# Patient Record
Sex: MALE | Race: BLACK OR AFRICAN AMERICAN | NOT HISPANIC OR LATINO | Employment: OTHER | URBAN - METROPOLITAN AREA
[De-identification: names, ages, dates, MRNs, and addresses within clinical notes are randomized per-mention and may not be internally consistent; named-entity substitution may affect disease eponyms.]

---

## 2022-03-10 ENCOUNTER — OFFICE VISIT (OUTPATIENT)
Dept: INTERNAL MEDICINE | Facility: CLINIC | Age: 72
End: 2022-03-10
Payer: MEDICARE

## 2022-03-10 ENCOUNTER — LAB VISIT (OUTPATIENT)
Dept: LAB | Facility: HOSPITAL | Age: 72
End: 2022-03-10
Attending: INTERNAL MEDICINE
Payer: MEDICARE

## 2022-03-10 VITALS
DIASTOLIC BLOOD PRESSURE: 90 MMHG | OXYGEN SATURATION: 97 % | HEART RATE: 65 BPM | WEIGHT: 148.25 LBS | BODY MASS INDEX: 24.7 KG/M2 | HEIGHT: 65 IN | SYSTOLIC BLOOD PRESSURE: 180 MMHG

## 2022-03-10 DIAGNOSIS — R79.9 ABNORMAL FINDING OF BLOOD CHEMISTRY, UNSPECIFIED: ICD-10-CM

## 2022-03-10 DIAGNOSIS — Z87.891 PERSONAL HISTORY OF NICOTINE DEPENDENCE: ICD-10-CM

## 2022-03-10 DIAGNOSIS — I10 PRIMARY HYPERTENSION: ICD-10-CM

## 2022-03-10 DIAGNOSIS — I10 PRIMARY HYPERTENSION: Primary | ICD-10-CM

## 2022-03-10 DIAGNOSIS — R19.4 CHANGE IN BOWEL HABIT: ICD-10-CM

## 2022-03-10 DIAGNOSIS — I73.9 CLAUDICATION: ICD-10-CM

## 2022-03-10 DIAGNOSIS — N43.3 HYDROCELE, UNSPECIFIED HYDROCELE TYPE: ICD-10-CM

## 2022-03-10 DIAGNOSIS — L60.3 DYSTROPHIC NAIL: ICD-10-CM

## 2022-03-10 DIAGNOSIS — Z13.6 ENCOUNTER FOR ABDOMINAL AORTIC ANEURYSM (AAA) SCREENING: ICD-10-CM

## 2022-03-10 DIAGNOSIS — Z12.11 SCREENING FOR COLON CANCER: ICD-10-CM

## 2022-03-10 LAB
ALBUMIN SERPL BCP-MCNC: 3.7 G/DL (ref 3.5–5.2)
ALP SERPL-CCNC: 86 U/L (ref 55–135)
ALT SERPL W/O P-5'-P-CCNC: 10 U/L (ref 10–44)
ANION GAP SERPL CALC-SCNC: 9 MMOL/L (ref 8–16)
ANISOCYTOSIS BLD QL SMEAR: SLIGHT
AST SERPL-CCNC: 19 U/L (ref 10–40)
BASOPHILS # BLD AUTO: 0.08 K/UL (ref 0–0.2)
BASOPHILS NFR BLD: 0.6 % (ref 0–1.9)
BILIRUB SERPL-MCNC: 0.4 MG/DL (ref 0.1–1)
BUN SERPL-MCNC: 12 MG/DL (ref 8–23)
BURR CELLS BLD QL SMEAR: ABNORMAL
CALCIUM SERPL-MCNC: 10.1 MG/DL (ref 8.7–10.5)
CHLORIDE SERPL-SCNC: 106 MMOL/L (ref 95–110)
CHOLEST SERPL-MCNC: 138 MG/DL (ref 120–199)
CHOLEST/HDLC SERPL: 3.1 {RATIO} (ref 2–5)
CO2 SERPL-SCNC: 25 MMOL/L (ref 23–29)
CREAT SERPL-MCNC: 0.8 MG/DL (ref 0.5–1.4)
DIFFERENTIAL METHOD: ABNORMAL
EOSINOPHIL # BLD AUTO: 0.1 K/UL (ref 0–0.5)
EOSINOPHIL NFR BLD: 0.4 % (ref 0–8)
ERYTHROCYTE [DISTWIDTH] IN BLOOD BY AUTOMATED COUNT: 21 % (ref 11.5–14.5)
EST. GFR  (AFRICAN AMERICAN): >60 ML/MIN/1.73 M^2
EST. GFR  (NON AFRICAN AMERICAN): >60 ML/MIN/1.73 M^2
ESTIMATED AVG GLUCOSE: 120 MG/DL (ref 68–131)
GLUCOSE SERPL-MCNC: 92 MG/DL (ref 70–110)
HBA1C MFR BLD: 5.8 % (ref 4–5.6)
HCT VFR BLD AUTO: 39.2 % (ref 40–54)
HDLC SERPL-MCNC: 44 MG/DL (ref 40–75)
HDLC SERPL: 31.9 % (ref 20–50)
HGB BLD-MCNC: 11.7 G/DL (ref 14–18)
IMM GRANULOCYTES # BLD AUTO: 0.04 K/UL (ref 0–0.04)
IMM GRANULOCYTES NFR BLD AUTO: 0.3 % (ref 0–0.5)
LDLC SERPL CALC-MCNC: 86 MG/DL (ref 63–159)
LYMPHOCYTES # BLD AUTO: 4.8 K/UL (ref 1–4.8)
LYMPHOCYTES NFR BLD: 38.1 % (ref 18–48)
MCH RBC QN AUTO: 24.2 PG (ref 27–31)
MCHC RBC AUTO-ENTMCNC: 29.8 G/DL (ref 32–36)
MCV RBC AUTO: 81 FL (ref 82–98)
MONOCYTES # BLD AUTO: 1 K/UL (ref 0.3–1)
MONOCYTES NFR BLD: 7.9 % (ref 4–15)
NEUTROPHILS # BLD AUTO: 6.6 K/UL (ref 1.8–7.7)
NEUTROPHILS NFR BLD: 52.7 % (ref 38–73)
NONHDLC SERPL-MCNC: 94 MG/DL
NRBC BLD-RTO: 0 /100 WBC
OVALOCYTES BLD QL SMEAR: ABNORMAL
PLATELET # BLD AUTO: 471 K/UL (ref 150–450)
PLATELET BLD QL SMEAR: ABNORMAL
PMV BLD AUTO: 9.7 FL (ref 9.2–12.9)
POIKILOCYTOSIS BLD QL SMEAR: SLIGHT
POTASSIUM SERPL-SCNC: 5 MMOL/L (ref 3.5–5.1)
PROT SERPL-MCNC: 7.7 G/DL (ref 6–8.4)
RBC # BLD AUTO: 4.84 M/UL (ref 4.6–6.2)
SODIUM SERPL-SCNC: 140 MMOL/L (ref 136–145)
TRIGL SERPL-MCNC: 40 MG/DL (ref 30–150)
WBC # BLD AUTO: 12.56 K/UL (ref 3.9–12.7)

## 2022-03-10 PROCEDURE — 3008F BODY MASS INDEX DOCD: CPT | Mod: CPTII,S$GLB,, | Performed by: INTERNAL MEDICINE

## 2022-03-10 PROCEDURE — 99406 BEHAV CHNG SMOKING 3-10 MIN: CPT | Mod: S$GLB,,, | Performed by: INTERNAL MEDICINE

## 2022-03-10 PROCEDURE — 1101F PT FALLS ASSESS-DOCD LE1/YR: CPT | Mod: CPTII,S$GLB,, | Performed by: INTERNAL MEDICINE

## 2022-03-10 PROCEDURE — 1159F PR MEDICATION LIST DOCUMENTED IN MEDICAL RECORD: ICD-10-PCS | Mod: CPTII,S$GLB,, | Performed by: INTERNAL MEDICINE

## 2022-03-10 PROCEDURE — 80061 LIPID PANEL: CPT | Performed by: INTERNAL MEDICINE

## 2022-03-10 PROCEDURE — 1160F PR REVIEW ALL MEDS BY PRESCRIBER/CLIN PHARMACIST DOCUMENTED: ICD-10-PCS | Mod: CPTII,S$GLB,, | Performed by: INTERNAL MEDICINE

## 2022-03-10 PROCEDURE — 3080F DIAST BP >= 90 MM HG: CPT | Mod: CPTII,S$GLB,, | Performed by: INTERNAL MEDICINE

## 2022-03-10 PROCEDURE — 1125F AMNT PAIN NOTED PAIN PRSNT: CPT | Mod: CPTII,S$GLB,, | Performed by: INTERNAL MEDICINE

## 2022-03-10 PROCEDURE — 3080F PR MOST RECENT DIASTOLIC BLOOD PRESSURE >= 90 MM HG: ICD-10-PCS | Mod: CPTII,S$GLB,, | Performed by: INTERNAL MEDICINE

## 2022-03-10 PROCEDURE — 99999 PR PBB SHADOW E&M-NEW PATIENT-LVL IV: ICD-10-PCS | Mod: PBBFAC,,, | Performed by: INTERNAL MEDICINE

## 2022-03-10 PROCEDURE — 3288F FALL RISK ASSESSMENT DOCD: CPT | Mod: CPTII,S$GLB,, | Performed by: INTERNAL MEDICINE

## 2022-03-10 PROCEDURE — 3077F SYST BP >= 140 MM HG: CPT | Mod: CPTII,S$GLB,, | Performed by: INTERNAL MEDICINE

## 2022-03-10 PROCEDURE — 3077F PR MOST RECENT SYSTOLIC BLOOD PRESSURE >= 140 MM HG: ICD-10-PCS | Mod: CPTII,S$GLB,, | Performed by: INTERNAL MEDICINE

## 2022-03-10 PROCEDURE — 1125F PR PAIN SEVERITY QUANTIFIED, PAIN PRESENT: ICD-10-PCS | Mod: CPTII,S$GLB,, | Performed by: INTERNAL MEDICINE

## 2022-03-10 PROCEDURE — 36415 COLL VENOUS BLD VENIPUNCTURE: CPT | Performed by: INTERNAL MEDICINE

## 2022-03-10 PROCEDURE — 3008F PR BODY MASS INDEX (BMI) DOCUMENTED: ICD-10-PCS | Mod: CPTII,S$GLB,, | Performed by: INTERNAL MEDICINE

## 2022-03-10 PROCEDURE — 83036 HEMOGLOBIN GLYCOSYLATED A1C: CPT | Performed by: INTERNAL MEDICINE

## 2022-03-10 PROCEDURE — 99999 PR PBB SHADOW E&M-NEW PATIENT-LVL IV: CPT | Mod: PBBFAC,,, | Performed by: INTERNAL MEDICINE

## 2022-03-10 PROCEDURE — 1160F RVW MEDS BY RX/DR IN RCRD: CPT | Mod: CPTII,S$GLB,, | Performed by: INTERNAL MEDICINE

## 2022-03-10 PROCEDURE — 85025 COMPLETE CBC W/AUTO DIFF WBC: CPT | Performed by: INTERNAL MEDICINE

## 2022-03-10 PROCEDURE — 99204 PR OFFICE/OUTPT VISIT, NEW, LEVL IV, 45-59 MIN: ICD-10-PCS | Mod: 25,S$GLB,, | Performed by: INTERNAL MEDICINE

## 2022-03-10 PROCEDURE — 80053 COMPREHEN METABOLIC PANEL: CPT | Performed by: INTERNAL MEDICINE

## 2022-03-10 PROCEDURE — 99406 PR TOBACCO USE CESSATION INTERMEDIATE 3-10 MINUTES: ICD-10-PCS | Mod: S$GLB,,, | Performed by: INTERNAL MEDICINE

## 2022-03-10 PROCEDURE — 1159F MED LIST DOCD IN RCRD: CPT | Mod: CPTII,S$GLB,, | Performed by: INTERNAL MEDICINE

## 2022-03-10 PROCEDURE — 99204 OFFICE O/P NEW MOD 45 MIN: CPT | Mod: 25,S$GLB,, | Performed by: INTERNAL MEDICINE

## 2022-03-10 PROCEDURE — 1101F PR PT FALLS ASSESS DOC 0-1 FALLS W/OUT INJ PAST YR: ICD-10-PCS | Mod: CPTII,S$GLB,, | Performed by: INTERNAL MEDICINE

## 2022-03-10 PROCEDURE — 3288F PR FALLS RISK ASSESSMENT DOCUMENTED: ICD-10-PCS | Mod: CPTII,S$GLB,, | Performed by: INTERNAL MEDICINE

## 2022-03-10 RX ORDER — METOPROLOL TARTRATE 25 MG/1
25 TABLET, FILM COATED ORAL 2 TIMES DAILY
COMMUNITY
End: 2022-03-10

## 2022-03-10 RX ORDER — TERBINAFINE HYDROCHLORIDE 250 MG/1
250 TABLET ORAL DAILY
Qty: 90 TABLET | Refills: 0 | Status: SHIPPED | OUTPATIENT
Start: 2022-03-10 | End: 2022-03-11 | Stop reason: SDUPTHER

## 2022-03-10 RX ORDER — LOSARTAN POTASSIUM AND HYDROCHLOROTHIAZIDE 12.5; 5 MG/1; MG/1
1 TABLET ORAL DAILY
Qty: 30 TABLET | Refills: 3 | Status: SHIPPED | OUTPATIENT
Start: 2022-03-10 | End: 2022-03-11 | Stop reason: SDUPTHER

## 2022-03-10 RX ORDER — ATORVASTATIN CALCIUM 80 MG/1
80 TABLET, FILM COATED ORAL DAILY
COMMUNITY
End: 2022-06-07 | Stop reason: SDUPTHER

## 2022-03-10 RX ORDER — METFORMIN HYDROCHLORIDE 500 MG/1
500 TABLET ORAL 2 TIMES DAILY WITH MEALS
COMMUNITY
End: 2022-03-10 | Stop reason: ALTCHOICE

## 2022-03-10 RX ORDER — HYDROCHLOROTHIAZIDE 25 MG/1
25 TABLET ORAL DAILY
COMMUNITY
End: 2022-03-10 | Stop reason: ALTCHOICE

## 2022-03-10 NOTE — PROGRESS NOTES
"    CHIEF COMPLAINT     Chief Complaint   Patient presents with    Establish Care     Concern about weight loss. (Is he still healthy to drop some weight)  Is his medication making him drop weight?  Eating habits have changed.( Don't eat as much as he used too)       HPI     Babatunde Singh is a 71 y.o. male hypertension hyperlipidemia tobacco use here today for new patient visit.  Previously followed at [facility].    Hypertension  Taking hydrochlorothiazide 25 and metoprolol XL 25 mg daily.  Does not check blood pressure at home.  No coronary artery disease no atrial fibrillation    Hydrocele  Had US 10/2020. Patient reports swelling is more bothersome.    Tobacco use disorder >40 pack year history still smoking, contemplative regarding quitting  - never had lung Ca screening  -never had AAA screening    Leg pain  Sx concerning for claudication. Never evaluated by physician.    Personally Reviewed Patient's Medical, surgical, family and social hx. Changes updated in [facility].  Care Team updated in Epic    Review of Systems:  Review of Systems   Constitutional: Negative for fatigue and fever.   Respiratory: Negative for shortness of breath and stridor.    Cardiovascular: Negative for leg swelling.   Genitourinary: Positive for scrotal swelling.   Musculoskeletal: Positive for gait problem.       Health Maintenance:   Reviewed with patient  Due for the following:      PHYSICAL EXAM     BP (!) 180/90 (BP Location: Right arm)   Pulse 65   Ht 5' 5" (1.651 m)   Wt 67.3 kg (148 lb 4.2 oz)   SpO2 97%   BMI 24.67 kg/m²     Gen: Well Appearing, NAD  HEENT: PERR, EOMI  Neck: FROM, no thyromegaly, no cervical adenopathy  CVD: RRR, no M/R/G  Pulm: Normal work of breathing, CTAB, no wheezing  Abd:  Soft, NT, ND non TTP, no mass  : Left sided scrotal fullness.  MSK: no LE edema, decreased hair pattern  Neuro: A&Ox3, gait normal, speech normal  Mood; Mood normal, behavior normal, thought process linear       LABS "     Labs reviewed; ordered  Scrotal US  There is a left-sided hydrocele which is quite large, and much larger than the right. The left hydrocele is estimated at around 5.7 x 6.3 x 10.8 cm in dimensions. It has slight diffuse low-level echo type findings questioned within it, but shows no evidence of septations or any mural nodules.   The left testicle appears well seen and appears negative, at 4 cm long axis and 2.5 cm AP diameter. There is visible flow in the left testicle. This includes arterial and venous flow.    ASSESSMENT     1. Primary hypertension  CBC Auto Differential    Comprehensive Metabolic Panel    Hemoglobin A1C    Lipid Panel    losartan-hydrochlorothiazide 50-12.5 mg (HYZAAR) 50-12.5 mg per tablet   2. Screening for colon cancer  Case Request Endoscopy: COLONOSCOPY   3. Encounter for abdominal aortic aneurysm (AAA) screening  US Abdominal Aorta   4. Abnormal finding of blood chemistry, unspecified   Hemoglobin A1C   5. Change in bowel habit   Case Request Endoscopy: COLONOSCOPY   6. Personal history of nicotine dependence   CT Chest Lung Screening Low Dose   7. Claudication  Ankle Brachial Indices (BRENNAN)   8. Dystrophic nail  Ambulatory referral/consult to Podiatry    terbinafine HCL (LAMISIL) 250 mg tablet   9. Hydrocele, unspecified hydrocele type  Ambulatory referral/consult to Urology           Plan     Babatunde Singh is a 71 y.o. male with hypertension hyperlipidemia and tobacco use disorder  1. Primary hypertension  Will start Hyzaar 50-12.5  Stop hydrochlorothiazide stop metoprolol  Reassess in 1 month  - CBC Auto Differential; Future  - Comprehensive Metabolic Panel; Future  - Hemoglobin A1C; Future  - Lipid Panel; Future  - losartan-hydrochlorothiazide 50-12.5 mg (HYZAAR) 50-12.5 mg per tablet; Take 1 tablet by mouth once daily.  Dispense: 30 tablet; Refill: 3    2. Screening for colon cancer  - Case Request Endoscopy: COLONOSCOPY    3. Encounter for abdominal aortic aneurysm (AAA)  screening  - US Abdominal Aorta; Future    4. Abnormal finding of blood chemistry, unspecified   - Hemoglobin A1C; Future    5. Change in bowel habit   - Case Request Endoscopy: COLONOSCOPY    6. Personal history of nicotine dependence   Greater than 3 minutes counseling contemplative  - CT Chest Lung Screening Low Dose; Future    7. Claudication  Leg symptoms concerning for claudication will get BRENNAN to screening  - Ankle Brachial Indices (BRENNAN); Future    8. Dystrophic nail  - Ambulatory referral/consult to Podiatry; Future  - terbinafine HCL (LAMISIL) 250 mg tablet; Take 1 tablet (250 mg total) by mouth once daily.  Dispense: 90 tablet; Refill: 0    9. Hydrocele, unspecified hydrocele type  Symptomatic patient would like to have this evaluated  - Ambulatory referral/consult to Urology; Future      Esdras Ly MD

## 2022-03-11 ENCOUNTER — TELEPHONE (OUTPATIENT)
Dept: INTERNAL MEDICINE | Facility: CLINIC | Age: 72
End: 2022-03-11
Payer: MEDICARE

## 2022-03-11 DIAGNOSIS — L60.3 DYSTROPHIC NAIL: ICD-10-CM

## 2022-03-11 DIAGNOSIS — I10 PRIMARY HYPERTENSION: ICD-10-CM

## 2022-03-11 DIAGNOSIS — D50.9 MICROCYTIC ANEMIA: Primary | ICD-10-CM

## 2022-03-11 RX ORDER — LOSARTAN POTASSIUM AND HYDROCHLOROTHIAZIDE 12.5; 5 MG/1; MG/1
1 TABLET ORAL DAILY
Qty: 30 TABLET | Refills: 3 | Status: SHIPPED | OUTPATIENT
Start: 2022-03-11 | End: 2022-04-21 | Stop reason: SDUPTHER

## 2022-03-11 RX ORDER — TERBINAFINE HYDROCHLORIDE 250 MG/1
250 TABLET ORAL DAILY
Qty: 90 TABLET | Refills: 0 | Status: SHIPPED | OUTPATIENT
Start: 2022-03-11 | End: 2022-04-10

## 2022-03-11 NOTE — TELEPHONE ENCOUNTER
----- Message from Esdras Ly MD sent at 3/11/2022  2:14 PM CST -----  Labs notable for anemia (not enough to cause symptoms but not normal.). Would like to do additional blood work to see if iron deficiency or not.    Order for c-scope already placed.    Esdras Ly

## 2022-03-11 NOTE — TELEPHONE ENCOUNTER
Daughter informed. Pt has a new number. [phone]. I will call him to see when he want to come in for lab. I had to leave a message on his recorder to call back to schedule lab work.

## 2022-03-11 NOTE — TELEPHONE ENCOUNTER
----- Message from Radha Montanez sent at 3/11/2022 10:56 AM CST -----  Requesting an RX refill or new RX.  Is this a refill or new RX: Refill  RX name and strength terbinafine HCL (LAMISIL) 250 mg tablet  and losartan-hydrochlorothiazide 50-12.5 mg (HYZAAR) 50-12.5 mg per tablet  Is this a 30 day or 90 day RX:   Pharmacy name and phone # [id number] [address] Phone:  [phone] Fax:  [phone]    Comments: the patient said he nolonger use [facility] due to ins coverage.

## 2022-03-15 ENCOUNTER — HOSPITAL ENCOUNTER (OUTPATIENT)
Dept: RADIOLOGY | Facility: OTHER | Age: 72
Discharge: HOME | End: 2022-03-15
Attending: INTERNAL MEDICINE
Payer: MEDICARE

## 2022-03-15 DIAGNOSIS — Z87.891 PERSONAL HISTORY OF NICOTINE DEPENDENCE: ICD-10-CM

## 2022-03-15 DIAGNOSIS — Z13.6 ENCOUNTER FOR ABDOMINAL AORTIC ANEURYSM (AAA) SCREENING: ICD-10-CM

## 2022-03-15 PROCEDURE — 71271 CT THORAX LUNG CANCER SCR C-: CPT | Mod: TC

## 2022-03-15 PROCEDURE — 71271 CT THORAX LUNG CANCER SCR C-: CPT | Mod: 26,,, | Performed by: RADIOLOGY

## 2022-03-15 PROCEDURE — 76775 US ABDOMINAL AORTA: ICD-10-PCS | Mod: 26,,, | Performed by: RADIOLOGY

## 2022-03-15 PROCEDURE — 76775 US EXAM ABDO BACK WALL LIM: CPT | Mod: 26,,, | Performed by: RADIOLOGY

## 2022-03-15 PROCEDURE — 76775 US EXAM ABDO BACK WALL LIM: CPT | Mod: TC

## 2022-03-15 PROCEDURE — 71271 CT CHEST LUNG SCREENING LOW DOSE: ICD-10-PCS | Mod: 26,,, | Performed by: RADIOLOGY

## 2022-03-16 ENCOUNTER — TELEPHONE (OUTPATIENT)
Dept: INTERNAL MEDICINE | Facility: CLINIC | Age: 72
End: 2022-03-16
Payer: MEDICARE

## 2022-03-16 NOTE — TELEPHONE ENCOUNTER
----- Message from Esdras Ly MD sent at 3/15/2022  4:42 PM CDT -----  AAA screen normal no further follow-up required

## 2022-03-16 NOTE — TELEPHONE ENCOUNTER
----- Message from Esdras Ly MD sent at 3/15/2022  4:43 PM CDT -----  No suspicious findings recommend repeat imaging in a year

## 2022-03-17 ENCOUNTER — TELEPHONE (OUTPATIENT)
Dept: INTERNAL MEDICINE | Facility: CLINIC | Age: 72
End: 2022-03-17
Payer: MEDICARE

## 2022-03-17 NOTE — TELEPHONE ENCOUNTER
----- Message from Esdras Ly MD sent at 3/17/2022 10:34 AM CDT -----  Iron deficiency anemia.  Patient needs to proceed with colonoscopy that was ordered our last visit

## 2022-03-31 ENCOUNTER — OFFICE VISIT (OUTPATIENT)
Dept: UROLOGY | Facility: CLINIC | Age: 72
End: 2022-03-31
Payer: MEDICARE

## 2022-03-31 VITALS
WEIGHT: 148.38 LBS | HEIGHT: 65 IN | HEART RATE: 77 BPM | BODY MASS INDEX: 24.72 KG/M2 | SYSTOLIC BLOOD PRESSURE: 157 MMHG | DIASTOLIC BLOOD PRESSURE: 67 MMHG

## 2022-03-31 DIAGNOSIS — N43.3 HYDROCELE, UNSPECIFIED HYDROCELE TYPE: ICD-10-CM

## 2022-03-31 PROCEDURE — 1159F MED LIST DOCD IN RCRD: CPT | Mod: CPTII,S$GLB,, | Performed by: NURSE PRACTITIONER

## 2022-03-31 PROCEDURE — 3288F FALL RISK ASSESSMENT DOCD: CPT | Mod: CPTII,S$GLB,, | Performed by: NURSE PRACTITIONER

## 2022-03-31 PROCEDURE — 1126F AMNT PAIN NOTED NONE PRSNT: CPT | Mod: CPTII,S$GLB,, | Performed by: NURSE PRACTITIONER

## 2022-03-31 PROCEDURE — 1101F PT FALLS ASSESS-DOCD LE1/YR: CPT | Mod: CPTII,S$GLB,, | Performed by: NURSE PRACTITIONER

## 2022-03-31 PROCEDURE — 99203 OFFICE O/P NEW LOW 30 MIN: CPT | Mod: S$GLB,,, | Performed by: NURSE PRACTITIONER

## 2022-03-31 PROCEDURE — 3077F SYST BP >= 140 MM HG: CPT | Mod: CPTII,S$GLB,, | Performed by: NURSE PRACTITIONER

## 2022-03-31 PROCEDURE — 1126F PR PAIN SEVERITY QUANTIFIED, NO PAIN PRESENT: ICD-10-PCS | Mod: CPTII,S$GLB,, | Performed by: NURSE PRACTITIONER

## 2022-03-31 PROCEDURE — 3077F PR MOST RECENT SYSTOLIC BLOOD PRESSURE >= 140 MM HG: ICD-10-PCS | Mod: CPTII,S$GLB,, | Performed by: NURSE PRACTITIONER

## 2022-03-31 PROCEDURE — 1159F PR MEDICATION LIST DOCUMENTED IN MEDICAL RECORD: ICD-10-PCS | Mod: CPTII,S$GLB,, | Performed by: NURSE PRACTITIONER

## 2022-03-31 PROCEDURE — 3008F PR BODY MASS INDEX (BMI) DOCUMENTED: ICD-10-PCS | Mod: CPTII,S$GLB,, | Performed by: NURSE PRACTITIONER

## 2022-03-31 PROCEDURE — 1160F PR REVIEW ALL MEDS BY PRESCRIBER/CLIN PHARMACIST DOCUMENTED: ICD-10-PCS | Mod: CPTII,S$GLB,, | Performed by: NURSE PRACTITIONER

## 2022-03-31 PROCEDURE — 3288F PR FALLS RISK ASSESSMENT DOCUMENTED: ICD-10-PCS | Mod: CPTII,S$GLB,, | Performed by: NURSE PRACTITIONER

## 2022-03-31 PROCEDURE — 99203 PR OFFICE/OUTPT VISIT, NEW, LEVL III, 30-44 MIN: ICD-10-PCS | Mod: S$GLB,,, | Performed by: NURSE PRACTITIONER

## 2022-03-31 PROCEDURE — 3044F HG A1C LEVEL LT 7.0%: CPT | Mod: CPTII,S$GLB,, | Performed by: NURSE PRACTITIONER

## 2022-03-31 PROCEDURE — 1160F RVW MEDS BY RX/DR IN RCRD: CPT | Mod: CPTII,S$GLB,, | Performed by: NURSE PRACTITIONER

## 2022-03-31 PROCEDURE — 1101F PR PT FALLS ASSESS DOC 0-1 FALLS W/OUT INJ PAST YR: ICD-10-PCS | Mod: CPTII,S$GLB,, | Performed by: NURSE PRACTITIONER

## 2022-03-31 PROCEDURE — 3008F BODY MASS INDEX DOCD: CPT | Mod: CPTII,S$GLB,, | Performed by: NURSE PRACTITIONER

## 2022-03-31 PROCEDURE — 3078F DIAST BP <80 MM HG: CPT | Mod: CPTII,S$GLB,, | Performed by: NURSE PRACTITIONER

## 2022-03-31 PROCEDURE — 3078F PR MOST RECENT DIASTOLIC BLOOD PRESSURE < 80 MM HG: ICD-10-PCS | Mod: CPTII,S$GLB,, | Performed by: NURSE PRACTITIONER

## 2022-03-31 PROCEDURE — 3044F PR MOST RECENT HEMOGLOBIN A1C LEVEL <7.0%: ICD-10-PCS | Mod: CPTII,S$GLB,, | Performed by: NURSE PRACTITIONER

## 2022-03-31 NOTE — PROGRESS NOTES
"Subjective:      Babatunde Singh is a 71 y.o. male who was referred by Dr. Ly for evaluation of his scrotal swelling.    The patient presents today reporting left sided scrotal swelling for several years. Denies pain but states that this can be uncomfortable at times. Wearing supportive underwear. There are no aggravating or alleviating factors. States that the amount of swelling does not change during the day.     Denies bothersome urinary symptoms.       The following portions of the patient's history were reviewed and updated as appropriate: allergies, current medications, past family history, past medical history, past social history, past surgical history and problem list.    Review of Systems  Constitutional: no fever or chills  ENT: no nasal congestion or sore throat  Respiratory: no cough or shortness of breath  Cardiovascular: no chest pain or palpitations  Gastrointestinal: no nausea or vomiting, tolerating diet  Genitourinary: as per HPI  Hematologic/Lymphatic: no easy bruising or lymphadenopathy  Musculoskeletal: no arthralgias or myalgias  Neurological: no seizures or tremors  Behavioral/Psych: no auditory or visual hallucinations     Objective:   Vitals: BP (!) 157/67 (BP Location: Left arm, Patient Position: Sitting, BP Method: Large (Automatic))   Pulse 77   Ht 5' 5" (1.651 m)   Wt 67.3 kg (148 lb 5.9 oz)   BMI 24.69 kg/m²     Physical Exam   General: alert and oriented, no acute distress  Head: normocephalic, atraumatic  Neck: supple, normal ROM  Respiratory: Symmetric expansion, non-labored breathing  Cardiovascular: regular rate and rhythm  Abdomen: soft, non tender, non distended, no palpable masses, no hernias, no hepatomegaly or splenomegaly  Genitourinary:   Penis: normal, no lesions, patent orthotopic meatus, no plaques  Scrotum: no rashes or skin changes;   Testes: descended bilaterally, no masses, nontender, normal epididymis right difficulty palpating left due to swelling; " significant amount of left scrotal swelling/hydrocele (non tender)   Skin: normal coloration and turgor, no rashes, no suspicious skin lesions noted  Neuro: alert and oriented x3, no gross deficits  Psych: normal judgment and insight, normal mood/affect and non-anxious    Lab Review   Urinalysis demonstrates positive for red blood cells (trace), protein (trace)  Lab Results   Component Value Date    WBC 12.56 03/10/2022    HGB 11.7 (L) 03/10/2022    HCT 39.2 (L) 03/10/2022    MCV 81 (L) 03/10/2022     (H) 03/10/2022     Lab Results   Component Value Date    CREATININE 0.8 03/10/2022    BUN 12 03/10/2022     No results found for: PSA  Imaging   None    Assessment:     1. Hydrocele, unspecified hydrocele type      Plan:   Babatunde was seen today for hydrocele.    Diagnoses and all orders for this visit:    Hydrocele, unspecified hydrocele type  -     Ambulatory referral/consult to Urology  -     US Scrotum And Testicles; Future    Plan:  --Suspect hydrocele but will r/o hernia with scrotal US   --Follow up will be determined based on results of imaging. Pt would like to proceed with hydrocelectomy if imaging confirms this is a hydrocele

## 2022-04-05 ENCOUNTER — HOSPITAL ENCOUNTER (OUTPATIENT)
Dept: RADIOLOGY | Facility: OTHER | Age: 72
Discharge: HOME | End: 2022-04-05
Attending: NURSE PRACTITIONER
Payer: MEDICARE

## 2022-04-05 DIAGNOSIS — N43.3 HYDROCELE, UNSPECIFIED HYDROCELE TYPE: ICD-10-CM

## 2022-04-05 PROCEDURE — 76870 US EXAM SCROTUM: CPT | Mod: TC

## 2022-04-05 PROCEDURE — 76870 US SCROTUM AND TESTICLES: ICD-10-PCS | Mod: 26,,, | Performed by: RADIOLOGY

## 2022-04-05 PROCEDURE — 76870 US EXAM SCROTUM: CPT | Mod: 26,,, | Performed by: RADIOLOGY

## 2022-04-18 ENCOUNTER — OFFICE VISIT (OUTPATIENT)
Dept: PODIATRY | Facility: CLINIC | Age: 72
End: 2022-04-18
Payer: MEDICARE

## 2022-04-18 VITALS
DIASTOLIC BLOOD PRESSURE: 69 MMHG | HEIGHT: 65 IN | RESPIRATION RATE: 18 BRPM | SYSTOLIC BLOOD PRESSURE: 149 MMHG | WEIGHT: 149.94 LBS | HEART RATE: 76 BPM | BODY MASS INDEX: 24.98 KG/M2

## 2022-04-18 DIAGNOSIS — L60.3 DYSTROPHIC NAIL: ICD-10-CM

## 2022-04-18 DIAGNOSIS — B35.1 ONYCHOMYCOSIS DUE TO DERMATOPHYTE: Primary | ICD-10-CM

## 2022-04-18 DIAGNOSIS — Z72.0 TOBACCO USE: ICD-10-CM

## 2022-04-18 DIAGNOSIS — I73.9 CLAUDICATION OF BOTH LOWER EXTREMITIES: ICD-10-CM

## 2022-04-18 PROCEDURE — 99203 OFFICE O/P NEW LOW 30 MIN: CPT | Mod: S$GLB,,, | Performed by: PODIATRIST

## 2022-04-18 PROCEDURE — 99999 PR PBB SHADOW E&M-EST. PATIENT-LVL III: CPT | Mod: PBBFAC,,, | Performed by: PODIATRIST

## 2022-04-18 PROCEDURE — 99999 PR PBB SHADOW E&M-EST. PATIENT-LVL III: ICD-10-PCS | Mod: PBBFAC,,, | Performed by: PODIATRIST

## 2022-04-18 PROCEDURE — 99203 PR OFFICE/OUTPT VISIT, NEW, LEVL III, 30-44 MIN: ICD-10-PCS | Mod: S$GLB,,, | Performed by: PODIATRIST

## 2022-04-18 PROCEDURE — 99213 OFFICE O/P EST LOW 20 MIN: CPT | Mod: PBBFAC,PN | Performed by: PODIATRIST

## 2022-04-18 NOTE — PROGRESS NOTES
Subjective:      Patient ID: Babatunde Singh is a 71 y.o. male.    Chief Complaint:   PCP (Esdras Ly MD  3/10/22), Diabetes Mellitus (Pre diabetic ), and Nail Problem (Burning sensation of toes, nail fungus )    Babatunde is a 71 y.o. male who presents to the clinic complaining of thick and discolored toenails. he does have difficulty cutting them.    Patient is new to podiatry he denies any history of seeing a foot doctor.    She was referred by his primary care doctor.  He is not exactly sure why however he does have some toenail issues as well as numbness and tingling in his feet.    He relates that he has been taking the oral pill for his toenails without any problems.  He has the 90 day supply at home. (per chart review patient discussing Lamisil)    Patient relates that he mostly sits for work.  He has no issues with his shoe gear.  Patient relates he does smoke anywhere between a half to 1 pack per day.  He does describe pain in his calves and feet when walking.  He relates he can walk about a half a block.  He also relates often at night when lying down he has to get out of bed and walk around.  This does help the pain.  He denies any lower back problems.  He did discuss this with his primary care doctor recently.  He is unsure if he has had any workup for this but he has been getting some testing at [facility].  He denies having any heart disease or any known lower extremity disease.  He denies seeing vascular or Cardiology in the past.  Patient is new to [facility].    He is aware that he has pre diabetes    He has an appointment tomorrow.  He discusses his urology visit.    PCP 3/10/22  7. Claudication  Ankle Brachial Indices (BRENNAN)   8. Dystrophic nail  Ambulatory referral/consult to Podiatry     terbinafine HCL (LAMISIL) 250 mg tablet          History reviewed. No pertinent past medical history.  Past Surgical History:   Procedure Laterality Date    KNEE SURGERY  1972     Current Outpatient  "Medications on File Prior to Visit   Medication Sig Dispense Refill    atorvastatin (LIPITOR) 80 MG tablet Take 80 mg by mouth once daily.      losartan-hydrochlorothiazide 50-12.5 mg (HYZAAR) 50-12.5 mg per tablet Take 1 tablet by mouth once daily. 30 tablet 3     No current facility-administered medications on file prior to visit.     Review of patient's allergies indicates:  No Known Allergies    Review of Systems   Constitutional: Negative for chills, decreased appetite, fever, malaise/fatigue, night sweats, weight gain and weight loss.   Cardiovascular: Negative for chest pain, claudication, dyspnea on exertion, leg swelling, palpitations and syncope.   Respiratory: Negative for cough and shortness of breath.    Endocrine: Negative for cold intolerance and heat intolerance.   Hematologic/Lymphatic: Negative for bleeding problem. Does not bruise/bleed easily.   Skin: Positive for nail changes. Negative for color change, dry skin, flushing, itching, poor wound healing, rash, skin cancer, suspicious lesions and unusual hair distribution.   Musculoskeletal: Positive for joint pain (History knee pains) and muscle cramps (When walking and at night). Negative for arthritis, back pain, falls, gout, joint swelling, muscle weakness, myalgias, neck pain and stiffness.   Gastrointestinal: Negative for diarrhea, nausea and vomiting.   Neurological: Positive for numbness and paresthesias. Negative for dizziness, focal weakness, light-headedness, tremors, vertigo and weakness.   Psychiatric/Behavioral: Negative for altered mental status and depression. The patient does not have insomnia.    Allergic/Immunologic: Negative.            Objective:       Vitals:    04/18/22 0748   BP: (!) 149/69   Pulse: 76   Resp: 18   Weight: 68 kg (149 lb 14.6 oz)   Height: 5' 5" (1.651 m)   PainSc: 0-No pain   68 kg (149 lb 14.6 oz)     Physical Exam  Vitals reviewed.   Constitutional:       General: He is not in acute distress.     " Appearance: He is well-developed. He is not ill-appearing, toxic-appearing or diaphoretic.      Comments: Proper supportive shoegear      Cardiovascular:      Pulses:           Dorsalis pedis pulses are 0 on the right side and 0 on the left side.        Posterior tibial pulses are 1+ on the right side and 1+ on the left side.      Comments: Waxy skin, absent hair growth to digits and foot and lower legs, no signs of ischemia delayed cap fill time however digits are warm  Musculoskeletal:         General: No swelling or tenderness.      Right lower leg: No edema.      Left lower leg: No edema.      Right ankle: Normal.      Right Achilles Tendon: Normal. No tenderness.      Left ankle: Normal.      Left Achilles Tendon: Normal. No tenderness.      Right foot: Decreased range of motion. Deformity and prominent metatarsal heads present. No tenderness or bony tenderness.      Left foot: Decreased range of motion. Deformity and prominent metatarsal heads present. No tenderness or bony tenderness.      Comments: No gross deformities noted    No pain on palpation or with range of motion strength 5/5    Flexible pes planus foot type w/ medial arch collapse and mild gastroc equinus      Reducible extensor and flexor contractures at the MTPJ and PIPJ of toes 2-5, bilat.       Feet:      Right foot:      Protective Sensation: 10 sites tested. 10 sites sensed.      Skin integrity: Dry skin present. No ulcer, blister, skin breakdown, erythema, warmth or callus.      Toenail Condition: Right toenails are abnormally thick. Fungal disease present.     Left foot:      Protective Sensation: 10 sites tested. 10 sites sensed.      Skin integrity: Dry skin present. No ulcer, blister, skin breakdown, erythema, warmth or callus.      Toenail Condition: Left toenails are abnormally thick. Fungal disease present.     Comments: Gratiot Pedro intact    Nails 1 through 10 thickened discolored    Left hallux nail incurvated with distal 1/3  lysis, mild subungual maceration no acute signs of infection mycosis noted    No open lesions  Skin:     General: Skin is warm and dry.      Capillary Refill: Capillary refill takes 2 to 3 seconds.      Coloration: Skin is not pale.      Findings: No erythema or rash.      Nails: There is no clubbing.   Neurological:      Mental Status: He is alert and oriented to person, place, and time.      Sensory: No sensory deficit.      Gait: Gait is intact.   Psychiatric:         Attention and Perception: Attention normal.         Mood and Affect: Mood normal.         Speech: Speech normal.         Behavior: Behavior normal.         Thought Content: Thought content normal.         Cognition and Memory: Cognition normal.         Judgment: Judgment normal.               Assessment:       Encounter Diagnoses   Name Primary?    Dystrophic nail     Onychomycosis due to dermatophyte Yes    Tobacco use     Claudication of both lower extremities          Plan:       Babatunde was seen today for pcp, diabetes mellitus and nail problem.    Diagnoses and all orders for this visit:    Onychomycosis due to dermatophyte    Dystrophic nail  -     Ambulatory referral/consult to Podiatry    Tobacco use    Claudication of both lower extremities      I counseled the patient on his conditions, their implications and medical management.        - Shoe inspection. Patient instructed on proper foot hygeine. We discussed wearing proper shoe gear, daily foot inspections, never walking without protective shoe gear, never putting sharp instruments to feet, routine podiatric nail visits every 2-3 months.      - With patient's permission, nails were aggressively reduced and debrided x 10 to their soft tissue attachment mechanically and with electric , removing all offending nail and debris. Patient relates relief following the procedure. He will continue to monitor the areas daily, inspect his feet, wear protective shoe gear when ambulatory,  moisturizer to maintain skin integrity and follow in this office in approximately 2-3 months, sooner p.r.n.      - patient taking Lamisil oral for 90 days per PCP discussed use and how new nail growth will occur ideally resolving fungus.  Discussed often times topical medication will need to be added will consider it next visit    - discussed with patient decreased pulses and claudication symptoms.  Patient has an BRENNAN ordered by his primary care doctor however does not appear to be scheduled.  Will ask my staff to help kindly schedule patient's BRENNAN.  Also discussed with patient he can reach out to primary care doctor about this.    Recommend vascular referral after BRENNAN patient likely needs medication and or possible further imaging    Discussed with patient tobacco use and patient understands it affects blood flow    Discussed proper shoes and foot care    Return to clinic in 2 months or sooner if needed once again will consider topical fungal medication            No follow-ups on file.

## 2022-04-18 NOTE — PROGRESS NOTES
"Subjective:      Babatunde Singh is a 71 y.o. male who returns today regarding his hydrocele.    The patient reports left sided scrotal swelling for several years. Denies pain but states that this can be uncomfortable at times. Wearing supportive underwear. There are no aggravating or alleviating factors. States that the amount of swelling does not change during the day.      Denies bothersome urinary symptoms.       Scrotal US demonstrates "Medium right hydrocele.  Large left hydrocele.  No evidence for abnormal testicular masses or testicular torsion."    He presents today to discuss hydrocelectomy.     The following portions of the patient's history were reviewed and updated as appropriate: allergies, current medications, past family history, past medical history, past social history, past surgical history and problem list.    Review of Systems  Constitutional: no fever or chills  ENT: no nasal congestion or sore throat  Respiratory: no cough or shortness of breath  Cardiovascular: no chest pain or palpitations  Gastrointestinal: no nausea or vomiting, tolerating diet  Genitourinary: as per HPI  Hematologic/Lymphatic: no easy bruising or lymphadenopathy  Musculoskeletal: no arthralgias or myalgias  Neurological: no seizures or tremors  Behavioral/Psych: no auditory or visual hallucinations     Objective:   Vitals: (did not take BP medication yet this morning)  Vitals:    04/19/22 0703   BP: (!) 208/81   Pulse: 92     Physical Exam   General: alert and oriented, no acute distress  Head: normocephalic, atraumatic  Neck: supple, no lymphadenopathy, normal ROM, no masses  Respiratory: Symmetric expansion, non-labored breathing  Cardiovascular: regular rate and rhythm  Abdomen: soft, non tender, non distended  Genitourinary:   Penis: normal, no lesions, patent orthotopic meatus, no plaques  Scrotum: no rashes or skin changes;   Testes: descended bilaterally, no masses, nontender, normal epididymides bilaterally, " "bilateral hydroceles (L>R)  Skin: normal coloration and turgor, no rashes, no suspicious skin lesions noted  Neuro: alert and oriented x3, no gross deficits  Psych: normal judgment and insight, normal mood/affect and non-anxious    Lab Review   Urinalysis demonstrates: no specimen    Lab Results   Component Value Date    WBC 12.56 03/10/2022    HGB 11.7 (L) 03/10/2022    HCT 39.2 (L) 03/10/2022    MCV 81 (L) 03/10/2022     (H) 03/10/2022     Lab Results   Component Value Date    CREATININE 0.8 03/10/2022    BUN 12 03/10/2022     No results found for: PSA  Imaging   (all images personally reviewed; agree with report below)  Scrotal US- 'Medium right hydrocele.  Large left hydrocele.  No evidence for abnormal testicular masses or testicular torsion."    Assessment:   Hydrocele, bilateral    Plan:   Diagnoses and all orders for this visit:    Hydrocele, bilateral    Plan:  --Will proceed with bilateral hydrocelectomy with Dr. Roberts next opening. Discussed the risks/benefits of the procedure. Answered all questions. Consent signed.     "

## 2022-04-19 ENCOUNTER — OFFICE VISIT (OUTPATIENT)
Dept: UROLOGY | Facility: CLINIC | Age: 72
End: 2022-04-19
Payer: MEDICARE

## 2022-04-19 ENCOUNTER — TELEPHONE (OUTPATIENT)
Dept: UROLOGY | Facility: CLINIC | Age: 72
End: 2022-04-19

## 2022-04-19 VITALS — DIASTOLIC BLOOD PRESSURE: 81 MMHG | SYSTOLIC BLOOD PRESSURE: 208 MMHG | HEART RATE: 92 BPM

## 2022-04-19 DIAGNOSIS — N43.3 HYDROCELE, BILATERAL: Primary | ICD-10-CM

## 2022-04-19 PROCEDURE — 99214 OFFICE O/P EST MOD 30 MIN: CPT | Mod: S$GLB,,, | Performed by: NURSE PRACTITIONER

## 2022-04-19 PROCEDURE — 3288F PR FALLS RISK ASSESSMENT DOCUMENTED: ICD-10-PCS | Mod: CPTII,S$GLB,, | Performed by: NURSE PRACTITIONER

## 2022-04-19 PROCEDURE — 1160F PR REVIEW ALL MEDS BY PRESCRIBER/CLIN PHARMACIST DOCUMENTED: ICD-10-PCS | Mod: CPTII,S$GLB,, | Performed by: NURSE PRACTITIONER

## 2022-04-19 PROCEDURE — 99214 PR OFFICE/OUTPT VISIT, EST, LEVL IV, 30-39 MIN: ICD-10-PCS | Mod: S$GLB,,, | Performed by: NURSE PRACTITIONER

## 2022-04-19 PROCEDURE — 3044F HG A1C LEVEL LT 7.0%: CPT | Mod: CPTII,S$GLB,, | Performed by: NURSE PRACTITIONER

## 2022-04-19 PROCEDURE — 1126F AMNT PAIN NOTED NONE PRSNT: CPT | Mod: CPTII,S$GLB,, | Performed by: NURSE PRACTITIONER

## 2022-04-19 PROCEDURE — 3077F PR MOST RECENT SYSTOLIC BLOOD PRESSURE >= 140 MM HG: ICD-10-PCS | Mod: CPTII,S$GLB,, | Performed by: NURSE PRACTITIONER

## 2022-04-19 PROCEDURE — 1126F PR PAIN SEVERITY QUANTIFIED, NO PAIN PRESENT: ICD-10-PCS | Mod: CPTII,S$GLB,, | Performed by: NURSE PRACTITIONER

## 2022-04-19 PROCEDURE — 1101F PT FALLS ASSESS-DOCD LE1/YR: CPT | Mod: CPTII,S$GLB,, | Performed by: NURSE PRACTITIONER

## 2022-04-19 PROCEDURE — 1101F PR PT FALLS ASSESS DOC 0-1 FALLS W/OUT INJ PAST YR: ICD-10-PCS | Mod: CPTII,S$GLB,, | Performed by: NURSE PRACTITIONER

## 2022-04-19 PROCEDURE — 3288F FALL RISK ASSESSMENT DOCD: CPT | Mod: CPTII,S$GLB,, | Performed by: NURSE PRACTITIONER

## 2022-04-19 PROCEDURE — 3077F SYST BP >= 140 MM HG: CPT | Mod: CPTII,S$GLB,, | Performed by: NURSE PRACTITIONER

## 2022-04-19 PROCEDURE — 1160F RVW MEDS BY RX/DR IN RCRD: CPT | Mod: CPTII,S$GLB,, | Performed by: NURSE PRACTITIONER

## 2022-04-19 PROCEDURE — 3079F DIAST BP 80-89 MM HG: CPT | Mod: CPTII,S$GLB,, | Performed by: NURSE PRACTITIONER

## 2022-04-19 PROCEDURE — 3044F PR MOST RECENT HEMOGLOBIN A1C LEVEL <7.0%: ICD-10-PCS | Mod: CPTII,S$GLB,, | Performed by: NURSE PRACTITIONER

## 2022-04-19 PROCEDURE — 1159F PR MEDICATION LIST DOCUMENTED IN MEDICAL RECORD: ICD-10-PCS | Mod: CPTII,S$GLB,, | Performed by: NURSE PRACTITIONER

## 2022-04-19 PROCEDURE — 3079F PR MOST RECENT DIASTOLIC BLOOD PRESSURE 80-89 MM HG: ICD-10-PCS | Mod: CPTII,S$GLB,, | Performed by: NURSE PRACTITIONER

## 2022-04-19 PROCEDURE — 1159F MED LIST DOCD IN RCRD: CPT | Mod: CPTII,S$GLB,, | Performed by: NURSE PRACTITIONER

## 2022-04-19 RX ORDER — SODIUM CHLORIDE 9 MG/ML
INJECTION, SOLUTION INTRAVENOUS CONTINUOUS
Status: CANCELLED | OUTPATIENT
Start: 2022-04-19

## 2022-04-19 NOTE — TELEPHONE ENCOUNTER
Called patient left detail message with address for pre-op date and time , mailed a copy and surgery date and prep.

## 2022-04-21 ENCOUNTER — ANESTHESIA EVENT (OUTPATIENT)
Dept: SURGERY | Facility: OTHER | Age: 72
End: 2022-04-21
Payer: MEDICARE

## 2022-04-21 ENCOUNTER — TELEPHONE (OUTPATIENT)
Dept: INTERNAL MEDICINE | Facility: CLINIC | Age: 72
End: 2022-04-21
Payer: MEDICARE

## 2022-04-21 ENCOUNTER — HOSPITAL ENCOUNTER (OUTPATIENT)
Dept: PREADMISSION TESTING | Facility: OTHER | Age: 72
Discharge: HOME | End: 2022-04-21
Attending: UROLOGY
Payer: MEDICARE

## 2022-04-21 VITALS
HEART RATE: 99 BPM | DIASTOLIC BLOOD PRESSURE: 69 MMHG | WEIGHT: 145 LBS | TEMPERATURE: 98 F | SYSTOLIC BLOOD PRESSURE: 159 MMHG | OXYGEN SATURATION: 98 % | BODY MASS INDEX: 24.13 KG/M2

## 2022-04-21 DIAGNOSIS — I10 PRIMARY HYPERTENSION: ICD-10-CM

## 2022-04-21 RX ORDER — FAMOTIDINE 20 MG/1
20 TABLET, FILM COATED ORAL
Status: CANCELLED | OUTPATIENT
Start: 2022-04-21 | End: 2022-04-21

## 2022-04-21 RX ORDER — LOSARTAN POTASSIUM AND HYDROCHLOROTHIAZIDE 12.5; 5 MG/1; MG/1
1 TABLET ORAL DAILY
Qty: 30 TABLET | Refills: 3 | Status: SHIPPED | OUTPATIENT
Start: 2022-04-21 | End: 2022-06-07 | Stop reason: SDUPTHER

## 2022-04-21 RX ORDER — SODIUM CHLORIDE, SODIUM LACTATE, POTASSIUM CHLORIDE, CALCIUM CHLORIDE 600; 310; 30; 20 MG/100ML; MG/100ML; MG/100ML; MG/100ML
INJECTION, SOLUTION INTRAVENOUS CONTINUOUS
Status: CANCELLED | OUTPATIENT
Start: 2022-04-21

## 2022-04-21 RX ORDER — ACETAMINOPHEN 500 MG
1000 TABLET ORAL
Status: CANCELLED | OUTPATIENT
Start: 2022-04-21 | End: 2022-04-21

## 2022-04-21 RX ORDER — LIDOCAINE HYDROCHLORIDE 10 MG/ML
0.5 INJECTION, SOLUTION EPIDURAL; INFILTRATION; INTRACAUDAL; PERINEURAL ONCE
Status: CANCELLED | OUTPATIENT
Start: 2022-04-21 | End: 2022-04-21

## 2022-04-21 RX ORDER — MULTIVITAMIN
1 TABLET ORAL DAILY
COMMUNITY

## 2022-04-21 NOTE — TELEPHONE ENCOUNTER
Refill Routing Note   Medication(s) are not appropriate for processing by [facility] for the following reason(s):      - Required vitals are abnormal    [facility] action(s):  Defer          Medication reconciliation completed: No     Appointments  past 12m or future 3m with PCP    Date Provider   Last Visit   3/10/2022 Esdras Ly MD   Next Visit   4/21/2022 Esdras Ly MD   ED visits in past 90 days: 0        Note composed:9:25 AM 04/21/2022

## 2022-04-21 NOTE — TELEPHONE ENCOUNTER
----- Message from Yamileth Adrian RN sent at 4/21/2022  7:58 AM CDT -----  Regarding: needs refill on BP med  Patient in preadmit now to see Anesthesia for surgery.  States he has only 1 pill left of BP med.  Can you send a refill to [facility] today?    Please call patient to confirm.    Yamileth LYMAN  PreAdmit

## 2022-04-21 NOTE — ANESTHESIA PREPROCEDURE EVALUATION
"                                                                                                             04/21/2022  Babatunde Singh is a 71 y.o., male.      Pre-op Assessment    I have reviewed the Patient Summary Reports.     I have reviewed the Nursing Notes. I have reviewed the NPO Status.   I have reviewed the Medications.     Review of Systems  Anesthesia Hx:  Denies Family Hx of Anesthesia complications.   Denies Personal Hx of Anesthesia complications.   Social:  Non-Smoker    Hematology/Oncology:     Oncology Normal    -- Anemia: Hematology Comments: Hb 11.7    EENT/Dental:EENT/Dental Normal   Cardiovascular:   Hypertension hyperlipidemia    Pulmonary:  Pulmonary Normal    Renal/:  Renal/ Normal     Hepatic/GI:  Hepatic/GI Normal    Musculoskeletal:  Musculoskeletal Normal    Neurological:  Neurology Normal    Endocrine:  Endocrine Normal    Dermatological:  Skin Normal    Psych:  Psychiatric Normal           Physical Exam  General: Well nourished, Cooperative, Alert and Oriented    Airway:  Mallampati: II   Mouth Opening: Normal  TM Distance: Normal  Neck ROM: Normal ROM    Dental:  Partial Dentures        Anesthesia Plan  Type of Anesthesia, risks & benefits discussed:    Anesthesia Type: Gen Supraglottic Airway  Intra-op Monitoring Plan: Standard ASA Monitors  Post Op Pain Control Plan: multimodal analgesia and IV/PO Opioids PRN  Induction:  IV  Informed Consent: Informed consent signed with the Patient and all parties understand the risks and agree with anesthesia plan.  All questions answered.   ASA Score: 2  Anesthesia Plan Notes: Informed pt to f/u with PCP on anemia, OK to proceed with planned surgery    Labs in [facility], as above    Booked "general/MAC", plan Gen LMA    Ready For Surgery From Anesthesia Perspective.     .      "

## 2022-04-21 NOTE — DISCHARGE INSTRUCTIONS
Information to Prepare you for your Surgery    PRE-ADMIT TESTING -  [phone]    [address] [facility]          Your surgery has been scheduled at [facility]. We are pleased to have the opportunity to serve you. For Further Information please call [phone].    On the day of surgery please report to the Information Desk on the 1st floor.    CONTACT YOUR PHYSICIAN'S OFFICE THE DAY PRIOR TO YOUR SURGERY TO OBTAIN YOUR ARRIVAL TIME.     The evening before surgery do not eat anything after 9 p.m. ( this includes hard candy, chewing gum and mints).  You may only have GATORADE, POWERADE AND WATER  from 9 p.m. until you leave your home.   DO NOT DRINK ANY LIQUIDS ON THE WAY TO THE HOSPITAL.      Why does your anesthesiologist allow you to drink Gatorade/Powerade before surgery?  Gatorade/Powerade helps to increase your comfort before surgery and to decrease your nausea after surgery. The carbohydrates in Gatorade/Powerade help reduce your body's stress response to surgery.  If you are a diabetic-drink only water prior to surgery.      Current Visitor policy(12/27/2021) - Patients may have 2 visitors pre and post procedure. Only 2 visitors will be allowed in the Surgical building with the patient.     SPECIAL MEDICATION INSTRUCTIONS: TAKE medications checked off by the Anesthesiologist on your Medication List.    Angiogram Patients: Take medications as instructed by your physician, including aspirin.     Surgery Patients:    If you take ASPIRIN - Your PHYSICIAN/SURGEON will need to inform you IF/OR when you need to stop taking aspirin prior to your surgery.     Do Not take any medications containing IBUPROFEN.    Do Not Wear any make-up (especially eye make-up) to surgery. Please remove any false eyelashes or eyelash extensions. If you arrive the day of surgery with makeup/eyelashes on you will be required to remove prior to surgery. (There is a risk of corneal  abrasions if eye makeup/eyelash extensions are not removed)      Leave all valuables at home.   Do Not wear any jewelry or watches, including any metal in body piercings. Jewelry must be removed prior to coming to the hospital.  There is a possibility that rings that are unable to be removed may be cut off if they are on the surgical extremity.    Please remove all hair extensions, wigs, clips and any other metal accessories/ ornaments from your hair.  These items may pose a flammable/fire risk in Surgery and must be removed.    Do not shave your surgical area at least 5 days prior to your surgery. The surgical prep will be performed at the hospital according to Infection Control regulations.    Contact Lens must be removed before surgery. Either do not wear the contact lens or bring a case and solution for storage.  Please bring a container for eyeglasses or dentures as required.  Bring any paperwork your physician has provided, such as consent forms,  history and physicals, doctor's orders, etc.   Bring comfortable clothes that are loose fitting to wear upon discharge. Take into consideration the type of surgery being performed.  Maintain your diet as advised per your physician the day prior to surgery.      Adequate rest the night before surgery is advised.   Park in the Parking lot behind the hospital or in the [facility] [employer] [facility] across the street from the parking lot. Parking is complimentary.  If you will be discharged the same day as your procedure, please arrange for a responsible adult to drive you home or to accompany you if traveling by taxi.   YOU WILL NOT BE PERMITTED TO DRIVE OR TO LEAVE THE HOSPITAL ALONE AFTER SURGERY.   If you are being discharged the same day, it is strongly recommended that you arrange for someone to remain with you for the first 24 hrs following your surgery.    The Surgeon will speak to your family/visitor after your surgery regarding the outcome of your surgery and post op  care.  The Surgeon may speak to you after your surgery, but there is a possibility you may not remember the details.  Please check with your family members regarding the conversation with the Surgeon.    We strongly recommend whoever is bringing you home be present for discharge instructions.  This will ensure a thorough understanding for your post op home care.    ALL CHILDREN MUST ALWAYS BE ACCOMPANIED BY AN ADULT.    Visitors-Refer to current Visitor policy handouts.    Thank you for your cooperation.  The Staff of [facility].            Bathing Instructions with Hibiclens    Shower the evening before and morning of your procedure with Hibiclens:  Wash your face with water and your regular face wash/soap  Apply Hibiclens directly on your skin or on a wet washcloth and wash gently. When showering: Move away from the shower stream when applying Hibiclens to avoid rinsing off too soon.  Rinse thoroughly with warm water  Do not dilute Hibiclens        Dry off as usual, do not use any deodorant, powder, body lotions, perfume, after shave or cologne.

## 2022-04-21 NOTE — TELEPHONE ENCOUNTER
No new care gaps identified.  Powered by [employer]. Reference number: [id number].   4/21/2022 8:56:51 AM CDT

## 2022-04-21 NOTE — TELEPHONE ENCOUNTER
----- Message from Yamileth dArian RN sent at 4/21/2022  7:58 AM CDT -----  Regarding: needs refill on BP med  Patient in preadmit now to see Anesthesia for surgery.  States he has only 1 pill left of BP med.  Can you send a refill to [facility] today?    Please call patient to confirm.    Yamileth LYMAN  PreAdmit

## 2022-04-25 ENCOUNTER — TELEPHONE (OUTPATIENT)
Dept: UROLOGY | Facility: CLINIC | Age: 72
End: 2022-04-25
Payer: MEDICARE

## 2022-04-26 ENCOUNTER — HOSPITAL ENCOUNTER (OUTPATIENT)
Facility: OTHER | Age: 72
Discharge: HOME | End: 2022-04-26
Attending: UROLOGY | Admitting: UROLOGY
Payer: MEDICARE

## 2022-04-26 ENCOUNTER — ANESTHESIA (OUTPATIENT)
Dept: SURGERY | Facility: OTHER | Age: 72
End: 2022-04-26
Payer: MEDICARE

## 2022-04-26 DIAGNOSIS — N43.3 HYDROCELE, BILATERAL: Primary | ICD-10-CM

## 2022-04-26 PROCEDURE — 37000009 HC ANESTHESIA EA ADD 15 MINS: Performed by: UROLOGY

## 2022-04-26 PROCEDURE — 25000003 PHARM REV CODE 250: Performed by: UROLOGY

## 2022-04-26 PROCEDURE — 37000008 HC ANESTHESIA 1ST 15 MINUTES: Performed by: UROLOGY

## 2022-04-26 PROCEDURE — 63600175 PHARM REV CODE 636 W HCPCS: Performed by: ANESTHESIOLOGY

## 2022-04-26 PROCEDURE — 71000039 HC RECOVERY, EACH ADD'L HOUR: Performed by: UROLOGY

## 2022-04-26 PROCEDURE — 55040 PR REMOVAL OF HYDROCELE,TUNICA,UNILAT: ICD-10-PCS | Mod: LT,,, | Performed by: UROLOGY

## 2022-04-26 PROCEDURE — 36000707: Performed by: UROLOGY

## 2022-04-26 PROCEDURE — 25000003 PHARM REV CODE 250: Performed by: STUDENT IN AN ORGANIZED HEALTH CARE EDUCATION/TRAINING PROGRAM

## 2022-04-26 PROCEDURE — 71000033 HC RECOVERY, INTIAL HOUR: Performed by: UROLOGY

## 2022-04-26 PROCEDURE — 71000015 HC POSTOP RECOV 1ST HR: Performed by: UROLOGY

## 2022-04-26 PROCEDURE — 25000003 PHARM REV CODE 250: Performed by: ANESTHESIOLOGY

## 2022-04-26 PROCEDURE — 63600175 PHARM REV CODE 636 W HCPCS: Performed by: NURSE PRACTITIONER

## 2022-04-26 PROCEDURE — 71000016 HC POSTOP RECOV ADDL HR: Performed by: UROLOGY

## 2022-04-26 PROCEDURE — 36000706: Performed by: UROLOGY

## 2022-04-26 PROCEDURE — 63600175 PHARM REV CODE 636 W HCPCS: Performed by: STUDENT IN AN ORGANIZED HEALTH CARE EDUCATION/TRAINING PROGRAM

## 2022-04-26 PROCEDURE — 55040 REMOVAL OF HYDROCELE: CPT | Mod: LT,,, | Performed by: UROLOGY

## 2022-04-26 RX ORDER — LIDOCAINE HYDROCHLORIDE 10 MG/ML
0.5 INJECTION, SOLUTION EPIDURAL; INFILTRATION; INTRACAUDAL; PERINEURAL ONCE
Status: DISCONTINUED | OUTPATIENT
Start: 2022-04-26 | End: 2022-04-26 | Stop reason: HOSPADM

## 2022-04-26 RX ORDER — LIDOCAINE HYDROCHLORIDE 20 MG/ML
INJECTION INTRAVENOUS
Status: DISCONTINUED | OUTPATIENT
Start: 2022-04-26 | End: 2022-04-26

## 2022-04-26 RX ORDER — FAMOTIDINE 20 MG/1
20 TABLET, FILM COATED ORAL
Status: COMPLETED | OUTPATIENT
Start: 2022-04-26 | End: 2022-04-26

## 2022-04-26 RX ORDER — SODIUM CHLORIDE 9 MG/ML
INJECTION, SOLUTION INTRAVENOUS CONTINUOUS
Status: DISCONTINUED | OUTPATIENT
Start: 2022-04-26 | End: 2022-04-26 | Stop reason: HOSPADM

## 2022-04-26 RX ORDER — SODIUM CHLORIDE 0.9 % (FLUSH) 0.9 %
3 SYRINGE (ML) INJECTION
Status: DISCONTINUED | OUTPATIENT
Start: 2022-04-26 | End: 2022-04-26 | Stop reason: HOSPADM

## 2022-04-26 RX ORDER — CEFAZOLIN SODIUM 2 G/50ML
2 SOLUTION INTRAVENOUS
Status: DISCONTINUED | OUTPATIENT
Start: 2022-04-26 | End: 2022-04-26 | Stop reason: HOSPADM

## 2022-04-26 RX ORDER — OXYCODONE HYDROCHLORIDE 5 MG/1
5 TABLET ORAL
Status: DISCONTINUED | OUTPATIENT
Start: 2022-04-26 | End: 2022-04-26 | Stop reason: HOSPADM

## 2022-04-26 RX ORDER — IBUPROFEN 800 MG/1
800 TABLET ORAL 3 TIMES DAILY
Qty: 21 TABLET | Refills: 0 | Status: SHIPPED | OUTPATIENT
Start: 2022-04-26 | End: 2022-05-03

## 2022-04-26 RX ORDER — ACETAMINOPHEN 500 MG
1000 TABLET ORAL
Status: COMPLETED | OUTPATIENT
Start: 2022-04-26 | End: 2022-04-26

## 2022-04-26 RX ORDER — PROPOFOL 10 MG/ML
VIAL (ML) INTRAVENOUS
Status: DISCONTINUED | OUTPATIENT
Start: 2022-04-26 | End: 2022-04-26

## 2022-04-26 RX ORDER — HYDROMORPHONE HYDROCHLORIDE 2 MG/ML
INJECTION, SOLUTION INTRAMUSCULAR; INTRAVENOUS; SUBCUTANEOUS
Status: DISCONTINUED | OUTPATIENT
Start: 2022-04-26 | End: 2022-04-26

## 2022-04-26 RX ORDER — DEXAMETHASONE SODIUM PHOSPHATE 4 MG/ML
INJECTION, SOLUTION INTRA-ARTICULAR; INTRALESIONAL; INTRAMUSCULAR; INTRAVENOUS; SOFT TISSUE
Status: DISCONTINUED | OUTPATIENT
Start: 2022-04-26 | End: 2022-04-26

## 2022-04-26 RX ORDER — HYDROMORPHONE HYDROCHLORIDE 2 MG/ML
0.4 INJECTION, SOLUTION INTRAMUSCULAR; INTRAVENOUS; SUBCUTANEOUS EVERY 5 MIN PRN
Status: DISCONTINUED | OUTPATIENT
Start: 2022-04-26 | End: 2022-04-26 | Stop reason: HOSPADM

## 2022-04-26 RX ORDER — ONDANSETRON 2 MG/ML
INJECTION INTRAMUSCULAR; INTRAVENOUS
Status: DISCONTINUED | OUTPATIENT
Start: 2022-04-26 | End: 2022-04-26

## 2022-04-26 RX ORDER — PROCHLORPERAZINE EDISYLATE 5 MG/ML
5 INJECTION INTRAMUSCULAR; INTRAVENOUS EVERY 30 MIN PRN
Status: DISCONTINUED | OUTPATIENT
Start: 2022-04-26 | End: 2022-04-26 | Stop reason: HOSPADM

## 2022-04-26 RX ORDER — PHENYLEPHRINE HYDROCHLORIDE 10 MG/ML
INJECTION INTRAVENOUS
Status: DISCONTINUED | OUTPATIENT
Start: 2022-04-26 | End: 2022-04-26

## 2022-04-26 RX ORDER — SODIUM CHLORIDE, SODIUM LACTATE, POTASSIUM CHLORIDE, CALCIUM CHLORIDE 600; 310; 30; 20 MG/100ML; MG/100ML; MG/100ML; MG/100ML
INJECTION, SOLUTION INTRAVENOUS CONTINUOUS
Status: DISCONTINUED | OUTPATIENT
Start: 2022-04-26 | End: 2022-04-26 | Stop reason: HOSPADM

## 2022-04-26 RX ORDER — MEPERIDINE HYDROCHLORIDE 25 MG/ML
12.5 INJECTION INTRAMUSCULAR; INTRAVENOUS; SUBCUTANEOUS ONCE AS NEEDED
Status: DISCONTINUED | OUTPATIENT
Start: 2022-04-26 | End: 2022-04-26 | Stop reason: HOSPADM

## 2022-04-26 RX ORDER — BACITRACIN ZINC 500 UNIT/G
OINTMENT (GRAM) TOPICAL
Status: DISCONTINUED | OUTPATIENT
Start: 2022-04-26 | End: 2022-04-26 | Stop reason: HOSPADM

## 2022-04-26 RX ORDER — BUPIVACAINE HYDROCHLORIDE 5 MG/ML
INJECTION, SOLUTION EPIDURAL; INTRACAUDAL
Status: DISCONTINUED | OUTPATIENT
Start: 2022-04-26 | End: 2022-04-26 | Stop reason: HOSPADM

## 2022-04-26 RX ORDER — KETOROLAC TROMETHAMINE 30 MG/ML
INJECTION, SOLUTION INTRAMUSCULAR; INTRAVENOUS
Status: DISCONTINUED | OUTPATIENT
Start: 2022-04-26 | End: 2022-04-26

## 2022-04-26 RX ORDER — ACETAMINOPHEN 500 MG
1000 TABLET ORAL EVERY 8 HOURS
Qty: 42 TABLET | Refills: 0 | Status: SHIPPED | OUTPATIENT
Start: 2022-04-26 | End: 2022-05-03

## 2022-04-26 RX ADMIN — PHENYLEPHRINE HYDROCHLORIDE 200 MCG: 10 INJECTION INTRAVENOUS at 11:04

## 2022-04-26 RX ADMIN — HYDROMORPHONE HYDROCHLORIDE 0.4 MG: 2 INJECTION INTRAMUSCULAR; INTRAVENOUS; SUBCUTANEOUS at 11:04

## 2022-04-26 RX ADMIN — CEFAZOLIN SODIUM 2 G: 2 SOLUTION INTRAVENOUS at 10:04

## 2022-04-26 RX ADMIN — FAMOTIDINE 20 MG: 20 TABLET, FILM COATED ORAL at 08:04

## 2022-04-26 RX ADMIN — GLYCOPYRROLATE 0.2 MG: 0.2 INJECTION, SOLUTION INTRAMUSCULAR; INTRAVITREAL at 10:04

## 2022-04-26 RX ADMIN — HYDROMORPHONE HYDROCHLORIDE 0.4 MG: 2 INJECTION INTRAMUSCULAR; INTRAVENOUS; SUBCUTANEOUS at 10:04

## 2022-04-26 RX ADMIN — KETOROLAC TROMETHAMINE 15 MG: 30 INJECTION, SOLUTION INTRAMUSCULAR; INTRAVENOUS at 11:04

## 2022-04-26 RX ADMIN — DEXAMETHASONE SODIUM PHOSPHATE 8 MG: 4 INJECTION, SOLUTION INTRAMUSCULAR; INTRAVENOUS at 10:04

## 2022-04-26 RX ADMIN — SODIUM CHLORIDE, SODIUM LACTATE, POTASSIUM CHLORIDE, AND CALCIUM CHLORIDE: 600; 310; 30; 20 INJECTION, SOLUTION INTRAVENOUS at 10:04

## 2022-04-26 RX ADMIN — LIDOCAINE HYDROCHLORIDE 80 MG: 20 INJECTION, SOLUTION INTRAVENOUS at 10:04

## 2022-04-26 RX ADMIN — PROPOFOL 200 MG: 10 INJECTION, EMULSION INTRAVENOUS at 10:04

## 2022-04-26 RX ADMIN — ACETAMINOPHEN 1000 MG: 500 TABLET, FILM COATED ORAL at 08:04

## 2022-04-26 RX ADMIN — PHENYLEPHRINE HYDROCHLORIDE 200 MCG: 10 INJECTION INTRAVENOUS at 10:04

## 2022-04-26 RX ADMIN — ONDANSETRON HYDROCHLORIDE 4 MG: 2 INJECTION INTRAMUSCULAR; INTRAVENOUS at 10:04

## 2022-04-26 NOTE — DISCHARGE SUMMARY
[facility]  Discharge Note  Short Stay    Admit Date: 4/26/2022    Discharge Date and Time: 04/26/2022 11:37 AM      Attending Physician: Damion Roberts MD     Discharge Provider: Darrel Miller    Diagnoses:  Past Medical History:   Diagnosis Date    Hypertension        Discharged Condition: good    Hospital Course: Patient was admitted for hydrocelectomy and tolerated the procedure well with no complications. The patient was discharged home in good condition on the same day.       Final Diagnoses: Same as principal problem.    Disposition: Home or Self Care    Follow up/Patient Instructions:    Medications:  Reconciled Home Medications:   Current Discharge Medication List      START taking these medications    Details   acetaminophen (TYLENOL) 500 MG tablet Take 2 tablets (1,000 mg total) by mouth every 8 (eight) hours. for 7 days  Qty: 42 tablet, Refills: 0      ibuprofen (ADVIL,MOTRIN) 800 MG tablet Take 1 tablet (800 mg total) by mouth 3 (three) times daily. for 7 days  Qty: 21 tablet, Refills: 0         CONTINUE these medications which have NOT CHANGED    Details   atorvastatin (LIPITOR) 80 MG tablet Take 80 mg by mouth once daily.      losartan-hydrochlorothiazide 50-12.5 mg (HYZAAR) 50-12.5 mg per tablet Take 1 tablet by mouth once daily.  Qty: 30 tablet, Refills: 3    Comments: .  Associated Diagnoses: Primary hypertension      multivitamin (THERAGRAN) per tablet Take 1 tablet by mouth once daily.      terbinafine HCl (LAMISIL ORAL) Take by mouth once daily at 6am.           Discharge Procedure Orders   Notify your health care provider if you experience any of the following:  temperature >100.4     Notify your health care provider if you experience any of the following:  persistent nausea and vomiting or diarrhea     Notify your health care provider if you experience any of the following:  severe uncontrolled pain     Notify your health care provider if you experience any of the following:   difficulty breathing or increased cough     Notify your health care provider if you experience any of the following:  severe persistent headache     Notify your health care provider if you experience any of the following:  persistent dizziness, light-headedness, or visual disturbances     Notify your health care provider if you experience any of the following:  increased confusion or weakness     Other restrictions (specify):   Order Comments: No driving while taking pain medications.  No lifting more than 15 pounds for 6 weeks, no strenuous activity.  Do not submerge incisions.  May shower in 48 hours.  Wear tight, supportive underwear and/ or jock strap until follow up.   Apply ice to scrotum intermittently for the first 48 hours, 30 minutes on, 30 minutes off.      Follow-up Information     Damion Roberts MD Follow up in 1 month(s).    Specialty: Urology  Why: For wound re-check  Contact information:  [address] [address]  [phone]                         Discharge Procedure Orders (must include Diet, Follow-up, Activity):   Discharge Procedure Orders (must include Diet, Follow-up, Activity)   Notify your health care provider if you experience any of the following:  temperature >100.4     Notify your health care provider if you experience any of the following:  persistent nausea and vomiting or diarrhea     Notify your health care provider if you experience any of the following:  severe uncontrolled pain     Notify your health care provider if you experience any of the following:  difficulty breathing or increased cough     Notify your health care provider if you experience any of the following:  severe persistent headache     Notify your health care provider if you experience any of the following:  persistent dizziness, light-headedness, or visual disturbances     Notify your health care provider if you experience any of the following:  increased confusion or weakness     Other  restrictions (specify):   Order Comments: No driving while taking pain medications.  No lifting more than 15 pounds for 6 weeks, no strenuous activity.  Do not submerge incisions.  May shower in 48 hours.  Wear tight, supportive underwear and/ or jock strap until follow up.   Apply ice to scrotum intermittently for the first 48 hours, 30 minutes on, 30 minutes off.

## 2022-04-26 NOTE — ANESTHESIA POSTPROCEDURE EVALUATION
Anesthesia Post Evaluation    Patient: Babatunde Singh    Procedure(s) Performed: Procedure(s) (LRB):  HYDROCELECTOMY (Left)    Final Anesthesia Type: general      Patient location during evaluation: PACU  Patient participation: Yes- Able to Participate  Level of consciousness: awake and alert  Post-procedure vital signs: reviewed and stable  Pain management: adequate  Airway patency: patent    PONV status at discharge: No PONV  Anesthetic complications: no      Cardiovascular status: blood pressure returned to baseline  Respiratory status: unassisted  Hydration status: euvolemic  Follow-up not needed.          Vitals Value Taken Time   /74 04/26/22 1234   Temp 36.5 °C (97.7 °F) 04/26/22 1147   Pulse 88 04/26/22 1240   Resp 17 04/26/22 1230   SpO2 95 % 04/26/22 1240   Vitals shown include unvalidated device data.      Event Time   Out of Recovery 12:39:57         Pain/Cassie Score: Pain Rating Prior to Med Admin: 0 (4/26/2022  8:24 AM)  Cassie Score: 10 (4/26/2022 12:30 PM)

## 2022-04-26 NOTE — DISCHARGE INSTRUCTIONS

## 2022-04-26 NOTE — TRANSFER OF CARE
"Anesthesia Transfer of Care Note    Patient: Babatunde Singh    Procedure(s) Performed: Procedure(s) (LRB):  HYDROCELECTOMY (Left)    Patient location: PACU    Anesthesia Type: general    Transport from OR: Transported from OR on room air with adequate spontaneous ventilation    Post pain: adequate analgesia    Post assessment: no apparent anesthetic complications    Post vital signs: stable    Level of consciousness: awake and alert    Nausea/Vomiting: no nausea/vomiting    Complications: none    Transfer of care protocol was followed      Last vitals:   Visit Vitals  BP (!) 158/72 (BP Location: Left arm, Patient Position: Sitting)   Pulse 79   Temp 36.4 °C (97.5 °F) (Oral)   Resp 16   Ht 5' 5" (1.651 m)   Wt 65.8 kg (145 lb)   SpO2 100%   BMI 24.13 kg/m²     "

## 2022-04-26 NOTE — OP NOTE
[facility]  Operative Note    Date: 04/26/2022    Pre-Op Diagnosis: Left hydrocele  Patient Active Problem List    Diagnosis Date Noted    Primary hypertension 03/10/2022     Post-Op Diagnosis: same    Procedure(s) Performed:   1.  Left hydrocelectomy    Specimen(s):  None    Staff Surgeon: Amarjit Roberts MD    Assistant Surgeon: Darrel Miller MD    Anesthesia: LMA    Indications: Babatunde Singh is a 71 y.o. male with a left hydrocele.  He desires surgical correction.      Findings: 300 mL of fluid was drained    Estimated Blood Loss: min    Drains: none    Procedure in detail:  After risks benefits and possible complications of hydrocelectomy were explained, the patient elected to undergo the procedure and consent was obtained. All questions were answered in the pre-operative area. The patient was transferred to the operative suite and placed in supine position on the operative table.  SCDs were applied and working.  Anesthesia was administered and the patient was prepped and draped in the usual sterile fashion. Time out was performed, nader-procedural antibiotics were confirmed.    The patient's hydrocele was palpated and brought to the anterior scrotal skin. A marking pen was used to ernie a 5 cm incision along the midline raphe.  A 15 blade was used to sharply incise the incision.  The underlying dartos and spermatic fascia was dissected with electrocautery until the hydrocele sac could be delivered through the scrotal incision. The tunica vaginalis was bluntly dissected free with a moist ray el and opened sharply with a scalpel. Care was taken to preserve the spermatic cord and testicle. 300 mL of fluid was suctioned from the hydrocele sac.  It was then opened and everted. The bleeding edges were cauterized and oversewn. The circumferential remnant was then oversewn with a 3-0 vicryl in a running baseball fashion keeping the epidididymis in tact.  The cord structures were inspected and found to be  in tact.     Hemostasis was obtained with electrocautery. The testicle was returned to its orthotopic position. The dartos fascia was closed with a 3-0 chromic in a running fashion. The skin was re approximated with a 4-0 monocryl suture in a running baseball fashion. Dermabond, fluffs and scrotal support was applied    Once the left side was fully treated the right side was fully examined and there was no appreciable hydrocele palpable on exam. We therefore opted not to perform hydrocelectomy on that side.    The patient tolerated the procedure well and was transferred to the PACU in stable condition    Disposition:  The patient will follow up with Dr. Roberts in 4-6 weeks .  He was given prescriptions for tylenol, ibuprofen.  He was instructed to avoid heavy lifting x 2 weeks, ice his scrotum x 48 hours and wear scrotal support x 2 weeks.      Darrel Miller MD    Attestation:  I have reviewed the notes, assessments, and/or procedures documented by Dr. Miller and I concur with her/his documentation of Babatunde Singh.     I was present and scrubbed for the entire procedure.    Amarjit Roberts MD

## 2022-04-26 NOTE — ANESTHESIA PROCEDURE NOTES
Intubation    Date/Time: 4/26/2022 10:45 AM  Performed by: Nazario Cummings CRNA  Authorized by: Shashi Cuevas MD     Intubation:     Induction:  Intravenous    Intubated:  Postinduction    Mask Ventilation:  N/a    Attempts:  1    Attempted By:  CRNA    Difficult Airway Encountered?: No      Complications:  None    Airway Device:  Supraglottic airway/LMA    Airway Device Size:  4.0    Style/Cuff Inflation:  Cuffed    Placement Verified By:  Capnometry    Complicating Factors:  None    Findings Post-Intubation:  BS equal bilateral and atraumatic/condition of teeth unchanged

## 2022-04-26 NOTE — PLAN OF CARE
Babatunde Singh has met all discharge criteria from Phase II. Vital Signs are stable, ambulating  without difficulty. Discharge instructions given, patient verbalized understanding. Discharged from facility via wheelchair in stable condition.

## 2022-04-27 VITALS
RESPIRATION RATE: 16 BRPM | SYSTOLIC BLOOD PRESSURE: 132 MMHG | HEIGHT: 65 IN | DIASTOLIC BLOOD PRESSURE: 89 MMHG | WEIGHT: 145 LBS | OXYGEN SATURATION: 98 % | BODY MASS INDEX: 24.16 KG/M2 | HEART RATE: 81 BPM | TEMPERATURE: 98 F

## 2022-05-06 ENCOUNTER — HOSPITAL ENCOUNTER (EMERGENCY)
Facility: OTHER | Age: 72
Discharge: HOME | End: 2022-05-06
Attending: EMERGENCY MEDICINE
Payer: MEDICARE

## 2022-05-06 ENCOUNTER — NURSE TRIAGE (OUTPATIENT)
Dept: ADMINISTRATIVE | Facility: CLINIC | Age: 72
End: 2022-05-06
Payer: MEDICARE

## 2022-05-06 VITALS
HEART RATE: 88 BPM | HEIGHT: 64 IN | TEMPERATURE: 98 F | DIASTOLIC BLOOD PRESSURE: 86 MMHG | SYSTOLIC BLOOD PRESSURE: 194 MMHG | BODY MASS INDEX: 25.27 KG/M2 | OXYGEN SATURATION: 100 % | WEIGHT: 148 LBS | RESPIRATION RATE: 20 BRPM

## 2022-05-06 DIAGNOSIS — T81.31XA WOUND DEHISCENCE, SURGICAL, INITIAL ENCOUNTER: Primary | ICD-10-CM

## 2022-05-06 PROCEDURE — 99283 EMERGENCY DEPT VISIT LOW MDM: CPT

## 2022-05-06 RX ORDER — BACITRACIN ZINC 500 UNIT/G
OINTMENT (GRAM) TOPICAL 2 TIMES DAILY
Qty: 28 G | Refills: 0 | Status: SHIPPED | OUTPATIENT
Start: 2022-05-06 | End: 2022-09-15

## 2022-05-06 NOTE — ED PROVIDER NOTES
Encounter Date: 5/6/2022    SCRIBE #1 NOTE: Kaylin ORDAZ, petey scribing for, and in the presence of, Mesfin Lloyd MD.       History     Chief Complaint   Patient presents with    Post-op Problem     Pt had hydrocelectomy left scrotum on 4-26-22. Pt states incision site appears to be healing well except in the middle. Pt states middle of incision appears to be opening. Pt called surgeon and nurse advised pt to come to er.  AAO x 3 nadn skin w.d       Time seen by provider: 11:13 AM    This is a 71 y.o. male with PMHx of HTN who presents with opening in surgical incision following a hydrocelectomy that he had done on 4/26. He states that the incision began bleeding 2 days after the procedure but has been mild since then. He noticed that the incision appeared to be opening this morning. He denies any pain or swelling in the area. He denies any trouble urinating. This is the extent of the patient's complaints at this time.    The history is provided by the patient.     Review of patient's allergies indicates:  No Known Allergies  Past Medical History:   Diagnosis Date    Hypertension      Past Surgical History:   Procedure Laterality Date    EXCISION OF HYDROCELE Left 4/26/2022    Procedure: HYDROCELECTOMY;  Surgeon: Damion Roberts MD;  Location: [facility];  Service: Urology;  Laterality: Left;    KNEE SURGERY  1972     Family History   Problem Relation Age of Onset    Hypertension Mother     Hypertension Father      Social History     Tobacco Use    Smoking status: Current Every Day Smoker     Packs/day: 0.50     Years: 50.00     Pack years: 25.00     Types: Cigarettes    Smokeless tobacco: Never Used   Substance Use Topics    Alcohol use: Never    Drug use: Never     Review of Systems   Constitutional: Negative for fever.   HENT: Negative for sore throat.    Respiratory: Negative for shortness of breath.    Cardiovascular: Negative for chest pain.   Gastrointestinal: Negative for nausea.    Genitourinary: Negative for dysuria.   Musculoskeletal: Negative for back pain.   Skin: Negative for rash.        Positive for incision opening.   Neurological: Negative for weakness.   Hematological: Does not bruise/bleed easily.       Physical Exam     Initial Vitals [05/06/22 0913]   BP Pulse Resp Temp SpO2   (!) 170/72 99 18 97.9 °F (36.6 °C) 99 %      MAP       --         Physical Exam    Nursing note and vitals reviewed.  Constitutional: He appears well-developed and well-nourished. He is not diaphoretic. No distress.   HENT:   Head: Normocephalic and atraumatic.   Eyes: Conjunctivae are normal.   Cardiovascular: Normal rate, regular rhythm and normal heart sounds. Exam reveals no gallop and no friction rub.    No murmur heard.  Pulmonary/Chest: Breath sounds normal. No respiratory distress. He has no wheezes. He has no rhonchi. He has no rales.   Abdominal: Abdomen is soft. There is no abdominal tenderness. There is no rebound and no guarding.   Genitourinary:    Genitourinary Comments: Left scrotal incision with 1.5 cm wound dehiscence over inferior portion. Extends to subcutaneous tissue with no active bleeding or sign of infection.     Musculoskeletal:         General: No edema.     Neurological: He is alert and oriented to person, place, and time.   Skin: Skin is warm and dry.   Psychiatric: He has a normal mood and affect.             ED Course   Procedures  Labs Reviewed - No data to display       Imaging Results    None          Medications - No data to display  Medical Decision Making:   History:   Old Medical Records: I decided to obtain old medical records.  Initial Assessment:     71-year-old male with history of HTN, recent hydrocelectomy done on 04/26 presents for evaluation of postop incision wound dehiscence that he noticed this morning.  He had minor bleeding after surgery but no significant pain or other complaints until he noticed opening of wound today.  On exam he has 1.5 cm area of  dehiscence over inferior end of incision that extends to subcutaneous tissues of left scrotum approximately 1 cm deep, no surrounding erythema or drainage or active bleeding, no sign of active infection.  Discussed with urology on-call Dr. Cuba who does not recommend closure of the wound at this time, recommends antibiotic ointment and wound care with follow-up with his surgeon Dr. Roberts in the next few weeks for reassessment.  Patient is comfortable with this discharge plan and understands return precautions for any signs of infection, worsening dehiscence or other concerns.      Clinical Tests:   Lab Tests: Ordered and Reviewed          Scribe Attestation:   Scribe #1: I performed the above scribed service and the documentation accurately describes the services I performed. I attest to the accuracy of the note.              I, Dr. Mesfin Lloyd, personally performed the services described in this documentation. All medical record entries made by the scribe were at my direction and in my presence.  I have reviewed the chart and agree that the record reflects my personal performance and is accurate and complete. Mesfin Lloyd MD.  10:10 PM 05/06/2022          Clinical Impression:   Final diagnoses:  [T81.31XA] Wound dehiscence, surgical, initial encounter (Primary)          ED Disposition Condition    Discharge Stable        ED Prescriptions     Medication Sig Dispense Start Date End Date Auth. Provider    bacitracin 500 unit/gram Oint Apply topically 2 (two) times daily. 28 g 5/6/2022  Mesfin Lloyd MD        Follow-up Information     Follow up With Specialties Details Why Contact Info    Damion Roberts MD Urology Schedule an appointment as soon as possible for a visit in 1 week  [address] [address]  [phone]             Mesfin Lloyd MD  05/06/22 8764

## 2022-05-06 NOTE — TELEPHONE ENCOUNTER
Pt stated he wore jeans and his stiches must have come a loose but he is not having any problems. Wound is in the middle. A little blood. No pain. Watery drainage. Pt stated he can see flesh. Care advice recommend pt go to Er. Pt verbalized understanding.     Reason for Disposition   Incision gaping open and < 2 days (48 hours) since wound re-opened    Additional Information   Negative: Major abdominal surgical incision and wound gaping open with visible internal organs   Negative: Sounds like a life-threatening emergency to the triager   Negative: Bleeding from incision and won't stop after 10 minutes of direct pressure   Negative: Widespread rash and bright red, sunburn-like   Negative: Severe pain in the incision    Protocols used: POST-OP INCISION SYMPTOMS AND [id number]

## 2022-05-06 NOTE — ED NOTES
Pt to ED with c/o wound check, recent hydrocelectomy on 04/26, denies complications with procedure or post-op, reporting small opening noted on surgical site. Denies drainage, fever, chills, dysuria. NAD noted. Advised by nurse on call to report to ER for evaluation. Will continue to monitor.

## 2022-05-13 ENCOUNTER — TELEPHONE (OUTPATIENT)
Dept: UROLOGY | Facility: CLINIC | Age: 72
End: 2022-05-13
Payer: MEDICARE

## 2022-05-13 NOTE — TELEPHONE ENCOUNTER
----- Message from Kevin Lennon sent at 5/13/2022 11:07 AM CDT -----  Name of Who is Calling: GODWIN RASHID          What is the request in detail: The patient is calling to schedule an appointment with in 6 days he states he was told this by the nurse. please advise         Can the clinic reply by [facility]: No         What Number to Call Back if not in [facility]: [phone]

## 2022-05-16 ENCOUNTER — TELEPHONE (OUTPATIENT)
Dept: UROLOGY | Facility: CLINIC | Age: 72
End: 2022-05-16
Payer: MEDICARE

## 2022-05-16 NOTE — TELEPHONE ENCOUNTER
----- Message from Stephie Riojas sent at 5/13/2022  4:26 PM CDT -----  Regarding: missed call       Who Called: Babatunde         Who Left Message for Patient: Diann         Does the patient know what this is regarding? Yes         Best Call Back Number:[phone]         Additional Information: Please leave a message if he don't answer.

## 2022-06-06 ENCOUNTER — OFFICE VISIT (OUTPATIENT)
Dept: UROLOGY | Facility: CLINIC | Age: 72
End: 2022-06-06
Attending: UROLOGY
Payer: MEDICARE

## 2022-06-06 VITALS — SYSTOLIC BLOOD PRESSURE: 172 MMHG | HEART RATE: 92 BPM | DIASTOLIC BLOOD PRESSURE: 76 MMHG

## 2022-06-06 DIAGNOSIS — N43.3 HYDROCELE, BILATERAL: Primary | ICD-10-CM

## 2022-06-06 LAB
BILIRUB SERPL-MCNC: NEGATIVE MG/DL
BLOOD URINE, POC: NEGATIVE
CLARITY, POC UA: CLEAR
COLOR, POC UA: YELLOW
GLUCOSE UR QL STRIP: NEGATIVE
KETONES UR QL STRIP: NEGATIVE
LEUKOCYTE ESTERASE URINE, POC: NEGATIVE
NITRITE, POC UA: NEGATIVE
PH, POC UA: 7
PROTEIN, POC: NEGATIVE
SPECIFIC GRAVITY, POC UA: 1
UROBILINOGEN, POC UA: NEGATIVE

## 2022-06-06 PROCEDURE — 3078F PR MOST RECENT DIASTOLIC BLOOD PRESSURE < 80 MM HG: ICD-10-PCS | Mod: CPTII,S$GLB,, | Performed by: UROLOGY

## 2022-06-06 PROCEDURE — 3077F SYST BP >= 140 MM HG: CPT | Mod: CPTII,S$GLB,, | Performed by: UROLOGY

## 2022-06-06 PROCEDURE — 81002 URINALYSIS NONAUTO W/O SCOPE: CPT | Mod: S$GLB,,, | Performed by: UROLOGY

## 2022-06-06 PROCEDURE — 3078F DIAST BP <80 MM HG: CPT | Mod: CPTII,S$GLB,, | Performed by: UROLOGY

## 2022-06-06 PROCEDURE — 3288F FALL RISK ASSESSMENT DOCD: CPT | Mod: CPTII,S$GLB,, | Performed by: UROLOGY

## 2022-06-06 PROCEDURE — 4010F PR ACE/ARB THEARPY RXD/TAKEN: ICD-10-PCS | Mod: CPTII,S$GLB,, | Performed by: UROLOGY

## 2022-06-06 PROCEDURE — 1101F PT FALLS ASSESS-DOCD LE1/YR: CPT | Mod: CPTII,S$GLB,, | Performed by: UROLOGY

## 2022-06-06 PROCEDURE — 81002 POCT URINE DIPSTICK WITHOUT MICROSCOPE: ICD-10-PCS | Mod: S$GLB,,, | Performed by: UROLOGY

## 2022-06-06 PROCEDURE — 99024 POSTOP FOLLOW-UP VISIT: CPT | Mod: S$GLB,,, | Performed by: UROLOGY

## 2022-06-06 PROCEDURE — 3077F PR MOST RECENT SYSTOLIC BLOOD PRESSURE >= 140 MM HG: ICD-10-PCS | Mod: CPTII,S$GLB,, | Performed by: UROLOGY

## 2022-06-06 PROCEDURE — 3044F PR MOST RECENT HEMOGLOBIN A1C LEVEL <7.0%: ICD-10-PCS | Mod: CPTII,S$GLB,, | Performed by: UROLOGY

## 2022-06-06 PROCEDURE — 3044F HG A1C LEVEL LT 7.0%: CPT | Mod: CPTII,S$GLB,, | Performed by: UROLOGY

## 2022-06-06 PROCEDURE — 1101F PR PT FALLS ASSESS DOC 0-1 FALLS W/OUT INJ PAST YR: ICD-10-PCS | Mod: CPTII,S$GLB,, | Performed by: UROLOGY

## 2022-06-06 PROCEDURE — 99024 PR POST-OP FOLLOW-UP VISIT: ICD-10-PCS | Mod: S$GLB,,, | Performed by: UROLOGY

## 2022-06-06 PROCEDURE — 1126F PR PAIN SEVERITY QUANTIFIED, NO PAIN PRESENT: ICD-10-PCS | Mod: CPTII,S$GLB,, | Performed by: UROLOGY

## 2022-06-06 PROCEDURE — 1159F MED LIST DOCD IN RCRD: CPT | Mod: CPTII,S$GLB,, | Performed by: UROLOGY

## 2022-06-06 PROCEDURE — 1160F PR REVIEW ALL MEDS BY PRESCRIBER/CLIN PHARMACIST DOCUMENTED: ICD-10-PCS | Mod: CPTII,S$GLB,, | Performed by: UROLOGY

## 2022-06-06 PROCEDURE — 3288F PR FALLS RISK ASSESSMENT DOCUMENTED: ICD-10-PCS | Mod: CPTII,S$GLB,, | Performed by: UROLOGY

## 2022-06-06 PROCEDURE — 1160F RVW MEDS BY RX/DR IN RCRD: CPT | Mod: CPTII,S$GLB,, | Performed by: UROLOGY

## 2022-06-06 PROCEDURE — 4010F ACE/ARB THERAPY RXD/TAKEN: CPT | Mod: CPTII,S$GLB,, | Performed by: UROLOGY

## 2022-06-06 PROCEDURE — 1126F AMNT PAIN NOTED NONE PRSNT: CPT | Mod: CPTII,S$GLB,, | Performed by: UROLOGY

## 2022-06-06 PROCEDURE — 1159F PR MEDICATION LIST DOCUMENTED IN MEDICAL RECORD: ICD-10-PCS | Mod: CPTII,S$GLB,, | Performed by: UROLOGY

## 2022-06-06 NOTE — PROGRESS NOTES
Subjective:       Babatunde Singh is a 71 y.o. male status post left hydrocelectomy on 4/26/22 here for post-op follow-up. He was seen in ED on 5/6 for small wound dehiscence, which he states is now healed.      Objective:   Vitals: BP (!) 172/76   Pulse 92      Physical Exam   : Well healed incision with 3-5mm opening that is nearly closed; minimal swelling    Assessment and Plan:   5 weeks s/p hydrocelectomy, doing well.    FU PRN

## 2022-06-07 DIAGNOSIS — E78.5 HYPERLIPIDEMIA, UNSPECIFIED HYPERLIPIDEMIA TYPE: Primary | ICD-10-CM

## 2022-06-07 DIAGNOSIS — I10 PRIMARY HYPERTENSION: ICD-10-CM

## 2022-06-07 RX ORDER — ATORVASTATIN CALCIUM 80 MG/1
80 TABLET, FILM COATED ORAL DAILY
Qty: 30 TABLET | Refills: 3 | Status: SHIPPED | OUTPATIENT
Start: 2022-06-07 | End: 2022-09-15 | Stop reason: SDUPTHER

## 2022-06-07 RX ORDER — LOSARTAN POTASSIUM AND HYDROCHLOROTHIAZIDE 12.5; 5 MG/1; MG/1
1 TABLET ORAL DAILY
Qty: 30 TABLET | Refills: 3 | Status: SHIPPED | OUTPATIENT
Start: 2022-06-07 | End: 2022-09-15 | Stop reason: SDUPTHER

## 2022-06-07 NOTE — TELEPHONE ENCOUNTER
No new care gaps identified.  [facility] Embedded Care Gaps. Reference number: [id number]. 6/07/2022   9:38:46 AM JOSET

## 2022-06-07 NOTE — TELEPHONE ENCOUNTER
----- Message from Sally Tang sent at 6/7/2022  7:17 AM CDT -----  Contact: [phone]  Requesting an RX refill or new RX.  Is this a refill or new RX: refill  RX name and strength  losartan-hydrochlorothiazide 50-12.5 mg (HYZAAR) 50-12.5 mg per tablet 30 tablet   Is this a 30 day or 90 day RX: 90  Pharmacy name and phone # :  [facility] [address] [place] [address]   Phone:  [phone]  Fax:  [phone]  The doctors have asked that we provide their patients with the following 2 reminders -- prescription refills can take up to 72 hours, and a friendly reminder that in the future you can use your [facility] account to request refills: yes         Requesting an RX refill or new RX.  Is this a refill or new RX: refill  RX name and strength :  atorvastatin (LIPITOR) 80 MG tablet  Is this a 30 day or 90 day RX: 90  Pharmacy name and phone # :  [facility] [id number] [place] [address]   Phone:  [phone]  Fax:  [phone]  The doctors have asked that we provide their patients with the following 2 reminders -- prescription refills can take up to 72 hours, and a friendly reminder that in the future you can use your [facility] account to request refills: yes

## 2022-07-10 ENCOUNTER — HOSPITAL ENCOUNTER (EMERGENCY)
Facility: OTHER | Age: 72
Discharge: HOME | End: 2022-07-10
Attending: EMERGENCY MEDICINE
Payer: MEDICARE

## 2022-07-10 VITALS
SYSTOLIC BLOOD PRESSURE: 153 MMHG | HEART RATE: 80 BPM | DIASTOLIC BLOOD PRESSURE: 72 MMHG | OXYGEN SATURATION: 99 % | RESPIRATION RATE: 16 BRPM | TEMPERATURE: 99 F | BODY MASS INDEX: 26.46 KG/M2 | HEIGHT: 64 IN | WEIGHT: 155 LBS

## 2022-07-10 DIAGNOSIS — M79.604 RIGHT LEG PAIN: Primary | ICD-10-CM

## 2022-07-10 DIAGNOSIS — I73.9 CLAUDICATION IN PERIPHERAL VASCULAR DISEASE: ICD-10-CM

## 2022-07-10 DIAGNOSIS — R52 PAIN: ICD-10-CM

## 2022-07-10 LAB — POCT GLUCOSE: 100 MG/DL (ref 70–110)

## 2022-07-10 PROCEDURE — 63600175 PHARM REV CODE 636 W HCPCS: Performed by: EMERGENCY MEDICINE

## 2022-07-10 PROCEDURE — 25000003 PHARM REV CODE 250: Performed by: EMERGENCY MEDICINE

## 2022-07-10 PROCEDURE — 96372 THER/PROPH/DIAG INJ SC/IM: CPT | Performed by: EMERGENCY MEDICINE

## 2022-07-10 PROCEDURE — 82962 GLUCOSE BLOOD TEST: CPT

## 2022-07-10 PROCEDURE — 99284 EMERGENCY DEPT VISIT MOD MDM: CPT | Mod: 25

## 2022-07-10 RX ORDER — GABAPENTIN 300 MG/1
300 CAPSULE ORAL ONCE
Status: COMPLETED | OUTPATIENT
Start: 2022-07-10 | End: 2022-07-10

## 2022-07-10 RX ORDER — TRIAMCINOLONE ACETONIDE 40 MG/ML
40 INJECTION, SUSPENSION INTRA-ARTICULAR; INTRAMUSCULAR
Status: COMPLETED | OUTPATIENT
Start: 2022-07-10 | End: 2022-07-10

## 2022-07-10 RX ORDER — GABAPENTIN 300 MG/1
300 CAPSULE ORAL 3 TIMES DAILY
Qty: 90 CAPSULE | Refills: 11 | Status: SHIPPED | OUTPATIENT
Start: 2022-07-10 | End: 2022-08-16 | Stop reason: SDUPTHER

## 2022-07-10 RX ADMIN — TRIAMCINOLONE ACETONIDE 40 MG: 40 INJECTION, SUSPENSION INTRA-ARTICULAR; INTRAMUSCULAR at 04:07

## 2022-07-10 RX ADMIN — GABAPENTIN 300 MG: 300 CAPSULE ORAL at 04:07

## 2022-07-10 NOTE — ED PROVIDER NOTES
"Encounter Date: 7/10/2022    SCRIBE #1 NOTE: I, Taran Torres, am scribing for, and in the presence of,  Vijaya Avelar MD. I have scribed the following portions of the note - Other sections scribed: HPI, ROS, PE.       History     Chief Complaint   Patient presents with    Leg Pain     Increasing right leg pain x "months." Pt denies trauma. Pt reports intermittent "tingling," feeling & "it sometimes feels cold.      Time seen by provider: 4:10 AM    This is a 72 y.o. male who presents with complaint of right lower leg pains worsening for the past three days. Patient reports a several month history of pain from his lower leg into his foot. He describes this as a throbbing pain with intermittent tingling. Over the past three days he has experienced numbness and swelling to his foot. He took ibuprofen one hour ago with no relief of his pain. Patient was evaluated for similar symptoms in the past with plans for ultrasound evaluation. PMHx of HTN.     The history is provided by the patient.     Review of patient's allergies indicates:  No Known Allergies  Past Medical History:   Diagnosis Date    Hypertension      Past Surgical History:   Procedure Laterality Date    EXCISION OF HYDROCELE Left 4/26/2022    Procedure: HYDROCELECTOMY;  Surgeon: Damion Roberts MD;  Location: [facility];  Service: Urology;  Laterality: Left;    KNEE SURGERY  1972     Family History   Problem Relation Age of Onset    Hypertension Mother     Hypertension Father      Social History     Tobacco Use    Smoking status: Current Every Day Smoker     Packs/day: 0.50     Years: 50.00     Pack years: 25.00     Types: Cigarettes    Smokeless tobacco: Never Used   Substance Use Topics    Alcohol use: Never    Drug use: Never     Review of Systems   Constitutional: Negative for activity change, appetite change, chills, diaphoresis and fever.   HENT: Negative for congestion, sore throat and trouble swallowing.    Eyes: Negative for photophobia " and visual disturbance.   Respiratory: Negative for cough, chest tightness and shortness of breath.    Cardiovascular: Positive for leg swelling. Negative for chest pain.   Gastrointestinal: Negative for abdominal pain, nausea and vomiting.   Endocrine: Negative for polydipsia, polyphagia and polyuria.   Genitourinary: Negative for difficulty urinating and flank pain.   Musculoskeletal: Positive for arthralgias. Negative for back pain and neck pain.   Skin: Negative for pallor.   Neurological: Positive for numbness. Negative for weakness and headaches.        Positive for paresthesias.   Psychiatric/Behavioral: Negative for confusion.       Physical Exam     Initial Vitals [07/10/22 0401]   BP Pulse Resp Temp SpO2   (!) 150/68 86 18 98.7 °F (37.1 °C) 97 %      MAP       --         Physical Exam    Constitutional: He appears well-developed. He is cooperative.   HENT:   Head: Atraumatic.   Eyes: Conjunctivae and lids are normal.   Neck:   Normal range of motion.  Cardiovascular: Normal rate.   Musculoskeletal:         General: Normal range of motion.      Cervical back: Normal range of motion.      Comments: Thickening to bilateral toenails. Normal, symmetric warmth. No pallor to extremities. No calf tenderness. Normal hip flexion strength.     Neurological: He is alert.   Skin: No rash noted.   Psychiatric: He has a normal mood and affect. His speech is normal and behavior is normal.         ED Course   Procedures  Labs Reviewed   POCT GLUCOSE          Imaging Results          US Lower Extremity Veins Right (Final result)  Result time 07/10/22 05:22:03    Final result by Kiara Sherwood MD (07/10/22 05:22:03)                 Impression:      No evidence of deep venous thrombosis in the right lower extremity.      Electronically signed by: Kiara Sherwood MD  Date:    07/10/2022  Time:    05:22             Narrative:    EXAMINATION:  US LOWER EXTREMITY VEINS RIGHT    CLINICAL HISTORY:  Pain,  unspecified    TECHNIQUE:  Duplex and color flow Doppler evaluation and graded compression of the right lower extremity veins was performed.    COMPARISON:  None    FINDINGS:  Right thigh veins: The common femoral, femoral, popliteal, upper greater saphenous, and deep femoral veins are patent and free of thrombus. The veins are normally compressible and have normal phasic flow and augmentation response.    Right calf veins: The visualized calf veins are patent.    Contralateral CFV: The contralateral (left) common femoral vein is patent and free of thrombus.    Miscellaneous: None                                 Medications   gabapentin capsule 300 mg (300 mg Oral Given 7/10/22 0422)   triamcinolone acetonide injection 40 mg (40 mg Intramuscular Given 7/10/22 0442)     Medical Decision Making:   History:   Old Medical Records: I decided to obtain old medical records.  Clinical Tests:   Lab Tests: Ordered and Reviewed  Radiological Study: Ordered and Reviewed          Scribe Attestation:   Scribe #1: I performed the above scribed service and the documentation accurately describes the services I performed. I attest to the accuracy of the note.        ED Course as of 07/14/22 2113   Sun Jul 10, 2022   0416 71 yo w htn here with chronic discomfort to the R LE, paresthesias and throbbing pain. Suspect claudication, without concern for acute ischemia. Will obtain US to ensure no dvt, treat v sciatica and likely dc home w outpt fu. Message sent to pcp.  [DM]      ED Course User Index  [DM] Vijaya Avelar MD           Physician Attestation for Scribe: IRosio, reviewed documentation as scribed in my presence, which is both accurate and complete.    Clinical Impression:   Final diagnoses:  [R52] Pain  [M79.604] Right leg pain (Primary)  [I73.9] Claudication in peripheral vascular disease          ED Disposition Condition    Discharge Stable        ED Prescriptions     Medication Sig Dispense Start Date End Date Auth.  Provider    gabapentin (NEURONTIN) 300 MG capsule Take 1 capsule (300 mg total) by mouth 3 (three) times daily. 90 capsule 7/10/2022 7/10/2023 Vijaya Avelar MD        Follow-up Information     Follow up With Specialties Details Why Contact Info Additional Information    [facility] Vascular Surgery Schedule an appointment as soon as possible for a visit   [address] [facility] [address]  [phone] Main Building, 5th Floor Please park in [facility] and use Clinic elevator    Esdras Ly MD Internal Medicine Schedule an appointment as soon as possible for a visit   [address]  [facility] [address]  [phone]              Vijaya Avelar MD  07/14/22 4670

## 2022-07-10 NOTE — ED TRIAGE NOTES
"Pt presents to the ED c/o leg pain. Pt reports chronic leg pain "for months" that has increased in intensity over the past x2 days. Reports tingling sensation in the right leg that is worse when laying down. Denies any other complaints at this time. AAOx4  "

## 2022-07-26 ENCOUNTER — OFFICE VISIT (OUTPATIENT)
Dept: CARDIOLOGY | Facility: CLINIC | Age: 72
End: 2022-07-26
Payer: MEDICARE

## 2022-07-26 ENCOUNTER — TELEPHONE (OUTPATIENT)
Dept: CARDIOLOGY | Facility: CLINIC | Age: 72
End: 2022-07-26

## 2022-07-26 VITALS
WEIGHT: 144.38 LBS | DIASTOLIC BLOOD PRESSURE: 70 MMHG | BODY MASS INDEX: 24.65 KG/M2 | HEIGHT: 64 IN | SYSTOLIC BLOOD PRESSURE: 147 MMHG | HEART RATE: 87 BPM

## 2022-07-26 DIAGNOSIS — R20.2 NUMBNESS AND TINGLING OF RIGHT LEG: Primary | ICD-10-CM

## 2022-07-26 DIAGNOSIS — R20.0 NUMBNESS AND TINGLING OF RIGHT LEG: Primary | ICD-10-CM

## 2022-07-26 DIAGNOSIS — E78.2 MIXED HYPERLIPIDEMIA: ICD-10-CM

## 2022-07-26 DIAGNOSIS — M54.16 LUMBAR RADICULOPATHY, CHRONIC: ICD-10-CM

## 2022-07-26 DIAGNOSIS — F17.200 SMOKING: ICD-10-CM

## 2022-07-26 DIAGNOSIS — I73.9 CLAUDICATION OF RIGHT LOWER EXTREMITY: ICD-10-CM

## 2022-07-26 DIAGNOSIS — M54.31 SCIATICA OF RIGHT SIDE: ICD-10-CM

## 2022-07-26 DIAGNOSIS — M79.604 RIGHT LEG PAIN: ICD-10-CM

## 2022-07-26 DIAGNOSIS — I73.9 PERIPHERAL VASCULAR DISEASE, UNSPECIFIED: ICD-10-CM

## 2022-07-26 PROCEDURE — 3044F HG A1C LEVEL LT 7.0%: CPT | Mod: CPTII,S$GLB,, | Performed by: INTERNAL MEDICINE

## 2022-07-26 PROCEDURE — 4010F PR ACE/ARB THEARPY RXD/TAKEN: ICD-10-PCS | Mod: CPTII,S$GLB,, | Performed by: INTERNAL MEDICINE

## 2022-07-26 PROCEDURE — 4010F ACE/ARB THERAPY RXD/TAKEN: CPT | Mod: CPTII,S$GLB,, | Performed by: INTERNAL MEDICINE

## 2022-07-26 PROCEDURE — 3077F SYST BP >= 140 MM HG: CPT | Mod: CPTII,S$GLB,, | Performed by: INTERNAL MEDICINE

## 2022-07-26 PROCEDURE — 3077F PR MOST RECENT SYSTOLIC BLOOD PRESSURE >= 140 MM HG: ICD-10-PCS | Mod: CPTII,S$GLB,, | Performed by: INTERNAL MEDICINE

## 2022-07-26 PROCEDURE — 1159F MED LIST DOCD IN RCRD: CPT | Mod: CPTII,S$GLB,, | Performed by: INTERNAL MEDICINE

## 2022-07-26 PROCEDURE — 3044F PR MOST RECENT HEMOGLOBIN A1C LEVEL <7.0%: ICD-10-PCS | Mod: CPTII,S$GLB,, | Performed by: INTERNAL MEDICINE

## 2022-07-26 PROCEDURE — 99205 OFFICE O/P NEW HI 60 MIN: CPT | Mod: S$GLB,,, | Performed by: INTERNAL MEDICINE

## 2022-07-26 PROCEDURE — 3288F PR FALLS RISK ASSESSMENT DOCUMENTED: ICD-10-PCS | Mod: CPTII,S$GLB,, | Performed by: INTERNAL MEDICINE

## 2022-07-26 PROCEDURE — 1159F PR MEDICATION LIST DOCUMENTED IN MEDICAL RECORD: ICD-10-PCS | Mod: CPTII,S$GLB,, | Performed by: INTERNAL MEDICINE

## 2022-07-26 PROCEDURE — 1125F AMNT PAIN NOTED PAIN PRSNT: CPT | Mod: CPTII,S$GLB,, | Performed by: INTERNAL MEDICINE

## 2022-07-26 PROCEDURE — 1101F PR PT FALLS ASSESS DOC 0-1 FALLS W/OUT INJ PAST YR: ICD-10-PCS | Mod: CPTII,S$GLB,, | Performed by: INTERNAL MEDICINE

## 2022-07-26 PROCEDURE — 99999 PR PBB SHADOW E&M-EST. PATIENT-LVL V: ICD-10-PCS | Mod: PBBFAC,,, | Performed by: INTERNAL MEDICINE

## 2022-07-26 PROCEDURE — 3078F DIAST BP <80 MM HG: CPT | Mod: CPTII,S$GLB,, | Performed by: INTERNAL MEDICINE

## 2022-07-26 PROCEDURE — 3008F BODY MASS INDEX DOCD: CPT | Mod: CPTII,S$GLB,, | Performed by: INTERNAL MEDICINE

## 2022-07-26 PROCEDURE — 3078F PR MOST RECENT DIASTOLIC BLOOD PRESSURE < 80 MM HG: ICD-10-PCS | Mod: CPTII,S$GLB,, | Performed by: INTERNAL MEDICINE

## 2022-07-26 PROCEDURE — 1125F PR PAIN SEVERITY QUANTIFIED, PAIN PRESENT: ICD-10-PCS | Mod: CPTII,S$GLB,, | Performed by: INTERNAL MEDICINE

## 2022-07-26 PROCEDURE — 3288F FALL RISK ASSESSMENT DOCD: CPT | Mod: CPTII,S$GLB,, | Performed by: INTERNAL MEDICINE

## 2022-07-26 PROCEDURE — 99999 PR PBB SHADOW E&M-EST. PATIENT-LVL V: CPT | Mod: PBBFAC,,, | Performed by: INTERNAL MEDICINE

## 2022-07-26 PROCEDURE — 1101F PT FALLS ASSESS-DOCD LE1/YR: CPT | Mod: CPTII,S$GLB,, | Performed by: INTERNAL MEDICINE

## 2022-07-26 PROCEDURE — 99205 PR OFFICE/OUTPT VISIT, NEW, LEVL V, 60-74 MIN: ICD-10-PCS | Mod: S$GLB,,, | Performed by: INTERNAL MEDICINE

## 2022-07-26 PROCEDURE — 3008F PR BODY MASS INDEX (BMI) DOCUMENTED: ICD-10-PCS | Mod: CPTII,S$GLB,, | Performed by: INTERNAL MEDICINE

## 2022-07-26 RX ORDER — EZETIMIBE 10 MG/1
10 TABLET ORAL DAILY
Qty: 90 TABLET | Refills: 3 | Status: ON HOLD | OUTPATIENT
Start: 2022-07-26 | End: 2022-10-31 | Stop reason: SDUPTHER

## 2022-07-26 NOTE — TELEPHONE ENCOUNTER
Good Morning Staff!    Dr. Wallace put in a referral for this patient to see someone in your clinic. Can someone call and assist the patient with scheduling?    Thanks,  Anastasia

## 2022-07-26 NOTE — PROGRESS NOTES
"[facility]      Chief Complaint   Patient presents with    Establish Care     Right leg and foot nerve pain from the spine down       Patient ID: Babatunde Singh is a 72 y.o. male with HTN, HLD, smoking, who presents for an initial appointment.  Pertinent history/events are as follows:     -Pt kindly referred by Dr. Avelar for evaluation of right leg pain (per her note on 7/10/2022):  "71 yo w htn here with chronic discomfort to the R LE, paresthesias and throbbing pain. Suspect claudication, without concern for acute ischemia. Will obtain US to ensure no dvt, treat v sciatica and likely dc home w outpt fu."    HPI:  Mr. Singh reports first noticing right leg pain approximately 4-5 years ago.  States right leg pain became acutely worse 2 weeks ago after moving furniture.  At that time he notes "shooting pain down my right leg".  He reports pain in both calves after walking less than 1 block, which is relieved with rest.  He hs no rest pain or tissue loss.  Smokes up to 1.5 packs a day for total 58 years.  RLE venous ultrasound on 7/10/2022 revealed no evidence of deep venous thrombosis in the right lower extremity.    Past Medical History:   Diagnosis Date    Hypertension      Past Surgical History:   Procedure Laterality Date    EXCISION OF HYDROCELE Left 4/26/2022    Procedure: HYDROCELECTOMY;  Surgeon: Damion Roberts MD;  Location: [facility];  Service: Urology;  Laterality: Left;    KNEE SURGERY  1972     Social History     Socioeconomic History    Marital status:    Occupational History    Occupation: doorman [employer]   Tobacco Use    Smoking status: Current Every Day Smoker     Packs/day: 0.50     Years: 50.00     Pack years: 25.00     Types: Cigarettes    Smokeless tobacco: Never Used   Substance and Sexual Activity    Alcohol use: Never    Drug use: Never    Sexual activity: Yes     Partners: Female     Comment: wife     Family History   Problem Relation Age of Onset    " "Hypertension Mother     Hypertension Father        Review of patient's allergies indicates:  No Known Allergies    Medication List with Changes/Refills   Current Medications    ATORVASTATIN (LIPITOR) 80 MG TABLET    Take 1 tablet (80 mg total) by mouth once daily.    BACITRACIN 500 UNIT/GRAM OINT    Apply topically 2 (two) times daily.    GABAPENTIN (NEURONTIN) 300 MG CAPSULE    Take 1 capsule (300 mg total) by mouth 3 (three) times daily.    LOSARTAN-HYDROCHLOROTHIAZIDE 50-12.5 MG (HYZAAR) 50-12.5 MG PER TABLET    Take 1 tablet by mouth once daily.    MULTIVITAMIN (THERAGRAN) PER TABLET    Take 1 tablet by mouth once daily.    TERBINAFINE HCL (LAMISIL ORAL)    Take by mouth once daily at 6am.       Review of Systems  Constitution: Denies chills, fever, and sweats.  HENT: Denies headaches or blurry vision.  Cardiovascular: Denies chest pain or irregular heart beat.  Respiratory: Denies cough or shortness of breath.  Gastrointestinal: Denies abdominal pain, nausea, or vomiting.  Musculoskeletal: Positive for right leg pain.   Neurological: Denies dizziness or focal weakness.  Psychiatric/Behavioral: Normal mental status.  Hematologic/Lymphatic: Denies bleeding problem or easy bruising/bleeding.  Skin: Denies rash or suspicious lesions    Physical Examination  BP (!) 147/70 (BP Location: Left arm, Patient Position: Sitting, BP Method: Medium (Automatic))   Pulse 87   Ht 5' 4" (1.626 m)   Wt 65.5 kg (144 lb 6.4 oz)   BMI 24.79 kg/m²     Constitutional: No acute distress, conversant  HEENT: Sclera anicteric, Pupils equal, round and reactive to light, extraocular motions intact, Oropharynx clear  Neck: No JVD, no carotid bruits  Cardiovascular: regular rate and rhythm, no murmur, rubs or gallops, normal S1/S2  Pulmonary: Clear to auscultation bilaterally  Abdominal: Abdomen soft, nontender, nondistended, positive bowel sounds  Extremities: No lower extremity edema,   Pulses:  Carotid pulses are 2+ on the right side, " and 2+ on the left side.  Radial pulses are 2+ on the right side, and 2+ on the left side.   Femoral pulses are 2+ on the right side, and 2+ on the left side.  Popliteal pulses are 2+ on the right side, and 2+ on the left side.   Dorsalis pedis pulses are 2+ on the right side, and 2+ on the left side.   Posterior tibial pulses are 2+ on the right side, and 2+ on the left side.    Skin: No ecchymosis, erythema, or ulcers  Psych: Alert and oriented x 3, appropriate affect  Neuro: CNII-XII intact, no focal deficits    Labs:  Most Recent Data  CBC:   Lab Results   Component Value Date    WBC 12.56 03/10/2022    HGB 11.7 (L) 03/10/2022    HCT 39.2 (L) 03/10/2022     (H) 03/10/2022    MCV 81 (L) 03/10/2022    RDW 21.0 (H) 03/10/2022     BMP:   Lab Results   Component Value Date     03/10/2022    K 5.0 03/10/2022     03/10/2022    CO2 25 03/10/2022    BUN 12 03/10/2022    CREATININE 0.8 03/10/2022    GLU 92 03/10/2022    CALCIUM 10.1 03/10/2022     LFTS;   Lab Results   Component Value Date    PROT 7.7 03/10/2022    ALBUMIN 3.7 03/10/2022    BILITOT 0.4 03/10/2022    AST 19 03/10/2022    ALKPHOS 86 03/10/2022    ALT 10 03/10/2022     COAGS: No results found for: INR, PROTIME, PTT  FLP:   Lab Results   Component Value Date    CHOL 138 03/10/2022    HDL 44 03/10/2022    LDLCALC 86.0 03/10/2022    TRIG 40 03/10/2022    CHOLHDL 31.9 03/10/2022     CARDIAC: No results found for: TROPONINI, CKMB, BNP    Imaging:    RLE Venous Ultrasound 7/10/2022:  No evidence of deep venous thrombosis in the right lower extremity.    Assessment/Plan:  Babatunde Singh is a 72 y.o. male with HTN, HLD, smoking, who presents for an initial appointment.     1. Right leg pain- Etiology likely multifactorial with contribution from possible lumbar degenerative disc disease with sciatica, and flow limiting PAD.  Check MRI lumbar spine and CTA abd/pelvis with iliofemoral runoff.  Check echo.  If EF is normal and CTA shows evidence of  PAD, start cilostazol 100 mg bid.  Pt to start walking program with goal of at least 30 minutes a day/5 days a week.  Refer to Neurosurgery for evaluation of neurogenic claudication and RLE numbness.     2. HLD- Start zetia 10 mg daily.  Continue atorvastatin 80 mg daily and ASA 81 mg daily.    3. HTN- Continue current medications.      4. Smoking- Encourage smoking cessation.    Will call pt with results of CTA, MRI and echo  Otherwise, follow up in 3 months    Total duration of face to face visit time 45 minutes.  Total time spent counseling greater than fifty percent of total visit time.  Counseling included discussion regarding imaging findings, diagnosis, possibilities, treatment options, risks and benefits.  The patient had many questions regarding the options and long-term effects.    Cr Wallace MD, PhD  Interventional Cardiology

## 2022-07-26 NOTE — PATIENT INSTRUCTIONS
Assessment/Plan:  Babatunde Singh is a 72 y.o. male with HTN, HLD, smoking, who presents for an initial appointment.     1. Right leg pain- Etiology likely multifactorial with contribution from possible lumbar degenerative disc disease with sciatica, and flow limiting PAD.  Check MRI lumbar spine and CTA abd/pelvis with iliofemoral runoff.  Check echo.  If EF is normal and CTA shows evidence of PAD, start cilostazol 100 mg bid.  Pt to start walking program with goal of at least 30 minutes a day/5 days a week.  Refer to Neurosurgery for evaluation of neurogenic claudication and RLE numbness.     2. HLD- Start zetia 10 mg daily.  Continue atorvastatin 80 mg daily and ASA 81 mg daily.    3. HTN- Continue current medications.      4. Smoking- Encourage smoking cessation.    Will call pt with results of CTA, MRI and echo  Otherwise, follow up in 3 months

## 2022-08-01 ENCOUNTER — TELEPHONE (OUTPATIENT)
Dept: CARDIOLOGY | Facility: CLINIC | Age: 72
End: 2022-08-01
Payer: MEDICARE

## 2022-08-01 NOTE — TELEPHONE ENCOUNTER
Pt is rescheduled for appts.       ----- Message from Lizzette Chaudhary sent at 8/1/2022  9:15 AM CDT -----  Regarding: orders  Contact: [phone]  Type: Request Orders    Request orders for : Echo  Please contact :[phone] or [phone]

## 2022-08-03 ENCOUNTER — HOSPITAL ENCOUNTER (OUTPATIENT)
Dept: RADIOLOGY | Facility: HOSPITAL | Age: 72
Discharge: HOME | End: 2022-08-03
Attending: INTERNAL MEDICINE
Payer: MEDICARE

## 2022-08-03 DIAGNOSIS — I73.9 PERIPHERAL VASCULAR DISEASE, UNSPECIFIED: ICD-10-CM

## 2022-08-03 PROCEDURE — 75635 CT ANGIO ABDOMINAL ARTERIES: CPT | Mod: 26,,, | Performed by: RADIOLOGY

## 2022-08-03 PROCEDURE — 75635 CTA RUNOFF ABD PEL BILAT LOWER EXT: ICD-10-PCS | Mod: 26,,, | Performed by: RADIOLOGY

## 2022-08-03 PROCEDURE — 75635 CT ANGIO ABDOMINAL ARTERIES: CPT | Mod: TC

## 2022-08-03 PROCEDURE — 25500020 PHARM REV CODE 255: Performed by: INTERNAL MEDICINE

## 2022-08-03 RX ADMIN — IOHEXOL 100 ML: 350 INJECTION, SOLUTION INTRAVENOUS at 07:08

## 2022-08-05 ENCOUNTER — HOSPITAL ENCOUNTER (OUTPATIENT)
Dept: CARDIOLOGY | Facility: HOSPITAL | Age: 72
Discharge: HOME | End: 2022-08-05
Attending: INTERNAL MEDICINE
Payer: MEDICARE

## 2022-08-05 VITALS
DIASTOLIC BLOOD PRESSURE: 76 MMHG | HEART RATE: 94 BPM | WEIGHT: 144 LBS | SYSTOLIC BLOOD PRESSURE: 150 MMHG | BODY MASS INDEX: 24.59 KG/M2 | HEIGHT: 64 IN

## 2022-08-05 DIAGNOSIS — M79.604 RIGHT LEG PAIN: ICD-10-CM

## 2022-08-05 LAB
ASCENDING AORTA: 3.26 CM
AV INDEX (PROSTH): 0.81
AV MEAN GRADIENT: 7 MMHG
AV PEAK GRADIENT: 13 MMHG
AV VALVE AREA: 2.85 CM2
AV VELOCITY RATIO: 0.65
BSA FOR ECHO PROCEDURE: 1.72 M2
CREAT SERPL-MCNC: 0.8 MG/DL (ref 0.5–1.4)
CV ECHO LV RWT: 0.81 CM
DOP CALC AO PEAK VEL: 1.82 M/S
DOP CALC AO VTI: 25.54 CM
DOP CALC LVOT AREA: 3.5 CM2
DOP CALC LVOT DIAMETER: 2.12 CM
DOP CALC LVOT PEAK VEL: 1.19 M/S
DOP CALC LVOT STROKE VOLUME: 72.78 CM3
DOP CALCLVOT PEAK VEL VTI: 20.63 CM
E WAVE DECELERATION TIME: 145.96 MSEC
E/A RATIO: 0.67
E/E' RATIO: 15.2 M/S
ECHO LV POSTERIOR WALL: 1.43 CM (ref 0.6–1.1)
EJECTION FRACTION: 70 %
FRACTIONAL SHORTENING: 25 % (ref 28–44)
INTERVENTRICULAR SEPTUM: 1.4 CM (ref 0.6–1.1)
IVRT: 125.59 MSEC
LA MAJOR: 4.56 CM
LA MINOR: 4.89 CM
LA WIDTH: 3.7 CM
LEFT ATRIUM SIZE: 3.55 CM
LEFT ATRIUM VOLUME INDEX: 31 ML/M2
LEFT ATRIUM VOLUME: 52.69 CM3
LEFT INTERNAL DIMENSION IN SYSTOLE: 2.65 CM (ref 2.1–4)
LEFT VENTRICLE DIASTOLIC VOLUME INDEX: 30.89 ML/M2
LEFT VENTRICLE DIASTOLIC VOLUME: 52.52 ML
LEFT VENTRICLE MASS INDEX: 106 G/M2
LEFT VENTRICLE SYSTOLIC VOLUME INDEX: 15.2 ML/M2
LEFT VENTRICLE SYSTOLIC VOLUME: 25.78 ML
LEFT VENTRICULAR INTERNAL DIMENSION IN DIASTOLE: 3.55 CM (ref 3.5–6)
LEFT VENTRICULAR MASS: 179.44 G
LV LATERAL E/E' RATIO: 12.67 M/S
LV SEPTAL E/E' RATIO: 19 M/S
MV A" WAVE DURATION": 7.71 MSEC
MV PEAK A VEL: 1.14 M/S
MV PEAK E VEL: 0.76 M/S
MV STENOSIS PRESSURE HALF TIME: 42.33 MS
MV VALVE AREA P 1/2 METHOD: 5.2 CM2
PISA TR MAX VEL: 1.96 M/S
PULM VEIN S/D RATIO: 1.52
PV PEAK D VEL: 0.52 M/S
PV PEAK S VEL: 0.79 M/S
RA MAJOR: 4.25 CM
RA PRESSURE: 3 MMHG
RA WIDTH: 3.38 CM
RIGHT VENTRICULAR END-DIASTOLIC DIMENSION: 2.56 CM
RV TISSUE DOPPLER FREE WALL SYSTOLIC VELOCITY 1 (APICAL 4 CHAMBER VIEW): 14.34 CM/S
SAMPLE: NORMAL
SINUS: 3.36 CM
STJ: 2.81 CM
TDI LATERAL: 0.06 M/S
TDI SEPTAL: 0.04 M/S
TDI: 0.05 M/S
TR MAX PG: 15 MMHG
TRICUSPID ANNULAR PLANE SYSTOLIC EXCURSION: 1.95 CM
TV REST PULMONARY ARTERY PRESSURE: 18 MMHG

## 2022-08-05 PROCEDURE — 93306 ECHO (CUPID ONLY): ICD-10-PCS | Mod: 26,,, | Performed by: INTERNAL MEDICINE

## 2022-08-05 PROCEDURE — 93306 TTE W/DOPPLER COMPLETE: CPT

## 2022-08-05 PROCEDURE — 93306 TTE W/DOPPLER COMPLETE: CPT | Mod: 26,,, | Performed by: INTERNAL MEDICINE

## 2022-08-08 ENCOUNTER — TELEPHONE (OUTPATIENT)
Dept: INTERNAL MEDICINE | Facility: CLINIC | Age: 72
End: 2022-08-08
Payer: MEDICARE

## 2022-08-08 NOTE — TELEPHONE ENCOUNTER
"----- Message from Teresa Herman sent at 8/8/2022  1:46 PM CDT -----  Regarding: Patient advise                Name of Who is Calling: Babatunde Singh    Who Left The Message: Babatunde Francisco      What is the request in detail:        Patient called requesting to have pain medication called into his local pharmacy. Patient states,  "he can't sleep at night and can't walk."  Please give a call back at your earliest convenience and further advise.   Thank you!    Reply by [facility]:  No    Preferred Call Back  :  [phone] (T)                                           "

## 2022-08-09 ENCOUNTER — TELEPHONE (OUTPATIENT)
Dept: INTERNAL MEDICINE | Facility: CLINIC | Age: 72
End: 2022-08-09
Payer: MEDICARE

## 2022-08-09 NOTE — TELEPHONE ENCOUNTER
----- Message from Minerva Flores sent at 8/9/2022  9:46 AM CDT -----  Pt missed a call and would the nurse to return their call.    Pt can be reached at [phone]

## 2022-08-09 NOTE — TELEPHONE ENCOUNTER
Called and cancelled pt audio visit. Pt states he will just wait until Tuesday for his in person appt.

## 2022-08-09 NOTE — TELEPHONE ENCOUNTER
Called and spoke to pt. Pt states he is having horrible bilateral foot pain and the Gabapentin is not working. Pt asking if there is something else he can try for the pain. Pt states he isn't sleeping. Pt scheduled appt with PCP Tuesday

## 2022-08-10 ENCOUNTER — TELEPHONE (OUTPATIENT)
Dept: CARDIOLOGY | Facility: CLINIC | Age: 72
End: 2022-08-10
Payer: MEDICARE

## 2022-08-10 NOTE — TELEPHONE ENCOUNTER
Called daughter number, was told to call pt at [phone].      Pt states Dr. Wallace had called him yeterday about a medicine for his foot. The phone cut off in the process of telling the patient Dr. Wallace will give him a call, the phone went out.             ----- Message from Junaid Shook sent at 8/10/2022  9:53 AM CDT -----  Regarding: New RX  Contact: [phone]  RX Refill Request    Who Called: Babatunde    Refill or New Rx: New RX for Pain    Preferred Pharmacy with phone number:[id number]    Local or Mail Order: Local    Would the patient rather a call back or a response via [facility]? Call Back    Best Call Back Number:[phone]

## 2022-08-11 ENCOUNTER — TELEPHONE (OUTPATIENT)
Dept: CARDIOLOGY | Facility: CLINIC | Age: 72
End: 2022-08-11
Payer: MEDICARE

## 2022-08-11 ENCOUNTER — DOCUMENTATION ONLY (OUTPATIENT)
Dept: CARDIOLOGY | Facility: CLINIC | Age: 72
End: 2022-08-11
Payer: MEDICARE

## 2022-08-11 DIAGNOSIS — N28.89 OTHER SPECIFIED DISORDERS OF KIDNEY AND URETER: ICD-10-CM

## 2022-08-11 DIAGNOSIS — N28.89 RENAL MASS: Primary | ICD-10-CM

## 2022-08-11 RX ORDER — CILOSTAZOL 100 MG/1
100 TABLET ORAL 2 TIMES DAILY
Qty: 180 TABLET | Refills: 3 | Status: ON HOLD | OUTPATIENT
Start: 2022-08-11 | End: 2022-12-08 | Stop reason: CLARIF

## 2022-08-11 NOTE — TELEPHONE ENCOUNTER
Good afternoon staff. Dr. Wallace put in a referral to see someone in clinic. Pt is established with Dr. Roberts. Could someone reach out to patient to schedule an appt?      Thanks,  Anastasia      ----- Message from Cr Wallace MD PhD sent at 8/11/2022  8:33 AM CDT -----  Please schedule patient for CT scan had refer to urology.  Orders placed in epic.    Thank you

## 2022-08-11 NOTE — PROGRESS NOTES
CTA abd/pelvis on 8/3/2022 revealed severe bilateral LE PAD.   The study also revealed a 6cm soft tissue density mass with borderline enhancement at right upper pole kidney.  Echo on 8/5/2022 with normal LV systolic function EF 70%.    Plan:  -Start cilostazol 100 mg bid  -Check CT renal mass protocol and refer to Urology for evaluation  -Results and plan discussed in detail with Mr. Singh who voiced understanding.  -Follow-up is scheduled

## 2022-08-12 ENCOUNTER — TELEPHONE (OUTPATIENT)
Dept: CARDIOLOGY | Facility: CLINIC | Age: 72
End: 2022-08-12
Payer: MEDICARE

## 2022-08-12 NOTE — TELEPHONE ENCOUNTER
----- Message from Bev Grove sent at 8/12/2022  9:51 AM CDT -----  Regarding: Missed Call  Pt [phone] returning missed call in ref to a message he received on yesterday.    Thanks

## 2022-08-13 ENCOUNTER — HOSPITAL ENCOUNTER (OUTPATIENT)
Dept: RADIOLOGY | Facility: OTHER | Age: 72
Discharge: HOME | End: 2022-08-13
Attending: INTERNAL MEDICINE
Payer: MEDICARE

## 2022-08-13 DIAGNOSIS — I73.9 PAD (PERIPHERAL ARTERY DISEASE): Primary | ICD-10-CM

## 2022-08-13 DIAGNOSIS — N28.89 OTHER SPECIFIED DISORDERS OF KIDNEY AND URETER: ICD-10-CM

## 2022-08-15 ENCOUNTER — HOSPITAL ENCOUNTER (OUTPATIENT)
Dept: RADIOLOGY | Facility: OTHER | Age: 72
Discharge: HOME | End: 2022-08-15
Attending: INTERNAL MEDICINE
Payer: MEDICARE

## 2022-08-15 DIAGNOSIS — N28.89 OTHER SPECIFIED DISORDERS OF KIDNEY AND URETER: ICD-10-CM

## 2022-08-15 PROCEDURE — 25500020 PHARM REV CODE 255: Performed by: INTERNAL MEDICINE

## 2022-08-15 PROCEDURE — 74170 CT ABDOMEN W WO CONTRAST: ICD-10-PCS | Mod: 26,,, | Performed by: RADIOLOGY

## 2022-08-15 PROCEDURE — 74170 CT ABD WO CNTRST FLWD CNTRST: CPT | Mod: 26,,, | Performed by: RADIOLOGY

## 2022-08-15 PROCEDURE — 74170 CT ABD WO CNTRST FLWD CNTRST: CPT | Mod: TC

## 2022-08-15 RX ADMIN — IOHEXOL 75 ML: 350 INJECTION, SOLUTION INTRAVENOUS at 08:08

## 2022-08-16 ENCOUNTER — OFFICE VISIT (OUTPATIENT)
Dept: INTERNAL MEDICINE | Facility: CLINIC | Age: 72
End: 2022-08-16
Payer: MEDICARE

## 2022-08-16 VITALS
OXYGEN SATURATION: 99 % | WEIGHT: 138 LBS | HEART RATE: 104 BPM | DIASTOLIC BLOOD PRESSURE: 64 MMHG | BODY MASS INDEX: 22.99 KG/M2 | SYSTOLIC BLOOD PRESSURE: 130 MMHG | HEIGHT: 65 IN

## 2022-08-16 DIAGNOSIS — I73.9 CLAUDICATION OF RIGHT LOWER EXTREMITY: Primary | ICD-10-CM

## 2022-08-16 DIAGNOSIS — N28.89 RENAL MASS: ICD-10-CM

## 2022-08-16 DIAGNOSIS — F17.200 SMOKING: ICD-10-CM

## 2022-08-16 PROCEDURE — 99214 OFFICE O/P EST MOD 30 MIN: CPT | Mod: 25,S$GLB,, | Performed by: INTERNAL MEDICINE

## 2022-08-16 PROCEDURE — 1159F MED LIST DOCD IN RCRD: CPT | Mod: CPTII,S$GLB,, | Performed by: INTERNAL MEDICINE

## 2022-08-16 PROCEDURE — 1101F PR PT FALLS ASSESS DOC 0-1 FALLS W/OUT INJ PAST YR: ICD-10-PCS | Mod: CPTII,S$GLB,, | Performed by: INTERNAL MEDICINE

## 2022-08-16 PROCEDURE — 3044F PR MOST RECENT HEMOGLOBIN A1C LEVEL <7.0%: ICD-10-PCS | Mod: CPTII,S$GLB,, | Performed by: INTERNAL MEDICINE

## 2022-08-16 PROCEDURE — 1125F AMNT PAIN NOTED PAIN PRSNT: CPT | Mod: CPTII,S$GLB,, | Performed by: INTERNAL MEDICINE

## 2022-08-16 PROCEDURE — 1101F PT FALLS ASSESS-DOCD LE1/YR: CPT | Mod: CPTII,S$GLB,, | Performed by: INTERNAL MEDICINE

## 2022-08-16 PROCEDURE — 3288F PR FALLS RISK ASSESSMENT DOCUMENTED: ICD-10-PCS | Mod: CPTII,S$GLB,, | Performed by: INTERNAL MEDICINE

## 2022-08-16 PROCEDURE — 3008F BODY MASS INDEX DOCD: CPT | Mod: CPTII,S$GLB,, | Performed by: INTERNAL MEDICINE

## 2022-08-16 PROCEDURE — 99406 BEHAV CHNG SMOKING 3-10 MIN: CPT | Mod: S$GLB,,, | Performed by: INTERNAL MEDICINE

## 2022-08-16 PROCEDURE — 3044F HG A1C LEVEL LT 7.0%: CPT | Mod: CPTII,S$GLB,, | Performed by: INTERNAL MEDICINE

## 2022-08-16 PROCEDURE — 3008F PR BODY MASS INDEX (BMI) DOCUMENTED: ICD-10-PCS | Mod: CPTII,S$GLB,, | Performed by: INTERNAL MEDICINE

## 2022-08-16 PROCEDURE — 1125F PR PAIN SEVERITY QUANTIFIED, PAIN PRESENT: ICD-10-PCS | Mod: CPTII,S$GLB,, | Performed by: INTERNAL MEDICINE

## 2022-08-16 PROCEDURE — 3078F PR MOST RECENT DIASTOLIC BLOOD PRESSURE < 80 MM HG: ICD-10-PCS | Mod: CPTII,S$GLB,, | Performed by: INTERNAL MEDICINE

## 2022-08-16 PROCEDURE — 1160F RVW MEDS BY RX/DR IN RCRD: CPT | Mod: CPTII,S$GLB,, | Performed by: INTERNAL MEDICINE

## 2022-08-16 PROCEDURE — 99406 PR TOBACCO USE CESSATION INTERMEDIATE 3-10 MINUTES: ICD-10-PCS | Mod: S$GLB,,, | Performed by: INTERNAL MEDICINE

## 2022-08-16 PROCEDURE — 99214 PR OFFICE/OUTPT VISIT, EST, LEVL IV, 30-39 MIN: ICD-10-PCS | Mod: 25,S$GLB,, | Performed by: INTERNAL MEDICINE

## 2022-08-16 PROCEDURE — 3078F DIAST BP <80 MM HG: CPT | Mod: CPTII,S$GLB,, | Performed by: INTERNAL MEDICINE

## 2022-08-16 PROCEDURE — 4010F PR ACE/ARB THEARPY RXD/TAKEN: ICD-10-PCS | Mod: CPTII,S$GLB,, | Performed by: INTERNAL MEDICINE

## 2022-08-16 PROCEDURE — 99999 PR PBB SHADOW E&M-EST. PATIENT-LVL IV: ICD-10-PCS | Mod: PBBFAC,,, | Performed by: INTERNAL MEDICINE

## 2022-08-16 PROCEDURE — 3075F SYST BP GE 130 - 139MM HG: CPT | Mod: CPTII,S$GLB,, | Performed by: INTERNAL MEDICINE

## 2022-08-16 PROCEDURE — 4010F ACE/ARB THERAPY RXD/TAKEN: CPT | Mod: CPTII,S$GLB,, | Performed by: INTERNAL MEDICINE

## 2022-08-16 PROCEDURE — 1159F PR MEDICATION LIST DOCUMENTED IN MEDICAL RECORD: ICD-10-PCS | Mod: CPTII,S$GLB,, | Performed by: INTERNAL MEDICINE

## 2022-08-16 PROCEDURE — 99999 PR PBB SHADOW E&M-EST. PATIENT-LVL IV: CPT | Mod: PBBFAC,,, | Performed by: INTERNAL MEDICINE

## 2022-08-16 PROCEDURE — 3288F FALL RISK ASSESSMENT DOCD: CPT | Mod: CPTII,S$GLB,, | Performed by: INTERNAL MEDICINE

## 2022-08-16 PROCEDURE — 1160F PR REVIEW ALL MEDS BY PRESCRIBER/CLIN PHARMACIST DOCUMENTED: ICD-10-PCS | Mod: CPTII,S$GLB,, | Performed by: INTERNAL MEDICINE

## 2022-08-16 PROCEDURE — 3075F PR MOST RECENT SYSTOLIC BLOOD PRESS GE 130-139MM HG: ICD-10-PCS | Mod: CPTII,S$GLB,, | Performed by: INTERNAL MEDICINE

## 2022-08-16 RX ORDER — GABAPENTIN 300 MG/1
600 CAPSULE ORAL 3 TIMES DAILY
Qty: 540 CAPSULE | Refills: 3 | Status: SHIPPED | OUTPATIENT
Start: 2022-08-16 | End: 2022-08-25 | Stop reason: SDUPTHER

## 2022-08-16 NOTE — PROGRESS NOTES
"    CHIEF COMPLAINT     Chief Complaint   Patient presents with    Muscle Pain     Nerve pain    Leg Pain       HPI     Babatunde Singh is a 72 y.o. male PAD, hypertension,hyperlipidemia, tobacco here today for     Worsening RLE pain.   Taking gabapentin 300mg TID.  Has been evaluated for vasc cards showing significant PAD. Also being evaluated for neurogenic cause.Patient reports pain all the time with activity pain is particularly bothersome at night when he elevates his leg to bed.  Reports getting some relief dangling off the side of the bed but never complete relief.  Also reports that when he runs his fingers along the back of his leg that that feels irritating and painful.  Has tried using lidocaine patch which helps somewhat.    Personally Reviewed Patient's Medical, surgical, family and social hx. Changes updated in [employer].  Care Team updated in Epic    Review of Systems:  Review of Systems   Musculoskeletal:        Leg pain         Health Maintenance:   Reviewed with patient  Due for the following:      PHYSICAL EXAM     /64 (BP Location: Right arm, Patient Position: Sitting, BP Method: Medium (Manual))   Pulse 104   Ht 5' 5" (1.651 m)   Wt 62.6 kg (138 lb)   SpO2 99%   BMI 22.96 kg/m²     Gen: Well Appearing, NAD  HEENT: PERR, EOMI  Neck: FROM, no thyromegaly, no cervical adenopathy  CVD: RRR, no M/R/G  RLE: palpable pulses, warm foot.      LABS     Labs reviewed; Notable for    Chemistry        Component Value Date/Time     08/15/2022 0839    K 4.5 08/15/2022 0839     08/15/2022 0839    CO2 24 08/15/2022 0839    BUN 12 08/15/2022 0839    CREATININE 0.8 08/15/2022 0839    GLU 87 08/15/2022 0839        Component Value Date/Time    CALCIUM 9.0 08/15/2022 0839    ALKPHOS 87 08/15/2022 0839    AST 20 08/15/2022 0839    ALT 13 08/15/2022 0839    BILITOT 0.3 08/15/2022 0839    ESTGFRAFRICA >60.0 03/10/2022 1118    EGFRNONAA >60.0 03/10/2022 1118         Impression:     Significant vascular " disease as described above.     Cholelithiasis.     6 cm soft tissue density mass with borderline enhancement right upper pole kidney.  Urology consultation and CT renal mass protocol suggested.     ASSESSMENT     1. Claudication of right lower extremity  gabapentin (NEURONTIN) 300 MG capsule   2. Renal mass     3. Smoking             Plan     Babatunde Singh is a 72 y.o. male with PAD, hypertension,hyperlipidemia, tobacco use    1. Claudication of right lower extremity    Think symptoms are claudication from peripheral arterial disease.  Patient also has underlying allodynia   continue Pletal, atorvastatin 80 Zetia 10   ambulate as tolerated Can phos 1 claudication symptoms begin   quit smoking    will increase gabapentin to 600 mg 3 times a day   consider Lyrica if symptoms do not improve with increase gabapentin  - gabapentin (NEURONTIN) 300 MG capsule; Take 2 capsules (600 mg total) by mouth 3 (three) times daily.  Dispense: 540 capsule; Refill: 3    2. Renal mass   has urology appointment September 12th    3. Smoking  >3 minutes counseling, down to 4 cigs/day.    Esdras Ly MD

## 2022-08-22 ENCOUNTER — HOSPITAL ENCOUNTER (OUTPATIENT)
Dept: RADIOLOGY | Facility: HOSPITAL | Age: 72
Discharge: HOME | End: 2022-08-22
Attending: INTERNAL MEDICINE
Payer: MEDICARE

## 2022-08-22 DIAGNOSIS — M54.16 LUMBAR RADICULOPATHY, CHRONIC: ICD-10-CM

## 2022-08-22 PROCEDURE — 72148 MRI LUMBAR SPINE W/O DYE: CPT | Mod: TC

## 2022-08-22 PROCEDURE — 72148 MRI LUMBAR SPINE WITHOUT CONTRAST: ICD-10-PCS | Mod: 26,,, | Performed by: RADIOLOGY

## 2022-08-22 PROCEDURE — 72148 MRI LUMBAR SPINE W/O DYE: CPT | Mod: 26,,, | Performed by: RADIOLOGY

## 2022-08-25 ENCOUNTER — HOSPITAL ENCOUNTER (EMERGENCY)
Facility: OTHER | Age: 72
Discharge: HOME | End: 2022-08-25
Attending: EMERGENCY MEDICINE
Payer: MEDICARE

## 2022-08-25 ENCOUNTER — TELEPHONE (OUTPATIENT)
Dept: CARDIOLOGY | Facility: CLINIC | Age: 72
End: 2022-08-25

## 2022-08-25 VITALS
WEIGHT: 147 LBS | OXYGEN SATURATION: 99 % | DIASTOLIC BLOOD PRESSURE: 60 MMHG | SYSTOLIC BLOOD PRESSURE: 146 MMHG | BODY MASS INDEX: 24.49 KG/M2 | HEART RATE: 98 BPM | TEMPERATURE: 98 F | RESPIRATION RATE: 20 BRPM | HEIGHT: 65 IN

## 2022-08-25 DIAGNOSIS — R60.0 EDEMA OF RIGHT LOWER LEG: Primary | ICD-10-CM

## 2022-08-25 DIAGNOSIS — I73.9 CLAUDICATION OF RIGHT LOWER EXTREMITY: ICD-10-CM

## 2022-08-25 DIAGNOSIS — R20.8 ALLODYNIA: ICD-10-CM

## 2022-08-25 DIAGNOSIS — I73.9 CLAUDICATION: ICD-10-CM

## 2022-08-25 DIAGNOSIS — M79.604 RIGHT LEG PAIN: ICD-10-CM

## 2022-08-25 LAB
ALBUMIN SERPL BCP-MCNC: 3.5 G/DL (ref 3.5–5.2)
ALP SERPL-CCNC: 71 U/L (ref 55–135)
ALT SERPL W/O P-5'-P-CCNC: 17 U/L (ref 10–44)
ANION GAP SERPL CALC-SCNC: 9 MMOL/L (ref 8–16)
AST SERPL-CCNC: 32 U/L (ref 10–40)
BASOPHILS # BLD AUTO: 0.06 K/UL (ref 0–0.2)
BASOPHILS NFR BLD: 0.5 % (ref 0–1.9)
BILIRUB SERPL-MCNC: 0.4 MG/DL (ref 0.1–1)
BUN SERPL-MCNC: 11 MG/DL (ref 8–23)
CALCIUM SERPL-MCNC: 9.4 MG/DL (ref 8.7–10.5)
CHLORIDE SERPL-SCNC: 106 MMOL/L (ref 95–110)
CO2 SERPL-SCNC: 22 MMOL/L (ref 23–29)
CREAT SERPL-MCNC: 0.8 MG/DL (ref 0.5–1.4)
D DIMER PPP IA.FEU-MCNC: 0.92 MG/L FEU
DIFFERENTIAL METHOD: ABNORMAL
EOSINOPHIL # BLD AUTO: 0.1 K/UL (ref 0–0.5)
EOSINOPHIL NFR BLD: 0.7 % (ref 0–8)
ERYTHROCYTE [DISTWIDTH] IN BLOOD BY AUTOMATED COUNT: 20.1 % (ref 11.5–14.5)
EST. GFR  (NO RACE VARIABLE): >60 ML/MIN/1.73 M^2
GLUCOSE SERPL-MCNC: 110 MG/DL (ref 70–110)
HCT VFR BLD AUTO: 32.3 % (ref 40–54)
HGB BLD-MCNC: 10.4 G/DL (ref 14–18)
IMM GRANULOCYTES # BLD AUTO: 0.04 K/UL (ref 0–0.04)
IMM GRANULOCYTES NFR BLD AUTO: 0.3 % (ref 0–0.5)
LYMPHOCYTES # BLD AUTO: 4.3 K/UL (ref 1–4.8)
LYMPHOCYTES NFR BLD: 36.7 % (ref 18–48)
MCH RBC QN AUTO: 25.9 PG (ref 27–31)
MCHC RBC AUTO-ENTMCNC: 32.2 G/DL (ref 32–36)
MCV RBC AUTO: 80 FL (ref 82–98)
MONOCYTES # BLD AUTO: 1 K/UL (ref 0.3–1)
MONOCYTES NFR BLD: 8.8 % (ref 4–15)
NEUTROPHILS # BLD AUTO: 6.2 K/UL (ref 1.8–7.7)
NEUTROPHILS NFR BLD: 53 % (ref 38–73)
NRBC BLD-RTO: 0 /100 WBC
PLATELET # BLD AUTO: 571 K/UL (ref 150–450)
PMV BLD AUTO: 8.3 FL (ref 9.2–12.9)
POTASSIUM SERPL-SCNC: 4.1 MMOL/L (ref 3.5–5.1)
PROT SERPL-MCNC: 6.3 G/DL (ref 6–8.4)
RBC # BLD AUTO: 4.02 M/UL (ref 4.6–6.2)
SODIUM SERPL-SCNC: 137 MMOL/L (ref 136–145)
TROPONIN I SERPL DL<=0.01 NG/ML-MCNC: 0.02 NG/ML (ref 0–0.03)
WBC # BLD AUTO: 11.76 K/UL (ref 3.9–12.7)

## 2022-08-25 PROCEDURE — 80053 COMPREHEN METABOLIC PANEL: CPT | Performed by: EMERGENCY MEDICINE

## 2022-08-25 PROCEDURE — 96360 HYDRATION IV INFUSION INIT: CPT

## 2022-08-25 PROCEDURE — 85025 COMPLETE CBC W/AUTO DIFF WBC: CPT | Performed by: EMERGENCY MEDICINE

## 2022-08-25 PROCEDURE — 99285 EMERGENCY DEPT VISIT HI MDM: CPT | Mod: 25

## 2022-08-25 PROCEDURE — 85379 FIBRIN DEGRADATION QUANT: CPT | Performed by: EMERGENCY MEDICINE

## 2022-08-25 PROCEDURE — 84484 ASSAY OF TROPONIN QUANT: CPT | Performed by: EMERGENCY MEDICINE

## 2022-08-25 PROCEDURE — 25000003 PHARM REV CODE 250: Performed by: EMERGENCY MEDICINE

## 2022-08-25 RX ORDER — METHOCARBAMOL 500 MG/1
500 TABLET, FILM COATED ORAL
Status: COMPLETED | OUTPATIENT
Start: 2022-08-25 | End: 2022-08-25

## 2022-08-25 RX ORDER — DICLOFENAC SODIUM 10 MG/G
2 GEL TOPICAL 3 TIMES DAILY PRN
Qty: 100 G | Refills: 0 | Status: ON HOLD | OUTPATIENT
Start: 2022-08-25 | End: 2022-10-21 | Stop reason: HOSPADM

## 2022-08-25 RX ORDER — GABAPENTIN 300 MG/1
900 CAPSULE ORAL 3 TIMES DAILY
Qty: 540 CAPSULE | Refills: 3 | Status: ON HOLD | OUTPATIENT
Start: 2022-08-25 | End: 2022-10-31 | Stop reason: SDUPTHER

## 2022-08-25 RX ORDER — LIDOCAINE 50 MG/G
PATCH TOPICAL
Qty: 30 PATCH | Refills: 0 | Status: ON HOLD | OUTPATIENT
Start: 2022-08-25 | End: 2022-10-21 | Stop reason: HOSPADM

## 2022-08-25 RX ORDER — HYDROCODONE BITARTRATE AND ACETAMINOPHEN 5; 325 MG/1; MG/1
1 TABLET ORAL
Status: COMPLETED | OUTPATIENT
Start: 2022-08-25 | End: 2022-08-25

## 2022-08-25 RX ORDER — NAPROXEN 500 MG/1
500 TABLET ORAL 2 TIMES DAILY PRN
Qty: 60 TABLET | Refills: 0 | Status: ON HOLD | OUTPATIENT
Start: 2022-08-25 | End: 2022-10-31 | Stop reason: HOSPADM

## 2022-08-25 RX ORDER — NAPROXEN 500 MG/1
500 TABLET ORAL
Status: COMPLETED | OUTPATIENT
Start: 2022-08-25 | End: 2022-08-25

## 2022-08-25 RX ADMIN — SODIUM CHLORIDE 500 ML: 0.9 INJECTION, SOLUTION INTRAVENOUS at 04:08

## 2022-08-25 RX ADMIN — METHOCARBAMOL 500 MG: 500 TABLET ORAL at 05:08

## 2022-08-25 RX ADMIN — NAPROXEN 500 MG: 500 TABLET ORAL at 04:08

## 2022-08-25 NOTE — TELEPHONE ENCOUNTER
----- Message from Madelyn Reich, Patient Care Assistant sent at 8/25/2022 12:26 PM CDT -----  Type:  Needs Medical Advice    Who Called:  pt  Symptoms (please be specific):  Patient would like a call back regarding his plan of care/ and test result   Would the patient rather a call back or a response via [facility]?  Please call   Best Call Back Number:  [phone]  Additional Information:

## 2022-08-25 NOTE — ED TRIAGE NOTES
"Pt c/o RLE pain and swelling. Rates pain 10/10. States "I have poor circulation and I can barely feel this foot" admits to numbness of RLE. Also admits to numerous falls recently due to worsening foot pain and swelling.     LOC: The patient is awake, alert, and oriented to self, place, time, and situation. Pt is calm and cooperative. Speech is appropriate and clear.      APPEARANCE: Patient resting comfortably in no acute distress.  Patient is clean and well groomed.     SKIN: The skin is warm and dry; no breakdown or bruising noted.      MUSCULOSKELETAL:  MAEAW; RLE edema with redness noted      RESPIRATORY: Airway is open and patent. Respirations are non-labored with normal effort and rate.  Denies cough.      CARDIAC:  Normal rate noted though pt is normotensive.  No complaints of chest pain at this time.      ABDOMEN: Pt denies abdominal pain, nausea, vomiting, diarrhea or constipation.     NEUROLOGIC: Follows commands;  Pt denies headache, lightheadedness or dizziness; denies loss of consciousness.       "

## 2022-08-25 NOTE — DISCHARGE INSTRUCTIONS
Thank you for letting us take care of you today! It was nice meeting you, and I hope you feel better soon.     Call your primary care doctor to make the first available appointment.     Keep all your medical appointments.     Take your regular medication as prescribed. Contact your primary care provider before running out of medication, or for any problems obtaining them.    Do not drive or operate heavy machinery while taking opioid or muscle relaxing medications. Examples include norco, percocet, xanax, valium, flexeril.     Overuse or long term use of pain and sedating medication may lead to addiction, dependence, organ failure, family and work problems, legal problems, accidental overdose and death.    If you do not have health insurance, you probably can afford it:  Call [phone] ([facility] hotline) or go to www.[employer].[place].gov    Your evaluation in the ED does not suggest any emergent or life threatening medical condition requiring admission or immediate intervention beyond that provided in the ED.   However, the signs of a serious problem sometimes take more time to appear.     Do not hesitate to return to the ER if any of the following occur:    Weakness, dizziness, fainting, or loss of consciousness   Fever of 100.4ºF (38ºC) or higher  Any worse symptoms  Any new or concerning symptoms    Sincerely,   Ralph Thomas MD  Board Certified Emergency Physician    To protect yourself and others from COVID19:  Get vaccinated.   Anyone over 5 years old is eligible for vaccination.   Everyone 18 and older should get 3 total vaccine doses. Anyone over 50 years old or with certain chronic conditions should get a 4th dose.   Vaccination is shown to prevent getting sick, ending up in the hospital, or dying because of COVID19.   If you are vaccinated, help friends and family get the vaccine.    If not vaccinated:  Your shot is waiting for you. To get it:   Text your ZIP code to GETVAX (226839) or VACUNA ([id number]) in  [place]  call 311, or [phone], or [phone], or [phone],   go to www.vaccines.gov, or  Call your health provider  If exposed to someone with cold, flu, or COVID19 symptoms, you must quarantine for at least 5 days.   Even if you have no symptoms   Otherwise you could give the virus to someone who dies from it  Some symptoms of COVID19 include fever, cough, sore throat, breathing troubles, loss of taste/smell, headaches, stomach upset, diarrhea.   Each household can get 4 free COVID19 test kits, even if you already got one set. Go to www.covidtests.gov       soft/nondistended/nontender

## 2022-08-25 NOTE — ED PROVIDER NOTES
"  Source of History:  Medical record, patient    Chief complaint:  Per triage note: "Leg Swelling (Reports chronic right lower leg issues, but notes increasing right calf and foot swelling and pain with recent falls. +1 edema noted with red discoloration, tender to touch)  "    HPI:    Patient presents with acute worsening of his chronic right lower leg edema.  He reports the edema has caused him for mechanical trip and falls over the last week.  He denies any significant injury from these falls.  He denies any associated dizziness, ataxia.  He denies any recent fevers, chills.  He has not taken any acute analgesics for this, and reports compliance with his chronic daily medications.  He denies any acute worsening of his chronic dyspnea, or any new chest pain    This is the extent of the patient's complaints at this time.     ROS:   As per HPI and below:   General: No fever.   HENT: No facial pain.   Eyes: No eye pain.   Cardiovascular: No chest pain.   Respiratory:  Chronic dyspnea without acute change.   GI: No abdominal pain. No vomiting.   Skin: No rashes.   Neuro:  No syncope.  No focal deficits.   Musculoskeletal: Notes extremity pain and edema.  All other systems reviewed and are negative.      Review of patient's allergies indicates:  No Known Allergies    PMH:  As per HPI and below:  Past Medical History:   Diagnosis Date    Hypertension     Peripheral arterial disease        Past Surgical History:   Procedure Laterality Date    EXCISION OF HYDROCELE Left 4/26/2022    Procedure: HYDROCELECTOMY;  Surgeon: Damion Roberts MD;  [facility];  Service: Urology;  Laterality: Left;    KNEE SURGERY  1972       Social History     Tobacco Use    Smoking status: Current Every Day Smoker     Packs/day: 0.50     Years: 50.00     Pack years: 25.00     Types: Cigarettes    Smokeless tobacco: Never Used   Substance Use Topics    Alcohol use: Never    Drug use: Never       Physical Exam:      Nursing note " "and vitals reviewed.  BP (!) 146/60 (BP Location: Left arm, Patient Position: Sitting)   Pulse 98   Temp 98.2 °F (36.8 °C) (Oral)   Resp 20   Ht 5' 5" (1.651 m)   Wt 66.7 kg (147 lb)   SpO2 99%   BMI 24.46 kg/m²     Constitutional: AAOx3. No distress.  Eyes: EOMI. No discharge. Anicteric.  HENT:   Neck: Normal range of motion. Neck supple.  Cardiovascular: Normal rate. No murmur, no gallop and no friction rub heard.   Pulmonary/Chest: No respiratory distress. Effort normal. No wheezes, no rales, no rhonchi.   Abdominal: Bowel sounds normal. Soft. No distension and no mass. There is no tenderness. There is no rebound, no guarding, no tenderness at McBurney's point.  Musculoskeletal: Normal range of motion.   Chronic venous stasis changes at b/l LEs. Moderate right ankle edema. No erythema. R foot with normal cap refill, n/v intact.   Neurological: GCS 15. Alert and oriented to person, place, and time. No gross cranial nerve, light touch or strength deficit. Coordination normal.   Skin: Skin is warm and dry.   EXT: 2+ radial pulses. 1+ R DP pulse.   Psychiatric: Behavior is normal. Judgment normal.        MDM:    I decided to obtain the patient's medical records.  Patient is a 72-year-old male with HTN, right sided claudication and sciatica who presents with acute worsening of his chronic right leg edema, pain without associated new shortness of breath or chest pain.   On exam patient has lower extremity chronic venous stasis changes, left ankle edema, no erythema.  History and physical not consistent with cellulitis or deep space infection.  Differential also included acute DVT, chronic venous stasis, complex regional pain syndrome.  I doubt acute arterial thrombus.     ED Course as of 08/25/22 2144   Thu Aug 25, 2022   0531 Tachycardia resolved - HR 90s on the monitor during my reassessment.   I independently reviewed and interpreted labs which are notable for elevated ddimer. I doubt acute pulmonary embolus. " If DVT study negative, will have pt arrange for repeat ultrasound in a week. I discussed this plan with pt who stated understanding. Would also increase gabapentin dose pending PCP possibly starting lyrica.   Labs which are also notable for mild anemia, thrombocythemia, microcytosis.  [RC]   0542 Ultrasonographer at bedside.  [RC]   0617 Patient reports improved pain, clinically appears significantly improved.   I independently interpreted and reviewed the patient's ultrasound, notable for no evidence of acute DVT.    Will have pt start using ANGIE hose pending vascular and PCP f/u. Referred to pain management. Will add oral and topical NSAIDs, lidocaine patches.   --  I discussed with patient and/or guardian/caretaker that this evaluation in the ED does not suggest any emergent or life threatening medical condition requiring admission or further immediate intervention or diagnostics. Regardless, an unremarkable evaluation in the ED does not preclude the development or presence of a serious or life threatening condition. As such, patient was instructed to return for any worsening, new, changed, or concerning symptoms.     I had a detailed discussion with patient and/or guardian/caretaker regarding findings, plan, return precautions, importance of medication adherence, need to follow-up with a PCP and vascular specialist. All questions answered.     Management decisions for this encounter made during COVID-19 public health emergency. Available resources, standards for appropriate emergency department evaluation, and admission vs. discharge standards have necessarily shifted and remain dynamic.     Note was created using voice recognition software. It may have occasional typographical errors not identified and edited despite initial review prior to signing.   [RC]      ED Course User Index  [RC] Ralph Thomas MD       Medications   naproxen tablet 500 mg (500 mg Oral Given 8/25/22 0413)   sodium chloride 0.9% bolus  500 mL (0 mLs Intravenous Stopped 8/25/22 9852)   methocarbamoL tablet 500 mg (500 mg Oral Given 8/25/22 3380)   HYDROcodone-acetaminophen 5-325 mg per tablet 1 tablet (0 tablets Oral Return to Cabinet 8/25/22 0553)              Diagnostic Impression:    1. Edema of right lower leg    2. Right leg pain    3. Claudication    4. Claudication of right lower extremity    5. Allodynia (Right leg)         ED Disposition Condition    Discharge Good        ED Prescriptions     Medication Sig Dispense Start Date End Date Auth. Provider    gabapentin (NEURONTIN) 300 MG capsule Take 3 capsules (900 mg total) by mouth 3 (three) times daily. 540 capsule 8/25/2022 8/25/2023 Ralph Thomas MD    naproxen (NAPROSYN) 500 MG tablet Take 1 tablet (500 mg total) by mouth 2 (two) times daily as needed (pain). 60 tablet 8/25/2022  Ralph Thomas MD    LIDOcaine (LIDODERM) 5 % Apply to affected area as needed for pain for 12 hours, then off for 12 hours. Discard after each use.  May use 4% lidocaine patch as alternative. 30 patch 8/25/2022  Ralph Thomas MD    diclofenac sodium (VOLTAREN) 1 % Gel Apply 2 g topically 3 (three) times daily as needed (muscle spasm pain). Apply 2 grams to affected area 3 times daily as needed 100 g 8/25/2022  Ralph Thomas MD        Follow-up Information     Follow up With Specialties Details Why Contact Info    Esdras Ly MD Internal Medicine Schedule an appointment as soon as possible for a visit  For recheck with your primary care doctor for repeat ultrasound in a week [address]  [place] [address]  [phone]      [facility] Pain Medicine Schedule an appointment as soon as possible for a visit  For recheck with specialist [address] [place] [address] [id number] [phone]           Ralph Thomas MD  08/25/22 4665

## 2022-08-26 ENCOUNTER — TELEPHONE (OUTPATIENT)
Dept: NEUROSURGERY | Facility: CLINIC | Age: 72
End: 2022-08-26
Payer: MEDICARE

## 2022-08-26 ENCOUNTER — TELEPHONE (OUTPATIENT)
Dept: CARDIOLOGY | Facility: CLINIC | Age: 72
End: 2022-08-26
Payer: MEDICARE

## 2022-08-26 NOTE — TELEPHONE ENCOUNTER
Spoke with patient and he has been rescheduled due to a missed appointment recently.   Patient has been made aware of his appointment and has confirmed . Patient denies questions or concerns at this time.   Has been encouraged to contact clinic back should he have questions prior to appointment.

## 2022-08-26 NOTE — TELEPHONE ENCOUNTER
Good morning Staff,    Just emailing you guys to see if you were able to reach the patient to reschedule missed appt?    Thanks,  Rachel

## 2022-08-30 ENCOUNTER — OFFICE VISIT (OUTPATIENT)
Dept: NEUROSURGERY | Facility: CLINIC | Age: 72
End: 2022-08-30
Payer: MEDICARE

## 2022-08-30 ENCOUNTER — TELEPHONE (OUTPATIENT)
Dept: PODIATRY | Facility: CLINIC | Age: 72
End: 2022-08-30
Payer: MEDICARE

## 2022-08-30 VITALS — HEART RATE: 115 BPM | SYSTOLIC BLOOD PRESSURE: 141 MMHG | DIASTOLIC BLOOD PRESSURE: 63 MMHG

## 2022-08-30 DIAGNOSIS — R20.2 NUMBNESS AND TINGLING OF RIGHT LEG: ICD-10-CM

## 2022-08-30 DIAGNOSIS — M79.604 RIGHT LEG PAIN: ICD-10-CM

## 2022-08-30 DIAGNOSIS — M54.31 SCIATICA OF RIGHT SIDE: Primary | ICD-10-CM

## 2022-08-30 DIAGNOSIS — M54.16 LUMBAR RADICULOPATHY: ICD-10-CM

## 2022-08-30 DIAGNOSIS — R20.0 NUMBNESS AND TINGLING OF RIGHT LEG: ICD-10-CM

## 2022-08-30 PROCEDURE — 1125F AMNT PAIN NOTED PAIN PRSNT: CPT | Mod: CPTII,S$GLB,, | Performed by: NURSE PRACTITIONER

## 2022-08-30 PROCEDURE — 3288F PR FALLS RISK ASSESSMENT DOCUMENTED: ICD-10-PCS | Mod: CPTII,S$GLB,, | Performed by: NURSE PRACTITIONER

## 2022-08-30 PROCEDURE — 99999 PR PBB SHADOW E&M-EST. PATIENT-LVL IV: ICD-10-PCS | Mod: PBBFAC,,, | Performed by: NURSE PRACTITIONER

## 2022-08-30 PROCEDURE — 1159F MED LIST DOCD IN RCRD: CPT | Mod: CPTII,S$GLB,, | Performed by: NURSE PRACTITIONER

## 2022-08-30 PROCEDURE — 3288F FALL RISK ASSESSMENT DOCD: CPT | Mod: CPTII,S$GLB,, | Performed by: NURSE PRACTITIONER

## 2022-08-30 PROCEDURE — 1160F PR REVIEW ALL MEDS BY PRESCRIBER/CLIN PHARMACIST DOCUMENTED: ICD-10-PCS | Mod: CPTII,S$GLB,, | Performed by: NURSE PRACTITIONER

## 2022-08-30 PROCEDURE — 3077F PR MOST RECENT SYSTOLIC BLOOD PRESSURE >= 140 MM HG: ICD-10-PCS | Mod: CPTII,S$GLB,, | Performed by: NURSE PRACTITIONER

## 2022-08-30 PROCEDURE — 99204 PR OFFICE/OUTPT VISIT, NEW, LEVL IV, 45-59 MIN: ICD-10-PCS | Mod: S$GLB,,, | Performed by: NURSE PRACTITIONER

## 2022-08-30 PROCEDURE — 1100F PTFALLS ASSESS-DOCD GE2>/YR: CPT | Mod: CPTII,S$GLB,, | Performed by: NURSE PRACTITIONER

## 2022-08-30 PROCEDURE — 4010F ACE/ARB THERAPY RXD/TAKEN: CPT | Mod: CPTII,S$GLB,, | Performed by: NURSE PRACTITIONER

## 2022-08-30 PROCEDURE — 99204 OFFICE O/P NEW MOD 45 MIN: CPT | Mod: S$GLB,,, | Performed by: NURSE PRACTITIONER

## 2022-08-30 PROCEDURE — 3078F PR MOST RECENT DIASTOLIC BLOOD PRESSURE < 80 MM HG: ICD-10-PCS | Mod: CPTII,S$GLB,, | Performed by: NURSE PRACTITIONER

## 2022-08-30 PROCEDURE — 1100F PR PT FALLS ASSESS DOC 2+ FALLS/FALL W/INJURY/YR: ICD-10-PCS | Mod: CPTII,S$GLB,, | Performed by: NURSE PRACTITIONER

## 2022-08-30 PROCEDURE — 99499 RISK ADDL DX/OHS AUDIT: ICD-10-PCS | Mod: S$GLB,,, | Performed by: NURSE PRACTITIONER

## 2022-08-30 PROCEDURE — 99499 UNLISTED E&M SERVICE: CPT | Mod: S$GLB,,, | Performed by: NURSE PRACTITIONER

## 2022-08-30 PROCEDURE — 3077F SYST BP >= 140 MM HG: CPT | Mod: CPTII,S$GLB,, | Performed by: NURSE PRACTITIONER

## 2022-08-30 PROCEDURE — 3044F HG A1C LEVEL LT 7.0%: CPT | Mod: CPTII,S$GLB,, | Performed by: NURSE PRACTITIONER

## 2022-08-30 PROCEDURE — 1125F PR PAIN SEVERITY QUANTIFIED, PAIN PRESENT: ICD-10-PCS | Mod: CPTII,S$GLB,, | Performed by: NURSE PRACTITIONER

## 2022-08-30 PROCEDURE — 1160F RVW MEDS BY RX/DR IN RCRD: CPT | Mod: CPTII,S$GLB,, | Performed by: NURSE PRACTITIONER

## 2022-08-30 PROCEDURE — 1159F PR MEDICATION LIST DOCUMENTED IN MEDICAL RECORD: ICD-10-PCS | Mod: CPTII,S$GLB,, | Performed by: NURSE PRACTITIONER

## 2022-08-30 PROCEDURE — 99999 PR PBB SHADOW E&M-EST. PATIENT-LVL IV: CPT | Mod: PBBFAC,,, | Performed by: NURSE PRACTITIONER

## 2022-08-30 PROCEDURE — 3078F DIAST BP <80 MM HG: CPT | Mod: CPTII,S$GLB,, | Performed by: NURSE PRACTITIONER

## 2022-08-30 PROCEDURE — 4010F PR ACE/ARB THEARPY RXD/TAKEN: ICD-10-PCS | Mod: CPTII,S$GLB,, | Performed by: NURSE PRACTITIONER

## 2022-08-30 PROCEDURE — 3044F PR MOST RECENT HEMOGLOBIN A1C LEVEL <7.0%: ICD-10-PCS | Mod: CPTII,S$GLB,, | Performed by: NURSE PRACTITIONER

## 2022-08-30 NOTE — PROGRESS NOTES
Neurosurgery  History & Physical    SUBJECTIVE:     Chief Complaint: Back Pain    History of Present Illness: Babatunde Singh is a 72 y.o. male with PAD, HTN, and smoker. He is being seen in clinic today as a referral from Dr. Wallace to discuss concerns with lumbar radiculopathy with neurogenic claudication. The patient has struggled with back pain for several years; however, over the past couple of months the pain has progressed. Describes the pain as constant and aching across the low back with radiation into the right hip extending down the posterior and lateral aspect of the leg intermittently he has left leg symptoms. Rates the pain as 9/10. Aggravating factors include standing and walking. Alleviating factors include rest and medications. Denies b/b dysfunction, saddle anesthesia, or gait instability.  Endorses numbness and tingling and weakness in the right foot. He has not obtained conservative therapy.    Review of patient's allergies indicates:  No Known Allergies    Current Outpatient Medications   Medication Sig Dispense Refill    atorvastatin (LIPITOR) 80 MG tablet Take 1 tablet (80 mg total) by mouth once daily. 30 tablet 3    bacitracin 500 unit/gram Oint Apply topically 2 (two) times daily. 28 g 0    cilostazoL (PLETAL) 100 MG Tab Take 1 tablet (100 mg total) by mouth 2 (two) times daily. 180 tablet 3    diclofenac sodium (VOLTAREN) 1 % Gel Apply 2 g topically 3 (three) times daily as needed (muscle spasm pain). Apply 2 grams to affected area 3 times daily as needed 100 g 0    ezetimibe (ZETIA) 10 mg tablet Take 1 tablet (10 mg total) by mouth once daily. 90 tablet 3    gabapentin (NEURONTIN) 300 MG capsule Take 3 capsules (900 mg total) by mouth 3 (three) times daily. 540 capsule 3    LIDOcaine (LIDODERM) 5 % Apply to affected area as needed for pain for 12 hours, then off for 12 hours. Discard after each use.  May use 4% lidocaine patch as alternative. 30 patch 0    losartan-hydrochlorothiazide  50-12.5 mg (HYZAAR) 50-12.5 mg per tablet Take 1 tablet by mouth once daily. 30 tablet 3    multivitamin (THERAGRAN) per tablet Take 1 tablet by mouth once daily.      naproxen (NAPROSYN) 500 MG tablet Take 1 tablet (500 mg total) by mouth 2 (two) times daily as needed (pain). 60 tablet 0    terbinafine HCl (LAMISIL ORAL) Take by mouth once daily at 6am.       No current facility-administered medications for this visit.       Past Medical History:   Diagnosis Date    Hypertension     Peripheral arterial disease      Past Surgical History:   Procedure Laterality Date    EXCISION OF HYDROCELE Left 4/26/2022    Procedure: HYDROCELECTOMY;  Surgeon: Damion Roberts MD;  Location: [facility];  Service: Urology;  Laterality: Left;    KNEE SURGERY  1972     Family History       Problem Relation (Age of Onset)    Hypertension Mother, Father          Social History     Socioeconomic History    Marital status:    Occupational History    Occupation: [employer]   Tobacco Use    Smoking status: Every Day     Packs/day: 0.50     Years: 50.00     Pack years: 25.00     Types: Cigarettes    Smokeless tobacco: Never   Substance and Sexual Activity    Alcohol use: Never    Drug use: Never    Sexual activity: Yes     Partners: Female     Comment: wife       Review of Systems   Constitutional:  Negative for activity change, appetite change and fever.   HENT:  Negative for hearing loss and postnasal drip.    Eyes:  Negative for pain and visual disturbance.   Respiratory:  Negative for shortness of breath.    Cardiovascular:  Negative for chest pain and palpitations.   Gastrointestinal:  Negative for abdominal pain.   Endocrine: Negative for polydipsia, polyphagia and polyuria.   Musculoskeletal:  Positive for arthralgias, back pain and myalgias. Negative for gait problem.   Neurological:  Positive for weakness and numbness. Negative for dizziness and headaches.   Psychiatric/Behavioral:  Negative for confusion and dysphoric mood.       OBJECTIVE:     Vital Signs  Pulse: (!) 115  BP: (!) 141/63  Pain Score:   9  There is no height or weight on file to calculate BMI.      Neurosurgery Physical Exam  General: well developed, well nourished, no distress.   Head: normocephalic, atraumatic  Neurologic: Alert and oriented. Thought content appropriate.  GCS: Motor: 6/Verbal: 5/Eyes: 4 GCS Total: 15  Mental Status: Awake, Alert, Oriented x 4  Language: No aphasia  Speech: No dysarthria  Cranial nerves: face symmetric, tongue midline, CN II-XII grossly intact.   Eyes: pupils equal, round, reactive to light with accomodation, EOMI.   Pulmonary: normal respirations, no signs of respiratory distress  Abdomen: soft, non-distended  Skin: Skin is warm, dry and intact.  Sensory: intact to light touch throughout  Motor Strength:Moves all extremities spontaneously with good tone.    Strength  Deltoids Triceps Biceps Wrist Extension Wrist Flexion Hand    Upper: R 5/5 5/5 5/5 5/5 5/5 5/5    L 5/5 5/5 5/5 5/5 5/5 5/5     HF KE KF DF PF EHL   Lower: R 5/5 5/5 5/5 4/5 4/5 4/5    L 5/5 5/5 5/5 5/5 5/5 5/5     Reflexes:   Hines's: Negative.  Clonus: Negative.     Cerebellar:   Gait stable, antalgic     Cervical:   ROM: Full with flexion, extension, lateral rotation and ear-to-shoulder bend.   Midline TTP: Negative.     Thoracic:  Midline TTP: Negative.     Lumbar:  Midline TTP: Negative.  Straight Leg Test: Negative.    Other:  SI joint TTP: Positive  Greater trochanter TTP: Negative.  Tenderness with external/internal hip rotation: Negative.    Diagnostic Results:  I have personally reviewed the the MRI lumbar spine dated 8/22/22. The imaging shows multilevel degenerative changes most pronounced L2-S1 with moderate to severe central and neural foraminal stenosis.     ASSESSMENT/PLAN:     Babatunde Singh is a 72 y.o. male with PAD, HTN, and smoker. He was seen in clinic today as a referral from Dr. Wallace to discuss concerns with lumbar radiculopathy with  neurogenic claudication. Dynamic x-rays have been ordered for instability. The patient is undergoing further evaluation of the recently identified renal mass. We discussed that in the interim conservative therapy for his lumbar stenosis can be obtained. He has an evaluation with pain management early in September. I am referring him to PT.     I would like the patient to follow-up in clinic in 8-12 weeks to discuss progress with PT and pain managmenet . I have encouraged him/her to contact the clinic with any questions, concerns, or adverse clinical changes. He/She verbalized understanding.      CRISTIANE Henderson  Neurosurgery  [facility].      Note dictated with voice recognition software, please excuse any grammatical errors.

## 2022-08-30 NOTE — TELEPHONE ENCOUNTER
called pt on 8/30/22 pt did not answer left pt vm ....also called pt daughter spoke with daughter and she told me to give him a call ...left pt a vm on his cell phone    SOFYA

## 2022-08-30 NOTE — TELEPHONE ENCOUNTER
----- Message from Karyna Fontenot NP sent at 8/30/2022 10:35 AM CDT -----  Regarding: Medication  Good Morning,  This mutual patient asked for a refill of the Lamisil and for a follow-up appointment with Dr. Pope. Could you please reach out to him.    Thanks,      KALEB Henderson-NORMAN  Neurosurgery  [facility]

## 2022-08-31 ENCOUNTER — TELEPHONE (OUTPATIENT)
Dept: PODIATRY | Facility: CLINIC | Age: 72
End: 2022-08-31
Payer: MEDICARE

## 2022-08-31 ENCOUNTER — HOSPITAL ENCOUNTER (EMERGENCY)
Facility: OTHER | Age: 72
Discharge: HOME | End: 2022-08-31
Attending: EMERGENCY MEDICINE
Payer: MEDICARE

## 2022-08-31 VITALS
OXYGEN SATURATION: 97 % | HEART RATE: 94 BPM | TEMPERATURE: 98 F | RESPIRATION RATE: 17 BRPM | BODY MASS INDEX: 23.32 KG/M2 | SYSTOLIC BLOOD PRESSURE: 140 MMHG | HEIGHT: 65 IN | WEIGHT: 140 LBS | DIASTOLIC BLOOD PRESSURE: 89 MMHG

## 2022-08-31 DIAGNOSIS — M79.604 RIGHT LEG PAIN: Primary | ICD-10-CM

## 2022-08-31 PROCEDURE — 99284 EMERGENCY DEPT VISIT MOD MDM: CPT

## 2022-08-31 PROCEDURE — 96372 THER/PROPH/DIAG INJ SC/IM: CPT | Performed by: EMERGENCY MEDICINE

## 2022-08-31 PROCEDURE — 63600175 PHARM REV CODE 636 W HCPCS: Performed by: EMERGENCY MEDICINE

## 2022-08-31 RX ORDER — KETOROLAC TROMETHAMINE 30 MG/ML
30 INJECTION, SOLUTION INTRAMUSCULAR; INTRAVENOUS
Status: COMPLETED | OUTPATIENT
Start: 2022-08-31 | End: 2022-08-31

## 2022-08-31 RX ORDER — TRIAMCINOLONE ACETONIDE 40 MG/ML
40 INJECTION, SUSPENSION INTRA-ARTICULAR; INTRAMUSCULAR
Status: COMPLETED | OUTPATIENT
Start: 2022-08-31 | End: 2022-08-31

## 2022-08-31 RX ADMIN — KETOROLAC TROMETHAMINE 30 MG: 30 INJECTION, SOLUTION INTRAMUSCULAR; INTRAVENOUS at 07:08

## 2022-08-31 RX ADMIN — TRIAMCINOLONE ACETONIDE 40 MG: 40 INJECTION, SUSPENSION INTRA-ARTICULAR; INTRAMUSCULAR at 07:08

## 2022-08-31 NOTE — TELEPHONE ENCOUNTER
called pt on 8/31/22 1st attempt pt answered spoke with pt  2nd attempt pt did not answer tried to update pt on refill info ....pt does not need lamisil meds they were only for 3 month per Dr Niko COWART

## 2022-08-31 NOTE — TELEPHONE ENCOUNTER
----- Message from Heather Izquierdo sent at 8/31/2022  8:07 AM CDT -----  Contact: pt [phone]  Patient is returning a phone call.  Who left a message for the patient: Acosta Chicas MA  Does patient know what this is regarding: n/a  Would you like a call back, or a response through your [facility] portal?:   call     Please call and advise.    Thank You

## 2022-09-01 NOTE — ED TRIAGE NOTES
Chief Complaint   Patient presents with    Leg Pain     Chronic R leg pain. No relief with home medications.         Patient identifiers for Babatunde Singh checked and correct   LOC: Patient is awake, alert, and aware of environment with an appropriate affect.  Patient is oriented x4 and speaking appropriately.  APPEARANCE: Patient resting comfortably and in no acute distress.  Patient is clean and well groomed, patient's clothing is properly fastened.  SKIN: The skin is warm and dry.  Patient has normal skin turgor and moist mucus membranes.  Skin is intact: no bruising or breakdown noted.  MUSCULOSKELETAL: Patient is moving all extremities well, no obvious swelling or deformities noted. Pulses intact.  RESPIRATORY: Airway is open and patent.  Respirations are spontaneous, even and unlabored.  Normal effort and rate noted.  CARDIAC: Patient has a normal rate and rhythm.  No peripheral edema noted.  Capillary refill < 3 seconds.  ABDOMEN: No abd distention noted.  Bowel sounds active in all 4 quadrants.  Soft and non-tender upon palpation.  NEUROLOGICAL: PERRLA.  Facial expression is symmetrical.  Hand grasps are equal bilaterally.  Normal sensation in all extremities when touched with finger.  Following commands appropriately.

## 2022-09-01 NOTE — ED PROVIDER NOTES
Encounter Date: 8/31/2022    SCRIBE #1 NOTE: I, Alexei Henning, am scribing for, and in the presence of,  Itzel Lombardo MD.     History     Chief Complaint   Patient presents with    Leg Pain     Chronic R leg pain. No relief with home medications.      Time seen by provider: 7:20 PM    This is a 72 y.o. male with PMHx of HTN and peripheral artery disease who presents with complaint of chronic right leg pain. The patient states his home medications have not been working and requests a steroid injection that he received here previously which provided relief. He denies any numbness or tingling and states his pain radiates throughout his entire right leg.  He denies any changes to symptoms and denies any new neurologic symptoms.  Denies fever/chills.  Denies any swelling or erythema of the leg.  This is the extent of the patient's complaints at this time.    The history is provided by the patient.   Review of patient's allergies indicates:  No Known Allergies  Past Medical History:   Diagnosis Date    Hypertension     Peripheral arterial disease      Past Surgical History:   Procedure Laterality Date    EXCISION OF HYDROCELE Left 4/26/2022    Procedure: HYDROCELECTOMY;  Surgeon: Damion Roberts MD;  Location: [facility];  Service: Urology;  Laterality: Left;    KNEE SURGERY  1972     Family History   Problem Relation Age of Onset    Hypertension Mother     Hypertension Father      Social History     Tobacco Use    Smoking status: Every Day     Packs/day: 0.50     Years: 50.00     Pack years: 25.00     Types: Cigarettes    Smokeless tobacco: Never   Substance Use Topics    Alcohol use: Never    Drug use: Never     Review of Systems   Constitutional:  Negative for chills and fever.   Cardiovascular:  Negative for leg swelling.   Gastrointestinal:  Negative for nausea and vomiting.   Musculoskeletal:  Negative for back pain, joint swelling and neck pain.        Right lower extremity pain   Neurological:  Negative  for dizziness and headaches.     Physical Exam     Initial Vitals [08/31/22 1815]   BP Pulse Resp Temp SpO2   (!) 149/83 (!) 116 20 97.9 °F (36.6 °C) 97 %      MAP       --         Physical Exam    Nursing note and vitals reviewed.  Constitutional: He appears well-developed and well-nourished.   HENT:   Head: Normocephalic and atraumatic.   Eyes: Conjunctivae are normal.   Neck:   Normal range of motion.  Cardiovascular:            Palpable DP/PT pulses   Pulmonary/Chest: No respiratory distress.   Musculoskeletal:         General: No edema.      Cervical back: Normal range of motion.      Comments: No lower extremity swelling     Neurological: He is alert and oriented to person, place, and time.   Skin: Skin is warm and dry. Capillary refill takes less than 2 seconds.   Warm to touch       ED Course   Procedures  Labs Reviewed - No data to display       Imaging Results    None          Medications   ketorolac injection 30 mg (30 mg Intramuscular Given 8/31/22 1936)   triamcinolone acetonide injection 40 mg (40 mg Intramuscular Given 8/31/22 1936)     Medical Decision Making:   History:   Old Medical Records: I decided to obtain old medical records.  Old Records Summarized: other records, records from clinic visits and records from previous admission(s).  Initial Assessment:   7:20PM:  Patient is a 72-year-old male who presents to the emergency room with right leg pain which is chronic in nature.  He has been unable to control his pain with his home medications.  Patient denies any new neurologic symptoms.  He is requesting a steroid shot.  I do not feel that imaging is warranted at this time.  He did see neurosurgery recently and also saw his primary care physician a couple weeks ago.  Will plan for a shot of Toradol and Kenalog.  I do not feel that further work up in the ED is indicated at this time.  I updated pt regarding results and I counseled pt regarding supportive care measures.  I have discussed with the  pt ED return warnings and need for close PCP f/u.  Pt agreeable to plan and all questions answered.  I feel that pt is stable for discharge and management as an outpatient and no further intervention is needed at this time.  Pt is comfortable returning to the ED if needed.  Will DC home in stable condition.    Clinical Tests:   Radiological Study: Reviewed        Scribe Attestation:   Scribe #1: I performed the above scribed service and the documentation accurately describes the services I performed. I attest to the accuracy of the note.           Physician Attestation for Scribe: I, Itzel Lombardo, reviewed documentation as scribed in my presence, which is both accurate and complete.      Clinical Impression:   Final diagnoses:  [M79.604] Right leg pain (Primary)      ED Disposition Condition    Discharge Stable          ED Prescriptions    None       Follow-up Information       Follow up With Specialties Details Why Contact Info    Esdras Ly MD Internal Medicine   [address]  [place] [address]  [phone]               Itzel Lombardo MD  08/31/22 2020

## 2022-09-02 ENCOUNTER — TELEPHONE (OUTPATIENT)
Dept: CARDIOLOGY | Facility: CLINIC | Age: 72
End: 2022-09-02
Payer: MEDICARE

## 2022-09-02 NOTE — TELEPHONE ENCOUNTER
----- Message from Christa Vergara sent at 9/2/2022  5:04 PM CDT -----  Contact: GODWIN RASHID [[id number]] [phone]  GENERAL CALL BACK    Patient requests a phone call back from Dr. Wallace. Patient does not want to speak with any other nurse or staff member, and declined disclosing reason for call. Patient OK with receiving a call back on Tuesday.     Contact Preference: Phone call [phone]

## 2022-09-06 ENCOUNTER — OFFICE VISIT (OUTPATIENT)
Dept: PODIATRY | Facility: CLINIC | Age: 72
End: 2022-09-06
Payer: MEDICARE

## 2022-09-06 ENCOUNTER — HOSPITAL ENCOUNTER (OUTPATIENT)
Dept: RADIOLOGY | Facility: HOSPITAL | Age: 72
Discharge: HOME | End: 2022-09-06
Attending: PODIATRIST
Payer: MEDICARE

## 2022-09-06 VITALS
BODY MASS INDEX: 23.32 KG/M2 | HEART RATE: 101 BPM | SYSTOLIC BLOOD PRESSURE: 159 MMHG | DIASTOLIC BLOOD PRESSURE: 79 MMHG | HEIGHT: 65 IN | WEIGHT: 140 LBS

## 2022-09-06 DIAGNOSIS — M79.671 FOOT PAIN, RIGHT: ICD-10-CM

## 2022-09-06 DIAGNOSIS — R60.0 EDEMA OF RIGHT FOOT: ICD-10-CM

## 2022-09-06 DIAGNOSIS — B35.1 ONYCHOMYCOSIS DUE TO DERMATOPHYTE: ICD-10-CM

## 2022-09-06 DIAGNOSIS — M79.671 FOOT PAIN, RIGHT: Primary | ICD-10-CM

## 2022-09-06 DIAGNOSIS — Z72.0 TOBACCO USE: ICD-10-CM

## 2022-09-06 PROCEDURE — 3044F HG A1C LEVEL LT 7.0%: CPT | Mod: CPTII,S$GLB,, | Performed by: PODIATRIST

## 2022-09-06 PROCEDURE — 1125F PR PAIN SEVERITY QUANTIFIED, PAIN PRESENT: ICD-10-PCS | Mod: CPTII,S$GLB,, | Performed by: PODIATRIST

## 2022-09-06 PROCEDURE — 1159F PR MEDICATION LIST DOCUMENTED IN MEDICAL RECORD: ICD-10-PCS | Mod: CPTII,S$GLB,, | Performed by: PODIATRIST

## 2022-09-06 PROCEDURE — 3077F SYST BP >= 140 MM HG: CPT | Mod: CPTII,S$GLB,, | Performed by: PODIATRIST

## 2022-09-06 PROCEDURE — 4010F ACE/ARB THERAPY RXD/TAKEN: CPT | Mod: CPTII,S$GLB,, | Performed by: PODIATRIST

## 2022-09-06 PROCEDURE — 4010F PR ACE/ARB THEARPY RXD/TAKEN: ICD-10-PCS | Mod: CPTII,S$GLB,, | Performed by: PODIATRIST

## 2022-09-06 PROCEDURE — 99213 PR OFFICE/OUTPT VISIT, EST, LEVL III, 20-29 MIN: ICD-10-PCS | Mod: S$GLB,,, | Performed by: PODIATRIST

## 2022-09-06 PROCEDURE — 3078F DIAST BP <80 MM HG: CPT | Mod: CPTII,S$GLB,, | Performed by: PODIATRIST

## 2022-09-06 PROCEDURE — 3078F PR MOST RECENT DIASTOLIC BLOOD PRESSURE < 80 MM HG: ICD-10-PCS | Mod: CPTII,S$GLB,, | Performed by: PODIATRIST

## 2022-09-06 PROCEDURE — 1125F AMNT PAIN NOTED PAIN PRSNT: CPT | Mod: CPTII,S$GLB,, | Performed by: PODIATRIST

## 2022-09-06 PROCEDURE — 3077F PR MOST RECENT SYSTOLIC BLOOD PRESSURE >= 140 MM HG: ICD-10-PCS | Mod: CPTII,S$GLB,, | Performed by: PODIATRIST

## 2022-09-06 PROCEDURE — 3044F PR MOST RECENT HEMOGLOBIN A1C LEVEL <7.0%: ICD-10-PCS | Mod: CPTII,S$GLB,, | Performed by: PODIATRIST

## 2022-09-06 PROCEDURE — 73630 X-RAY EXAM OF FOOT: CPT | Mod: 26,RT,, | Performed by: RADIOLOGY

## 2022-09-06 PROCEDURE — 73630 X-RAY EXAM OF FOOT: CPT | Mod: TC,PN,RT

## 2022-09-06 PROCEDURE — 73630 XR FOOT COMPLETE 3 VIEW RIGHT: ICD-10-PCS | Mod: 26,RT,, | Performed by: RADIOLOGY

## 2022-09-06 PROCEDURE — 99999 PR PBB SHADOW E&M-EST. PATIENT-LVL III: CPT | Mod: PBBFAC,,, | Performed by: PODIATRIST

## 2022-09-06 PROCEDURE — 99999 PR PBB SHADOW E&M-EST. PATIENT-LVL III: ICD-10-PCS | Mod: PBBFAC,,, | Performed by: PODIATRIST

## 2022-09-06 PROCEDURE — 3008F PR BODY MASS INDEX (BMI) DOCUMENTED: ICD-10-PCS | Mod: CPTII,S$GLB,, | Performed by: PODIATRIST

## 2022-09-06 PROCEDURE — 3008F BODY MASS INDEX DOCD: CPT | Mod: CPTII,S$GLB,, | Performed by: PODIATRIST

## 2022-09-06 PROCEDURE — 1159F MED LIST DOCD IN RCRD: CPT | Mod: CPTII,S$GLB,, | Performed by: PODIATRIST

## 2022-09-06 PROCEDURE — 99213 OFFICE O/P EST LOW 20 MIN: CPT | Mod: S$GLB,,, | Performed by: PODIATRIST

## 2022-09-06 NOTE — PROGRESS NOTES
Subjective:      Patient ID: Babatunde Singh is a 72 y.o. male.    Chief Complaint:   Poor Circulation (Right foot/Pcp-plost 8/16/22)    Babatunde is a 72 y.o. male who returns to the clinic f/u thick painful nails.   He relates the nails are okay he was unsure if he needs an oral Lamisil refill.  He is not concerned about getting any topical medication today because his primary complaint is right foot swelling and pain  Denies any fever chills nausea vomiting  Pt realtes he is off balance because the right foot is swollen and he can't put the shoe on. Not driving.  Patient relates that he is gotten some relief from the injection he got in the emergency room however overall he can not drive and he has been using a wheelchair for distance.  Denies hx gout,. No injury.     Presents in wheelchair for pain right lower extremity. Went to ED recently.   Followed by vascular, neurosurgery.         Past Medical History:   Diagnosis Date    Hypertension     Peripheral arterial disease      Past Surgical History:   Procedure Laterality Date    EXCISION OF HYDROCELE Left 4/26/2022    Procedure: HYDROCELECTOMY;  Surgeon: Damion Roberts MD;  Location: [facility];  Service: Urology;  Laterality: Left;    KNEE SURGERY  1972     Current Outpatient Medications on File Prior to Visit   Medication Sig Dispense Refill    atorvastatin (LIPITOR) 80 MG tablet Take 1 tablet (80 mg total) by mouth once daily. 30 tablet 3    bacitracin 500 unit/gram Oint Apply topically 2 (two) times daily. 28 g 0    cilostazoL (PLETAL) 100 MG Tab Take 1 tablet (100 mg total) by mouth 2 (two) times daily. 180 tablet 3    diclofenac sodium (VOLTAREN) 1 % Gel Apply 2 g topically 3 (three) times daily as needed (muscle spasm pain). Apply 2 grams to affected area 3 times daily as needed 100 g 0    ezetimibe (ZETIA) 10 mg tablet Take 1 tablet (10 mg total) by mouth once daily. 90 tablet 3    gabapentin (NEURONTIN) 300 MG capsule Take 3 capsules (900 mg total) by  mouth 3 (three) times daily. 540 capsule 3    LIDOcaine (LIDODERM) 5 % Apply to affected area as needed for pain for 12 hours, then off for 12 hours. Discard after each use.  May use 4% lidocaine patch as alternative. 30 patch 0    losartan-hydrochlorothiazide 50-12.5 mg (HYZAAR) 50-12.5 mg per tablet Take 1 tablet by mouth once daily. 30 tablet 3    multivitamin (THERAGRAN) per tablet Take 1 tablet by mouth once daily.      naproxen (NAPROSYN) 500 MG tablet Take 1 tablet (500 mg total) by mouth 2 (two) times daily as needed (pain). 60 tablet 0    terbinafine HCl (LAMISIL ORAL) Take by mouth once daily at 6am.       No current facility-administered medications on file prior to visit.     Review of patient's allergies indicates:  No Known Allergies    Review of Systems   Constitutional: Negative for chills, decreased appetite, fever, malaise/fatigue, night sweats, weight gain and weight loss.   Cardiovascular:  Positive for leg swelling (Foot swelling). Negative for chest pain, claudication, dyspnea on exertion, palpitations and syncope.   Respiratory:  Negative for cough and shortness of breath.    Endocrine: Negative for cold intolerance and heat intolerance.   Hematologic/Lymphatic: Negative for bleeding problem. Does not bruise/bleed easily.   Skin:  Positive for nail changes. Negative for color change, dry skin, flushing, itching, poor wound healing, rash, skin cancer, suspicious lesions and unusual hair distribution.   Musculoskeletal:  Positive for joint pain (History knee pains) and muscle cramps (When walking and at night). Negative for arthritis, back pain, falls, gout, joint swelling, muscle weakness, myalgias, neck pain and stiffness.   Gastrointestinal:  Negative for diarrhea, nausea and vomiting.   Neurological:  Positive for numbness and paresthesias. Negative for dizziness, focal weakness, light-headedness, tremors, vertigo and weakness.   Psychiatric/Behavioral:  Negative for altered mental status  "and depression. The patient does not have insomnia.    Allergic/Immunologic: Negative.          Objective:       Vitals:    22 1015   BP: (!) 159/79   Pulse: 101   Weight: 63.5 kg (139 lb 15.9 oz)   Height: 5' 5" (1.651 m)   PainSc:   4   PainLoc: Foot   63.5 kg (139 lb 15.9 oz)     Physical Exam  Vitals reviewed.   Constitutional:       General: He is not in acute distress.     Appearance: He is well-developed. He is not ill-appearing, toxic-appearing or diaphoretic.      Comments: Dress shoes      Cardiovascular:      Pulses:           Dorsalis pedis pulses are 0 on the right side and 0 on the left side.        Posterior tibial pulses are 1+ on the right side and 1+ on the left side.      Comments: Waxy skin, absent hair growth to digits and foot and lower legs, no signs of ischemia delayed cap fill time however digits are warm  Musculoskeletal:         General: No swelling.      Right lower le+ Edema (Foot) present.      Left lower leg: No edema.      Right ankle: Normal.      Right Achilles Tendon: Normal. No tenderness.      Left ankle: Normal.      Left Achilles Tendon: Normal. No tenderness.      Right foot: Decreased range of motion. Swelling, deformity, prominent metatarsal heads, tenderness and bony tenderness present.      Left foot: Decreased range of motion. Deformity and prominent metatarsal heads present. No tenderness or bony tenderness.      Comments: No gross deformities noted    Positive pain to right forefoot and midfoot diffuse  Mild pain with range of motion    No excessive warmth or fluctuance noted      Feet:      Right foot:      Protective Sensation: 10 sites tested.  10 sites sensed.      Skin integrity: Dry skin present. No ulcer, blister, skin breakdown, erythema, warmth or callus.      Toenail Condition: Right toenails are abnormally thick. Fungal disease present.     Left foot:      Protective Sensation: 10 sites tested.  10 sites sensed.      Skin integrity: Dry skin " present. No ulcer, blister, skin breakdown, erythema, warmth or callus.      Toenail Condition: Left toenails are abnormally thick. Fungal disease present.     Comments: Mann[place] Pedro intact    Nails 1 through 10 thickened shortened discolored    Left hallux nail stable  No open lesions  Skin:     General: Skin is warm and dry.      Capillary Refill: Capillary refill takes 2 to 3 seconds.      Coloration: Skin is not pale.      Findings: No erythema or rash.      Nails: There is no clubbing.   Neurological:      Mental Status: He is alert and oriented to person, place, and time.      Gait: Gait abnormal.      Comments: Wheelchair to exam   Psychiatric:         Attention and Perception: Attention normal.         Mood and Affect: Mood normal.         Speech: Speech normal.         Behavior: Behavior normal.         Thought Content: Thought content normal.         Cognition and Memory: Cognition normal.         Judgment: Judgment normal.             Assessment:       Encounter Diagnoses   Name Primary?    Foot pain, right Yes    Edema of right foot     Onychomycosis due to dermatophyte     Tobacco use            Plan:       Babatunde was seen today for poor circulation.    Diagnoses and all orders for this visit:    Foot pain, right  -     X-Ray Foot Complete Right; Future  -     Uric acid; Future    Edema of right foot  -     X-Ray Foot Complete Right; Future  -     Uric acid; Future    Onychomycosis due to dermatophyte    Tobacco use      I counseled the patient on his conditions, their implications and medical management.    - nails doing okay discussed with patient no indication to repeat any oral Lamisil patient can consider topical however today he is more concerned about his right foot swelling and pain    -rediscussed patient is followed by Cardiology for vascular issues  Discussed with patient due to the new edema recommend workup of any bony abnormalities with an x-ray would like to rule out fracture  Also  discussed concern for possible gout patient denies any history of gout.  He relates he does eat seafood occasionally and has been limiting his red meat however could be a possibility    Recommend uric acid test.  Discussed with patient could be normal given the time frame of the edema and pain    Dispense surgical shoe as patient can not get regular dress shoe on comfortably    Patient is not driving secondary to foot and leg pain.    Will talk to patient with results of x-rays and uric acid if uric acid is high indicating gout recommend follow-up with primary care doctor for medication management and diet recommendations    Patient had a positive D-dimer recently in the emergency room however no calf pain today no indication of DVT in the leg    Impression:     No evidence of deep venous thrombosis in either lower extremity.        Electronically signed by: Kiara Sherwood MD  Date:                                            08/25/2022  Time:                                           05:58        - patient to follow-up primary and vascular as well as Neurosurgery pain management      Follow up if symptoms worsen or fail to improve.

## 2022-09-08 ENCOUNTER — TELEPHONE (OUTPATIENT)
Dept: PODIATRY | Facility: CLINIC | Age: 72
End: 2022-09-08
Payer: MEDICARE

## 2022-09-08 NOTE — TELEPHONE ENCOUNTER
----- Message from Susanna Pope DPM sent at 9/8/2022  8:07 AM CDT -----  Please call patient and let him know his blood work as well as his x-rays were normal    I recommend he continue to follow-up with his primary and vascular as well as Neurosurgery pain management

## 2022-09-09 ENCOUNTER — HOSPITAL ENCOUNTER (EMERGENCY)
Facility: OTHER | Age: 72
Discharge: HOME | End: 2022-09-09
Attending: EMERGENCY MEDICINE
Payer: MEDICARE

## 2022-09-09 ENCOUNTER — TELEPHONE (OUTPATIENT)
Dept: CARDIOLOGY | Facility: CLINIC | Age: 72
End: 2022-09-09
Payer: MEDICARE

## 2022-09-09 VITALS
DIASTOLIC BLOOD PRESSURE: 75 MMHG | SYSTOLIC BLOOD PRESSURE: 162 MMHG | WEIGHT: 145 LBS | HEART RATE: 91 BPM | RESPIRATION RATE: 18 BRPM | OXYGEN SATURATION: 98 % | BODY MASS INDEX: 23.3 KG/M2 | HEIGHT: 66 IN | TEMPERATURE: 98 F

## 2022-09-09 DIAGNOSIS — R60.9 SWELLING: ICD-10-CM

## 2022-09-09 DIAGNOSIS — I82.462 ACUTE DEEP VEIN THROMBOSIS (DVT) OF CALF MUSCLE VEIN OF LEFT LOWER EXTREMITY: Primary | ICD-10-CM

## 2022-09-09 DIAGNOSIS — T14.8XXA BLISTER: ICD-10-CM

## 2022-09-09 LAB
ALBUMIN SERPL BCP-MCNC: 3.2 G/DL (ref 3.5–5.2)
ALP SERPL-CCNC: 59 U/L (ref 55–135)
ALT SERPL W/O P-5'-P-CCNC: 25 U/L (ref 10–44)
ANION GAP SERPL CALC-SCNC: 8 MMOL/L (ref 8–16)
APTT BLDCRRT: 27.2 SEC (ref 21–32)
AST SERPL-CCNC: 36 U/L (ref 10–40)
BASOPHILS # BLD AUTO: 0.07 K/UL (ref 0–0.2)
BASOPHILS NFR BLD: 0.5 % (ref 0–1.9)
BILIRUB SERPL-MCNC: 0.2 MG/DL (ref 0.1–1)
BUN SERPL-MCNC: 23 MG/DL (ref 8–23)
CALCIUM SERPL-MCNC: 9 MG/DL (ref 8.7–10.5)
CHLORIDE SERPL-SCNC: 110 MMOL/L (ref 95–110)
CO2 SERPL-SCNC: 20 MMOL/L (ref 23–29)
CREAT SERPL-MCNC: 0.8 MG/DL (ref 0.5–1.4)
DIFFERENTIAL METHOD: ABNORMAL
EOSINOPHIL # BLD AUTO: 0.1 K/UL (ref 0–0.5)
EOSINOPHIL NFR BLD: 0.9 % (ref 0–8)
ERYTHROCYTE [DISTWIDTH] IN BLOOD BY AUTOMATED COUNT: 19.7 % (ref 11.5–14.5)
EST. GFR  (NO RACE VARIABLE): >60 ML/MIN/1.73 M^2
GLUCOSE SERPL-MCNC: 102 MG/DL (ref 70–110)
HCT VFR BLD AUTO: 32.8 % (ref 40–54)
HGB BLD-MCNC: 10.5 G/DL (ref 14–18)
IMM GRANULOCYTES # BLD AUTO: 0.07 K/UL (ref 0–0.04)
IMM GRANULOCYTES NFR BLD AUTO: 0.5 % (ref 0–0.5)
INR PPP: 1 (ref 0.8–1.2)
LYMPHOCYTES # BLD AUTO: 4.6 K/UL (ref 1–4.8)
LYMPHOCYTES NFR BLD: 29.8 % (ref 18–48)
MCH RBC QN AUTO: 26.3 PG (ref 27–31)
MCHC RBC AUTO-ENTMCNC: 32 G/DL (ref 32–36)
MCV RBC AUTO: 82 FL (ref 82–98)
MONOCYTES # BLD AUTO: 1 K/UL (ref 0.3–1)
MONOCYTES NFR BLD: 6.4 % (ref 4–15)
NEUTROPHILS # BLD AUTO: 9.5 K/UL (ref 1.8–7.7)
NEUTROPHILS NFR BLD: 61.9 % (ref 38–73)
NRBC BLD-RTO: 0 /100 WBC
PLATELET # BLD AUTO: 481 K/UL (ref 150–450)
PMV BLD AUTO: 8.5 FL (ref 9.2–12.9)
POTASSIUM SERPL-SCNC: 3.7 MMOL/L (ref 3.5–5.1)
PROT SERPL-MCNC: 6.1 G/DL (ref 6–8.4)
PROTHROMBIN TIME: 10.8 SEC (ref 9–12.5)
RBC # BLD AUTO: 4 M/UL (ref 4.6–6.2)
SODIUM SERPL-SCNC: 138 MMOL/L (ref 136–145)
WBC # BLD AUTO: 15.27 K/UL (ref 3.9–12.7)

## 2022-09-09 PROCEDURE — 99284 EMERGENCY DEPT VISIT MOD MDM: CPT | Mod: 25

## 2022-09-09 PROCEDURE — 85610 PROTHROMBIN TIME: CPT | Performed by: EMERGENCY MEDICINE

## 2022-09-09 PROCEDURE — 25000003 PHARM REV CODE 250: Performed by: EMERGENCY MEDICINE

## 2022-09-09 PROCEDURE — 85730 THROMBOPLASTIN TIME PARTIAL: CPT | Performed by: EMERGENCY MEDICINE

## 2022-09-09 PROCEDURE — 80053 COMPREHEN METABOLIC PANEL: CPT | Performed by: EMERGENCY MEDICINE

## 2022-09-09 PROCEDURE — 85025 COMPLETE CBC W/AUTO DIFF WBC: CPT | Performed by: EMERGENCY MEDICINE

## 2022-09-09 RX ORDER — RIVAROXABAN 15 MG-20MG
KIT ORAL
Qty: 51 TABLET | Refills: 0 | Status: ON HOLD | OUTPATIENT
Start: 2022-09-09 | End: 2022-10-31 | Stop reason: HOSPADM

## 2022-09-09 RX ADMIN — RIVAROXABAN 15 MG: 15 TABLET, FILM COATED ORAL at 07:09

## 2022-09-09 RX ADMIN — BACITRACIN ZINC, NEOMYCIN, POLYMYXIN B 1 EACH: 400; 3.5; 5 OINTMENT TOPICAL at 05:09

## 2022-09-09 NOTE — FIRST PROVIDER EVALUATION
"Medical screening examination initiated.  I have conducted a focused provider triage encounter, findings are as follows:    Brief history of present illness:  blister to RLE x 2 days, hx of PVD, wants to be evaluated    Vitals:    09/09/22 1508 09/09/22 1509   BP: (!) 181/80    Pulse: (!) 112    Resp: 18    Temp: 98.9 °F (37.2 °C)    TempSrc: Oral    Weight:  65.8 kg (145 lb)   Height:  5' 6" (1.676 m)       Pertinent physical exam:  well appearing    Brief workup plan:  eval once roomed    Preliminary workup initiated; this workup will be continued and followed by the physician or advanced practice provider that is assigned to the patient when roomed.  "

## 2022-09-09 NOTE — ED PROVIDER NOTES
Encounter Date: 9/9/2022    SCRIBE #1 NOTE: I, Marlontiburcio Marroquin, am scribing for, and in the presence of,  Karri Dunn II, MD.     History     Chief Complaint   Patient presents with    Blister     Patient reports blister to RLE that he noticed today. Also reports bilateral foot pain, hx of PVD.      Time seen by provider: 4:45 PM    This is a 72 y.o. male, with a PMHx of HTN and peripheral arterial disease, who presents to the ED with complaint of a blister on the lower right leg. Patient states he noticed the blister today. He reports experiencing right leg pain. Patient notes he recently had X-Rays done on his legs. This is the extent of the patient's complaints at this time.    The history is provided by the patient.   Review of patient's allergies indicates:  No Known Allergies  Past Medical History:   Diagnosis Date    Hypertension     Peripheral arterial disease      Past Surgical History:   Procedure Laterality Date    EXCISION OF HYDROCELE Left 4/26/2022    Procedure: HYDROCELECTOMY;  Surgeon: Damion Roberts MD;  Location: [facility];  Service: Urology;  Laterality: Left;    KNEE SURGERY  1972     Family History   Problem Relation Age of Onset    Hypertension Mother     Hypertension Father      Social History     Tobacco Use    Smoking status: Every Day     Packs/day: 0.50     Years: 50.00     Pack years: 25.00     Types: Cigarettes    Smokeless tobacco: Never   Substance Use Topics    Alcohol use: Never    Drug use: Never     Review of Systems   Constitutional:  Negative for chills and fever.   HENT:  Negative for congestion, rhinorrhea and sore throat.    Eyes:  Negative for visual disturbance.   Respiratory:  Negative for cough and shortness of breath.    Cardiovascular:  Negative for chest pain.   Gastrointestinal:  Negative for abdominal pain, diarrhea, nausea and vomiting.   Genitourinary:  Negative for dysuria, frequency and hematuria.   Musculoskeletal:  Positive for myalgias. Negative for  back pain.   Skin:         Positive for blister.    Neurological:  Negative for dizziness, weakness and headaches.     Physical Exam     Initial Vitals   BP Pulse Resp Temp SpO2   09/09/22 1508 09/09/22 1508 09/09/22 1508 09/09/22 1508 09/09/22 1655   (!) 181/80 (!) 112 18 98.9 °F (37.2 °C) 100 %      MAP       --                Physical Exam    Nursing note and vitals reviewed.  Constitutional: He appears well-developed and well-nourished. He is not diaphoretic. No distress.   HENT:   Head: Normocephalic and atraumatic.   Eyes: Conjunctivae and EOM are normal. Pupils are equal, round, and reactive to light.   Neck: Neck supple.   Normal range of motion.  Cardiovascular:  Normal rate and regular rhythm.     Exam reveals no gallop and no friction rub.       No murmur heard.  Diminished pulses bilaterally.   Pulmonary/Chest: Breath sounds normal. No respiratory distress. He has no wheezes. He has no rhonchi. He has no rales.   Abdominal: Abdomen is soft. There is no abdominal tenderness.   Musculoskeletal:         General: Normal range of motion.      Cervical back: Normal range of motion and neck supple.      Comments: Trace edema to the anterior tibial region on the left.  1+ edema of the right foot and tibial area.. No focal bony tenderness.      Neurological: He is alert and oriented to person, place, and time.   Skin: Skin is warm and dry.   2.5 cm blister to the right posterolateral calf, intact. Serous fluid. No surrounding cellulitis.    Psychiatric: He has a normal mood and affect. His behavior is normal. Judgment and thought content normal.       ED Course   Procedures  Labs Reviewed   CBC W/ AUTO DIFFERENTIAL - Abnormal; Notable for the following components:       Result Value    WBC 15.27 (*)     RBC 4.00 (*)     Hemoglobin 10.5 (*)     Hematocrit 32.8 (*)     MCH 26.3 (*)     RDW 19.7 (*)     Platelets 481 (*)     MPV 8.5 (*)     Gran # (ANC) 9.5 (*)     Immature Grans (Abs) 0.07 (*)     All other  components within normal limits   COMPREHENSIVE METABOLIC PANEL - Abnormal; Notable for the following components:    CO2 20 (*)     Albumin 3.2 (*)     All other components within normal limits   PROTIME-INR   APTT          Imaging Results               US Lower Extremity Veins Right (Final result)  Result time 09/09/22 18:38:30      Final result by Belem Simpson MD (09/09/22 18:38:30)                   Impression:      Thrombosis with the right anterior tibial and peroneal veins.  No evidence of more proximal right lower extremity deep venous thrombosis.    This report was flagged in Epic as abnormal.      Electronically signed by: Belem Simpson MD  Date:    09/09/2022  Time:    18:38               Narrative:    EXAMINATION:  US LOWER EXTREMITY VEINS RIGHT    CLINICAL HISTORY:  Edema, unspecified    TECHNIQUE:  Duplex and color flow Doppler evaluation of the right lower extremity veins was performed.    COMPARISON:  08/25/2022.    FINDINGS:  Thrombosis is seen within one of the paired anterior tibial and peroneal veins.  No evidence of clot involving the bilateral common femoral veins or right greater saphenous, femoral, popliteal, and posterior tibial veins.  All venous structures demonstrate normal respiratory phasicity and augment adequately.  No evidence of soft tissue mass or Baker's cyst.                                       Medications   neomycin-bacitracnZn-polymyxnB packet 1 each (1 each Topical (Top) Given 9/9/22 1728)   rivaroxaban tablet 15 mg (15 mg Oral Given 9/9/22 1926)     Medical Decision Making:   History:   Old Medical Records: I decided to obtain old medical records.     Patient presents due to concern for blister on right leg.  This is a small noninfected blister filled with serous fluid.  Local wound care will suffice.  The patient has a history of peripheral artery disease with significant obstruction bilaterally based on prior CTA, he is followed by Cardiology for this.  He does have  asymmetric edema, greater on the right but this seems similar to what was described previously.  Although he has had multiple ultrasounds in the past month including approximately 2 weeks ago which were negative, repeat ultrasound today shows new DVT and distal veins.  He does have nonspecific leukocytosis however is afebrile and clinical picture does not suggest an infectious process.  Patient denies any recent surgery, history of bleeding ulcers, or other obvious contraindication in therefore be started on oral anticoagulants.  Encouraged follow-up with his cardiologist.  As we are starting him on  anticoagulants we did obtain new baseline labs.  Stable chronic anemia.  PT and PTT are normal.       Scribe Attestation:   Scribe #1: I performed the above scribed service and the documentation accurately describes the services I performed. I attest to the accuracy of the note.           Physician Attestation for Scribe: I, [facility], reviewed documentation as scribed in my presence, which is both accurate and complete.    Clinical Impression:   Final diagnoses:  [R60.9] Swelling  [I82.462] Acute deep vein thrombosis (DVT) of calf muscle vein of left lower extremity (Primary)  [T14.8XXA] Blister      ED Disposition Condition    Discharge Stable          ED Prescriptions       Medication Sig Dispense Start Date End Date Auth. Provider    rivaroxaban (XARELTO DVT-PE TREAT 30D START) 15 mg (42)- 20 mg (9) tablet dose pack Take 1 tablet (15 mg) by mouth twice daily with food for 21 days followed by 1 tablet (20 mg) by mouth once daily with food 51 tablet 9/9/2022 -- Karri Dunn II, MD          Follow-up Information       Follow up With Specialties Details Why Contact Info    Cr Wallace MD PhD Interventional Cardiology, Cardiology In 5 days  [address] [facility] [address]  [phone]               Karri Dunn II, MD  09/09/22 9445

## 2022-09-09 NOTE — TELEPHONE ENCOUNTER
----- Message from Malika Ventura sent at 9/9/2022  9:48 AM CDT -----  Regarding: advice  Pls call pt's daughter Maggie at [phone].  Pt is starting to get blisters on his legs.    Thank you

## 2022-09-09 NOTE — ED TRIAGE NOTES
Chief Complaint   Patient presents with    Blister     Patient reports blister to RLE that he noticed today. Also reports bilateral foot pain, hx of PVD.      Pt presents to ED with hx of HTN and peripheral arterial disease, c/o of a blister on the lower right leg. Patient states he noticed the blister today. He reports experiencing right leg pain. AAOX4

## 2022-09-12 ENCOUNTER — TELEPHONE (OUTPATIENT)
Dept: INTERNAL MEDICINE | Facility: CLINIC | Age: 72
End: 2022-09-12
Payer: MEDICARE

## 2022-09-12 NOTE — TELEPHONE ENCOUNTER
Called patients daughter to follow up on patient being in  the hospital. Advised her that  does not have anything these next few days however I was able to schedule him with another provider.     Patients daughter will bring patient to see provider on Thursday. Also advised patients daughter that he was scheduled for a appointment with urology today and provided the phone number for that department so that she can call and rs. Patients daughter expressed understanding of this information. Amnita CORREA MA

## 2022-09-14 NOTE — PROGRESS NOTES
[facility]  New Visit Progress Note   Recent ED Discharge     PRESENTING HISTORY     PCP: Esdras Ly MD  Chief Complaint/Reason for Visit:  Follow up ED Discharge   No chief complaint on file.      History of Present Illness: Mr. Babatunde Singh is a 72 y.o. male who was recently discharged from the ED.    Today:  He is unaccompanied to the appt today; presented in a wheelchair.  Since most recent discharge from the ER, he reports that he just started the 'blood thinners on Tuesday due to pharmacy not having and had to order'. In addition, reports that he has 'opened blisters to right leg, was not there when went to the ER on 9/9, burn; need refill on pain and nerve pills'. No fever or chills. No SOB or chest pain. No headaches or dizziness.   He is requesting refills on 'choleterol pill, the big ones and blood pressure pills, running out'.     Review of Systems:  Eyes: denies visual changes at this time denies floaters   ENT: no nasal congestion or sore throat  Respiratory: no cough or shorness of breath  Cardiovascular: no chest pain or palpitations  Gastrointestinal: no nausea or vomiting, no abdominal pain or change in bowel habits  Genitourinary: no hematuria or dysuria; denies frequency  Hematologic/Lymphatic: no easy bruising or lymphadenopathy  Musculoskeletal: no arthralgias or myalgias  Neurological: no seizures or tremors  Endocrine: no heat or cold intolerance  '    PAST HISTORY:     Past Medical History:   Diagnosis Date    Hypertension     Peripheral arterial disease        Past Surgical History:   Procedure Laterality Date    EXCISION OF HYDROCELE Left 4/26/2022    Procedure: HYDROCELECTOMY;  Surgeon: Damion Roberts MD;  Location: [facility];  Service: Urology;  Laterality: Left;    KNEE SURGERY  1972       Family History   Problem Relation Age of Onset    Hypertension Mother     Hypertension Father        Social History     Socioeconomic History    Marital status:    Occupational  History    Occupation: [employer]   Tobacco Use    Smoking status: Every Day     Packs/day: 0.50     Years: 50.00     Pack years: 25.00     Types: Cigarettes    Smokeless tobacco: Never   Substance and Sexual Activity    Alcohol use: Never    Drug use: Never    Sexual activity: Yes     Partners: Female     Comment: wife       MEDICATIONS & ALLERGIES:     Current Outpatient Medications on File Prior to Visit   Medication Sig Dispense Refill    atorvastatin (LIPITOR) 80 MG tablet Take 1 tablet (80 mg total) by mouth once daily. 30 tablet 3    bacitracin 500 unit/gram Oint Apply topically 2 (two) times daily. 28 g 0    cilostazoL (PLETAL) 100 MG Tab Take 1 tablet (100 mg total) by mouth 2 (two) times daily. 180 tablet 3    diclofenac sodium (VOLTAREN) 1 % Gel Apply 2 g topically 3 (three) times daily as needed (muscle spasm pain). Apply 2 grams to affected area 3 times daily as needed 100 g 0    ezetimibe (ZETIA) 10 mg tablet Take 1 tablet (10 mg total) by mouth once daily. 90 tablet 3    gabapentin (NEURONTIN) 300 MG capsule Take 3 capsules (900 mg total) by mouth 3 (three) times daily. 540 capsule 3    LIDOcaine (LIDODERM) 5 % Apply to affected area as needed for pain for 12 hours, then off for 12 hours. Discard after each use.  May use 4% lidocaine patch as alternative. 30 patch 0    losartan-hydrochlorothiazide 50-12.5 mg (HYZAAR) 50-12.5 mg per tablet Take 1 tablet by mouth once daily. 30 tablet 3    multivitamin (THERAGRAN) per tablet Take 1 tablet by mouth once daily.      naproxen (NAPROSYN) 500 MG tablet Take 1 tablet (500 mg total) by mouth 2 (two) times daily as needed (pain). 60 tablet 0    rivaroxaban (XARELTO DVT-PE TREAT 30D START) 15 mg (42)- 20 mg (9) tablet dose pack Take 1 tablet (15 mg) by mouth twice daily with food for 21 days followed by 1 tablet (20 mg) by mouth once daily with food 51 tablet 0    terbinafine HCl (LAMISIL ORAL) Take by mouth once daily at 6am.       No current  facility-administered medications on file prior to visit.        Review of patient's allergies indicates:  No Known Allergies    Medications Reconciliation:   I have reconciled the patient's home medications and discharge medications with the patient/family. I have updated all changes.  Refer to After-Visit Medication List.    OBJECTIVE:     Vital Signs:  There were no vitals filed for this visit.  Wt Readings from Last 3 Encounters:   09/09/22 1509 65.8 kg (145 lb)   09/06/22 1015 63.5 kg (139 lb 15.9 oz)   08/31/22 1815 63.5 kg (140 lb)     There is no height or weight on file to calculate BMI.     Wt Readings from Last 3 Encounters:   09/15/22 54.3 kg (119 lb 11.4 oz)   09/09/22 65.8 kg (145 lb)   09/06/22 63.5 kg (139 lb 15.9 oz)     Temp Readings from Last 3 Encounters:   09/09/22 97.7 °F (36.5 °C) (Oral)   08/31/22 97.9 °F (36.6 °C) (Oral)   08/25/22 98.2 °F (36.8 °C) (Oral)     BP Readings from Last 3 Encounters:   09/15/22 (!) 168/64   09/09/22 (!) 162/75   09/06/22 (!) 159/79     Pulse Readings from Last 3 Encounters:   09/15/22 92   09/09/22 91   09/06/22 101       Physical Exam:  General: Well developed, well nourished. No distress.  HEENT: Head is normocephalic, atraumatic  Eyes: Clear conjunctiva.  Neck: Supple, symmetrical neck; trachea midline.  Lungs: Clear to auscultation bilaterally and normal respiratory effort.  Cardiovascular: Heart with regular rate and rhythm. No murmurs, gallops or rubs  Extremities: No LE edema. Pulses 2+ and symmetric.   RLE: see photo below   Abdomen: Abdomen is soft, non-tender non-distended with normal bowel sounds.  Skin: Skin color, texture, turgor normal. No rashes.  RLE: see photo below  Neurologic: No focal numbness or weakness.   Psychiatric: Not depressed.      Laboratory  Lab Results   Component Value Date    WBC 15.27 (H) 09/09/2022    HGB 10.5 (L) 09/09/2022    HCT 32.8 (L) 09/09/2022    MCV 82 09/09/2022     (H) 09/09/2022     BMP  Lab Results  "  Component Value Date     09/09/2022    K 3.7 09/09/2022     09/09/2022    CO2 20 (L) 09/09/2022    BUN 23 09/09/2022    CREATININE 0.8 09/09/2022    CALCIUM 9.0 09/09/2022    ANIONGAP 8 09/09/2022    ESTGFRAFRICA >60.0 03/10/2022    EGFRNONAA >60.0 03/10/2022     Lab Results   Component Value Date    ALT 25 09/09/2022    AST 36 09/09/2022    ALKPHOS 59 09/09/2022    BILITOT 0.2 09/09/2022     Lab Results   Component Value Date    INR 1.0 09/09/2022     Lab Results   Component Value Date    HGBA1C 5.8 (H) 03/10/2022     No results for input(s): POCTGLUCOSE in the last 72 hours.    9/9  Thrombosis with the right anterior tibial and peroneal veins.  No evidence of more proximal right lower extremity deep venous thrombosis.     This report was flagged in Epic as abnormal.        Electronically signed by:        Belem Simpson MD  Date:                                                09/09/2022  Time:                                                18:38      9/9  FINDINGS:  Thrombosis is seen within one of the paired anterior tibial and peroneal veins.  No evidence of clot involving the bilateral common femoral veins or right greater saphenous, femoral, popliteal, and posterior tibial veins.  All venous structures demonstrate normal respiratory phasicity and augment adequately.  No evidence of soft tissue mass or Baker's cyst.    ASSESSMENT & PLAN:             HIGH RISK CONDITION(S):    Encounter for medication refill    Deep venous thrombosis (DVT) of right peroneal vein, unspecified chronicity / pre-existing Peripheral arterial disease:  Recent ER visit for RLE 'blister", serous filled per ER's notations in the setting of h/o PAD and followed by Cardiology; however, today upon clinical presentation, there are opened vesicles and erythema to surrounding areas.  In the setting of presentation and chronic LE edema, ER did another US, confirmed 'new' DVT, started on Xarelto and Vascular Cardiology (Dr." Rodrigo) follow up recommended. Note shared.  Satin% RA  ` Xarelto  ` follow up with Dr. Wallace in 2022; recommend  a sooner appt if possible with his Vascular expert; notations shared.       Cellulitis of right lower extremity, with Vascular ulcerations, suspect 2/2 and in the setting of underlying vascular issue and DVT /  Peripheral vascular disease of lower extremity with ulceration  -     Ambulatory referral/consult to Wound Clinic; Future; Expected date: 2022  -     mupirocin (BACTROBAN) 2 % ointment; Apply topically 2 (two) times daily.  Dispense: 30 g; Refill: 1  -     doxycycline (VIBRA-TABS) 100 MG tablet; Take 1 tablet (100 mg total) by mouth 2 (two) times daily. for10 days  Dispense: 20 tablet; Refill: 0  *Must update his PCP or this provider and his Vascular expert, Dr. Wallace in 10-14 days upon completion of the antibiotic. He understands that if not improving, increase redness to leg, must return to the ER.       Primary hypertension  Today: 168/74 (uncontrolled)  -     losartan-hydrochlorothiazide 50-12.5 mg (HYZAAR) 50-12.5 mg per tablet; Take 1 tablet by mouth once daily.  Dispense: 30 tablet; Refill: 2    Mixed hyperlipidemia /   Hyperlipidemia, unspecified hyperlipidemia type  -     atorvastatin (LIPITOR) 80 MG tablet; Take 1 tablet (80 mg total) by mouth once daily.  Dispense: 30 tablet; Refill: 2  ? Pletal  ` Lipitor   ` Zetia    *Provided copy of medication list today and understands that needs to follow up with [facility] pharmacy in regards to request of refills on Neurontin. Appears on 2022, Dr. YARITZA Thomas at [facility] wrote Rx for Neurontin; patient reports not receiving; he will check with his pharmacy, but will contact his primary care provider for refills given that the [facility] is unable to refill.     Future Appointments   Date Time Provider Department Center   2022 11:00 AM Damion Roberts MD [facility] UROLOGY [facility]   10/4/2022  8:30 AM Esdras Ly MD  [facility]   11/30/2022 10:15 AM [facility] OIC-XRAY [facility]   11/30/2022 10:30 AM [facility] OIC EOS [facility] EOS [facility]   11/30/2022 11:30 AM Karyna Fontenot NP [facility] [address]   12/16/2022 10:30 AM [facility]        Medication List            Accurate as of September 15, 2022 12:25 PM. If you have any questions, ask your nurse or doctor.                START taking these medications      doxycycline 100 MG tablet  Commonly known as: VIBRA-TABS  Take 1 tablet (100 mg total) by mouth 2 (two) times daily. for 10 days  Started by: KALEB Barrera     mupirocin 2 % ointment  Commonly known as: BACTROBAN  Apply topically 2 (two) times daily.  Started by: KALEB Barrera            CONTINUE taking these medications      atorvastatin 80 MG tablet  Commonly known as: LIPITOR  Take 1 tablet (80 mg total) by mouth once daily.     cilostazoL 100 MG Tab  Commonly known as: PLETAL  Take 1 tablet (100 mg total) by mouth 2 (two) times daily.     diclofenac sodium 1 % Gel  Commonly known as: VOLTAREN  Apply 2 g topically 3 (three) times daily as needed (muscle spasm pain). Apply 2 grams to affected area 3 times daily as needed     ezetimibe 10 mg tablet  Commonly known as: ZETIA  Take 1 tablet (10 mg total) by mouth once daily.     gabapentin 300 MG capsule  Commonly known as: NEURONTIN  Take 3 capsules (900 mg total) by mouth 3 (three) times daily.     LAMISIL ORAL     LIDOcaine 5 %  Commonly known as: LIDODERM  Apply to affected area as needed for pain for 12 hours, then off for 12 hours. Discard after each use.  May use 4% lidocaine patch as alternative.     losartan-hydrochlorothiazide 50-12.5 mg 50-12.5 mg per tablet  Commonly known as: HYZAAR  Take 1 tablet by mouth once daily.     multivitamin per tablet  Commonly known as: THERAGRAN     naproxen 500 MG tablet  Commonly known as: NAPROSYN  Take 1 tablet (500 mg total) by mouth 2 (two)  times daily as needed (pain).     XARELTO DVT-PE TREAT 30D START 15 mg (42)- 20 mg (9) tablet dose pack  Generic drug: rivaroxaban  Take 1 tablet (15 mg) by mouth twice daily with food for 21 days followed by 1 tablet (20 mg) by mouth once daily with food            STOP taking these medications      bacitracin 500 unit/gram Oint  Stopped by: KALEB Barrera               Where to Get Your Medications        These medications were sent to [id number] [address] [address]      Phone: [phone]   atorvastatin 80 MG tablet  doxycycline 100 MG tablet  losartan-hydrochlorothiazide 50-12.5 mg 50-12.5 mg per tablet  mupirocin 2 % ointment       Signing Physician:  KALEB Barrera

## 2022-09-15 ENCOUNTER — OFFICE VISIT (OUTPATIENT)
Dept: INTERNAL MEDICINE | Facility: CLINIC | Age: 72
End: 2022-09-15
Payer: MEDICARE

## 2022-09-15 ENCOUNTER — HOSPITAL ENCOUNTER (OUTPATIENT)
Facility: HOSPITAL | Age: 72
Discharge: HOME | DRG: 300 | End: 2022-09-18
Attending: STUDENT IN AN ORGANIZED HEALTH CARE EDUCATION/TRAINING PROGRAM | Admitting: STUDENT IN AN ORGANIZED HEALTH CARE EDUCATION/TRAINING PROGRAM
Payer: MEDICARE

## 2022-09-15 ENCOUNTER — TELEPHONE (OUTPATIENT)
Dept: INTERNAL MEDICINE | Facility: CLINIC | Age: 72
End: 2022-09-15

## 2022-09-15 VITALS
HEIGHT: 66 IN | SYSTOLIC BLOOD PRESSURE: 168 MMHG | BODY MASS INDEX: 19.24 KG/M2 | WEIGHT: 119.69 LBS | DIASTOLIC BLOOD PRESSURE: 64 MMHG | OXYGEN SATURATION: 97 % | HEART RATE: 92 BPM

## 2022-09-15 DIAGNOSIS — I73.9 PERIPHERAL VASCULAR DISEASE OF LOWER EXTREMITY WITH ULCERATION: ICD-10-CM

## 2022-09-15 DIAGNOSIS — Z76.0 ENCOUNTER FOR MEDICATION REFILL: ICD-10-CM

## 2022-09-15 DIAGNOSIS — E78.5 HYPERLIPIDEMIA, UNSPECIFIED HYPERLIPIDEMIA TYPE: ICD-10-CM

## 2022-09-15 DIAGNOSIS — I82.409 DVT (DEEP VENOUS THROMBOSIS): ICD-10-CM

## 2022-09-15 DIAGNOSIS — I73.9 PERIPHERAL ARTERIAL DISEASE: ICD-10-CM

## 2022-09-15 DIAGNOSIS — L97.909 PERIPHERAL VASCULAR DISEASE OF LOWER EXTREMITY WITH ULCERATION: ICD-10-CM

## 2022-09-15 DIAGNOSIS — I82.451 DEEP VENOUS THROMBOSIS (DVT) OF RIGHT PERONEAL VEIN, UNSPECIFIED CHRONICITY: Primary | ICD-10-CM

## 2022-09-15 DIAGNOSIS — E78.2 MIXED HYPERLIPIDEMIA: ICD-10-CM

## 2022-09-15 DIAGNOSIS — L03.115 CELLULITIS OF RIGHT LOWER EXTREMITY: ICD-10-CM

## 2022-09-15 DIAGNOSIS — I10 PRIMARY HYPERTENSION: ICD-10-CM

## 2022-09-15 DIAGNOSIS — Z09 HOSPITAL DISCHARGE FOLLOW-UP: ICD-10-CM

## 2022-09-15 PROBLEM — I99.8 LOWER LIMB ISCHEMIA: Status: ACTIVE | Noted: 2022-09-15

## 2022-09-15 PROBLEM — L97.911 ULCER OF RIGHT LOWER EXTREMITY, LIMITED TO BREAKDOWN OF SKIN: Status: ACTIVE | Noted: 2022-09-15

## 2022-09-15 LAB
ALBUMIN SERPL BCP-MCNC: 3.3 G/DL (ref 3.5–5.2)
ALP SERPL-CCNC: 59 U/L (ref 55–135)
ALT SERPL W/O P-5'-P-CCNC: 18 U/L (ref 10–44)
ANION GAP SERPL CALC-SCNC: 5 MMOL/L (ref 8–16)
APTT BLDCRRT: 110.3 SEC (ref 21–32)
APTT BLDCRRT: 32.2 SEC (ref 21–32)
AST SERPL-CCNC: 20 U/L (ref 10–40)
BASOPHILS # BLD AUTO: 0.07 K/UL (ref 0–0.2)
BASOPHILS NFR BLD: 0.6 % (ref 0–1.9)
BILIRUB SERPL-MCNC: 0.4 MG/DL (ref 0.1–1)
BUN SERPL-MCNC: 13 MG/DL (ref 8–23)
CALCIUM SERPL-MCNC: 9.1 MG/DL (ref 8.7–10.5)
CHLORIDE SERPL-SCNC: 108 MMOL/L (ref 95–110)
CO2 SERPL-SCNC: 26 MMOL/L (ref 23–29)
CREAT SERPL-MCNC: 0.8 MG/DL (ref 0.5–1.4)
DIFFERENTIAL METHOD: ABNORMAL
EOSINOPHIL # BLD AUTO: 0.1 K/UL (ref 0–0.5)
EOSINOPHIL NFR BLD: 0.9 % (ref 0–8)
ERYTHROCYTE [DISTWIDTH] IN BLOOD BY AUTOMATED COUNT: 19.9 % (ref 11.5–14.5)
EST. GFR  (NO RACE VARIABLE): >60 ML/MIN/1.73 M^2
GLUCOSE SERPL-MCNC: 125 MG/DL (ref 70–110)
HCT VFR BLD AUTO: 33.8 % (ref 40–54)
HCV AB SERPL QL IA: NORMAL
HGB BLD-MCNC: 10.7 G/DL (ref 14–18)
HIV 1+2 AB+HIV1 P24 AG SERPL QL IA: NORMAL
IMM GRANULOCYTES # BLD AUTO: 0.03 K/UL (ref 0–0.04)
IMM GRANULOCYTES NFR BLD AUTO: 0.2 % (ref 0–0.5)
INR PPP: 1.2 (ref 0.8–1.2)
LACTATE SERPL-SCNC: 0.9 MMOL/L (ref 0.5–2.2)
LYMPHOCYTES # BLD AUTO: 4.7 K/UL (ref 1–4.8)
LYMPHOCYTES NFR BLD: 38.2 % (ref 18–48)
MCH RBC QN AUTO: 26.6 PG (ref 27–31)
MCHC RBC AUTO-ENTMCNC: 31.7 G/DL (ref 32–36)
MCV RBC AUTO: 84 FL (ref 82–98)
MONOCYTES # BLD AUTO: 0.9 K/UL (ref 0.3–1)
MONOCYTES NFR BLD: 7.1 % (ref 4–15)
NEUTROPHILS # BLD AUTO: 6.5 K/UL (ref 1.8–7.7)
NEUTROPHILS NFR BLD: 53 % (ref 38–73)
NRBC BLD-RTO: 0 /100 WBC
PLATELET # BLD AUTO: 493 K/UL (ref 150–450)
PMV BLD AUTO: 8.9 FL (ref 9.2–12.9)
POTASSIUM SERPL-SCNC: 3.6 MMOL/L (ref 3.5–5.1)
PROT SERPL-MCNC: 6 G/DL (ref 6–8.4)
PROTHROMBIN TIME: 12.4 SEC (ref 9–12.5)
RBC # BLD AUTO: 4.03 M/UL (ref 4.6–6.2)
SODIUM SERPL-SCNC: 139 MMOL/L (ref 136–145)
WBC # BLD AUTO: 12.33 K/UL (ref 3.9–12.7)

## 2022-09-15 PROCEDURE — 96366 THER/PROPH/DIAG IV INF ADDON: CPT

## 2022-09-15 PROCEDURE — G0378 HOSPITAL OBSERVATION PER HR: HCPCS

## 2022-09-15 PROCEDURE — 1101F PR PT FALLS ASSESS DOC 0-1 FALLS W/OUT INJ PAST YR: ICD-10-PCS | Mod: CPTII,S$GLB,, | Performed by: NURSE PRACTITIONER

## 2022-09-15 PROCEDURE — 99285 EMERGENCY DEPT VISIT HI MDM: CPT | Mod: 25

## 2022-09-15 PROCEDURE — 1101F PT FALLS ASSESS-DOCD LE1/YR: CPT | Mod: CPTII,S$GLB,, | Performed by: NURSE PRACTITIONER

## 2022-09-15 PROCEDURE — 99284 PR EMERGENCY DEPT VISIT,LEVEL IV: ICD-10-PCS | Mod: ,,, | Performed by: STUDENT IN AN ORGANIZED HEALTH CARE EDUCATION/TRAINING PROGRAM

## 2022-09-15 PROCEDURE — 3078F DIAST BP <80 MM HG: CPT | Mod: CPTII,S$GLB,, | Performed by: NURSE PRACTITIONER

## 2022-09-15 PROCEDURE — 3008F PR BODY MASS INDEX (BMI) DOCUMENTED: ICD-10-PCS | Mod: CPTII,S$GLB,, | Performed by: NURSE PRACTITIONER

## 2022-09-15 PROCEDURE — 63600175 PHARM REV CODE 636 W HCPCS: Performed by: STUDENT IN AN ORGANIZED HEALTH CARE EDUCATION/TRAINING PROGRAM

## 2022-09-15 PROCEDURE — 99214 PR OFFICE/OUTPT VISIT, EST, LEVL IV, 30-39 MIN: ICD-10-PCS | Mod: S$GLB,,, | Performed by: NURSE PRACTITIONER

## 2022-09-15 PROCEDURE — 85730 THROMBOPLASTIN TIME PARTIAL: CPT | Mod: 91 | Performed by: STUDENT IN AN ORGANIZED HEALTH CARE EDUCATION/TRAINING PROGRAM

## 2022-09-15 PROCEDURE — 99284 EMERGENCY DEPT VISIT MOD MDM: CPT | Mod: ,,, | Performed by: STUDENT IN AN ORGANIZED HEALTH CARE EDUCATION/TRAINING PROGRAM

## 2022-09-15 PROCEDURE — 4010F PR ACE/ARB THEARPY RXD/TAKEN: ICD-10-PCS | Mod: CPTII,S$GLB,, | Performed by: NURSE PRACTITIONER

## 2022-09-15 PROCEDURE — 25000003 PHARM REV CODE 250: Performed by: STUDENT IN AN ORGANIZED HEALTH CARE EDUCATION/TRAINING PROGRAM

## 2022-09-15 PROCEDURE — 3008F BODY MASS INDEX DOCD: CPT | Mod: CPTII,S$GLB,, | Performed by: NURSE PRACTITIONER

## 2022-09-15 PROCEDURE — 3078F PR MOST RECENT DIASTOLIC BLOOD PRESSURE < 80 MM HG: ICD-10-PCS | Mod: CPTII,S$GLB,, | Performed by: NURSE PRACTITIONER

## 2022-09-15 PROCEDURE — 3077F PR MOST RECENT SYSTOLIC BLOOD PRESSURE >= 140 MM HG: ICD-10-PCS | Mod: CPTII,S$GLB,, | Performed by: NURSE PRACTITIONER

## 2022-09-15 PROCEDURE — 11000001 HC ACUTE MED/SURG PRIVATE ROOM

## 2022-09-15 PROCEDURE — 87389 HIV-1 AG W/HIV-1&-2 AB AG IA: CPT | Performed by: PHYSICIAN ASSISTANT

## 2022-09-15 PROCEDURE — 85610 PROTHROMBIN TIME: CPT | Performed by: EMERGENCY MEDICINE

## 2022-09-15 PROCEDURE — 1159F MED LIST DOCD IN RCRD: CPT | Mod: CPTII,S$GLB,, | Performed by: NURSE PRACTITIONER

## 2022-09-15 PROCEDURE — 96365 THER/PROPH/DIAG IV INF INIT: CPT

## 2022-09-15 PROCEDURE — 96374 THER/PROPH/DIAG INJ IV PUSH: CPT

## 2022-09-15 PROCEDURE — 25000003 PHARM REV CODE 250: Performed by: HOSPITALIST

## 2022-09-15 PROCEDURE — 99999 PR PBB SHADOW E&M-EST. PATIENT-LVL IV: CPT | Mod: PBBFAC,,, | Performed by: NURSE PRACTITIONER

## 2022-09-15 PROCEDURE — 3044F HG A1C LEVEL LT 7.0%: CPT | Mod: CPTII,S$GLB,, | Performed by: NURSE PRACTITIONER

## 2022-09-15 PROCEDURE — 1159F PR MEDICATION LIST DOCUMENTED IN MEDICAL RECORD: ICD-10-PCS | Mod: CPTII,S$GLB,, | Performed by: NURSE PRACTITIONER

## 2022-09-15 PROCEDURE — 99223 1ST HOSP IP/OBS HIGH 75: CPT | Mod: ,,, | Performed by: INTERNAL MEDICINE

## 2022-09-15 PROCEDURE — 99499 RISK ADDL DX/OHS AUDIT: ICD-10-PCS | Mod: S$GLB,,, | Performed by: NURSE PRACTITIONER

## 2022-09-15 PROCEDURE — 99999 PR PBB SHADOW E&M-EST. PATIENT-LVL IV: ICD-10-PCS | Mod: PBBFAC,,, | Performed by: NURSE PRACTITIONER

## 2022-09-15 PROCEDURE — 3288F PR FALLS RISK ASSESSMENT DOCUMENTED: ICD-10-PCS | Mod: CPTII,S$GLB,, | Performed by: NURSE PRACTITIONER

## 2022-09-15 PROCEDURE — 3044F PR MOST RECENT HEMOGLOBIN A1C LEVEL <7.0%: ICD-10-PCS | Mod: CPTII,S$GLB,, | Performed by: NURSE PRACTITIONER

## 2022-09-15 PROCEDURE — 85730 THROMBOPLASTIN TIME PARTIAL: CPT | Performed by: EMERGENCY MEDICINE

## 2022-09-15 PROCEDURE — 83605 ASSAY OF LACTIC ACID: CPT | Performed by: EMERGENCY MEDICINE

## 2022-09-15 PROCEDURE — 1125F PR PAIN SEVERITY QUANTIFIED, PAIN PRESENT: ICD-10-PCS | Mod: CPTII,S$GLB,, | Performed by: NURSE PRACTITIONER

## 2022-09-15 PROCEDURE — 99214 OFFICE O/P EST MOD 30 MIN: CPT | Mod: S$GLB,,, | Performed by: NURSE PRACTITIONER

## 2022-09-15 PROCEDURE — 85025 COMPLETE CBC W/AUTO DIFF WBC: CPT | Performed by: EMERGENCY MEDICINE

## 2022-09-15 PROCEDURE — 99223 PR INITIAL HOSPITAL CARE,LEVL III: ICD-10-PCS | Mod: ,,, | Performed by: INTERNAL MEDICINE

## 2022-09-15 PROCEDURE — 86803 HEPATITIS C AB TEST: CPT | Performed by: PHYSICIAN ASSISTANT

## 2022-09-15 PROCEDURE — 25500020 PHARM REV CODE 255: Performed by: STUDENT IN AN ORGANIZED HEALTH CARE EDUCATION/TRAINING PROGRAM

## 2022-09-15 PROCEDURE — 3077F SYST BP >= 140 MM HG: CPT | Mod: CPTII,S$GLB,, | Performed by: NURSE PRACTITIONER

## 2022-09-15 PROCEDURE — 1125F AMNT PAIN NOTED PAIN PRSNT: CPT | Mod: CPTII,S$GLB,, | Performed by: NURSE PRACTITIONER

## 2022-09-15 PROCEDURE — 3288F FALL RISK ASSESSMENT DOCD: CPT | Mod: CPTII,S$GLB,, | Performed by: NURSE PRACTITIONER

## 2022-09-15 PROCEDURE — 99499 UNLISTED E&M SERVICE: CPT | Mod: S$GLB,,, | Performed by: NURSE PRACTITIONER

## 2022-09-15 PROCEDURE — 4010F ACE/ARB THERAPY RXD/TAKEN: CPT | Mod: CPTII,S$GLB,, | Performed by: NURSE PRACTITIONER

## 2022-09-15 PROCEDURE — 80053 COMPREHEN METABOLIC PANEL: CPT | Performed by: EMERGENCY MEDICINE

## 2022-09-15 RX ORDER — IPRATROPIUM BROMIDE AND ALBUTEROL SULFATE 2.5; .5 MG/3ML; MG/3ML
3 SOLUTION RESPIRATORY (INHALATION) EVERY 6 HOURS PRN
Status: DISCONTINUED | OUTPATIENT
Start: 2022-09-15 | End: 2022-09-18 | Stop reason: HOSPADM

## 2022-09-15 RX ORDER — ACETAMINOPHEN 325 MG/1
650 TABLET ORAL EVERY 4 HOURS PRN
Status: DISCONTINUED | OUTPATIENT
Start: 2022-09-15 | End: 2022-09-18 | Stop reason: HOSPADM

## 2022-09-15 RX ORDER — SODIUM CHLORIDE 0.9 % (FLUSH) 0.9 %
10 SYRINGE (ML) INJECTION
Status: DISCONTINUED | OUTPATIENT
Start: 2022-09-15 | End: 2022-09-18 | Stop reason: HOSPADM

## 2022-09-15 RX ORDER — LOSARTAN POTASSIUM AND HYDROCHLOROTHIAZIDE 12.5; 5 MG/1; MG/1
1 TABLET ORAL DAILY
Qty: 30 TABLET | Refills: 2 | Status: ON HOLD | OUTPATIENT
Start: 2022-09-15 | End: 2022-10-31 | Stop reason: SDUPTHER

## 2022-09-15 RX ORDER — GABAPENTIN 300 MG/1
900 CAPSULE ORAL 3 TIMES DAILY
Status: DISCONTINUED | OUTPATIENT
Start: 2022-09-15 | End: 2022-09-18 | Stop reason: HOSPADM

## 2022-09-15 RX ORDER — ONDANSETRON 2 MG/ML
4 INJECTION INTRAMUSCULAR; INTRAVENOUS EVERY 8 HOURS PRN
Status: DISCONTINUED | OUTPATIENT
Start: 2022-09-15 | End: 2022-09-18 | Stop reason: HOSPADM

## 2022-09-15 RX ORDER — IBUPROFEN 200 MG
24 TABLET ORAL
Status: DISCONTINUED | OUTPATIENT
Start: 2022-09-15 | End: 2022-09-18 | Stop reason: HOSPADM

## 2022-09-15 RX ORDER — TRAMADOL HYDROCHLORIDE 50 MG/1
50 TABLET ORAL EVERY 6 HOURS PRN
Status: DISCONTINUED | OUTPATIENT
Start: 2022-09-16 | End: 2022-09-18 | Stop reason: HOSPADM

## 2022-09-15 RX ORDER — IBUPROFEN 200 MG
16 TABLET ORAL
Status: DISCONTINUED | OUTPATIENT
Start: 2022-09-15 | End: 2022-09-18 | Stop reason: HOSPADM

## 2022-09-15 RX ORDER — ATORVASTATIN CALCIUM 80 MG/1
80 TABLET, FILM COATED ORAL DAILY
Qty: 30 TABLET | Refills: 2 | Status: ON HOLD | OUTPATIENT
Start: 2022-09-15 | End: 2022-10-31 | Stop reason: SDUPTHER

## 2022-09-15 RX ORDER — PROCHLORPERAZINE EDISYLATE 5 MG/ML
5 INJECTION INTRAMUSCULAR; INTRAVENOUS EVERY 6 HOURS PRN
Status: DISCONTINUED | OUTPATIENT
Start: 2022-09-15 | End: 2022-09-18 | Stop reason: HOSPADM

## 2022-09-15 RX ORDER — MUPIROCIN 20 MG/G
OINTMENT TOPICAL 2 TIMES DAILY
Qty: 30 G | Refills: 1 | Status: ON HOLD | OUTPATIENT
Start: 2022-09-15 | End: 2022-12-08 | Stop reason: CLARIF

## 2022-09-15 RX ORDER — TALC
6 POWDER (GRAM) TOPICAL NIGHTLY PRN
Status: DISCONTINUED | OUTPATIENT
Start: 2022-09-15 | End: 2022-09-18 | Stop reason: HOSPADM

## 2022-09-15 RX ORDER — DOXYCYCLINE HYCLATE 100 MG
100 TABLET ORAL 2 TIMES DAILY
Qty: 20 TABLET | Refills: 0 | Status: ON HOLD | OUTPATIENT
Start: 2022-09-15 | End: 2022-09-18 | Stop reason: HOSPADM

## 2022-09-15 RX ORDER — HEPARIN SODIUM,PORCINE/D5W 25000/250
0-40 INTRAVENOUS SOLUTION INTRAVENOUS CONTINUOUS
Status: DISCONTINUED | OUTPATIENT
Start: 2022-09-15 | End: 2022-09-18 | Stop reason: HOSPADM

## 2022-09-15 RX ORDER — NALOXONE HCL 0.4 MG/ML
0.02 VIAL (ML) INJECTION
Status: DISCONTINUED | OUTPATIENT
Start: 2022-09-15 | End: 2022-09-18 | Stop reason: HOSPADM

## 2022-09-15 RX ADMIN — HEPARIN SODIUM 18 UNITS/KG/HR: 5000 INJECTION INTRAVENOUS; SUBCUTANEOUS at 04:09

## 2022-09-15 RX ADMIN — GABAPENTIN 900 MG: 300 CAPSULE ORAL at 10:09

## 2022-09-15 RX ADMIN — IOHEXOL 100 ML: 350 INJECTION, SOLUTION INTRAVENOUS at 05:09

## 2022-09-15 NOTE — FIRST PROVIDER EVALUATION
Medical screening examination initiated.  I have conducted a focused provider triage encounter, findings are as follows:    Brief history of present illness:  72-year-old man with recent diagnosis of right-sided DVT reports to emergency department as directed by his PCP and vascular surgeon for further evaluation of ongoing right lower extremity pain with concerns for ischemia.  Patient endorses ongoing pain and worsening swelling to his right lower extremity    There were no vitals filed for this visit.    Pertinent physical exam:  72-year-old  man in moderate discomfort; moderate swelling noted to the right foot, no dopplerable dorsalis pedis or posterior tibialis pulses noted    Brief workup plan:  labs, emergent vascular consultation, heparin infusion    Preliminary workup initiated; this workup will be continued and followed by the physician or advanced practice provider that is assigned to the patient when roomed.

## 2022-09-15 NOTE — Clinical Note
Just an update... Please see my note for details.  Saw him for an ER follow up...After speaking with his Vascular Surgeon, admitted IP for Ischemic limb.  SHERRY

## 2022-09-15 NOTE — HPI
Babatunde Singh is a 71 yo M with significant PAD, HTN, HLD, recent diagnosis of DVT and smoking who presents due to RLE pain. He has been seen in the ED several times recently for RLE pain, with the most recent visit 09/09 at which time he was diagnosed with R AT/peroneal DVT for which he has been taking xarelto for. At that time he was found to have RLE blisters/wounds on the lateral part of his RLE which he has not followed up with wound care for. He states he has continued to have RLE pain and that his blisters have gotten worse. States he has had coolness to his feet/swelling in his legs since at least August and has been followed as an outpatient. He presented to internal medicine clinic yesterday, who noted wounds to leg and he was then referred to the ED. He then presented to the ED today. Of note, he has not been taking xarelto until Tuesday of this week 2/2 unable to get medicine filled. He is currently trying to quit smoking and is down to [place]. 2 cigarettes a day.    In the ED, his vitals were stable. Lactic acid and cmp were negative. ED staff unable to doppler distal pulses and vascular surgery originally called who recommended calling cardiology given he follows with cardiology. Cta is pending at the time of this note. Cardiology then consulted for evaluation.     He follows with Dr. Wallace who he last saw in early August and was started on xarelto/cilostazol. At that time he had a CTA runoff with results below.   CT runoff 08/03:  Extensive BLE PAD with:   -occlusion or RCA and reconsitution to ERIC, patent R PFA and occluded GELY, suspected  R SFA with reconstitution at the level of the adductor canal and significant plaque/stenosis at R tibioperoneal trunk with suspicion R PT supplying majority of foot.   -there was similar extensive PAD to left ISABELLE etc, however, he has consistently complained of RLE symptoms.

## 2022-09-15 NOTE — TELEPHONE ENCOUNTER
SPOKE WITH HIS VASCULAR PHYSICIAN, DR. GAGNON; RECOMMENDS THE ER AND ADMISSION AT THIS TIME WITH CONCERN FOR 'ISCHEMIC' LIMB IN THE SETTING OF PRE EXISTING PAD, NEW DVT AND NOW WITH VASCULAR ULCERS AND SUPERIMPOSED CELLULITIS.     SPOKE WITH 'NATASHA' HIS GRANDDAUGHTER WHOM WILL BE TAKING HIM NOW TO HOSPITAL.       SHERRY

## 2022-09-15 NOTE — Clinical Note
Kieran Wallace,  He was in the ER on 9/9, dx'd with acute DVT it appears, they started him on Xarelto, now with acute ulcerations to the limb... (Please see my note for photos...); he has a follow up with you in December, but wanted to respectfully reach out; thought you may have wanted to see him sooner if possible.   All the Best,  Jerardo Rodriguez

## 2022-09-16 LAB
ALBUMIN SERPL BCP-MCNC: 2.9 G/DL (ref 3.5–5.2)
ALP SERPL-CCNC: 56 U/L (ref 55–135)
ALT SERPL W/O P-5'-P-CCNC: 17 U/L (ref 10–44)
ANION GAP SERPL CALC-SCNC: 5 MMOL/L (ref 8–16)
APTT BLDCRRT: 35.7 SEC (ref 21–32)
APTT BLDCRRT: 45.8 SEC (ref 21–32)
AST SERPL-CCNC: 19 U/L (ref 10–40)
BASOPHILS # BLD AUTO: 0.06 K/UL (ref 0–0.2)
BASOPHILS NFR BLD: 0.6 % (ref 0–1.9)
BILIRUB SERPL-MCNC: 0.3 MG/DL (ref 0.1–1)
BUN SERPL-MCNC: 15 MG/DL (ref 8–23)
CALCIUM SERPL-MCNC: 8.8 MG/DL (ref 8.7–10.5)
CHLORIDE SERPL-SCNC: 110 MMOL/L (ref 95–110)
CO2 SERPL-SCNC: 23 MMOL/L (ref 23–29)
CREAT SERPL-MCNC: 0.7 MG/DL (ref 0.5–1.4)
DIFFERENTIAL METHOD: ABNORMAL
EOSINOPHIL # BLD AUTO: 0.1 K/UL (ref 0–0.5)
EOSINOPHIL NFR BLD: 1.2 % (ref 0–8)
ERYTHROCYTE [DISTWIDTH] IN BLOOD BY AUTOMATED COUNT: 19.8 % (ref 11.5–14.5)
EST. GFR  (NO RACE VARIABLE): >60 ML/MIN/1.73 M^2
GLUCOSE SERPL-MCNC: 85 MG/DL (ref 70–110)
HCT VFR BLD AUTO: 31.3 % (ref 40–54)
HGB BLD-MCNC: 9.8 G/DL (ref 14–18)
IMM GRANULOCYTES # BLD AUTO: 0.04 K/UL (ref 0–0.04)
IMM GRANULOCYTES NFR BLD AUTO: 0.4 % (ref 0–0.5)
LYMPHOCYTES # BLD AUTO: 4.1 K/UL (ref 1–4.8)
LYMPHOCYTES NFR BLD: 38.6 % (ref 18–48)
MCH RBC QN AUTO: 25.5 PG (ref 27–31)
MCHC RBC AUTO-ENTMCNC: 31.3 G/DL (ref 32–36)
MCV RBC AUTO: 81 FL (ref 82–98)
MONOCYTES # BLD AUTO: 0.8 K/UL (ref 0.3–1)
MONOCYTES NFR BLD: 7.7 % (ref 4–15)
NEUTROPHILS # BLD AUTO: 5.5 K/UL (ref 1.8–7.7)
NEUTROPHILS NFR BLD: 51.5 % (ref 38–73)
NRBC BLD-RTO: 0 /100 WBC
PLATELET # BLD AUTO: 227 K/UL (ref 150–450)
PMV BLD AUTO: 9.9 FL (ref 9.2–12.9)
POTASSIUM SERPL-SCNC: 3.9 MMOL/L (ref 3.5–5.1)
PROT SERPL-MCNC: 5.4 G/DL (ref 6–8.4)
RBC # BLD AUTO: 3.85 M/UL (ref 4.6–6.2)
SODIUM SERPL-SCNC: 138 MMOL/L (ref 136–145)
WBC # BLD AUTO: 10.7 K/UL (ref 3.9–12.7)

## 2022-09-16 PROCEDURE — 85730 THROMBOPLASTIN TIME PARTIAL: CPT | Mod: 91 | Performed by: STUDENT IN AN ORGANIZED HEALTH CARE EDUCATION/TRAINING PROGRAM

## 2022-09-16 PROCEDURE — 25000003 PHARM REV CODE 250: Performed by: HOSPITALIST

## 2022-09-16 PROCEDURE — 25000003 PHARM REV CODE 250: Performed by: STUDENT IN AN ORGANIZED HEALTH CARE EDUCATION/TRAINING PROGRAM

## 2022-09-16 PROCEDURE — 36415 COLL VENOUS BLD VENIPUNCTURE: CPT | Performed by: STUDENT IN AN ORGANIZED HEALTH CARE EDUCATION/TRAINING PROGRAM

## 2022-09-16 PROCEDURE — 99222 1ST HOSP IP/OBS MODERATE 55: CPT | Mod: ,,, | Performed by: UROLOGY

## 2022-09-16 PROCEDURE — 99223 PR INITIAL HOSPITAL CARE,LEVL III: ICD-10-PCS | Mod: ,,, | Performed by: HOSPITALIST

## 2022-09-16 PROCEDURE — 63600175 PHARM REV CODE 636 W HCPCS: Performed by: STUDENT IN AN ORGANIZED HEALTH CARE EDUCATION/TRAINING PROGRAM

## 2022-09-16 PROCEDURE — G0378 HOSPITAL OBSERVATION PER HR: HCPCS

## 2022-09-16 PROCEDURE — 80053 COMPREHEN METABOLIC PANEL: CPT | Performed by: HOSPITALIST

## 2022-09-16 PROCEDURE — 85025 COMPLETE CBC W/AUTO DIFF WBC: CPT | Performed by: HOSPITALIST

## 2022-09-16 PROCEDURE — 96366 THER/PROPH/DIAG IV INF ADDON: CPT

## 2022-09-16 PROCEDURE — 99232 PR SUBSEQUENT HOSPITAL CARE,LEVL II: ICD-10-PCS | Mod: ,,, | Performed by: STUDENT IN AN ORGANIZED HEALTH CARE EDUCATION/TRAINING PROGRAM

## 2022-09-16 PROCEDURE — 99232 SBSQ HOSP IP/OBS MODERATE 35: CPT | Mod: ,,, | Performed by: STUDENT IN AN ORGANIZED HEALTH CARE EDUCATION/TRAINING PROGRAM

## 2022-09-16 PROCEDURE — 99222 PR INITIAL HOSPITAL CARE,LEVL II: ICD-10-PCS | Mod: ,,, | Performed by: UROLOGY

## 2022-09-16 PROCEDURE — 11000001 HC ACUTE MED/SURG PRIVATE ROOM

## 2022-09-16 PROCEDURE — 99223 1ST HOSP IP/OBS HIGH 75: CPT | Mod: ,,, | Performed by: HOSPITALIST

## 2022-09-16 RX ORDER — ATORVASTATIN CALCIUM 40 MG/1
80 TABLET, FILM COATED ORAL DAILY
Status: DISCONTINUED | OUTPATIENT
Start: 2022-09-16 | End: 2022-09-18 | Stop reason: HOSPADM

## 2022-09-16 RX ORDER — MORPHINE SULFATE 15 MG/1
15 TABLET ORAL EVERY 4 HOURS PRN
Status: DISCONTINUED | OUTPATIENT
Start: 2022-09-16 | End: 2022-09-16

## 2022-09-16 RX ORDER — MORPHINE SULFATE 15 MG/1
15 TABLET, FILM COATED, EXTENDED RELEASE ORAL EVERY 12 HOURS PRN
Status: DISCONTINUED | OUTPATIENT
Start: 2022-09-16 | End: 2022-09-16 | Stop reason: ALTCHOICE

## 2022-09-16 RX ORDER — CILOSTAZOL 50 MG/1
100 TABLET ORAL 2 TIMES DAILY
Status: DISCONTINUED | OUTPATIENT
Start: 2022-09-16 | End: 2022-09-18 | Stop reason: HOSPADM

## 2022-09-16 RX ORDER — NAPROXEN SODIUM 220 MG/1
81 TABLET, FILM COATED ORAL DAILY
Status: DISCONTINUED | OUTPATIENT
Start: 2022-09-16 | End: 2022-09-18 | Stop reason: HOSPADM

## 2022-09-16 RX ORDER — EZETIMIBE 10 MG/1
10 TABLET ORAL DAILY
Status: DISCONTINUED | OUTPATIENT
Start: 2022-09-16 | End: 2022-09-18 | Stop reason: HOSPADM

## 2022-09-16 RX ORDER — LOSARTAN POTASSIUM AND HYDROCHLOROTHIAZIDE 12.5; 5 MG/1; MG/1
1 TABLET ORAL DAILY
Status: DISCONTINUED | OUTPATIENT
Start: 2022-09-16 | End: 2022-09-18 | Stop reason: HOSPADM

## 2022-09-16 RX ORDER — DOXYCYCLINE HYCLATE 100 MG
100 TABLET ORAL 2 TIMES DAILY
Status: DISCONTINUED | OUTPATIENT
Start: 2022-09-16 | End: 2022-09-18 | Stop reason: HOSPADM

## 2022-09-16 RX ORDER — MORPHINE SULFATE 15 MG/1
15 TABLET ORAL EVERY 6 HOURS PRN
Status: DISCONTINUED | OUTPATIENT
Start: 2022-09-16 | End: 2022-09-18 | Stop reason: HOSPADM

## 2022-09-16 RX ADMIN — ATORVASTATIN CALCIUM 80 MG: 40 TABLET, FILM COATED ORAL at 09:09

## 2022-09-16 RX ADMIN — TRAMADOL HYDROCHLORIDE 50 MG: 50 TABLET, COATED ORAL at 09:09

## 2022-09-16 RX ADMIN — MORPHINE SULFATE 15 MG: 15 TABLET ORAL at 03:09

## 2022-09-16 RX ADMIN — HEPARIN SODIUM 16 UNITS/KG/HR: 5000 INJECTION INTRAVENOUS; SUBCUTANEOUS at 04:09

## 2022-09-16 RX ADMIN — CILOSTAZOL 100 MG: 50 TABLET ORAL at 09:09

## 2022-09-16 RX ADMIN — EZETIMIBE 10 MG: 10 TABLET ORAL at 09:09

## 2022-09-16 RX ADMIN — Medication 6 MG: at 09:09

## 2022-09-16 RX ADMIN — ASPIRIN 81 MG CHEWABLE TABLET 81 MG: 81 TABLET CHEWABLE at 09:09

## 2022-09-16 RX ADMIN — GABAPENTIN 900 MG: 300 CAPSULE ORAL at 09:09

## 2022-09-16 RX ADMIN — DOXYCYCLINE HYCLATE 100 MG: 100 TABLET, COATED ORAL at 01:09

## 2022-09-16 RX ADMIN — GABAPENTIN 900 MG: 300 CAPSULE ORAL at 03:09

## 2022-09-16 RX ADMIN — HEPARIN SODIUM 16 UNITS/KG/HR: 5000 INJECTION INTRAVENOUS; SUBCUTANEOUS at 09:09

## 2022-09-16 RX ADMIN — DOXYCYCLINE HYCLATE 100 MG: 100 TABLET, COATED ORAL at 09:09

## 2022-09-16 RX ADMIN — LOSARTAN POTASSIUM AND HYDROCHLOROTHIAZIDE 1 TABLET: 50; 12.5 TABLET, FILM COATED ORAL at 09:09

## 2022-09-16 RX ADMIN — TRAMADOL HYDROCHLORIDE 50 MG: 50 TABLET, COATED ORAL at 01:09

## 2022-09-16 NOTE — PROGRESS NOTES
[facility] - [facility]  Wound Care    Patient Name:  Babatunde Singh   MRN:  [id number]  Date: 9/16/2022  Diagnosis: Lower limb ischemia    History:     Past Medical History:   Diagnosis Date    Hypertension     Peripheral arterial disease        Social History     Socioeconomic History    Marital status:    Occupational History    Occupation: doorman [employer]   Tobacco Use    Smoking status: Every Day     Packs/day: 0.50     Years: 50.00     Pack years: 25.00     Types: Cigarettes    Smokeless tobacco: Never   Substance and Sexual Activity    Alcohol use: Never    Drug use: Never    Sexual activity: Yes     Partners: Female     Comment: wife       Precautions:     Allergies as of 09/15/2022    (No Known Allergies)       [facility] Assessment Details/Treatment   Wound care consult received for right lower extremity ischemic ulcer at the calf.   The right calf has three ruptured blisters. The patient complains of pain to touch of that leg. The superior ulcer is tan with the skin flap approximated. The medial blister is dry and intact. The inferior blister is a partial thickness skin loss which which is tan and pink. All periwounds are pink, warm and tender. The patient says that the ulcers drain a moderate amount during the night.    Recommendations:  - Right calf ulcers: nursing to cleanse with NS, pat dry, apply hydrofera blue ready to wound beds and loosely wrap with kerlix every 2 days and PRN saturation        09/16/22 1338        Altered Skin Integrity 09/16/22 1338 Right Calf Arterial Ulcer   Date First Assessed/Time First Assessed: 09/16/22 1338   Altered Skin Integrity Present on Admission: yes  Side: Right  Location: Calf  Primary Wound Type: Arterial Ulcer   Wound Image     Dressing Appearance Open to air   Appearance Pink;Red;Moist;Dry   Tissue loss description Partial thickness   Periwound Area Dry;Pink;Warm   Care Cleansed with:;Sterile normal saline       Recommendations made to primary team for above plan via  secure chat. Orders placed. Wound care to follow-up as needed.     09/16/2022

## 2022-09-16 NOTE — ASSESSMENT & PLAN NOTE
Suspect venous ulcers with chronic PAD/CLI. CTa pending, but dopplerable pulses to RLE.     -admit to medicine, no immediate intervention/angiogram pending, but will continue to follow  -heparin gtt, asa, atorvastatin  -BP control  -tobacco cessation counseling

## 2022-09-16 NOTE — PROGRESS NOTES
"[facility] Medicine  Progress Note    Patient Name: Babatunde Singh  MRN: [id number]  Patient Class: IP- Inpatient   Admission Date: 9/15/2022  Length of Stay: 1 days  Attending Physician: Mariya Turner MD  Primary Care Provider: Esdras Ly MD        Subjective:     Principal Problem:Lower limb ischemia        HPI:  73 yo M with PMHx severe PAD, HTN, HLD, and recently dx'd RLE DVT (9/9) who presents to the ED complaining of worsening R sided leg pain. Pt. Initally developed R lateral leg "blisters" on 9/9, and he presented to the ED. U/S at the time was notable for thrombosis with the right anterior tibial and peroneal veins. Pt. Was discharged home with xarelto prescription, but he was unable to receive this medication until 2 days ago due to issues with the pharmacy. At his f/u appointment, the patient reported worsening redness, swelling and ulceration to his R lateral lower extremity. Pt. Pulses were not able to be palpated, so he was referred to the ED. Pt. Reports tenderness and pain at site, bnut he denies any systemic symptoms such as fevers, chills, or generalized malaise.      Overview/Hospital Course:  No notes on file    Interval History: Patient seen at bedside. O heparin drip. Consulted urology for evaluation of R renal mass. Recommend OP work up.     Review of Systems   Constitutional:  Negative for activity change, chills, fever and unexpected weight change.   HENT:  Negative for congestion and sore throat.    Respiratory:  Negative for cough, shortness of breath and wheezing.    Cardiovascular:  Positive for leg swelling. Negative for chest pain and palpitations.   Gastrointestinal:  Negative for abdominal pain, blood in stool, nausea and vomiting.   Genitourinary:  Negative for dysuria and hematuria.   Musculoskeletal:  Positive for arthralgias. Negative for neck pain.   Skin:  Positive for color change, rash and wound.   Neurological:  Negative for dizziness, seizures and " numbness.   Psychiatric/Behavioral:  Negative for hallucinations and suicidal ideas.    Objective:     Vital Signs (Most Recent):  Temp: 98.2 °F (36.8 °C) (09/16/22 1203)  Pulse: 88 (09/16/22 1203)  Resp: 18 (09/16/22 1203)  BP: 123/62 (09/16/22 1203)  SpO2: 96 % (09/16/22 1203) Vital Signs (24h Range):  Temp:  [97 °F (36.1 °C)-98.8 °F (37.1 °C)] 98.2 °F (36.8 °C)  Pulse:  [72-88] 88  Resp:  [2-20] 18  SpO2:  [95 %-99 %] 96 %  BP: (123-164)/(62-85) 123/62     Weight: 54 kg (119 lb)  Body mass index is 19.21 kg/m².  No intake or output data in the 24 hours ending 09/16/22 1443   Physical Exam  Vitals reviewed.   Constitutional:       General: He is not in acute distress.     Appearance: He is well-developed.   HENT:      Head: Normocephalic and atraumatic.   Eyes:      Extraocular Movements: Extraocular movements intact.      Pupils: Pupils are equal, round, and reactive to light.   Neck:      Vascular: No JVD.      Trachea: No tracheal deviation.   Cardiovascular:      Rate and Rhythm: Normal rate and regular rhythm.      Pulses: Decreased pulses.      Heart sounds: No murmur heard.    No friction rub. No gallop.   Pulmonary:      Effort: No respiratory distress.      Breath sounds: Normal breath sounds. No wheezing or rales.   Abdominal:      General: Bowel sounds are normal. There is no distension.      Palpations: Abdomen is soft. There is no mass.      Tenderness: There is no abdominal tenderness.   Musculoskeletal:         General: No deformity.      Cervical back: Neck supple.      Right lower leg: Edema present.   Lymphadenopathy:      Cervical: No cervical adenopathy.   Skin:     General: Skin is dry.      Findings: No rash.      Comments: RLE with ulceration over lateral/posterior calf with surrounding erythema.   Neurological:      Mental Status: He is alert and oriented to person, place, and time.       MELD-Na score: 8 at 9/16/2022  7:29 AM  MELD score: 8 at 9/16/2022  7:29 AM  Calculated from:  Serum  Creatinine: 0.7 mg/dL (Using min of 1 mg/dL) at 9/16/2022  7:29 AM  Serum Sodium: 138 mmol/L (Using max of 137 mmol/L) at 9/16/2022  7:29 AM  Total Bilirubin: 0.3 mg/dL (Using min of 1 mg/dL) at 9/16/2022  7:29 AM  INR(ratio): 1.2 at 9/15/2022  3:00 PM  Age: 72 years    Significant Labs:  CBC:  Recent Labs   Lab 09/15/22  1500 09/16/22  0729   WBC 12.33 10.70   HGB 10.7* 9.8*   HCT 33.8* 31.3*   * 227     CMP:  Recent Labs   Lab 09/15/22  1500 09/16/22  0729    138   K 3.6 3.9    110   CO2 26 23   * 85   BUN 13 15   CREATININE 0.8 0.7   CALCIUM 9.1 8.8   PROT 6.0 5.4*   ALBUMIN 3.3* 2.9*   BILITOT 0.4 0.3   ALKPHOS 59 56   AST 20 19   ALT 18 17   ANIONGAP 5* 5*     PTINR:  Recent Labs   Lab 09/15/22  1500   INR 1.2     .sf      Assessment/Plan:      * Lower limb ischemia  -Pt. With worsening ulceration and erytehma. Pulses dopperable in ED. CTA shows Scattered regions of luminal narrowing and occlusion throughout the arterial vasculature  -Interventional cards consulted, recommend heparin gtt, no current plans for intervention  -Continue heparin gtt, pletal, statin and ASA. Plan to transition back to PO DOAC (xarelto or eliquis) at discharge. Pt. Had only been taking for 2 days prior to presentation so do not consider to be failure    Ulcer of right lower extremity, limited to breakdown of skin  -Pt. With ischemic ulcer to lateral/posterior calf related to ischemia. Notable for surrounding erythema/cellulitis. Pt. Afebrile without signs of sepsis. Plan to continue doxycycline started as outpatient and continue to monitor for clinical improvement  -Wound care consult      Peripheral arterial disease  -Continue pletal and statin. ASA per cards recommendations      Renal mass  - R renal mass 6.5 cm, concerning for possible malignancy. Pt. Missed appointment with urology 9/12.  - Consulted urology. Plan to see his established Urologist, Dr. Roberts, on 9/22/22 to discuss outpt w/u for renal  mass. No plans for urologic interventions while inpt    Mixed hyperlipidemia  -Continue home statin      Primary hypertension  -Continue home losartan-HCTZ        VTE Risk Mitigation (From admission, onward)         Ordered     IP VTE HIGH RISK PATIENT  Once         09/15/22 2222     heparin 25,000 units in dextrose 5% (100 units/ml) IV bolus from bag - ADDITIONAL PRN BOLUS - 60 units/kg  As needed (PRN)        Question:  Heparin Infusion Adjustment (DO NOT MODIFY ANSWER)  Answer:  [email or web address]\epic\Images\Pharmacy\HeparinInfusions\heparin HIGH INTENSITY nomogram for [facility] [id number].pdf    09/15/22 1501     heparin 25,000 units in dextrose 5% (100 units/ml) IV bolus from bag - ADDITIONAL PRN BOLUS - 30 units/kg  As needed (PRN)        Question:  Heparin Infusion Adjustment (DO NOT MODIFY ANSWER)  Answer:  [email or web address]\epic\Images\Pharmacy\HeparinInfusions\heparin HIGH INTENSITY nomogram for [facility] [id number].pdf    09/15/22 1501     heparin 25,000 units in dextrose 5% 250 mL (100 units/mL) infusion HIGH INTENSITY nomogram - [facility]  Continuous        Question Answer Comment   Heparin Infusion Adjustment (DO NOT MODIFY ANSWER) \\[email or web address]\epic\Images\Pharmacy\HeparinInfusions\heparin HIGH INTENSITY nomogram for [facility] [id number].pdf    Begin at (in units/kg/hr) 18        09/15/22 1501                Discharge Planning   LAZ:      Code Status: Full Code   Is the patient medically ready for discharge?: No    Reason for patient still in hospital (select all that apply): Patient trending condition  Discharge Plan A: Home with family                  Mariya Turner MD  Department of Hospital Medicine   [facility]

## 2022-09-16 NOTE — SUBJECTIVE & OBJECTIVE
Past Medical History:   Diagnosis Date    Hypertension     Peripheral arterial disease        Past Surgical History:   Procedure Laterality Date    EXCISION OF HYDROCELE Left 4/26/2022    Procedure: HYDROCELECTOMY;  Surgeon: Damion Roberts MD;  Location: [facility];  Service: Urology;  Laterality: Left;    KNEE SURGERY  1972       Review of patient's allergies indicates:  No Known Allergies    No current facility-administered medications on file prior to encounter.     Current Outpatient Medications on File Prior to Encounter   Medication Sig    atorvastatin (LIPITOR) 80 MG tablet Take 1 tablet (80 mg total) by mouth once daily.    cilostazoL (PLETAL) 100 MG Tab Take 1 tablet (100 mg total) by mouth 2 (two) times daily.    diclofenac sodium (VOLTAREN) 1 % Gel Apply 2 g topically 3 (three) times daily as needed (muscle spasm pain). Apply 2 grams to affected area 3 times daily as needed    doxycycline (VIBRA-TABS) 100 MG tablet Take 1 tablet (100 mg total) by mouth 2 (two) times daily. for 10 days    ezetimibe (ZETIA) 10 mg tablet Take 1 tablet (10 mg total) by mouth once daily.    gabapentin (NEURONTIN) 300 MG capsule Take 3 capsules (900 mg total) by mouth 3 (three) times daily.    LIDOcaine (LIDODERM) 5 % Apply to affected area as needed for pain for 12 hours, then off for 12 hours. Discard after each use.  May use 4% lidocaine patch as alternative.    losartan-hydrochlorothiazide 50-12.5 mg (HYZAAR) 50-12.5 mg per tablet Take 1 tablet by mouth once daily.    multivitamin (THERAGRAN) per tablet Take 1 tablet by mouth once daily.    mupirocin (BACTROBAN) 2 % ointment Apply topically 2 (two) times daily.    naproxen (NAPROSYN) 500 MG tablet Take 1 tablet (500 mg total) by mouth 2 (two) times daily as needed (pain).    rivaroxaban (XARELTO DVT-PE TREAT 30D START) 15 mg (42)- 20 mg (9) tablet dose pack Take 1 tablet (15 mg) by mouth twice daily with food for 21 days followed by 1 tablet (20 mg) by mouth once daily  with food    terbinafine HCl (LAMISIL ORAL) Take by mouth once daily at 6am.    [DISCONTINUED] atorvastatin (LIPITOR) 80 MG tablet Take 1 tablet (80 mg total) by mouth once daily.    [DISCONTINUED] bacitracin 500 unit/gram Oint Apply topically 2 (two) times daily.    [DISCONTINUED] losartan-hydrochlorothiazide 50-12.5 mg (HYZAAR) 50-12.5 mg per tablet Take 1 tablet by mouth once daily.     Family History       Problem Relation (Age of Onset)    Hypertension Mother, Father          Tobacco Use    Smoking status: Every Day     Packs/day: 0.50     Years: 50.00     Pack years: 25.00     Types: Cigarettes    Smokeless tobacco: Never   Substance and Sexual Activity    Alcohol use: Never    Drug use: Never    Sexual activity: Yes     Partners: Female     Comment: wife     Review of Systems   Constitutional:  Negative for activity change, chills, fever and unexpected weight change.   HENT:  Negative for congestion and sore throat.    Respiratory:  Negative for cough, shortness of breath and wheezing.    Cardiovascular:  Positive for leg swelling. Negative for chest pain and palpitations.   Gastrointestinal:  Negative for abdominal pain, blood in stool, nausea and vomiting.   Genitourinary:  Negative for dysuria and hematuria.   Musculoskeletal:  Positive for arthralgias. Negative for neck pain.   Skin:  Positive for color change, rash and wound.   Neurological:  Negative for dizziness, seizures and numbness.   Psychiatric/Behavioral:  Negative for hallucinations and suicidal ideas.    Objective:     Vital Signs (Most Recent):  Temp: 98.8 °F (37.1 °C) (09/15/22 2221)  Pulse: 82 (09/15/22 2221)  Resp: 20 (09/15/22 2221)  BP: (!) 161/70 (09/15/22 2221)  SpO2: 98 % (09/15/22 2221)   Vital Signs (24h Range):  Temp:  [97.8 °F (36.6 °C)-98.8 °F (37.1 °C)] 98.8 °F (37.1 °C)  Pulse:  [77-92] 82  Resp:  [18-20] 20  SpO2:  [95 %-100 %] 98 %  BP: (158-168)/(64-85) 161/70     Weight: 54 kg (119 lb)  Body mass index is 19.21  kg/m².    Physical Exam  Vitals reviewed.   Constitutional:       General: He is not in acute distress.     Appearance: He is well-developed.   HENT:      Head: Normocephalic and atraumatic.   Eyes:      Extraocular Movements: Extraocular movements intact.      Pupils: Pupils are equal, round, and reactive to light.   Neck:      Vascular: No JVD.      Trachea: No tracheal deviation.   Cardiovascular:      Rate and Rhythm: Normal rate and regular rhythm.      Pulses: Decreased pulses.      Heart sounds: No murmur heard.    No friction rub. No gallop.   Pulmonary:      Effort: No respiratory distress.      Breath sounds: Normal breath sounds. No wheezing or rales.   Abdominal:      General: Bowel sounds are normal. There is no distension.      Palpations: Abdomen is soft. There is no mass.      Tenderness: There is no abdominal tenderness.   Musculoskeletal:         General: No deformity.      Cervical back: Neck supple.      Right lower leg: Edema present.   Lymphadenopathy:      Cervical: No cervical adenopathy.   Skin:     General: Skin is dry.      Findings: No rash.      Comments: RLE with ulceration over lateral/posterior calf with surrounding erythema.   Neurological:      Mental Status: He is alert and oriented to person, place, and time.                     CRANIAL NERVES     CN III, IV, VI   Pupils are equal, round, and reactive to light.     Significant Labs: All pertinent labs within the past 24 hours have been reviewed.    Significant Imaging: I have reviewed all pertinent imaging results/findings within the past 24 hours.

## 2022-09-16 NOTE — CONSULTS
[facility] - Emergency Dept  Interventional Cardiology  Consult Note    Patient Name: Babatunde Singh  MRN: [id number]  Admission Date: 9/15/2022  Hospital Length of Stay: 0 days  Code Status: No Order   Attending Provider: Marci oDmingo MD  Consulting Provider: Harry Luna MD  Primary Care Physician: Esdras Ly MD  Principal Problem:Lower limb ischemia    Patient information was obtained from patient and past medical records.     Inpatient consult to Cardiology  Consult performed by: Harry Luna MD  Consult ordered by: Marci Domingo MD        Subjective:     Chief Complaint:  Leg ulcers      HPI:  Babatunde Singh is a 73 yo M with significant PAD, HTN, HLD, recent diagnosis of DVT and smoking who presents due to RLE pain. He has been seen in the ED several times recently for RLE pain, with the most recent visit 09/09 at which time he was diagnosed with R AT/peroneal DVT for which he has been taking xarelto for. At that time he was found to have RLE blisters/wounds on the lateral part of his RLE which he has not followed up with wound care for. He states he has continued to have RLE pain and that his blisters have gotten worse. States he has had coolness to his feet/swelling in his legs since at least August and has been followed as an outpatient. He presented to internal medicine clinic yesterday, who noted wounds to leg and he was then referred to the ED. He then presented to the ED today. Of note, he has not been taking xarelto until Tuesday of this week 2/2 unable to get medicine filled. He is currently trying to quit smoking and is down to [place]. 2 cigarettes a day.    In the ED, his vitals were stable. Lactic acid and cmp were negative. ED staff unable to doppler distal pulses and vascular surgery originally called who recommended calling cardiology given he follows with cardiology. Cta is pending at the time of this note. Cardiology then consulted for evaluation.     He follows with Dr. Wallace  who he last saw in early August and was started on xarelto/cilostazol. At that time he had a CTA runoff with results below.   CT runoff 08/03:  Extensive BLE PAD with:   -occlusion or RCA and reconsitution to ERIC, patent R PFA and occluded GELY, suspected  R SFA with reconstitution at the level of the adductor canal and significant plaque/stenosis at R tibioperoneal trunk with suspicion R PT supplying majority of foot.   -there was similar extensive PAD to left ISABELLE etc, however, he has consistently complained of RLE symptoms.         Past Medical History:   Diagnosis Date    Hypertension     Peripheral arterial disease        Past Surgical History:   Procedure Laterality Date    EXCISION OF HYDROCELE Left 4/26/2022    Procedure: HYDROCELECTOMY;  Surgeon: Damion Roberts MD;  Location: [facility];  Service: Urology;  Laterality: Left;    KNEE SURGERY  1972       Review of patient's allergies indicates:  No Known Allergies    (Not in a hospital admission)    Family History       Problem Relation (Age of Onset)    Hypertension Mother, Father          Tobacco Use    Smoking status: Every Day     Packs/day: 0.50     Years: 50.00     Pack years: 25.00     Types: Cigarettes    Smokeless tobacco: Never   Substance and Sexual Activity    Alcohol use: Never    Drug use: Never    Sexual activity: Yes     Partners: Female     Comment: wife     Review of Systems   Cardiovascular:  Positive for claudication and leg swelling.   Skin:  Positive for poor wound healing.   All other systems reviewed and are negative.  Objective:     Vital Signs (Most Recent):  Temp: 97.8 °F (36.6 °C) (09/15/22 1422)  Pulse: 77 (09/15/22 1650)  Resp: 18 (09/15/22 1650)  BP: (!) 163/85 (09/15/22 1650)  SpO2: 95 % (09/15/22 1650)   Vital Signs (24h Range):  Temp:  [97.8 °F (36.6 °C)] 97.8 °F (36.6 °C)  Pulse:  [77-92] 77  Resp:  [18] 18  SpO2:  [95 %-100 %] 95 %  BP: (158-168)/(64-85) 163/85     Weight: 54 kg (119 lb)  Body mass index is 19.21  kg/m².    SpO2: 95 %  O2 Device (Oxygen Therapy): room air    No intake or output data in the 24 hours ending 09/15/22 2020    Lines/Drains/Airways       Peripheral Intravenous Line  Duration                  Peripheral IV - Single Lumen 09/15/22 1516 20 G Left Antecubital <1 day         Peripheral IV - Single Lumen 09/15/22 1635 20 G Anterior;Distal;Right Upper Arm <1 day                    Physical Exam  Vitals and nursing note reviewed.   Constitutional:       Appearance: Normal appearance.   HENT:      Head: Normocephalic and atraumatic.      Right Ear: External ear normal.      Left Ear: External ear normal.      Nose: Nose normal.      Mouth/Throat:      Mouth: Mucous membranes are moist.   Eyes:      Pupils: Pupils are equal, round, and reactive to light.   Cardiovascular:      Rate and Rhythm: Normal rate and regular rhythm.      Comments: Diminished monophasic pulse to R PT, unable to doppler DP  Pulmonary:      Effort: Pulmonary effort is normal.   Abdominal:      General: Abdomen is flat.   Musculoskeletal:         General: Normal range of motion.      Cervical back: Normal range of motion.      Right lower leg: Edema present.      Left lower leg: No edema.   Skin:     Capillary Refill: Capillary refill takes less than 2 seconds.      Comments: Cool RLE with dopplerable PT, dry wound to inferior, lateral portion of right calve with surrounding ecchymosis   Neurological:      General: No focal deficit present.      Mental Status: He is alert and oriented to person, place, and time.       Significant Labs: All pertinent lab results from the last 24 hours have been reviewed.    Significant Imaging:  reviewed    Assessment and Plan:     * Lower limb ischemia  Suspect venous ulcers with chronic PAD/CLI. CTa pending, but dopplerable pulses to RLE.     -admit to medicine, no immediate intervention/angiogram pending, but will continue to follow  -heparin gtt, asa, atorvastatin  -BP control  -tobacco cessation  counseling     Ulcer of right lower extremity, limited to breakdown of skin  -would treat with abx, defer to primary  -pending course     Peripheral arterial disease  -see limb ischemia     Renal mass  -CT with read concerning for malignancy  -recommend urology consult for evaluation     Claudication of right lower extremity  -see limb ischemia      Mixed hyperlipidemia  -atorvastatin 80    Smoking  -actively trying to quit    Primary hypertension  -continue home meds         VTE Risk Mitigation (From admission, onward)         Ordered     heparin 25,000 units in dextrose 5% (100 units/ml) IV bolus from bag - ADDITIONAL PRN BOLUS - 60 units/kg  As needed (PRN)        Question:  Heparin Infusion Adjustment (DO NOT MODIFY ANSWER)  Answer:  \\[email or web address]\epic\Images\Pharmacy\HeparinInfusions\heparin HIGH INTENSITY nomogram for [facility] [id number].pdf    09/15/22 1501     heparin 25,000 units in dextrose 5% (100 units/ml) IV bolus from bag - ADDITIONAL PRN BOLUS - 30 units/kg  As needed (PRN)        Question:  Heparin Infusion Adjustment (DO NOT MODIFY ANSWER)  Answer:  \\[email or web address]\epic\Images\Pharmacy\HeparinInfusions\heparin HIGH INTENSITY nomogram for [facility] [id number].pdf    09/15/22 1501     heparin 25,000 units in dextrose 5% 250 mL (100 units/mL) infusion HIGH INTENSITY nomogram - [facility]  Continuous        Question Answer Comment   Heparin Infusion Adjustment (DO NOT MODIFY ANSWER) \\[email or web address]\epic\Images\Pharmacy\HeparinInfusions\heparin HIGH INTENSITY nomogram for [facility] [id number].pdf    Begin at (in units/kg/hr) 18        09/15/22 1501                Thank you for your consult. I will follow-up with patient. Please contact us if you have any additional questions.    Harry Luna MD  Interventional Cardiology   [facility] - Emergency Dept

## 2022-09-16 NOTE — H&P
"[facility] Medicine  History & Physical    Patient Name: Babatunde Singh  MRN: [id number]  Patient Class: IP- Inpatient  Admission Date: 9/15/2022  Attending Physician: Mariya Turner MD   Primary Care Provider: Esdras Ly MD         Patient information was obtained from patient, past medical records and ER records.     Subjective:     Principal Problem:Lower limb ischemia    Chief Complaint:   Chief Complaint   Patient presents with    Leg Swelling     R leg pain + dvt, on blood thinners, called from clinic to come to er, toes cool and bluish, not able to doppler pedal and post tibial on r foot, very painful with any movement        HPI: 71 yo M with PMHx severe PAD, HTN, HLD, and recently dx'd RLE DVT (9/9) who presents to the ED complaining of worsening R sided leg pain. Pt. Initally developed R lateral leg "blisters" on 9/9, and he presented to the ED. U/S at the time was notable for thrombosis with the right anterior tibial and peroneal veins. Pt. Was discharged home with xarelto prescription, but he was unable to receive this medication until 2 days ago due to issues with the pharmacy. At his f/u appointment, the patient reported worsening redness, swelling and ulceration to his R lateral lower extremity. Pt. Pulses were not able to be palpated, so he was referred to the ED. Pt. Reports tenderness and pain at site, bnut he denies any systemic symptoms such as fevers, chills, or generalized malaise.      Past Medical History:   Diagnosis Date    Hypertension     Peripheral arterial disease        Past Surgical History:   Procedure Laterality Date    EXCISION OF HYDROCELE Left 4/26/2022    Procedure: HYDROCELECTOMY;  Surgeon: Damion Roberts MD;  Location: [facility];  Service: Urology;  Laterality: Left;    KNEE SURGERY  1972       Review of patient's allergies indicates:  No Known Allergies    No current facility-administered medications on file prior to encounter.     Current " Outpatient Medications on File Prior to Encounter   Medication Sig    atorvastatin (LIPITOR) 80 MG tablet Take 1 tablet (80 mg total) by mouth once daily.    cilostazoL (PLETAL) 100 MG Tab Take 1 tablet (100 mg total) by mouth 2 (two) times daily.    diclofenac sodium (VOLTAREN) 1 % Gel Apply 2 g topically 3 (three) times daily as needed (muscle spasm pain). Apply 2 grams to affected area 3 times daily as needed    doxycycline (VIBRA-TABS) 100 MG tablet Take 1 tablet (100 mg total) by mouth 2 (two) times daily. for 10 days    ezetimibe (ZETIA) 10 mg tablet Take 1 tablet (10 mg total) by mouth once daily.    gabapentin (NEURONTIN) 300 MG capsule Take 3 capsules (900 mg total) by mouth 3 (three) times daily.    LIDOcaine (LIDODERM) 5 % Apply to affected area as needed for pain for 12 hours, then off for 12 hours. Discard after each use.  May use 4% lidocaine patch as alternative.    losartan-hydrochlorothiazide 50-12.5 mg (HYZAAR) 50-12.5 mg per tablet Take 1 tablet by mouth once daily.    multivitamin (THERAGRAN) per tablet Take 1 tablet by mouth once daily.    mupirocin (BACTROBAN) 2 % ointment Apply topically 2 (two) times daily.    naproxen (NAPROSYN) 500 MG tablet Take 1 tablet (500 mg total) by mouth 2 (two) times daily as needed (pain).    rivaroxaban (XARELTO DVT-PE TREAT 30D START) 15 mg (42)- 20 mg (9) tablet dose pack Take 1 tablet (15 mg) by mouth twice daily with food for 21 days followed by 1 tablet (20 mg) by mouth once daily with food    terbinafine HCl (LAMISIL ORAL) Take by mouth once daily at 6am.    [DISCONTINUED] atorvastatin (LIPITOR) 80 MG tablet Take 1 tablet (80 mg total) by mouth once daily.    [DISCONTINUED] bacitracin 500 unit/gram Oint Apply topically 2 (two) times daily.    [DISCONTINUED] losartan-hydrochlorothiazide 50-12.5 mg (HYZAAR) 50-12.5 mg per tablet Take 1 tablet by mouth once daily.     Family History       Problem Relation (Age of Onset)    Hypertension Mother,  Father          Tobacco Use    Smoking status: Every Day     Packs/day: 0.50     Years: 50.00     Pack years: 25.00     Types: Cigarettes    Smokeless tobacco: Never   Substance and Sexual Activity    Alcohol use: Never    Drug use: Never    Sexual activity: Yes     Partners: Female     Comment: wife     Review of Systems   Constitutional:  Negative for activity change, chills, fever and unexpected weight change.   HENT:  Negative for congestion and sore throat.    Respiratory:  Negative for cough, shortness of breath and wheezing.    Cardiovascular:  Positive for leg swelling. Negative for chest pain and palpitations.   Gastrointestinal:  Negative for abdominal pain, blood in stool, nausea and vomiting.   Genitourinary:  Negative for dysuria and hematuria.   Musculoskeletal:  Positive for arthralgias. Negative for neck pain.   Skin:  Positive for color change, rash and wound.   Neurological:  Negative for dizziness, seizures and numbness.   Psychiatric/Behavioral:  Negative for hallucinations and suicidal ideas.    Objective:     Vital Signs (Most Recent):  Temp: 98.8 °F (37.1 °C) (09/15/22 2221)  Pulse: 82 (09/15/22 2221)  Resp: 20 (09/15/22 2221)  BP: (!) 161/70 (09/15/22 2221)  SpO2: 98 % (09/15/22 2221)   Vital Signs (24h Range):  Temp:  [97.8 °F (36.6 °C)-98.8 °F (37.1 °C)] 98.8 °F (37.1 °C)  Pulse:  [77-92] 82  Resp:  [18-20] 20  SpO2:  [95 %-100 %] 98 %  BP: (158-168)/(64-85) 161/70     Weight: 54 kg (119 lb)  Body mass index is 19.21 kg/m².    Physical Exam  Vitals reviewed.   Constitutional:       General: He is not in acute distress.     Appearance: He is well-developed.   HENT:      Head: Normocephalic and atraumatic.   Eyes:      Extraocular Movements: Extraocular movements intact.      Pupils: Pupils are equal, round, and reactive to light.   Neck:      Vascular: No JVD.      Trachea: No tracheal deviation.   Cardiovascular:      Rate and Rhythm: Normal rate and regular rhythm.      Pulses:  Decreased pulses.      Heart sounds: No murmur heard.    No friction rub. No gallop.   Pulmonary:      Effort: No respiratory distress.      Breath sounds: Normal breath sounds. No wheezing or rales.   Abdominal:      General: Bowel sounds are normal. There is no distension.      Palpations: Abdomen is soft. There is no mass.      Tenderness: There is no abdominal tenderness.   Musculoskeletal:         General: No deformity.      Cervical back: Neck supple.      Right lower leg: Edema present.   Lymphadenopathy:      Cervical: No cervical adenopathy.   Skin:     General: Skin is dry.      Findings: No rash.      Comments: RLE with ulceration over lateral/posterior calf with surrounding erythema.   Neurological:      Mental Status: He is alert and oriented to person, place, and time.                     CRANIAL NERVES     CN III, IV, VI   Pupils are equal, round, and reactive to light.     Significant Labs: All pertinent labs within the past 24 hours have been reviewed.    Significant Imaging: I have reviewed all pertinent imaging results/findings within the past 24 hours.    Assessment/Plan:     * Lower limb ischemia  -Pt. With worsening ulceration and erytehma. Pulses dopperable in ED. CTA shows Scattered regions of luminal narrowing and occlusion throughout the arterial vasculature  -Interventional cards consulted, recommend heparin gtt, no current plans for intervention  -Continue heparin gtt, pletal, statin and ASA. Plan to transition back to PO DOAC (xarelto or eliquis) at discharge. Pt. Had only been taking for 2 days prior to presentation so do not consider to be failure    Ulcer of right lower extremity, limited to breakdown of skin  -Pt. With ischemic ulcer to lateral/posterior calf related to ischemia. Notable for surrounding erythema/cellulitis. Pt. Afebrile without signs of sepsis. Plan to continue doxycycline started as outpatient and continue to monitor for clinical improvement  -Wound care  consult      Peripheral arterial disease  -Continue pletal and statin. ASA per cards recommendations      Renal mass  -R renal mass 6.5 cm, concerning for possible malignancy. Pt. Missed appointment with urology 9/12. Refer to urology at discharge, can discuss inpatient if needed      Mixed hyperlipidemia  -Continue home statin      Primary hypertension  -Continue home losartan-HCTZ        VTE Risk Mitigation (From admission, onward)         Ordered     IP VTE HIGH RISK PATIENT  Once         09/15/22 2222     heparin 25,000 units in dextrose 5% (100 units/ml) IV bolus from bag - ADDITIONAL PRN BOLUS - 60 units/kg  As needed (PRN)        Question:  Heparin Infusion Adjustment (DO NOT MODIFY ANSWER)  Answer:  [email or web address]\epic\Images\Pharmacy\HeparinInfusions\heparin HIGH INTENSITY nomogram for [facility] [id number].pdf    09/15/22 1501     heparin 25,000 units in dextrose 5% (100 units/ml) IV bolus from bag - ADDITIONAL PRN BOLUS - 30 units/kg  As needed (PRN)        Question:  Heparin Infusion Adjustment (DO NOT MODIFY ANSWER)  Answer:  \\[email or web address]\epic\Images\Pharmacy\HeparinInfusions\heparin HIGH INTENSITY nomogram for [facility] [id number].pdf    09/15/22 1501     heparin 25,000 units in dextrose 5% 250 mL (100 units/mL) infusion HIGH INTENSITY nomogram - [facility]  Continuous        Question Answer Comment   Heparin Infusion Adjustment (DO NOT MODIFY ANSWER) [email or web address]\epic\Images\Pharmacy\HeparinInfusions\heparin HIGH INTENSITY nomogram for [facility] [id number].pdf    Begin at (in units/kg/hr) 18        09/15/22 1501                   Hector Chicas MD  Department of Hospital Medicine   [facility] Surg

## 2022-09-16 NOTE — CONSULTS
[facility] Surg  Urology  Consult Note    Patient Name: Babatunde Singh  MRN: [id number]  Admission Date: 9/15/2022  Hospital Length of Stay: 1   Code Status: Full Code   Attending Provider: Mariya Turner MD   Consulting Provider: Brent Miller MD  Primary Care Physician: Esdras Ly MD  Principal Problem:Lower limb ischemia    Inpatient consult to Urology  Consult performed by: Brent Miller MD  Consult ordered by: Mariya Turner MD          Subjective:     HPI:  Babatunde Singh is a 73 yo M with PMHx severe PAD, HTN, HLD, and recently dx'd RLE DVT (9/9) who presents to the ED with right lower limb ischemia. Urology consulted for CTA finidings c/w known RUP renal mass measuring 6.5cm in size with contrast enhancement. Tumor initially incidentally found on CTA in July of 2022. Denies right flank pain. States he has had a hx of gross hematuria, last time roughly in July of 2022. Endorses a current smoking hx, 50+ pys. Denies family hx of  malignancies. Denies difficulty voiding, dysuria and recent fevers, chills, and n/v/d.       Past Medical History:   Diagnosis Date    Hypertension     Peripheral arterial disease        Past Surgical History:   Procedure Laterality Date    EXCISION OF HYDROCELE Left 4/26/2022    Procedure: HYDROCELECTOMY;  Surgeon: Damion Roberts MD;  Location: [facility];  Service: Urology;  Laterality: Left;    KNEE SURGERY  1972       Review of patient's allergies indicates:  No Known Allergies    Family History       Problem Relation (Age of Onset)    Hypertension Mother, Father            Tobacco Use    Smoking status: Every Day     Packs/day: 0.50     Years: 50.00     Pack years: 25.00     Types: Cigarettes    Smokeless tobacco: Never   Substance and Sexual Activity    Alcohol use: Never    Drug use: Never    Sexual activity: Yes     Partners: Female     Comment: wife       Review of Systems   Constitutional:  Negative for chills, fatigue and fever.   HENT: Negative.      Respiratory: Negative.     Cardiovascular: Negative.    Gastrointestinal:  Negative for constipation, nausea and vomiting.   Genitourinary:  Negative for dysuria, flank pain, hematuria and testicular pain.   Musculoskeletal:  Negative for arthralgias and back pain.   Skin:  Positive for color change and pallor.   Neurological: Negative.  Negative for seizures, speech difficulty and headaches.   Psychiatric/Behavioral:  Negative for agitation and confusion.      Objective:     Temp:  [97 °F (36.1 °C)-98.8 °F (37.1 °C)] 97 °F (36.1 °C)  Pulse:  [72-92] 72  Resp:  [2-20] 18  SpO2:  [95 %-100 %] 99 %  BP: (147-168)/(64-85) 164/72     Body mass index is 19.21 kg/m².           Drains       None                   Physical Exam  Vitals and nursing note reviewed.   Constitutional:       Appearance: Normal appearance.   HENT:      Head: Atraumatic.      Nose: Nose normal.   Eyes:      Extraocular Movements: Extraocular movements intact.      Pupils: Pupils are equal, round, and reactive to light.   Cardiovascular:      Rate and Rhythm: Normal rate.   Pulmonary:      Effort: Pulmonary effort is normal.   Abdominal:      General: Abdomen is flat.   Musculoskeletal:         General: Normal range of motion.      Cervical back: Normal range of motion.   Neurological:      General: No focal deficit present.      Mental Status: He is alert and oriented to person, place, and time. Mental status is at baseline.   Psychiatric:         Mood and Affect: Mood normal.         Behavior: Behavior normal.         Thought Content: Thought content normal.         Judgment: Judgment normal.       Significant Labs:    BMP:  Recent Labs   Lab 09/09/22  1917 09/15/22  1500 09/16/22  0729    139 138   K 3.7 3.6 3.9    108 110   CO2 20* 26 23   BUN 23 13 15   CREATININE 0.8 0.8 0.7   CALCIUM 9.0 9.1 8.8       CBC:  Recent Labs   Lab 09/09/22  1917 09/15/22  1500 09/16/22  0729   WBC 15.27* 12.33 10.70   HGB 10.5* 10.7* 9.8*   HCT 32.8*  33.8* 31.3*   * 493* 227       All pertinent labs results from the past 24 hours have been reviewed.    Significant Imaging:  All pertinent imaging results/findings from the past 24 hours have been reviewed.                      Assessment and Plan:     Renal mass  Babatunde Singh is a 72 y.o. male with an extensive past medical hx with known hx of RUP renal mass suspicious for RCC.    - Consult d/w Staff Urologist  - Plan to see his established Urologist, Dr. Roberts, on 9/22/22 to discuss outpt w/u for renal mass  - No plans for urologic interventions while inpt  - All remaining care per primary team        VTE Risk Mitigation (From admission, onward)         Ordered     IP VTE HIGH RISK PATIENT  Once         09/15/22 2222     heparin 25,000 units in dextrose 5% (100 units/ml) IV bolus from bag - ADDITIONAL PRN BOLUS - 60 units/kg  As needed (PRN)        Question:  Heparin Infusion Adjustment (DO NOT MODIFY ANSWER)  Answer:  \\[email or web address]\epic\Images\Pharmacy\HeparinInfusions\heparin HIGH INTENSITY nomogram for [facility] [id number].pdf    09/15/22 1501     heparin 25,000 units in dextrose 5% (100 units/ml) IV bolus from bag - ADDITIONAL PRN BOLUS - 30 units/kg  As needed (PRN)        Question:  Heparin Infusion Adjustment (DO NOT MODIFY ANSWER)  Answer:  \\[email or web address]\epic\Images\Pharmacy\HeparinInfusions\heparin HIGH INTENSITY nomogram for [facility] [id number].pdf    09/15/22 1501     heparin 25,000 units in dextrose 5% 250 mL (100 units/mL) infusion HIGH INTENSITY nomogram - [facility]  Continuous        Question Answer Comment   Heparin Infusion Adjustment (DO NOT MODIFY ANSWER) [email or web address]\epic\Images\Pharmacy\HeparinInfusions\heparin HIGH INTENSITY nomogram for [facility] [id number].pdf    Begin at (in units/kg/hr) 18        09/15/22 1501                Thank you for your consult. I will sign off. Please contact us if you have any additional questions.    Bretn Miller MD  Urology  [facility] - Med Surg

## 2022-09-16 NOTE — HPI
"71 yo M with PMHx severe PAD, HTN, HLD, and recently dx'd RLE DVT (9/9) who presents to the ED complaining of worsening R sided leg pain. Pt. Initally developed R lateral leg "blisters" on 9/9, and he presented to the ED. U/S at the time was notable for thrombosis with the right anterior tibial and peroneal veins. Pt. Was discharged home with xarelto prescription, but he was unable to receive this medication until 2 days ago due to issues with the pharmacy. At his f/u appointment, the patient reported worsening redness, swelling and ulceration to his R lateral lower extremity. Pt. Pulses were not able to be palpated, so he was referred to the ED. Pt. Reports tenderness and pain at site, bnut he denies any systemic symptoms such as fevers, chills, or generalized malaise.  "

## 2022-09-16 NOTE — SUBJECTIVE & OBJECTIVE
Interval History: Patient seen at bedside. O heparin drip. Consulted urology for evaluation of R renal mass. Recommend OP work up.     Review of Systems   Constitutional:  Negative for activity change, chills, fever and unexpected weight change.   HENT:  Negative for congestion and sore throat.    Respiratory:  Negative for cough, shortness of breath and wheezing.    Cardiovascular:  Positive for leg swelling. Negative for chest pain and palpitations.   Gastrointestinal:  Negative for abdominal pain, blood in stool, nausea and vomiting.   Genitourinary:  Negative for dysuria and hematuria.   Musculoskeletal:  Positive for arthralgias. Negative for neck pain.   Skin:  Positive for color change, rash and wound.   Neurological:  Negative for dizziness, seizures and numbness.   Psychiatric/Behavioral:  Negative for hallucinations and suicidal ideas.    Objective:     Vital Signs (Most Recent):  Temp: 98.2 °F (36.8 °C) (09/16/22 1203)  Pulse: 88 (09/16/22 1203)  Resp: 18 (09/16/22 1203)  BP: 123/62 (09/16/22 1203)  SpO2: 96 % (09/16/22 1203) Vital Signs (24h Range):  Temp:  [97 °F (36.1 °C)-98.8 °F (37.1 °C)] 98.2 °F (36.8 °C)  Pulse:  [72-88] 88  Resp:  [2-20] 18  SpO2:  [95 %-99 %] 96 %  BP: (123-164)/(62-85) 123/62     Weight: 54 kg (119 lb)  Body mass index is 19.21 kg/m².  No intake or output data in the 24 hours ending 09/16/22 1443   Physical Exam  Vitals reviewed.   Constitutional:       General: He is not in acute distress.     Appearance: He is well-developed.   HENT:      Head: Normocephalic and atraumatic.   Eyes:      Extraocular Movements: Extraocular movements intact.      Pupils: Pupils are equal, round, and reactive to light.   Neck:      Vascular: No JVD.      Trachea: No tracheal deviation.   Cardiovascular:      Rate and Rhythm: Normal rate and regular rhythm.      Pulses: Decreased pulses.      Heart sounds: No murmur heard.    No friction rub. No gallop.   Pulmonary:      Effort: No respiratory  distress.      Breath sounds: Normal breath sounds. No wheezing or rales.   Abdominal:      General: Bowel sounds are normal. There is no distension.      Palpations: Abdomen is soft. There is no mass.      Tenderness: There is no abdominal tenderness.   Musculoskeletal:         General: No deformity.      Cervical back: Neck supple.      Right lower leg: Edema present.   Lymphadenopathy:      Cervical: No cervical adenopathy.   Skin:     General: Skin is dry.      Findings: No rash.      Comments: RLE with ulceration over lateral/posterior calf with surrounding erythema.   Neurological:      Mental Status: He is alert and oriented to person, place, and time.       MELD-Na score: 8 at 9/16/2022  7:29 AM  MELD score: 8 at 9/16/2022  7:29 AM  Calculated from:  Serum Creatinine: 0.7 mg/dL (Using min of 1 mg/dL) at 9/16/2022  7:29 AM  Serum Sodium: 138 mmol/L (Using max of 137 mmol/L) at 9/16/2022  7:29 AM  Total Bilirubin: 0.3 mg/dL (Using min of 1 mg/dL) at 9/16/2022  7:29 AM  INR(ratio): 1.2 at 9/15/2022  3:00 PM  Age: 72 years    Significant Labs:  CBC:  Recent Labs   Lab 09/15/22  1500 09/16/22  0729   WBC 12.33 10.70   HGB 10.7* 9.8*   HCT 33.8* 31.3*   * 227     CMP:  Recent Labs   Lab 09/15/22  1500 09/16/22  0729    138   K 3.6 3.9    110   CO2 26 23   * 85   BUN 13 15   CREATININE 0.8 0.7   CALCIUM 9.1 8.8   PROT 6.0 5.4*   ALBUMIN 3.3* 2.9*   BILITOT 0.4 0.3   ALKPHOS 59 56   AST 20 19   ALT 18 17   ANIONGAP 5* 5*     PTINR:  Recent Labs   Lab 09/15/22  1500   INR 1.2     .sf

## 2022-09-16 NOTE — ASSESSMENT & PLAN NOTE
-Pt. With ischemic ulcer to lateral/posterior calf related to ischemia. Notable for surrounding erythema/cellulitis. Pt. Afebrile without signs of sepsis. Plan to continue doxycycline started as outpatient and continue to monitor for clinical improvement  -Wound care consult

## 2022-09-16 NOTE — ED NOTES
Patient identifiers for Babatunde Singh 72 y.o. male checked and correct.  Chief Complaint   Patient presents with    Leg Swelling     R leg pain + dvt, on blood thinners, called from clinic to come to er, toes cool and bluish, not able to doppler pedal and post tibial on r foot, very painful with any movement     Past Medical History:   Diagnosis Date    Hypertension     Peripheral arterial disease      Allergies reported: Review of patient's allergies indicates:  No Known Allergies      LOC: Patient is awake, alert, and aware of environment with an appropriate affect. Patient is oriented x 4 and speaking appropriately.  APPEARANCE: Patient resting comfortably and in no acute distress. Patient is clean and well groomed, patient's clothing is properly fastened.    SKIN: The skin is warm and dry. Patient has normal skin turgor and moist mucus membranes.   MUSKULOSKELETAL: Patient is moving all extremities well, no obvious deformities noted. LLE pulse dopplered, diminished.   RESPIRATORY: Airway is open and patent. Respirations are spontaneous and non-labored with normal effort and rate.  CARDIAC: Patient has a normal rate and rhythm.  +2 edema noted in LLE  ABDOMEN: No distention noted. Soft and non-tender upon palpation.  NEUROLOGICAL: pupils 3 mm, PERRL. Facial expression is symmetrical. Hand grasps are equal bilaterally. Normal sensation in all extremities when touched with finger.

## 2022-09-16 NOTE — ASSESSMENT & PLAN NOTE
-Pt. With worsening ulceration and erytehma. Pulses dopperable in ED. CTA shows Scattered regions of luminal narrowing and occlusion throughout the arterial vasculature  -Interventional cards consulted, recommend heparin gtt, no current plans for intervention  -Continue heparin gtt, pletal, statin and ASA. Plan to transition back to PO DOAC (xarelto or eliquis) at discharge. Pt. Had only been taking for 2 days prior to presentation so do not consider to be failure

## 2022-09-16 NOTE — SUBJECTIVE & OBJECTIVE
Past Medical History:   Diagnosis Date    Hypertension     Peripheral arterial disease        Past Surgical History:   Procedure Laterality Date    EXCISION OF HYDROCELE Left 4/26/2022    Procedure: HYDROCELECTOMY;  Surgeon: Damion Roberts MD;  Location: [facility];  Service: Urology;  Laterality: Left;    KNEE SURGERY  1972       Review of patient's allergies indicates:  No Known Allergies    Family History       Problem Relation (Age of Onset)    Hypertension Mother, Father            Tobacco Use    Smoking status: Every Day     Packs/day: 0.50     Years: 50.00     Pack years: 25.00     Types: Cigarettes    Smokeless tobacco: Never   Substance and Sexual Activity    Alcohol use: Never    Drug use: Never    Sexual activity: Yes     Partners: Female     Comment: wife       Review of Systems   Constitutional:  Negative for chills, fatigue and fever.   HENT: Negative.     Respiratory: Negative.     Cardiovascular: Negative.    Gastrointestinal:  Negative for constipation, nausea and vomiting.   Genitourinary:  Negative for dysuria, flank pain, hematuria and testicular pain.   Musculoskeletal:  Negative for arthralgias and back pain.   Skin:  Positive for color change and pallor.   Neurological: Negative.  Negative for seizures, speech difficulty and headaches.   Psychiatric/Behavioral:  Negative for agitation and confusion.      Objective:     Temp:  [97 °F (36.1 °C)-98.8 °F (37.1 °C)] 97 °F (36.1 °C)  Pulse:  [72-92] 72  Resp:  [2-20] 18  SpO2:  [95 %-100 %] 99 %  BP: (147-168)/(64-85) 164/72     Body mass index is 19.21 kg/m².           Drains       None                   Physical Exam  Vitals and nursing note reviewed.   Constitutional:       Appearance: Normal appearance.   HENT:      Head: Atraumatic.      Nose: Nose normal.   Eyes:      Extraocular Movements: Extraocular movements intact.      Pupils: Pupils are equal, round, and reactive to light.   Cardiovascular:      Rate and Rhythm: Normal rate.    Pulmonary:      Effort: Pulmonary effort is normal.   Abdominal:      General: Abdomen is flat.   Musculoskeletal:         General: Normal range of motion.      Cervical back: Normal range of motion.   Neurological:      General: No focal deficit present.      Mental Status: He is alert and oriented to person, place, and time. Mental status is at baseline.   Psychiatric:         Mood and Affect: Mood normal.         Behavior: Behavior normal.         Thought Content: Thought content normal.         Judgment: Judgment normal.       Significant Labs:    BMP:  Recent Labs   Lab 09/09/22  1917 09/15/22  1500 09/16/22  0729    139 138   K 3.7 3.6 3.9    108 110   CO2 20* 26 23   BUN 23 13 15   CREATININE 0.8 0.8 0.7   CALCIUM 9.0 9.1 8.8       CBC:  Recent Labs   Lab 09/09/22  1917 09/15/22  1500 09/16/22  0729   WBC 15.27* 12.33 10.70   HGB 10.5* 10.7* 9.8*   HCT 32.8* 33.8* 31.3*   * 493* 227       All pertinent labs results from the past 24 hours have been reviewed.    Significant Imaging:  All pertinent imaging results/findings from the past 24 hours have been reviewed.

## 2022-09-16 NOTE — ASSESSMENT & PLAN NOTE
- R renal mass 6.5 cm, concerning for possible malignancy. Pt. Missed appointment with urology 9/12.  - Consulted urology. Plan to see his established Urologist, Dr. Roberts, on 9/22/22 to discuss outpt w/u for renal mass. No plans for urologic interventions while inpt

## 2022-09-16 NOTE — HPI
Babatunde Singh is a 73 yo M with PMHx severe PAD, HTN, HLD, and recently dx'd RLE DVT (9/9) who presents to the ED with right lower limb ischemia. Urology consulted for CTA finidings c/w known RUP renal mass measuring 6.5cm in size with contrast enhancement. Tumor initially incidentally found on CTA in July of 2022. Denies right flank pain. States he has had a hx of gross hematuria, last time roughly in July of 2022. Endorses a current smoking hx, 50+ pys. Denies family hx of  malignancies. Denies difficulty voiding, dysuria and recent fevers, chills, and n/v/d.

## 2022-09-16 NOTE — ASSESSMENT & PLAN NOTE
-R renal mass 6.5 cm, concerning for possible malignancy. Pt. Missed appointment with urology 9/12. Refer to urology at discharge, can discuss inpatient if needed

## 2022-09-16 NOTE — ASSESSMENT & PLAN NOTE
Babatunde Singh is a 72 y.o. male with an extensive past medical hx with known hx of RUP renal mass suspicious for RCC.    - Consult d/w Staff Urologist  - Plan to see his established Urologist, Dr. Roberts, on 9/22/22 to discuss outpt w/u for renal mass  - No plans for urologic interventions while inpt  - All remaining care per primary team

## 2022-09-16 NOTE — PLAN OF CARE
[facility] - Med Surg  Initial Discharge Assessment       Primary Care Provider: Esdras Ly MD    Admission Diagnosis: DVT (deep venous thrombosis) [I82.409]    Admission Date: 9/15/2022  Expected Discharge Date:     Discharge Barriers Identified: None    Payor: [employer] / Plan: [employer] 65 / Product Type: Medicare Advantage /     Extended Emergency Contact Information  Primary Emergency Contact: Cindi Matos  Mobile Phone: [phone]  Relation: Daughter  Preferred language: English   needed? No    Discharge Plan A: Home with family  Discharge Plan B: [id number] [place], [place] - [address] [address]  Phone: [phone] Fax: [phone]      Initial Assessment (most recent)       Adult Discharge Assessment - 09/16/22 1434          Discharge Assessment    Assessment Type Discharge Planning Assessment     Confirmed/corrected address, phone number and insurance Yes     Confirmed Demographics Correct on Facesheet     Source of Information patient     Does patient/caregiver understand observation status Yes     Communicated LAZ with patient/caregiver Yes     Reason For Admission DVT     Lives With spouse     Facility Arrived From: Home     Do you expect to return to your current living situation? Yes     Do you have help at home or someone to help you manage your care at home? Yes     Who are your caregiver(s) and their phone number(s)? Cindi marianna      Prior to hospitilization cognitive status: Alert/Oriented;No Deficits     Current cognitive status: No Deficits;Alert/Oriented     Walking or Climbing Stairs Difficulty none     Dressing/Bathing Difficulty none     Equipment Currently Used at Home cane, straight     Readmission within 30 days? No     Patient currently being followed by outpatient case management? No     Do you currently have service(s) that help you  manage your care at home? No     Do you take prescription medications? Yes     Do you have prescription coverage? Yes     Do you have any problems affording any of your prescribed medications? No     Is the patient taking medications as prescribed? yes     Who is going to help you get home at discharge? Family, [employer] member     How do you get to doctors appointments? family or friend will provide     Are you on dialysis? No     Do you take coumadin? No     Discharge Plan A Home with family     Discharge Plan B Home Health     DME Needed Upon Discharge  none     Discharge Plan discussed with: Patient     Discharge Barriers Identified None

## 2022-09-16 NOTE — ED PROVIDER NOTES
Encounter Date: 9/15/2022       History     Chief Complaint   Patient presents with    Leg Swelling     R leg pain + dvt, on blood thinners, called from clinic to come to er, toes cool and bluish, not able to doppler pedal and post tibial on r foot, very painful with any movement     HPI  Patient is a 72-year-old male with history of peripheral arterial disease followed by cardiology and recently diagnosed right lower extremity DVT on ultrasound from 09/09 who presents from clinic for concern for right lower extremity limb ischemia.  Patient was recently started on Xarelto.  He started the prescription on this past Tuesday.  He states that he has had worsening pain in the right lower extremity.  He states that the swelling has stayed about the same.  He does state that he initially had a blister type lesion on his calf but now he has a large wound that is new.  No fevers or chills.  He states that he has decreased sensation and difficulty the moving his foot due to pain.  At the clinic, it was reported that the patient's foot was cool to the touch, painful with movement and they were not able to palpate a pulse or get a dopplerable signal.    Review of patient's allergies indicates:  No Known Allergies  Past Medical History:   Diagnosis Date    Hypertension     Peripheral arterial disease      Past Surgical History:   Procedure Laterality Date    EXCISION OF HYDROCELE Left 4/26/2022    Procedure: HYDROCELECTOMY;  Surgeon: Damion Roberts MD;  Location: [facility];  Service: Urology;  Laterality: Left;    KNEE SURGERY  1972     Family History   Problem Relation Age of Onset    Hypertension Mother     Hypertension Father      Social History     Tobacco Use    Smoking status: Every Day     Packs/day: 0.50     Years: 50.00     Pack years: 25.00     Types: Cigarettes    Smokeless tobacco: Never   Substance Use Topics    Alcohol use: Never    Drug use: Never     Review of Systems  Constitutional: No fever, no  chills  HENT: No sore throat  Eyes: No eye pain  Respiratory: No shortness of breath  Cardiovascular: No chest pain, positive right lower extremity pain and swelling  Gastrointestinal: No abdominal pain  Genitourinary: No dysuria  Musculoskeletal: No back pain  Integumentary:  Positive right lower extremity wound   Neurological: No headache  Psychiatric: No agitation     Physical Exam     Initial Vitals [09/15/22 1422]   BP Pulse Resp Temp SpO2   (!) 158/72 91 18 97.8 °F (36.6 °C) 100 %      MAP       --         Physical Exam  Constitutional: No acute distress  Respiratory: Non-labored, lungs ctab  Cardiovascular: Well perfused, rrr  Gastrointestinal: Soft, non-tender, non-distended  Integumentary: Right lower extremity with swelling in the calf, erythema present, ulcerated appearing wound over the right posterior calf, unable to palpate a pulse in the right foot, DP and PT without dopplerable signal, decreased sensation light touch in the distal foot on the right as compared to the left, decreased range of motion of the foot and toes on the right as compared to the left  Musculoskeletal: No deformity  Neurological: Awake and alert  Psychiatric: Cooperative         ED Course   Procedures  Labs Reviewed   CBC W/ AUTO DIFFERENTIAL - Abnormal; Notable for the following components:       Result Value    RBC 4.03 (*)     Hemoglobin 10.7 (*)     Hematocrit 33.8 (*)     MCH 26.6 (*)     MCHC 31.7 (*)     RDW 19.9 (*)     Platelets 493 (*)     MPV 8.9 (*)     All other components within normal limits    Narrative:     Release to patient->Immediate   COMPREHENSIVE METABOLIC PANEL - Abnormal; Notable for the following components:    Glucose 125 (*)     Albumin 3.3 (*)     Anion Gap 5 (*)     All other components within normal limits    Narrative:     Release to patient->Immediate   APTT - Abnormal; Notable for the following components:    aPTT 32.2 (*)     All other components within normal limits    Narrative:     Release to  patient->Immediate   LACTIC ACID, PLASMA    Narrative:     Release to patient->Immediate   PROTIME-INR    Narrative:     Release to patient->Immediate   HIV 1 / 2 ANTIBODY    Narrative:     Release to patient->Immediate   HEPATITIS C ANTIBODY    Narrative:     Release to patient->Immediate   APTT          Imaging Results              CTA Runoff ABD PEL Bilat Lower Ext (Final result)  Result time 09/15/22 18:06:02      Final result by Cb Diaz MD (09/15/22 18:06:02)                   Impression:      1. Pulmonary emphysematous change noting there may be superimposed sequela of small airways disease or small vessel disease.  Correlation is advised.  2. Enhancing right renal lesion, similar to the previous exam, concerning for malignancy, correlation recommended.  3. Cholelithiasis without secondary findings to suggest acute cholecystitis.  4. Colonic diverticulosis without diverticulitis.  5. Scattered regions of luminal narrowing and occlusion throughout the arterial vasculature as described in detail above.  Please see above.  6. Additional findings above.      Electronically signed by: Cb Diaz MD  Date:    09/15/2022  Time:    18:06               Narrative:    EXAMINATION:  CTA RUNOFF ABD PEL BILAT LOWER EXT    CLINICAL HISTORY:  Claudication or leg ischemia;    TECHNIQUE:  Axial images of the abdomen and pelvis were obtained at 1.25 mm intervals during administration of 100 cc omni 350 IV contrast following the CTA runoff abdomen and pelvis bilateral lower extremity protocol.  Coronal and sagittal reformatted images were reviewed, pre contrast images were reviewed, and coronal and sagittal MIPS reformatted images were reviewed.    COMPARISON:  CT 08/15/2022, CTA runoff 08/03/2022    FINDINGS:  Images of the lower thorax are remarkable for bilateral dependent atelectasis.  There is bilateral emphysematous change.  Scattered regions of mosaic attenuation may reflect sequela of small airways disease  or small vessel disease.    Allowing for bolus timing, the pancreas is unremarkable.  The adrenal glands are remarkable for a subcentimeter low attenuating lesion within the left adrenal gland, nonspecific.  There is an enhancing rounded focus within the splenic parenchyma measuring 1.3 cm, nonspecific, possibly hemangioma.  There is cholelithiasis without secondary findings to suggest acute cholecystitis.  The stomach is distended with ingested liquid without wall thickening.  The portal vein, splenic vein and SMV all are patent.  No significant abdominal lymphadenopathy.    The kidneys enhance symmetrically without hydronephrosis or nephrolithiasis noting bilateral vascular calcification.  There is an enhancing lesion within the upper pole/interpolar region of the right kidney measuring 6.5 cm, stable.  The bilateral ureters are unable to be followed in their entirety to the urinary bladder, no definite calculi seen or secondary findings to suggest obstructive uropathy.  The urinary bladder is distended without wall thickening.  The prostate is enlarged.    There are several scattered colonic diverticula without convincing surrounding inflammation to suggest diverticulitis.  There is moderate to large amount of stool throughout the colon.  The terminal ileum is unremarkable.  The appendix is unremarkable.  Allowing for bolus timing, the small bowel is grossly unremarkable.  There are several scattered shotty periaortic and paracaval lymph nodes.  No focal organized pelvic fluid collection.    There is osteopenia.  Degenerative changes are noted of the bilateral femoroacetabular joints, sacroiliac joints, pubic symphysis, and spine.    Vascular details: There is atherosclerotic plaque and calcification of the aorta and its branches.  There is approximately 50% narrowing at the origin of the celiac artery noting poststenotic dilation without occlusion.  The SMA is patent allowing for motion artifact.  The bilateral  renal arteries are patent noting at least 50% stenosis at the origin of the right renal artery.  The TODD is patent.  There is significant concentric plaque involving the infrarenal abdominal aorta narrowing its lumen to a proximally 5 mm just proximal to the iliac bifurcation peer there is occlusion of the right common iliac artery with a few scattered regions of reconstitution.  There is occlusion of the right internal iliac artery noting the vessel is partially reconstituted distally however becomes again occluded at the level of the right common femoral artery.  The right internal iliac artery is intermittently occluded and reconstituted.  The appearance is similar to the previous examination.  There is thread-like appearance of the left common iliac artery noting distal occlusion just proximal to the bifurcation.  The external iliac artery is occluded with distal reconstitution just proximal to the origin of the left common femoral artery which is occluded proximally.    Right lower extremity:    The right common femoral artery is intermittently occluded and reconstituted although for the most part remains occluded.  The right superficial femoral artery is occluded proximally however is reconstituted distally just proximal to the trifurcation vessels.  The popliteal artery is patent.  The trifurcation vessels are patent proximally with 2 vessel runoff.  The profundus femoral artery is patent.    Left lower extremity: The distal left common femoral artery is partially reconstituted although bowel comes occluded just proximal to the origin of the superficial femoral artery.  The superficial femoral artery is patent noting scattered regions of occlusion and reconstitution noting overall, the vessel caliber is diminished.  The popliteal artery is patent.  There is 2 vessel runoff to the foot.                                       Medications   heparin 25,000 units in dextrose 5% 250 mL (100 units/mL) infusion HIGH  INTENSITY nomogram - [facility] (18 Units/kg/hr × 54 kg Intravenous New Bag 9/15/22 1642)   heparin 25,000 units in dextrose 5% (100 units/ml) IV bolus from bag - ADDITIONAL PRN BOLUS - 60 units/kg (has no administration in time range)   heparin 25,000 units in dextrose 5% (100 units/ml) IV bolus from bag - ADDITIONAL PRN BOLUS - 30 units/kg (has no administration in time range)   heparin 25,000 units in dextrose 5% (100 units/ml) IV bolus from bag INITIAL BOLUS (4,320 Units Intravenous Bolus from Bag 9/15/22 1643)   iohexoL (OMNIPAQUE 350) injection 100 mL (100 mLs Intravenous Given 9/15/22 1748)     Medical Decision Making:   History:   Old Records Summarized: records from clinic visits and records from previous admission(s).  Clinical Tests:   Lab Tests: Ordered and Reviewed  Radiological Study: Ordered and Reviewed  Medical Tests: Ordered and Reviewed  ED Management:  Patient presents for concern for right lower extremity limb ischemia.  He has no palpable or dopplerable signal in the distal right lower extremity. Recently diagnosed with DVT but also has peripheral arterial disease.  Labs and heparin drip ordered.  Patient follows with cardiology 1st peripheral arterial disease so Cardiology was consulted.  CTA was ordered per recommendations and reviewed.  Cardiology would like the patient to continue on heparin drip and be admitted to hospitalist Medicine.  Cardiology to continue to follow along.  Patient was accepted by hospitalist Medicine.  Other:   I have discussed this case with another health care provider.           ED Course as of 09/15/22 1932   Thu Sep 15, 2022   1439 DVT study on 9/9 with thrombosis within the right anterior tibial and peroneal veins. [NN]      ED Course User Index  [NN] Marci Domingo MD                 Clinical Impression:   Final diagnoses:  [I82.409] DVT (deep venous thrombosis)        ED Disposition Condition    Admit Stable                Marci Domingo MD  09/15/22 1940

## 2022-09-17 LAB
APTT BLDCRRT: 40.2 SEC (ref 21–32)
APTT BLDCRRT: 42.5 SEC (ref 21–32)

## 2022-09-17 PROCEDURE — 25000003 PHARM REV CODE 250: Performed by: STUDENT IN AN ORGANIZED HEALTH CARE EDUCATION/TRAINING PROGRAM

## 2022-09-17 PROCEDURE — G0378 HOSPITAL OBSERVATION PER HR: HCPCS

## 2022-09-17 PROCEDURE — 63600175 PHARM REV CODE 636 W HCPCS: Performed by: HOSPITALIST

## 2022-09-17 PROCEDURE — 85730 THROMBOPLASTIN TIME PARTIAL: CPT | Performed by: STUDENT IN AN ORGANIZED HEALTH CARE EDUCATION/TRAINING PROGRAM

## 2022-09-17 PROCEDURE — 94761 N-INVAS EAR/PLS OXIMETRY MLT: CPT

## 2022-09-17 PROCEDURE — 99232 PR SUBSEQUENT HOSPITAL CARE,LEVL II: ICD-10-PCS | Mod: ,,, | Performed by: STUDENT IN AN ORGANIZED HEALTH CARE EDUCATION/TRAINING PROGRAM

## 2022-09-17 PROCEDURE — 63600175 PHARM REV CODE 636 W HCPCS: Performed by: STUDENT IN AN ORGANIZED HEALTH CARE EDUCATION/TRAINING PROGRAM

## 2022-09-17 PROCEDURE — 25000003 PHARM REV CODE 250: Performed by: HOSPITALIST

## 2022-09-17 PROCEDURE — 11000001 HC ACUTE MED/SURG PRIVATE ROOM

## 2022-09-17 PROCEDURE — 36415 COLL VENOUS BLD VENIPUNCTURE: CPT | Performed by: STUDENT IN AN ORGANIZED HEALTH CARE EDUCATION/TRAINING PROGRAM

## 2022-09-17 PROCEDURE — 96366 THER/PROPH/DIAG IV INF ADDON: CPT

## 2022-09-17 PROCEDURE — 96375 TX/PRO/DX INJ NEW DRUG ADDON: CPT

## 2022-09-17 PROCEDURE — 85730 THROMBOPLASTIN TIME PARTIAL: CPT | Mod: 91 | Performed by: STUDENT IN AN ORGANIZED HEALTH CARE EDUCATION/TRAINING PROGRAM

## 2022-09-17 PROCEDURE — 99232 SBSQ HOSP IP/OBS MODERATE 35: CPT | Mod: ,,, | Performed by: STUDENT IN AN ORGANIZED HEALTH CARE EDUCATION/TRAINING PROGRAM

## 2022-09-17 RX ADMIN — GABAPENTIN 900 MG: 300 CAPSULE ORAL at 09:09

## 2022-09-17 RX ADMIN — Medication 6 MG: at 10:09

## 2022-09-17 RX ADMIN — CILOSTAZOL 100 MG: 50 TABLET ORAL at 10:09

## 2022-09-17 RX ADMIN — TRAMADOL HYDROCHLORIDE 50 MG: 50 TABLET, COATED ORAL at 07:09

## 2022-09-17 RX ADMIN — ASPIRIN 81 MG CHEWABLE TABLET 81 MG: 81 TABLET CHEWABLE at 09:09

## 2022-09-17 RX ADMIN — DOXYCYCLINE HYCLATE 100 MG: 100 TABLET, COATED ORAL at 10:09

## 2022-09-17 RX ADMIN — ONDANSETRON 4 MG: 2 INJECTION INTRAMUSCULAR; INTRAVENOUS at 07:09

## 2022-09-17 RX ADMIN — ATORVASTATIN CALCIUM 80 MG: 40 TABLET, FILM COATED ORAL at 09:09

## 2022-09-17 RX ADMIN — TRAMADOL HYDROCHLORIDE 50 MG: 50 TABLET, COATED ORAL at 09:09

## 2022-09-17 RX ADMIN — GABAPENTIN 900 MG: 300 CAPSULE ORAL at 02:09

## 2022-09-17 RX ADMIN — ACETAMINOPHEN 650 MG: 325 TABLET ORAL at 07:09

## 2022-09-17 RX ADMIN — CILOSTAZOL 100 MG: 50 TABLET ORAL at 09:09

## 2022-09-17 RX ADMIN — MORPHINE SULFATE 15 MG: 15 TABLET ORAL at 11:09

## 2022-09-17 RX ADMIN — DOXYCYCLINE HYCLATE 100 MG: 100 TABLET, COATED ORAL at 09:09

## 2022-09-17 RX ADMIN — HEPARIN SODIUM 16 UNITS/KG/HR: 5000 INJECTION INTRAVENOUS; SUBCUTANEOUS at 11:09

## 2022-09-17 RX ADMIN — GABAPENTIN 900 MG: 300 CAPSULE ORAL at 11:09

## 2022-09-17 RX ADMIN — LOSARTAN POTASSIUM AND HYDROCHLOROTHIAZIDE 1 TABLET: 50; 12.5 TABLET, FILM COATED ORAL at 09:09

## 2022-09-17 RX ADMIN — MORPHINE SULFATE 15 MG: 15 TABLET ORAL at 03:09

## 2022-09-17 RX ADMIN — EZETIMIBE 10 MG: 10 TABLET ORAL at 09:09

## 2022-09-17 NOTE — PROGRESS NOTES
"[facility]  Progress Note    Patient Name: Babatunde Singh  MRN: [id number]  Patient Class: IP- Inpatient   Admission Date: 9/15/2022  Length of Stay: 2 days  Attending Physician: Mariya Turner MD  Primary Care Provider: Esdras Ly MD        Subjective:     Principal Problem:Lower limb ischemia        HPI:  73 yo M with PMHx severe PAD, HTN, HLD, and recently dx'd RLE DVT (9/9) who presents to the ED complaining of worsening R sided leg pain. Pt. Initally developed R lateral leg "blisters" on 9/9, and he presented to the ED. U/S at the time was notable for thrombosis with the right anterior tibial and peroneal veins. Pt. Was discharged home with xarelto prescription, but he was unable to receive this medication until 2 days ago due to issues with the pharmacy. At his f/u appointment, the patient reported worsening redness, swelling and ulceration to his R lateral lower extremity. Pt. Pulses were not able to be palpated, so he was referred to the ED. Pt. Reports tenderness and pain at site, bnut he denies any systemic symptoms such as fevers, chills, or generalized malaise.      Overview/Hospital Course:  No notes on file    Interval History: No acute complaints today. Urology rec OP fu    Review of Systems   Constitutional:  Negative for activity change, chills, fever and unexpected weight change.   HENT:  Negative for congestion and sore throat.    Respiratory:  Negative for cough, shortness of breath and wheezing.    Cardiovascular:  Positive for leg swelling. Negative for chest pain and palpitations.   Gastrointestinal:  Negative for abdominal pain, blood in stool, nausea and vomiting.   Genitourinary:  Negative for dysuria and hematuria.   Musculoskeletal:  Positive for arthralgias. Negative for neck pain.   Skin:  Positive for color change, rash and wound.   Neurological:  Negative for dizziness, seizures and numbness.   Psychiatric/Behavioral:  Negative for hallucinations and " suicidal ideas.    Objective:     Vital Signs (Most Recent):  Temp: 99.1 °F (37.3 °C) (09/17/22 1155)  Pulse: 109 (09/17/22 1155)  Resp: 18 (09/17/22 1155)  BP: (!) 154/70 (09/17/22 1155)  SpO2: 95 % (09/17/22 1155)   Vital Signs (24h Range):  Temp:  [97 °F (36.1 °C)-99.1 °F (37.3 °C)] 99.1 °F (37.3 °C)  Pulse:  [] 109  Resp:  [16-20] 18  SpO2:  [94 %-95 %] 95 %  BP: (123-154)/(55-70) 154/70     Weight: 54 kg (119 lb)  Body mass index is 19.21 kg/m².    Intake/Output Summary (Last 24 hours) at 9/17/2022 1541  Last data filed at 9/17/2022 1527  Gross per 24 hour   Intake --   Output 1500 ml   Net -1500 ml      Physical Exam  Vitals reviewed.   Constitutional:       General: He is not in acute distress.     Appearance: He is well-developed.   HENT:      Head: Normocephalic and atraumatic.   Eyes:      Extraocular Movements: Extraocular movements intact.      Pupils: Pupils are equal, round, and reactive to light.   Neck:      Vascular: No JVD.      Trachea: No tracheal deviation.   Cardiovascular:      Rate and Rhythm: Normal rate and regular rhythm.      Pulses: Decreased pulses.      Heart sounds: No murmur heard.    No friction rub. No gallop.   Pulmonary:      Effort: No respiratory distress.      Breath sounds: Normal breath sounds. No wheezing or rales.   Abdominal:      General: Bowel sounds are normal. There is no distension.      Palpations: Abdomen is soft. There is no mass.      Tenderness: There is no abdominal tenderness.   Musculoskeletal:         General: No deformity.      Cervical back: Neck supple.      Right lower leg: Edema present.   Lymphadenopathy:      Cervical: No cervical adenopathy.   Skin:     General: Skin is dry.      Findings: No rash.      Comments: RLE with ulceration over lateral/posterior calf with surrounding erythema.   Neurological:      Mental Status: He is alert and oriented to person, place, and time.       MELD-Na score: 8 at 9/16/2022  7:29 AM  MELD score: 8 at  9/16/2022  7:29 AM  Calculated from:  Serum Creatinine: 0.7 mg/dL (Using min of 1 mg/dL) at 9/16/2022  7:29 AM  Serum Sodium: 138 mmol/L (Using max of 137 mmol/L) at 9/16/2022  7:29 AM  Total Bilirubin: 0.3 mg/dL (Using min of 1 mg/dL) at 9/16/2022  7:29 AM  INR(ratio): 1.2 at 9/15/2022  3:00 PM  Age: 72 years    Significant Labs:  CBC:  Recent Labs   Lab 09/16/22  0729   WBC 10.70   HGB 9.8*   HCT 31.3*        CMP:  Recent Labs   Lab 09/16/22  0729      K 3.9      CO2 23   GLU 85   BUN 15   CREATININE 0.7   CALCIUM 8.8   PROT 5.4*   ALBUMIN 2.9*   BILITOT 0.3   ALKPHOS 56   AST 19   ALT 17   ANIONGAP 5*       Assessment/Plan:      * Lower limb ischemia  -Pt. With worsening ulceration and erytehma. Pulses dopperable in ED. CTA shows Scattered regions of luminal narrowing and occlusion throughout the arterial vasculature  -Interventional cards consulted, recommend heparin gtt, no current plans for intervention  -Continue heparin gtt, pletal, statin and ASA. Plan to transition back to PO DOAC (xarelto or eliquis) at discharge. Pt. Had only been taking for 2 days prior to presentation so do not consider to be failure    Ulcer of right lower extremity, limited to breakdown of skin  -Pt. With ischemic ulcer to lateral/posterior calf related to ischemia. Notable for surrounding erythema/cellulitis. Pt. Afebrile without signs of sepsis. Plan to continue doxycycline started as outpatient and continue to monitor for clinical improvement  -Wound care consult    Peripheral arterial disease  -Continue pletal and statin. ASA per cards recommendations    Renal mass  - R renal mass 6.5 cm, concerning for possible malignancy. Pt. Missed appointment with urology 9/12.  - Consulted urology. Rec to see his established Urologist, Dr. Roberts, on 9/22/22 to discuss outpt w/u for renal mass. No plans for urologic interventions while inpt    Mixed hyperlipidemia  -Continue home statin    Primary hypertension  -Continue  home losartan-HCTZ    VTE Risk Mitigation (From admission, onward)         Ordered     IP VTE HIGH RISK PATIENT  Once         09/15/22 2222     heparin 25,000 units in dextrose 5% (100 units/ml) IV bolus from bag - ADDITIONAL PRN BOLUS - 60 units/kg  As needed (PRN)        Question:  Heparin Infusion Adjustment (DO NOT MODIFY ANSWER)  Answer:  [email or web address]\epic\Images\Pharmacy\HeparinInfusions\heparin HIGH INTENSITY nomogram for [facility] [id number]    09/15/22 1501     heparin 25,000 units in dextrose 5% (100 units/ml) IV bolus from bag - ADDITIONAL PRN BOLUS - 30 units/kg  As needed (PRN)        Question:  Heparin Infusion Adjustment (DO NOT MODIFY ANSWER)  Answer:  [email or web address]\epic\Images\Pharmacy\HeparinInfusions\heparin HIGH INTENSITY nomogram for [facility] [id number]    09/15/22 1501     heparin 25,000 units in dextrose 5% 250 mL (100 units/mL) infusion HIGH INTENSITY nomogram - [facility]  Continuous        Question Answer Comment   Heparin Infusion Adjustment (DO NOT MODIFY ANSWER) [email or web address]\epic\Images\Pharmacy\HeparinInfusions\heparin HIGH INTENSITY nomogram for [facility] [id number]    Begin at (in units/kg/hr) 18        09/15/22 1501                Discharge Planning   LAZ:      Code Status: Full Code   Is the patient medically ready for discharge?: No    Reason for patient still in hospital (select all that apply): Patient trending condition  Discharge Plan A: Home with family        Mariya Turner MD  Department of Hospital Medicine   [facility]

## 2022-09-17 NOTE — PLAN OF CARE
POC reviewed with patient, verbalized understanding, continues on heparin drip, therapeutic at this time, no s/s of bleeding, ambulates with cane without difficulty, PT/OT eval ordered, inquired about going to smoke, after some teaching pt decided not to go, refuses a nicotine patch at the time, VSS, will continue to monitor.    Problem: Adult Inpatient Plan of Care  Goal: Plan of Care Review  Outcome: Ongoing, Progressing  Goal: Patient-Specific Goal (Individualized)  Outcome: Ongoing, Progressing  Goal: Absence of Hospital-Acquired Illness or Injury  Outcome: Ongoing, Progressing  Goal: Optimal Comfort and Wellbeing  Outcome: Ongoing, Progressing  Goal: Readiness for Transition of Care  Outcome: Ongoing, Progressing     Problem: Fall Injury Risk  Goal: Absence of Fall and Fall-Related Injury  Outcome: Ongoing, Progressing     Problem: Impaired Wound Healing  Goal: Optimal Wound Healing  Outcome: Ongoing, Progressing

## 2022-09-17 NOTE — ASSESSMENT & PLAN NOTE
- R renal mass 6.5 cm, concerning for possible malignancy. Pt. Missed appointment with urology 9/12.  - Consulted urology. Rec to see his established Urologist, Dr. Roberts, on 9/22/22 to discuss outpt w/u for renal mass. No plans for urologic interventions while inpt

## 2022-09-17 NOTE — SUBJECTIVE & OBJECTIVE
Interval History: No acute complaints today. Urology rec OP fu    Review of Systems   Constitutional:  Negative for activity change, chills, fever and unexpected weight change.   HENT:  Negative for congestion and sore throat.    Respiratory:  Negative for cough, shortness of breath and wheezing.    Cardiovascular:  Positive for leg swelling. Negative for chest pain and palpitations.   Gastrointestinal:  Negative for abdominal pain, blood in stool, nausea and vomiting.   Genitourinary:  Negative for dysuria and hematuria.   Musculoskeletal:  Positive for arthralgias. Negative for neck pain.   Skin:  Positive for color change, rash and wound.   Neurological:  Negative for dizziness, seizures and numbness.   Psychiatric/Behavioral:  Negative for hallucinations and suicidal ideas.    Objective:     Vital Signs (Most Recent):  Temp: 99.1 °F (37.3 °C) (09/17/22 1155)  Pulse: 109 (09/17/22 1155)  Resp: 18 (09/17/22 1155)  BP: (!) 154/70 (09/17/22 1155)  SpO2: 95 % (09/17/22 1155)   Vital Signs (24h Range):  Temp:  [97 °F (36.1 °C)-99.1 °F (37.3 °C)] 99.1 °F (37.3 °C)  Pulse:  [] 109  Resp:  [16-20] 18  SpO2:  [94 %-95 %] 95 %  BP: (123-154)/(55-70) 154/70     Weight: 54 kg (119 lb)  Body mass index is 19.21 kg/m².    Intake/Output Summary (Last 24 hours) at 9/17/2022 1541  Last data filed at 9/17/2022 1527  Gross per 24 hour   Intake --   Output 1500 ml   Net -1500 ml      Physical Exam  Vitals reviewed.   Constitutional:       General: He is not in acute distress.     Appearance: He is well-developed.   HENT:      Head: Normocephalic and atraumatic.   Eyes:      Extraocular Movements: Extraocular movements intact.      Pupils: Pupils are equal, round, and reactive to light.   Neck:      Vascular: No JVD.      Trachea: No tracheal deviation.   Cardiovascular:      Rate and Rhythm: Normal rate and regular rhythm.      Pulses: Decreased pulses.      Heart sounds: No murmur heard.    No friction rub. No gallop.    Pulmonary:      Effort: No respiratory distress.      Breath sounds: Normal breath sounds. No wheezing or rales.   Abdominal:      General: Bowel sounds are normal. There is no distension.      Palpations: Abdomen is soft. There is no mass.      Tenderness: There is no abdominal tenderness.   Musculoskeletal:         General: No deformity.      Cervical back: Neck supple.      Right lower leg: Edema present.   Lymphadenopathy:      Cervical: No cervical adenopathy.   Skin:     General: Skin is dry.      Findings: No rash.      Comments: RLE with ulceration over lateral/posterior calf with surrounding erythema.   Neurological:      Mental Status: He is alert and oriented to person, place, and time.       MELD-Na score: 8 at 9/16/2022  7:29 AM  MELD score: 8 at 9/16/2022  7:29 AM  Calculated from:  Serum Creatinine: 0.7 mg/dL (Using min of 1 mg/dL) at 9/16/2022  7:29 AM  Serum Sodium: 138 mmol/L (Using max of 137 mmol/L) at 9/16/2022  7:29 AM  Total Bilirubin: 0.3 mg/dL (Using min of 1 mg/dL) at 9/16/2022  7:29 AM  INR(ratio): 1.2 at 9/15/2022  3:00 PM  Age: 72 years    Significant Labs:  CBC:  Recent Labs   Lab 09/16/22  0729   WBC 10.70   HGB 9.8*   HCT 31.3*        CMP:  Recent Labs   Lab 09/16/22  0729      K 3.9      CO2 23   GLU 85   BUN 15   CREATININE 0.7   CALCIUM 8.8   PROT 5.4*   ALBUMIN 2.9*   BILITOT 0.3   ALKPHOS 56   AST 19   ALT 17   ANIONGAP 5*

## 2022-09-18 VITALS
HEIGHT: 66 IN | HEART RATE: 96 BPM | DIASTOLIC BLOOD PRESSURE: 57 MMHG | BODY MASS INDEX: 19.13 KG/M2 | TEMPERATURE: 97 F | OXYGEN SATURATION: 95 % | WEIGHT: 119 LBS | RESPIRATION RATE: 18 BRPM | SYSTOLIC BLOOD PRESSURE: 120 MMHG

## 2022-09-18 LAB
APTT BLDCRRT: 41.5 SEC (ref 21–32)
APTT BLDCRRT: 48 SEC (ref 21–32)

## 2022-09-18 PROCEDURE — 25000003 PHARM REV CODE 250: Performed by: STUDENT IN AN ORGANIZED HEALTH CARE EDUCATION/TRAINING PROGRAM

## 2022-09-18 PROCEDURE — 99238 HOSP IP/OBS DSCHRG MGMT 30/<: CPT | Mod: ,,, | Performed by: STUDENT IN AN ORGANIZED HEALTH CARE EDUCATION/TRAINING PROGRAM

## 2022-09-18 PROCEDURE — G0378 HOSPITAL OBSERVATION PER HR: HCPCS

## 2022-09-18 PROCEDURE — 96366 THER/PROPH/DIAG IV INF ADDON: CPT

## 2022-09-18 PROCEDURE — 99238 PR HOSPITAL DISCHARGE DAY,<30 MIN: ICD-10-PCS | Mod: ,,, | Performed by: STUDENT IN AN ORGANIZED HEALTH CARE EDUCATION/TRAINING PROGRAM

## 2022-09-18 PROCEDURE — 36415 COLL VENOUS BLD VENIPUNCTURE: CPT | Performed by: STUDENT IN AN ORGANIZED HEALTH CARE EDUCATION/TRAINING PROGRAM

## 2022-09-18 PROCEDURE — 85730 THROMBOPLASTIN TIME PARTIAL: CPT | Mod: 91 | Performed by: STUDENT IN AN ORGANIZED HEALTH CARE EDUCATION/TRAINING PROGRAM

## 2022-09-18 PROCEDURE — 25000003 PHARM REV CODE 250: Performed by: HOSPITALIST

## 2022-09-18 RX ORDER — MORPHINE SULFATE 15 MG/1
15 TABLET ORAL EVERY 6 HOURS PRN
Refills: 0 | Status: CANCELLED | OUTPATIENT
Start: 2022-09-18

## 2022-09-18 RX ORDER — ACETAMINOPHEN 325 MG/1
650 TABLET ORAL EVERY 8 HOURS PRN
Qty: 20 TABLET | Refills: 0 | Status: SHIPPED | OUTPATIENT
Start: 2022-09-18 | End: 2023-05-24

## 2022-09-18 RX ORDER — DOXYCYCLINE 50 MG/1
50 CAPSULE ORAL EVERY 12 HOURS
Qty: 14 CAPSULE | Refills: 0 | Status: SHIPPED | OUTPATIENT
Start: 2022-09-18 | End: 2022-09-25

## 2022-09-18 RX ORDER — PROCHLORPERAZINE EDISYLATE 5 MG/ML
5 INJECTION INTRAMUSCULAR; INTRAVENOUS EVERY 6 HOURS PRN
Status: CANCELLED | OUTPATIENT
Start: 2022-09-18 | End: 2022-09-28

## 2022-09-18 RX ORDER — IPRATROPIUM BROMIDE AND ALBUTEROL SULFATE 2.5; .5 MG/3ML; MG/3ML
3 SOLUTION RESPIRATORY (INHALATION) EVERY 6 HOURS PRN
Qty: 75 ML | Refills: 0 | Status: CANCELLED | OUTPATIENT
Start: 2022-09-18 | End: 2023-09-18

## 2022-09-18 RX ORDER — TRAMADOL HYDROCHLORIDE 50 MG/1
50 TABLET ORAL EVERY 6 HOURS PRN
Status: CANCELLED | OUTPATIENT
Start: 2022-09-18

## 2022-09-18 RX ORDER — ONDANSETRON 2 MG/ML
4 INJECTION INTRAMUSCULAR; INTRAVENOUS EVERY 8 HOURS PRN
Status: CANCELLED | OUTPATIENT
Start: 2022-09-18

## 2022-09-18 RX ORDER — IBUPROFEN 200 MG
16 TABLET ORAL
Refills: 12 | Status: CANCELLED | OUTPATIENT
Start: 2022-09-18 | End: 2023-09-18

## 2022-09-18 RX ORDER — NAPROXEN SODIUM 220 MG/1
81 TABLET, FILM COATED ORAL DAILY
Qty: 30 TABLET | Refills: 0 | Status: ON HOLD | OUTPATIENT
Start: 2022-09-18 | End: 2022-10-31 | Stop reason: SDUPTHER

## 2022-09-18 RX ORDER — TALC
6 POWDER (GRAM) TOPICAL NIGHTLY PRN
Refills: 0 | Status: CANCELLED | OUTPATIENT
Start: 2022-09-18

## 2022-09-18 RX ORDER — BACITRACIN 500 [USP'U]/G
OINTMENT TOPICAL 2 TIMES DAILY
Qty: 28 G | Refills: 0 | Status: ON HOLD | OUTPATIENT
Start: 2022-09-18 | End: 2022-10-21 | Stop reason: HOSPADM

## 2022-09-18 RX ORDER — IBUPROFEN 200 MG
24 TABLET ORAL
Refills: 12 | Status: CANCELLED | OUTPATIENT
Start: 2022-09-18 | End: 2023-09-18

## 2022-09-18 RX ADMIN — MORPHINE SULFATE 15 MG: 15 TABLET ORAL at 05:09

## 2022-09-18 RX ADMIN — ATORVASTATIN CALCIUM 80 MG: 40 TABLET, FILM COATED ORAL at 09:09

## 2022-09-18 RX ADMIN — CILOSTAZOL 100 MG: 50 TABLET ORAL at 08:09

## 2022-09-18 RX ADMIN — DOXYCYCLINE HYCLATE 100 MG: 100 TABLET, COATED ORAL at 08:09

## 2022-09-18 RX ADMIN — LOSARTAN POTASSIUM AND HYDROCHLOROTHIAZIDE 1 TABLET: 50; 12.5 TABLET, FILM COATED ORAL at 08:09

## 2022-09-18 RX ADMIN — GABAPENTIN 900 MG: 300 CAPSULE ORAL at 08:09

## 2022-09-18 RX ADMIN — EZETIMIBE 10 MG: 10 TABLET ORAL at 08:09

## 2022-09-18 RX ADMIN — ASPIRIN 81 MG CHEWABLE TABLET 81 MG: 81 TABLET CHEWABLE at 08:09

## 2022-09-18 NOTE — HOSPITAL COURSE
"73 yo M with PMHx severe PAD, HTN, HLD, and recently dx'd RLE DVT (9/9) who presented to the ED complaining of worsening R sided leg pain. Pt. Initally developed R lateral leg "blisters" on 9/9, and he presented to the ED. U/S at the time was notable for thrombosis with the right anterior tibial and peroneal veins. Pt. Was discharged home with xarelto prescription, but he was unable to receive this medication until 2 days prior to current ED visit due to issues with the pharmacy. At his f/u appointment, the patient reported worsening redness, swelling and ulceration to his R lateral lower extremity. Pt. Pulses were not able to be palpated, so he was referred to the ED.Pulses were dopperable in ED. CTA shows Scattered regions of luminal narrowing and occlusion throughout the arterial vasculature. Interventional cards were consulted. Recommend heparin gtt, no current plans for intervention. Continued pletal, statin and ASA. Patient was transitioned back to PO DOAC (xarelto) at discharge. Since he had only been taking for 2 days prior to presentation so do not consider to be failure. Advised to Fu with PCP and cards as OP. For  ischemic ulcer to lateral/posterior calf , patient had notable surrounding erythema/cellulitis. Pt. Remained Afebrile without signs of sepsis. Continued doxycycline x total 10 days started as outpatient. Wound care consulted  Of note, patient had a R renal mass 6.5 cm, concerning for possible malignancy. Pt. Missed appointment with urology 9/12.  Consulted urology. Rec to see his established Urologist, Dr. Roberts, on 9/22/22 to discuss outpt w/u for renal mass. No plans for urologic interventions while inpt    "

## 2022-09-18 NOTE — PROGRESS NOTES
Discharge papers and instructions given and reviewed with pt and he verbalized understanding . Peripheral IV access removed and pressure applied to the site . Pt received his prescriptions at bedside by bedside delivery . Pt escorted out of the unit on wheelchair to the main entrance where he picked up by his daughter .

## 2022-09-18 NOTE — PLAN OF CARE
Problem: Adult Inpatient Plan of Care  Goal: Plan of Care Review  Outcome: Adequate for Care Transition     Problem: Fall Injury Risk  Goal: Absence of Fall and Fall-Related Injury  Outcome: Adequate for Care Transition     Problem: Impaired Wound Healing  Goal: Optimal Wound Healing  Outcome: Adequate for Care Transition     Problem: Adult Inpatient Plan of Care  Goal: Readiness for Transition of Care  Outcome: Adequate for Care Transition     Problem: Adult Inpatient Plan of Care  Goal: Optimal Comfort and Wellbeing  Outcome: Adequate for Care Transition   Pt is A,A,OX 4 . Stable V/S . No C/O pain or discomfort . Pt ambulated in the room and hallway . Pt remain free of falls and injuries . Dressing changed on RLE wound as ordered . Pt is ready for discharge per MD order .

## 2022-09-19 ENCOUNTER — PATIENT OUTREACH (OUTPATIENT)
Dept: ADMINISTRATIVE | Facility: CLINIC | Age: 72
End: 2022-09-19
Payer: MEDICARE

## 2022-09-19 NOTE — PROGRESS NOTES
C3 Nurse made second attempt to reach patient for TCC call. Left voicemail asking patient to please call [phone] and leave first name, last name, and , and Britney will return call.

## 2022-09-19 NOTE — PROGRESS NOTES
C3 nurse attempted to contact Babatunde Singh  for a TCC post hospital discharge follow up call. No answer. Left voicemail with callback information. The patient does not have a scheduled HOSFU appointment. Message sent to PCP staff for assistance with scheduling visit with patient.

## 2022-09-21 ENCOUNTER — TELEPHONE (OUTPATIENT)
Dept: INTERNAL MEDICINE | Facility: CLINIC | Age: 72
End: 2022-09-21
Payer: MEDICARE

## 2022-09-21 NOTE — TELEPHONE ENCOUNTER
----- Message from Clementina Rosas sent at 9/21/2022  1:37 PM CDT -----  Type:  Patient Returning Call    Who Called: ,pt   Who Left Message for Patient: pt   Does the patient know what this is regarding?:pt need a call he want to know when is the appt in [place] is   Would the patient rather a call back or a responseia [facility]?  Call   Best Call Back Number: Click to [phone]  Additional Information:  call back

## 2022-09-22 ENCOUNTER — OFFICE VISIT (OUTPATIENT)
Dept: UROLOGY | Facility: CLINIC | Age: 72
End: 2022-09-22
Attending: UROLOGY
Payer: MEDICARE

## 2022-09-22 VITALS — SYSTOLIC BLOOD PRESSURE: 159 MMHG | HEART RATE: 111 BPM | DIASTOLIC BLOOD PRESSURE: 67 MMHG

## 2022-09-22 DIAGNOSIS — N28.89 RENAL MASS: ICD-10-CM

## 2022-09-22 PROCEDURE — 3288F PR FALLS RISK ASSESSMENT DOCUMENTED: ICD-10-PCS | Mod: CPTII,S$GLB,, | Performed by: UROLOGY

## 2022-09-22 PROCEDURE — 1126F PR PAIN SEVERITY QUANTIFIED, NO PAIN PRESENT: ICD-10-PCS | Mod: CPTII,S$GLB,, | Performed by: UROLOGY

## 2022-09-22 PROCEDURE — 4010F PR ACE/ARB THEARPY RXD/TAKEN: ICD-10-PCS | Mod: CPTII,S$GLB,, | Performed by: UROLOGY

## 2022-09-22 PROCEDURE — 3077F PR MOST RECENT SYSTOLIC BLOOD PRESSURE >= 140 MM HG: ICD-10-PCS | Mod: CPTII,S$GLB,, | Performed by: UROLOGY

## 2022-09-22 PROCEDURE — 3288F FALL RISK ASSESSMENT DOCD: CPT | Mod: CPTII,S$GLB,, | Performed by: UROLOGY

## 2022-09-22 PROCEDURE — 1159F PR MEDICATION LIST DOCUMENTED IN MEDICAL RECORD: ICD-10-PCS | Mod: CPTII,S$GLB,, | Performed by: UROLOGY

## 2022-09-22 PROCEDURE — 1126F AMNT PAIN NOTED NONE PRSNT: CPT | Mod: CPTII,S$GLB,, | Performed by: UROLOGY

## 2022-09-22 PROCEDURE — 4010F ACE/ARB THERAPY RXD/TAKEN: CPT | Mod: CPTII,S$GLB,, | Performed by: UROLOGY

## 2022-09-22 PROCEDURE — 99215 PR OFFICE/OUTPT VISIT, EST, LEVL V, 40-54 MIN: ICD-10-PCS | Mod: S$GLB,,, | Performed by: UROLOGY

## 2022-09-22 PROCEDURE — 3044F HG A1C LEVEL LT 7.0%: CPT | Mod: CPTII,S$GLB,, | Performed by: UROLOGY

## 2022-09-22 PROCEDURE — 1101F PT FALLS ASSESS-DOCD LE1/YR: CPT | Mod: CPTII,S$GLB,, | Performed by: UROLOGY

## 2022-09-22 PROCEDURE — 99215 OFFICE O/P EST HI 40 MIN: CPT | Mod: S$GLB,,, | Performed by: UROLOGY

## 2022-09-22 PROCEDURE — 3044F PR MOST RECENT HEMOGLOBIN A1C LEVEL <7.0%: ICD-10-PCS | Mod: CPTII,S$GLB,, | Performed by: UROLOGY

## 2022-09-22 PROCEDURE — 1111F PR DISCHARGE MEDS RECONCILED W/ CURRENT OUTPATIENT MED LIST: ICD-10-PCS | Mod: CPTII,S$GLB,, | Performed by: UROLOGY

## 2022-09-22 PROCEDURE — 3078F PR MOST RECENT DIASTOLIC BLOOD PRESSURE < 80 MM HG: ICD-10-PCS | Mod: CPTII,S$GLB,, | Performed by: UROLOGY

## 2022-09-22 PROCEDURE — 1101F PR PT FALLS ASSESS DOC 0-1 FALLS W/OUT INJ PAST YR: ICD-10-PCS | Mod: CPTII,S$GLB,, | Performed by: UROLOGY

## 2022-09-22 PROCEDURE — 1160F RVW MEDS BY RX/DR IN RCRD: CPT | Mod: CPTII,S$GLB,, | Performed by: UROLOGY

## 2022-09-22 PROCEDURE — 1160F PR REVIEW ALL MEDS BY PRESCRIBER/CLIN PHARMACIST DOCUMENTED: ICD-10-PCS | Mod: CPTII,S$GLB,, | Performed by: UROLOGY

## 2022-09-22 PROCEDURE — 1111F DSCHRG MED/CURRENT MED MERGE: CPT | Mod: CPTII,S$GLB,, | Performed by: UROLOGY

## 2022-09-22 PROCEDURE — 3077F SYST BP >= 140 MM HG: CPT | Mod: CPTII,S$GLB,, | Performed by: UROLOGY

## 2022-09-22 PROCEDURE — 3078F DIAST BP <80 MM HG: CPT | Mod: CPTII,S$GLB,, | Performed by: UROLOGY

## 2022-09-22 PROCEDURE — 1159F MED LIST DOCD IN RCRD: CPT | Mod: CPTII,S$GLB,, | Performed by: UROLOGY

## 2022-09-22 NOTE — PROGRESS NOTES
Subjective:      Babatunde Singh is a 72 y.o. male who returns today regarding his renal mass.    Previously seen for hydrocele, s/p hydrocelectomy in May 2022, doing well.    Returns today with new incidental finding on right renal mass on multiple recent CT scans. Lesion is 6.5cm with minimal enhancement. He was seen by Dr. De La Torre at [facility] during recent admission who recommended surgery.     He was recently diagnosed with DVT and started Xarelto a few weeks ago.    The following portions of the patient's history were reviewed and updated as appropriate: allergies, current medications, past family history, past medical history, past social history, past surgical history and problem list.    Review of Systems  A comprehensive multipoint review of systems was negative except as otherwise stated in the HPI.     Objective:   Vitals: BP (!) 159/67   Pulse (!) 111     Physical Exam   General: alert and oriented, no acute distress  Respiratory: Symmetric expansion, non-labored breathing  Neuro: no gross deficits  Psych: normal judgment and insight, normal mood/affect, and non-anxious    Lab Review     Lab Results   Component Value Date    WBC 10.70 09/16/2022    HGB 9.8 (L) 09/16/2022    HCT 31.3 (L) 09/16/2022    MCV 81 (L) 09/16/2022     09/16/2022     Lab Results   Component Value Date    CREATININE 0.7 09/16/2022    BUN 15 09/16/2022     No results found for: PSA, PSADIAG    Imaging   Multiple recent CT scans reviewed. Heterogeneous 6.5cm mass in upper pole of right kidney, enhances 10-30 HU, concerning for RCC.    Assessment and Plan:   1. Renal mass  -- Discussed imaging findings. Mass certainly looks concerning for RCC, even though degree of enhancement was low and varied on different studies.  -- Discussed options including further imaging, biopsy, and surgery including partial and radical nephrectomy.   -- Discussed recent DVT diagnosis and issues associated with anticoagulation. He may need IVC filter  placed in order to stop AC for surgery.  -- Will schedule FU with Dr. Cuba to discuss further

## 2022-09-25 ENCOUNTER — HOSPITAL ENCOUNTER (EMERGENCY)
Facility: HOSPITAL | Age: 72
Discharge: HOME | End: 2022-09-25
Attending: EMERGENCY MEDICINE
Payer: MEDICARE

## 2022-09-25 VITALS
DIASTOLIC BLOOD PRESSURE: 100 MMHG | OXYGEN SATURATION: 96 % | HEIGHT: 67 IN | RESPIRATION RATE: 16 BRPM | TEMPERATURE: 98 F | BODY MASS INDEX: 18.52 KG/M2 | WEIGHT: 118 LBS | HEART RATE: 82 BPM | SYSTOLIC BLOOD PRESSURE: 136 MMHG

## 2022-09-25 DIAGNOSIS — L03.115 CELLULITIS OF RIGHT LOWER EXTREMITY: Primary | ICD-10-CM

## 2022-09-25 DIAGNOSIS — I82.451 ACUTE DEEP VEIN THROMBOSIS (DVT) OF RIGHT PERONEAL VEIN: ICD-10-CM

## 2022-09-25 DIAGNOSIS — M79.606 LEG PAIN: ICD-10-CM

## 2022-09-25 LAB
ALBUMIN SERPL BCP-MCNC: 3 G/DL (ref 3.5–5.2)
ALP SERPL-CCNC: 67 U/L (ref 55–135)
ALT SERPL W/O P-5'-P-CCNC: 14 U/L (ref 10–44)
ANION GAP SERPL CALC-SCNC: 7 MMOL/L (ref 8–16)
AST SERPL-CCNC: 29 U/L (ref 10–40)
BASOPHILS # BLD AUTO: 0.03 K/UL (ref 0–0.2)
BASOPHILS NFR BLD: 0.3 % (ref 0–1.9)
BILIRUB SERPL-MCNC: 0.4 MG/DL (ref 0.1–1)
BUN SERPL-MCNC: 17 MG/DL (ref 8–23)
CALCIUM SERPL-MCNC: 8.7 MG/DL (ref 8.7–10.5)
CHLORIDE SERPL-SCNC: 109 MMOL/L (ref 95–110)
CK SERPL-CCNC: 323 U/L (ref 20–200)
CO2 SERPL-SCNC: 23 MMOL/L (ref 23–29)
CREAT SERPL-MCNC: 0.7 MG/DL (ref 0.5–1.4)
DIFFERENTIAL METHOD: ABNORMAL
EOSINOPHIL # BLD AUTO: 0.1 K/UL (ref 0–0.5)
EOSINOPHIL NFR BLD: 0.5 % (ref 0–8)
ERYTHROCYTE [DISTWIDTH] IN BLOOD BY AUTOMATED COUNT: 19.5 % (ref 11.5–14.5)
EST. GFR  (NO RACE VARIABLE): >60 ML/MIN/1.73 M^2
GLUCOSE SERPL-MCNC: 100 MG/DL (ref 70–110)
HCT VFR BLD AUTO: 27.4 % (ref 40–54)
HGB BLD-MCNC: 8.5 G/DL (ref 14–18)
IMM GRANULOCYTES # BLD AUTO: 0.03 K/UL (ref 0–0.04)
IMM GRANULOCYTES NFR BLD AUTO: 0.3 % (ref 0–0.5)
LACTATE SERPL-SCNC: 0.9 MMOL/L (ref 0.5–2.2)
LYMPHOCYTES # BLD AUTO: 3.8 K/UL (ref 1–4.8)
LYMPHOCYTES NFR BLD: 32.7 % (ref 18–48)
MCH RBC QN AUTO: 26.2 PG (ref 27–31)
MCHC RBC AUTO-ENTMCNC: 31 G/DL (ref 32–36)
MCV RBC AUTO: 85 FL (ref 82–98)
MONOCYTES # BLD AUTO: 1.2 K/UL (ref 0.3–1)
MONOCYTES NFR BLD: 9.9 % (ref 4–15)
NEUTROPHILS # BLD AUTO: 6.6 K/UL (ref 1.8–7.7)
NEUTROPHILS NFR BLD: 56.3 % (ref 38–73)
NRBC BLD-RTO: 0 /100 WBC
PLATELET # BLD AUTO: 472 K/UL (ref 150–450)
PMV BLD AUTO: 9 FL (ref 9.2–12.9)
POTASSIUM SERPL-SCNC: 3.5 MMOL/L (ref 3.5–5.1)
PROT SERPL-MCNC: 6.2 G/DL (ref 6–8.4)
RBC # BLD AUTO: 3.24 M/UL (ref 4.6–6.2)
SODIUM SERPL-SCNC: 139 MMOL/L (ref 136–145)
WBC # BLD AUTO: 11.72 K/UL (ref 3.9–12.7)

## 2022-09-25 PROCEDURE — 80053 COMPREHEN METABOLIC PANEL: CPT | Performed by: STUDENT IN AN ORGANIZED HEALTH CARE EDUCATION/TRAINING PROGRAM

## 2022-09-25 PROCEDURE — 82550 ASSAY OF CK (CPK): CPT | Performed by: STUDENT IN AN ORGANIZED HEALTH CARE EDUCATION/TRAINING PROGRAM

## 2022-09-25 PROCEDURE — 96374 THER/PROPH/DIAG INJ IV PUSH: CPT

## 2022-09-25 PROCEDURE — 85025 COMPLETE CBC W/AUTO DIFF WBC: CPT | Performed by: STUDENT IN AN ORGANIZED HEALTH CARE EDUCATION/TRAINING PROGRAM

## 2022-09-25 PROCEDURE — 99284 EMERGENCY DEPT VISIT MOD MDM: CPT | Mod: ,,, | Performed by: EMERGENCY MEDICINE

## 2022-09-25 PROCEDURE — 63600175 PHARM REV CODE 636 W HCPCS: Performed by: STUDENT IN AN ORGANIZED HEALTH CARE EDUCATION/TRAINING PROGRAM

## 2022-09-25 PROCEDURE — 99284 PR EMERGENCY DEPT VISIT,LEVEL IV: ICD-10-PCS | Mod: ,,, | Performed by: EMERGENCY MEDICINE

## 2022-09-25 PROCEDURE — 83605 ASSAY OF LACTIC ACID: CPT | Performed by: STUDENT IN AN ORGANIZED HEALTH CARE EDUCATION/TRAINING PROGRAM

## 2022-09-25 PROCEDURE — 99285 EMERGENCY DEPT VISIT HI MDM: CPT | Mod: 25

## 2022-09-25 RX ORDER — KETOROLAC TROMETHAMINE 30 MG/ML
10 INJECTION, SOLUTION INTRAMUSCULAR; INTRAVENOUS
Status: COMPLETED | OUTPATIENT
Start: 2022-09-25 | End: 2022-09-25

## 2022-09-25 RX ORDER — DOXYCYCLINE 100 MG/1
100 CAPSULE ORAL 2 TIMES DAILY
Qty: 20 CAPSULE | Refills: 0 | Status: SHIPPED | OUTPATIENT
Start: 2022-09-25 | End: 2022-09-25 | Stop reason: SDUPTHER

## 2022-09-25 RX ADMIN — KETOROLAC TROMETHAMINE 10 MG: 30 INJECTION, SOLUTION INTRAMUSCULAR; INTRAVENOUS at 12:09

## 2022-09-25 NOTE — ED NOTES
Bed: [facility]  Expected date:   Expected time:   Means of arrival:   Comments:

## 2022-09-25 NOTE — ED PROVIDER NOTES
Encounter Date: 9/25/2022       History     Chief Complaint   Patient presents with    Fatigue     Recent admission for right LE DVT.      72 year old male with history of severe PAD, HTN, HLD, and recently dx'd RLE DVT (9/9) on Xarelto presenting to the ED with worsening right lower leg pain.  Symptoms worsened today when he tried getting up.  He was unable to ambulate due to pain.  Usually ambulates with a walker.  Did not take any medicine at home for the pain. Reports compliance with home meds and AC.       Review of patient's allergies indicates:  No Known Allergies  Past Medical History:   Diagnosis Date    Hypertension     Peripheral arterial disease      Past Surgical History:   Procedure Laterality Date    EXCISION OF HYDROCELE Left 4/26/2022    Procedure: HYDROCELECTOMY;  Surgeon: Damion Roberts MD;  Location: [facility];  Service: Urology;  Laterality: Left;    KNEE SURGERY  1972     Family History   Problem Relation Age of Onset    Hypertension Mother     Hypertension Father      Social History     Tobacco Use    Smoking status: Former     Packs/day: 0.50     Years: 50.00     Pack years: 25.00     Types: Cigarettes    Smokeless tobacco: Never   Substance Use Topics    Alcohol use: Never    Drug use: Never     Review of Systems   Constitutional:  Negative for fever.   HENT:  Negative for sore throat.    Respiratory:  Negative for shortness of breath.    Cardiovascular:  Negative for chest pain.   Gastrointestinal:  Negative for abdominal pain, diarrhea, nausea and vomiting.   Genitourinary:  Negative for dysuria.   Musculoskeletal:  Positive for gait problem. Negative for back pain.   Skin:  Negative for rash.   Neurological:  Negative for weakness.   Hematological:  Does not bruise/bleed easily.     Physical Exam     Initial Vitals   BP Pulse Resp Temp SpO2   09/25/22 1415 09/25/22 1110 09/25/22 1110 09/25/22 1110 09/25/22 1110   (!) 136/100 91 16 98.4 °F (36.9 °C) 100 %      MAP       --                 Physical Exam    Nursing note and vitals reviewed.  Constitutional: Vital signs are normal. He appears well-developed and well-nourished. He is not diaphoretic. He does not appear ill. No distress.   HENT:   Head: Normocephalic and atraumatic.   Eyes: Conjunctivae and EOM are normal. Right conjunctiva is not injected. Left conjunctiva is not injected.   Neck:   Normal range of motion.  Cardiovascular:  Normal rate, regular rhythm, S1 normal and S2 normal.     Exam reveals no gallop and no friction rub.       No murmur heard.  Pulmonary/Chest: No respiratory distress. He has no wheezes. He has no rhonchi. He has no rales. He exhibits no tenderness.   Abdominal: Abdomen is soft. He exhibits no distension and no mass. There is no abdominal tenderness. There is no rebound and no guarding.   Musculoskeletal:         General: No edema.      Cervical back: Normal range of motion.      Comments: Significant tenderness to palpation of right calf, lower leg     Neurological: He is alert.   Skin: Skin is warm and dry. No rash noted. No erythema. No pallor.   Blue discolorations of lower leg, chronic.  Small hemorrhagic bullae noted       ED Course   Procedures  Labs Reviewed   CBC W/ AUTO DIFFERENTIAL - Abnormal; Notable for the following components:       Result Value    RBC 3.24 (*)     Hemoglobin 8.5 (*)     Hematocrit 27.4 (*)     MCH 26.2 (*)     MCHC 31.0 (*)     RDW 19.5 (*)     Platelets 472 (*)     MPV 9.0 (*)     Mono # 1.2 (*)     All other components within normal limits   COMPREHENSIVE METABOLIC PANEL - Abnormal; Notable for the following components:    Albumin 3.0 (*)     Anion Gap 7 (*)     All other components within normal limits   CK - Abnormal; Notable for the following components:     (*)     All other components within normal limits   LACTIC ACID, PLASMA          Imaging Results              US Lower Extremity Veins Right (Final result)  Result time 09/25/22 13:28:31      Final result by Jasen  ROD Lang MD (09/25/22 13:28:31)                   Impression:      No evidence of deep venous thrombosis in the right lower extremity.  Resolution of previous thrombus within 1 of each paired right peroneal and anterior tibial veins.      Electronically signed by: Jasen Lang MD  Date:    09/25/2022  Time:    13:28               Narrative:    EXAMINATION:  US LOWER EXTREMITY VEINS RIGHT    CLINICAL HISTORY:  Pain in leg, unspecified    TECHNIQUE:  Duplex and color flow Doppler evaluation and graded compression of the right lower extremity veins was performed.    COMPARISON:  Right lower extremity venous upper ultrasound 09/09/2022.    FINDINGS:  Right thigh veins: The common femoral, femoral, popliteal, upper greater saphenous, and deep femoral veins are patent and free of thrombus. The veins are normally compressible and have normal phasic flow and augmentation response.    Right calf veins: Previous thrombus within 1 of each paired peroneal and anterior tibial veins is no longer identified and has likely resolved.  The visualized calf veins are all now patent.    Contralateral CFV: The contralateral (left) common femoral vein is patent and free of thrombus.    Miscellaneous: None                                       Medications   ketorolac injection 9.999 mg (9.999 mg Intravenous Given 9/25/22 1214)     Medical Decision Making:   History:   Old Medical Records: I decided to obtain old medical records.  Initial Assessment:   80-year-old male with history of recent bleed diagnosed DVT on anticoagulation presenting to the ED with worsening right lower extremity pain.  Has chronic skin changes of right lower extremity.  Tenderness to palpation of right lower calf.    Differential includes is not limited to worsening DVT burden, electrolyte abnormalities, less likely necrotizing fasciitis as skin changes been chronic.    CBC without leukocytosis, mild decrease in hemoglobin noted.  No obvious signs bleeding per  history.  No symptoms of anemia.  CMP showed no extremities concerning hospitalization.  CPK mildly elevated, not consistent with rhabdomyolysis.  Lactic within normal limits, low suspicion for necrotizing fasciitis low LRINEC score.  Ultrasound right lower extremity showed no concern for DVT, resolution of previous DVT.  Due to concern for possible superimposed infection patient's skin changes, initially ordered doxycycline.  However, patient really has a prescription for doxycycline.  Informed to continue taking his antibiotics and follow-up with PCP in the next couple days.      PCP follow-up within 2-3 days was recommended.     After taking into careful account the patient's history, physical exam findings, as well as empirical and objective data obtained throughout ED workup, I feel no emergent medical condition has been identified. No further evaluation or admission was felt to be required, and the patient is stable for discharge from the ED. The patient and any additional family present were updated with test results, overall clinical impression, and recommended further plan of care, including discharge instructions as provided and outpatient follow-up for continued evaluation and management as needed. All questions were answered. The patient expressed understanding and agreed with current plan for discharge and follow-up plan of care. Strict ED return precautions were provided, including return/worsening of current symptoms or any other concerns.  .  Clinical Tests:   Lab Tests: Ordered and Reviewed  Radiological Study: Ordered and Reviewed           ED Course as of 09/25/22 1537   Sun Sep 25, 2022   1159 72-year-old male with recent diagnosis of DVT.  He presents with increased pain and swelling as well as scab formation in that leg.  His examination is not consistent with ulceration secondary to arterial obstruction in the lower extremity.  His legs not flat [place] but he does have edema to his foot.  He  has 2+ dorsalis pedis and posterior tibialis pulses on examination.  Patient reports that he noticed the development of bullae on his right lateral lower leg after taking a hot bath after he was diagnosed with DVT.  Reports compliance with his xarelto.  The bullae subsequently unroofed and he now is eschar formation. [DS]   1201 Of note, patient has a history of progressive neuropathy and edema in that extremity. [DS]   1201 In areas without eschar formation, the patient does not have tenderness to palpation.  I am less concern for necrotizing fasciitis, but given his neuropathy will obtain lactic acid.  Will obtain CBC to assess for signs of infection.  Will repeat ultrasound to assess for increasing DVT burden. [DS]   1240 Chronic anemia, worse from previous. Recurrence of thrombocytosis. No symtpoms c/w worsening anemia. F/u recheck cbc as outpatient [DS]   1249 CK mildly elevated.  Chemistry unremarkable. [DS]   1332 US shows resolution of DVT. Plan to treat w/ abx for possible bacterial superinfection in area of unroofed bullae. F/u for wound check and repeat CBC [DS]   1406 Chart review shows the patient has a prescription for doxycycline.  I canceled the repeat prescription.  Occult his daughter and on speaker phone, reiterated his doxycycline instructions as well as Bactroban instructions given the patient has not completed a course of doxycycline despite it being prescribed 10 days ago [DS]      ED Course User Index  [DS] Arnoldo Evans MD                 Clinical Impression:   Final diagnoses:  [M79.606] Leg pain  [L03.115] Cellulitis of right lower extremity (Primary)  [I82.451] Acute deep vein thrombosis (DVT) of right peroneal vein      ED Disposition Condition    Discharge Stable          ED Prescriptions       Medication Sig Dispense Start Date End Date Auth. Provider    doxycycline (VIBRAMYCIN) 100 MG Cap  (Status: Discontinued) Take 1 capsule (100 mg total) by mouth 2 (two) times daily. for 10  days 20 capsule 9/25/2022 9/25/2022 Arnoldo Evans MD          Follow-up Information       Follow up With Specialties Details Why Contact Info    Esdras Ly MD Internal Medicine Schedule an appointment as soon as possible for a visit in 3 days  [address]  [place] [address]  [phone]               Douglas Monique MD  Resident  09/25/22 0720

## 2022-09-25 NOTE — DISCHARGE INSTRUCTIONS
Follow up with your primary care tomorrow to schedule a recheck of your CBC (blood cell count) within 72 hours    Follow up with your primary care this week for a wound check    Return to the emergency department immediately if any new or concerning symptoms occur    Take over the counter acetaminophen 1000mg every 6 hours as needed for pain and inflammation

## 2022-09-25 NOTE — ED NOTES
Pt to ER c/o right foot and leg pain. Was seen in ER on 9/9 for same pain. Has scattered wounds to right lateral lower leg, eschar. Has right pedal edema +2. Has bilateral palpable dp pulses present. States the pain is making it difficult to walk.

## 2022-09-26 NOTE — PROGRESS NOTES
Subjective:      Babatunde Singh is a 72 y.o. male who returns today regarding his     Referred by Dr Roberts    Incidedntally discovered right renal mass    Recent DVT now on Xeralto.    Denies gross hematuria    The following portions of the patient's history were reviewed and updated as appropriate: allergies, current medications, past family history, past medical history, past social history, past surgical history and problem list.    Review of Systems  Pertinent items are noted in HPI.  A comprehensive multipoint review of systems was negative except as otherwise stated in the HPI.    Past Medical History:   Diagnosis Date    Hypertension     Peripheral arterial disease      Past Surgical History:   Procedure Laterality Date    EXCISION OF HYDROCELE Left 4/26/2022    Procedure: HYDROCELECTOMY;  Surgeon: Damion Roberts MD;  Location: [facility];  Service: Urology;  Laterality: Left;    KNEE SURGERY  1972       Review of patient's allergies indicates:  No Known Allergies       Objective:   Vitals: There were no vitals taken for this visit.    Physical Exam   General: in wheelchair and alert and oriented, no acute distress  Respiratory: Symmetric expansion, non-labored breathing  Cardiovascular: no peripheral edema  Abdomen: soft, non distended  Skin: normal coloration and turgor, no rashes, no suspicious skin lesions noted  Neuro: no gross deficits  Psych: normal judgment and insight, normal mood/affect, and non-anxious    Physical Exam    Lab Review   Urinalysis demonstrates no specimen    Lab Results   Component Value Date    WBC 11.72 09/25/2022    HGB 8.5 (L) 09/25/2022    HCT 27.4 (L) 09/25/2022    MCV 85 09/25/2022     (H) 09/25/2022     Lab Results   Component Value Date    CREATININE 0.7 09/25/2022    BUN 17 09/25/2022       Imaging  CT ABDOMEN W WO CONTRAST     CLINICAL HISTORY:  Renal mass;  Other specified disorders of kidney and ureter     TECHNIQUE:  Using helical CT technique, as well as oral  and IV contrast, (100 cc of Omnipaque 350 IV), 5 mm axial images of the abdomen were obtained, from the lung bases through the iliac crests.  Portal venous phase images and delayed phases images were obtained.  Coronal reformations were generated on the scanner.     COMPARISON:  None     FINDINGS:  - Lung bases: No infiltrates and no nodules.     - Liver: No focal mass.     - Gallbladder: Cholelithiasis.     - Bile Ducts: No evidence of intra or extrahepatic biliary ductal dilation.     - Spleen: Negative.     - Pancreas: No mass or peripancreatic fat stranding.     - Adrenals: Unremarkable.     - Kidneys: No hydronephrosis.  6.8 x 6.5 by 5.8 cm mass in the RIGHT upper pole, measuring 39 Hounsfield units precontrast and 52 Hounsfield units postcontrast with delayed images demonstrate persistent enhancement at 50 Hounsfield units.  As such, this enhances greater than 10 Hounsfield units.  However, it does not enhance greater than 20 Hounsfield units to suggest unequivocal enhancement, given the phenomenon of potential pseudo enhancement related to beam hardening.  Nevertheless, its appearance suggests a solid mass.  Renal ultrasound could confirm.  Renal neoplasm must be a leading consideration.     - Retroperitoneum:  No significant adenopathy.     - Vascular: No abdominal aortic aneurysm. Diffuse atherosclerosis.     - Abdominal wall:  Unremarkable.     - Visualized bowel: No evidence of bowel obstruction or inflammation.     - Bones: No acute osseous abnormality and no suspicious lytic or blastic lesion.     Impression:     Greater than 6 cm RIGHT upper pole renal mass, initial Hounsfield units of which are not consistent with cyst and demonstrating greater than 10 Hounsfield units enhancement.  To exclude possibility of pseudo enhancement of less than 20 Hounsfield units, renal ultrasound could confirm.  Nevertheless, primary concern is for renal neoplasm.  Urologic consult recommended.     This report was  flagged in Epic as abnormal.        Electronically signed by: Ralph Bowens MD  Date:                                            08/15/2022  Time:                                           09:35      Assessment and Plan:   Renal mass  We discussed the natural history of small renal masses, the risk of malignancy and disease progression, and the small risk of mortality.  We discussed the risks and benefits of watchful waiting, biopsy, partial and total nephrectomy.  We discussed the benefits of renal preservation when possible.  We discussed percutaneous, laparoscopic and robotic approaches.  We discussed the management of positive surgical margins.  I answered all questions.    We discussed the risk of PE etc if we stop anticoagulation now for surgery.  I think these risks are greater than the risk of developing metastatic disease.    Plan robotic partial vs total nephrectomy as soon as his anticoagulation is finished; anticipate Feb 2023.    RTC Dec 2022 with Ab US and CXR      COPD  See PCP    DVT  Anticoagulation per PCP

## 2022-09-27 ENCOUNTER — TELEPHONE (OUTPATIENT)
Dept: INTERNAL MEDICINE | Facility: CLINIC | Age: 72
End: 2022-09-27
Payer: MEDICARE

## 2022-09-27 ENCOUNTER — OFFICE VISIT (OUTPATIENT)
Dept: UROLOGY | Facility: CLINIC | Age: 72
End: 2022-09-27
Payer: MEDICARE

## 2022-09-27 VITALS
HEIGHT: 67 IN | BODY MASS INDEX: 18.52 KG/M2 | WEIGHT: 118 LBS | SYSTOLIC BLOOD PRESSURE: 129 MMHG | HEART RATE: 92 BPM | DIASTOLIC BLOOD PRESSURE: 71 MMHG

## 2022-09-27 DIAGNOSIS — N28.89 RENAL MASS: Primary | ICD-10-CM

## 2022-09-27 PROCEDURE — 99499 RISK ADDL DX/OHS AUDIT: ICD-10-PCS | Mod: S$GLB,,, | Performed by: UROLOGY

## 2022-09-27 PROCEDURE — 3008F BODY MASS INDEX DOCD: CPT | Mod: CPTII,S$GLB,, | Performed by: UROLOGY

## 2022-09-27 PROCEDURE — 1159F MED LIST DOCD IN RCRD: CPT | Mod: CPTII,S$GLB,, | Performed by: UROLOGY

## 2022-09-27 PROCEDURE — 3044F HG A1C LEVEL LT 7.0%: CPT | Mod: CPTII,S$GLB,, | Performed by: UROLOGY

## 2022-09-27 PROCEDURE — 99999 PR PBB SHADOW E&M-EST. PATIENT-LVL III: ICD-10-PCS | Mod: PBBFAC,,, | Performed by: UROLOGY

## 2022-09-27 PROCEDURE — 99214 PR OFFICE/OUTPT VISIT, EST, LEVL IV, 30-39 MIN: ICD-10-PCS | Mod: S$GLB,,, | Performed by: UROLOGY

## 2022-09-27 PROCEDURE — 4010F PR ACE/ARB THEARPY RXD/TAKEN: ICD-10-PCS | Mod: CPTII,S$GLB,, | Performed by: UROLOGY

## 2022-09-27 PROCEDURE — 1101F PR PT FALLS ASSESS DOC 0-1 FALLS W/OUT INJ PAST YR: ICD-10-PCS | Mod: CPTII,S$GLB,, | Performed by: UROLOGY

## 2022-09-27 PROCEDURE — 1125F AMNT PAIN NOTED PAIN PRSNT: CPT | Mod: CPTII,S$GLB,, | Performed by: UROLOGY

## 2022-09-27 PROCEDURE — 3074F PR MOST RECENT SYSTOLIC BLOOD PRESSURE < 130 MM HG: ICD-10-PCS | Mod: CPTII,S$GLB,, | Performed by: UROLOGY

## 2022-09-27 PROCEDURE — 1125F PR PAIN SEVERITY QUANTIFIED, PAIN PRESENT: ICD-10-PCS | Mod: CPTII,S$GLB,, | Performed by: UROLOGY

## 2022-09-27 PROCEDURE — 1111F PR DISCHARGE MEDS RECONCILED W/ CURRENT OUTPATIENT MED LIST: ICD-10-PCS | Mod: CPTII,S$GLB,, | Performed by: UROLOGY

## 2022-09-27 PROCEDURE — 4010F ACE/ARB THERAPY RXD/TAKEN: CPT | Mod: CPTII,S$GLB,, | Performed by: UROLOGY

## 2022-09-27 PROCEDURE — 99499 UNLISTED E&M SERVICE: CPT | Mod: S$GLB,,, | Performed by: UROLOGY

## 2022-09-27 PROCEDURE — 3044F PR MOST RECENT HEMOGLOBIN A1C LEVEL <7.0%: ICD-10-PCS | Mod: CPTII,S$GLB,, | Performed by: UROLOGY

## 2022-09-27 PROCEDURE — 3078F DIAST BP <80 MM HG: CPT | Mod: CPTII,S$GLB,, | Performed by: UROLOGY

## 2022-09-27 PROCEDURE — 1111F DSCHRG MED/CURRENT MED MERGE: CPT | Mod: CPTII,S$GLB,, | Performed by: UROLOGY

## 2022-09-27 PROCEDURE — 99999 PR PBB SHADOW E&M-EST. PATIENT-LVL III: CPT | Mod: PBBFAC,,, | Performed by: UROLOGY

## 2022-09-27 PROCEDURE — 3008F PR BODY MASS INDEX (BMI) DOCUMENTED: ICD-10-PCS | Mod: CPTII,S$GLB,, | Performed by: UROLOGY

## 2022-09-27 PROCEDURE — 1101F PT FALLS ASSESS-DOCD LE1/YR: CPT | Mod: CPTII,S$GLB,, | Performed by: UROLOGY

## 2022-09-27 PROCEDURE — 3288F PR FALLS RISK ASSESSMENT DOCUMENTED: ICD-10-PCS | Mod: CPTII,S$GLB,, | Performed by: UROLOGY

## 2022-09-27 PROCEDURE — 99214 OFFICE O/P EST MOD 30 MIN: CPT | Mod: S$GLB,,, | Performed by: UROLOGY

## 2022-09-27 PROCEDURE — 3078F PR MOST RECENT DIASTOLIC BLOOD PRESSURE < 80 MM HG: ICD-10-PCS | Mod: CPTII,S$GLB,, | Performed by: UROLOGY

## 2022-09-27 PROCEDURE — 3288F FALL RISK ASSESSMENT DOCD: CPT | Mod: CPTII,S$GLB,, | Performed by: UROLOGY

## 2022-09-27 PROCEDURE — 1159F PR MEDICATION LIST DOCUMENTED IN MEDICAL RECORD: ICD-10-PCS | Mod: CPTII,S$GLB,, | Performed by: UROLOGY

## 2022-09-27 PROCEDURE — 3074F SYST BP LT 130 MM HG: CPT | Mod: CPTII,S$GLB,, | Performed by: UROLOGY

## 2022-09-27 NOTE — TELEPHONE ENCOUNTER
----- Message from Jenni Lennon sent at 9/27/2022 11:34 AM CDT -----  Contact: [phone]  Pt is requesting orders for physical therapy.Please f/u

## 2022-09-29 ENCOUNTER — TELEPHONE (OUTPATIENT)
Dept: INTERNAL MEDICINE | Facility: CLINIC | Age: 72
End: 2022-09-29
Payer: MEDICARE

## 2022-09-29 NOTE — TELEPHONE ENCOUNTER
----- Message from William Shi sent at 9/29/2022 11:02 AM CDT -----  Contact: [phone]  Daughter requesting a call from the office to discuss getting patient in for a early visit due to patient having blister on his legs. Please call and advise, Thanks

## 2022-10-02 ENCOUNTER — HOSPITAL ENCOUNTER (INPATIENT)
Facility: HOSPITAL | Age: 72
LOS: 10 days | Discharge: NURSING, REHAB OR LONG-TERM CARE | DRG: 252 | End: 2022-10-12
Attending: EMERGENCY MEDICINE | Admitting: INTERNAL MEDICINE
Payer: MEDICARE

## 2022-10-02 DIAGNOSIS — I70.238: ICD-10-CM

## 2022-10-02 DIAGNOSIS — I70.238 ATHEROSCLEROSIS OF NATIVE ARTERY OF RIGHT LOWER EXTREMITY WITH ULCERATION OF OTHER PART OF LOWER LEG: ICD-10-CM

## 2022-10-02 DIAGNOSIS — I73.9 PAD (PERIPHERAL ARTERY DISEASE): ICD-10-CM

## 2022-10-02 DIAGNOSIS — I99.8 LOWER LIMB ISCHEMIA: ICD-10-CM

## 2022-10-02 DIAGNOSIS — I99.8 LIMB ISCHEMIA: ICD-10-CM

## 2022-10-02 DIAGNOSIS — R00.0 TACHYCARDIA: ICD-10-CM

## 2022-10-02 DIAGNOSIS — L97.911 ULCER OF RIGHT LOWER EXTREMITY, LIMITED TO BREAKDOWN OF SKIN: ICD-10-CM

## 2022-10-02 DIAGNOSIS — I82.409 DVT (DEEP VENOUS THROMBOSIS): ICD-10-CM

## 2022-10-02 DIAGNOSIS — M79.673 FOOT PAIN: ICD-10-CM

## 2022-10-02 DIAGNOSIS — R79.89 ELEVATED TROPONIN: ICD-10-CM

## 2022-10-02 DIAGNOSIS — Z91.89 AT RISK FOR LONG QT SYNDROME: ICD-10-CM

## 2022-10-02 DIAGNOSIS — R09.89 ABSENT PEDAL PULSES: Primary | ICD-10-CM

## 2022-10-02 LAB
ALBUMIN SERPL BCP-MCNC: 3 G/DL (ref 3.5–5.2)
ALP SERPL-CCNC: 74 U/L (ref 55–135)
ALT SERPL W/O P-5'-P-CCNC: 14 U/L (ref 10–44)
ANION GAP SERPL CALC-SCNC: 11 MMOL/L (ref 8–16)
APTT BLDCRRT: 35.6 SEC (ref 21–32)
APTT BLDCRRT: 45.8 SEC (ref 21–32)
APTT BLDCRRT: >150 SEC (ref 21–32)
AST SERPL-CCNC: 23 U/L (ref 10–40)
BASOPHILS # BLD AUTO: 0.06 K/UL (ref 0–0.2)
BASOPHILS NFR BLD: 0.5 % (ref 0–1.9)
BILIRUB SERPL-MCNC: 0.3 MG/DL (ref 0.1–1)
BUN SERPL-MCNC: 18 MG/DL (ref 6–30)
BUN SERPL-MCNC: 19 MG/DL (ref 8–23)
CALCIUM SERPL-MCNC: 9.3 MG/DL (ref 8.7–10.5)
CHLORIDE SERPL-SCNC: 103 MMOL/L (ref 95–110)
CHLORIDE SERPL-SCNC: 105 MMOL/L (ref 95–110)
CO2 SERPL-SCNC: 22 MMOL/L (ref 23–29)
CREAT SERPL-MCNC: 0.7 MG/DL (ref 0.5–1.4)
CREAT SERPL-MCNC: 0.8 MG/DL (ref 0.5–1.4)
DIFFERENTIAL METHOD: ABNORMAL
EOSINOPHIL # BLD AUTO: 0.1 K/UL (ref 0–0.5)
EOSINOPHIL NFR BLD: 0.6 % (ref 0–8)
ERYTHROCYTE [DISTWIDTH] IN BLOOD BY AUTOMATED COUNT: 18.8 % (ref 11.5–14.5)
EST. GFR  (NO RACE VARIABLE): >60 ML/MIN/1.73 M^2
GLUCOSE SERPL-MCNC: 105 MG/DL (ref 70–110)
GLUCOSE SERPL-MCNC: 99 MG/DL (ref 70–110)
HCT VFR BLD AUTO: 28.6 % (ref 40–54)
HCT VFR BLD CALC: 30 %PCV (ref 36–54)
HGB BLD-MCNC: 9.1 G/DL (ref 14–18)
IMM GRANULOCYTES # BLD AUTO: 0.05 K/UL (ref 0–0.04)
IMM GRANULOCYTES NFR BLD AUTO: 0.4 % (ref 0–0.5)
INR PPP: 1.4 (ref 0.8–1.2)
LACTATE SERPL-SCNC: 1.3 MMOL/L (ref 0.5–2.2)
LYMPHOCYTES # BLD AUTO: 3.6 K/UL (ref 1–4.8)
LYMPHOCYTES NFR BLD: 29.7 % (ref 18–48)
MCH RBC QN AUTO: 26.8 PG (ref 27–31)
MCHC RBC AUTO-ENTMCNC: 31.8 G/DL (ref 32–36)
MCV RBC AUTO: 84 FL (ref 82–98)
MONOCYTES # BLD AUTO: 0.8 K/UL (ref 0.3–1)
MONOCYTES NFR BLD: 6.8 % (ref 4–15)
NEUTROPHILS # BLD AUTO: 7.4 K/UL (ref 1.8–7.7)
NEUTROPHILS NFR BLD: 62 % (ref 38–73)
NRBC BLD-RTO: 0 /100 WBC
PLATELET # BLD AUTO: 579 K/UL (ref 150–450)
PMV BLD AUTO: 9.7 FL (ref 9.2–12.9)
POC IONIZED CALCIUM: 1.21 MMOL/L (ref 1.06–1.42)
POC TCO2 (MEASURED): 24 MMOL/L (ref 23–29)
POTASSIUM BLD-SCNC: 3.6 MMOL/L (ref 3.5–5.1)
POTASSIUM SERPL-SCNC: 3.6 MMOL/L (ref 3.5–5.1)
PROT SERPL-MCNC: 6.3 G/DL (ref 6–8.4)
PROTHROMBIN TIME: 14.6 SEC (ref 9–12.5)
RBC # BLD AUTO: 3.39 M/UL (ref 4.6–6.2)
SAMPLE: ABNORMAL
SODIUM BLD-SCNC: 138 MMOL/L (ref 136–145)
SODIUM SERPL-SCNC: 138 MMOL/L (ref 136–145)
TROPONIN I SERPL DL<=0.01 NG/ML-MCNC: 0.01 NG/ML (ref 0–0.03)
WBC # BLD AUTO: 11.96 K/UL (ref 3.9–12.7)

## 2022-10-02 PROCEDURE — 25000003 PHARM REV CODE 250: Performed by: INTERNAL MEDICINE

## 2022-10-02 PROCEDURE — 25500020 PHARM REV CODE 255: Performed by: EMERGENCY MEDICINE

## 2022-10-02 PROCEDURE — 99223 1ST HOSP IP/OBS HIGH 75: CPT | Mod: GC,,, | Performed by: INTERNAL MEDICINE

## 2022-10-02 PROCEDURE — 80053 COMPREHEN METABOLIC PANEL: CPT

## 2022-10-02 PROCEDURE — 99223 PR INITIAL HOSPITAL CARE,LEVL III: ICD-10-PCS | Mod: GC,,, | Performed by: INTERNAL MEDICINE

## 2022-10-02 PROCEDURE — 93010 EKG 12-LEAD: ICD-10-PCS | Mod: ,,, | Performed by: INTERNAL MEDICINE

## 2022-10-02 PROCEDURE — 85610 PROTHROMBIN TIME: CPT

## 2022-10-02 PROCEDURE — 93005 ELECTROCARDIOGRAM TRACING: CPT

## 2022-10-02 PROCEDURE — 99285 PR EMERGENCY DEPT VISIT,LEVEL V: ICD-10-PCS | Mod: ,,, | Performed by: EMERGENCY MEDICINE

## 2022-10-02 PROCEDURE — 93010 ELECTROCARDIOGRAM REPORT: CPT | Mod: ,,, | Performed by: INTERNAL MEDICINE

## 2022-10-02 PROCEDURE — 20600001 HC STEP DOWN PRIVATE ROOM

## 2022-10-02 PROCEDURE — 99223 1ST HOSP IP/OBS HIGH 75: CPT | Mod: AI,GC,, | Performed by: INTERNAL MEDICINE

## 2022-10-02 PROCEDURE — 36415 COLL VENOUS BLD VENIPUNCTURE: CPT | Performed by: INTERNAL MEDICINE

## 2022-10-02 PROCEDURE — 99285 EMERGENCY DEPT VISIT HI MDM: CPT | Mod: ,,, | Performed by: EMERGENCY MEDICINE

## 2022-10-02 PROCEDURE — 85730 THROMBOPLASTIN TIME PARTIAL: CPT

## 2022-10-02 PROCEDURE — 25000003 PHARM REV CODE 250: Performed by: STUDENT IN AN ORGANIZED HEALTH CARE EDUCATION/TRAINING PROGRAM

## 2022-10-02 PROCEDURE — 99285 EMERGENCY DEPT VISIT HI MDM: CPT | Mod: 25

## 2022-10-02 PROCEDURE — 96366 THER/PROPH/DIAG IV INF ADDON: CPT

## 2022-10-02 PROCEDURE — 83605 ASSAY OF LACTIC ACID: CPT

## 2022-10-02 PROCEDURE — 85730 THROMBOPLASTIN TIME PARTIAL: CPT | Mod: 91 | Performed by: INTERNAL MEDICINE

## 2022-10-02 PROCEDURE — 84484 ASSAY OF TROPONIN QUANT: CPT

## 2022-10-02 PROCEDURE — 63600175 PHARM REV CODE 636 W HCPCS

## 2022-10-02 PROCEDURE — 96375 TX/PRO/DX INJ NEW DRUG ADDON: CPT

## 2022-10-02 PROCEDURE — 85025 COMPLETE CBC W/AUTO DIFF WBC: CPT

## 2022-10-02 PROCEDURE — 85730 THROMBOPLASTIN TIME PARTIAL: CPT | Mod: 91 | Performed by: EMERGENCY MEDICINE

## 2022-10-02 PROCEDURE — 96365 THER/PROPH/DIAG IV INF INIT: CPT

## 2022-10-02 PROCEDURE — 99223 PR INITIAL HOSPITAL CARE,LEVL III: ICD-10-PCS | Mod: AI,GC,, | Performed by: INTERNAL MEDICINE

## 2022-10-02 RX ORDER — CILOSTAZOL 50 MG/1
100 TABLET ORAL 2 TIMES DAILY
Status: DISCONTINUED | OUTPATIENT
Start: 2022-10-02 | End: 2022-10-12 | Stop reason: HOSPADM

## 2022-10-02 RX ORDER — SODIUM CHLORIDE 0.9 % (FLUSH) 0.9 %
10 SYRINGE (ML) INJECTION
Status: DISCONTINUED | OUTPATIENT
Start: 2022-10-02 | End: 2022-10-12 | Stop reason: HOSPADM

## 2022-10-02 RX ORDER — HEPARIN SODIUM,PORCINE/D5W 25000/250
0-40 INTRAVENOUS SOLUTION INTRAVENOUS CONTINUOUS
Status: DISCONTINUED | OUTPATIENT
Start: 2022-10-02 | End: 2022-10-05

## 2022-10-02 RX ORDER — OXYCODONE AND ACETAMINOPHEN 5; 325 MG/1; MG/1
1 TABLET ORAL EVERY 4 HOURS PRN
Status: DISCONTINUED | OUTPATIENT
Start: 2022-10-02 | End: 2022-10-06

## 2022-10-02 RX ORDER — MORPHINE SULFATE 4 MG/ML
4 INJECTION, SOLUTION INTRAMUSCULAR; INTRAVENOUS
Status: COMPLETED | OUTPATIENT
Start: 2022-10-02 | End: 2022-10-02

## 2022-10-02 RX ORDER — LOSARTAN POTASSIUM 50 MG/1
50 TABLET ORAL DAILY
Status: DISCONTINUED | OUTPATIENT
Start: 2022-10-02 | End: 2022-10-08

## 2022-10-02 RX ORDER — LOSARTAN POTASSIUM AND HYDROCHLOROTHIAZIDE 12.5; 5 MG/1; MG/1
1 TABLET ORAL DAILY
Status: DISCONTINUED | OUTPATIENT
Start: 2022-10-02 | End: 2022-10-02

## 2022-10-02 RX ORDER — HYDROCHLOROTHIAZIDE 12.5 MG/1
12.5 TABLET ORAL DAILY
Status: DISCONTINUED | OUTPATIENT
Start: 2022-10-02 | End: 2022-10-08

## 2022-10-02 RX ORDER — GABAPENTIN 300 MG/1
900 CAPSULE ORAL 3 TIMES DAILY
Status: DISCONTINUED | OUTPATIENT
Start: 2022-10-02 | End: 2022-10-12 | Stop reason: HOSPADM

## 2022-10-02 RX ORDER — IBUPROFEN 200 MG
1 TABLET ORAL DAILY PRN
Status: DISCONTINUED | OUTPATIENT
Start: 2022-10-02 | End: 2022-10-03

## 2022-10-02 RX ORDER — EZETIMIBE 10 MG/1
10 TABLET ORAL DAILY
Status: DISCONTINUED | OUTPATIENT
Start: 2022-10-02 | End: 2022-10-12 | Stop reason: HOSPADM

## 2022-10-02 RX ORDER — NAPROXEN SODIUM 220 MG/1
81 TABLET, FILM COATED ORAL DAILY
Status: DISCONTINUED | OUTPATIENT
Start: 2022-10-02 | End: 2022-10-06

## 2022-10-02 RX ORDER — ACETAMINOPHEN 325 MG/1
650 TABLET ORAL EVERY 4 HOURS PRN
Status: DISCONTINUED | OUTPATIENT
Start: 2022-10-02 | End: 2022-10-09

## 2022-10-02 RX ORDER — ATORVASTATIN CALCIUM 40 MG/1
80 TABLET, FILM COATED ORAL DAILY
Status: DISCONTINUED | OUTPATIENT
Start: 2022-10-02 | End: 2022-10-06

## 2022-10-02 RX ORDER — MORPHINE SULFATE 2 MG/ML
2 INJECTION, SOLUTION INTRAMUSCULAR; INTRAVENOUS EVERY 4 HOURS PRN
Status: DISCONTINUED | OUTPATIENT
Start: 2022-10-02 | End: 2022-10-06

## 2022-10-02 RX ADMIN — IOHEXOL 100 ML: 350 INJECTION, SOLUTION INTRAVENOUS at 06:10

## 2022-10-02 RX ADMIN — CILOSTAZOL 100 MG: 100 TABLET ORAL at 11:10

## 2022-10-02 RX ADMIN — EZETIMIBE 10 MG: 10 TABLET ORAL at 11:10

## 2022-10-02 RX ADMIN — ASPIRIN 81 MG CHEWABLE TABLET 81 MG: 81 TABLET CHEWABLE at 08:10

## 2022-10-02 RX ADMIN — CILOSTAZOL 100 MG: 100 TABLET ORAL at 08:10

## 2022-10-02 RX ADMIN — IOHEXOL 25 ML: 350 INJECTION, SOLUTION INTRAVENOUS at 06:10

## 2022-10-02 RX ADMIN — ACETAMINOPHEN 650 MG: 325 TABLET ORAL at 11:10

## 2022-10-02 RX ADMIN — GABAPENTIN 900 MG: 300 CAPSULE ORAL at 04:10

## 2022-10-02 RX ADMIN — LOSARTAN POTASSIUM 50 MG: 50 TABLET, FILM COATED ORAL at 11:10

## 2022-10-02 RX ADMIN — GABAPENTIN 900 MG: 300 CAPSULE ORAL at 08:10

## 2022-10-02 RX ADMIN — OXYCODONE HYDROCHLORIDE AND ACETAMINOPHEN 1 TABLET: 5; 325 TABLET ORAL at 04:10

## 2022-10-02 RX ADMIN — HEPARIN SODIUM 14 UNITS/KG/HR: 10000 INJECTION, SOLUTION INTRAVENOUS at 04:10

## 2022-10-02 RX ADMIN — HYDROCHLOROTHIAZIDE 12.5 MG: 12.5 TABLET ORAL at 11:10

## 2022-10-02 RX ADMIN — MORPHINE SULFATE 4 MG: 4 INJECTION INTRAVENOUS at 05:10

## 2022-10-02 RX ADMIN — HEPARIN SODIUM 18 UNITS/KG/HR: 10000 INJECTION, SOLUTION INTRAVENOUS at 06:10

## 2022-10-02 RX ADMIN — ATORVASTATIN CALCIUM 80 MG: 40 TABLET, FILM COATED ORAL at 08:10

## 2022-10-02 NOTE — ED NOTES
Pt care assumed. Report received by ESMER Myrick. Pt lying in stretcher in low and locked position and side rails raised x2. Call light, pt's belongings, and bedside table within pt's reach. Pt on continuous cardiac monitoring, pulse oximetry, and BP cycling every 30 minutes. Pt in NAD and verbalized no needs at this time.

## 2022-10-02 NOTE — PLAN OF CARE
VSS, free from falls and injury.  Pain to RLE present.  Managed with Gabapentin and Percocet this shift.

## 2022-10-02 NOTE — ED NOTES
Two patient identifiers checked and correct. Heparin infusing into IV on RUE.    LOC: Patient name and date of birth verified. The patient is asleep, arousable to voice, aware of environment with an appropriate affect, the patient is oriented x 3 and speaking appropriately.   APPEARANCE: Patient resting comfortably, patient is clean and well groomed, patient's clothing is properly fastened.  SKIN: The skin is warm and dry, color consistent with ethnicity, patient has normal skin turgor and moist mucus membranes. Bulla on the right anterior shin, Superficial ulcers on the posterior shin with overlying scarring   MUSCULOSKELETAL: Patient moving all extremities, complaint of myalgia and difficulty ambulating. Absent pedal pulse   RESPIRATORY: Respirations are spontaneous, patient has a normal effort and rate, no accessory muscle use noted.  CARDIAC: Patient has a normal rate and rhythm, no periphreal edema noted, capillary refill < 3 seconds.  ABDOMEN: Soft and non tender to palpation, no distention noted. Bowel sounds present in all four quadrants.  NEUROLOGIC: Eyes open spontaneously, behavior appropriate to situation, follows commands, facial expression symmetrical, bilateral hand grasp equal and even, purposeful motor response noted, normal sensation in all extremities when touched with a finger.

## 2022-10-02 NOTE — ED NOTES
I-STAT Chem-8+ Results:   Value Reference Range   Sodium 138 136-145 mmol/L   Potassium  3.6 3.5-5.1 mmol/L   Chloride 103  mmol/L   Ionized Calcium 1.21 1.06-1.42 mmol/L   CO2 (measured) 24 23-29 mmol/L   Glucose 105  mg/dL   BUN 18 6-30 mg/dL   Creatinine 0.8 0.5-1.4 mg/dL   Hematocrit 30 36-54%

## 2022-10-02 NOTE — HPI
Babatunde Singh is a 71 yo M withPAD, HTN, HLD, recent diagnosis of DVT (9/8) on xarelto who presents due to RLE pain. Of note, patient has frequent admission for lower extremity pain. He states that today he has had increasing 10/10 aching pain which wakes him up from sleep every few hours. This has been progressing over the last 2-3 days. He states at baseline he has pain every other night. Improved pain in the ER but RLE distal pulses were absent on presentation, unable to palpate or obtain Doppler signal.

## 2022-10-02 NOTE — CONSULTS
[facility], [place]  Interventional Cardiology Consult       Babatunde Singh  YOB: 1950  Medical Record Number:  [id number]  Attending Physician:  Ricky Rojas MD   Date of Admission: 10/2/2022       Hospital Day:  0  Current Principal Problem:  <principal problem not specified>      History     Cc: right leg pain     HPI  Babatunde Singh is a 71 yo M with significant PAD, HTN, HLD, recent diagnosis of DVT and smoking who presents due to RLE pain. He has been seen in the ED several times recently for RLE pain.      Regarding PAD history, he established with Dr. Wallace 7/2022 and started on cilostazol claudication. He has never had interventions or revascularization procedures to his lower extremities. He had pain with walking less than one block at the time but this was resolved with rest. Since then he has had numberous visits to the emergency department for right leg pain. On 9/9 he was diagnosed with DVT of the RLE and started on xarelto. He again presented 9/15 and had repeat US which showed resolution of previously seen thrombus. He underwent CTA bilateral lower extremities which showed extensive areas of occlusion throughout bilateral lower extremities from aorta to SFA, had patent popliteals with 2 vessel runoff bilaterally. No intervention was performed and he was advised to follow up with outpatient cardiologist. He has historically difficult to palpate RLE pulses however at ED visit 9/25 providers documented palpable DP and PT pulses to the RLE.     He presents today with severe RLE pain which was present upon awakening for the past few hours, associated numbness from mid shin distally which is chronic and at baseline. He has limited range of motion of the ankle and toes but this is not worse than baseline. On my interview he reports his pain is improved now compared to on waking. RLE distal pulses were absent on presentation, ED staff unable to palpate or obtain Doppler  signal. CTA lower extremities again obtained which demonstrated     Medications - Outpatient  Prior to Admission medications    Medication Sig Start Date End Date Taking? Authorizing Provider   acetaminophen (TYLENOL) 325 MG tablet Take 2 tablets (650 mg total) by mouth every 8 (eight) hours as needed. 9/18/22   Mariya Turner MD   aspirin 81 MG Chew Chew and swallow 1 tablet (81 mg total) by mouth once daily. 9/18/22 9/18/23  Mariya Turner MD   atorvastatin (LIPITOR) 80 MG tablet Take 1 tablet (80 mg total) by mouth once daily. 9/15/22   KALEB Galvan   bacitracin 500 unit/gram ointment Apply topically 2 (two) times daily. 9/18/22   Mariya Turner MD   cilostazoL (PLETAL) 100 MG Tab Take 1 tablet (100 mg total) by mouth 2 (two) times daily. 8/11/22 8/11/23  Cr Wallace MD PhD   diclofenac sodium (VOLTAREN) 1 % Gel Apply 2 g topically 3 (three) times daily as needed (muscle spasm pain). Apply 2 grams to affected area 3 times daily as needed 8/25/22   Ralph Thomas MD   ezetimibe (ZETIA) 10 mg tablet Take 1 tablet (10 mg total) by mouth once daily. 7/26/22 7/26/23  Cr Wallace MD PhD   gabapentin (NEURONTIN) 300 MG capsule Take 3 capsules (900 mg total) by mouth 3 (three) times daily. 8/25/22 8/25/23  Ralph Thomas MD   LIDOcaine (LIDODERM) 5 % Apply to affected area as needed for pain for 12 hours, then off for 12 hours. Discard after each use.  May use 4% lidocaine patch as alternative. 8/25/22   Ralph Thomas MD   losartan-hydrochlorothiazide 50-12.5 mg (HYZAAR) 50-12.5 mg per tablet Take 1 tablet by mouth once daily. 9/15/22 9/15/23  KALEB Galvan   multivitamin (THERAGRAN) per tablet Take 1 tablet by mouth once daily.    Historical Provider   mupirocin (BACTROBAN) 2 % ointment Apply topically 2 (two) times daily. 9/15/22   Jennifer Fatima, KALEB   naproxen (NAPROSYN) 500 MG tablet Take 1 tablet (500 mg total) by mouth 2 (two) times daily as needed  (pain). 8/25/22   Ralph Thomas MD   rivaroxaban (XARELTO DVT-PE TREAT 30D START) 15 mg (42)- 20 mg (9) tablet dose pack Take 1 tablet (15 mg) by mouth twice daily with food for 21 days followed by 1 tablet (20 mg) by mouth once daily with food 9/9/22   Karri Dunn II, MD   terbinafine HCl (LAMISIL ORAL) Take by mouth once daily at 6am.    Historical Provider         Medications - Current  Scheduled Meds:   heparin (PORCINE)  80 Units/kg (Adjusted) Intravenous Once     Continuous Infusions:   heparin (porcine) in D5W       PRN Meds:.heparin (PORCINE), heparin (PORCINE)      Allergies  Review of patient's allergies indicates:  No Known Allergies      Past Medical History  Past Medical History:   Diagnosis Date    Hypertension     Peripheral arterial disease          Past Surgical History  Past Surgical History:   Procedure Laterality Date    EXCISION OF HYDROCELE Left 4/26/2022    Procedure: HYDROCELECTOMY;  Surgeon: Damion Roberts MD;  Location: [facility];  Service: Urology;  Laterality: Left;    KNEE SURGERY  1972         Social History  Social History     Socioeconomic History    Marital status:    Occupational History    Occupation: [employer]   Tobacco Use    Smoking status: Former     Packs/day: 0.50     Years: 50.00     Pack years: 25.00     Types: Cigarettes    Smokeless tobacco: Never   Substance and Sexual Activity    Alcohol use: Never    Drug use: Never    Sexual activity: Yes     Partners: Female     Comment: wife         ROS  10 poit ROS performed and negative except as stated in HPI     Physical Examination         Vital Signs  Vitals  Temp: 97.2 °F (36.2 °C)  Temp src: Oral  Pulse: 85  Resp: 16  SpO2: 99 %  O2 Device (Oxygen Therapy): room air  BP: (!) 165/71  MAP (mmHg): 102            24 Hour VS Range    Temp:  [97.2 °F (36.2 °C)]   Pulse:  [85-94]   Resp:  [16-18]   BP: (121-165)/(58-71)   SpO2:  [95 %-99 %]   No intake or output data in the 24 hours ending 10/02/22 0615       Physical Exam:   Constitutional: no acute distress  HEENT: NCAT, EOMI, no scleral icterus  Cardiovascular: Regular rate and rhythm, 2/6 holosystolic murmur at the cardiac apex. 2+ radial pulses bilaterally. 1+ L femoral pulse, unable to palpate R femoral pulse. Unable to palpate distal lower extremity pulses. Monophasic doppler signal to bilateral PT, unable to obtain Doppler signal to either DP. Cool RLE as compared to left with mild edema and rubor, delayed cap refill ~5 sec of the right foot.    Pulmonary: Clear to auscultation bilaterally   Abdomen: nontender, non-distended   Neuro: alert and oriented, no focal deficits  Extremities: warm, no edema   MSK: no deformities  Integument: intact, no rashes       Data       Recent Labs   Lab 09/25/22  1210 10/02/22  0519 10/02/22  0520   WBC 11.72  --  11.96   HGB 8.5*  --  9.1*   HCT 27.4* 30* 28.6*   *  --  579*        Recent Labs   Lab 10/02/22  0520   INR 1.4*        Recent Labs   Lab 09/25/22  1210 10/02/22  0520    138   K 3.5 3.6    105   CO2 23 22*   BUN 17 19   CREATININE 0.7 0.7   ANIONGAP 7* 11   CALCIUM 8.7 9.3        Recent Labs   Lab 09/25/22  1210 10/02/22  0520   PROT 6.2 6.3   ALBUMIN 3.0* 3.0*   BILITOT 0.4 0.3   ALKPHOS 67 74   AST 29 23   ALT 14 14        Recent Labs   Lab 10/02/22  0520   TROPONINI 0.009        No results found for: BNP    No results for input(s): LABBLOO in the last 168 hours.             Assessment & Plan   Babatunde Singh is a 73 yo M with significant PAD, HTN, HLD, recent diagnosis of DVT and smoking who presents due to RLE pain. He has been seen in the ED several times recently for RLE pain. Presents today for worsening pain, ED staff unable to palpate or Doppler RLE pulses. On my assessment he has PT doppler signal on the right but unable to obtain DP signal.     Acute on chronic limb ischemia:   Likely acute worsening of chronic PAD. Pain is controlled at this time and sensation/motor function appear to  be at baseline. Case discussed with interventional cardiology who agree with following up CTA, admission to CCU and anticoagulation with heparin drip. No emergent intervention but will need catheterization prior to discharge.   -Admit to CCU  -Heparin gtt with serial PTT checks  -ASA/statin  -Serial examination/doppler assessment  -Follow up CTA  -If worsened ischemia on CTA or clinical deterioration will discuss with IC for emergent catheterization        Baldemar Veloz MD  [facility]   Cardiovascular Disease PGY-V

## 2022-10-02 NOTE — SUBJECTIVE & OBJECTIVE
Past Medical History:   Diagnosis Date    Hypertension     Peripheral arterial disease        Past Surgical History:   Procedure Laterality Date    EXCISION OF HYDROCELE Left 4/26/2022    Procedure: HYDROCELECTOMY;  Surgeon: Damion Roberts MD;  Location: [facility];  Service: Urology;  Laterality: Left;    KNEE SURGERY  1972       Review of patient's allergies indicates:  No Known Allergies    No current facility-administered medications on file prior to encounter.     Current Outpatient Medications on File Prior to Encounter   Medication Sig    acetaminophen (TYLENOL) 325 MG tablet Take 2 tablets (650 mg total) by mouth every 8 (eight) hours as needed.    aspirin 81 MG Chew Chew and swallow 1 tablet (81 mg total) by mouth once daily.    atorvastatin (LIPITOR) 80 MG tablet Take 1 tablet (80 mg total) by mouth once daily.    bacitracin 500 unit/gram ointment Apply topically 2 (two) times daily.    cilostazoL (PLETAL) 100 MG Tab Take 1 tablet (100 mg total) by mouth 2 (two) times daily.    diclofenac sodium (VOLTAREN) 1 % Gel Apply 2 g topically 3 (three) times daily as needed (muscle spasm pain). Apply 2 grams to affected area 3 times daily as needed    ezetimibe (ZETIA) 10 mg tablet Take 1 tablet (10 mg total) by mouth once daily.    gabapentin (NEURONTIN) 300 MG capsule Take 3 capsules (900 mg total) by mouth 3 (three) times daily.    LIDOcaine (LIDODERM) 5 % Apply to affected area as needed for pain for 12 hours, then off for 12 hours. Discard after each use.  May use 4% lidocaine patch as alternative.    losartan-hydrochlorothiazide 50-12.5 mg (HYZAAR) 50-12.5 mg per tablet Take 1 tablet by mouth once daily.    multivitamin (THERAGRAN) per tablet Take 1 tablet by mouth once daily.    mupirocin (BACTROBAN) 2 % ointment Apply topically 2 (two) times daily.    naproxen (NAPROSYN) 500 MG tablet Take 1 tablet (500 mg total) by mouth 2 (two) times daily as needed (pain).    rivaroxaban (XARELTO DVT-PE TREAT 30D START)  15 mg (42)- 20 mg (9) tablet dose pack Take 1 tablet (15 mg) by mouth twice daily with food for 21 days followed by 1 tablet (20 mg) by mouth once daily with food    terbinafine HCl (LAMISIL ORAL) Take by mouth once daily at 6am.     Family History       Problem Relation (Age of Onset)    Hypertension Mother, Father          Tobacco Use    Smoking status: Former     Packs/day: 0.50     Years: 50.00     Pack years: 25.00     Types: Cigarettes    Smokeless tobacco: Never    Tobacco comments:     Cigarettes3-4 per day   Substance and Sexual Activity    Alcohol use: Never    Drug use: Never    Sexual activity: Yes     Partners: Female     Comment: wife     Review of Systems   Constitutional: Negative.   HENT: Negative.     Eyes: Negative.    Cardiovascular:  Positive for claudication and leg swelling. Negative for chest pain, near-syncope, orthopnea, palpitations and syncope.   Respiratory: Negative.     Endocrine: Negative.    Hematologic/Lymphatic: Negative.    Skin: Negative.    Musculoskeletal: Negative.    Gastrointestinal: Negative.    Genitourinary: Negative.    Neurological: Negative.    Psychiatric/Behavioral: Negative.     Allergic/Immunologic: Negative.    Objective:     Vital Signs (Most Recent):  Temp: 97.7 °F (36.5 °C) (10/02/22 1514)  Pulse: 107 (10/02/22 1514)  Resp: 16 (10/02/22 1514)  BP: 135/63 (10/02/22 1514)  SpO2: 96 % (10/02/22 1514) Vital Signs (24h Range):  Temp:  [97.2 °F (36.2 °C)-97.8 °F (36.6 °C)] 97.7 °F (36.5 °C)  Pulse:  [] 107  Resp:  [14-22] 16  SpO2:  [95 %-100 %] 96 %  BP: (121-165)/(58-80) 135/63     Weight: 54 kg (119 lb)  Body mass index is 18.64 kg/m².    SpO2: 96 %  O2 Device (Oxygen Therapy): room air    No intake or output data in the 24 hours ending 10/02/22 1612    Lines/Drains/Airways       Peripheral Intravenous Line  Duration                  Peripheral IV - Single Lumen 10/02/22 0520 20 G Anterior;Left Forearm <1 day                    Physical Exam  Constitutional:        Appearance: Normal appearance.   Cardiovascular:      Rate and Rhythm: Normal rate and regular rhythm.      Heart sounds: No murmur heard.    No friction rub. No gallop.   Pulmonary:      Effort: Pulmonary effort is normal.      Breath sounds: Normal breath sounds.   Abdominal:      General: Abdomen is flat.      Palpations: Abdomen is soft.   Musculoskeletal:         General: Normal range of motion.      Comments: Right shin with blister    Ulcer to lateral/posterior calf    Feet:      Comments: Right DP and PT pulses not palpable    Skin:     Coloration: Skin is not jaundiced or pale.      Findings: No bruising, erythema, lesion or rash.   Neurological:      General: No focal deficit present.      Mental Status: He is alert and oriented to person, place, and time.       Significant Labs: All pertinent lab results from the last 24 hours have been reviewed.    Significant Imaging:  All pertinent imaging results from the last 24 hours have been reviewed.

## 2022-10-02 NOTE — ASSESSMENT & PLAN NOTE
Noted on CT AP 8/15, following with urology for evaluation and management    Plan robotic partial vs total nephrectomy as soon as his anticoagulation is finished for DVT, per outpatient urologist.

## 2022-10-02 NOTE — ED TRIAGE NOTES
Babatunde Singh, a 72 y.o. male presents to the ED w/ complaint of pain to R leg and buttocks. States he has chronic nerve pain but has gotten worse over the past few hours. Hx of sciatic nerve pain.     Triage note:  Chief Complaint   Patient presents with    Leg Pain     Pt c/o worsening pain to RLE x few hours. Pt states this is a chronic issue that he has. Hx of right leg pain and right sciatica pain.      Review of patient's allergies indicates:  No Known Allergies  Past Medical History:   Diagnosis Date    Hypertension     Peripheral arterial disease

## 2022-10-02 NOTE — ASSESSMENT & PLAN NOTE
Ischemic ulcer with surrounding erythema, previously treated with doxycycline    - wound care consulted  - consider further antibiotics if worsening erythema or clinical status

## 2022-10-02 NOTE — ASSESSMENT & PLAN NOTE
"Acute on chronic limb ischemia:   Patient with significant PAD presents with worsening acute on chronic RLE pain unable to palpate or Doppler RLE pulses. Previously seen by Dr. Tracey for PAD, at that time management was medical only. PT doppler signal on the right but unable to obtain DP signal on admission. Suspect acute worsening of chronic PAD. Patient evaluated by IC.    CTA notable for "worsening stenosis or interval occlusion of the right dorsalis pedis artery, otherwise grossly unchanged noting regions of persistent occlusion as described above with two vessel runoff to the bilateral lower extremities."    - continue heparin infusion  - continue aspirin and statin  - continue cilostazol  - regular LE pulse examination and monitoring of pain  - NPO at midnight, potentially can complete angiogram 10/3  - multimodal pain regimen (opioid, gabapentin, tylenol)     "

## 2022-10-02 NOTE — ED PROVIDER NOTES
Encounter Date: 10/2/2022       History     Chief Complaint   Patient presents with    Leg Pain     Pt c/o worsening pain to RLE x few hours. Pt states this is a chronic issue that he has. Hx of right leg pain and right sciatica pain.      72 year old male with history of severe PAD, HTN, HLD, and recently dx'd RLE DVT (9/9) on Xarelto presenting to the ED with worsening right lower leg pain.  Patient reports that he woke up this morning with severe right leg pain.  He reports numbness and pain unresolved with home gabapentin.  Patient reports that he has a chronic posterior calf wound that is being followed by wound care outpatient.  He reports he is still taking his oral anticoagulation for previously diagnosed DVT.  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  He denies any chest pain, shortness of breath, nausea, vomiting, fevers at this time.    The history is provided by the patient and medical records. No  was used.   Review of patient's allergies indicates:  No Known Allergies  Past Medical History:   Diagnosis Date    Hypertension     Peripheral arterial disease      Past Surgical History:   Procedure Laterality Date    EXCISION OF HYDROCELE Left 4/26/2022    Procedure: HYDROCELECTOMY;  Surgeon: Damion Roberts MD;  Location: [facility];  Service: Urology;  Laterality: Left;    KNEE SURGERY  1972     Family History   Problem Relation Age of Onset    Hypertension Mother     Hypertension Father      Social History     Tobacco Use    Smoking status: Former     Packs/day: 0.50     Years: 50.00     Pack years: 25.00     Types: Cigarettes    Smokeless tobacco: Never    Tobacco comments:     Cigarettes3-4 per day   Substance Use Topics    Alcohol use: Never    Drug use: Never     Review of Systems   Constitutional:  Negative for fatigue and fever.   HENT:  Negative for sore throat.    Respiratory:  Negative for cough, chest tightness and shortness of breath.    Cardiovascular:   Negative for chest pain.   Gastrointestinal:  Negative for anal bleeding, nausea and vomiting.   Genitourinary:  Negative for dysuria.   Musculoskeletal:  Positive for myalgias. Negative for back pain.   Skin:  Negative for rash.   Neurological:  Negative for weakness, light-headedness and headaches.   Hematological:  Does not bruise/bleed easily.     Physical Exam     Initial Vitals [10/02/22 0310]   BP Pulse Resp Temp SpO2   (!) 121/58 94 18 97.2 °F (36.2 °C) 95 %      MAP       --         Physical Exam    Nursing note and vitals reviewed.  Constitutional: He appears well-developed and well-nourished.   HENT:   Head: Normocephalic and atraumatic.   Eyes: EOM are normal. Pupils are equal, round, and reactive to light.   Neck: Neck supple. No JVD present.   Normal range of motion.  Cardiovascular:  Normal rate, regular rhythm and normal heart sounds.           Pulses:       Dorsalis pedis pulses are 0 on the right side.        Posterior tibial pulses are 0 on the right side.   Pulmonary/Chest: Breath sounds normal. No respiratory distress.   Abdominal: Abdomen is soft. Bowel sounds are normal.   Musculoskeletal:         General: Tenderness and edema present. Normal range of motion.      Cervical back: Normal range of motion and neck supple.     Lymphadenopathy:     He has no cervical adenopathy.   Neurological: He is alert and oriented to person, place, and time. He has normal strength and normal reflexes. GCS score is 15. GCS eye subscore is 4. GCS verbal subscore is 5. GCS motor subscore is 6.   Skin: Skin is warm and dry. Capillary refill takes less than 2 seconds.   Bulla on the right anterior shin  Superficial ulcers on the posterior shin with overlying scarring   Psychiatric: He has a normal mood and affect. Thought content normal.       ED Course   Procedures  Labs Reviewed   CBC W/ AUTO DIFFERENTIAL - Abnormal; Notable for the following components:       Result Value    RBC 3.39 (*)     Hemoglobin 9.1 (*)      Hematocrit 28.6 (*)     MCH 26.8 (*)     MCHC 31.8 (*)     RDW 18.8 (*)     Platelets 579 (*)     Immature Grans (Abs) 0.05 (*)     All other components within normal limits   COMPREHENSIVE METABOLIC PANEL - Abnormal; Notable for the following components:    CO2 22 (*)     Albumin 3.0 (*)     All other components within normal limits    Narrative:     ADD ON TROPONIN PER DR KIP LIM;OME/ORDER# [id number] @ 5:52AM    add on ptt [id number] per dr. elise 10/02/2022  05:41    PROTIME-INR - Abnormal; Notable for the following components:    Prothrombin Time 14.6 (*)     INR 1.4 (*)     All other components within normal limits   APTT - Abnormal; Notable for the following components:    aPTT 35.6 (*)     All other components within normal limits    Narrative:     ADD ON TROPONIN PER DR KIP LIM;OME/ORDER# [id number] @ 5:52AM    add on ptt [id number] per dr. elise 10/02/2022  05:41    APTT - Abnormal; Notable for the following components:    aPTT >150.0 (*)     All other components within normal limits    Narrative:     PTT   critical result(s) called and verbal readback obtained from   ORTIZ KIRKLAND RN by RAY 10/02/2022 13:57   ISTAT PROCEDURE - Abnormal; Notable for the following components:    POC Hematocrit 30 (*)     All other components within normal limits   APTT   TROPONIN I   LACTIC ACID, PLASMA   TROPONIN I    Narrative:     ADD ON TROPONIN PER DR KIP LIM;OME/ORDER# [id number] @ 5:52AM    add on ptt [id number] per dr. elise 10/02/2022  05:41    ISTAT CHEM8     EKG Readings: (Independently Interpreted)   Initial Reading: No STEMI. Rhythm: Normal Sinus Rhythm. Ectopy: No Ectopy. Clinical Impression: Normal Sinus Rhythm   No prior for comparison  ST depression inferiorly, V6     ECG Results              EKG 12-lead (Final result)  Result time 10/02/22 08:26:17      Final result by Interface, Lab In [facility] (10/02/22 08:26:17)                   Narrative:    Test Reason :     Vent. Rate : 082 BPM     Atrial Rate : 082  BPM     P-R Int : 164 ms          QRS Dur : 082 ms      QT Int : 400 ms       P-R-T Axes : 074 026 243 degrees     QTc Int : 467 ms    Normal sinus rhythm  ST and T wave abnormality, consider inferior ischemia  Abnormal ECG  No previous ECGs available  Confirmed by Tolu Johnson MD ([id number]) on 10/2/2022 8:26:08 AM    Referred By: [email or web address]   SELF           Confirmed By:Tolu Johnson MD                                  Imaging Results              CTA Runoff ABD PEL Bilat Lower Ext (Final result)  Result time 10/02/22 09:46:16      Final result by Babatunde Vang MD (10/02/22 09:46:16)                   Impression:      1. Possible worsening stenosis or interval occlusion of the right dorsalis pedis artery.  In this patient with severe aortoiliac atherosclerosis and peripheral arterial disease, overall evaluation of lower extremity arteries is otherwise grossly unchanged noting regions of persistent occlusion as described above with two vessel runoff to the bilateral lower extremities.  Consider conventional angiographic follow-up as clinically appropriate and correlation for any changes in lower extremity symptoms.  2. Stable large heterogeneous enhancing lesion in the upper pole of the right kidney, likely related to renal cell carcinoma.  3. Centrilobular pulmonary emphysema.  4. Cholelithiasis and gallbladder wall thickening.  5. Stable, enhancing splenic lesion, likely a hemangioma.  6. Colonic diverticulosis without diverticulitis.  7. Additional findings as above.    Electronically signed by resident: Harinder Purcell  Date:    10/02/2022  Time:    08:32    Electronically signed by: Babatunde Vang MD  Date:    10/02/2022  Time:    09:46               Narrative:    EXAMINATION:  CTA RUNOFF ABD PEL BILAT LOWER EXT    CLINICAL HISTORY:  Arterial embolism, lower extremity;    TECHNIQUE:  CTA images were obtained from the lung bases to the pubic symphysis.  Noncontrast, arterial, and delayed phase images were  acquired. 125 mL of intravenous Omnipaque 350 was administered.  Oral contrast was not administered. Axial MIP, sagittal, and coronal reformats were obtained.    COMPARISON:  Ultrasound lower extremity veins right 09/25/2022, CTA runoff abdomen pelvis 09/15/2022, CTA runoff 08/03/2022    FINDINGS:  LUNG BASES: Centrilobular emphysema.  No visualized mediastinal lymphadenopathy.    HEPATOBILIARY:   No focal hepatic lesions.  Probable hepatic steatosis.  No biliary ductal dilatation.  Cholelithiasis and gallbladder wall thickening.    SPLEEN: Stable appearing 1.3 cm enhancing splenic lesion, likely a hemangioma.  No splenomegaly.    PANCREAS:   No focal masses or ductal dilatation.    ADRENALS:   Stable, hypoattenuating subcentimeter left adrenal nodule.  Right adrenals unremarkable.    KIDNEYS/URETERS: Stable-appearing 6.1 cm heterogeneous enhancing lesion in the upper pole of the right kidney.  No hydronephrosis, stones, or additional solid mass lesions.  Visualized ureters are unremarkable.    PELVIC ORGANS/BLADDER:  Bladder is moderately distended without wall thickening.  Dystrophic prostatic calcifications.    PERITONEUM / RETROPERITONEUM:  No free air or fluid.    LYMPH NODES:   No lymphadenopathy.    VESSELS:    Arterial structures:    *Abdomen: The aorta and its visceral branches including the celiac axis, SMA, TODD, and both renal arteries are patent. No splenic artery aneurysm.  Stable-appearing approximate 50% stenosis at the origin of the celiac trunk.  Stable-appearing approximate 50% stenosis at the origin of the right renal artery.  No aneurysmal dilatation is definitively seen as the abdominal aorta.  No evidence of aortic dissection.  Extensive atherosclerotic plaque and calcification of the aorta and its branches.  Severe stenosis or short segment occlusion again noted at the aortic bifurcation.  *Pelvis: Significant concentric plaque at the infrarenal abdominal aorta with narrowing of the abdominal  aorta proximal to the iliac bifurcation with luminal diameter measuring 5 mm.  Persistent occlusion of the right common iliac artery.  Persistent occlusion of the right external iliac artery with few areas of distal reconstitution.  Persistent occlusion of the right internal iliac artery with distal reconstitution.  Overall appearance of the right iliac system is stable compared to prior.  The left common iliac artery is again thread-like and its patency and occluded just proximal to its bifurcation.  The left external iliac artery is occluded with brief distal reconstitution before occlusion extending into the proximal left common femoral artery.  *Infra-inguinal: The right common femoral artery is intermittently occluded.  The right superficial femoral artery is proximally occluded and reconstituted distally just proximal to the trifurcation.  The distal left common femoral artery is partially reconstituted and becomes occluded again proximal to the origin of the superficial femoral artery.  The left superficial femoral artery is patent with diminished caliber and scattered areas of occlusion and reconstitution.  *Popliteal: Bilateral popliteal arteries are patent.  No popliteal arterial aneurysms.  *Infra-popliteal: The right-sided trifurcation vessels are proximally patent with 2 vessel runoff.  Right dorsalis pedis artery is not seen distally.  The left trifurcation vessels are patent proximally with 2 vessel runoff.  Venous structures: Unremarkable.    GI TRACT: Scattered colonic diverticulosis without diverticulitis.  Moderate stool burden in the rectum.  No small bowel obstruction.  Normal appendix.    BONES AND SOFT TISSUES: No fractures or focal osseous lesions.  Degenerative changes of the hips and spine.  Bilateral lower extremity soft tissue edema, right side worse than left.                                       X-Ray Chest AP Portable (Final result)  Result time 10/02/22 06:17:19      Final result by  "Babatunde Vang MD (10/02/22 06:17:19)                   Impression:      No acute cardiopulmonary finding identified on this single view.      Electronically signed by: Babatunde Vang MD  Date:    10/02/2022  Time:    06:17               Narrative:    EXAMINATION:  XR CHEST AP PORTABLE    CLINICAL HISTORY:  Provided history is "  Pain in unspecified foot".    TECHNIQUE:  One view of the chest.    COMPARISON:  None.    FINDINGS:  Cardiac wires overlie the chest.  Cardiac silhouette is not enlarged.  No focal consolidation.  No sizable pleural effusion.  No pneumothorax.                                       Medications   heparin 25,000 units in dextrose 5% (100 units/ml) IV bolus from bag - ADDITIONAL PRN BOLUS - 60 units/kg (has no administration in time range)   heparin 25,000 units in dextrose 5% (100 units/ml) IV bolus from bag - ADDITIONAL PRN BOLUS - 30 units/kg (has no administration in time range)   heparin 25,000 units in dextrose 5% 250 mL (100 units/mL) infusion HIGH INTENSITY nomogram - [facility] (14 Units/kg/hr × 54 kg (Adjusted) Intravenous New Bag 10/2/22 1617)   aspirin chewable tablet 81 mg (81 mg Oral Given 10/2/22 0853)   atorvastatin tablet 80 mg (80 mg Oral Given 10/2/22 0853)   cilostazoL tablet 100 mg (100 mg Oral Given 10/2/22 1119)   ezetimibe tablet 10 mg (10 mg Oral Given 10/2/22 1119)   gabapentin capsule 900 mg (900 mg Oral Given 10/2/22 1621)   sodium chloride 0.9% flush 10 mL (has no administration in time range)   acetaminophen tablet 650 mg (650 mg Oral Given 10/2/22 1121)   losartan tablet 50 mg (50 mg Oral Given 10/2/22 1119)   hydroCHLOROthiazide tablet 12.5 mg (12.5 mg Oral Given 10/2/22 1119)   morphine injection 2 mg (has no administration in time range)   oxyCODONE-acetaminophen 5-325 mg per tablet 1 tablet (1 tablet Oral Given 10/2/22 1625)   nicotine 14 mg/24 hr 1 patch (has no administration in time range)   morphine injection 4 mg (4 mg Intravenous Given 10/2/22 3527) "   heparin 25,000 units in dextrose 5% (100 units/ml) IV bolus from bag INITIAL BOLUS (4,320 Units Intravenous Bolus from Bag 10/2/22 0624)   iohexoL (OMNIPAQUE 350) injection 25 mL (25 mLs Intravenous Given 10/2/22 0622)   iohexoL (OMNIPAQUE 350) injection 100 mL (100 mLs Intravenous Given 10/2/22 0622)     Medical Decision Making:   History:   Old Medical Records: I decided to obtain old medical records.  Initial Assessment:   72-year-old male who presents to the emergency department for worsening right lower extremity pain increased numbness with no resolution after taking gabapentin.  Patient is currently alert oriented in no acute distress.  Patient is afebrile vitals within normal limits.  See physical exam findings noted above.  Differential Diagnosis:   Including, but not limited to:  Acute arterial occlusion   PAD  DVT  Neuropathy      Independently Interpreted Test(s):   I have ordered and independently interpreted EKG Reading(s) - see prior notes  Clinical Tests:   Lab Tests: Ordered and Reviewed  Radiological Study: Ordered and Reviewed  Medical Tests: Ordered and Reviewed  ED Management:  72-year-old male complaining of right lower extremity pain.  Upon assessment patient had no palpable pulses.  Attempted Doppler and unable to find pulse.  Ultrasound taken and there was no color flow - concern for occlusion.  Patient given 4 mg IV morphine for pain.  Patient was started on a heparin bolus and cardiology consulted as he is followed by outpatient Interventional Cardiology for his PA D.  CTA of the right lower extremity ordered and pending at this time.  CBC, CMP obtained evaluate for leukocytosis, anemia, metabolic derangements.  Labs within normal limits and stable from prior.  Patient pending imaging and evaluation and recommendations from Cardiology.  Patient care handed over to oncoming care team.  See their note for final plan and disposition.  Other:   I have discussed this case with another health  care provider.          Attending Attestation:   Physician Attestation Statement for Resident:  As the supervising MD   Physician Attestation Statement: I have personally seen and examined this patient.   I agree with the above history.  -: 72-year-old male presenting with acute on chronic right lower leg and foot pain.  Recent admission and evaluation by Interventional Cardiology with no plan for intervention.  Endorses compliance with Xarelto.    Recent CTA reviewed:   right common femoral artery is intermittently occluded and reconstituted although for the most part remains occluded   As the supervising MD I agree with the above PE.   -:   No distress    Right lower extremity cool to touch compared to left, no palpable or dopplerable DP/PT  Bulla present  Skin discoloration   Left foot is warm and perfused    As the supervising MD I agree with the above treatment, course, plan, and disposition.   -: Concern for acute arterial occlusion.   Plan for repeat CTA.  Heparin gtt. Morphine for pain.   Cardiology consulted.    Signed out to oncoming team at 0600 pending CTA and cardiology recommendations.   Anticipate admission.    I have reviewed and agree with the residents interpretation of the following: lab data, CT scans and EKG.  I have reviewed the following: old records at this facility.              ED Course as of 10/02/22 1714   Sun Oct 02, 2022   0544 Vascular surgery requesting call to interventional cardiology.  [AB]   0544 WBC: 11.96  No leukocytosis  [AB]   0544 Hemoglobin(!): 9.1  Stable anemia  [AB]      ED Course User Index  [AB] Ricky Rojas MD                 Clinical Impression:   Final diagnoses:  [M79.673] Foot pain  [R09.89] Absent pedal pulses (Primary)  [I99.8] Limb ischemia      ED Disposition Condition    Admit                 Cesar Jim MD  Resident  10/02/22 0624       Ricky Rojas MD  10/02/22 1714

## 2022-10-02 NOTE — H&P
[facility] - Cardiology Stepdown  Cardiology  History and Physical     Patient Name: Babatunde Singh  MRN: [id number]  Admission Date: 10/2/2022  Code Status: Full Code   Attending Provider: Jerry Alvarado MD   Primary Care Physician: Esdras Ly MD  Principal Problem:Claudication of right lower extremity    Patient information was obtained from patient and ER records.     Subjective:     Chief Complaint:  Right leg pain     HPI:  Babatunde Singh is a 73 yo M withPAD, HTN, HLD, recent diagnosis of DVT (9/8) on xarelto who presents due to RLE pain. Of note, patient has frequent admission for lower extremity pain. He states that today he has had increasing 10/10 aching pain which wakes him up from sleep every few hours. This has been progressing over the last 2-3 days. He states at baseline he has pain every other night. Improved pain in the ER but RLE distal pulses were absent on presentation, unable to palpate or obtain Doppler signal.      Past Medical History:   Diagnosis Date    Hypertension     Peripheral arterial disease        Past Surgical History:   Procedure Laterality Date    EXCISION OF HYDROCELE Left 4/26/2022    Procedure: HYDROCELECTOMY;  Surgeon: Damion Roberts MD;  Location: [facility];  Service: Urology;  Laterality: Left;    KNEE SURGERY  1972       Review of patient's allergies indicates:  No Known Allergies    No current facility-administered medications on file prior to encounter.     Current Outpatient Medications on File Prior to Encounter   Medication Sig    acetaminophen (TYLENOL) 325 MG tablet Take 2 tablets (650 mg total) by mouth every 8 (eight) hours as needed.    aspirin 81 MG Chew Chew and swallow 1 tablet (81 mg total) by mouth once daily.    atorvastatin (LIPITOR) 80 MG tablet Take 1 tablet (80 mg total) by mouth once daily.    bacitracin 500 unit/gram ointment Apply topically 2 (two) times daily.    cilostazoL (PLETAL) 100 MG Tab Take 1 tablet (100 mg total) by mouth 2  (two) times daily.    diclofenac sodium (VOLTAREN) 1 % Gel Apply 2 g topically 3 (three) times daily as needed (muscle spasm pain). Apply 2 grams to affected area 3 times daily as needed    ezetimibe (ZETIA) 10 mg tablet Take 1 tablet (10 mg total) by mouth once daily.    gabapentin (NEURONTIN) 300 MG capsule Take 3 capsules (900 mg total) by mouth 3 (three) times daily.    LIDOcaine (LIDODERM) 5 % Apply to affected area as needed for pain for 12 hours, then off for 12 hours. Discard after each use.  May use 4% lidocaine patch as alternative.    losartan-hydrochlorothiazide 50-12.5 mg (HYZAAR) 50-12.5 mg per tablet Take 1 tablet by mouth once daily.    multivitamin (THERAGRAN) per tablet Take 1 tablet by mouth once daily.    mupirocin (BACTROBAN) 2 % ointment Apply topically 2 (two) times daily.    naproxen (NAPROSYN) 500 MG tablet Take 1 tablet (500 mg total) by mouth 2 (two) times daily as needed (pain).    rivaroxaban (XARELTO DVT-PE TREAT 30D START) 15 mg (42)- 20 mg (9) tablet dose pack Take 1 tablet (15 mg) by mouth twice daily with food for 21 days followed by 1 tablet (20 mg) by mouth once daily with food    terbinafine HCl (LAMISIL ORAL) Take by mouth once daily at 6am.     Family History       Problem Relation (Age of Onset)    Hypertension Mother, Father          Tobacco Use    Smoking status: Former     Packs/day: 0.50     Years: 50.00     Pack years: 25.00     Types: Cigarettes    Smokeless tobacco: Never    Tobacco comments:     Cigarettes3-4 per day   Substance and Sexual Activity    Alcohol use: Never    Drug use: Never    Sexual activity: Yes     Partners: Female     Comment: wife     Review of Systems   Constitutional: Negative.   HENT: Negative.     Eyes: Negative.    Cardiovascular:  Positive for claudication and leg swelling. Negative for chest pain, near-syncope, orthopnea, palpitations and syncope.   Respiratory: Negative.     Endocrine: Negative.    Hematologic/Lymphatic:  Negative.    Skin: Negative.    Musculoskeletal: Negative.    Gastrointestinal: Negative.    Genitourinary: Negative.    Neurological: Negative.    Psychiatric/Behavioral: Negative.     Allergic/Immunologic: Negative.    Objective:     Vital Signs (Most Recent):  Temp: 97.7 °F (36.5 °C) (10/02/22 1514)  Pulse: 107 (10/02/22 1514)  Resp: 16 (10/02/22 1514)  BP: 135/63 (10/02/22 1514)  SpO2: 96 % (10/02/22 1514) Vital Signs (24h Range):  Temp:  [97.2 °F (36.2 °C)-97.8 °F (36.6 °C)] 97.7 °F (36.5 °C)  Pulse:  [] 107  Resp:  [14-22] 16  SpO2:  [95 %-100 %] 96 %  BP: (121-165)/(58-80) 135/63     Weight: 54 kg (119 lb)  Body mass index is 18.64 kg/m².    SpO2: 96 %  O2 Device (Oxygen Therapy): room air    No intake or output data in the 24 hours ending 10/02/22 1612    Lines/Drains/Airways       Peripheral Intravenous Line  Duration                  Peripheral IV - Single Lumen 10/02/22 0520 20 G Anterior;Left Forearm <1 day                    Physical Exam  Constitutional:       Appearance: Normal appearance.   Cardiovascular:      Rate and Rhythm: Normal rate and regular rhythm.      Heart sounds: No murmur heard.    No friction rub. No gallop.   Pulmonary:      Effort: Pulmonary effort is normal.      Breath sounds: Normal breath sounds.   Abdominal:      General: Abdomen is flat.      Palpations: Abdomen is soft.   Musculoskeletal:         General: Normal range of motion.      Comments: Right shin with blister    Ulcer to lateral/posterior calf    Feet:      Comments: Right DP and PT pulses not palpable    Skin:     Coloration: Skin is not jaundiced or pale.      Findings: No bruising, erythema, lesion or rash.   Neurological:      General: No focal deficit present.      Mental Status: He is alert and oriented to person, place, and time.       Significant Labs: All pertinent lab results from the last 24 hours have been reviewed.    Significant Imaging:  All pertinent imaging results from the last 24 hours have  "been reviewed.    Assessment and Plan:     * Claudication of right lower extremity  Acute on chronic limb ischemia:   Patient with significant PAD presents with worsening acute on chronic RLE pain unable to palpate or Doppler RLE pulses. Previously seen by Dr. Tracey for PAD, at that time management was medical only. PT doppler signal on the right but unable to obtain DP signal on admission. Suspect acute worsening of chronic PAD. Patient evaluated by IC.    CTA notable for "worsening stenosis or interval occlusion of the right dorsalis pedis artery, otherwise grossly unchanged noting regions of persistent occlusion as described above with two vessel runoff to the bilateral lower extremities."    - continue heparin infusion  - continue aspirin and statin  - continue cilostazol  - regular LE pulse examination and monitoring of pain  - NPO at midnight, potentially can complete angiogram 10/3  - multimodal pain regimen (opioid, gabapentin, tylenol)       Ulcer of right lower extremity, limited to breakdown of skin  Ischemic ulcer with surrounding erythema, previously treated with doxycycline    - wound care consulted  - consider further antibiotics if worsening erythema or clinical status    Renal mass  Noted on CT AP 8/15, following with urology for evaluation and management    Plan robotic partial vs total nephrectomy as soon as his anticoagulation is finished for DVT, per outpatient urologist.          Mixed hyperlipidemia  - continue statin and ezetimibe    Smoking  Discussed smoking cessation with patient    - nicotine PRN if needed    Primary hypertension  - continue losartan and hydrochlorothiazide        VTE Risk Mitigation (From admission, onward)         Ordered     IP VTE HIGH RISK PATIENT  Once         10/02/22 0814     Place sequential compression device  Until discontinued         10/02/22 0814     heparin 25,000 units in dextrose 5% (100 units/ml) IV bolus from bag - ADDITIONAL PRN BOLUS - 60 units/kg  " As needed (PRN)        Question:  Heparin Infusion Adjustment (DO NOT MODIFY ANSWER)  Answer:  \\[email or web address]\epic\Images\Pharmacy\HeparinInfusions\heparin HIGH INTENSITY nomogram for [facility] [id number].pdf    10/02/22 0521     heparin 25,000 units in dextrose 5% (100 units/ml) IV bolus from bag - ADDITIONAL PRN BOLUS - 30 units/kg  As needed (PRN)        Question:  Heparin Infusion Adjustment (DO NOT MODIFY ANSWER)  Answer:  \\[email or web address]\epic\Images\Pharmacy\HeparinInfusions\heparin HIGH INTENSITY nomogram for [facility] [id number].pdf    10/02/22 0521     heparin 25,000 units in dextrose 5% 250 mL (100 units/mL) infusion HIGH INTENSITY nomogram - [facility]  Continuous        Question Answer Comment   Heparin Infusion Adjustment (DO NOT MODIFY ANSWER) \\[email or web address]\epic\Images\Pharmacy\HeparinInfusions\heparin HIGH INTENSITY nomogram for [facility] [id number].pdf    Begin at (in units/kg/hr) 18        10/02/22 0521                Marco Herrera MD  Cardiology   [facility] - Cardiology Stepdown

## 2022-10-02 NOTE — Clinical Note
Diagnosis: Limb ischemia [375614]   Admitting Provider:: ADELITA CASILLAS [[id number]]   Future Attending Provider: ADELITA CASILLAS [[id number]]   Reason for IP Medical Treatment  (Clinical interventions that can only be accomplished in the IP setting? ) :: acute on chronic limb ischemia   Estimated Length of Stay:: 2 midnights   I certify that Inpatient services for greater than or equal to 2 midnights are medically necessary:: Yes   Plans for Post-Acute care--if anticipated (pick the single best option):: A. No post acute care anticipated at this time

## 2022-10-02 NOTE — PROVIDER PROGRESS NOTES - EMERGENCY DEPT.
"Encounter Date: 10/2/2022    ED Physician Progress Notes        Physician Note:   ED Resident HAND-OFF NOTE:  6:20 AM 10/2/2022  Babatunde Singh is a 72 y.o. male who presented to the ED on 10/2/2022 and has been managed by Dr. Rojas and Dr. Jim, who reports patient C/O leg pain. I assumed care of patient from off-going ED physician team at 6:20 AM pending CTA of bilateral lower extremities and interventional cardiology recommendations.    On my evaluation, Babatunde Singh appears well and in NAD. Thus far, Babatunde Singh has received:    Medications  heparin 25,000 units in dextrose 5% (100 units/ml) IV bolus from bag INITIAL BOLUS (has no administration in time range)  heparin 25,000 units in dextrose 5% (100 units/ml) IV bolus from bag - ADDITIONAL PRN BOLUS - 60 units/kg (has no administration in time range)  heparin 25,000 units in dextrose 5% (100 units/ml) IV bolus from bag - ADDITIONAL PRN BOLUS - 30 units/kg (has no administration in time range)  heparin 25,000 units in dextrose 5% 250 mL (100 units/mL) infusion HIGH INTENSITY nomogram - [facility] (has no administration in time range)  iohexoL (OMNIPAQUE 350) injection 25 mL (has no administration in time range)  iohexoL (OMNIPAQUE 350) injection 100 mL (has no administration in time range)  morphine injection 4 mg (4 mg Intravenous Given 10/2/22 0557)    On my exam, I appreciate:  BP (!) 165/71   Pulse 85   Temp 97.2 °F (36.2 °C) (Oral)   Resp 16   Ht 5' 7" (1.702 m)   Wt 54 kg (119 lb)   SpO2 99%   BMI 18.64 kg/m²     ED Course as of 10/02/22 0620  ------------------------------------------------------------  Time: 10/02 0544  Comment: Vascular surgery requesting call to interventional cardiology.   By: Ricky Rojas MD  ------------------------------------------------------------  Time: 10/02 0544  Value: WBC: 11.96  Comment: No leukocytosis   By: Ricky Rojas MD  ------------------------------------------------------------  Time: 10/02 " 0544  Value: Hemoglobin(!): 9.1  Comment: Stable anemia   By: Ricky Rojas MD        Additional ED course:  10/02 0948: CTA run off bilateral lower extremity shows possible worsening stenosis or interval occlusion of the right dorsalis pedis artery.  In this patient with severe aortoiliac atherosclerosis and peripheral arterial disease, overall evaluation of lower extremity arteries is otherwise grossly unchanged noting regions of persistent occlusion with two vessel runoff to the bilateral lower extremities.    Disposition:  Patient was admitted to inpatient by Cardiology.  ______________________  Seven Brooks MD   Emergency Medicine Resident  10/2/2022

## 2022-10-03 PROBLEM — I70.209 ATHEROSCLEROSIS OF NATIVE ARTERY OF EXTREMITY: Status: ACTIVE | Noted: 2022-08-25

## 2022-10-03 LAB
ACANTHOCYTES BLD QL SMEAR: PRESENT
ALBUMIN SERPL BCP-MCNC: 2.7 G/DL (ref 3.5–5.2)
ALP SERPL-CCNC: 69 U/L (ref 55–135)
ALT SERPL W/O P-5'-P-CCNC: 13 U/L (ref 10–44)
ANION GAP SERPL CALC-SCNC: 8 MMOL/L (ref 8–16)
ANISOCYTOSIS BLD QL SMEAR: SLIGHT
APTT BLDCRRT: 40.1 SEC (ref 21–32)
APTT BLDCRRT: 43.7 SEC (ref 21–32)
AST SERPL-CCNC: 22 U/L (ref 10–40)
BASOPHILS # BLD AUTO: 0.07 K/UL (ref 0–0.2)
BASOPHILS NFR BLD: 0.6 % (ref 0–1.9)
BILIRUB SERPL-MCNC: 0.3 MG/DL (ref 0.1–1)
BUN SERPL-MCNC: 13 MG/DL (ref 8–23)
BURR CELLS BLD QL SMEAR: ABNORMAL
CALCIUM SERPL-MCNC: 9.1 MG/DL (ref 8.7–10.5)
CHLORIDE SERPL-SCNC: 105 MMOL/L (ref 95–110)
CO2 SERPL-SCNC: 24 MMOL/L (ref 23–29)
CREAT SERPL-MCNC: 0.7 MG/DL (ref 0.5–1.4)
DIFFERENTIAL METHOD: ABNORMAL
EOSINOPHIL # BLD AUTO: 0.1 K/UL (ref 0–0.5)
EOSINOPHIL NFR BLD: 0.9 % (ref 0–8)
ERYTHROCYTE [DISTWIDTH] IN BLOOD BY AUTOMATED COUNT: 18.6 % (ref 11.5–14.5)
EST. GFR  (NO RACE VARIABLE): >60 ML/MIN/1.73 M^2
GLUCOSE SERPL-MCNC: 97 MG/DL (ref 70–110)
HCT VFR BLD AUTO: 25.2 % (ref 40–54)
HGB BLD-MCNC: 7.9 G/DL (ref 14–18)
HYPOCHROMIA BLD QL SMEAR: ABNORMAL
IMM GRANULOCYTES # BLD AUTO: 0.05 K/UL (ref 0–0.04)
IMM GRANULOCYTES NFR BLD AUTO: 0.4 % (ref 0–0.5)
LYMPHOCYTES # BLD AUTO: 4.4 K/UL (ref 1–4.8)
LYMPHOCYTES NFR BLD: 35 % (ref 18–48)
MCH RBC QN AUTO: 26.1 PG (ref 27–31)
MCHC RBC AUTO-ENTMCNC: 31.3 G/DL (ref 32–36)
MCV RBC AUTO: 83 FL (ref 82–98)
MONOCYTES # BLD AUTO: 1 K/UL (ref 0.3–1)
MONOCYTES NFR BLD: 8.3 % (ref 4–15)
NEUTROPHILS # BLD AUTO: 6.9 K/UL (ref 1.8–7.7)
NEUTROPHILS NFR BLD: 54.8 % (ref 38–73)
NRBC BLD-RTO: 0 /100 WBC
OVALOCYTES BLD QL SMEAR: ABNORMAL
PLATELET # BLD AUTO: 522 K/UL (ref 150–450)
PLATELET BLD QL SMEAR: ABNORMAL
PMV BLD AUTO: 10.1 FL (ref 9.2–12.9)
POIKILOCYTOSIS BLD QL SMEAR: SLIGHT
POLYCHROMASIA BLD QL SMEAR: ABNORMAL
POTASSIUM SERPL-SCNC: 4.8 MMOL/L (ref 3.5–5.1)
PROT SERPL-MCNC: 5.7 G/DL (ref 6–8.4)
RBC # BLD AUTO: 3.03 M/UL (ref 4.6–6.2)
SCHISTOCYTES BLD QL SMEAR: ABNORMAL
SCHISTOCYTES BLD QL SMEAR: PRESENT
SODIUM SERPL-SCNC: 137 MMOL/L (ref 136–145)
SPHEROCYTES BLD QL SMEAR: ABNORMAL
TARGETS BLD QL SMEAR: ABNORMAL
WBC # BLD AUTO: 12.5 K/UL (ref 3.9–12.7)

## 2022-10-03 PROCEDURE — 25000003 PHARM REV CODE 250: Performed by: INTERNAL MEDICINE

## 2022-10-03 PROCEDURE — 99233 SBSQ HOSP IP/OBS HIGH 50: CPT | Mod: GC,,, | Performed by: INTERNAL MEDICINE

## 2022-10-03 PROCEDURE — 25000003 PHARM REV CODE 250: Performed by: STUDENT IN AN ORGANIZED HEALTH CARE EDUCATION/TRAINING PROGRAM

## 2022-10-03 PROCEDURE — 85025 COMPLETE CBC W/AUTO DIFF WBC: CPT | Performed by: STUDENT IN AN ORGANIZED HEALTH CARE EDUCATION/TRAINING PROGRAM

## 2022-10-03 PROCEDURE — 99223 PR INITIAL HOSPITAL CARE,LEVL III: ICD-10-PCS | Mod: ,,, | Performed by: SURGERY

## 2022-10-03 PROCEDURE — 80053 COMPREHEN METABOLIC PANEL: CPT | Performed by: STUDENT IN AN ORGANIZED HEALTH CARE EDUCATION/TRAINING PROGRAM

## 2022-10-03 PROCEDURE — 85730 THROMBOPLASTIN TIME PARTIAL: CPT

## 2022-10-03 PROCEDURE — 36415 COLL VENOUS BLD VENIPUNCTURE: CPT | Performed by: INTERNAL MEDICINE

## 2022-10-03 PROCEDURE — 63600175 PHARM REV CODE 636 W HCPCS

## 2022-10-03 PROCEDURE — 99233 PR SUBSEQUENT HOSPITAL CARE,LEVL III: ICD-10-PCS | Mod: GC,,, | Performed by: INTERNAL MEDICINE

## 2022-10-03 PROCEDURE — 99223 1ST HOSP IP/OBS HIGH 75: CPT | Mod: ,,, | Performed by: SURGERY

## 2022-10-03 PROCEDURE — 36415 COLL VENOUS BLD VENIPUNCTURE: CPT

## 2022-10-03 PROCEDURE — 85730 THROMBOPLASTIN TIME PARTIAL: CPT | Mod: 91 | Performed by: INTERNAL MEDICINE

## 2022-10-03 PROCEDURE — 20600001 HC STEP DOWN PRIVATE ROOM

## 2022-10-03 RX ORDER — SODIUM CHLORIDE 9 MG/ML
INJECTION, SOLUTION INTRAVENOUS CONTINUOUS
Status: DISCONTINUED | OUTPATIENT
Start: 2022-10-03 | End: 2022-10-07

## 2022-10-03 RX ORDER — IBUPROFEN 200 MG
1 TABLET ORAL DAILY
Status: DISCONTINUED | OUTPATIENT
Start: 2022-10-04 | End: 2022-10-06

## 2022-10-03 RX ADMIN — LOSARTAN POTASSIUM 50 MG: 50 TABLET, FILM COATED ORAL at 09:10

## 2022-10-03 RX ADMIN — OXYCODONE HYDROCHLORIDE AND ACETAMINOPHEN 1 TABLET: 5; 325 TABLET ORAL at 08:10

## 2022-10-03 RX ADMIN — ATORVASTATIN CALCIUM 80 MG: 40 TABLET, FILM COATED ORAL at 09:10

## 2022-10-03 RX ADMIN — GABAPENTIN 900 MG: 300 CAPSULE ORAL at 08:10

## 2022-10-03 RX ADMIN — GABAPENTIN 900 MG: 300 CAPSULE ORAL at 09:10

## 2022-10-03 RX ADMIN — OXYCODONE HYDROCHLORIDE AND ACETAMINOPHEN 1 TABLET: 5; 325 TABLET ORAL at 02:10

## 2022-10-03 RX ADMIN — HEPARIN SODIUM 14 UNITS/KG/HR: 10000 INJECTION, SOLUTION INTRAVENOUS at 10:10

## 2022-10-03 RX ADMIN — CILOSTAZOL 100 MG: 100 TABLET ORAL at 08:10

## 2022-10-03 RX ADMIN — GABAPENTIN 900 MG: 300 CAPSULE ORAL at 03:10

## 2022-10-03 RX ADMIN — ASPIRIN 81 MG CHEWABLE TABLET 81 MG: 81 TABLET CHEWABLE at 09:10

## 2022-10-03 RX ADMIN — SODIUM CHLORIDE: 0.9 INJECTION, SOLUTION INTRAVENOUS at 03:10

## 2022-10-03 RX ADMIN — HYDROCHLOROTHIAZIDE 12.5 MG: 12.5 TABLET ORAL at 09:10

## 2022-10-03 RX ADMIN — OXYCODONE HYDROCHLORIDE AND ACETAMINOPHEN 1 TABLET: 5; 325 TABLET ORAL at 09:10

## 2022-10-03 RX ADMIN — EZETIMIBE 10 MG: 10 TABLET ORAL at 09:10

## 2022-10-03 RX ADMIN — ACETAMINOPHEN 650 MG: 325 TABLET ORAL at 10:10

## 2022-10-03 RX ADMIN — CILOSTAZOL 100 MG: 100 TABLET ORAL at 09:10

## 2022-10-03 NOTE — HPI
Mr Singh is a 72 year old male with a pmh of smoking 50+pack years, hld, dvt, with chronic extensive PAD> The patient presents with right leg pain which he experiences at rest. This has been bothering him for at least three months. Interventional cardiology has seen the patient in the past.

## 2022-10-03 NOTE — NURSING
2350 APTT result 45.8. therapeutic range; no titration of heparin gtt   I approve of requested home care orders.   also left     Marisabel Rivera RN

## 2022-10-03 NOTE — SUBJECTIVE & OBJECTIVE
Interval History: No acute events, pain controlled. Consulted vascular surgery.     Review of Systems   Constitutional: Negative.   HENT: Negative.     Eyes: Negative.    Cardiovascular:  Positive for claudication and leg swelling. Negative for chest pain, near-syncope, orthopnea, palpitations and syncope.   Respiratory: Negative.     Endocrine: Negative.    Hematologic/Lymphatic: Negative.    Skin: Negative.    Musculoskeletal: Negative.    Gastrointestinal: Negative.    Genitourinary: Negative.    Neurological: Negative.    Psychiatric/Behavioral: Negative.     Allergic/Immunologic: Negative.    Objective:     Vital Signs (Most Recent):  Temp: 97.1 °F (36.2 °C) (10/03/22 1044)  Pulse: 101 (10/03/22 1044)  Resp: 17 (10/03/22 1044)  BP: (!) 138/56 (10/03/22 1044)  SpO2: 98 % (10/03/22 1044)   Vital Signs (24h Range):  Temp:  [97.1 °F (36.2 °C)-98.9 °F (37.2 °C)] 97.1 °F (36.2 °C)  Pulse:  [] 101  Resp:  [16-20] 17  SpO2:  [94 %-99 %] 98 %  BP: (135-159)/(56-70) 138/56     Weight: 54 kg (119 lb)  Body mass index is 18.64 kg/m².     SpO2: 98 %  O2 Device (Oxygen Therapy): room air      Intake/Output Summary (Last 24 hours) at 10/3/2022 1429  Last data filed at 10/3/2022 1052  Gross per 24 hour   Intake 750 ml   Output 1000 ml   Net -250 ml       Lines/Drains/Airways       Peripheral Intravenous Line  Duration                  Peripheral IV - Single Lumen 10/02/22 0520 20 G Anterior;Left Forearm 1 day                    Physical Exam  Constitutional:       Appearance: Normal appearance.   Cardiovascular:      Rate and Rhythm: Normal rate and regular rhythm.      Heart sounds: No murmur heard.    No friction rub. No gallop.   Pulmonary:      Effort: Pulmonary effort is normal.      Breath sounds: Normal breath sounds.   Abdominal:      General: Abdomen is flat.      Palpations: Abdomen is soft.   Musculoskeletal:         General: Normal range of motion.      Comments: Right shin with blister    Ulcer to  lateral/posterior calf    Feet:      Comments: Right DP and PT pulses not palpable    Skin:     Coloration: Skin is not jaundiced or pale.      Findings: No bruising, erythema, lesion or rash.   Neurological:      General: No focal deficit present.      Mental Status: He is alert and oriented to person, place, and time.       Significant Labs: All pertinent lab results from the last 24 hours have been reviewed.    Significant Imaging:  All pertinent imaging results from the last 24 hours have been reviewed.

## 2022-10-03 NOTE — PLAN OF CARE
Problem: Adult Inpatient Plan of Care  Goal: Plan of Care Review  Outcome: Ongoing, Progressing  Goal: Patient-Specific Goal (Individualized)  Outcome: Ongoing, Progressing  Goal: Absence of Hospital-Acquired Illness or Injury  Outcome: Ongoing, Progressing  Goal: Optimal Comfort and Wellbeing  Outcome: Ongoing, Progressing  Goal: Readiness for Transition of Care  Outcome: Ongoing, Progressing     Problem: Impaired Wound Healing  Goal: Optimal Wound Healing  Outcome: Ongoing, Progressing     Problem: Fall Injury Risk  Goal: Absence of Fall and Fall-Related Injury  Outcome: Ongoing, Progressing     Problem: Skin Injury Risk Increased  Goal: Skin Health and Integrity  Outcome: Ongoing, Progressing     Problem: Pain Acute  Goal: Acceptable Pain Control and Functional Ability  Outcome: Ongoing, Progressing

## 2022-10-03 NOTE — PLAN OF CARE
[facility] - Cardiology Stepdown  Initial Discharge Assessment       Primary Care Provider: Esdras Ly MD    Admission Diagnosis: Foot pain [M79.673]  Absent pedal pulses [R09.89]  Limb ischemia [I99.8]    Admission Date: 10/2/2022  Expected Discharge Date: 10/5/2022    Discharge Barriers Identified: None    Payor: [employer] MEDICARE / Plan: [employer] 65 / Product Type: Medicare Advantage /     Extended Emergency Contact Information  Primary Emergency Contact: Cindi Matos  Mobile Phone: [phone]  Relation: Daughter  Preferred language: English   needed? No    Discharge Plan A: Home with family  Discharge Plan B: Home      [id number] [place], [place] - [address] [place] [id number]  Phone: [phone] Fax: [phone]      Initial Assessment (most recent)       Adult Discharge Assessment - 10/03/22 1126          Discharge Assessment    Assessment Type Discharge Planning Assessment     Confirmed/corrected address, phone number and insurance Yes     Confirmed Demographics Correct on Facesheet     Source of Information patient     Communicated LAZ with patient/caregiver Yes     Lives With spouse     Do you expect to return to your current living situation? Yes     Do you have help at home or someone to help you manage your care at home? Yes     Who are your caregiver(s) and their phone number(s)? Cindi Matos (spouse) [phone]     Prior to hospitilization cognitive status: Alert/Oriented     Current cognitive status: Alert/Oriented     Walking or Climbing Stairs Difficulty ambulation difficulty, requires equipment     Dressing/Bathing Difficulty bathing difficulty, assistance 1 person     Equipment Currently Used at Home cane, straight     Readmission within 30 days? No     Patient currently being followed by outpatient case management? No     Do you currently have service(s) that help you manage your care at  home? No     Do you take prescription medications? Yes     Do you have prescription coverage? Yes     Coverage [employer]     Do you have any problems affording any of your prescribed medications? No     Is the patient taking medications as prescribed? yes     How do you get to doctors appointments? car, drives self     Are you on dialysis? No     Do you take coumadin? No     Discharge Plan A Home with family     Discharge Plan B Home     DME Needed Upon Discharge  none     Discharge Plan discussed with: Patient     Discharge Barriers Identified None                        Pt admitted 10/2/2022  3:43 AM    For Foot pain [M79.673]  Absent pedal pulses [R09.89]  Limb ischemia [I99.8]       DPEA completed with pt who lives at home with spouse Elis Singh at the address listed on the facesheet. Pt is independent in his ADLs with use of cane and sometimes rolling walker. Plan at time is home no needs.    Discharge packet provided to pt, all questions answered prior to leaving room and contact number provided to reach CM.      Pt is followed cardiologist Dr Wallace.     PCP is Esdras Ly MD  [phone] (p)  [phone] (f)    Future Appointments   Date Time Provider Department Center   10/4/2022  8:30 AM Esdras Ly MD [facility] IM [facility]   11/30/2022  8:00 AM [facility]IC-US1 MASTER [facility]TR [facility]   11/30/2022  9:15 AM [facility]XRAY [facility]   11/30/2022 10:15 AM [facility]   11/30/2022 10:30 AM [facility] EOS [facility]   11/30/2022 11:30 AM Karyna Fontenot NP [facility] NEUROS8 [facility]   12/13/2022  8:00 AM Moises Cuba MD [facility] UROLOGC [facility]   12/16/2022 10:30 AM Cr Wallace MD PhD [facility] PERVAS [facility], KANDIN, RN   Case Management [phone]

## 2022-10-03 NOTE — HOSPITAL COURSE
Patient admitted to CCU with critical limb ischemia. Patient started on aspirin, heparin, and statin. CTA with severe multi-level disease and intermittent loss of pulses in his RLE. CTA not changed compared to 1 month ago. Interventional cardiology consulted and unfortunately recommended discussion with vascular surgery for amputation or palliative care. Consulted vascular surgery who are recommending axillary to bi-profunda bypass 10/6. Patient care transitioned to hospital medicine service.

## 2022-10-03 NOTE — PROGRESS NOTES
[facility] - Cardiology Stepdown  Cardiology  Progress Note    Patient Name: Babatunde Singh  MRN: [id number]  Admission Date: 10/2/2022  Hospital Length of Stay: 1 days  Code Status: Full Code   Attending Physician: Jerry Alvarado MD   Primary Care Physician: Esdras Ly MD  Expected Discharge Date: 10/5/2022  Principal Problem:Claudication of right lower extremity    Subjective:     Hospital Course:   Patient admitted to CCU with critical limb ischemia. Patient started on aspirin, heparin, and statin. CTA with severe multi-level disease and intermittent loss of pulses in his RLE. CTA not changed compared to 1 month ago. Interventional cardiology consulted and unfortunately recommended discussion with vascular surgery for amputation or palliative care.      Interval History: No acute events, pain controlled. Consulted vascular surgery.     Review of Systems   Constitutional: Negative.   HENT: Negative.     Eyes: Negative.    Cardiovascular:  Positive for claudication and leg swelling. Negative for chest pain, near-syncope, orthopnea, palpitations and syncope.   Respiratory: Negative.     Endocrine: Negative.    Hematologic/Lymphatic: Negative.    Skin: Negative.    Musculoskeletal: Negative.    Gastrointestinal: Negative.    Genitourinary: Negative.    Neurological: Negative.    Psychiatric/Behavioral: Negative.     Allergic/Immunologic: Negative.    Objective:     Vital Signs (Most Recent):  Temp: 97.1 °F (36.2 °C) (10/03/22 1044)  Pulse: 101 (10/03/22 1044)  Resp: 17 (10/03/22 1044)  BP: (!) 138/56 (10/03/22 1044)  SpO2: 98 % (10/03/22 1044)   Vital Signs (24h Range):  Temp:  [97.1 °F (36.2 °C)-98.9 °F (37.2 °C)] 97.1 °F (36.2 °C)  Pulse:  [] 101  Resp:  [16-20] 17  SpO2:  [94 %-99 %] 98 %  BP: (135-159)/(56-70) 138/56     Weight: 54 kg (119 lb)  Body mass index is 18.64 kg/m².     SpO2: 98 %  O2 Device (Oxygen Therapy): room air      Intake/Output Summary (Last 24 hours) at 10/3/2022 7522  Last data  "filed at 10/3/2022 1052  Gross per 24 hour   Intake 750 ml   Output 1000 ml   Net -250 ml       Lines/Drains/Airways       Peripheral Intravenous Line  Duration                  Peripheral IV - Single Lumen 10/02/22 0520 20 G Anterior;Left Forearm 1 day                    Physical Exam  Constitutional:       Appearance: Normal appearance.   Cardiovascular:      Rate and Rhythm: Normal rate and regular rhythm.      Heart sounds: No murmur heard.    No friction rub. No gallop.   Pulmonary:      Effort: Pulmonary effort is normal.      Breath sounds: Normal breath sounds.   Abdominal:      General: Abdomen is flat.      Palpations: Abdomen is soft.   Musculoskeletal:         General: Normal range of motion.      Comments: Right shin with blister    Ulcer to lateral/posterior calf    Feet:      Comments: Right DP and PT pulses not palpable    Skin:     Coloration: Skin is not jaundiced or pale.      Findings: No bruising, erythema, lesion or rash.   Neurological:      General: No focal deficit present.      Mental Status: He is alert and oriented to person, place, and time.       Significant Labs: All pertinent lab results from the last 24 hours have been reviewed.    Significant Imaging:  All pertinent imaging results from the last 24 hours have been reviewed.    Assessment and Plan:       * Claudication of right lower extremity  Acute on chronic limb ischemia:   Patient with significant PAD presents with worsening acute on chronic RLE pain unable to palpate or Doppler RLE pulses. Previously seen by Dr. Tracey for PAD, at that time management was medical only. PT doppler signal on the right but unable to obtain DP signal on admission. Suspect acute worsening of chronic PAD. Patient evaluated by IC.    CTA notable for "worsening stenosis or interval occlusion of the right dorsalis pedis artery, otherwise grossly unchanged noting regions of persistent occlusion as described above with two vessel runoff to the bilateral " "lower extremities."    Discussed with intervention cardiology. Unfortunately, limited options for revascularization. They recommend assessment by vascular surgery for amputation or palliative care.     - continue heparin infusion  - continue aspirin and statin  - continue cilostazol  - regular LE pulse examination and monitoring of pain  - vascular surgery consulted  - multimodal pain regimen (opioid, gabapentin, tylenol)       Ulcer of right lower extremity, limited to breakdown of skin  Ischemic ulcer with surrounding erythema, previously treated with doxycycline    - wound care consulted  - consider further antibiotics if worsening erythema or clinical status    Renal mass  Noted on CT AP 8/15, following with urology for evaluation and management    Plan robotic partial vs total nephrectomy as soon as his anticoagulation is finished for DVT, per outpatient urologist.          Mixed hyperlipidemia  - continue statin and ezetimibe    Smoking  Discussed smoking cessation with patient    - nicotine PRN if needed    Primary hypertension  - continue losartan and hydrochlorothiazide        VTE Risk Mitigation (From admission, onward)         Ordered     IP VTE HIGH RISK PATIENT  Once         10/02/22 0814     Place sequential compression device  Until discontinued         10/02/22 0814     heparin 25,000 units in dextrose 5% (100 units/ml) IV bolus from bag - ADDITIONAL PRN BOLUS - 60 units/kg  As needed (PRN)        Question:  Heparin Infusion Adjustment (DO NOT MODIFY ANSWER)  Answer:  [email or web address]\epic\Images\Pharmacy\HeparinInfusions\heparin HIGH INTENSITY nomogram for [facility] [id number].pdf    10/02/22 0521     heparin 25,000 units in dextrose 5% (100 units/ml) IV bolus from bag - ADDITIONAL PRN BOLUS - 30 units/kg  As needed (PRN)        Question:  Heparin Infusion Adjustment (DO NOT MODIFY ANSWER)  Answer:  \\[email or web address]\epic\Images\Pharmacy\HeparinInfusions\heparin HIGH INTENSITY nomogram for [facility] [id number].pdf    10/02/22 " 0521     heparin 25,000 units in dextrose 5% 250 mL (100 units/mL) infusion HIGH INTENSITY nomogram - [facility]  Continuous        Question Answer Comment   Heparin Infusion Adjustment (DO NOT MODIFY ANSWER) [email or web address]\epic\Images\Pharmacy\HeparinInfusions\heparin HIGH INTENSITY nomogram for [facility] [id number].pdf    Begin at (in units/kg/hr) 18        10/02/22 0521                Marco Herrera MD  Cardiology  [facility] - Cardiology Stepdown

## 2022-10-03 NOTE — SUBJECTIVE & OBJECTIVE
Medications Prior to Admission   Medication Sig Dispense Refill Last Dose    acetaminophen (TYLENOL) 325 MG tablet Take 2 tablets (650 mg total) by mouth every 8 (eight) hours as needed. 20 tablet 0     aspirin 81 MG Chew Chew and swallow 1 tablet (81 mg total) by mouth once daily. 30 tablet 0     atorvastatin (LIPITOR) 80 MG tablet Take 1 tablet (80 mg total) by mouth once daily. 30 tablet 2     bacitracin 500 unit/gram ointment Apply topically 2 (two) times daily. 28 g 0     cilostazoL (PLETAL) 100 MG Tab Take 1 tablet (100 mg total) by mouth 2 (two) times daily. 180 tablet 3     diclofenac sodium (VOLTAREN) 1 % Gel Apply 2 g topically 3 (three) times daily as needed (muscle spasm pain). Apply 2 grams to affected area 3 times daily as needed 100 g 0     ezetimibe (ZETIA) 10 mg tablet Take 1 tablet (10 mg total) by mouth once daily. 90 tablet 3     gabapentin (NEURONTIN) 300 MG capsule Take 3 capsules (900 mg total) by mouth 3 (three) times daily. 540 capsule 3     LIDOcaine (LIDODERM) 5 % Apply to affected area as needed for pain for 12 hours, then off for 12 hours. Discard after each use.  May use 4% lidocaine patch as alternative. 30 patch 0     losartan-hydrochlorothiazide 50-12.5 mg (HYZAAR) 50-12.5 mg per tablet Take 1 tablet by mouth once daily. 30 tablet 2     multivitamin (THERAGRAN) per tablet Take 1 tablet by mouth once daily.       mupirocin (BACTROBAN) 2 % ointment Apply topically 2 (two) times daily. 30 g 1     naproxen (NAPROSYN) 500 MG tablet Take 1 tablet (500 mg total) by mouth 2 (two) times daily as needed (pain). 60 tablet 0     rivaroxaban (XARELTO DVT-PE TREAT 30D START) 15 mg (42)- 20 mg (9) tablet dose pack Take 1 tablet (15 mg) by mouth twice daily with food for 21 days followed by 1 tablet (20 mg) by mouth once daily with food 51 tablet 0     terbinafine HCl (LAMISIL ORAL) Take by mouth once daily at 6am.          Review of patient's allergies indicates:  No Known Allergies    Past Medical  History:   Diagnosis Date    Hypertension     Peripheral arterial disease      Past Surgical History:   Procedure Laterality Date    EXCISION OF HYDROCELE Left 4/26/2022    Procedure: HYDROCELECTOMY;  Surgeon: Damion Roberts MD;  Location: [facility];  Service: Urology;  Laterality: Left;    KNEE SURGERY  1972     Family History       Problem Relation (Age of Onset)    Hypertension Mother, Father          Tobacco Use    Smoking status: Former     Packs/day: 0.50     Years: 50.00     Pack years: 25.00     Types: Cigarettes    Smokeless tobacco: Never    Tobacco comments:     Cigarettes3-4 per day   Substance and Sexual Activity    Alcohol use: Never    Drug use: Never    Sexual activity: Yes     Partners: Female     Comment: wife     Review of Systems   Constitutional: Negative.    Skin:  Positive for pallor and wound.   All other systems reviewed and are negative.  Objective:     Vital Signs (Most Recent):  Temp: 97.1 °F (36.2 °C) (10/03/22 1044)  Pulse: 101 (10/03/22 1044)  Resp: 17 (10/03/22 1044)  BP: (!) 138/56 (10/03/22 1044)  SpO2: 98 % (10/03/22 1044)   Vital Signs (24h Range):  Temp:  [97.1 °F (36.2 °C)-98.9 °F (37.2 °C)] 97.1 °F (36.2 °C)  Pulse:  [] 101  Resp:  [16-20] 17  SpO2:  [94 %-99 %] 98 %  BP: (135-159)/(56-70) 138/56     Weight: 54 kg (119 lb)  Body mass index is 18.64 kg/m².    Physical Exam  Vitals and nursing note reviewed.   Constitutional:       Appearance: Normal appearance.   Cardiovascular:      Comments: No femoral pulses  No DP or PT signals in either foot  Pulmonary:      Effort: Pulmonary effort is normal.   Abdominal:      General: Abdomen is flat.   Musculoskeletal:         General: Tenderness present.   Skin:     Capillary Refill: Capillary refill takes more than 3 seconds.      Coloration: Skin is pale.      Comments: Right calf dry gangrene lesions   Neurological:      Mental Status: He is alert and oriented to person, place, and time.       Significant Labs:  CBC:   Recent  Labs   Lab 10/03/22  0741   WBC 12.50   RBC 3.03*   HGB 7.9*   HCT 25.2*   *   MCV 83   MCH 26.1*   MCHC 31.3*     CMP:   Recent Labs   Lab 10/03/22  0741   GLU 97   CALCIUM 9.1   ALBUMIN 2.7*   PROT 5.7*      K 4.8   CO2 24      BUN 13   CREATININE 0.7   ALKPHOS 69   ALT 13   AST 22   BILITOT 0.3       Significant Diagnostics:  CT: Multilevel chronic PAD   Aortoiliac disease  R iliac occluded  R CFA occluded  R Profunda is open  Distal SFA reconstitues  PT open to foot  AT occludes prior to foot

## 2022-10-03 NOTE — ASSESSMENT & PLAN NOTE
"Acute on chronic limb ischemia:   Patient with significant PAD presents with worsening acute on chronic RLE pain unable to palpate or Doppler RLE pulses. Previously seen by Dr. Tracey for PAD, at that time management was medical only. PT doppler signal on the right but unable to obtain DP signal on admission. Suspect acute worsening of chronic PAD. Patient evaluated by IC.    CTA notable for "worsening stenosis or interval occlusion of the right dorsalis pedis artery, otherwise grossly unchanged noting regions of persistent occlusion as described above with two vessel runoff to the bilateral lower extremities."    Discussed with intervention cardiology. Unfortunately, limited options for revascularization. They recommend assessment by vascular surgery for amputation or palliative care.     - continue heparin infusion  - continue aspirin and statin  - continue cilostazol  - regular LE pulse examination and monitoring of pain  - vascular surgery consulted  - multimodal pain regimen (opioid, gabapentin, tylenol)     "

## 2022-10-03 NOTE — CONSULTS
[facility] - Cardiology Stepdown  Vascular Surgery  Consult Note    Inpatient consult to Vascular Surgery  Consult performed by: Leland Murillo MD  Consult ordered by: Marco Herrera MD        Subjective:     Chief Complaint/Reason for Admission: CLI, Rest pain    History of Present Illness: Mr Singh is a 72 year old male with a pmh of smoking 50+pack years, hld, dvt, with chronic extensive PAD> The patient presents with right leg pain which he experiences at rest. This has been bothering him for at least three months. Interventional cardiology has seen the patient in the past.       Medications Prior to Admission   Medication Sig Dispense Refill Last Dose    acetaminophen (TYLENOL) 325 MG tablet Take 2 tablets (650 mg total) by mouth every 8 (eight) hours as needed. 20 tablet 0     aspirin 81 MG Chew Chew and swallow 1 tablet (81 mg total) by mouth once daily. 30 tablet 0     atorvastatin (LIPITOR) 80 MG tablet Take 1 tablet (80 mg total) by mouth once daily. 30 tablet 2     bacitracin 500 unit/gram ointment Apply topically 2 (two) times daily. 28 g 0     cilostazoL (PLETAL) 100 MG Tab Take 1 tablet (100 mg total) by mouth 2 (two) times daily. 180 tablet 3     diclofenac sodium (VOLTAREN) 1 % Gel Apply 2 g topically 3 (three) times daily as needed (muscle spasm pain). Apply 2 grams to affected area 3 times daily as needed 100 g 0     ezetimibe (ZETIA) 10 mg tablet Take 1 tablet (10 mg total) by mouth once daily. 90 tablet 3     gabapentin (NEURONTIN) 300 MG capsule Take 3 capsules (900 mg total) by mouth 3 (three) times daily. 540 capsule 3     LIDOcaine (LIDODERM) 5 % Apply to affected area as needed for pain for 12 hours, then off for 12 hours. Discard after each use.  May use 4% lidocaine patch as alternative. 30 patch 0     losartan-hydrochlorothiazide 50-12.5 mg (HYZAAR) 50-12.5 mg per tablet Take 1 tablet by mouth once daily. 30 tablet 2     multivitamin (THERAGRAN) per tablet Take 1 tablet by  mouth once daily.       mupirocin (BACTROBAN) 2 % ointment Apply topically 2 (two) times daily. 30 g 1     naproxen (NAPROSYN) 500 MG tablet Take 1 tablet (500 mg total) by mouth 2 (two) times daily as needed (pain). 60 tablet 0     rivaroxaban (XARELTO DVT-PE TREAT 30D START) 15 mg (42)- 20 mg (9) tablet dose pack Take 1 tablet (15 mg) by mouth twice daily with food for 21 days followed by 1 tablet (20 mg) by mouth once daily with food 51 tablet 0     terbinafine HCl (LAMISIL ORAL) Take by mouth once daily at 6am.          Review of patient's allergies indicates:  No Known Allergies    Past Medical History:   Diagnosis Date    Hypertension     Peripheral arterial disease      Past Surgical History:   Procedure Laterality Date    EXCISION OF HYDROCELE Left 4/26/2022    Procedure: HYDROCELECTOMY;  Surgeon: Damion Roberts MD;  Location: [facility];  Service: Urology;  Laterality: Left;    KNEE SURGERY  1972     Family History       Problem Relation (Age of Onset)    Hypertension Mother, Father          Tobacco Use    Smoking status: Former     Packs/day: 0.50     Years: 50.00     Pack years: 25.00     Types: Cigarettes    Smokeless tobacco: Never    Tobacco comments:     Cigarettes3-4 per day   Substance and Sexual Activity    Alcohol use: Never    Drug use: Never    Sexual activity: Yes     Partners: Female     Comment: wife     Review of Systems   Constitutional: Negative.    Skin:  Positive for pallor and wound.   All other systems reviewed and are negative.  Objective:     Vital Signs (Most Recent):  Temp: 97.1 °F (36.2 °C) (10/03/22 1044)  Pulse: 101 (10/03/22 1044)  Resp: 17 (10/03/22 1044)  BP: (!) 138/56 (10/03/22 1044)  SpO2: 98 % (10/03/22 1044)   Vital Signs (24h Range):  Temp:  [97.1 °F (36.2 °C)-98.9 °F (37.2 °C)] 97.1 °F (36.2 °C)  Pulse:  [] 101  Resp:  [16-20] 17  SpO2:  [94 %-99 %] 98 %  BP: (135-159)/(56-70) 138/56     Weight: 54 kg (119 lb)  Body mass index is 18.64  kg/m².    Physical Exam  Vitals and nursing note reviewed.   Constitutional:       Appearance: Normal appearance.   Cardiovascular:      Comments: No femoral pulses  No DP or PT signals in either foot  Pulmonary:      Effort: Pulmonary effort is normal.   Abdominal:      General: Abdomen is flat.   Musculoskeletal:         General: Tenderness present.   Skin:     Capillary Refill: Capillary refill takes more than 3 seconds.      Coloration: Skin is pale.      Comments: Right calf dry gangrene lesions   Neurological:      Mental Status: He is alert and oriented to person, place, and time.       Significant Labs:  CBC:   Recent Labs   Lab 10/03/22  0741   WBC 12.50   RBC 3.03*   HGB 7.9*   HCT 25.2*   *   MCV 83   MCH 26.1*   MCHC 31.3*     CMP:   Recent Labs   Lab 10/03/22  0741   GLU 97   CALCIUM 9.1   ALBUMIN 2.7*   PROT 5.7*      K 4.8   CO2 24      BUN 13   CREATININE 0.7   ALKPHOS 69   ALT 13   AST 22   BILITOT 0.3       Significant Diagnostics:  CT: Multilevel chronic PAD   Aortoiliac disease  R iliac occluded  R CFA occluded  R Profunda is open  Distal SFA reconstitues  PT open to foot  AT occludes prior to foot    Assessment/Plan:     Mr Hardy is a 72 year old male with a pmh of smoking, severe PAD, dvt who presents with chronic limb ischemia and rest pain.      -- offload legs with heel protection boots  -- Discuss with Urology on timing of RCC nephrectomy  -- Bilateral BP measurements  -- Vas lab non invastive studies  -- will plan on Ax bifem bypass, depending on discussion with Urology   -- smoking cessation   -- cardiology evaluation for preop risk stratification      Thank you for your consult. I will follow-up with patient. Please contact us if you have any additional questions.    Leland Murillo MD  Vascular Surgery  [facility] - Cardiology Stepdown

## 2022-10-04 PROBLEM — Z72.0 TOBACCO ABUSE: Status: ACTIVE | Noted: 2022-07-26

## 2022-10-04 PROBLEM — I70.238: Status: ACTIVE | Noted: 2022-10-04

## 2022-10-04 PROBLEM — I70.269 ATHEROSCLEROTIC PERIPHERAL VASCULAR DISEASE WITH GANGRENE: Status: ACTIVE | Noted: 2022-10-04

## 2022-10-04 LAB
ABO + RH BLD: NORMAL
ALBUMIN SERPL BCP-MCNC: 2.7 G/DL (ref 3.5–5.2)
ALP SERPL-CCNC: 66 U/L (ref 55–135)
ALT SERPL W/O P-5'-P-CCNC: 14 U/L (ref 10–44)
ANION GAP SERPL CALC-SCNC: 7 MMOL/L (ref 8–16)
ANISOCYTOSIS BLD QL SMEAR: SLIGHT
APTT BLDCRRT: 36.7 SEC (ref 21–32)
APTT BLDCRRT: 42.7 SEC (ref 21–32)
APTT BLDCRRT: 45.3 SEC (ref 21–32)
AST SERPL-CCNC: 19 U/L (ref 10–40)
BASOPHILS # BLD AUTO: 0.05 K/UL (ref 0–0.2)
BASOPHILS NFR BLD: 0.4 % (ref 0–1.9)
BILIRUB SERPL-MCNC: 0.2 MG/DL (ref 0.1–1)
BLD GP AB SCN CELLS X3 SERPL QL: NORMAL
BUN SERPL-MCNC: 11 MG/DL (ref 8–23)
CALCIUM SERPL-MCNC: 8.5 MG/DL (ref 8.7–10.5)
CHLORIDE SERPL-SCNC: 105 MMOL/L (ref 95–110)
CO2 SERPL-SCNC: 23 MMOL/L (ref 23–29)
CREAT SERPL-MCNC: 0.8 MG/DL (ref 0.5–1.4)
DIFFERENTIAL METHOD: ABNORMAL
EOSINOPHIL # BLD AUTO: 0.1 K/UL (ref 0–0.5)
EOSINOPHIL NFR BLD: 1.1 % (ref 0–8)
ERYTHROCYTE [DISTWIDTH] IN BLOOD BY AUTOMATED COUNT: 18.6 % (ref 11.5–14.5)
EST. GFR  (NO RACE VARIABLE): >60 ML/MIN/1.73 M^2
GLUCOSE SERPL-MCNC: 132 MG/DL (ref 70–110)
HCT VFR BLD AUTO: 25.2 % (ref 40–54)
HGB BLD-MCNC: 7.7 G/DL (ref 14–18)
HYPOCHROMIA BLD QL SMEAR: ABNORMAL
IMM GRANULOCYTES # BLD AUTO: 0.04 K/UL (ref 0–0.04)
IMM GRANULOCYTES NFR BLD AUTO: 0.3 % (ref 0–0.5)
LYMPHOCYTES # BLD AUTO: 4.3 K/UL (ref 1–4.8)
LYMPHOCYTES NFR BLD: 36.7 % (ref 18–48)
MCH RBC QN AUTO: 26.2 PG (ref 27–31)
MCHC RBC AUTO-ENTMCNC: 30.6 G/DL (ref 32–36)
MCV RBC AUTO: 86 FL (ref 82–98)
MONOCYTES # BLD AUTO: 1 K/UL (ref 0.3–1)
MONOCYTES NFR BLD: 8.2 % (ref 4–15)
NEUTROPHILS # BLD AUTO: 6.3 K/UL (ref 1.8–7.7)
NEUTROPHILS NFR BLD: 53.3 % (ref 38–73)
NRBC BLD-RTO: 0 /100 WBC
OVALOCYTES BLD QL SMEAR: ABNORMAL
PLATELET # BLD AUTO: 600 K/UL (ref 150–450)
PMV BLD AUTO: 9.4 FL (ref 9.2–12.9)
POIKILOCYTOSIS BLD QL SMEAR: SLIGHT
POLYCHROMASIA BLD QL SMEAR: ABNORMAL
POTASSIUM SERPL-SCNC: 3.6 MMOL/L (ref 3.5–5.1)
PROT SERPL-MCNC: 5.5 G/DL (ref 6–8.4)
RBC # BLD AUTO: 2.94 M/UL (ref 4.6–6.2)
SCHISTOCYTES BLD QL SMEAR: ABNORMAL
SODIUM SERPL-SCNC: 135 MMOL/L (ref 136–145)
SPHEROCYTES BLD QL SMEAR: ABNORMAL
WBC # BLD AUTO: 11.73 K/UL (ref 3.9–12.7)

## 2022-10-04 PROCEDURE — 99223 PR INITIAL HOSPITAL CARE,LEVL III: ICD-10-PCS | Mod: ,,, | Performed by: UROLOGY

## 2022-10-04 PROCEDURE — 85730 THROMBOPLASTIN TIME PARTIAL: CPT | Mod: 91 | Performed by: INTERNAL MEDICINE

## 2022-10-04 PROCEDURE — S4991 NICOTINE PATCH NONLEGEND: HCPCS | Performed by: STUDENT IN AN ORGANIZED HEALTH CARE EDUCATION/TRAINING PROGRAM

## 2022-10-04 PROCEDURE — 25000003 PHARM REV CODE 250: Performed by: INTERNAL MEDICINE

## 2022-10-04 PROCEDURE — 85730 THROMBOPLASTIN TIME PARTIAL: CPT | Mod: 91 | Performed by: HOSPITALIST

## 2022-10-04 PROCEDURE — 36415 COLL VENOUS BLD VENIPUNCTURE: CPT | Performed by: INTERNAL MEDICINE

## 2022-10-04 PROCEDURE — 86920 COMPATIBILITY TEST SPIN: CPT | Performed by: STUDENT IN AN ORGANIZED HEALTH CARE EDUCATION/TRAINING PROGRAM

## 2022-10-04 PROCEDURE — 25000003 PHARM REV CODE 250: Performed by: STUDENT IN AN ORGANIZED HEALTH CARE EDUCATION/TRAINING PROGRAM

## 2022-10-04 PROCEDURE — 20600001 HC STEP DOWN PRIVATE ROOM

## 2022-10-04 PROCEDURE — 99233 PR SUBSEQUENT HOSPITAL CARE,LEVL III: ICD-10-PCS | Mod: GC,,, | Performed by: INTERNAL MEDICINE

## 2022-10-04 PROCEDURE — 85025 COMPLETE CBC W/AUTO DIFF WBC: CPT | Performed by: STUDENT IN AN ORGANIZED HEALTH CARE EDUCATION/TRAINING PROGRAM

## 2022-10-04 PROCEDURE — 99233 SBSQ HOSP IP/OBS HIGH 50: CPT | Mod: GC,,, | Performed by: INTERNAL MEDICINE

## 2022-10-04 PROCEDURE — 36415 COLL VENOUS BLD VENIPUNCTURE: CPT | Performed by: HOSPITALIST

## 2022-10-04 PROCEDURE — 86901 BLOOD TYPING SEROLOGIC RH(D): CPT | Performed by: STUDENT IN AN ORGANIZED HEALTH CARE EDUCATION/TRAINING PROGRAM

## 2022-10-04 PROCEDURE — 36415 COLL VENOUS BLD VENIPUNCTURE: CPT | Performed by: STUDENT IN AN ORGANIZED HEALTH CARE EDUCATION/TRAINING PROGRAM

## 2022-10-04 PROCEDURE — 36415 COLL VENOUS BLD VENIPUNCTURE: CPT

## 2022-10-04 PROCEDURE — 85730 THROMBOPLASTIN TIME PARTIAL: CPT

## 2022-10-04 PROCEDURE — 99223 1ST HOSP IP/OBS HIGH 75: CPT | Mod: ,,, | Performed by: UROLOGY

## 2022-10-04 PROCEDURE — 80053 COMPREHEN METABOLIC PANEL: CPT | Performed by: STUDENT IN AN ORGANIZED HEALTH CARE EDUCATION/TRAINING PROGRAM

## 2022-10-04 RX ADMIN — OXYCODONE HYDROCHLORIDE AND ACETAMINOPHEN 1 TABLET: 5; 325 TABLET ORAL at 09:10

## 2022-10-04 RX ADMIN — LOSARTAN POTASSIUM 50 MG: 50 TABLET, FILM COATED ORAL at 09:10

## 2022-10-04 RX ADMIN — NICOTINE 1 PATCH: 14 PATCH, EXTENDED RELEASE TRANSDERMAL at 09:10

## 2022-10-04 RX ADMIN — GABAPENTIN 900 MG: 300 CAPSULE ORAL at 03:10

## 2022-10-04 RX ADMIN — CILOSTAZOL 100 MG: 100 TABLET ORAL at 09:10

## 2022-10-04 RX ADMIN — GABAPENTIN 900 MG: 300 CAPSULE ORAL at 09:10

## 2022-10-04 RX ADMIN — SODIUM CHLORIDE: 0.9 INJECTION, SOLUTION INTRAVENOUS at 05:10

## 2022-10-04 RX ADMIN — GABAPENTIN 900 MG: 300 CAPSULE ORAL at 08:10

## 2022-10-04 RX ADMIN — SODIUM CHLORIDE: 0.9 INJECTION, SOLUTION INTRAVENOUS at 04:10

## 2022-10-04 RX ADMIN — EZETIMIBE 10 MG: 10 TABLET ORAL at 09:10

## 2022-10-04 RX ADMIN — HYDROCHLOROTHIAZIDE 12.5 MG: 12.5 TABLET ORAL at 09:10

## 2022-10-04 RX ADMIN — ATORVASTATIN CALCIUM 80 MG: 40 TABLET, FILM COATED ORAL at 09:10

## 2022-10-04 RX ADMIN — OXYCODONE HYDROCHLORIDE AND ACETAMINOPHEN 1 TABLET: 5; 325 TABLET ORAL at 11:10

## 2022-10-04 RX ADMIN — ASPIRIN 81 MG CHEWABLE TABLET 81 MG: 81 TABLET CHEWABLE at 08:10

## 2022-10-04 NOTE — HOSPITAL COURSE
Airway       Patient location during procedure: OR       Procedure Start/Stop Times: 7/19/2022 7:22 AM  Staff -        CRNA: Jenni Johnson APRN CRNA       Performed By: CRNA  Consent for Airway        Urgency: elective  Indications and Patient Condition       Indications for airway management: lenin-procedural         Mask difficulty assessment: 1 - vent by mask    Final Airway Details       Final airway type: endotracheal airway       Successful airway: ETT - single  Endotracheal Airway Details        ETT size (mm): 7.0       Cuffed: yes       Successful intubation technique: direct laryngoscopy       DL Blade Type: Liang 2       Grade View of Cords: 1       Adjucts: stylet and tooth guard       Position: Right       Measured from: lips       Secured at (cm): 22       Bite block used: None    Post intubation assessment        Placement verified by: capnometry, equal breath sounds and chest rise        Number of attempts at approach: 1       Number of other approaches attempted: 0       Secured with: silk tape       Ease of procedure: easy       Dentition: Intact and Unchanged    Medication(s) Administered   Medication Administration Time: 7/19/2022 7:22 AM       BRENNAN was performed which showed Right Leg with PVR waveforms suggest ischemic peripheral arterial occlusive disease. No audible Doppler signal detected to obtain a reliable BRENNAN. TBI of 0 with a Great toe pressure of 0 mmHg is noted. Left Leg PVR waveforms also suggested ischemic peripheral arterial occlusive disease. No audible Doppler signal detected to obtain a   reliable BRENNAN.     CTA runoff abdomen bilateral LE showed Possible worsening stenosis or interval occlusion of the right dorsalis pedis artery.  Overall evaluation of lower extremity arteries is otherwise grossly unchanged noting regions of persistent occlusion with two vessel runoff to the bilateral lower extremities. Xarelto was held and pt was started on heparin ggt. IR was consulted, but endoscopically is no option for vascularization. Vascular surgery was consulted, and they scheduled the pt for intervention. Cardiology was consulted for cardiac clearance, and pt has been cleared with risk as per cardiology. Pt underwent L axillary to BILATERAL femoris profunda bypass with 8 mm PTFE on 10/6 by vascular.  He developed post operative delirium.     Regarding the right renal mass seen on the CT abdomen/pelvis, urology recommended outpt intervention.    On 10/7, pt became septic. CXR, UA and blood cx were ordered and he was started on empiric abx with zosyn and vanco. He was given 3 L bolus of lactate ringer and started on maintenance fluid. UA was negative. CXR ruled out pneumonia. Lactic acid is wnl. Blood cx is negative to date.     On 10/8; vascular surgery requested transfer of pt's care to their service for post operative management. Medicine will follow as a consultant.

## 2022-10-04 NOTE — PROGRESS NOTES
[facility] - Cardiology Stepdown  Wound Care    Patient Name:  Babatunde Singh   MRN:  [id number]  Date: 10/4/2022  Diagnosis: Critical limb ischemia of right lower extremity with ulceration of lower leg    History:     Past Medical History:   Diagnosis Date    Hypertension     Peripheral arterial disease        Social History     Socioeconomic History    Marital status:    Occupational History    Occupation: doorman [id number]   Tobacco Use    Smoking status: Former     Packs/day: 0.50     Years: 50.00     Pack years: 25.00     Types: Cigarettes    Smokeless tobacco: Never    Tobacco comments:     Cigarettes3-4 per day   Substance and Sexual Activity    Alcohol use: Never    Drug use: Never    Sexual activity: Yes     Partners: Female     Comment: wife       Precautions:     Allergies as of 10/02/2022    (No Known Allergies)       WOC Assessment Details/Treatment   10/3 Ahrabia RN consult received. Cleaned with ns, applied xeroform to protect and prevent gauze from sticking to the wound and applied gauze and tape. This is my recommendation. Measurements obtained. Patient stated he was going to see vascular Thursday. Wound has a large serous filled blister medial right leg, and a long open wound with black and  Red tissue on the lateral right lower leg. No odor and scant serosanguinous drainage.         10/04/22 1400   WOCN Assessment   WOCN Total Time (mins) 30   Visit Date 10/04/22   Visit Time 1400   Consult Type New   WOCN Speciality Wound   Intervention assessed;applied;orders;coordination of care;chart review   Teaching on-going   Skin Interventions   Device Skin Pressure Protection adhesive use limited   Pressure Reduction Techniques frequent weight shift encouraged   Skin Protection adhesive use limited   Positioning   Head of Bed (HOB) Positioning HOB at 30 degrees;HOB elevated   Positioning/Transfer Devices pillows;in use        Altered Skin Integrity 10/04/22 1400 Right lower;medial;lateral Leg Ulceration    Date First Assessed/Time First Assessed: 10/04/22 1400   Side: Right  Orientation: lower;medial;lateral  Location: Leg  Primary Wound Type: (c) Ulceration   Wound Image       Dressing Appearance Open to air   Drainage Amount Scant   Drainage Characteristics/Odor Serosanguineous   Appearance Red;Black;Blistered;Dry   Black (%), Wound Tissue Color 50 %   Red (%), Wound Tissue Color 50 %   Yellow (%), Wound Tissue Color 0 %   Periwound Area Blistered;Redness   Wound Length (cm) 20 cm   Wound Width (cm) 4 cm   Wound Depth (cm) 0.2 cm   Wound Volume (cm^3) 16 cm^3   Wound Surface Area (cm^2) 80 cm^2   Care Cleansed with:;Wound cleanser;Applied:   Dressing Applied;Rolled gauze   Dressing Change Due 10/05/22       10/04/2022

## 2022-10-04 NOTE — HPI
Babatunde Singh is a 73 yo M withPAD, HTN, HLD, recent diagnosis of DVT (9/8) on xarelto who presents due to RLE pain. Of note, patient has frequent admission for lower extremity pain. He states that on the day of his presentation, he has had increasing 10/10 aching pain which woke him up from sleep every few hours. This has been progressing over the last 2-3 days prior to admission. He states at baseline he has pain every other night. In the ER, RLE distal pulses were absent on presentation, unable to palpate or obtain Doppler signal.

## 2022-10-04 NOTE — CONSULTS
[facility] - Cardiology Stepdown  Urology  Consult Note    Patient Name: Babatunde Singh  MRN: [id number]  Admission Date: 10/2/2022  Hospital Length of Stay: 2   Code Status: Full Code   Attending Provider: Jerry Alvarado MD   Consulting Provider: Brent Miller MD  Primary Care Physician: Esdras Ly MD  Principal Problem:Claudication of right lower extremity    Inpatient consult to Urology  Consult performed by: Brent Miller MD  Consult ordered by: Leland Murillo MD          Subjective:     HPI:  Babatunde Singh is a 71 yo M with PMHx severe PAD, HTN, HLD, and recently dx'd RLE DVT (9/9) on Xeralto who presents to the ED with severe PAD and rest pain. Urology consulted for surgical planning of a 6.8cm RUP renal mass with contrast enhancement. Tumor initially incidentally found on CTA in July of 2022. Denies right flank pain. States he has had a hx of gross hematuria, last time roughly in July of 2022. Endorses a current smoking hx, 50+ pys. Denies family hx of  malignancies. Denies difficulty voiding, dysuria and recent fevers, chills, and n/v/d.       Past Medical History:   Diagnosis Date    Hypertension     Peripheral arterial disease        Past Surgical History:   Procedure Laterality Date    EXCISION OF HYDROCELE Left 4/26/2022    Procedure: HYDROCELECTOMY;  Surgeon: Damion Roberts MD;  Location: [facility];  Service: Urology;  Laterality: Left;    KNEE SURGERY  1972       Review of patient's allergies indicates:  No Known Allergies    Family History       Problem Relation (Age of Onset)    Hypertension Mother, Father            Tobacco Use    Smoking status: Former     Packs/day: 0.50     Years: 50.00     Pack years: 25.00     Types: Cigarettes    Smokeless tobacco: Never    Tobacco comments:     Cigarettes3-4 per day   Substance and Sexual Activity    Alcohol use: Never    Drug use: Never    Sexual activity: Yes     Partners: Female     Comment: wife       Review of Systems    Constitutional:  Negative for activity change, fatigue, fever and unexpected weight change.   HENT:  Negative for sore throat and trouble swallowing.    Eyes:  Negative for pain and discharge.   Respiratory:  Negative for chest tightness and shortness of breath.    Cardiovascular:  Positive for leg swelling. Negative for chest pain and palpitations.   Gastrointestinal:  Negative for abdominal pain, constipation, diarrhea, nausea and vomiting.   Genitourinary:  Negative for dysuria, flank pain and hematuria.        As per HPI   Musculoskeletal:  Negative for back pain and gait problem.   Skin:  Positive for color change and wound. Negative for rash.   Neurological:  Negative for seizures and numbness.   Psychiatric/Behavioral:  Negative for hallucinations. The patient is not nervous/anxious.      Objective:     Temp:  [97.1 °F (36.2 °C)-98.2 °F (36.8 °C)] 98.2 °F (36.8 °C)  Pulse:  [] 99  Resp:  [16-20] 20  SpO2:  [93 %-99 %] 99 %  BP: (134-154)/(56-69) 134/66     Body mass index is 18.64 kg/m².           Drains       None                   Physical Exam  Vitals and nursing note reviewed.   Constitutional:       Appearance: Normal appearance.   HENT:      Head: Atraumatic.      Nose: Nose normal.   Eyes:      Extraocular Movements: Extraocular movements intact.      Pupils: Pupils are equal, round, and reactive to light.   Cardiovascular:      Rate and Rhythm: Normal rate.   Pulmonary:      Effort: Pulmonary effort is normal.   Abdominal:      General: Abdomen is flat.   Musculoskeletal:         General: Normal range of motion.      Cervical back: Normal range of motion.   Neurological:      General: No focal deficit present.      Mental Status: He is alert and oriented to person, place, and time. Mental status is at baseline.   Psychiatric:         Mood and Affect: Mood normal.         Behavior: Behavior normal.         Thought Content: Thought content normal.         Judgment: Judgment normal.        Significant Labs:    BMP:  Recent Labs   Lab 10/02/22  0520 10/03/22  0741 10/04/22  0419    137 135*   K 3.6 4.8 3.6    105 105   CO2 22* 24 23   BUN 19 13 11   CREATININE 0.7 0.7 0.8   CALCIUM 9.3 9.1 8.5*       CBC:  Recent Labs   Lab 10/02/22  0520 10/03/22  0741 10/04/22  0419   WBC 11.96 12.50 11.73   HGB 9.1* 7.9* 7.7*   HCT 28.6* 25.2* 25.2*   * 522* 600*       All pertinent labs results from the past 24 hours have been reviewed.    Significant Imaging:  All pertinent imaging results/findings from the past 24 hours have been reviewed.                      Assessment and Plan:     Renal mass  Babatunde Singh is a 72 y.o. male with an extensive past medical hx with known hx of RUP renal mass suspicious for RCC.     - Consult d/w Staff Urologist  - Plan for right robotic partial vs total nephrectomy once able to pause AC/AP  - No plans for urologic interventions while inpt this admission  - He is cleared from a urologic perspective to proceed with planned vascular surgery on Thursday, 10/6/22  - All remaining care per primary team        VTE Risk Mitigation (From admission, onward)         Ordered     IP VTE HIGH RISK PATIENT  Once         10/02/22 0814     Place sequential compression device  Until discontinued         10/02/22 0814     heparin 25,000 units in dextrose 5% (100 units/ml) IV bolus from bag - ADDITIONAL PRN BOLUS - 60 units/kg  As needed (PRN)        Question:  Heparin Infusion Adjustment (DO NOT MODIFY ANSWER)  Answer:  \[email or web address]\epic\Images\Pharmacy\HeparinInfusions\heparin HIGH INTENSITY nomogram for [facility] [id number].pdf    10/02/22 0521     heparin 25,000 units in dextrose 5% (100 units/ml) IV bolus from bag - ADDITIONAL PRN BOLUS - 30 units/kg  As needed (PRN)        Question:  Heparin Infusion Adjustment (DO NOT MODIFY ANSWER)  Answer:  \[email or web address]\epic\Images\Pharmacy\HeparinInfusions\heparin HIGH INTENSITY nomogram for [facility] [id number].pdf    10/02/22 0521     heparin  25,000 units in dextrose 5% 250 mL (100 units/mL) infusion HIGH INTENSITY nomogram - [facility]  Continuous        Question Answer Comment   Heparin Infusion Adjustment (DO NOT MODIFY ANSWER) \\[email or web address]\epic\Images\Pharmacy\HeparinInfusions\heparin HIGH INTENSITY nomogram for [facility] [id number].pdf    Begin at (in units/kg/hr) 18        10/02/22 0521                Thank you for your consult. I will sign off. Please contact us if you have any additional questions.    Brent Miller MD  Urology  [facility] - Cardiology Stepdown

## 2022-10-04 NOTE — ASSESSMENT & PLAN NOTE
Babatunde Singh is a 72 y.o. male with an extensive past medical hx with known hx of RUP renal mass suspicious for RCC.     - Consult d/w Staff Urologist  - Plan for right robotic partial vs total nephrectomy once able to pause AC/AP  - No plans for urologic interventions while inpt this admission  - He is cleared from a urologic perspective to proceed with planned vascular surgery on Thursday, 10/6/22  - All remaining care per primary team

## 2022-10-04 NOTE — SUBJECTIVE & OBJECTIVE
Interval History: No acute events, pain controlled. Plan for OR Thursday.    Review of Systems   Constitutional: Negative.   HENT: Negative.     Eyes: Negative.    Cardiovascular:  Positive for claudication and leg swelling. Negative for chest pain, near-syncope, orthopnea, palpitations and syncope.   Respiratory: Negative.     Endocrine: Negative.    Hematologic/Lymphatic: Negative.    Skin: Negative.    Musculoskeletal: Negative.    Gastrointestinal: Negative.    Genitourinary: Negative.    Neurological: Negative.    Psychiatric/Behavioral: Negative.     Allergic/Immunologic: Negative.    Objective:     Vital Signs (Most Recent):  Temp: 97.5 °F (36.4 °C) (10/04/22 1043)  Pulse: 99 (10/04/22 1129)  Resp: 17 (10/04/22 1146)  BP: 138/64 (10/04/22 1043)  SpO2: 97 % (10/04/22 1043)   Vital Signs (24h Range):  Temp:  [97.5 °F (36.4 °C)-98.2 °F (36.8 °C)] 97.5 °F (36.4 °C)  Pulse:  [] 99  Resp:  [17-20] 17  SpO2:  [93 %-99 %] 97 %  BP: (134-161)/(60-70) 138/64     Weight: 54 kg (119 lb)  Body mass index is 18.64 kg/m².     SpO2: 97 %  O2 Device (Oxygen Therapy): room air      Intake/Output Summary (Last 24 hours) at 10/4/2022 1310  Last data filed at 10/4/2022 0907  Gross per 24 hour   Intake 869 ml   Output 1950 ml   Net -1081 ml         Lines/Drains/Airways       Peripheral Intravenous Line  Duration                  Peripheral IV - Single Lumen 10/02/22 0520 20 G Anterior;Left Forearm 2 days                    Physical Exam  Constitutional:       Appearance: Normal appearance.   Cardiovascular:      Rate and Rhythm: Normal rate and regular rhythm.      Heart sounds: No murmur heard.    No friction rub. No gallop.   Pulmonary:      Effort: Pulmonary effort is normal.      Breath sounds: Normal breath sounds.   Abdominal:      General: Abdomen is flat.      Palpations: Abdomen is soft.   Musculoskeletal:         General: Normal range of motion.      Comments: Right shin with blister    Ulcer to lateral/posterior  calf    Feet:      Comments: Right DP and PT pulses not palpable    Skin:     Coloration: Skin is not jaundiced or pale.      Findings: No bruising, erythema, lesion or rash.   Neurological:      General: No focal deficit present.      Mental Status: He is alert and oriented to person, place, and time.       Significant Labs: All pertinent lab results from the last 24 hours have been reviewed.    Significant Imaging:  All pertinent imaging results from the last 24 hours have been reviewed.

## 2022-10-04 NOTE — HPI
Babatunde Singh is a 73 yo M with PMHx severe PAD, HTN, HLD, and recently dx'd RLE DVT (9/9) on Xeralto who presents to the ED with severe PAD and rest pain. Urology consulted for surgical planning of a 6.8cm RUP renal mass with contrast enhancement. Tumor initially incidentally found on CTA in July of 2022. Denies right flank pain. States he has had a hx of gross hematuria, last time roughly in July of 2022. Endorses a current smoking hx, 50+ pys. Denies family hx of  malignancies. Denies difficulty voiding, dysuria and recent fevers, chills, and n/v/d.

## 2022-10-04 NOTE — ASSESSMENT & PLAN NOTE
"Acute on chronic limb ischemia:   Patient with significant PAD presents with worsening acute on chronic RLE pain unable to palpate or Doppler RLE pulses. Previously seen by Dr. Tracey for PAD, at that time management was medical only. PT doppler signal on the right but unable to obtain DP signal on admission. Suspect acute worsening of chronic PAD. Patient evaluated by IC.    CTA notable for "worsening stenosis or interval occlusion of the right dorsalis pedis artery, otherwise grossly unchanged noting regions of persistent occlusion as described above with two vessel runoff to the bilateral lower extremities."    Discussed with intervention cardiology. Unfortunately, limited options for revascularization. They recommend assessment by vascular surgery for amputation or palliative care. Discussed with vascular surgery who are planning axillary to bi-profunda bypass 10/6.    - continue heparin infusion  - continue aspirin and statin  - continue cilostazol  - regular LE pulse examination and monitoring of pain  - vascular surgery consulted, OR 10/6  - multimodal pain regimen (opioid, gabapentin, tylenol)     "

## 2022-10-04 NOTE — ASSESSMENT & PLAN NOTE
CTA leg show,Possible worsening stenosis or interval occlusion of the right dorsalis pedis artery.  In this patient with severe aortoiliac atherosclerosis and peripheral arterial disease, overall evaluation of lower extremity arteries is otherwise grossly unchanged noting regions of persistent occlusion as described above with two vessel runoff to the bilateral lower extremities.  Consider conventional angiographic follow-up as clinically appropriate and correlation for any changes in lower extremity symptoms.IR was consulted,but endoscopically is no option for vascularization,vascular surgery is consulted and planing doing LEFT axillary to bi-profunda bypass Thur 10/6/2022.patient is on ASA,heparin gtt,Petal and  Zetia and pain control.

## 2022-10-04 NOTE — SUBJECTIVE & OBJECTIVE
Past Medical History:   Diagnosis Date    Hypertension     Peripheral arterial disease        Past Surgical History:   Procedure Laterality Date    EXCISION OF HYDROCELE Left 4/26/2022    Procedure: HYDROCELECTOMY;  Surgeon: Damion Roberts MD;  Location: [facility];  Service: Urology;  Laterality: Left;    KNEE SURGERY  1972       Review of patient's allergies indicates:  No Known Allergies    Family History       Problem Relation (Age of Onset)    Hypertension Mother, Father            Tobacco Use    Smoking status: Former     Packs/day: 0.50     Years: 50.00     Pack years: 25.00     Types: Cigarettes    Smokeless tobacco: Never    Tobacco comments:     Cigarettes3-4 per day   Substance and Sexual Activity    Alcohol use: Never    Drug use: Never    Sexual activity: Yes     Partners: Female     Comment: wife       Review of Systems   Constitutional:  Negative for activity change, fatigue, fever and unexpected weight change.   HENT:  Negative for sore throat and trouble swallowing.    Eyes:  Negative for pain and discharge.   Respiratory:  Negative for chest tightness and shortness of breath.    Cardiovascular:  Positive for leg swelling. Negative for chest pain and palpitations.   Gastrointestinal:  Negative for abdominal pain, constipation, diarrhea, nausea and vomiting.   Genitourinary:  Negative for dysuria, flank pain and hematuria.        As per HPI   Musculoskeletal:  Negative for back pain and gait problem.   Skin:  Positive for color change and wound. Negative for rash.   Neurological:  Negative for seizures and numbness.   Psychiatric/Behavioral:  Negative for hallucinations. The patient is not nervous/anxious.      Objective:     Temp:  [97.1 °F (36.2 °C)-98.2 °F (36.8 °C)] 98.2 °F (36.8 °C)  Pulse:  [] 99  Resp:  [16-20] 20  SpO2:  [93 %-99 %] 99 %  BP: (134-154)/(56-69) 134/66     Body mass index is 18.64 kg/m².           Drains       None                   Physical Exam  Vitals and nursing note  reviewed.   Constitutional:       Appearance: Normal appearance.   HENT:      Head: Atraumatic.      Nose: Nose normal.   Eyes:      Extraocular Movements: Extraocular movements intact.      Pupils: Pupils are equal, round, and reactive to light.   Cardiovascular:      Rate and Rhythm: Normal rate.   Pulmonary:      Effort: Pulmonary effort is normal.   Abdominal:      General: Abdomen is flat.   Musculoskeletal:         General: Normal range of motion.      Cervical back: Normal range of motion.   Neurological:      General: No focal deficit present.      Mental Status: He is alert and oriented to person, place, and time. Mental status is at baseline.   Psychiatric:         Mood and Affect: Mood normal.         Behavior: Behavior normal.         Thought Content: Thought content normal.         Judgment: Judgment normal.       Significant Labs:    BMP:  Recent Labs   Lab 10/02/22  0520 10/03/22  0741 10/04/22  0419    137 135*   K 3.6 4.8 3.6    105 105   CO2 22* 24 23   BUN 19 13 11   CREATININE 0.7 0.7 0.8   CALCIUM 9.3 9.1 8.5*       CBC:  Recent Labs   Lab 10/02/22  0520 10/03/22  0741 10/04/22  0419   WBC 11.96 12.50 11.73   HGB 9.1* 7.9* 7.7*   HCT 28.6* 25.2* 25.2*   * 522* 600*       All pertinent labs results from the past 24 hours have been reviewed.    Significant Imaging:  All pertinent imaging results/findings from the past 24 hours have been reviewed.

## 2022-10-04 NOTE — PROGRESS NOTES
[facility] - Cardiology Stepdown  Cardiology  Progress Note    Patient Name: Babatunde Singh  MRN: [id number]  Admission Date: 10/2/2022  Hospital Length of Stay: 2 days  Code Status: Full Code   Attending Physician: Jt Medina MD   Primary Care Physician: Esdras Ly MD  Expected Discharge Date: 10/5/2022  Principal Problem:Critical limb ischemia of right lower extremity with ulceration of lower leg    Subjective:     Hospital Course:   Patient admitted to CCU with critical limb ischemia. Patient started on aspirin, heparin, and statin. CTA with severe multi-level disease and intermittent loss of pulses in his RLE. CTA not changed compared to 1 month ago. Interventional cardiology consulted and unfortunately recommended discussion with vascular surgery for amputation or palliative care. Consulted vascular surgery who are recommending axillary to bi-profunda bypass 10/6. Patient care transitioned to hospital medicine service.      Interval History: No acute events, pain controlled. Plan for OR Thursday.    Review of Systems   Constitutional: Negative.   HENT: Negative.     Eyes: Negative.    Cardiovascular:  Positive for claudication and leg swelling. Negative for chest pain, near-syncope, orthopnea, palpitations and syncope.   Respiratory: Negative.     Endocrine: Negative.    Hematologic/Lymphatic: Negative.    Skin: Negative.    Musculoskeletal: Negative.    Gastrointestinal: Negative.    Genitourinary: Negative.    Neurological: Negative.    Psychiatric/Behavioral: Negative.     Allergic/Immunologic: Negative.    Objective:     Vital Signs (Most Recent):  Temp: 97.5 °F (36.4 °C) (10/04/22 1043)  Pulse: 99 (10/04/22 1129)  Resp: 17 (10/04/22 1146)  BP: 138/64 (10/04/22 1043)  SpO2: 97 % (10/04/22 1043)   Vital Signs (24h Range):  Temp:  [97.5 °F (36.4 °C)-98.2 °F (36.8 °C)] 97.5 °F (36.4 °C)  Pulse:  [] 99  Resp:  [17-20] 17  SpO2:  [93 %-99 %] 97 %  BP: (134-161)/(60-70) 138/64     Weight: 54 kg  (119 lb)  Body mass index is 18.64 kg/m².     SpO2: 97 %  O2 Device (Oxygen Therapy): room air      Intake/Output Summary (Last 24 hours) at 10/4/2022 1310  Last data filed at 10/4/2022 0907  Gross per 24 hour   Intake 869 ml   Output 1950 ml   Net -1081 ml         Lines/Drains/Airways       Peripheral Intravenous Line  Duration                  Peripheral IV - Single Lumen 10/02/22 0520 20 G Anterior;Left Forearm 2 days                    Physical Exam  Constitutional:       Appearance: Normal appearance.   Cardiovascular:      Rate and Rhythm: Normal rate and regular rhythm.      Heart sounds: No murmur heard.    No friction rub. No gallop.   Pulmonary:      Effort: Pulmonary effort is normal.      Breath sounds: Normal breath sounds.   Abdominal:      General: Abdomen is flat.      Palpations: Abdomen is soft.   Musculoskeletal:         General: Normal range of motion.      Comments: Right shin with blister    Ulcer to lateral/posterior calf    Feet:      Comments: Right DP and PT pulses not palpable    Skin:     Coloration: Skin is not jaundiced or pale.      Findings: No bruising, erythema, lesion or rash.   Neurological:      General: No focal deficit present.      Mental Status: He is alert and oriented to person, place, and time.       Significant Labs: All pertinent lab results from the last 24 hours have been reviewed.    Significant Imaging:  All pertinent imaging results from the last 24 hours have been reviewed.    Assessment and Plan:     Ulcer of right lower extremity, limited to breakdown of skin  Ischemic ulcer with surrounding erythema, previously treated with doxycycline    - wound care consulted  - consider further antibiotics if worsening erythema or clinical status    Right renal mass  Noted on CT AP 8/15, following with urology for evaluation and management    Plan robotic partial vs total nephrectomy as soon as his anticoagulation is finished for DVT, per outpatient  "urologist.          Claudication of right lower extremity  Acute on chronic limb ischemia:   Patient with significant PAD presents with worsening acute on chronic RLE pain unable to palpate or Doppler RLE pulses. Previously seen by Dr. Tracey for PAD, at that time management was medical only. PT doppler signal on the right but unable to obtain DP signal on admission. Suspect acute worsening of chronic PAD. Patient evaluated by IC.    CTA notable for "worsening stenosis or interval occlusion of the right dorsalis pedis artery, otherwise grossly unchanged noting regions of persistent occlusion as described above with two vessel runoff to the bilateral lower extremities."    Discussed with intervention cardiology. Unfortunately, limited options for revascularization. They recommend assessment by vascular surgery for amputation or palliative care. Discussed with vascular surgery who are planning axillary to bi-profunda bypass 10/6.    - continue heparin infusion  - continue aspirin and statin  - continue cilostazol  - regular LE pulse examination and monitoring of pain  - vascular surgery consulted, OR 10/6  - multimodal pain regimen (opioid, gabapentin, tylenol)       Mixed hyperlipidemia  - continue statin and ezetimibe    Tobacco abuse  Discussed smoking cessation with patient    - nicotine PRN if needed    Primary hypertension  - continue losartan and hydrochlorothiazide        VTE Risk Mitigation (From admission, onward)         Ordered     IP VTE HIGH RISK PATIENT  Once         10/02/22 0814     Place sequential compression device  Until discontinued         10/02/22 0814     heparin 25,000 units in dextrose 5% (100 units/ml) IV bolus from bag - ADDITIONAL PRN BOLUS - 60 units/kg  As needed (PRN)        Question:  Heparin Infusion Adjustment (DO NOT MODIFY ANSWER)  Answer:  [email or web address]\epic\Images\Pharmacy\HeparinInfusions\heparin HIGH INTENSITY nomogram for [facility] [id number].pdf    10/02/22 0521     heparin 25,000 " units in dextrose 5% (100 units/ml) IV bolus from bag - ADDITIONAL PRN BOLUS - 30 units/kg  As needed (PRN)        Question:  Heparin Infusion Adjustment (DO NOT MODIFY ANSWER)  Answer:  \\[email or web address]\epic\Images\Pharmacy\HeparinInfusions\heparin HIGH INTENSITY nomogram for [facility] [id number].pdf    10/02/22 0521     heparin 25,000 units in dextrose 5% 250 mL (100 units/mL) infusion HIGH INTENSITY nomogram - [facility]  Continuous        Question Answer Comment   Heparin Infusion Adjustment (DO NOT MODIFY ANSWER) \\[email or web address]\epic\Images\Pharmacy\HeparinInfusions\heparin HIGH INTENSITY nomogram for [facility] [id number].pdf    Begin at (in units/kg/hr) 18        10/02/22 0521                Marco Herrera MD  Cardiology  [facility] - Cardiology Stepdown

## 2022-10-04 NOTE — CARE UPDATE
Patient undergoing high risk surgery.  Patient with no active cardiac problems (Patient does not have unstable angina, decompensated heart failure, significant uncontrolled tachy or yolanda arrhythmias or severe valvular heart disease).  Functional Status: Patient has functional METs > or = 4  His estimated risk with the proposed surgery/procedure for an adverse 30 day cardiac outcome (myocardial infarction, cardiac arrest, or death) is 6.0% (95% CI 4.9%-7.4%) (Revised Cardiac Risk Index [RCRI] Score = 1  based on the following risk factors: High risk surgery (intraperitoneal, intrathoracic, or suprainguinal vascular)). (Perioperative outcomes - Ermias at al Circ 1999; 100: 0969-1373; 30 day outcomes - Obed et al. Can J Cardiol 2017; 33:17-32)    Ok to proceed to surgery with above risk stratification.

## 2022-10-04 NOTE — ASSESSMENT & PLAN NOTE
CTA leg show,Possible worsening stenosis or interval occlusion of the right dorsalis pedis artery.  In this patient with severe aortoiliac atherosclerosis and peripheral arterial disease, overall evaluation of lower extremity arteries is otherwise grossly unchanged noting regions of persistent occlusion as described above with two vessel runoff to the bilateral lower extremities.  Consider conventional angiographic follow-up as clinically appropriate and correlation for any changes in lower extremity symptoms.IR was consulted,but endoscopically is no option for vascularization,vascular surgery is consulted and planing doing LEFT axillary to bi-profunda bypass Thur 10/6/2022.patient is on ASA,heparin gtt,Petal and  Zetia and pain control.  Cardiology cleared patient with risks.

## 2022-10-05 ENCOUNTER — ANESTHESIA EVENT (OUTPATIENT)
Dept: SURGERY | Facility: HOSPITAL | Age: 72
DRG: 252 | End: 2022-10-05
Payer: MEDICARE

## 2022-10-05 ENCOUNTER — TELEPHONE (OUTPATIENT)
Dept: UROLOGY | Facility: CLINIC | Age: 72
End: 2022-10-05
Payer: MEDICARE

## 2022-10-05 PROBLEM — R09.89 ABSENT PEDAL PULSES: Status: ACTIVE | Noted: 2022-10-05

## 2022-10-05 PROBLEM — I82.409 DVT (DEEP VENOUS THROMBOSIS): Status: ACTIVE | Noted: 2022-10-05

## 2022-10-05 LAB
ALBUMIN SERPL BCP-MCNC: 2.6 G/DL (ref 3.5–5.2)
ALP SERPL-CCNC: 63 U/L (ref 55–135)
ALT SERPL W/O P-5'-P-CCNC: 12 U/L (ref 10–44)
ANION GAP SERPL CALC-SCNC: 8 MMOL/L (ref 8–16)
APTT BLDCRRT: 43 SEC (ref 21–32)
AST SERPL-CCNC: 18 U/L (ref 10–40)
BASOPHILS # BLD AUTO: 0.04 K/UL (ref 0–0.2)
BASOPHILS NFR BLD: 0.4 % (ref 0–1.9)
BILIRUB SERPL-MCNC: 0.2 MG/DL (ref 0.1–1)
BUN SERPL-MCNC: 8 MG/DL (ref 8–23)
CALCIUM SERPL-MCNC: 8.3 MG/DL (ref 8.7–10.5)
CHLORIDE SERPL-SCNC: 107 MMOL/L (ref 95–110)
CO2 SERPL-SCNC: 24 MMOL/L (ref 23–29)
CREAT SERPL-MCNC: 0.7 MG/DL (ref 0.5–1.4)
DIFFERENTIAL METHOD: ABNORMAL
EOSINOPHIL # BLD AUTO: 0.1 K/UL (ref 0–0.5)
EOSINOPHIL NFR BLD: 0.9 % (ref 0–8)
ERYTHROCYTE [DISTWIDTH] IN BLOOD BY AUTOMATED COUNT: 18.7 % (ref 11.5–14.5)
EST. GFR  (NO RACE VARIABLE): >60 ML/MIN/1.73 M^2
GLUCOSE SERPL-MCNC: 133 MG/DL (ref 70–110)
HCT VFR BLD AUTO: 24 % (ref 40–54)
HGB BLD-MCNC: 7.5 G/DL (ref 14–18)
IMM GRANULOCYTES # BLD AUTO: 0.04 K/UL (ref 0–0.04)
IMM GRANULOCYTES NFR BLD AUTO: 0.4 % (ref 0–0.5)
LYMPHOCYTES # BLD AUTO: 3.6 K/UL (ref 1–4.8)
LYMPHOCYTES NFR BLD: 34.9 % (ref 18–48)
MCH RBC QN AUTO: 26.1 PG (ref 27–31)
MCHC RBC AUTO-ENTMCNC: 31.3 G/DL (ref 32–36)
MCV RBC AUTO: 84 FL (ref 82–98)
MONOCYTES # BLD AUTO: 0.8 K/UL (ref 0.3–1)
MONOCYTES NFR BLD: 8 % (ref 4–15)
NEUTROPHILS # BLD AUTO: 5.7 K/UL (ref 1.8–7.7)
NEUTROPHILS NFR BLD: 55.4 % (ref 38–73)
NRBC BLD-RTO: 0 /100 WBC
PLATELET # BLD AUTO: 390 K/UL (ref 150–450)
PMV BLD AUTO: 10.1 FL (ref 9.2–12.9)
POTASSIUM SERPL-SCNC: 3.3 MMOL/L (ref 3.5–5.1)
PROT SERPL-MCNC: 5.3 G/DL (ref 6–8.4)
RBC # BLD AUTO: 2.87 M/UL (ref 4.6–6.2)
SODIUM SERPL-SCNC: 139 MMOL/L (ref 136–145)
WBC # BLD AUTO: 10.24 K/UL (ref 3.9–12.7)

## 2022-10-05 PROCEDURE — 25000003 PHARM REV CODE 250: Performed by: STUDENT IN AN ORGANIZED HEALTH CARE EDUCATION/TRAINING PROGRAM

## 2022-10-05 PROCEDURE — 25000003 PHARM REV CODE 250: Performed by: INTERNAL MEDICINE

## 2022-10-05 PROCEDURE — 80053 COMPREHEN METABOLIC PANEL: CPT | Performed by: STUDENT IN AN ORGANIZED HEALTH CARE EDUCATION/TRAINING PROGRAM

## 2022-10-05 PROCEDURE — 99232 SBSQ HOSP IP/OBS MODERATE 35: CPT | Mod: ,,, | Performed by: HOSPITALIST

## 2022-10-05 PROCEDURE — 20600001 HC STEP DOWN PRIVATE ROOM

## 2022-10-05 PROCEDURE — 93971 EXTREMITY STUDY: CPT | Performed by: SURGERY

## 2022-10-05 PROCEDURE — 85025 COMPLETE CBC W/AUTO DIFF WBC: CPT | Performed by: STUDENT IN AN ORGANIZED HEALTH CARE EDUCATION/TRAINING PROGRAM

## 2022-10-05 PROCEDURE — S4991 NICOTINE PATCH NONLEGEND: HCPCS | Performed by: STUDENT IN AN ORGANIZED HEALTH CARE EDUCATION/TRAINING PROGRAM

## 2022-10-05 PROCEDURE — 36415 COLL VENOUS BLD VENIPUNCTURE: CPT

## 2022-10-05 PROCEDURE — 85730 THROMBOPLASTIN TIME PARTIAL: CPT

## 2022-10-05 PROCEDURE — 99232 PR SUBSEQUENT HOSPITAL CARE,LEVL II: ICD-10-PCS | Mod: ,,, | Performed by: HOSPITALIST

## 2022-10-05 RX ADMIN — GABAPENTIN 900 MG: 300 CAPSULE ORAL at 08:10

## 2022-10-05 RX ADMIN — LOSARTAN POTASSIUM 50 MG: 50 TABLET, FILM COATED ORAL at 08:10

## 2022-10-05 RX ADMIN — ATORVASTATIN CALCIUM 80 MG: 40 TABLET, FILM COATED ORAL at 08:10

## 2022-10-05 RX ADMIN — SODIUM CHLORIDE: 0.9 INJECTION, SOLUTION INTRAVENOUS at 07:10

## 2022-10-05 RX ADMIN — NICOTINE 1 PATCH: 14 PATCH, EXTENDED RELEASE TRANSDERMAL at 08:10

## 2022-10-05 RX ADMIN — OXYCODONE HYDROCHLORIDE AND ACETAMINOPHEN 1 TABLET: 5; 325 TABLET ORAL at 05:10

## 2022-10-05 RX ADMIN — SODIUM CHLORIDE: 0.9 INJECTION, SOLUTION INTRAVENOUS at 08:10

## 2022-10-05 RX ADMIN — CILOSTAZOL 100 MG: 100 TABLET ORAL at 08:10

## 2022-10-05 RX ADMIN — ASPIRIN 81 MG CHEWABLE TABLET 81 MG: 81 TABLET CHEWABLE at 08:10

## 2022-10-05 RX ADMIN — OXYCODONE HYDROCHLORIDE AND ACETAMINOPHEN 1 TABLET: 5; 325 TABLET ORAL at 08:10

## 2022-10-05 RX ADMIN — EZETIMIBE 10 MG: 10 TABLET ORAL at 08:10

## 2022-10-05 RX ADMIN — GABAPENTIN 900 MG: 300 CAPSULE ORAL at 04:10

## 2022-10-05 RX ADMIN — HYDROCHLOROTHIAZIDE 12.5 MG: 12.5 TABLET ORAL at 08:10

## 2022-10-05 RX ADMIN — OXYCODONE HYDROCHLORIDE AND ACETAMINOPHEN 1 TABLET: 5; 325 TABLET ORAL at 07:10

## 2022-10-05 NOTE — PLAN OF CARE
APPOINTMENT:    Patient has been scheduled for a PCP Hospital Follow Up with Esdras Ly MD on Friday Oct 14, 2022 at 9:00 AM.    Please arrive approximately 15 minutes before your scheduled appointment time and ensure that you have a valid government issued ID and your insurance card.    [facility]   Please park in surface lot and check in at central registration desk.    [facility]  [address]   [facility] [id number]  [phone]                  [facility] Len[facility]  Community Health Worker  Case management  Ext. [phone]

## 2022-10-05 NOTE — PROGRESS NOTES
[facility] Medicine  Progress Note    Patient Name: Babatunde Singh  MRN: [id number]  Patient Class: IP- Inpatient   Admission Date: 10/2/2022  Length of Stay: 3 days  Attending Physician: Jt Medina MD  Primary Care Provider: Esdras Ly MD        Subjective:     Principal Problem:Critical limb ischemia of right lower extremity with ulceration of lower leg        HPI:  Babatunde Singh is a 73 yo M withPAD, HTN, HLD, recent diagnosis of DVT (9/8) on xarelto who presents due to RLE pain. Of note, patient has frequent admission for lower extremity pain. He states that today he has had increasing 10/10 aching pain which wakes him up from sleep every few hours. This has been progressing over the last 2-3 days. He states at baseline he has pain every other night. Improved pain in the ER but RLE distal pulses were absent on presentation, unable to palpate or obtain Doppler signal      Overview/Hospital Course:  Babatunde Singh is a 73 yo M withPAD, HTN, HLD, recent diagnosis of DVT (9/8) on xarelto who presents due to RLE pain. Of note, patient has frequent admission for lower extremity pain. He states that today he has had increasing 10/10 aching pain which wakes him up from sleep every few hours. This has been progressing over the last 2-3 days. He states at baseline he has pain every other night. Improved pain in the ER but RLE distal pulses were absent on presentation, unable to palpate or obtain Doppler signal,CTA leg show,Possible worsening stenosis or interval occlusion of the right dorsalis pedis artery.  In this patient with severe aortoiliac atherosclerosis and peripheral arterial disease, overall evaluation of lower extremity arteries is otherwise grossly unchanged noting regions of persistent occlusion as described above with two vessel runoff to the bilateral lower extremities.  Consider conventional angiographic follow-up as clinically appropriate and correlation for  any changes in lower extremity symptoms.IR was consulted,but endoscopically is no option for vascularization,vascular surgery is consulted and planing doing LEFT axillary to bi-profunda bypass Thur 10/6/2022.patient is on ASA,heparin gtt,Petal and  Zetia and pain control.  Urology want out patient intervention for right renal mass,  Cardiology is consulted for cardiac clearance.has been cleared with risk as per cardiology.      Interval History: feel ok.    Review of Systems   Constitutional:  Positive for activity change and appetite change.   HENT:  Negative for congestion.    Respiratory:  Negative for apnea.    Cardiovascular:  Negative for chest pain and leg swelling.   Gastrointestinal:  Negative for abdominal distention and abdominal pain.   Genitourinary:  Negative for difficulty urinating and dysuria.   Musculoskeletal:  Positive for myalgias.   Neurological:  Positive for weakness.   Psychiatric/Behavioral:  Negative for agitation and behavioral problems.    Objective:     Vital Signs (Most Recent):  Temp: 97.1 °F (36.2 °C) (10/05/22 0804)  Pulse: 91 (10/05/22 0804)  Resp: 20 (10/05/22 0804)  BP: (!) 148/63 (10/05/22 0804)  SpO2: 100 % (10/05/22 0804)   Vital Signs (24h Range):  Temp:  [97.1 °F (36.2 °C)-98.2 °F (36.8 °C)] 97.1 °F (36.2 °C)  Pulse:  [] 91  Resp:  [17-20] 20  SpO2:  [92 %-100 %] 100 %  BP: (109-173)/(55-72) 148/63     Weight: 54 kg (119 lb)  Body mass index is 18.64 kg/m².    Intake/Output Summary (Last 24 hours) at 10/5/2022 1009  Last data filed at 10/5/2022 0654  Gross per 24 hour   Intake 702 ml   Output 3750 ml   Net -3048 ml      Physical Exam  Eyes:      Pupils: Pupils are equal, round, and reactive to light.   Cardiovascular:      Rate and Rhythm: Normal rate and regular rhythm.   Pulmonary:      Effort: No respiratory distress.   Skin:     Comments: Right lower leg wounds,dressed    Neurological:      General: No focal deficit present.      Mental Status: He is alert.       Cranial Nerves: No cranial nerve deficit.       Significant Labs: All pertinent labs within the past 24 hours have been reviewed.  BMP:   Recent Labs   Lab 10/05/22  0448   *      K 3.3*      CO2 24   BUN 8   CREATININE 0.7   CALCIUM 8.3*     CBC:   Recent Labs   Lab 10/04/22  0419 10/05/22  0448   WBC 11.73 10.24   HGB 7.7* 7.5*   HCT 25.2* 24.0*   * 390     CMP:   Recent Labs   Lab 10/04/22  0419 10/05/22  0448   * 139   K 3.6 3.3*    107   CO2 23 24   * 133*   BUN 11 8   CREATININE 0.8 0.7   CALCIUM 8.5* 8.3*   PROT 5.5* 5.3*   ALBUMIN 2.7* 2.6*   BILITOT 0.2 0.2   ALKPHOS 66 63   AST 19 18   ALT 14 12   ANIONGAP 7* 8       Significant Imaging: I have reviewed all pertinent imaging results/findings within the past 24 hours.      Assessment/Plan:      * Critical limb ischemia of right lower extremity with ulceration of lower leg  CTA leg show,Possible worsening stenosis or interval occlusion of the right dorsalis pedis artery.  In this patient with severe aortoiliac atherosclerosis and peripheral arterial disease, overall evaluation of lower extremity arteries is otherwise grossly unchanged noting regions of persistent occlusion as described above with two vessel runoff to the bilateral lower extremities.  Consider conventional angiographic follow-up as clinically appropriate and correlation for any changes in lower extremity symptoms.IR was consulted,but endoscopically is no option for vascularization,vascular surgery is consulted and planing doing LEFT axillary to bi-profunda bypass Thur 10/6/2022.patient is on ASA,heparin gtt,Petal and  Zetia and pain control.  Cardiology cleared patient with risks.      Ulcer of right lower extremity, limited to breakdown of skin  Wound care.      Atherosclerosis of native artery of extremity  Require intervention as above,      Right renal mass  Urology planing doing out patient intervention.      Right leg pain  On pain  medications.      Claudication of right lower extremity  Duo to ischemic leg.      Mixed hyperlipidemia  On Zetia       Tobacco abuse  Has been consulted over 6 minutes need quit smoking.      Primary hypertension  on home BP med's        VTE Risk Mitigation (From admission, onward)         Ordered     IP VTE HIGH RISK PATIENT  Once         10/02/22 0814     Place sequential compression device  Until discontinued         10/02/22 0814                Discharge Planning   LAZ: 10/5/2022     Code Status: Full Code   Is the patient medically ready for discharge?: No    Reason for patient still in hospital (select all that apply): Patient trending condition  Discharge Plan A: Home with family                  Jt Medina MD  Department of Hospital Medicine   [facility] Step[facility]

## 2022-10-05 NOTE — SUBJECTIVE & OBJECTIVE
Interval History: feel ok.    Review of Systems   Constitutional:  Positive for activity change and appetite change.   HENT:  Negative for congestion.    Respiratory:  Negative for apnea.    Cardiovascular:  Negative for chest pain and leg swelling.   Gastrointestinal:  Negative for abdominal distention and abdominal pain.   Genitourinary:  Negative for difficulty urinating and dysuria.   Musculoskeletal:  Positive for myalgias.   Neurological:  Positive for weakness.   Psychiatric/Behavioral:  Negative for agitation and behavioral problems.    Objective:     Vital Signs (Most Recent):  Temp: 97.1 °F (36.2 °C) (10/05/22 0804)  Pulse: 91 (10/05/22 0804)  Resp: 20 (10/05/22 0804)  BP: (!) 148/63 (10/05/22 0804)  SpO2: 100 % (10/05/22 0804)   Vital Signs (24h Range):  Temp:  [97.1 °F (36.2 °C)-98.2 °F (36.8 °C)] 97.1 °F (36.2 °C)  Pulse:  [] 91  Resp:  [17-20] 20  SpO2:  [92 %-100 %] 100 %  BP: (109-173)/(55-72) 148/63     Weight: 54 kg (119 lb)  Body mass index is 18.64 kg/m².    Intake/Output Summary (Last 24 hours) at 10/5/2022 1009  Last data filed at 10/5/2022 0654  Gross per 24 hour   Intake 702 ml   Output 3750 ml   Net -3048 ml      Physical Exam  Eyes:      Pupils: Pupils are equal, round, and reactive to light.   Cardiovascular:      Rate and Rhythm: Normal rate and regular rhythm.   Pulmonary:      Effort: No respiratory distress.   Skin:     Comments: Right lower leg wounds,dressed    Neurological:      General: No focal deficit present.      Mental Status: He is alert.      Cranial Nerves: No cranial nerve deficit.       Significant Labs: All pertinent labs within the past 24 hours have been reviewed.  BMP:   Recent Labs   Lab 10/05/22  0448   *      K 3.3*      CO2 24   BUN 8   CREATININE 0.7   CALCIUM 8.3*     CBC:   Recent Labs   Lab 10/04/22  0419 10/05/22  0448   WBC 11.73 10.24   HGB 7.7* 7.5*   HCT 25.2* 24.0*   * 390     CMP:   Recent Labs   Lab 10/04/22  0419  10/05/22  0448   * 139   K 3.6 3.3*    107   CO2 23 24   * 133*   BUN 11 8   CREATININE 0.8 0.7   CALCIUM 8.5* 8.3*   PROT 5.5* 5.3*   ALBUMIN 2.7* 2.6*   BILITOT 0.2 0.2   ALKPHOS 66 63   AST 19 18   ALT 14 12   ANIONGAP 7* 8       Significant Imaging: I have reviewed all pertinent imaging results/findings within the past 24 hours.

## 2022-10-05 NOTE — ANESTHESIA PREPROCEDURE EVALUATION
[facility]  Anesthesia Pre-Operative Evaluation         Patient Name: Babatunde Singh  YOB: 1950  MRN: [id number]    SUBJECTIVE:     Pre-operative evaluation for Procedure(s) (LRB):  CREATION, BYPASS, ARTERIAL, AXILLARY TO FEMORAL, USING GRAFT (N/A)  CREATION, BYPASS, ARTERIAL, FEMORAL TO FEMORAL, USING GRAFT (N/A)     10/05/2022    Babatunde Singh is a 72 y.o. male w/ a significant PMHx of PAD, HTN, HLD, tobacco abuse, recent diagnosis of DVT (9/8) on xarelto who presents due to RLE pain. Pt admitted to CCU with critical limb ischemia. Patient started on aspirin, heparin, and statin. CTA with severe multi-level disease and intermittent loss of pulses in his RLE    Patient now presents for the above procedure(s).      TTE: 8/5/22   The left ventricle is normal in size with concentric remodeling and normal systolic function. The estimated ejection fraction is 70%.   Normal right ventricular size with normal right ventricular systolic function.   Indeterminate left ventricular diastolic function.   The estimated PA systolic pressure is 18 mmHg.   Normal central venous pressure (3 mmHg).      LDA:        Peripheral IV - Single Lumen 10/02/22 0520 20 G Anterior;Left Forearm (Active)   Site Assessment Clean;Dry;Intact 10/05/22 0000   Extremity Assessment Distal to IV No warmth;No swelling;No redness 10/04/22 2000   Line Status Infusing 10/05/22 0000   Dressing Status Clean;Dry;Intact 10/05/22 0000   Dressing Intervention Integrity maintained 10/05/22 0000   Dressing Change Due 10/06/22 10/03/22 0800   Site Change Due 10/06/22 10/03/22 0800   Reason Not Rotated Not due 10/03/22 0800   Number of days: 3       Prev airway: 4/26/22  Intubation:     Induction:  Intravenous    Intubated:  Postinduction    Mask Ventilation:  N/a    Attempts:  1    Attempted By:  CRNA    Difficult Airway Encountered?: No      Complications:  None    Airway Device:  Supraglottic airway/LMA    Airway Device  Size:  4.0    Style/Cuff Inflation:  Cuffed    Placement Verified By:  Capnometry    Complicating Factors:  None    Findings Post-Intubation:  BS equal bilateral and atraumatic/condition of teeth unchanged    Drips:    sodium chloride 0.9% 75 mL/hr at 10/05/22 0714   pt also on high intensity heparin drip      Patient Active Problem List   Diagnosis    Primary hypertension    Tobacco abuse    Mixed hyperlipidemia    Claudication of right lower extremity    Right leg pain    Sciatica of right side    Numbness and tingling of right leg    Right renal mass    Allodynia (Right leg)    Atherosclerosis of native artery of extremity    Lower limb ischemia    Ulcer of right lower extremity, limited to breakdown of skin    Critical limb ischemia of right lower extremity with ulceration of lower leg       Review of patient's allergies indicates:  No Known Allergies    Current Outpatient Medications:    Current Facility-Administered Medications:     0.9%  NaCl infusion, , Intravenous, Continuous, Leland Murillo MD, Last Rate: 75 mL/hr at 10/05/22 0714, New Bag at 10/05/22 0714    acetaminophen tablet 650 mg, 650 mg, Oral, Q4H PRN, Baldemar Veloz MD, 650 mg at 10/03/22 2235    aspirin chewable tablet 81 mg, 81 mg, Oral, Daily, Baldemar Veloz MD, 81 mg at 10/05/22 0835    atorvastatin tablet 80 mg, 80 mg, Oral, Daily, Baldemar Veloz MD, 80 mg at 10/05/22 0836    cilostazoL tablet 100 mg, 100 mg, Oral, BID, Baldemar Veloz MD, 100 mg at 10/05/22 0836    ezetimibe tablet 10 mg, 10 mg, Oral, Daily, Baldemar Veloz MD, 10 mg at 10/05/22 0836    gabapentin capsule 900 mg, 900 mg, Oral, TID, Baldemar Veloz MD, 900 mg at 10/05/22 0835    hydroCHLOROthiazide tablet 12.5 mg, 12.5 mg, Oral, Daily, Jerry Alvarado MD, 12.5 mg at 10/05/22 0836    losartan tablet 50 mg, 50 mg, Oral, Daily, Jerry Alvarado MD, 50 mg at 10/05/22 0836    morphine injection 2 mg, 2 mg, Intravenous, Q4H PRN, Marco Herrera MD     nicotine 14 mg/24 hr 1 patch, 1 patch, Transdermal, Daily, Leland Murillo MD, 1 patch at 10/05/22 0835    oxyCODONE-acetaminophen 5-325 mg per tablet 1 tablet, 1 tablet, Oral, Q4H PRN, Marco Herrera MD, 1 tablet at 10/05/22 0713    sodium chloride 0.9% flush 10 mL, 10 mL, Intravenous, PRN, Baldemar Veloz MD    Past Surgical History:   Procedure Laterality Date    EXCISION OF HYDROCELE Left 4/26/2022    Procedure: HYDROCELECTOMY;  Surgeon: Damion Roberts MD;  [facility]: [facility];  Service: Urology;  Laterality: Left;    KNEE SURGERY  1972       Social History     Socioeconomic History    Marital status:    Occupational History    Occupation: [employer]   Tobacco Use    Smoking status: Former     Packs/day: 0.50     Years: 50.00     Pack years: 25.00     Types: Cigarettes    Smokeless tobacco: Never    Tobacco comments:     Cigarettes3-4 per day   Substance and Sexual Activity    Alcohol use: Never    Drug use: Never    Sexual activity: Yes     Partners: Female     Comment: wife       OBJECTIVE:     Vital Signs Range (Last 24H):  Temp:  [36.2 °C (97.1 °F)-36.8 °C (98.2 °F)]   Pulse:  []   Resp:  [17-20]   BP: (109-173)/(55-72)   SpO2:  [92 %-100 %]       Significant Labs:  Lab Results   Component Value Date    WBC 10.24 10/05/2022    HGB 7.5 (L) 10/05/2022    HCT 24.0 (L) 10/05/2022     10/05/2022    CHOL 138 03/10/2022    TRIG 40 03/10/2022    HDL 44 03/10/2022    ALT 12 10/05/2022    AST 18 10/05/2022     10/05/2022    K 3.3 (L) 10/05/2022     10/05/2022    CREATININE 0.7 10/05/2022    BUN 8 10/05/2022    CO2 24 10/05/2022    INR 1.4 (H) 10/02/2022    HGBA1C 5.8 (H) 03/10/2022       Diagnostic Studies: No relevant studies.    EKG:   Results for orders placed or performed during the hospital encounter of 10/02/22   EKG 12-lead    Collection Time: 10/02/22  5:25 AM    Narrative    Test Reason :     Vent. Rate : 082 BPM     Atrial Rate : 082 BPM     P-R Int : 164 ms        "   QRS Dur : 082 ms      QT Int : 400 ms       P-R-T Axes : 074 026 243 degrees     QTc Int : 467 ms    Normal sinus rhythm  ST and T wave abnormality, consider inferior ischemia  Abnormal ECG  No previous ECGs available  Confirmed by Tolu Johnson MD ([id number]) on 10/2/2022 8:26:08 AM    Referred By: [email or web address]   SELF           Confirmed By:Tolu Johnson MD       2D ECHO:  TTE:  Results for orders placed or performed during the hospital encounter of 08/05/22   Echo   Result Value Ref Range    Ascending aorta 3.26 cm    STJ 2.81 cm    AV mean gradient 7 mmHg    Ao peak john 1.82 m/s    Ao VTI 25.54 cm    IVRT 125.59 msec    IVS 1.40 (A) 0.6 - 1.1 cm    LA size 3.55 cm    Left Atrium Major Axis 4.56 cm    Left Atrium Minor Axis 4.89 cm    LVIDd 3.55 3.5 - 6.0 cm    LVIDs 2.65 2.1 - 4.0 cm    LVOT diameter 2.12 cm    LVOT peak VTI 20.63 cm    Posterior Wall 1.43 (A) 0.6 - 1.1 cm    MV Peak A John 1.14 m/s    E wave deceleration time 145.96 msec    MV Peak E John 0.76 m/s    PV Peak D John 0.52 m/s    PV Peak S John 0.79 m/s    RA Major Axis 4.25 cm    RA Width 3.38 cm    RVDD 2.56 cm    Sinus 3.36 cm    TAPSE 1.95 cm    TR Max John 1.96 m/s    TDI LATERAL 0.06 m/s    TDI SEPTAL 0.04 m/s    LA WIDTH 3.70 cm    MV stenosis pressure 1/2 time 42.33 ms    LV Diastolic Volume 52.52 mL    LV Systolic Volume 25.78 mL    RV S' 14.34 cm/s    LVOT peak john 1.19 m/s    MV "A" wave duration 7.71 msec    LV LATERAL E/E' RATIO 12.67 m/s    LV SEPTAL E/E' RATIO 19.00 m/s    FS 25 %    LA volume 52.69 cm3    LV mass 179.44 g    Left Ventricle Relative Wall Thickness 0.81 cm    AV valve area 2.85 cm2    AV Velocity Ratio 0.65     AV index (prosthetic) 0.81     MV valve area p 1/2 method 5.20 cm2    E/A ratio 0.67     Mean e' 0.05 m/s    Pulm vein S/D ratio 1.52     LVOT area 3.5 cm2    LVOT stroke volume 72.78 cm3    AV peak gradient 13 mmHg    E/E' ratio 15.20 m/s    Triscuspid Valve Regurgitation Peak Gradient 15 mmHg    BSA 1.72 m2    LV " Systolic Volume Index 15.2 mL/m2    LV Diastolic Volume Index 30.89 mL/m2    LA Volume Index 31.0 mL/m2    LV Mass Index 106 g/m2    Right Atrial Pressure (from IVC) 3 mmHg    EF 70 %    TV rest pulmonary artery pressure 18 mmHg    Narrative    · The left ventricle is normal in size with concentric remodeling and   normal systolic function. The estimated ejection fraction is 70%.  · Normal right ventricular size with normal right ventricular systolic   function.  · Indeterminate left ventricular diastolic function.  · The estimated PA systolic pressure is 18 mmHg.  · Normal central venous pressure (3 mmHg).          BRIAN:  No results found for this or any previous visit.    ASSESSMENT/PLAN:                                                                                                                  10/05/2022  Babatunde Singh is a 72 y.o., male.      Pre-op Assessment    I have reviewed the Patient Summary Reports.       I have reviewed the Medications.     Review of Systems  Anesthesia Hx:  No problems with previous Anesthesia  History of prior surgery of interest to airway management or planning: Denies Family Hx of Anesthesia complications.   Denies Personal Hx of Anesthesia complications.   Social:  Smoker    Hematology/Oncology:     Oncology Normal   Hematology Comments: PLACED On xarelto for PAD 3 weeks ago,    Cardiovascular:   Hypertension, well controlled Denies MI.   Denies Dysrhythmias.   Denies Angina.  Denies CHF. PVD hyperlipidemia    Pulmonary:  Pulmonary Normal  Denies COPD.  Denies Asthma.  Denies Shortness of breath.    Renal/:   Denies Chronic Renal Disease.     Hepatic/GI:   Denies Liver Disease.    Neurological:   Denies TIA. Denies CVA. Neuromuscular Disease,  Denies Seizures.    Endocrine:   Denies Diabetes.  Denies Obesity / BMI > 30      Physical Exam  General: Well nourished, Cooperative and Alert    Airway:  Mallampati: III   Mouth Opening: Normal  TM Distance: Normal  Tongue:  Normal    Dental:  Partial Dentures  No top teeth/dentures  About 6 teeth on bottom  Chest/Lungs:  Clear to auscultation    Heart:  Rhythm: Regular Rhythm  Murmur: Systolic;  Systolic: Holosystolic;        Anesthesia Plan  Type of Anesthesia, risks & benefits discussed:    Anesthesia Type: Gen ETT  Intra-op Monitoring Plan: Standard ASA Monitors and Art Line  Post Op Pain Control Plan: multimodal analgesia and IV/PO Opioids PRN  Induction:  IV  Airway Plan: Direct, Post-Induction  Informed Consent: Informed consent signed with the Patient and all parties understand the risks and agree with anesthesia plan.  All questions answered.   ASA Score: 3  Day of Surgery Review of History & Physical: H&P Update referred to the surgeon/provider.    Ready For Surgery From Anesthesia Perspective.     .

## 2022-10-05 NOTE — PLAN OF CARE
Problem: Adult Inpatient Plan of Care  Goal: Plan of Care Review  Outcome: Ongoing, Progressing   Patient Aox4. Vital signs stable. No falls or injuries noted. Complaints of pain to lower extremities. Pain controlled with meds. Heparin running continuous. Remains therapeutic at 16 units with aptt at 45.3. Call light within reach, will continue to monitor.

## 2022-10-06 ENCOUNTER — ANESTHESIA (OUTPATIENT)
Dept: SURGERY | Facility: HOSPITAL | Age: 72
DRG: 252 | End: 2022-10-06
Payer: MEDICARE

## 2022-10-06 PROBLEM — L97.911 ULCER OF RIGHT LOWER EXTREMITY, LIMITED TO BREAKDOWN OF SKIN: Status: RESOLVED | Noted: 2022-09-15 | Resolved: 2022-10-06

## 2022-10-06 LAB
ALBUMIN SERPL BCP-MCNC: 2.7 G/DL (ref 3.5–5.2)
ALP SERPL-CCNC: 66 U/L (ref 55–135)
ALT SERPL W/O P-5'-P-CCNC: 13 U/L (ref 10–44)
ANION GAP SERPL CALC-SCNC: 10 MMOL/L (ref 8–16)
ANION GAP SERPL CALC-SCNC: 10 MMOL/L (ref 8–16)
ANION GAP SERPL CALC-SCNC: 9 MMOL/L (ref 8–16)
AST SERPL-CCNC: 24 U/L (ref 10–40)
BASOPHILS # BLD AUTO: 0.03 K/UL (ref 0–0.2)
BASOPHILS # BLD AUTO: 0.03 K/UL (ref 0–0.2)
BASOPHILS # BLD AUTO: 0.05 K/UL (ref 0–0.2)
BASOPHILS NFR BLD: 0.2 % (ref 0–1.9)
BASOPHILS NFR BLD: 0.2 % (ref 0–1.9)
BASOPHILS NFR BLD: 0.4 % (ref 0–1.9)
BILIRUB SERPL-MCNC: 0.2 MG/DL (ref 0.1–1)
BLD PROD TYP BPU: NORMAL
BLD PROD TYP BPU: NORMAL
BLOOD UNIT EXPIRATION DATE: NORMAL
BLOOD UNIT EXPIRATION DATE: NORMAL
BLOOD UNIT TYPE CODE: 6200
BLOOD UNIT TYPE CODE: 6200
BLOOD UNIT TYPE: NORMAL
BLOOD UNIT TYPE: NORMAL
BUN SERPL-MCNC: 6 MG/DL (ref 8–23)
BUN SERPL-MCNC: 7 MG/DL (ref 8–23)
BUN SERPL-MCNC: 7 MG/DL (ref 8–23)
CALCIUM SERPL-MCNC: 8.4 MG/DL (ref 8.7–10.5)
CALCIUM SERPL-MCNC: 8.4 MG/DL (ref 8.7–10.5)
CALCIUM SERPL-MCNC: 8.8 MG/DL (ref 8.7–10.5)
CHLORIDE SERPL-SCNC: 108 MMOL/L (ref 95–110)
CO2 SERPL-SCNC: 18 MMOL/L (ref 23–29)
CO2 SERPL-SCNC: 18 MMOL/L (ref 23–29)
CO2 SERPL-SCNC: 21 MMOL/L (ref 23–29)
CODING SYSTEM: NORMAL
CODING SYSTEM: NORMAL
CREAT SERPL-MCNC: 0.7 MG/DL (ref 0.5–1.4)
DIFFERENTIAL METHOD: ABNORMAL
DISPENSE STATUS: NORMAL
DISPENSE STATUS: NORMAL
EOSINOPHIL # BLD AUTO: 0 K/UL (ref 0–0.5)
EOSINOPHIL # BLD AUTO: 0 K/UL (ref 0–0.5)
EOSINOPHIL # BLD AUTO: 0.1 K/UL (ref 0–0.5)
EOSINOPHIL NFR BLD: 0.1 % (ref 0–8)
EOSINOPHIL NFR BLD: 0.1 % (ref 0–8)
EOSINOPHIL NFR BLD: 1.2 % (ref 0–8)
ERYTHROCYTE [DISTWIDTH] IN BLOOD BY AUTOMATED COUNT: 17.3 % (ref 11.5–14.5)
ERYTHROCYTE [DISTWIDTH] IN BLOOD BY AUTOMATED COUNT: 17.3 % (ref 11.5–14.5)
ERYTHROCYTE [DISTWIDTH] IN BLOOD BY AUTOMATED COUNT: 18.5 % (ref 11.5–14.5)
EST. GFR  (NO RACE VARIABLE): >60 ML/MIN/1.73 M^2
GLUCOSE SERPL-MCNC: 146 MG/DL (ref 70–110)
GLUCOSE SERPL-MCNC: 146 MG/DL (ref 70–110)
GLUCOSE SERPL-MCNC: 96 MG/DL (ref 70–110)
HCT VFR BLD AUTO: 24.2 % (ref 40–54)
HCT VFR BLD AUTO: 31.9 % (ref 40–54)
HCT VFR BLD AUTO: 31.9 % (ref 40–54)
HGB BLD-MCNC: 10 G/DL (ref 14–18)
HGB BLD-MCNC: 10 G/DL (ref 14–18)
HGB BLD-MCNC: 7.5 G/DL (ref 14–18)
IMM GRANULOCYTES # BLD AUTO: 0.05 K/UL (ref 0–0.04)
IMM GRANULOCYTES # BLD AUTO: 0.08 K/UL (ref 0–0.04)
IMM GRANULOCYTES # BLD AUTO: 0.08 K/UL (ref 0–0.04)
IMM GRANULOCYTES NFR BLD AUTO: 0.4 % (ref 0–0.5)
IMM GRANULOCYTES NFR BLD AUTO: 0.5 % (ref 0–0.5)
IMM GRANULOCYTES NFR BLD AUTO: 0.5 % (ref 0–0.5)
LYMPHOCYTES # BLD AUTO: 2.7 K/UL (ref 1–4.8)
LYMPHOCYTES # BLD AUTO: 2.7 K/UL (ref 1–4.8)
LYMPHOCYTES # BLD AUTO: 3.9 K/UL (ref 1–4.8)
LYMPHOCYTES NFR BLD: 16.7 % (ref 18–48)
LYMPHOCYTES NFR BLD: 16.7 % (ref 18–48)
LYMPHOCYTES NFR BLD: 35 % (ref 18–48)
MCH RBC QN AUTO: 26.4 PG (ref 27–31)
MCH RBC QN AUTO: 26.7 PG (ref 27–31)
MCH RBC QN AUTO: 26.7 PG (ref 27–31)
MCHC RBC AUTO-ENTMCNC: 31 G/DL (ref 32–36)
MCHC RBC AUTO-ENTMCNC: 31.3 G/DL (ref 32–36)
MCHC RBC AUTO-ENTMCNC: 31.3 G/DL (ref 32–36)
MCV RBC AUTO: 85 FL (ref 82–98)
MONOCYTES # BLD AUTO: 0.5 K/UL (ref 0.3–1)
MONOCYTES # BLD AUTO: 0.5 K/UL (ref 0.3–1)
MONOCYTES # BLD AUTO: 0.9 K/UL (ref 0.3–1)
MONOCYTES NFR BLD: 2.9 % (ref 4–15)
MONOCYTES NFR BLD: 2.9 % (ref 4–15)
MONOCYTES NFR BLD: 8.1 % (ref 4–15)
NEUTROPHILS # BLD AUTO: 13 K/UL (ref 1.8–7.7)
NEUTROPHILS # BLD AUTO: 13 K/UL (ref 1.8–7.7)
NEUTROPHILS # BLD AUTO: 6.1 K/UL (ref 1.8–7.7)
NEUTROPHILS NFR BLD: 54.9 % (ref 38–73)
NEUTROPHILS NFR BLD: 79.6 % (ref 38–73)
NEUTROPHILS NFR BLD: 79.6 % (ref 38–73)
NRBC BLD-RTO: 0 /100 WBC
PLATELET # BLD AUTO: 500 K/UL (ref 150–450)
PLATELET # BLD AUTO: 500 K/UL (ref 150–450)
PLATELET # BLD AUTO: 645 K/UL (ref 150–450)
PMV BLD AUTO: 8.4 FL (ref 9.2–12.9)
PMV BLD AUTO: 8.5 FL (ref 9.2–12.9)
PMV BLD AUTO: 8.5 FL (ref 9.2–12.9)
POTASSIUM SERPL-SCNC: 3.7 MMOL/L (ref 3.5–5.1)
POTASSIUM SERPL-SCNC: 3.8 MMOL/L (ref 3.5–5.1)
POTASSIUM SERPL-SCNC: 3.8 MMOL/L (ref 3.5–5.1)
PROT SERPL-MCNC: 5.5 G/DL (ref 6–8.4)
RBC # BLD AUTO: 2.84 M/UL (ref 4.6–6.2)
RBC # BLD AUTO: 3.75 M/UL (ref 4.6–6.2)
RBC # BLD AUTO: 3.75 M/UL (ref 4.6–6.2)
SODIUM SERPL-SCNC: 136 MMOL/L (ref 136–145)
SODIUM SERPL-SCNC: 136 MMOL/L (ref 136–145)
SODIUM SERPL-SCNC: 138 MMOL/L (ref 136–145)
TRANS ERYTHROCYTES VOL PATIENT: NORMAL ML
TRANS ERYTHROCYTES VOL PATIENT: NORMAL ML
WBC # BLD AUTO: 11.15 K/UL (ref 3.9–12.7)
WBC # BLD AUTO: 16.36 K/UL (ref 3.9–12.7)
WBC # BLD AUTO: 16.36 K/UL (ref 3.9–12.7)

## 2022-10-06 PROCEDURE — 25000003 PHARM REV CODE 250: Performed by: STUDENT IN AN ORGANIZED HEALTH CARE EDUCATION/TRAINING PROGRAM

## 2022-10-06 PROCEDURE — 35654 PR BYPASS GRAFT OTHR,AXILL-FEM-FEM: ICD-10-PCS | Mod: 22,,, | Performed by: SURGERY

## 2022-10-06 PROCEDURE — 27800903 OPTIME MED/SURG SUP & DEVICES OTHER IMPLANTS: Performed by: SURGERY

## 2022-10-06 PROCEDURE — D9220A PRA ANESTHESIA: Mod: ,,, | Performed by: ANESTHESIOLOGY

## 2022-10-06 PROCEDURE — 27201037 HC PRESSURE MONITORING SET UP

## 2022-10-06 PROCEDURE — 37000008 HC ANESTHESIA 1ST 15 MINUTES: Performed by: SURGERY

## 2022-10-06 PROCEDURE — 37000009 HC ANESTHESIA EA ADD 15 MINS: Performed by: SURGERY

## 2022-10-06 PROCEDURE — 25000003 PHARM REV CODE 250

## 2022-10-06 PROCEDURE — 36620 ARTERIAL: ICD-10-PCS | Mod: 59,,, | Performed by: ANESTHESIOLOGY

## 2022-10-06 PROCEDURE — 63600175 PHARM REV CODE 636 W HCPCS: Performed by: STUDENT IN AN ORGANIZED HEALTH CARE EDUCATION/TRAINING PROGRAM

## 2022-10-06 PROCEDURE — 85025 COMPLETE CBC W/AUTO DIFF WBC: CPT | Performed by: STUDENT IN AN ORGANIZED HEALTH CARE EDUCATION/TRAINING PROGRAM

## 2022-10-06 PROCEDURE — 35654 BPG AXILLARY-FEMORAL-FEMORAL: CPT | Mod: 22,,, | Performed by: SURGERY

## 2022-10-06 PROCEDURE — 63600175 PHARM REV CODE 636 W HCPCS

## 2022-10-06 PROCEDURE — 27201423 OPTIME MED/SURG SUP & DEVICES STERILE SUPPLY: Performed by: SURGERY

## 2022-10-06 PROCEDURE — 80053 COMPREHEN METABOLIC PANEL: CPT | Performed by: STUDENT IN AN ORGANIZED HEALTH CARE EDUCATION/TRAINING PROGRAM

## 2022-10-06 PROCEDURE — 85025 COMPLETE CBC W/AUTO DIFF WBC: CPT | Mod: 91 | Performed by: STUDENT IN AN ORGANIZED HEALTH CARE EDUCATION/TRAINING PROGRAM

## 2022-10-06 PROCEDURE — 25000003 PHARM REV CODE 250: Performed by: INTERNAL MEDICINE

## 2022-10-06 PROCEDURE — 36000708 HC OR TIME LEV III 1ST 15 MIN: Performed by: SURGERY

## 2022-10-06 PROCEDURE — D9220A PRA ANESTHESIA: ICD-10-PCS | Mod: ,,, | Performed by: ANESTHESIOLOGY

## 2022-10-06 PROCEDURE — 63600175 PHARM REV CODE 636 W HCPCS: Performed by: SURGERY

## 2022-10-06 PROCEDURE — 71000033 HC RECOVERY, INTIAL HOUR: Performed by: SURGERY

## 2022-10-06 PROCEDURE — 36620 INSERTION CATHETER ARTERY: CPT | Mod: 59,,, | Performed by: ANESTHESIOLOGY

## 2022-10-06 PROCEDURE — P9021 RED BLOOD CELLS UNIT: HCPCS | Performed by: STUDENT IN AN ORGANIZED HEALTH CARE EDUCATION/TRAINING PROGRAM

## 2022-10-06 PROCEDURE — 20600001 HC STEP DOWN PRIVATE ROOM

## 2022-10-06 PROCEDURE — 80048 BASIC METABOLIC PNL TOTAL CA: CPT | Mod: XB | Performed by: STUDENT IN AN ORGANIZED HEALTH CARE EDUCATION/TRAINING PROGRAM

## 2022-10-06 PROCEDURE — 36415 COLL VENOUS BLD VENIPUNCTURE: CPT | Performed by: STUDENT IN AN ORGANIZED HEALTH CARE EDUCATION/TRAINING PROGRAM

## 2022-10-06 PROCEDURE — 36000709 HC OR TIME LEV III EA ADD 15 MIN: Performed by: SURGERY

## 2022-10-06 DEVICE — GRAFT VASC AXLLBFMRL 8X8MM: Type: IMPLANTABLE DEVICE | Site: ARTERIAL | Status: FUNCTIONAL

## 2022-10-06 RX ORDER — LIDOCAINE HYDROCHLORIDE 20 MG/ML
INJECTION, SOLUTION EPIDURAL; INFILTRATION; INTRACAUDAL; PERINEURAL
Status: DISCONTINUED | OUTPATIENT
Start: 2022-10-06 | End: 2022-10-06

## 2022-10-06 RX ORDER — CEFAZOLIN SODIUM/WATER 2 G/20 ML
SYRINGE (ML) INTRAVENOUS
Status: DISCONTINUED | OUTPATIENT
Start: 2022-10-06 | End: 2022-10-06

## 2022-10-06 RX ORDER — HYDROCODONE BITARTRATE AND ACETAMINOPHEN 5; 325 MG/1; MG/1
1 TABLET ORAL EVERY 4 HOURS PRN
Status: DISCONTINUED | OUTPATIENT
Start: 2022-10-06 | End: 2022-10-12 | Stop reason: HOSPADM

## 2022-10-06 RX ORDER — NEOSTIGMINE METHYLSULFATE 0.5 MG/ML
INJECTION, SOLUTION INTRAVENOUS
Status: DISCONTINUED | OUTPATIENT
Start: 2022-10-06 | End: 2022-10-06

## 2022-10-06 RX ORDER — NAPROXEN SODIUM 220 MG/1
81 TABLET, FILM COATED ORAL DAILY
Status: DISCONTINUED | OUTPATIENT
Start: 2022-10-07 | End: 2022-10-12 | Stop reason: HOSPADM

## 2022-10-06 RX ORDER — ACETAMINOPHEN 10 MG/ML
INJECTION, SOLUTION INTRAVENOUS
Status: DISCONTINUED | OUTPATIENT
Start: 2022-10-06 | End: 2022-10-06

## 2022-10-06 RX ORDER — ATORVASTATIN CALCIUM 40 MG/1
80 TABLET, FILM COATED ORAL DAILY
Status: DISCONTINUED | OUTPATIENT
Start: 2022-10-07 | End: 2022-10-12 | Stop reason: HOSPADM

## 2022-10-06 RX ORDER — PHENYLEPHRINE HCL IN 0.9% NACL 1 MG/10 ML
SYRINGE (ML) INTRAVENOUS
Status: DISCONTINUED | OUTPATIENT
Start: 2022-10-06 | End: 2022-10-06

## 2022-10-06 RX ORDER — OLANZAPINE 10 MG/1
20 TABLET ORAL ONCE
Status: COMPLETED | OUTPATIENT
Start: 2022-10-06 | End: 2022-10-06

## 2022-10-06 RX ORDER — ONDANSETRON 2 MG/ML
INJECTION INTRAMUSCULAR; INTRAVENOUS
Status: DISCONTINUED | OUTPATIENT
Start: 2022-10-06 | End: 2022-10-06

## 2022-10-06 RX ORDER — IBUPROFEN 200 MG
1 TABLET ORAL DAILY
Status: DISCONTINUED | OUTPATIENT
Start: 2022-10-07 | End: 2022-10-12 | Stop reason: HOSPADM

## 2022-10-06 RX ORDER — MORPHINE SULFATE 2 MG/ML
3 INJECTION, SOLUTION INTRAMUSCULAR; INTRAVENOUS
Status: DISCONTINUED | OUTPATIENT
Start: 2022-10-06 | End: 2022-10-07

## 2022-10-06 RX ORDER — FENTANYL CITRATE 50 UG/ML
INJECTION, SOLUTION INTRAMUSCULAR; INTRAVENOUS
Status: DISCONTINUED | OUTPATIENT
Start: 2022-10-06 | End: 2022-10-06

## 2022-10-06 RX ORDER — HEPARIN SODIUM 5000 [USP'U]/ML
5000 INJECTION, SOLUTION INTRAVENOUS; SUBCUTANEOUS EVERY 8 HOURS
Status: DISCONTINUED | OUTPATIENT
Start: 2022-10-06 | End: 2022-10-07

## 2022-10-06 RX ORDER — LABETALOL HYDROCHLORIDE 5 MG/ML
INJECTION, SOLUTION INTRAVENOUS
Status: DISCONTINUED | OUTPATIENT
Start: 2022-10-06 | End: 2022-10-06

## 2022-10-06 RX ORDER — DEXAMETHASONE SODIUM PHOSPHATE 4 MG/ML
INJECTION, SOLUTION INTRA-ARTICULAR; INTRALESIONAL; INTRAMUSCULAR; INTRAVENOUS; SOFT TISSUE
Status: DISCONTINUED | OUTPATIENT
Start: 2022-10-06 | End: 2022-10-06

## 2022-10-06 RX ORDER — MUPIROCIN 20 MG/G
OINTMENT TOPICAL 2 TIMES DAILY
Status: DISPENSED | OUTPATIENT
Start: 2022-10-06 | End: 2022-10-11

## 2022-10-06 RX ORDER — METOPROLOL TARTRATE 1 MG/ML
INJECTION, SOLUTION INTRAVENOUS
Status: DISCONTINUED | OUTPATIENT
Start: 2022-10-06 | End: 2022-10-06

## 2022-10-06 RX ORDER — PROTAMINE SULFATE 10 MG/ML
INJECTION, SOLUTION INTRAVENOUS
Status: DISCONTINUED | OUTPATIENT
Start: 2022-10-06 | End: 2022-10-06

## 2022-10-06 RX ORDER — ROCURONIUM BROMIDE 10 MG/ML
INJECTION, SOLUTION INTRAVENOUS
Status: DISCONTINUED | OUTPATIENT
Start: 2022-10-06 | End: 2022-10-06

## 2022-10-06 RX ORDER — HEPARIN SODIUM 1000 [USP'U]/ML
INJECTION, SOLUTION INTRAVENOUS; SUBCUTANEOUS
Status: DISCONTINUED | OUTPATIENT
Start: 2022-10-06 | End: 2022-10-06

## 2022-10-06 RX ORDER — PROPOFOL 10 MG/ML
VIAL (ML) INTRAVENOUS
Status: DISCONTINUED | OUTPATIENT
Start: 2022-10-06 | End: 2022-10-06

## 2022-10-06 RX ORDER — TRAMADOL HYDROCHLORIDE 50 MG/1
50 TABLET ORAL EVERY 4 HOURS PRN
Status: DISCONTINUED | OUTPATIENT
Start: 2022-10-06 | End: 2022-10-10

## 2022-10-06 RX ADMIN — LABETALOL HYDROCHLORIDE 10 MG: 5 INJECTION, SOLUTION INTRAVENOUS at 03:10

## 2022-10-06 RX ADMIN — Medication 2 G: at 02:10

## 2022-10-06 RX ADMIN — DEXAMETHASONE SODIUM PHOSPHATE 4 MG: 4 INJECTION INTRA-ARTICULAR; INTRALESIONAL; INTRAMUSCULAR; INTRAVENOUS; SOFT TISSUE at 10:10

## 2022-10-06 RX ADMIN — LABETALOL HYDROCHLORIDE 5 MG: 5 INJECTION, SOLUTION INTRAVENOUS at 02:10

## 2022-10-06 RX ADMIN — ONDANSETRON 4 MG: 2 INJECTION INTRAMUSCULAR; INTRAVENOUS at 02:10

## 2022-10-06 RX ADMIN — FENTANYL CITRATE 50 MCG: 0.05 INJECTION, SOLUTION INTRAMUSCULAR; INTRAVENOUS at 09:10

## 2022-10-06 RX ADMIN — SODIUM CHLORIDE, SODIUM GLUCONATE, SODIUM ACETATE, POTASSIUM CHLORIDE, MAGNESIUM CHLORIDE, SODIUM PHOSPHATE, DIBASIC, AND POTASSIUM PHOSPHATE: .53; .5; .37; .037; .03; .012; .00082 INJECTION, SOLUTION INTRAVENOUS at 01:10

## 2022-10-06 RX ADMIN — Medication 100 MCG: at 01:10

## 2022-10-06 RX ADMIN — HEPARIN SODIUM 5000 UNITS: 5000 INJECTION INTRAVENOUS; SUBCUTANEOUS at 10:10

## 2022-10-06 RX ADMIN — Medication 100 MCG: at 11:10

## 2022-10-06 RX ADMIN — MUPIROCIN: 20 OINTMENT TOPICAL at 08:10

## 2022-10-06 RX ADMIN — ROCURONIUM BROMIDE 20 MG: 10 INJECTION INTRAVENOUS at 10:10

## 2022-10-06 RX ADMIN — OXYCODONE HYDROCHLORIDE AND ACETAMINOPHEN 1 TABLET: 5; 325 TABLET ORAL at 01:10

## 2022-10-06 RX ADMIN — HEPARIN SODIUM 2500 UNITS: 1000 INJECTION, SOLUTION INTRAVENOUS; SUBCUTANEOUS at 12:10

## 2022-10-06 RX ADMIN — Medication 2 G: at 10:10

## 2022-10-06 RX ADMIN — HEPARIN SODIUM 6000 UNITS: 1000 INJECTION, SOLUTION INTRAVENOUS; SUBCUTANEOUS at 12:10

## 2022-10-06 RX ADMIN — ACETAMINOPHEN 1000 MG: 10 INJECTION INTRAVENOUS at 02:10

## 2022-10-06 RX ADMIN — SODIUM CHLORIDE, SODIUM GLUCONATE, SODIUM ACETATE, POTASSIUM CHLORIDE, MAGNESIUM CHLORIDE, SODIUM PHOSPHATE, DIBASIC, AND POTASSIUM PHOSPHATE: .53; .5; .37; .037; .03; .012; .00082 INJECTION, SOLUTION INTRAVENOUS at 10:10

## 2022-10-06 RX ADMIN — PROPOFOL 120 MG: 10 INJECTION, EMULSION INTRAVENOUS at 09:10

## 2022-10-06 RX ADMIN — NEOSTIGMINE METHYLSULFATE 2.5 MG: 0.5 INJECTION INTRAVENOUS at 02:10

## 2022-10-06 RX ADMIN — ROCURONIUM BROMIDE 10 MG: 10 INJECTION INTRAVENOUS at 12:10

## 2022-10-06 RX ADMIN — ROCURONIUM BROMIDE 10 MG: 10 INJECTION INTRAVENOUS at 11:10

## 2022-10-06 RX ADMIN — PROTAMINE SULFATE 60 MG: 10 INJECTION, SOLUTION INTRAVENOUS at 01:10

## 2022-10-06 RX ADMIN — Medication 100 MCG: at 12:10

## 2022-10-06 RX ADMIN — METOROPROLOL TARTRATE 1 MG: 5 INJECTION, SOLUTION INTRAVENOUS at 11:10

## 2022-10-06 RX ADMIN — ROCURONIUM BROMIDE 10 MG: 10 INJECTION INTRAVENOUS at 01:10

## 2022-10-06 RX ADMIN — LIDOCAINE HYDROCHLORIDE 100 MG: 20 INJECTION, SOLUTION EPIDURAL; INFILTRATION; INTRACAUDAL; PERINEURAL at 09:10

## 2022-10-06 RX ADMIN — GLYCOPYRROLATE 0.6 MG: 0.2 INJECTION INTRAMUSCULAR; INTRAVENOUS at 02:10

## 2022-10-06 RX ADMIN — SODIUM CHLORIDE: 0.9 INJECTION, SOLUTION INTRAVENOUS at 09:10

## 2022-10-06 RX ADMIN — GABAPENTIN 900 MG: 300 CAPSULE ORAL at 08:10

## 2022-10-06 RX ADMIN — OLANZAPINE 20 MG: 10 TABLET, FILM COATED ORAL at 08:10

## 2022-10-06 RX ADMIN — METOROPROLOL TARTRATE 2 MG: 5 INJECTION, SOLUTION INTRAVENOUS at 10:10

## 2022-10-06 RX ADMIN — ROCURONIUM BROMIDE 50 MG: 10 INJECTION INTRAVENOUS at 09:10

## 2022-10-06 RX ADMIN — GABAPENTIN 900 MG: 300 CAPSULE ORAL at 03:10

## 2022-10-06 RX ADMIN — CILOSTAZOL 100 MG: 100 TABLET ORAL at 08:10

## 2022-10-06 NOTE — ANESTHESIA PROCEDURE NOTES
Arterial    Diagnosis: peripheal arterial disease    Patient location during procedure: done in OR  Procedure start time: 10/6/2022 10:15 AM  Timeout: 10/6/2022 10:14 AM  Procedure end time: 10/6/2022 10:18 AM    Staffing  Authorizing Provider: Cr Juarez Jr., MD  Performing Provider: Estefanía Chicas MD    Anesthesiologist was present at the time of the procedure.    Preanesthetic Checklist  Completed: patient identified, IV checked, site marked, risks and benefits discussed, surgical consent, monitors and equipment checked, pre-op evaluation, timeout performed and anesthesia consent givenArterial  Skin Prep: chlorhexidine gluconate and isopropyl alcohol  Local Infiltration: none  Orientation: right  Location: radial    Catheter Size: 20 G  Catheter placement by Ultrasound guidance. Heme positive aspiration all ports.   Vessel Caliber: small, patent, compressibility poor  Needle advanced into vessel with real time Ultrasound guidance.Insertion Attempts: 2  Assessment  Dressing: secured with tape and tegaderm  Patient: Tolerated well

## 2022-10-06 NOTE — SUBJECTIVE & OBJECTIVE
Interval History:  Still complain of pain 5/10 in his right lower extremity extending from calf to his toes.  Patient had his left calf wrapped, and had 2 socks on his right foot, and he requested that I won't removed them because of pain.    Review of Systems   Constitutional:  Negative for chills and fever.   Respiratory:  Negative for cough and shortness of breath.    Cardiovascular:  Negative for chest pain and palpitations.   Gastrointestinal:  Negative for abdominal pain, nausea and vomiting.   Endocrine: Negative for cold intolerance and heat intolerance.   Genitourinary:  Negative for dysuria and frequency.   Musculoskeletal:         Severe pain RLE extending from calf to toes.    Neurological:  Negative for dizziness and headaches.   Objective:     Vital Signs (Most Recent):  Temp: 97 °F (36.1 °C) (10/06/22 1520)  Pulse: 79 (10/06/22 1631)  Resp: 16 (10/06/22 1631)  BP: (!) 144/66 (10/06/22 1631)  SpO2: 96 % (10/06/22 1631)   Vital Signs (24h Range):  Temp:  [97 °F (36.1 °C)-98.7 °F (37.1 °C)] 97 °F (36.1 °C)  Pulse:  [] 79  Resp:  [16-22] 16  SpO2:  [95 %-100 %] 96 %  BP: (135-175)/(62-78) 144/66     Weight: 54 kg (119 lb)  Body mass index is 18.64 kg/m².    Intake/Output Summary (Last 24 hours) at 10/6/2022 1713  Last data filed at 10/6/2022 1525  Gross per 24 hour   Intake 4040 ml   Output 1925 ml   Net 2115 ml      Physical Exam  Constitutional:       General: He is not in acute distress.     Appearance: Normal appearance.   HENT:      Head: Normocephalic and atraumatic.   Eyes:      Conjunctiva/sclera: Conjunctivae normal.      Pupils: Pupils are equal, round, and reactive to light.   Cardiovascular:      Rate and Rhythm: Normal rate and regular rhythm.   Pulmonary:      Effort: Pulmonary effort is normal. No respiratory distress.      Breath sounds: Normal breath sounds. No wheezing.   Abdominal:      General: Abdomen is flat. Bowel sounds are normal.      Palpations: Abdomen is soft.       Tenderness: There is no abdominal tenderness.   Musculoskeletal:      Comments: Patient would not let me unwrap his right lower extremity, or remove his socks.  But on review of his leg pictures in the wound care note, patient had 2 eschar lesions at the lateral aspect of his right lower extremity with redness of the right lower extremity below the knee.  No pulse could be palpated posterior tibial.   Neurological:      Mental Status: He is alert and oriented to person, place, and time.   Psychiatric:         Mood and Affect: Mood normal.       Significant Labs: All pertinent labs within the past 24 hours have been reviewed.  BMP:   Recent Labs   Lab 10/06/22  1526   *  146*     136   K 3.8  3.8     108   CO2 18*  18*   BUN 7*  7*   CREATININE 0.7  0.7   CALCIUM 8.4*  8.4*     CBC:   Recent Labs   Lab 10/05/22  0448 10/06/22  0548 10/06/22  1526   WBC 10.24 11.15 16.36*  16.36*   HGB 7.5* 7.5* 10.0*  10.0*   HCT 24.0* 24.2* 31.9*  31.9*    645* 500*  500*       Significant Imaging: I have reviewed all pertinent imaging results/findings within the past 24 hours.

## 2022-10-06 NOTE — BRIEF OP NOTE
[facility] - [facility] (2nd Fl)  Brief Operative Note    SUMMARY     Surgery Date: 10/6/2022     Surgeon(s) and Role:     * LEI Ortiz III, MD - Primary     * Leland Murillo MD - Fellow     * Holden Bell MD - Fellow    Assisting Surgeon: None    Pre-op Diagnosis:  Severe PAD with gangrene    Post-op Diagnosis:   same    PROCEDURE:       L axillary to BILATERAL femoris profunda bypass with 8 mm PTFE      CODING NOTE: -22 Modifier appropriate given the increased complexity and length of this case     Anesthesia: General    Operative Findings:  Profunda exposure and anast difficult b/c of directly posterior position    Excellent augmentation of Logan profunda flow after bypass    Estimated Blood Loss: 100cc         Specimens:   Specimen (24h ago, onward)      None            [id number]

## 2022-10-06 NOTE — ASSESSMENT & PLAN NOTE
Pt is s/p left axillary to BILATERAL femoris profunda bypass with 8 mm PTFE today by vascular surgery. Appreciate vascular input.  Continue aspirin, and cilostazol.

## 2022-10-06 NOTE — TRANSFER OF CARE
"Anesthesia Transfer of Care Note    Patient: Babatunde Singh    Procedure(s) Performed: Procedure(s) (LRB):  CREATION, BYPASS, ARTERIAL, AXILLARY TO FEMORAL, USING GRAFT (N/A)  CREATION, BYPASS, ARTERIAL, FEMORAL TO FEMORAL, USING GRAFT (N/A)    Patient location: PACU    Anesthesia Type: general    Transport from OR: Transported from OR on 6-10 L/min O2 by face mask with adequate spontaneous ventilation    Post pain: adequate analgesia    Post assessment: no apparent anesthetic complications    Post vital signs: stable    Level of consciousness: awake and alert    Nausea/Vomiting: no nausea/vomiting    Complications: none    Transfer of care protocol was followed      Last vitals:   Visit Vitals  BP (!) 171/75 (BP Location: Right arm, Patient Position: Lying)   Pulse 98   Temp 36.8 °C (98.3 °F) (Oral)   Resp 18   Ht 5' 7" (1.702 m)   Wt 54 kg (119 lb)   SpO2 99%   BMI 18.64 kg/m²     "

## 2022-10-06 NOTE — ANESTHESIA PROCEDURE NOTES
Intubation    Date/Time: 10/6/2022 9:56 AM  Performed by: Estefanía Chicas MD  Authorized by: Cr Juarez Jr., MD     Intubation:     Induction:  Intravenous    Intubated:  Postinduction    Mask Ventilation:  Easy mask    Attempts:  2    Attempted By:  Resident anesthesiologist    Method of Intubation:  Direct    Blade:  Ernst 2    Laryngeal View Grade: Grade III - only epiglottis visible      Attempted By (2nd Attempt):  Resident anesthesiologist    Method of Intubation (2nd Attempt):  Video laryngoscopy    Blade (2nd Attempt):  Holder 3    Laryngeal View Grade (2nd Attempt): Grade I - full view of cords      Difficult Airway Encountered?: No      Complications:  None    Airway Device:  Oral endotracheal tube    Airway Device Size:  7.5    Style/Cuff Inflation:  Cuffed (inflated to minimal occlusive pressure)    Tube secured:  22    Secured at:  The teeth    Placement Verified By:  Capnometry    Complicating Factors:  None, anterior larynx, overbite, large prominent central incisors, large/floppy epiglottis and poor neck/head extension    Findings Post-Intubation:  BS equal bilateral and atraumatic/condition of teeth unchanged

## 2022-10-06 NOTE — ANESTHESIA POSTPROCEDURE EVALUATION
Anesthesia Post Evaluation    Patient: Babatunde Singh    Procedure(s) Performed: Procedure(s) (LRB):  CREATION, BYPASS, ARTERIAL, AXILLARY TO FEMORAL, USING GRAFT (N/A)  CREATION, BYPASS, ARTERIAL, FEMORAL TO FEMORAL, USING GRAFT (N/A)    Final Anesthesia Type: general      Patient location during evaluation: PACU  Patient participation: Yes- Able to Participate  Level of consciousness: awake  Post-procedure vital signs: reviewed and stable  Pain management: adequate  Airway patency: patent    PONV status at discharge: No PONV  Anesthetic complications: no      Cardiovascular status: stable  Respiratory status: unassisted, spontaneous ventilation and face mask  Hydration status: euvolemic  Follow-up not needed.          Vitals Value Taken Time   /73 10/06/22 1532   Temp 36.1 °C (97 °F) 10/06/22 1520   Pulse 81 10/06/22 1539   Resp 24 10/06/22 1539   SpO2 100 % 10/06/22 1539   Vitals shown include unvalidated device data.      No case tracking events are documented in the log.      Pain/Cassie Score: Pain Rating Prior to Med Admin: 10 (10/6/2022  1:51 AM)  Pain Rating Post Med Admin: 0 (10/6/2022  2:40 AM)

## 2022-10-06 NOTE — CARE UPDATE
Patient evaluated at bedside in PACU for AMS. Patient with right eye droop which is at baseline and equal strength bilaterally for UE and LE. Patient AAO to person only. Not oriented to place or time. Pt known to have sundowners. Pt able to be transferred back to floor room. No interventions needed at this time.    Sherry Benz MD  CA1

## 2022-10-06 NOTE — ASSESSMENT & PLAN NOTE
Absent pedial pulse of the right foot.   Pt is s/p left axillary to BILATERAL femoris profunda bypass with 8 mm PTFE today by vascular surgery.   Continue statin, aspirin, and cilostazol.

## 2022-10-06 NOTE — NURSING
Pt returned from surgery with AMS. MD aware, advised to hold narcotics until AMS improves. Pt agitated, telesitter camera placed bedside.

## 2022-10-06 NOTE — ASSESSMENT & PLAN NOTE
Has been consulted over 10 minutes regarding need to quit smoking, and emphazised its negative consequences on his leg ischemia.

## 2022-10-06 NOTE — PLAN OF CARE
Plan of care discussed with pt. Remained stable through the shift. Alert and oriented x3 with episode of confusion. Medicated for pain  on rt LE. NSR to ST on cardicac montitor. Sats > 94% on RA. Voids via urinal/ pur wick, no BM this shift. NPO after midnight for bypass to right leg. Will continue to monitor and hand off to the oncoming nurse    Problem: Adult Inpatient Plan of Care  Goal: Plan of Care Review  Outcome: Ongoing, Progressing  Goal: Patient-Specific Goal (Individualized)  Outcome: Ongoing, Progressing  Goal: Absence of Hospital-Acquired Illness or Injury  Outcome: Ongoing, Progressing  Goal: Optimal Comfort and Wellbeing  Outcome: Ongoing, Progressing  Goal: Readiness for Transition of Care  Outcome: Ongoing, Progressing     Problem: Impaired Wound Healing  Goal: Optimal Wound Healing  Outcome: Ongoing, Progressing

## 2022-10-06 NOTE — PROGRESS NOTES
[facility] Medicine  Progress Note    Patient Name: Babatunde Singh  MRN: [id number]  Patient Class: IP- Inpatient   Admission Date: 10/2/2022  Length of Stay: 4 days  Attending Physician: Alannah Hicks MD  Primary Care Provider: Esdras Ly MD        Subjective:     Principal Problem:Critical limb ischemia of right lower extremity with ulceration of lower leg        HPI:  Babatunde Singh is a 71 yo M withPAD, HTN, HLD, recent diagnosis of DVT (9/8) on xarelto who presents due to RLE pain. Of note, patient has frequent admission for lower extremity pain. He states that on the day of his presentation, he has had increasing 10/10 aching pain which woke him up from sleep every few hours. This has been progressing over the last 2-3 days prior to admission. He states at baseline he has pain every other night. In the ER, RLE distal pulses were absent on presentation, unable to palpate or obtain Doppler signal.               Overview/Hospital Course:  BRENNAN was performed which showed Right Leg with PVR waveforms suggest ischemic peripheral arterial occlusive disease. No audible Doppler signal detected to obtain a reliable BRENNAN. TBI of 0 with a Great toe pressure of 0 mmHg is noted. Left Leg PVR waveforms also suggested ischemic peripheral arterial occlusive disease. No audible Doppler signal detected to obtain a   reliable BRENNAN.     CTA runoff abdomen bilateral LE showed Possible worsening stenosis or interval occlusion of the right dorsalis pedis artery.  Overall evaluation of lower extremity arteries is otherwise grossly unchanged noting regions of persistent occlusion with two vessel runoff to the bilateral lower extremities. Xarelto was held and pt was started on heparin ggt. IR was consulted, but endoscopically is no option for vascularization. Vascular surgery was consulted, and they scheduled the pt for intervention. Cardiology was consulted for cardiac clearance, and pt has been cleared  with risk as per cardiology. Pt underwent L axillary to BILATERAL femoris profunda bypass with 8 mm PTFE today on 10/6 by vascular.      Regarding the right renal mass seen on the CT abdomen/pelvis, urology recommended outpt intervention.        Interval History:  Still complain of pain 5/10 in his right lower extremity extending from calf to his toes.  Patient had his left calf wrapped, and had 2 socks on his right foot, and he requested that I won't removed them because of pain.    Review of Systems   Constitutional:  Negative for chills and fever.   Respiratory:  Negative for cough and shortness of breath.    Cardiovascular:  Negative for chest pain and palpitations.   Gastrointestinal:  Negative for abdominal pain, nausea and vomiting.   Endocrine: Negative for cold intolerance and heat intolerance.   Genitourinary:  Negative for dysuria and frequency.   Musculoskeletal:         Severe pain RLE extending from calf to toes.    Neurological:  Negative for dizziness and headaches.   Objective:     Vital Signs (Most Recent):  Temp: 97 °F (36.1 °C) (10/06/22 1520)  Pulse: 79 (10/06/22 1631)  Resp: 16 (10/06/22 1631)  BP: (!) 144/66 (10/06/22 1631)  SpO2: 96 % (10/06/22 1631)   Vital Signs (24h Range):  Temp:  [97 °F (36.1 °C)-98.7 °F (37.1 °C)] 97 °F (36.1 °C)  Pulse:  [] 79  Resp:  [16-22] 16  SpO2:  [95 %-100 %] 96 %  BP: (135-175)/(62-78) 144/66     Weight: 54 kg (119 lb)  Body mass index is 18.64 kg/m².    Intake/Output Summary (Last 24 hours) at 10/6/2022 1713  Last data filed at 10/6/2022 1525  Gross per 24 hour   Intake 4040 ml   Output 1925 ml   Net 2115 ml        Please note that pt was seen at 7:45 am today prior to him going to surgery. This was the physical exam prior to surgery.     Physical Exam  Constitutional:       General: He is not in acute distress.     Appearance: Normal appearance.   HENT:      Head: Normocephalic and atraumatic.   Eyes:      Conjunctiva/sclera: Conjunctivae normal.       Pupils: Pupils are equal, round, and reactive to light.   Cardiovascular:      Rate and Rhythm: Normal rate and regular rhythm.   Pulmonary:      Effort: Pulmonary effort is normal. No respiratory distress.      Breath sounds: Normal breath sounds. No wheezing.   Abdominal:      General: Abdomen is flat. Bowel sounds are normal.      Palpations: Abdomen is soft.      Tenderness: There is no abdominal tenderness.   Musculoskeletal:      Comments: Patient would not let me unwrap his right lower extremity, or remove his socks.  But on review of his leg pictures in the wound care note, patient had 2 eschar lesions at the lateral aspect of his right lower extremity with redness of the right lower extremity below the knee.  No pulse could be palpated posterior tibial.   Neurological:      Mental Status: He is alert and oriented to person, place, and time.   Psychiatric:         Mood and Affect: Mood normal.       Significant Labs: All pertinent labs within the past 24 hours have been reviewed.  BMP:   Recent Labs   Lab 10/06/22  1526   *  146*     136   K 3.8  3.8     108   CO2 18*  18*   BUN 7*  7*   CREATININE 0.7  0.7   CALCIUM 8.4*  8.4*     CBC:   Recent Labs   Lab 10/05/22  0448 10/06/22  0548 10/06/22  1526   WBC 10.24 11.15 16.36*  16.36*   HGB 7.5* 7.5* 10.0*  10.0*   HCT 24.0* 24.2* 31.9*  31.9*    645* 500*  500*       Significant Imaging: I have reviewed all pertinent imaging results/findings within the past 24 hours.      Assessment/Plan:      * Critical limb ischemia of right lower extremity with ulceration of lower leg  CTA runoff was reviewed.  Pt is s/p left axillary to BILATERAL femoris profunda bypass with 8 mm PTFE today by vascular surgery. Appreciate vascular input.  Continue aspirin, cilostazol and statin.      Atherosclerosis of native artery of extremity  Pt is s/p left axillary to BILATERAL femoris profunda bypass with 8 mm PTFE today by vascular surgery.  Appreciate vascular input.  Continue aspirin, and cilostazol.    Right leg pain  Due to ischemia.   S/p intervention today by vascular surgery.       DVT (deep venous thrombosis)  Will resume xarelto tomorrow if ok with vascular surgery.      Absent pedal pulses  Absent pedial pulse of the right foot.   Pt is s/p left axillary to BILATERAL femoris profunda bypass with 8 mm PTFE today by vascular surgery.   Continue statin, aspirin, and cilostazol.      Right renal mass  Urology planing doing out patient intervention.      Claudication of right lower extremity  Duo to ischemic leg.      Mixed hyperlipidemia  On Zetia and lipitor.      Tobacco abuse  Has been consulted over 10 minutes regarding need to quit smoking, and emphazised its negative consequences on his leg ischemia.      Primary hypertension  on home BP med's of losartan/HCTZ.      Please note that pt's cbc today doesn't look accurate, with increase in all blood lines.     VTE Risk Mitigation (From admission, onward)           Ordered     heparin (porcine) injection 5,000 Units  Every 8 hours         10/06/22 1516     IP VTE HIGH RISK PATIENT  Once         10/06/22 1516     Place sequential compression device  Until discontinued         10/06/22 1516     Place sequential compression device  Until discontinued         10/02/22 0814                    Discharge Planning   LAZ: 10/9/2022     Code Status: Full Code   Is the patient medically ready for discharge?: No    Reason for patient still in hospital (select all that apply): Patient trending condition  Discharge Plan A: Pending clinical status               Alannah Hicks MD  Department of Hospital Medicine   [facility] Stepdown

## 2022-10-06 NOTE — NURSING TRANSFER
Nursing Transfer Note      10/6/2022     Reason patient is being transferred: to room    Transfer To: 353    Transfer via bed    Transfer with cardiac monitoring    Transported by RN    Medicines sent: none    Any special needs or follow-up needed: yes    Chart send with patient: Yes    Notified: RN    Patient reassessed at: 10/6/22

## 2022-10-06 NOTE — ASSESSMENT & PLAN NOTE
CTA runoff was reviewed.  Pt is s/p left axillary to BILATERAL femoris profunda bypass with 8 mm PTFE today by vascular surgery. Appreciate vascular input.  Continue aspirin, cilostazol and statin.

## 2022-10-07 PROBLEM — G93.41 ENCEPHALOPATHY, METABOLIC: Status: ACTIVE | Noted: 2022-10-07

## 2022-10-07 LAB
ABO + RH BLD: NORMAL
ALBUMIN SERPL BCP-MCNC: 2.6 G/DL (ref 3.5–5.2)
ALP SERPL-CCNC: 69 U/L (ref 55–135)
ALT SERPL W/O P-5'-P-CCNC: 16 U/L (ref 10–44)
ANION GAP SERPL CALC-SCNC: 11 MMOL/L (ref 8–16)
AST SERPL-CCNC: 27 U/L (ref 10–40)
BASOPHILS # BLD AUTO: 0.02 K/UL (ref 0–0.2)
BASOPHILS NFR BLD: 0.2 % (ref 0–1.9)
BILIRUB SERPL-MCNC: 0.5 MG/DL (ref 0.1–1)
BILIRUB UR QL STRIP: NEGATIVE
BLD GP AB SCN CELLS X3 SERPL QL: NORMAL
BUN SERPL-MCNC: 10 MG/DL (ref 8–23)
CALCIUM SERPL-MCNC: 9 MG/DL (ref 8.7–10.5)
CHLORIDE SERPL-SCNC: 110 MMOL/L (ref 95–110)
CLARITY UR REFRACT.AUTO: CLEAR
CO2 SERPL-SCNC: 22 MMOL/L (ref 23–29)
COLOR UR AUTO: YELLOW
CREAT SERPL-MCNC: 0.6 MG/DL (ref 0.5–1.4)
DIFFERENTIAL METHOD: ABNORMAL
EOSINOPHIL # BLD AUTO: 0 K/UL (ref 0–0.5)
EOSINOPHIL NFR BLD: 0 % (ref 0–8)
ERYTHROCYTE [DISTWIDTH] IN BLOOD BY AUTOMATED COUNT: 17.7 % (ref 11.5–14.5)
EST. GFR  (NO RACE VARIABLE): >60 ML/MIN/1.73 M^2
GLUCOSE SERPL-MCNC: 116 MG/DL (ref 70–110)
GLUCOSE SERPL-MCNC: 121 MG/DL (ref 70–110)
GLUCOSE SERPL-MCNC: 97 MG/DL (ref 70–110)
GLUCOSE UR QL STRIP: NEGATIVE
HCO3 UR-SCNC: 22 MMOL/L (ref 24–28)
HCO3 UR-SCNC: 23.1 MMOL/L (ref 24–28)
HCT VFR BLD AUTO: 35.9 % (ref 40–54)
HCT VFR BLD CALC: 21 %PCV (ref 36–54)
HCT VFR BLD CALC: 26 %PCV (ref 36–54)
HGB BLD-MCNC: 10.7 G/DL (ref 14–18)
HGB UR QL STRIP: ABNORMAL
IMM GRANULOCYTES # BLD AUTO: 0.05 K/UL (ref 0–0.04)
IMM GRANULOCYTES NFR BLD AUTO: 0.4 % (ref 0–0.5)
KETONES UR QL STRIP: NEGATIVE
LEUKOCYTE ESTERASE UR QL STRIP: ABNORMAL
LYMPHOCYTES # BLD AUTO: 3.1 K/UL (ref 1–4.8)
LYMPHOCYTES NFR BLD: 23.8 % (ref 18–48)
MCH RBC QN AUTO: 26 PG (ref 27–31)
MCHC RBC AUTO-ENTMCNC: 29.8 G/DL (ref 32–36)
MCV RBC AUTO: 87 FL (ref 82–98)
MICROSCOPIC COMMENT: ABNORMAL
MONOCYTES # BLD AUTO: 1.1 K/UL (ref 0.3–1)
MONOCYTES NFR BLD: 8.4 % (ref 4–15)
NEUTROPHILS # BLD AUTO: 8.7 K/UL (ref 1.8–7.7)
NEUTROPHILS NFR BLD: 67.2 % (ref 38–73)
NITRITE UR QL STRIP: NEGATIVE
NRBC BLD-RTO: 0 /100 WBC
PCO2 BLDA: 36.5 MMHG (ref 35–45)
PCO2 BLDA: 38.7 MMHG (ref 35–45)
PH SMN: 7.38 [PH] (ref 7.35–7.45)
PH SMN: 7.39 [PH] (ref 7.35–7.45)
PH UR STRIP: 7 [PH] (ref 5–8)
PLATELET # BLD AUTO: 532 K/UL (ref 150–450)
PMV BLD AUTO: 9.2 FL (ref 9.2–12.9)
PO2 BLDA: 156 MMHG (ref 80–100)
PO2 BLDA: 214 MMHG (ref 80–100)
POC ACTIVATED CLOTTING TIME K: 138 SEC (ref 74–137)
POC ACTIVATED CLOTTING TIME K: 202 SEC (ref 74–137)
POC ACTIVATED CLOTTING TIME K: 202 SEC (ref 74–137)
POC ACTIVATED CLOTTING TIME K: 225 SEC (ref 74–137)
POC BE: -2 MMOL/L
POC BE: -3 MMOL/L
POC IONIZED CALCIUM: 1.14 MMOL/L (ref 1.06–1.42)
POC IONIZED CALCIUM: 1.18 MMOL/L (ref 1.06–1.42)
POC SATURATED O2: 100 % (ref 95–100)
POC SATURATED O2: 99 % (ref 95–100)
POC TCO2: 23 MMOL/L (ref 23–27)
POC TCO2: 24 MMOL/L (ref 23–27)
POTASSIUM BLD-SCNC: 3.5 MMOL/L (ref 3.5–5.1)
POTASSIUM BLD-SCNC: 3.7 MMOL/L (ref 3.5–5.1)
POTASSIUM SERPL-SCNC: 3.8 MMOL/L (ref 3.5–5.1)
PROT SERPL-MCNC: 6 G/DL (ref 6–8.4)
PROT UR QL STRIP: NEGATIVE
RBC # BLD AUTO: 4.11 M/UL (ref 4.6–6.2)
RBC #/AREA URNS AUTO: 72 /HPF (ref 0–4)
SAMPLE: ABNORMAL
SODIUM BLD-SCNC: 142 MMOL/L (ref 136–145)
SODIUM BLD-SCNC: 142 MMOL/L (ref 136–145)
SODIUM SERPL-SCNC: 143 MMOL/L (ref 136–145)
SP GR UR STRIP: 1.01 (ref 1–1.03)
URN SPEC COLLECT METH UR: ABNORMAL
WBC # BLD AUTO: 12.95 K/UL (ref 3.9–12.7)
WBC #/AREA URNS AUTO: 1 /HPF (ref 0–5)

## 2022-10-07 PROCEDURE — 63600175 PHARM REV CODE 636 W HCPCS: Performed by: SURGERY

## 2022-10-07 PROCEDURE — 93010 ELECTROCARDIOGRAM REPORT: CPT | Mod: ,,, | Performed by: INTERNAL MEDICINE

## 2022-10-07 PROCEDURE — 36415 COLL VENOUS BLD VENIPUNCTURE: CPT | Performed by: HOSPITALIST

## 2022-10-07 PROCEDURE — 25000003 PHARM REV CODE 250: Performed by: SURGERY

## 2022-10-07 PROCEDURE — 87040 BLOOD CULTURE FOR BACTERIA: CPT | Mod: 59 | Performed by: STUDENT IN AN ORGANIZED HEALTH CARE EDUCATION/TRAINING PROGRAM

## 2022-10-07 PROCEDURE — 93010 EKG 12-LEAD: ICD-10-PCS | Mod: ,,, | Performed by: INTERNAL MEDICINE

## 2022-10-07 PROCEDURE — 86850 RBC ANTIBODY SCREEN: CPT | Performed by: HOSPITALIST

## 2022-10-07 PROCEDURE — 97530 THERAPEUTIC ACTIVITIES: CPT

## 2022-10-07 PROCEDURE — 80053 COMPREHEN METABOLIC PANEL: CPT | Performed by: STUDENT IN AN ORGANIZED HEALTH CARE EDUCATION/TRAINING PROGRAM

## 2022-10-07 PROCEDURE — 25000003 PHARM REV CODE 250: Performed by: STUDENT IN AN ORGANIZED HEALTH CARE EDUCATION/TRAINING PROGRAM

## 2022-10-07 PROCEDURE — 63600175 PHARM REV CODE 636 W HCPCS: Performed by: STUDENT IN AN ORGANIZED HEALTH CARE EDUCATION/TRAINING PROGRAM

## 2022-10-07 PROCEDURE — 25000003 PHARM REV CODE 250: Performed by: INTERNAL MEDICINE

## 2022-10-07 PROCEDURE — S4991 NICOTINE PATCH NONLEGEND: HCPCS | Performed by: STUDENT IN AN ORGANIZED HEALTH CARE EDUCATION/TRAINING PROGRAM

## 2022-10-07 PROCEDURE — 93005 ELECTROCARDIOGRAM TRACING: CPT

## 2022-10-07 PROCEDURE — 36415 COLL VENOUS BLD VENIPUNCTURE: CPT | Performed by: STUDENT IN AN ORGANIZED HEALTH CARE EDUCATION/TRAINING PROGRAM

## 2022-10-07 PROCEDURE — 97116 GAIT TRAINING THERAPY: CPT

## 2022-10-07 PROCEDURE — 63600175 PHARM REV CODE 636 W HCPCS

## 2022-10-07 PROCEDURE — 25000003 PHARM REV CODE 250

## 2022-10-07 PROCEDURE — 81001 URINALYSIS AUTO W/SCOPE: CPT | Performed by: HOSPITALIST

## 2022-10-07 PROCEDURE — 97165 OT EVAL LOW COMPLEX 30 MIN: CPT

## 2022-10-07 PROCEDURE — 20600001 HC STEP DOWN PRIVATE ROOM

## 2022-10-07 PROCEDURE — 97162 PT EVAL MOD COMPLEX 30 MIN: CPT

## 2022-10-07 PROCEDURE — 85025 COMPLETE CBC W/AUTO DIFF WBC: CPT | Performed by: STUDENT IN AN ORGANIZED HEALTH CARE EDUCATION/TRAINING PROGRAM

## 2022-10-07 RX ORDER — HALOPERIDOL 5 MG/ML
5 INJECTION INTRAMUSCULAR EVERY 6 HOURS PRN
Status: DISCONTINUED | OUTPATIENT
Start: 2022-10-07 | End: 2022-10-12 | Stop reason: HOSPADM

## 2022-10-07 RX ORDER — VANCOMYCIN HCL IN 5 % DEXTROSE 1G/250ML
1000 PLASTIC BAG, INJECTION (ML) INTRAVENOUS ONCE
Status: COMPLETED | OUTPATIENT
Start: 2022-10-07 | End: 2022-10-07

## 2022-10-07 RX ORDER — OLANZAPINE 10 MG/1
10 TABLET ORAL ONCE
Status: COMPLETED | OUTPATIENT
Start: 2022-10-07 | End: 2022-10-07

## 2022-10-07 RX ORDER — TALC
6 POWDER (GRAM) TOPICAL NIGHTLY
Status: DISCONTINUED | OUTPATIENT
Start: 2022-10-07 | End: 2022-10-12 | Stop reason: HOSPADM

## 2022-10-07 RX ORDER — IBUPROFEN 400 MG/1
400 TABLET ORAL EVERY 6 HOURS PRN
Status: DISCONTINUED | OUTPATIENT
Start: 2022-10-08 | End: 2022-10-12 | Stop reason: HOSPADM

## 2022-10-07 RX ORDER — QUETIAPINE FUMARATE 25 MG/1
25 TABLET, FILM COATED ORAL ONCE
Status: COMPLETED | OUTPATIENT
Start: 2022-10-07 | End: 2022-10-07

## 2022-10-07 RX ADMIN — HYDROCODONE BITARTRATE AND ACETAMINOPHEN 1 TABLET: 5; 325 TABLET ORAL at 10:10

## 2022-10-07 RX ADMIN — CILOSTAZOL 100 MG: 100 TABLET ORAL at 09:10

## 2022-10-07 RX ADMIN — OLANZAPINE 10 MG: 10 TABLET, FILM COATED ORAL at 12:10

## 2022-10-07 RX ADMIN — PIPERACILLIN SODIUM AND TAZOBACTAM SODIUM 4.5 G: 4; .5 INJECTION, POWDER, LYOPHILIZED, FOR SOLUTION INTRAVENOUS at 10:10

## 2022-10-07 RX ADMIN — SODIUM CHLORIDE, SODIUM LACTATE, POTASSIUM CHLORIDE, AND CALCIUM CHLORIDE 1000 ML: .6; .31; .03; .02 INJECTION, SOLUTION INTRAVENOUS at 09:10

## 2022-10-07 RX ADMIN — SODIUM CHLORIDE, SODIUM LACTATE, POTASSIUM CHLORIDE, AND CALCIUM CHLORIDE 1000 ML: .6; .31; .03; .02 INJECTION, SOLUTION INTRAVENOUS at 11:10

## 2022-10-07 RX ADMIN — HEPARIN SODIUM 5000 UNITS: 5000 INJECTION INTRAVENOUS; SUBCUTANEOUS at 05:10

## 2022-10-07 RX ADMIN — ATORVASTATIN CALCIUM 80 MG: 40 TABLET, FILM COATED ORAL at 10:10

## 2022-10-07 RX ADMIN — VANCOMYCIN HYDROCHLORIDE 1000 MG: 1 INJECTION, POWDER, LYOPHILIZED, FOR SOLUTION INTRAVENOUS at 10:10

## 2022-10-07 RX ADMIN — MUPIROCIN: 20 OINTMENT TOPICAL at 09:10

## 2022-10-07 RX ADMIN — QUETIAPINE FUMARATE 25 MG: 25 TABLET ORAL at 05:10

## 2022-10-07 RX ADMIN — GABAPENTIN 900 MG: 300 CAPSULE ORAL at 05:10

## 2022-10-07 RX ADMIN — LOSARTAN POTASSIUM 50 MG: 50 TABLET, FILM COATED ORAL at 10:10

## 2022-10-07 RX ADMIN — CILOSTAZOL 100 MG: 100 TABLET ORAL at 10:10

## 2022-10-07 RX ADMIN — RIVAROXABAN 20 MG: 20 TABLET, FILM COATED ORAL at 05:10

## 2022-10-07 RX ADMIN — HYDROCODONE BITARTRATE AND ACETAMINOPHEN 1 TABLET: 5; 325 TABLET ORAL at 05:10

## 2022-10-07 RX ADMIN — TRAZODONE HYDROCHLORIDE 25 MG: 50 TABLET ORAL at 09:10

## 2022-10-07 RX ADMIN — EZETIMIBE 10 MG: 10 TABLET ORAL at 10:10

## 2022-10-07 RX ADMIN — GABAPENTIN 900 MG: 300 CAPSULE ORAL at 09:10

## 2022-10-07 RX ADMIN — ASPIRIN 81 MG: 81 TABLET, CHEWABLE ORAL at 10:10

## 2022-10-07 RX ADMIN — ACETAMINOPHEN 650 MG: 325 TABLET ORAL at 09:10

## 2022-10-07 RX ADMIN — IBUPROFEN 400 MG: 400 TABLET, FILM COATED ORAL at 11:10

## 2022-10-07 RX ADMIN — NICOTINE 1 PATCH: 14 PATCH, EXTENDED RELEASE TRANSDERMAL at 10:10

## 2022-10-07 RX ADMIN — ACETAMINOPHEN 650 MG: 325 TABLET ORAL at 05:10

## 2022-10-07 RX ADMIN — HALOPERIDOL LACTATE 5 MG: 5 INJECTION, SOLUTION INTRAMUSCULAR at 11:10

## 2022-10-07 RX ADMIN — Medication 6 MG: at 09:10

## 2022-10-07 RX ADMIN — HYDROCHLOROTHIAZIDE 12.5 MG: 12.5 TABLET ORAL at 10:10

## 2022-10-07 RX ADMIN — GABAPENTIN 900 MG: 300 CAPSULE ORAL at 10:10

## 2022-10-07 NOTE — PROGRESS NOTES
[facility] Stepdown  Vascular Surgery  Progress Note    Patient Name: Babatunde Singh  MRN: [id number]  Admission Date: 10/2/2022  Primary Care Provider: Esdras Ly MD    Subjective:     Interval History: A&Ox0.  Very confused overnight, but otherwise doing well post-operatively.  R monophasic PT, L biphasic PT.  Incision c/d/i, dressing changed at bedside.         Post-Op Info:  Procedure(s) (LRB):  CREATION, BYPASS, ARTERIAL, AXILLARY TO FEMORAL, USING GRAFT (N/A)  CREATION, BYPASS, ARTERIAL, FEMORAL TO FEMORAL, USING GRAFT (N/A)   1 Day Post-Op       Medications:  Continuous Infusions:   sodium chloride 0.9% 75 mL/hr at 10/05/22 2058     Scheduled Meds:   aspirin  81 mg Oral Daily    atorvastatin  80 mg Oral Daily    cilostazoL  100 mg Oral BID    ezetimibe  10 mg Oral Daily    gabapentin  900 mg Oral TID    heparin (porcine)  5,000 Units Subcutaneous Q8H    hydroCHLOROthiazide  12.5 mg Oral Daily    losartan  50 mg Oral Daily    mupirocin   Nasal BID    nicotine  1 patch Transdermal Daily     PRN Meds:acetaminophen, HYDROcodone-acetaminophen, morphine, sodium chloride 0.9%, traMADoL     Objective:     Vital Signs (Most Recent):  Temp: 98.6 °F (37 °C) (10/07/22 0400)  Pulse: 105 (10/07/22 0400)  Resp: 16 (10/07/22 0400)  BP: (!) 154/70 (10/07/22 0400)  SpO2: 99 % (10/07/22 0400)   Vital Signs (24h Range):  Temp:  [97 °F (36.1 °C)-98.6 °F (37 °C)] 98.6 °F (37 °C)  Pulse:  [] 105  Resp:  [16-22] 16  SpO2:  [95 %-100 %] 99 %  BP: (126-175)/(59-78) 154/70         Physical Exam  Vitals and nursing note reviewed.   Constitutional:       Appearance: Normal appearance.   Cardiovascular:      Comments: R monophasic PT, L biphasic PT  No DP signals   Warm, well-perfused BLE   Pulmonary:      Effort: Pulmonary effort is normal.   Abdominal:      General: Abdomen is flat.   Musculoskeletal:         General: Tenderness present.   Skin:     Comments: Right calf dry gangrene lesions   Neurological:       Mental Status: He is disoriented and confused.       Significant Labs:  Recent Lab Results         10/07/22  0231   10/06/22  1526        Albumin 2.6         Alkaline Phosphatase 69         ALT 16         Anion Gap 11   10          10       AST 27         Baso # 0.02   0.03          0.03       Basophil % 0.2   0.2          0.2       BILIRUBIN TOTAL 0.5  Comment: For infants and newborns, interpretation of results should be based  on gestational age, weight and in agreement with clinical  observations.    Premature Infant recommended reference ranges:  Up to 24 hours.............<8.0 mg/dL  Up to 48 hours............<12.0 mg/dL  3-5 days..................<15.0 mg/dL  6-29 days.................<15.0 mg/dL           BUN 10   7          7       Calcium 9.0   8.4          8.4       Chloride 110   108          108       CO2 22   18          18       Creatinine 0.6   0.7          0.7       Differential Method Automated   Automated          Automated       eGFR >60.0   >60.0          >60.0       Eos # 0.0   0.0          0.0       Eosinophil % 0.0   0.1          0.1       Glucose 116   146          146       Gran # (ANC) 8.7   13.0          13.0       Gran % 67.2   79.6          79.6       Group & Rh A POS         Hematocrit 35.9   31.9          31.9       Hemoglobin 10.7   10.0          10.0       Immature Grans (Abs) 0.05  Comment: Mild elevation in immature granulocytes is non specific and   can be seen in a variety of conditions including stress response,   acute inflammation, trauma and pregnancy. Correlation with other   laboratory and clinical findings is essential.     0.08  Comment: Mild elevation in immature granulocytes is non specific and   can be seen in a variety of conditions including stress response,   acute inflammation, trauma and pregnancy. Correlation with other   laboratory and clinical findings is essential.            0.08  Comment: Mild elevation in immature granulocytes is non specific and    can be seen in a variety of conditions including stress response,   acute inflammation, trauma and pregnancy. Correlation with other   laboratory and clinical findings is essential.         Immature Granulocytes 0.4   0.5          0.5       INDIRECT PATRICE NEG         Lymph # 3.1   2.7          2.7       Lymph % 23.8   16.7          16.7       MCH 26.0   26.7          26.7       MCHC 29.8   31.3          31.3       MCV 87   85          85       Mono # 1.1   0.5          0.5       Mono % 8.4   2.9          2.9       MPV 9.2   8.5          8.5       nRBC 0   0          0       Platelets 532   500          500       Potassium 3.8   3.8          3.8       PROTEIN TOTAL 6.0         RBC 4.11   3.75          3.75       RDW 17.7   17.3          17.3       Sodium 143   136          136       WBC 12.95   16.36          16.36             All pertinent labs from the last 24 hours have been reviewed.    Significant Diagnostics:  I have reviewed all pertinent imaging results/findings within the past 24 hours.    Assessment/Plan:     * Critical limb ischemia of right lower extremity with ulceration of lower leg  Mr Hardy is a 72 year old male with a pmh of smoking, severe PAD, dvt who presented with chronic limb ischemia and rest pain now s/p successful axio-fem-fem bypass on 10/6.  - resume home Xarelto   - very disoriented overnight and this AM, will start melatonin QHS and trazodone QHS PRN   -delirium precautions  - PT/OT   - PMNR consult for rehab eval   -continue asa/statin/ ezetimibe/ cilostazol            Sis Miles MD  Vascular Surgery  [facility] Step[facility]

## 2022-10-07 NOTE — PLAN OF CARE
Problem: Physical Therapy  Goal: Physical Therapy Goal  Description: Goals to be met by: 10/25/2022     Patient will increase functional independence with mobility by performin. Supine to sit with MInimal Assistance  2. Sit to stand transfer with Minimal Assistance using LRAD  3. Gait  x 25 feet with Minimal Assistance using LRAD.   4. Ascend/descend 4 stair with bilateral Handrails Moderate Assistance  5. Stand for 3 minutes with Minimal Assistance using LRAD    Outcome: Ongoing, Progressing     Eval completed. Goals appropriate.

## 2022-10-07 NOTE — ASSESSMENT & PLAN NOTE
Pt has been confused post operatively. Suspect 2/2 the anesthesia he received.   Will rule out infection. Will order UA, and cxr.

## 2022-10-07 NOTE — ASSESSMENT & PLAN NOTE
CTA runoff was reviewed.  Pt is s/p left axillary to BILATERAL femoris profunda bypass with 8 mm PTFE on 10/7 by vascular surgery. Appreciate vascular input.  Continue aspirin, cilostazol and statin.

## 2022-10-07 NOTE — SUBJECTIVE & OBJECTIVE
Medications:  Continuous Infusions:   sodium chloride 0.9% 75 mL/hr at 10/05/22 2058     Scheduled Meds:   aspirin  81 mg Oral Daily    atorvastatin  80 mg Oral Daily    cilostazoL  100 mg Oral BID    ezetimibe  10 mg Oral Daily    gabapentin  900 mg Oral TID    heparin (porcine)  5,000 Units Subcutaneous Q8H    hydroCHLOROthiazide  12.5 mg Oral Daily    losartan  50 mg Oral Daily    mupirocin   Nasal BID    nicotine  1 patch Transdermal Daily     PRN Meds:acetaminophen, HYDROcodone-acetaminophen, morphine, sodium chloride 0.9%, traMADoL     Objective:     Vital Signs (Most Recent):  Temp: 98.6 °F (37 °C) (10/07/22 0400)  Pulse: 105 (10/07/22 0400)  Resp: 16 (10/07/22 0400)  BP: (!) 154/70 (10/07/22 0400)  SpO2: 99 % (10/07/22 0400)   Vital Signs (24h Range):  Temp:  [97 °F (36.1 °C)-98.6 °F (37 °C)] 98.6 °F (37 °C)  Pulse:  [] 105  Resp:  [16-22] 16  SpO2:  [95 %-100 %] 99 %  BP: (126-175)/(59-78) 154/70         Physical Exam  Vitals and nursing note reviewed.   Constitutional:       Appearance: Normal appearance.   Cardiovascular:      Comments: R monophasic PT, L biphasic PT  No DP signals   Warm, well-perfused BLE   Pulmonary:      Effort: Pulmonary effort is normal.   Abdominal:      General: Abdomen is flat.   Musculoskeletal:         General: Tenderness present.   Skin:     Comments: Right calf dry gangrene lesions   Neurological:      Mental Status: He is disoriented and confused.       Significant Labs:  Recent Lab Results         10/07/22  0231   10/06/22  1526        Albumin 2.6         Alkaline Phosphatase 69         ALT 16         Anion Gap 11   10          10       AST 27         Baso # 0.02   0.03          0.03       Basophil % 0.2   0.2          0.2       BILIRUBIN TOTAL 0.5  Comment: For infants and newborns, interpretation of results should be based  on gestational age, weight and in agreement with clinical  observations.    Premature Infant recommended reference ranges:  Up to 24  hours.............<8.0 mg/dL  Up to 48 hours............<12.0 mg/dL  3-5 days..................<15.0 mg/dL  6-29 days.................<15.0 mg/dL           BUN 10   7          7       Calcium 9.0   8.4          8.4       Chloride 110   108          108       CO2 22   18          18       Creatinine 0.6   0.7          0.7       Differential Method Automated   Automated          Automated       eGFR >60.0   >60.0          >60.0       Eos # 0.0   0.0          0.0       Eosinophil % 0.0   0.1          0.1       Glucose 116   146          146       Gran # (ANC) 8.7   13.0          13.0       Gran % 67.2   79.6          79.6       Group & Rh A POS         Hematocrit 35.9   31.9          31.9       Hemoglobin 10.7   10.0          10.0       Immature Grans (Abs) 0.05  Comment: Mild elevation in immature granulocytes is non specific and   can be seen in a variety of conditions including stress response,   acute inflammation, trauma and pregnancy. Correlation with other   laboratory and clinical findings is essential.     0.08  Comment: Mild elevation in immature granulocytes is non specific and   can be seen in a variety of conditions including stress response,   acute inflammation, trauma and pregnancy. Correlation with other   laboratory and clinical findings is essential.            0.08  Comment: Mild elevation in immature granulocytes is non specific and   can be seen in a variety of conditions including stress response,   acute inflammation, trauma and pregnancy. Correlation with other   laboratory and clinical findings is essential.         Immature Granulocytes 0.4   0.5          0.5       INDIRECT PATRICE NEG         Lymph # 3.1   2.7          2.7       Lymph % 23.8   16.7          16.7       MCH 26.0   26.7          26.7       MCHC 29.8   31.3          31.3       MCV 87   85          85       Mono # 1.1   0.5          0.5       Mono % 8.4   2.9          2.9       MPV 9.2   8.5          8.5       nRBC 0   0           0       Platelets 532   500          500       Potassium 3.8   3.8          3.8       PROTEIN TOTAL 6.0         RBC 4.11   3.75          3.75       RDW 17.7   17.3          17.3       Sodium 143   136          136       WBC 12.95   16.36          16.36             All pertinent labs from the last 24 hours have been reviewed.    Significant Diagnostics:  I have reviewed all pertinent imaging results/findings within the past 24 hours.

## 2022-10-07 NOTE — PT/OT/SLP EVAL
Physical Therapy Evaluation    Patient Name:  Babatunde Singh   MRN:  [id number]  Admit Date: 10/2/2022  Admitting Diagnosis:  Critical limb ischemia of right lower extremity with ulceration of lower leg  Length of Stay: 5 days  Recent Surgery: Procedure(s) (LRB):  CREATION, BYPASS, ARTERIAL, AXILLARY TO FEMORAL, USING GRAFT (N/A)  CREATION, BYPASS, ARTERIAL, FEMORAL TO FEMORAL, USING GRAFT (N/A) 1 Day Post-Op    Recommendations:     Discharge Recommendations:  nursing facility, skilled   Discharge Equipment Recommendations:  (TBD)    Assessment:     Babatunde Singh is a 72 y.o. male admitted with a medical diagnosis of Critical limb ischemia of right lower extremity with ulceration of lower leg. Pt found alert, confused, and intermittently agitated. Pt required moderate assistance for re-direction. Pt did not formally report R LE pain but did complain of R LE with movement and touch. Pt was able to perform bed mobility, stand, and take a few side step with 2 person assistance. Pt was only able to achieve approx 50% upright posture when standing. Due to patient's confusion, pt was not safe to transfer to the chair today. Tele sitter present and bed alarm on. Recommend SNF once pt is medically stable for discharge.       Problem List: weakness, impaired endurance, impaired self care skills, impaired functional mobility, gait instability, impaired balance, impaired cognition, decreased lower extremity function, decreased safety awareness, pain, impaired cardiopulmonary response to activity  Rehab Prognosis: Good; patient would benefit from acute skilled PT services to address these deficits and reach maximum level of function.      Plan:     During this hospitalization, patient to be seen 3 x/week to address the identified rehab impairments via gait training, therapeutic activities, therapeutic exercises, neuromuscular re-education and progress towards the established goals.    Plan of Care Expires:   "11/06/22    Subjective   Communicated with RN prior to session.  Patient found HOB elevated upon PT entry to room, agreeable to evaluation. Babatunde Singh's alone during session.    Chief Complaint: Leg Pain (Pt c/o worsening pain to RLE x few hours. Pt states this is a chronic issue that he has. Hx of right leg pain and right sciatica pain. )    Patient/Family Comments/goals: to get better  Pain/Comfort:  Pain Rating 1: 0/10  Location 1:  (pain reported with movement of R LE; pain to touch R LE; unrated)  Pain Addressed 1: Reposition, Distraction, Cessation of Activity    Living Environment:  Pt lives with wife in a SS double with 4 ALTAGRACIA and B HR. Pt was mod ind with ambulation, using SPC. Pt was ind with ADL. Patient uses DME as follows: cane, straight. DME owned (not currently used): none.  Drive: yes.     Patient reports they will have limited assistance upon discharge. Pt has to assist wife.    Objective:   Patient found with: telemetry, peripheral IV, byers catheter     General Precautions: Standard, Cardiac fall   Orthopedic Precautions:N/A   Braces: N/A   Oxygen Device: Room Air  Vitals: BP (!) 109/59 (BP Location: Left arm, Patient Position: Lying)   Pulse (!) 124   Temp 98.6 °F (37 °C) (Oral)   Resp 16   Ht 5' 7" (1.702 m)   Wt 54 kg (119 lb)   SpO2 98%   BMI 18.64 kg/m²     Exams:  Cognition:   Alert, Agitated, Confused, and Tangential affect  Oriented to person, place, moth, and year  Command following: Follows one-step commands inconsistently; impaired attention to task/confusion   Fluency: clear/fluent; tangential    RLE ROM: WFL  RLE Strength: grossly 3+/5  LLE ROM: WFL  LLE Strength: grossly 4/5    Outcome Measures:  AM-PAC 6 CLICK MOBILITY  Turning over in bed (including adjusting bedclothes, sheets and blankets)?: 2  Sitting down on and standing up from a chair with arms (e.g., wheelchair, bedside commode, etc.): 2  Moving from lying on back to sitting on the side of the bed?: 2  Moving to " and from a bed to a chair (including a wheelchair)?: 2  Need to walk in hospital room?: 2  Climbing 3-5 steps with a railing?: 1  Basic Mobility Total Score: 11     Functional Mobility:  Additional staff present: OT  Bed Mobility:  Supine to Sit: maximal assistance; HOB elevated  Scooting anteriorly to EOB to have both feet planted on floor: contact guard assistance  Sit to Supine: maximal assistance; HOB flat    Sitting Balance at Edge of Bed:  Assistance Level Required: fluctuating between CGA and max A depending on attention to task.   Time: 5 minutes  Comments:   Worked on activity tolerance sitting EOB, worked on tolerance to positional change, and worked on sitting balance   Pt demo'd right posterior lean that required max A to correct    Transfers:   Sit <> Stand Transfer: moderate assistance and of 2 persons with no assistive device from EOB  Pt approx 50% upright  No c/o R LE pain with weight bearing today   Gait:   Patient ambulated: 6 side steps to left   Patient required: maximal assist and of 2 persons  Patient used:  B HHA  Gait Deviation(s): unsteady gait, decreased step length, narrow base of support, flexed posture, and decreased marina  Impairments due to: impaired balance, decreased strength, decreased endurance, and impaired cognition   Comments:   Facilitation of B weight shifting and upright posture  Verbal cuing for advancement of B Les  Pt only 50% upright       Therapeutic Activities, Exercises, & Education:   Educated pt on PT role/POC  Provided re-orientation   Pt verbalized understanding       Patient left HOB elevated with all lines intact, call button in reach, bed alarm on, RN notified, and telesitter present.    GOALS:   Multidisciplinary Problems       Physical Therapy Goals          Problem: Physical Therapy    Goal Priority Disciplines Outcome Goal Variances Interventions   Physical Therapy Goal     PT, PT/OT Ongoing, Progressing     Description: Goals to be met by: 10/25/2022      Patient will increase functional independence with mobility by performin. Supine to sit with MInimal Assistance  2. Sit to stand transfer with Minimal Assistance using LRAD  3. Gait  x 25 feet with Minimal Assistance using LRAD.   4. Ascend/descend 4 stair with bilateral Handrails Moderate Assistance  5. Stand for 3 minutes with Minimal Assistance using LRAD                         History:     Past Medical History:   Diagnosis Date    Hypertension     Peripheral arterial disease        Past Surgical History:   Procedure Laterality Date    CREATION OF AXILLARY-FEMORAL ARTERY BYPASS WITH GRAFT N/A 10/6/2022    Procedure: CREATION, BYPASS, ARTERIAL, AXILLARY TO FEMORAL, USING GRAFT;  Surgeon: LEI Ortiz III, MD;  Location: [address];  Service: Peripheral Vascular;  Laterality: N/A;    CREATION OF FEMORAL-FEMORAL ARTERY BYPASS WITH GRAFT N/A 10/6/2022    Procedure: CREATION, BYPASS, ARTERIAL, FEMORAL TO FEMORAL, USING GRAFT;  Surgeon: LEI Ortiz III, MD;  Location: [address];  Service: Peripheral Vascular;  Laterality: N/A;    EXCISION OF HYDROCELE Left 2022    Procedure: HYDROCELECTOMY;  Surgeon: Damion Roberts MD;  Location: [facility];  Service: Urology;  Laterality: Left;    KNEE SURGERY  1972       Time Tracking:     PT Received On: 10/07/22  PT Start Time: 1035     PT Stop Time: 1052  PT Total Time (min): 17 min     Billable Minutes: Evaluation 5 and Gait Training 8

## 2022-10-07 NOTE — PT/OT/SLP EVAL
Occupational Therapy   Co-Evaluation    Name: Babatunde Singh  MRN: [id number]  Admitting Diagnosis:  Critical limb ischemia of right lower extremity with ulceration of lower leg  Recent Surgery: Procedure(s) (LRB):  CREATION, BYPASS, ARTERIAL, AXILLARY TO FEMORAL, USING GRAFT (N/A)  CREATION, BYPASS, ARTERIAL, FEMORAL TO FEMORAL, USING GRAFT (N/A) 1 Day Post-Op    Recommendations:     Discharge Recommendations: nursing facility, skilled  Discharge Equipment Recommendations:   (TBD)  Barriers to discharge:  Decreased caregiver support    Assessment:     Babatunde Singh is a 72 y.o. male with a medical diagnosis of Critical limb ischemia of right lower extremity with ulceration of lower leg.  He presents with confusion post-op. Pt required cueing to engage in eval. Performance deficits affecting function: weakness, impaired balance, decreased safety awareness, impaired endurance, decreased lower extremity function, gait instability, impaired functional mobility, impaired self care skills, impaired cardiopulmonary response to activity, impaired cognition. Pt benefits from co-treatment with PT to accommodate pt's activity tolerance and progression with therapy.      Rehab Prognosis: Good; patient would benefit from acute skilled OT services to address these deficits and reach maximum level of function.       Plan:     Patient to be seen 3 x/week to address the above listed problems via self-care/home management, therapeutic activities, therapeutic exercises  Plan of Care Expires: 11/06/22  Plan of Care Reviewed with: patient    Subjective     Chief Complaint: tangential speech  Patient/Family Comments/goals: none stated    Occupational Profile: Needs verification  Living Environment: lives with spouse (who he cares for) in  double with 4 ALTAGRACIA and B HR  Previous level of function: (I) with ADL and uses cane for mobility; drives  Roles and Routines: cares for his wife  Equipment Used at Home:  cane, straight  Assistance  upon Discharge: unknown    Pain/Comfort:  Pain Rating 1: 0/10  Pain Rating Post-Intervention 1: 0/10    Patients cultural, spiritual, L[employer] day conflicts given the current situation: no    Objective:     Communicated with: RN prior to session.  Patient found supine with telemetry, bed alarm, peripheral IV, byers catheter ([employer]) upon OT entry to room.    General Precautions: Standard, fall   Orthopedic Precautions:    Braces:    Respiratory Status: Room air    Occupational Performance:    Bed Mobility:    Patient completed Scooting/Bridging with contact guard assistance to EOB  Patient completed Supine to Sit with maximal assistance for initiation and redirection  Patient completed Sit to Supine with maximal assistance    Functional Mobility/Transfers:  Patient completed Sit <> Stand Transfer with moderate assistance and of 2 persons  with  hand-held assist   Functional Mobility: Max A x 2 for sidestepping along EOB with ~50% upright posture    Activities of Daily Living:  Grooming: moderate assistance    Upper Body Dressing: moderate assistance    Lower Body Dressing: maximal assistance      Cognitive/Visual Perceptual:  Pt is alert and following commands with cueing and redirection as pt is easily distractable  Pt is grossly confused, but able to answer orientation questions (except reason for hospitalization)    Physical Exam:  Postural examination/scapula alignment:    -       PPT  Sensation:    -       Intact  Upper Extremity Range of Motion:     -       Right Upper Extremity: WFL  -       Left Upper Extremity: WFL  Upper Extremity Strength:    -       Right Upper Extremity: WFL  -       Left Upper Extremity: WFL   Strength:    -       Right Upper Extremity: WFL  -       Left Upper Extremity: WFL  R LE bandages intact    AMPAC 6 Click ADL:  AMPAC Total Score: 12    Treatment & Education:  Pt ed on OT POC  Daily orientation provided  Pt ed on safety and need for assistance    Patient left supine with all  lines intact, call button in reach, bed alarm on, RN notified, and [employer] on pt    GOALS:   Multidisciplinary Problems       Occupational Therapy Goals          Problem: Occupational Therapy    Goal Priority Disciplines Outcome Interventions   Occupational Therapy Goal     OT, PT/OT Ongoing, Progressing    Description: Goals to be met by: 10/21/22     Patient will increase functional independence with ADLs by performing:    Feeding with Modified [place].  UE Dressing with Stand-by Assistance.  LE Dressing with Minimal Assistance.  Grooming while standing at sink with Contact Guard Assistance.  Toileting from toilet with Minimal Assistance for hygiene and clothing management.   Toilet transfer to toilet with Contact Guard Assistance.                         History:     Past Medical History:   Diagnosis Date    Hypertension     Peripheral arterial disease          Past Surgical History:   Procedure Laterality Date    CREATION OF AXILLARY-FEMORAL ARTERY BYPASS WITH GRAFT N/A 10/6/2022    Procedure: CREATION, BYPASS, ARTERIAL, AXILLARY TO FEMORAL, USING GRAFT;  Surgeon: LEI Ortiz III, MD;  Location: [address];  Service: Peripheral Vascular;  Laterality: N/A;    CREATION OF FEMORAL-FEMORAL ARTERY BYPASS WITH GRAFT N/A 10/6/2022    Procedure: CREATION, BYPASS, ARTERIAL, FEMORAL TO FEMORAL, USING GRAFT;  Surgeon: LEI Ortiz III, MD;  Location: [address];  Service: Peripheral Vascular;  Laterality: N/A;    EXCISION OF HYDROCELE Left 4/26/2022    Procedure: HYDROCELECTOMY;  Surgeon: Damion Roberts MD;  Location: [facility];  Service: Urology;  Laterality: Left;    KNEE SURGERY  1972       Time Tracking:     OT Date of Treatment: 10/07/22  OT Start Time: 1035  OT Stop Time: 1052  OT Total Time (min): 17 min    Billable Minutes:Evaluation 7  Self Care/Home Management 10    10/7/2022

## 2022-10-07 NOTE — CONSULTS
Inpatient consult to Physical Medicine Rehab  Consult performed by: Luna Lomas NP  Consult ordered by: Sis Miles MD    Consult received.  Reviewed patient history and current admission.  PM&R following.    Luna Lomas NP  Physical Medicine & Rehabilitation   10/07/2022

## 2022-10-07 NOTE — PROGRESS NOTES
[facility] - Cardiology [facility] Medicine  Progress Note    Patient Name: Babatunde Singh  MRN: [id number]  Patient Class: IP- Inpatient   Admission Date: 10/2/2022  Length of Stay: 5 days  Attending Physician: Alannah Hicks MD  Primary Care Provider: Esdras Ly MD        Subjective:     Principal Problem:Critical limb ischemia of right lower extremity with ulceration of lower leg        HPI:  Babatunde Singh is a 71 yo M withPAD, HTN, HLD, recent diagnosis of DVT (9/8) on xarelto who presents due to RLE pain. Of note, patient has frequent admission for lower extremity pain. He states that on the day of his presentation, he has had increasing 10/10 aching pain which woke him up from sleep every few hours. This has been progressing over the last 2-3 days prior to admission. He states at baseline he has pain every other night. In the ER, RLE distal pulses were absent on presentation, unable to palpate or obtain Doppler signal.               Overview/Hospital Course:  BRENNAN was performed which showed Right Leg with PVR waveforms suggest ischemic peripheral arterial occlusive disease. No audible Doppler signal detected to obtain a reliable BRENNAN. TBI of 0 with a Great toe pressure of 0 mmHg is noted. Left Leg PVR waveforms also suggested ischemic peripheral arterial occlusive disease. No audible Doppler signal detected to obtain a   reliable BRENNAN.     CTA runoff abdomen bilateral LE showed Possible worsening stenosis or interval occlusion of the right dorsalis pedis artery.  Overall evaluation of lower extremity arteries is otherwise grossly unchanged noting regions of persistent occlusion with two vessel runoff to the bilateral lower extremities. Xarelto was held and pt was started on heparin ggt. IR was consulted, but endoscopically is no option for vascularization. Vascular surgery was consulted, and they scheduled the pt for intervention. Cardiology was consulted for cardiac clearance, and pt has been cleared  with risk as per cardiology. Pt underwent L axillary to BILATERAL femoris profunda bypass with 8 mm PTFE on 10/6 by vascular.  He developed post operative delirium. PT/OT/PM&R have been consulted.    Regarding the right renal mass seen on the CT abdomen/pelvis, urology recommended outpt intervention.        Interval History:  Confused, doesn't answer much questions, has incoherent speech.      Review of Systems   Reason unable to perform ROS: Pt is confused, has incoherent speech.       Objective:     Vital Signs (Most Recent):  Temp: 98.6 °F (37 °C) (10/07/22 1158)  Pulse: 108 (10/07/22 1158)  Resp: 16 (10/07/22 1158)  BP: (!) 109/59 (10/07/22 1158)  SpO2: 98 % (10/07/22 1158)   Vital Signs (24h Range):  Temp:  [97 °F (36.1 °C)-98.6 °F (37 °C)] 98.6 °F (37 °C)  Pulse:  [] 108  Resp:  [16-22] 16  SpO2:  [95 %-100 %] 98 %  BP: (109-175)/(59-78) 109/59     Weight: 54 kg (119 lb)  Body mass index is 18.64 kg/m².    Intake/Output Summary (Last 24 hours) at 10/7/2022 1209  Last data filed at 10/7/2022 0255  Gross per 24 hour   Intake 1900 ml   Output 1475 ml   Net 425 ml        Physical Exam  Constitutional:       Comments: Confused, incoherent speech.   HENT:      Head: Normocephalic and atraumatic.   Eyes:      Conjunctiva/sclera: Conjunctivae normal.      Pupils: Pupils are equal, round, and reactive to light.   Cardiovascular:      Rate and Rhythm: Regular rhythm. Tachycardia present.   Pulmonary:      Effort: Pulmonary effort is normal. No respiratory distress.      Breath sounds: Normal breath sounds. No wheezing.   Abdominal:      General: Abdomen is flat. Bowel sounds are normal.      Palpations: Abdomen is soft.      Tenderness: There is no abdominal tenderness.   Musculoskeletal:      Comments: two wounds covered with back eschar lateral aspect of his right tibia chin, with no signs of surrounding infection.  A large blister full of fluid noted anterior aspect of his right tibia chin.     Neurological:       Comments: Confused, doesn't follow command.        Significant Labs: All pertinent labs within the past 24 hours have been reviewed.  BMP:   Recent Labs   Lab 10/07/22  0231   *      K 3.8      CO2 22*   BUN 10   CREATININE 0.6   CALCIUM 9.0       CBC:   Recent Labs   Lab 10/06/22  0548 10/06/22  1245 10/06/22  1526 10/07/22  0231   WBC 11.15  --  16.36*  16.36* 12.95*   HGB 7.5*  --  10.0*  10.0* 10.7*   HCT 24.2* 26* 31.9*  31.9* 35.9*   *  --  500*  500* 532*         Significant Imaging: I have reviewed all pertinent imaging results/findings within the past 24 hours.      Assessment/Plan:      * Critical limb ischemia of right lower extremity with ulceration of lower leg  CTA runoff was reviewed.  Pt is s/p left axillary to BILATERAL femoris profunda bypass with 8 mm PTFE on 10/7 by vascular surgery. Appreciate vascular input.  Continue aspirin, cilostazol and statin/zetia.  Will consult PT/OT/PM&R now that pt is post procedure.      Atherosclerosis of native artery of extremity  Pt is s/p left axillary to BILATERAL femoris profunda bypass with 8 mm PTFE on 10/6 by vascular surgery. Appreciate vascular input.  Continue statin, zetia, aspirin, and cilostazol.    Right leg pain  Due to ischemia.   S/p intervention on 10/6 by vascular surgery.       Encephalopathy, metabolic  Pt has been confused post operatively. Suspect 2/2 the anesthesia he received.   Will rule out infection. Will order UA. Pt is sating 98% on RA with RR of 16, so will hold off ordering CXR.       DVT (deep venous thrombosis)  Vascular surgery approved resuming xarelto today.      Absent pedal pulses  Absent pedial pulse of the right foot.   Pt is s/p left axillary to BILATERAL femoris profunda bypass with 8 mm PTFE on 10/6 by vascular surgery.   Continue statin, aspirin, and cilostazol.      Right renal mass  Urology is planing doing out patient intervention.      Claudication of right lower extremity  Duo to  ischemic leg.      Mixed hyperlipidemia  On Zetia and lipitor.      Tobacco abuse  Has been consulted over 10 minutes regarding need to quit smoking, and emphazised its negative consequences on his leg ischemia.      Primary hypertension  on home BP med's of losartan/HCTZ. Will add parameters.      VTE Risk Mitigation (From admission, onward)           Ordered     rivaroxaban tablet 20 mg  With dinner         10/07/22 0818     IP VTE HIGH RISK PATIENT  Once         10/06/22 1516     Place sequential compression device  Until discontinued         10/06/22 1516     Place sequential compression device  Until discontinued         10/02/22 0814                    Discharge Planning   LAZ: 10/10/2022     Code Status: Full Code   Is the patient medically ready for discharge?: No    Reason for patient still in hospital (select all that apply): Patient trending condition  Discharge Plan A: Home with family                  Alannah Hicks MD  Department of Hospital Medicine   [facility] Stepdown

## 2022-10-07 NOTE — PLAN OF CARE
Problem: Occupational Therapy  Goal: Occupational Therapy Goal  Description: Goals to be met by: 10/21/22     Patient will increase functional independence with ADLs by performing:    Feeding with Modified [place].  UE Dressing with Stand-by Assistance.  LE Dressing with Minimal Assistance.  Grooming while standing at sink with Contact Guard Assistance.  Toileting from toilet with Minimal Assistance for hygiene and clothing management.   Toilet transfer to toilet with Contact Guard Assistance.    Outcome: Ongoing, Progressing

## 2022-10-07 NOTE — PROGRESS NOTES
Pt confused. Keep removing tele box and leads. Tele Tech &  notified. No acute distress noted. Will continue to monitor.

## 2022-10-07 NOTE — ASSESSMENT & PLAN NOTE
Pt is s/p left axillary to BILATERAL femoris profunda bypass with 8 mm PTFE on 10/6 by vascular surgery. Appreciate vascular input.  Continue statin, zetia, aspirin, and cilostazol.

## 2022-10-07 NOTE — ASSESSMENT & PLAN NOTE
Mr Hardy is a 72 year old male with a pmh of smoking, severe PAD, dvt who presented with chronic limb ischemia and rest pain now s/p successful axio-fem-fem bypass on 10/6.  - resume home Xarelto   - very disoriented overnight and this AM, will start melatonin QHS and trazodone QHS PRN   -delirium precautions  - PT/OT   - PMNR consult for rehab eval   -continue asa/statin cilostazol

## 2022-10-07 NOTE — NURSING
PT is confused, and hard to redirect. PT attempts to remove IV's and telemetry. Have used coban in an  attempt to keep him from removing necessary medical equipment. At this time not able to sit patient into chair, as he is uncooperative and will not assist with getting him up, he will also not follow directions of not attempting things prior to contacting nursing staff for assistance. Will continue to monitor and assess.

## 2022-10-07 NOTE — SUBJECTIVE & OBJECTIVE
Interval History:  Confused, doesn't answer much questions, has incoherent speech.      Review of Systems   Reason unable to perform ROS: Pt is confused, has incoherent speech.       Objective:     Vital Signs (Most Recent):  Temp: 98.6 °F (37 °C) (10/07/22 1158)  Pulse: 108 (10/07/22 1158)  Resp: 16 (10/07/22 1158)  BP: (!) 109/59 (10/07/22 1158)  SpO2: 98 % (10/07/22 1158)   Vital Signs (24h Range):  Temp:  [97 °F (36.1 °C)-98.6 °F (37 °C)] 98.6 °F (37 °C)  Pulse:  [] 108  Resp:  [16-22] 16  SpO2:  [95 %-100 %] 98 %  BP: (109-175)/(59-78) 109/59     Weight: 54 kg (119 lb)  Body mass index is 18.64 kg/m².    Intake/Output Summary (Last 24 hours) at 10/7/2022 1209  Last data filed at 10/7/2022 0255  Gross per 24 hour   Intake 1900 ml   Output 1475 ml   Net 425 ml        Physical Exam  Constitutional:       Comments: Confused, incoherent speech.   HENT:      Head: Normocephalic and atraumatic.   Eyes:      Conjunctiva/sclera: Conjunctivae normal.      Pupils: Pupils are equal, round, and reactive to light.   Cardiovascular:      Rate and Rhythm: Regular rhythm. Tachycardia present.   Pulmonary:      Effort: Pulmonary effort is normal. No respiratory distress.      Breath sounds: Normal breath sounds. No wheezing.   Abdominal:      General: Abdomen is flat. Bowel sounds are normal.      Palpations: Abdomen is soft.      Tenderness: There is no abdominal tenderness.   Musculoskeletal:      Comments: Patient would not let me unwrap his right lower extremity, but he is known to have two wounds with back eschar lateral aspect of his tibia chin.      Neurological:      Comments: Confused, doesn't follow command.        Significant Labs: All pertinent labs within the past 24 hours have been reviewed.  BMP:   Recent Labs   Lab 10/07/22  0231   *      K 3.8      CO2 22*   BUN 10   CREATININE 0.6   CALCIUM 9.0       CBC:   Recent Labs   Lab 10/06/22  0548 10/06/22  1245 10/06/22  1526 10/07/22  0235    WBC 11.15  --  16.36*  16.36* 12.95*   HGB 7.5*  --  10.0*  10.0* 10.7*   HCT 24.2* 26* 31.9*  31.9* 35.9*   *  --  500*  500* 532*         Significant Imaging: I have reviewed all pertinent imaging results/findings within the past 24 hours.

## 2022-10-07 NOTE — ASSESSMENT & PLAN NOTE
Absent pedial pulse of the right foot.   Pt is s/p left axillary to BILATERAL femoris profunda bypass with 8 mm PTFE on 10/6 by vascular surgery.   Continue statin, aspirin, and cilostazol.

## 2022-10-07 NOTE — CONSULTS
Inpatient consult to Physical Medicine Rehab  Consult performed by: Luna Lomas NP  Consult ordered by: Alannah Hicks MD    Consult received.  Reviewed patient history and current admission.  PM&R following.    Luna Lomas NP  Physical Medicine & Rehabilitation   10/07/2022

## 2022-10-08 DIAGNOSIS — Z12.11 SCREENING FOR COLON CANCER: Primary | ICD-10-CM

## 2022-10-08 PROBLEM — R65.20 SEVERE SEPSIS: Status: ACTIVE | Noted: 2022-10-08

## 2022-10-08 PROBLEM — A41.9 SEVERE SEPSIS: Status: ACTIVE | Noted: 2022-10-08

## 2022-10-08 LAB
ALBUMIN SERPL BCP-MCNC: 2.1 G/DL (ref 3.5–5.2)
ALP SERPL-CCNC: 59 U/L (ref 55–135)
ALT SERPL W/O P-5'-P-CCNC: 14 U/L (ref 10–44)
ANION GAP SERPL CALC-SCNC: 8 MMOL/L (ref 8–16)
ANION GAP SERPL CALC-SCNC: 8 MMOL/L (ref 8–16)
AST SERPL-CCNC: 26 U/L (ref 10–40)
BASOPHILS # BLD AUTO: 0.04 K/UL (ref 0–0.2)
BASOPHILS # BLD AUTO: 0.04 K/UL (ref 0–0.2)
BASOPHILS NFR BLD: 0.3 % (ref 0–1.9)
BASOPHILS NFR BLD: 0.3 % (ref 0–1.9)
BILIRUB SERPL-MCNC: 0.6 MG/DL (ref 0.1–1)
BUN SERPL-MCNC: 11 MG/DL (ref 8–23)
BUN SERPL-MCNC: 12 MG/DL (ref 8–23)
CALCIUM SERPL-MCNC: 8.3 MG/DL (ref 8.7–10.5)
CALCIUM SERPL-MCNC: 8.3 MG/DL (ref 8.7–10.5)
CHLORIDE SERPL-SCNC: 107 MMOL/L (ref 95–110)
CHLORIDE SERPL-SCNC: 110 MMOL/L (ref 95–110)
CO2 SERPL-SCNC: 21 MMOL/L (ref 23–29)
CO2 SERPL-SCNC: 23 MMOL/L (ref 23–29)
CREAT SERPL-MCNC: 0.7 MG/DL (ref 0.5–1.4)
CREAT SERPL-MCNC: 0.7 MG/DL (ref 0.5–1.4)
DIFFERENTIAL METHOD: ABNORMAL
DIFFERENTIAL METHOD: ABNORMAL
EOSINOPHIL # BLD AUTO: 0 K/UL (ref 0–0.5)
EOSINOPHIL # BLD AUTO: 0 K/UL (ref 0–0.5)
EOSINOPHIL NFR BLD: 0.1 % (ref 0–8)
EOSINOPHIL NFR BLD: 0.1 % (ref 0–8)
ERYTHROCYTE [DISTWIDTH] IN BLOOD BY AUTOMATED COUNT: 18 % (ref 11.5–14.5)
ERYTHROCYTE [DISTWIDTH] IN BLOOD BY AUTOMATED COUNT: 18.2 % (ref 11.5–14.5)
EST. GFR  (NO RACE VARIABLE): >60 ML/MIN/1.73 M^2
EST. GFR  (NO RACE VARIABLE): >60 ML/MIN/1.73 M^2
GLUCOSE SERPL-MCNC: 108 MG/DL (ref 70–110)
GLUCOSE SERPL-MCNC: 125 MG/DL (ref 70–110)
HCT VFR BLD AUTO: 30.3 % (ref 40–54)
HCT VFR BLD AUTO: 31.4 % (ref 40–54)
HGB BLD-MCNC: 10 G/DL (ref 14–18)
HGB BLD-MCNC: 9.3 G/DL (ref 14–18)
IMM GRANULOCYTES # BLD AUTO: 0.06 K/UL (ref 0–0.04)
IMM GRANULOCYTES # BLD AUTO: 0.08 K/UL (ref 0–0.04)
IMM GRANULOCYTES NFR BLD AUTO: 0.4 % (ref 0–0.5)
IMM GRANULOCYTES NFR BLD AUTO: 0.6 % (ref 0–0.5)
LACTATE SERPL-SCNC: 1.6 MMOL/L (ref 0.5–2.2)
LYMPHOCYTES # BLD AUTO: 2.3 K/UL (ref 1–4.8)
LYMPHOCYTES # BLD AUTO: 2.8 K/UL (ref 1–4.8)
LYMPHOCYTES NFR BLD: 16.6 % (ref 18–48)
LYMPHOCYTES NFR BLD: 17.7 % (ref 18–48)
MAGNESIUM SERPL-MCNC: 1.7 MG/DL (ref 1.6–2.6)
MCH RBC QN AUTO: 26.5 PG (ref 27–31)
MCH RBC QN AUTO: 27.4 PG (ref 27–31)
MCHC RBC AUTO-ENTMCNC: 30.7 G/DL (ref 32–36)
MCHC RBC AUTO-ENTMCNC: 31.8 G/DL (ref 32–36)
MCV RBC AUTO: 86 FL (ref 82–98)
MCV RBC AUTO: 86 FL (ref 82–98)
MONOCYTES # BLD AUTO: 1.1 K/UL (ref 0.3–1)
MONOCYTES # BLD AUTO: 1.3 K/UL (ref 0.3–1)
MONOCYTES NFR BLD: 8.2 % (ref 4–15)
MONOCYTES NFR BLD: 8.4 % (ref 4–15)
NEUTROPHILS # BLD AUTO: 10.2 K/UL (ref 1.8–7.7)
NEUTROPHILS # BLD AUTO: 11.5 K/UL (ref 1.8–7.7)
NEUTROPHILS NFR BLD: 73.1 % (ref 38–73)
NEUTROPHILS NFR BLD: 74.2 % (ref 38–73)
NRBC BLD-RTO: 0 /100 WBC
NRBC BLD-RTO: 0 /100 WBC
PLATELET # BLD AUTO: 455 K/UL (ref 150–450)
PLATELET # BLD AUTO: 473 K/UL (ref 150–450)
PMV BLD AUTO: 8.6 FL (ref 9.2–12.9)
PMV BLD AUTO: 8.6 FL (ref 9.2–12.9)
POCT GLUCOSE: 98 MG/DL (ref 70–110)
POTASSIUM SERPL-SCNC: 3.4 MMOL/L (ref 3.5–5.1)
POTASSIUM SERPL-SCNC: 3.5 MMOL/L (ref 3.5–5.1)
PROT SERPL-MCNC: 4.9 G/DL (ref 6–8.4)
RBC # BLD AUTO: 3.51 M/UL (ref 4.6–6.2)
RBC # BLD AUTO: 3.65 M/UL (ref 4.6–6.2)
SODIUM SERPL-SCNC: 138 MMOL/L (ref 136–145)
SODIUM SERPL-SCNC: 139 MMOL/L (ref 136–145)
WBC # BLD AUTO: 13.79 K/UL (ref 3.9–12.7)
WBC # BLD AUTO: 15.67 K/UL (ref 3.9–12.7)

## 2022-10-08 PROCEDURE — 83605 ASSAY OF LACTIC ACID: CPT | Performed by: HOSPITALIST

## 2022-10-08 PROCEDURE — 20600001 HC STEP DOWN PRIVATE ROOM

## 2022-10-08 PROCEDURE — 25000003 PHARM REV CODE 250: Performed by: STUDENT IN AN ORGANIZED HEALTH CARE EDUCATION/TRAINING PROGRAM

## 2022-10-08 PROCEDURE — 25000003 PHARM REV CODE 250

## 2022-10-08 PROCEDURE — 36415 COLL VENOUS BLD VENIPUNCTURE: CPT | Performed by: HOSPITALIST

## 2022-10-08 PROCEDURE — 80048 BASIC METABOLIC PNL TOTAL CA: CPT | Mod: XB | Performed by: HOSPITALIST

## 2022-10-08 PROCEDURE — 63600175 PHARM REV CODE 636 W HCPCS: Performed by: INTERNAL MEDICINE

## 2022-10-08 PROCEDURE — 93005 ELECTROCARDIOGRAM TRACING: CPT

## 2022-10-08 PROCEDURE — 93010 ELECTROCARDIOGRAM REPORT: CPT | Mod: ,,, | Performed by: INTERNAL MEDICINE

## 2022-10-08 PROCEDURE — 63600175 PHARM REV CODE 636 W HCPCS: Performed by: STUDENT IN AN ORGANIZED HEALTH CARE EDUCATION/TRAINING PROGRAM

## 2022-10-08 PROCEDURE — 83735 ASSAY OF MAGNESIUM: CPT | Performed by: HOSPITALIST

## 2022-10-08 PROCEDURE — 85025 COMPLETE CBC W/AUTO DIFF WBC: CPT | Mod: 91 | Performed by: STUDENT IN AN ORGANIZED HEALTH CARE EDUCATION/TRAINING PROGRAM

## 2022-10-08 PROCEDURE — 85025 COMPLETE CBC W/AUTO DIFF WBC: CPT | Performed by: HOSPITALIST

## 2022-10-08 PROCEDURE — 25000003 PHARM REV CODE 250: Performed by: INTERNAL MEDICINE

## 2022-10-08 PROCEDURE — 94761 N-INVAS EAR/PLS OXIMETRY MLT: CPT

## 2022-10-08 PROCEDURE — S4991 NICOTINE PATCH NONLEGEND: HCPCS | Performed by: STUDENT IN AN ORGANIZED HEALTH CARE EDUCATION/TRAINING PROGRAM

## 2022-10-08 PROCEDURE — 80053 COMPREHEN METABOLIC PANEL: CPT | Performed by: STUDENT IN AN ORGANIZED HEALTH CARE EDUCATION/TRAINING PROGRAM

## 2022-10-08 PROCEDURE — 63600175 PHARM REV CODE 636 W HCPCS: Performed by: SURGERY

## 2022-10-08 PROCEDURE — 27000221 HC OXYGEN, UP TO 24 HOURS

## 2022-10-08 PROCEDURE — 25000003 PHARM REV CODE 250: Performed by: SURGERY

## 2022-10-08 PROCEDURE — 36415 COLL VENOUS BLD VENIPUNCTURE: CPT | Performed by: STUDENT IN AN ORGANIZED HEALTH CARE EDUCATION/TRAINING PROGRAM

## 2022-10-08 PROCEDURE — 93010 EKG 12-LEAD: ICD-10-PCS | Mod: ,,, | Performed by: INTERNAL MEDICINE

## 2022-10-08 RX ORDER — SODIUM CHLORIDE, SODIUM LACTATE, POTASSIUM CHLORIDE, CALCIUM CHLORIDE 600; 310; 30; 20 MG/100ML; MG/100ML; MG/100ML; MG/100ML
INJECTION, SOLUTION INTRAVENOUS CONTINUOUS
Status: ACTIVE | OUTPATIENT
Start: 2022-10-08 | End: 2022-10-08

## 2022-10-08 RX ORDER — POTASSIUM CHLORIDE 7.45 MG/ML
10 INJECTION INTRAVENOUS
Status: COMPLETED | OUTPATIENT
Start: 2022-10-08 | End: 2022-10-08

## 2022-10-08 RX ORDER — IBUPROFEN 400 MG/1
400 TABLET ORAL ONCE
Status: COMPLETED | OUTPATIENT
Start: 2022-10-08 | End: 2022-10-08

## 2022-10-08 RX ORDER — MAGNESIUM SULFATE HEPTAHYDRATE 40 MG/ML
2 INJECTION, SOLUTION INTRAVENOUS ONCE
Status: COMPLETED | OUTPATIENT
Start: 2022-10-08 | End: 2022-10-08

## 2022-10-08 RX ORDER — MORPHINE SULFATE 2 MG/ML
4 INJECTION, SOLUTION INTRAMUSCULAR; INTRAVENOUS EVERY 6 HOURS PRN
Status: DISCONTINUED | OUTPATIENT
Start: 2022-10-08 | End: 2022-10-10

## 2022-10-08 RX ADMIN — PIPERACILLIN SODIUM AND TAZOBACTAM SODIUM 4.5 G: 4; .5 INJECTION, POWDER, LYOPHILIZED, FOR SOLUTION INTRAVENOUS at 01:10

## 2022-10-08 RX ADMIN — MUPIROCIN: 20 OINTMENT TOPICAL at 11:10

## 2022-10-08 RX ADMIN — Medication 6 MG: at 09:10

## 2022-10-08 RX ADMIN — PIPERACILLIN SODIUM AND TAZOBACTAM SODIUM 4.5 G: 4; .5 INJECTION, POWDER, LYOPHILIZED, FOR SOLUTION INTRAVENOUS at 05:10

## 2022-10-08 RX ADMIN — TRAZODONE HYDROCHLORIDE 25 MG: 50 TABLET ORAL at 09:10

## 2022-10-08 RX ADMIN — CILOSTAZOL 100 MG: 100 TABLET ORAL at 09:10

## 2022-10-08 RX ADMIN — PIPERACILLIN SODIUM AND TAZOBACTAM SODIUM 4.5 G: 4; .5 INJECTION, POWDER, LYOPHILIZED, FOR SOLUTION INTRAVENOUS at 11:10

## 2022-10-08 RX ADMIN — ACETAMINOPHEN 650 MG: 325 TABLET ORAL at 01:10

## 2022-10-08 RX ADMIN — MUPIROCIN: 20 OINTMENT TOPICAL at 10:10

## 2022-10-08 RX ADMIN — POTASSIUM CHLORIDE 10 MEQ: 10 INJECTION INTRAVENOUS at 06:10

## 2022-10-08 RX ADMIN — GABAPENTIN 900 MG: 300 CAPSULE ORAL at 09:10

## 2022-10-08 RX ADMIN — RIVAROXABAN 20 MG: 20 TABLET, FILM COATED ORAL at 04:10

## 2022-10-08 RX ADMIN — GABAPENTIN 900 MG: 300 CAPSULE ORAL at 02:10

## 2022-10-08 RX ADMIN — NICOTINE 1 PATCH: 14 PATCH, EXTENDED RELEASE TRANSDERMAL at 10:10

## 2022-10-08 RX ADMIN — POTASSIUM CHLORIDE 10 MEQ: 10 INJECTION INTRAVENOUS at 07:10

## 2022-10-08 RX ADMIN — CILOSTAZOL 100 MG: 100 TABLET ORAL at 11:10

## 2022-10-08 RX ADMIN — ATORVASTATIN CALCIUM 80 MG: 40 TABLET, FILM COATED ORAL at 11:10

## 2022-10-08 RX ADMIN — MORPHINE SULFATE 4 MG: 2 INJECTION, SOLUTION INTRAMUSCULAR; INTRAVENOUS at 01:10

## 2022-10-08 RX ADMIN — VANCOMYCIN HYDROCHLORIDE 750 MG: 750 INJECTION, POWDER, LYOPHILIZED, FOR SOLUTION INTRAVENOUS at 10:10

## 2022-10-08 RX ADMIN — VANCOMYCIN HYDROCHLORIDE 750 MG: 750 INJECTION, POWDER, LYOPHILIZED, FOR SOLUTION INTRAVENOUS at 11:10

## 2022-10-08 RX ADMIN — ASPIRIN 81 MG: 81 TABLET, CHEWABLE ORAL at 11:10

## 2022-10-08 RX ADMIN — SODIUM CHLORIDE, POTASSIUM CHLORIDE, SODIUM LACTATE AND CALCIUM CHLORIDE 1000 ML: 600; 310; 30; 20 INJECTION, SOLUTION INTRAVENOUS at 05:10

## 2022-10-08 RX ADMIN — SODIUM CHLORIDE, POTASSIUM CHLORIDE, SODIUM LACTATE AND CALCIUM CHLORIDE: 600; 310; 30; 20 INJECTION, SOLUTION INTRAVENOUS at 02:10

## 2022-10-08 RX ADMIN — ACETAMINOPHEN 650 MG: 325 TABLET ORAL at 04:10

## 2022-10-08 RX ADMIN — IBUPROFEN 400 MG: 400 TABLET, FILM COATED ORAL at 02:10

## 2022-10-08 RX ADMIN — EZETIMIBE 10 MG: 10 TABLET ORAL at 11:10

## 2022-10-08 RX ADMIN — MAGNESIUM SULFATE 2 G: 2 INJECTION INTRAVENOUS at 05:10

## 2022-10-08 NOTE — PLAN OF CARE
POC discussed. Life vest remains in place. SB to SR on telemetry. 1.5l fluid restriction with strict I/O in progress. Refused 10pm BG checks and refuses to have patient ID bracelet to wrist. Says 'I haven't had it on in 2 weeks, I don't want it on'      Problem: Adult Inpatient Plan of Care  Goal: Plan of Care Review  10/8/2022 0617 by Lesli Hughes RN  Outcome: Ongoing, Progressing  10/8/2022 0143 by Lesli Hughes RN  Outcome: Ongoing, Progressing  Goal: Patient-Specific Goal (Individualized)  10/8/2022 0617 by Lesli Hughes RN  Outcome: Ongoing, Progressing  10/8/2022 0143 by Lesli Hughes RN  Outcome: Ongoing, Progressing  Goal: Absence of Hospital-Acquired Illness or Injury  10/8/2022 0617 by Lesli Hughes RN  Outcome: Ongoing, Progressing  10/8/2022 0143 by Lesli Hughes RN  Outcome: Ongoing, Progressing  Goal: Optimal Comfort and Wellbeing  10/8/2022 0617 by Lesli Hughes RN  Outcome: Ongoing, Progressing  10/8/2022 0143 by Lesli Hughes RN  Outcome: Ongoing, Progressing  Goal: Readiness for Transition of Care  10/8/2022 0617 by Lesli Hughes RN  Outcome: Ongoing, Progressing  10/8/2022 0143 by Lesli Hughes RN  Outcome: Ongoing, Progressing     Problem: Impaired Wound Healing  Goal: Optimal Wound Healing  10/8/2022 0617 by Lesli Hughes RN  Outcome: Ongoing, Progressing  10/8/2022 0143 by Lesli Hughes RN  Outcome: Ongoing, Progressing     Problem: Fall Injury Risk  Goal: Absence of Fall and Fall-Related Injury  10/8/2022 0617 by Lesli Hughes RN  Outcome: Ongoing, Progressing  10/8/2022 0143 by Lesli Hughes RN  Outcome: Ongoing, Progressing     Problem: Skin Injury Risk Increased  Goal: Skin Health and Integrity  10/8/2022 0617 by Lesli Hughes RN  Outcome: Ongoing, Progressing  10/8/2022 0143 by Lesli Hughes RN  Outcome: Ongoing, Progressing     Problem: Pain Acute  Goal: Acceptable Pain Control and Functional Ability  10/8/2022 0617 by Lesli Hughes  RN  Outcome: Ongoing, Progressing  10/8/2022 0143 by Lesli Hughes RN  Outcome: Ongoing, Progressing     Problem: Infection  Goal: Absence of Infection Signs and Symptoms  10/8/2022 0617 by Lesli Hughes RN  Outcome: Ongoing, Progressing  10/8/2022 0143 by Lesli Hughes RN  Outcome: Ongoing, Progressing     Problem: Restraint, Nonbehavioral (Nonviolent)  Goal: Absence of Harm or Injury  10/8/2022 0617 by Lesli Hughes RN  Outcome: Ongoing, Progressing  10/8/2022 0143 by Lesli Hughes RN  Outcome: Ongoing, Progressing

## 2022-10-08 NOTE — OP NOTE
Surgery Date: 10/6/2022      Surgeon(s) and Role:     * LEI Ortiz III, MD - Primary     * Leland Murillo MD - Fellow     * Holden Bell MD - Fellow     Assisting Surgeon: None     Pre-op Diagnosis:  Severe PAD with gangrene     Post-op Diagnosis:   same     PROCEDURE:        L axillary to BILATERAL femoris profunda bypass with 8 mm PTFE        CODING NOTE: -22 Modifier appropriate given the increased complexity and length of this case      Anesthesia: General     Operative Findings:  Profunda exposure and anast difficult b/c of directly posterior position     Excellent augmentation of [place] profunda flow after bypass     Estimated Blood Loss: 100cc          Specimens:   Specimen (24h ago, onward)        None             Indications:  Patient is a 72-year-old male with disabling ischemic rest pain.  Evaluation to noted severe aortoiliac occlusive disease.  Patient had significant medical comorbidities.  Risks and benefits of the procedure were described in detail to the patient.  And he elected to proceed with the procedure.      Description of procedure:  Patient was placed on the OR table in the supine position.  Time-outs were performed using both pre incision and pre induction safety checklist to verify correct site, patient, procedure, and all other information necessary to the procedure.  Procedure was performed under general anesthesia.  Patient's left axillary region, chest, groins, and lower extremities were prepped and draped in the usual sterile fashion.  A 10-12 cm transverse incision was made parallel and inferior to the left clavicle and the incision was deepened through the subcutaneous fascia and fat.  The pectoralis major fascia was identified and incised.  The incision was deepened through the pectoralis major bluntly retracting its muscle fibers.  The underlying pectoralis fat was incised, and thus exposing the axillary artery and vein.  A short-segment of the axillary vein was  dissected and retracted inferiorly and its side branches were ligated with 2-0 silk suture.  This was done to provide better exposure to the axillary artery.  First portion of the axillary artery was identified and dissected free and this was medial to the pectoralis minor muscle.  This was encircled with a silastic loop for better control.  A 3-4 seconds cm segment of the artery was circumferentially dissected.  A vertical skin incision was then made over the right and left common femoral arteries.  The incisions were deepened through the subcutaneous tissues.  In countered lymphatics and crossing veins were clipped and ligated.  The common femoral, superficial femoral, and profunda femoris arteries were dissected and controlled with vessel loops.  The common femoral arteries were noted to be highly calcified and occluded.  And so we targeted using the profundus femoral arteries as our bypass target.  A a tunnel was then created from the infraclavicular incision on the left to the left groin along the axillary line.  This tunnel for the most part was subcutaneous except for the portion which was posterior to the pectoralis major muscle.  A subsequent subcutaneous tunnel was thus created between the 2 groins using a large heavy Kiesha clamp.  The patient was then administered 6000 units of IV heparin.  An 8 mm ringed PTFE bifurcated graft was then brought onto the field vascular clamps were then applied to the axillary artery and a 1 cm arteriotomy was made on the anterior surface of the axillary artery.  The PTFE graft was fashioned in the shape to match the arteriotomy of the axillary artery.  We began anastomosis in a heel-to-toe fashion with running 5 0 [place]-Ramon suture.  With the anastomosis complete the graft was flushed and forward bled.  The anastomosis was then thus checked for hemostasis and hemostasis was felt to be adequate.  Next we turned our attention to the left groin.  Clamps were placed on the  profunda and its branches.  And silastic vessels were tightened to control any backbleeding.  An arteriotomy was made on the anterior surface of the 1st branch of the profunda femoris.  An anastomosis was made between the PTFE graft and the profunda femoris artery using 5 0 [place]-Ramon suture.  Prior to completion of the anastomosis the arteries were back bled and forward bled and the anastomosis was completed.  Hemostasis was then checked around the anastomosis was and the groin was packed with Surgicel and dry 4 x 4 gauze.  We next turned our attention to the right groin.  And we began with a 1 cm arteriotomy made on the anterior surface the profundus femoris artery.  We fashioned our PTFE graft to match the arteriotomy the profundus femoris.  And we began our anastomosis using 5 0 [place]-Ramon suture in a running continuous fashion.  Prior to completing the anastomosis the arteries were forward and back bled as well as the graft.  Hemostasis was achieved using Surgicel and dry 4 x 4 gauze.  Good Doppler signals were heard in the profunda femoris artery as well the pedal signals.  All the wounds were then thoroughly irrigated using non heparinized saline.  Hemostasis was then achieved in all 3 wounds.  The axillary incision was closed 1st with 2-0 Vicryl for the prepectoral fascia.  And then a running 3-0 Vicryl stitch.  And the skin was closed with 4-0 Monocryl suture in running continuous fashion.  Sveta sterile dressing was then applied.  The groins were closed in multiple layers using interrupted 2-0 3-0 Vicryl stitch for the femoral sheath and subcutaneous tissue.  A running 3-0 Vicryl suture was used for the deep dermal layer and the skin was closed with running 4-0 Monocryl suture sterile dressings were then applied to the bilateral groins.  A deep briefing checklist was then performed to share critical information critical to the postoperative care of the patient.  All needle and sponge counts were correct at  the conclusion of case.  There were no complications.    Leland Murillo MD PGY7  Vascular Surgery Fellow  [phone]

## 2022-10-08 NOTE — CARE UPDATE
RAPID RESPONSE NURSE PROACTIVE ROUNDING NOTE       Time of Visit: 11:11    Admit Date: 10/2/2022  LOS: 6  Code Status: Full Code   Date of Visit: 10/08/2022  : 1950  Age: 72 y.o.  Sex: male  Race: Black or   Bed: 353/353 A:   MRN: [id number]  Was the patient discharged from an ICU this admission? No   Was the patient discharged from a PACU within last 24 hours? No   Did the patient receive conscious sedation/general anesthesia in last 24 hours? No  Was the patient in the ED within the past 24 hours? No  Was the patient on NIPPV within the past 24 hours? No   Attending Physician: LEI Ortiz III, MD  Primary Service: [facility]   Time spent at the bedside: < 15 min    SITUATION    Notified by charge RN during rounding.  Reason for alert: fever and tachycardia overnight  Called to evaluate the patient for Circulatory    BACKGROUND     Why is the patient in the hospital?: Critical limb ischemia of right lower extremity with ulceration of lower leg    Patient has a past medical history of Hypertension and Peripheral arterial disease.    Last Vitals:  Temp: 99.6 °F (37.6 °C) (10/08 1529)  Pulse: 129 (10/08 1529)  Resp: 18 (10/08 1529)  BP: 129/59 (10/08 1529)  SpO2: 96 % (10/08 1529)    24 Hours Vitals Range:  Temp:  [97.4 °F (36.3 °C)-101.8 °F (38.8 °C)]   Pulse:  [109-133]   Resp:  [15-18]   BP: (100-153)/(52-67)   SpO2:  [85 %-97 %]     Labs:  Recent Labs     10/07/22  0231 10/08/22  0044 10/08/22  0228   WBC 12.95* 13.79* 15.67*   HGB 10.7* 10.0* 9.3*   HCT 35.9* 31.4* 30.3*   * 455* 473*       Recent Labs     10/07/22  0231 10/08/22  0044 10/08/22  0228    139 138   K 3.8 3.5 3.4*    110 107   CO2 22* 21* 23   CREATININE 0.6 0.7 0.7   * 125* 108   MG  --  1.7  --         Recent Labs     10/06/22  1039 10/06/22  1245   PH 7.388 7.384   PCO2 36.5 38.7   PO2 214* 156*   HCO3 22.0* 23.1*   POCSATURATED 100 99   BE -3 -2        ASSESSMENT    Physical Exam  Vitals  and nursing note reviewed.   Constitutional:       Appearance: He is ill-appearing.   Cardiovascular:      Rate and Rhythm: Tachycardia present.   Pulmonary:      Effort: Pulmonary effort is normal.   Neurological:      Mental Status: He is confused.      GCS: GCS eye subscore is 3. GCS verbal subscore is 4. GCS motor subscore is 6.       INTERVENTIONS    The patient was seen for Cardiac problem. Staff concerns included tachycardia. The following interventions were performed: continuous cardiac monitoring continued.    RECOMMENDATIONS    Continue monitoring per unit protocol     PROVIDER ESCALATION    Yes/No  no    Orders received and case discussed with NA.    Disposition: Remain in room 353.    FOLLOW-UP    Charge RNDipti  updated on plan of care. Instructed to call the Rapid Response Nurse, Saumya Vides RN at [phone] for additional questions or concerns.

## 2022-10-08 NOTE — PROGRESS NOTES
Pharmacokinetic Initial Assessment: IV Vancomycin    Assessment/Plan:    Initiate intravenous vancomycin with loading dose of 1000 mg once followed by a maintenance dose of vancomycin 750mg IV every 12 hours  Desired empiric serum trough concentration is 15 to 20 mcg/mL  Draw vancomycin trough level 60 min prior to fourth dose on 10/09 at approximately 0900  Pharmacy will continue to follow and monitor vancomycin.      Please contact pharmacy at extension [phone] with any questions regarding this assessment.     Thank you for the consult,   Mark Anthony Luna       Patient brief summary:  Babatunde Singh is a 72 y.o. male initiated on antimicrobial therapy with IV Vancomycin for treatment of suspected bacteremia    Drug Allergies:   Review of patient's allergies indicates:  No Known Allergies    Actual Body Weight:   54 kg    Renal Function:   Estimated Creatinine Clearance: 86.3 mL/min (based on SCr of 0.6 mg/dL).,     Dialysis Method (if applicable):  N/A    CBC (last 72 hours):  Recent Labs   Lab Result Units 10/05/22  0448 10/06/22  0548 10/06/22  1526 10/07/22  0231   WBC K/uL 10.24 11.15 16.36*  16.36* 12.95*   Hemoglobin g/dL 7.5* 7.5* 10.0*  10.0* 10.7*   Hematocrit % 24.0* 24.2* 31.9*  31.9* 35.9*   Platelets K/uL 390 645* 500*  500* 532*   Gran % % 55.4 54.9 79.6*  79.6* 67.2   Lymph % % 34.9 35.0 16.7*  16.7* 23.8   Mono % % 8.0 8.1 2.9*  2.9* 8.4   Eosinophil % % 0.9 1.2 0.1  0.1 0.0   Basophil % % 0.4 0.4 0.2  0.2 0.2   Differential Method  Automated Automated Automated  Automated Automated       Metabolic Panel (last 72 hours):  Recent Labs   Lab Result Units 10/05/22  0448 10/06/22  0548 10/06/22  1526 10/07/22  0231 10/07/22  1430   Sodium mmol/L 139 138 136  136 143  --    Potassium mmol/L 3.3* 3.7 3.8  3.8 3.8  --    Chloride mmol/L 107 108 108  108 110  --    CO2 mmol/L 24 21* 18*  18* 22*  --    Glucose mg/dL 133* 96 146*  146* 116*  --    Glucose, UA   --   --   --   --  Negative   BUN  mg/dL 8 6* 7*  7* 10  --    Creatinine mg/dL 0.7 0.7 0.7  0.7 0.6  --    Albumin g/dL 2.6* 2.7*  --  2.6*  --    Total Bilirubin mg/dL 0.2 0.2  --  0.5  --    Alkaline Phosphatase U/L 63 66  --  69  --    AST U/L 18 24  --  27  --    ALT U/L 12 13  --  16  --        Drug levels (last 3 results):  No results for input(s): VANCOMYCINRA, VANCORANDOM, VANCOMYCINPE, VANCOPEAK, VANCOMYCINTR, VANCOTROUGH in the last 72 hours.    Microbiologic Results:  Microbiology Results (last 7 days)       Procedure Component Value Units Date/Time    Blood culture [[id number]]     Order Status: Sent Specimen: Blood     Blood culture [[id number]]     Order Status: Sent Specimen: Blood

## 2022-10-08 NOTE — ASSESSMENT & PLAN NOTE
CTA runoff was reviewed.  Pt is s/p left axillary to BILATERAL femoris profunda bypass with 8 mm PTFE on 10/6 by vascular surgery. Appreciate vascular input.  Continue aspirin, cilostazol and Zetia/statin.

## 2022-10-08 NOTE — SUBJECTIVE & OBJECTIVE
Interval History:  febrile to 101.8 and tachycardic overnight in the 120-130 range, tachycardia responded to 3 L bolus and initiation of vancomycin and Zosyn urinalysis appears clean, chest x-ray without evidence of pneumonia, blood cultures gather.  Right lower extremity with appearance of erythema which is new. Wbc 15.7 from 13.8.  Patient denies pain, fevers, chills, sweats, angina, or dyspnea.    Medications:  Continuous Infusions:  Scheduled Meds:   aspirin  81 mg Oral Daily    atorvastatin  80 mg Oral Daily    cilostazoL  100 mg Oral BID    ezetimibe  10 mg Oral Daily    gabapentin  900 mg Oral TID    melatonin  6 mg Oral Nightly    mupirocin   Nasal BID    nicotine  1 patch Transdermal Daily    piperacillin-tazobactam (ZOSYN) IVPB  4.5 g Intravenous Q8H    rivaroxaban  20 mg Oral Daily with dinner    traZODone  25 mg Oral QHS    vancomycin (VANCOCIN) IVPB  750 mg Intravenous Q12H     PRN Meds:acetaminophen, haloperidol lactate, HYDROcodone-acetaminophen, ibuprofen, morphine, sodium chloride 0.9%, traMADoL, Pharmacy to dose Vancomycin consult **AND** vancomycin - pharmacy to dose     Objective:     Vital Signs (Most Recent):  Temp: 97.4 °F (36.3 °C) (10/08/22 1200)  Pulse: 109 (10/08/22 1200)  Resp: 18 (10/08/22 1200)  BP: (!) 153/67 (10/08/22 1200)  SpO2: 97 % (10/08/22 1200)   Vital Signs (24h Range):  Temp:  [97.4 °F (36.3 °C)-101.8 °F (38.8 °C)] 97.4 °F (36.3 °C)  Pulse:  [109-133] 109  Resp:  [15-18] 18  SpO2:  [85 %-97 %] 97 %  BP: (100-153)/(52-67) 153/67         Physical Exam  Physical Exam  Vitals and nursing note reviewed.   Constitutional:       Appearance: Normal appearance.   Cardiovascular:      Comments: R monophasic PT, L biphasic PT  No DP signals   Warm, well-perfused BLE   Pulmonary:      Effort: Pulmonary effort is normal.   Abdominal:      General: Abdomen is flat.   Musculoskeletal:         General: Tenderness present.   Skin:     Comments: Right calf dry gangrene lesions    Neurological:      Mental Status: Intact  Significant Labs:  Recent Lab Results         10/08/22  0228   10/08/22  0044   10/07/22  2345        Albumin 2.1           Alkaline Phosphatase 59           ALT 14           Anion Gap 8   8         AST 26           Baso # 0.04   0.04         Basophil % 0.3   0.3         BILIRUBIN TOTAL 0.6  Comment: For infants and newborns, interpretation of results should be based  on gestational age, weight and in agreement with clinical  observations.    Premature Infant recommended reference ranges:  Up to 24 hours.............<8.0 mg/dL  Up to 48 hours............<12.0 mg/dL  3-5 days..................<15.0 mg/dL  6-29 days.................<15.0 mg/dL             Blood Culture, Routine     No Growth to date  [P]            No Growth to date  [P]       BUN 11   12         Calcium 8.3   8.3         Chloride 107   110         CO2 23   21         Creatinine 0.7   0.7         Differential Method Automated   Automated         eGFR >60.0   >60.0         Eos # 0.0   0.0         Eosinophil % 0.1   0.1         Glucose 108   125         Gran # (ANC) 11.5   10.2         Gran % 73.1   74.2         Hematocrit 30.3   31.4         Hemoglobin 9.3   10.0         Immature Grans (Abs) 0.06  Comment: Mild elevation in immature granulocytes is non specific and   can be seen in a variety of conditions including stress response,   acute inflammation, trauma and pregnancy. Correlation with other   laboratory and clinical findings is essential.     0.08  Comment: Mild elevation in immature granulocytes is non specific and   can be seen in a variety of conditions including stress response,   acute inflammation, trauma and pregnancy. Correlation with other   laboratory and clinical findings is essential.           Immature Granulocytes 0.4   0.6         Lactate, Lauri   1.6  Comment: Falsely low lactic acid results can be found in samples   containing >=13.0 mg/dL total bilirubin and/or >=3.5 mg/dL   direct  bilirubin.           Lymph # 2.8   2.3         Lymph % 17.7   16.6         Magnesium   1.7         MCH 26.5   27.4         MCHC 30.7   31.8         MCV 86   86         Mono # 1.3   1.1         Mono % 8.4   8.2         MPV 8.6   8.6         nRBC 0   0         Platelets 473   455         Potassium 3.4   3.5         PROTEIN TOTAL 4.9           RBC 3.51   3.65         RDW 18.2   18.0         Sodium 138   139         WBC 15.67   13.79                  [P] - Preliminary Result               Significant Diagnostics:  CXR: X-Ray Chest AP Portable    Result Date: 10/8/2022  Interval development of mild bilateral edema. Electronically signed by: Víctor Heard MD Date:    10/08/2022 Time:    04:15

## 2022-10-08 NOTE — PLAN OF CARE
Patient remains confused and mostly agitated. A0 x1 (self). Patient with a temp to a high of 38.8. HR 130s- 140s. MD made aware. Fluid bolus x2, labs, blood culture, EKG X2. Tylenol, motrin and icepacks for fever. MD came to bedside for chest xray. MD updated approx q2 hours.  0230- Update Dr Chwala on sustained HR >130 and patient remaining febrile despite antipyretic measures. Additional dose of motrin ordered and continuous fluids, same given. Rapid response RN Alyssa, Charge RN notified of patient's current status. Patient remains confused having conversations with himself and also appears to have tactile hallucinations   0530- 0630. Another L bolus ordered and given. MG AND K+ replacement in progress. HR now 114  0600- pt finally settled and appears asleep        Problem: Adult Inpatient Plan of Care  Goal: Plan of Care Review  Outcome: Ongoing, Progressing  Goal: Patient-Specific Goal (Individualized)  Outcome: Ongoing, Progressing  Goal: Absence of Hospital-Acquired Illness or Injury  Outcome: Ongoing, Progressing  Goal: Optimal Comfort and Wellbeing  Outcome: Ongoing, Progressing  Goal: Readiness for Transition of Care  Outcome: Ongoing, Progressing     Problem: Impaired Wound Healing  Goal: Optimal Wound Healing  Outcome: Ongoing, Progressing     Problem: Fall Injury Risk  Goal: Absence of Fall and Fall-Related Injury  Outcome: Ongoing, Progressing     Problem: Skin Injury Risk Increased  Goal: Skin Health and Integrity  Outcome: Ongoing, Progressing     Problem: Pain Acute  Goal: Acceptable Pain Control and Functional Ability  Outcome: Ongoing, Progressing     Problem: Infection  Goal: Absence of Infection Signs and Symptoms  Outcome: Ongoing, Progressing     Problem: Restraint, Nonbehavioral (Nonviolent)  Goal: Absence of Harm or Injury  Outcome: Ongoing, Progressing

## 2022-10-08 NOTE — ASSESSMENT & PLAN NOTE
Suspect multifactorial related to infection, plus the anesthesia pt received during his surgical procedure.  Improved while being on abx, and also now being post op day #2.  Continue to monitor.

## 2022-10-08 NOTE — HOSPITAL COURSE
Babatunde Singh underwent the morning of 10/6/2022 a axillo bifemoral bypass. The procedure was performed without complication and  was transferred to the floor for further post op care and monitoring. Their postoperative course was remarkable for some disorientation in the first 2 days and tachycardia with febrile episodes. Work up for infection was negative and patient has not had another febrile episode since. Patient's pain was controlled with a combination of IV and PO narcotic pain medications. Patient's diet has been advanced throughout  hospital stay. Is now tolerating a regular diet, pain is well controlled with PO pain medication, and is voiding appropriately.  Patient requires PT/OT and will continue on PO antibiotics. Will require Duricef for 30days. Will be transferred to SNF for continued care and follow up appointment has been set.

## 2022-10-08 NOTE — ASSESSMENT & PLAN NOTE
Mr Hardy is a 72 year old male with a pmh of smoking, severe PAD, dvt who presented with chronic limb ischemia and rest pain now s/p successful axio-fem-fem bypass on 10/6.  - Vanc/Zosyn   - Home Xarelto   - melatonin QHS and trazodone QHS PRN  - delirium precautions  - PT/OT   - PMNR consult for rehab eval   -continue asa/statin cilostazol   - Monitor fever curve and Follow up blood culture

## 2022-10-08 NOTE — SUBJECTIVE & OBJECTIVE
Interval History:  Pt was seen twice today. First time at around 9 am. He was sleepy, drowsy when awaken and incoherent.   Second time was at 3:30 pm. He was AAO*3, pleasant stated that his RLE pain has improved since procedure. 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       Review of Systems   Reason unable to perform ROS: Pt is confused, has incoherent speech.   Constitutional:  Negative for chills and fever.   HENT:  Negative for sore throat.    Respiratory:  Negative for cough and shortness of breath.    Cardiovascular:  Negative for chest pain and palpitations.   Gastrointestinal:  Negative for abdominal pain, nausea and vomiting.   Endocrine: Negative for cold intolerance and heat intolerance.   Genitourinary:  Negative for frequency and urgency.   Musculoskeletal:         RLE pain has improved since his procedure on 10/6.   Skin:         Right LE ischemic ulcers.        Objective:     Vital Signs (Most Recent):  Temp: 99.6 °F (37.6 °C) (10/08/22 1529)  Pulse: (!) 129 (10/08/22 1529)  Resp: 18 (10/08/22 1529)  BP: (!) 129/59 (10/08/22 1529)  SpO2: 96 % (10/08/22 1529)   Vital Signs (24h Range):  Temp:  [97.4 °F (36.3 °C)-101.8 °F (38.8 °C)] 99.6 °F (37.6 °C)  Pulse:  [109-133] 129  Resp:  [15-18] 18  SpO2:  [85 %-97 %] 96 %  BP: (100-153)/(52-67) 129/59     Weight: 54 kg (119 lb)  Body mass index is 18.64 kg/m².    Intake/Output Summary (Last 24 hours) at 10/8/2022 1616  Last data filed at 10/8/2022 0224  Gross per 24 hour   Intake 7424.47 ml   Output 910 ml   Net 6514.47 ml        Physical Exam  Constitutional:       Comments: Confused, incoherent speech.   HENT:      Head: Normocephalic and atraumatic.   Eyes:      Conjunctiva/sclera: Conjunctivae normal.      Pupils: Pupils are equal, round, and reactive to light.   Cardiovascular:      Rate and Rhythm: Regular rhythm. Tachycardia present.   Pulmonary:      Effort: Pulmonary effort is normal. No respiratory distress.      Breath sounds: Normal  breath sounds. No wheezing.   Abdominal:      General: Abdomen is flat. Bowel sounds are normal. There is no distension.      Palpations: Abdomen is soft.      Tenderness: There is no abdominal tenderness.   Musculoskeletal:      Comments: RLE exam; Multiple ischemic ulcers lateral aspect of mid chin extending to above the right ankle with mild erythema around the area. No purulent drainage. Also large blister filled with liquid inferior aspect of anterior tibia chin. RLE erythema and tenderness to palpation has much improved compared to 10/6 on my initial assessment prior to his vascular surgical intervention.    Neurological:      Mental Status: He is oriented to person, place, and time. Mental status is at baseline.       Significant Labs: All pertinent labs within the past 24 hours have been reviewed.  BMP:   Recent Labs   Lab 10/08/22  0044 10/08/22  0228   * 108    138   K 3.5 3.4*    107   CO2 21* 23   BUN 12 11   CREATININE 0.7 0.7   CALCIUM 8.3* 8.3*   MG 1.7  --        CBC:   Recent Labs   Lab 10/07/22  0231 10/08/22  0044 10/08/22  0228   WBC 12.95* 13.79* 15.67*   HGB 10.7* 10.0* 9.3*   HCT 35.9* 31.4* 30.3*   * 455* 473*         Significant Imaging: I have reviewed all pertinent imaging results/findings within the past 24 hours.

## 2022-10-08 NOTE — ASSESSMENT & PLAN NOTE
Duplex imaging of the right lower extremity veins on 10/5 demonstrates occlusive sub-acute thrombus of the anterior tibial vein (the anterior paired vessel).   Pt was initially on heparin ggt and now on xarelto 20 mg qhs.

## 2022-10-08 NOTE — ASSESSMENT & PLAN NOTE
Due to ischemia.   S/p intervention on 10/6 by vascular surgery.   Pain has improved since his procedure.

## 2022-10-08 NOTE — ASSESSMENT & PLAN NOTE
My overall impression is sepsis possibly 2/2 infected RLE ulcers. Vital signs were reviewed and noted in progress note.  Antibiotics given-   Antibiotics (From admission, onward)    Start     Stop Route Frequency Ordered    10/08/22 1000  vancomycin 750 mg in dextrose 5 % 250 mL IVPB (ready to mix system)         -- IV Every 12 hours (non-standard times) 10/07/22 2115    10/07/22 2145  piperacillin-tazobactam 4.5 g in sodium chloride 0.9% 100 mL IVPB (ready to mix system)         -- IV Every 8 hours (non-standard times) 10/07/22 2041    10/07/22 2140  vancomycin - pharmacy to dose  (vancomycin IVPB)        [facility] for [facility].    -- IV pharmacy to manage frequency 10/07/22 2041    10/06/22 2100  mupirocin 2 % ointment         10/11 2059 Nasl 2 times daily 10/06/22 1517        Cultures were taken-   Microbiology Results (last 7 days)     Procedure Component Value Units Date/Time    Blood culture [[id number]] Collected: 10/07/22 2345    Order Status: Completed Specimen: Blood Updated: 10/08/22 0915     Blood Culture, Routine No Growth to date    Blood culture [[id number]] Collected: 10/07/22 2345    Order Status: Completed Specimen: Blood Updated: 10/08/22 0915     Blood Culture, Routine No Growth to date        Latest lactate reviewed, they are-  Recent Labs   Lab 10/08/22  0044   LACTATE 1.6       Organ dysfunction indicated by Encephalopathy   Source- Possible infected leg wounds    Source control Achieved by- starting abx.     CXR rule out pneumonia  Surgical site left chest wall was clean.  UA is negative.

## 2022-10-08 NOTE — PROGRESS NOTES
[facility] - Cardiology [facility] Medicine  Progress Note    Patient Name: Babatunde Singh  MRN: [id number]  Patient Class: IP- Inpatient   Admission Date: 10/2/2022  Length of Stay: 6 days  Attending Physician: LEI Ortiz III, MD  Primary Care Provider: Esdras Ly MD        Subjective:     Principal Problem:Critical limb ischemia of right lower extremity with ulceration of lower leg        HPI:  Babatunde Singh is a 73 yo M withPAD, HTN, HLD, recent diagnosis of DVT (9/8) on xarelto who presents due to RLE pain. Of note, patient has frequent admission for lower extremity pain. He states that on the day of his presentation, he has had increasing 10/10 aching pain which woke him up from sleep every few hours. This has been progressing over the last 2-3 days prior to admission. He states at baseline he has pain every other night. In the ER, RLE distal pulses were absent on presentation, unable to palpate or obtain Doppler signal.               Overview/Hospital Course:  BRENNAN was performed which showed Right Leg with PVR waveforms suggest ischemic peripheral arterial occlusive disease. No audible Doppler signal detected to obtain a reliable BRENNAN. TBI of 0 with a Great toe pressure of 0 mmHg is noted. Left Leg PVR waveforms also suggested ischemic peripheral arterial occlusive disease. No audible Doppler signal detected to obtain a   reliable BRENNAN.     CTA runoff abdomen bilateral LE showed Possible worsening stenosis or interval occlusion of the right dorsalis pedis artery.  Overall evaluation of lower extremity arteries is otherwise grossly unchanged noting regions of persistent occlusion with two vessel runoff to the bilateral lower extremities. Xarelto was held and pt was started on heparin ggt. IR was consulted, but endoscopically is no option for vascularization. Vascular surgery was consulted, and they scheduled the pt for intervention. Cardiology was consulted for cardiac clearance, and pt has been  cleared with risk as per cardiology. Pt underwent L axillary to BILATERAL femoris profunda bypass with 8 mm PTFE on 10/6 by vascular.  He developed post operative delirium.     Regarding the right renal mass seen on the CT abdomen/pelvis, urology recommended outpt intervention.    On 10/7, pt became septic. CXR, UA and blood cx were ordered and he was started on empiric abx with zosyn and vanco. He was given 3 L bolus of lactate ringer and started on maintenance fluid. UA was negative. CXR ruled out pneumonia. Lactic acid is wnl. Blood cx is negative to date.     On 10/8; vascular surgery requested transfer of pt's care to their service for post operative management. Medicine will follow as a consultant.              Interval History:  Pt was seen twice today. First time at around 9 am. He was sleepy, drowsy when awaken and incoherent.   Second time was at 3:30 pm. He was AAO*3, pleasant stated that his RLE pain has improved since procedure. 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       Review of Systems   Constitutional:  Negative for chills and fever.   HENT:  Negative for sore throat.    Respiratory:  Negative for cough and shortness of breath.    Cardiovascular:  Negative for chest pain and palpitations.   Gastrointestinal:  Negative for abdominal pain, nausea and vomiting.   Endocrine: Negative for cold intolerance and heat intolerance.   Genitourinary:  Negative for frequency and urgency.   Musculoskeletal:         RLE pain has improved since his procedure on 10/6.   Skin:         Right LE ischemic ulcers.        Objective:     Vital Signs (Most Recent):  Temp: 99.6 °F (37.6 °C) (10/08/22 1529)  Pulse: (!) 129 (10/08/22 1529)  Resp: 18 (10/08/22 1529)  BP: (!) 129/59 (10/08/22 1529)  SpO2: 96 % (10/08/22 1529)   Vital Signs (24h Range):  Temp:  [97.4 °F (36.3 °C)-101.8 °F (38.8 °C)] 99.6 °F (37.6 °C)  Pulse:  [109-133] 129  Resp:  [15-18] 18  SpO2:  [85 %-97 %] 96 %  BP: (100-153)/(52-67) 129/59      Weight: 54 kg (119 lb)  Body mass index is 18.64 kg/m².    Intake/Output Summary (Last 24 hours) at 10/8/2022 1616  Last data filed at 10/8/2022 0224  Gross per 24 hour   Intake 7424.47 ml   Output 910 ml   Net 6514.47 ml        Physical Exam  Constitutional:       Comments: Confused, incoherent speech.   HENT:      Head: Normocephalic and atraumatic.   Eyes:      Conjunctiva/sclera: Conjunctivae normal.      Pupils: Pupils are equal, round, and reactive to light.   Cardiovascular:      Rate and Rhythm: Regular rhythm. Tachycardia present.   Pulmonary:      Effort: Pulmonary effort is normal. No respiratory distress.      Breath sounds: Normal breath sounds. No wheezing.   Abdominal:      General: Abdomen is flat. Bowel sounds are normal. There is no distension.      Palpations: Abdomen is soft.      Tenderness: There is no abdominal tenderness.   Musculoskeletal:      Comments: RLE exam; Multiple ischemic ulcers lateral aspect of mid chin extending to above the right ankle with mild erythema around the area. No purulent drainage. Also large blister filled with liquid inferior aspect of anterior tibia chin. RLE erythema and tenderness to palpation has improved compared to 10/6 on my initial assessment prior to his vascular surgical intervention.    Neurological:      Mental Status: He is oriented to person, place, and time. Mental status is at baseline.       Significant Labs: All pertinent labs within the past 24 hours have been reviewed.  BMP:   Recent Labs   Lab 10/08/22  0044 10/08/22  0228   * 108    138   K 3.5 3.4*    107   CO2 21* 23   BUN 12 11   CREATININE 0.7 0.7   CALCIUM 8.3* 8.3*   MG 1.7  --        CBC:   Recent Labs   Lab 10/07/22  0231 10/08/22  0044 10/08/22  0228   WBC 12.95* 13.79* 15.67*   HGB 10.7* 10.0* 9.3*   HCT 35.9* 31.4* 30.3*   * 455* 473*         Significant Imaging: I have reviewed all pertinent imaging results/findings within the past 24  hours.      Assessment/Plan:      * Critical limb ischemia of right lower extremity with ulceration of lower leg  CTA runoff was reviewed.  Pt is s/p left axillary to BILATERAL femoris profunda bypass with 8 mm PTFE on 10/6 by vascular surgery. Appreciate vascular input.  Continue aspirin, cilostazol and Zetia/statin.      Atherosclerosis of native artery of extremity  Pt is s/p left axillary to BILATERAL femoris profunda bypass with 8 mm PTFE on 10/6 by vascular surgery. Appreciate vascular input.  Continue statin, zetia, aspirin, and cilostazol.    Right leg pain  Due to ischemia.   S/p intervention on 10/6 by vascular surgery.   Pain has improved since his procedure.       Severe sepsis  My overall impression is sepsis possibly 2/2 infected RLE ulcers. Vital signs were reviewed and noted in progress note.  Antibiotics given-   Antibiotics (From admission, onward)    Start     Stop Route Frequency Ordered    10/08/22 1000  vancomycin 750 mg in dextrose 5 % 250 mL IVPB (ready to mix system)         -- IV Every 12 hours (non-standard times) 10/07/22 2115    10/07/22 2145  piperacillin-tazobactam 4.5 g in sodium chloride 0.9% 100 mL IVPB (ready to mix system)         -- IV Every 8 hours (non-standard times) 10/07/22 2041    10/07/22 2140  vancomycin - pharmacy to dose  (vancomycin IVPB)        See Hyperspace for full Linked Orders Report.    -- IV pharmacy to manage frequency 10/07/22 2041    10/06/22 2100  mupirocin 2 % ointment         10/11 2059 Nasl 2 times daily 10/06/22 1517        Cultures were taken-   Microbiology Results (last 7 days)     Procedure Component Value Units Date/Time    Blood culture [[id number]] Collected: 10/07/22 2345    Order Status: Completed Specimen: Blood Updated: 10/08/22 0915     Blood Culture, Routine No Growth to date    Blood culture [[id number]] Collected: 10/07/22 2345    Order Status: Completed Specimen: Blood Updated: 10/08/22 0915     Blood Culture, Routine No Growth to date         Latest lactate reviewed, they are-  Recent Labs   Lab 10/08/22  0044   LACTATE 1.6       Organ dysfunction indicated by Encephalopathy   Source- Possible infected leg wounds    Source control Achieved by- starting abx.     CXR rule out pneumonia  Surgical site left chest wall was clean.  UA is negative.       Tachycardic  If pt remained tachycardic despite abx, then would recommend CT thorax to rule out PE in the setting of his sub-acute thrombus of the anterior tibial vein   Pt is already on xarelo.  Primary team was notified of this recommendation.     Encephalopathy, metabolic  Suspect multifactorial related to infection, plus the anesthesia pt received during his surgical procedure.  Improved while being on abx, and also now being post op day #2.  Continue to monitor.       DVT (deep venous thrombosis)  Duplex imaging of the right lower extremity veins on 10/5 demonstrates occlusive sub-acute thrombus of the anterior tibial vein (the anterior paired vessel).   Pt was initially on heparin ggt and now on xarelto 20 mg qhs.         Absent pedal pulses  Absent pedial pulse of the right foot.   Pt is s/p left axillary to BILATERAL femoris profunda bypass with 8 mm PTFE on 10/6 by vascular surgery.   Continue statin, aspirin, and cilostazol.      Right renal mass  Urology is planing doing out patient intervention.    Tobacco abuse  Has been consulted over 10 minutes regarding need to quit smoking, and emphazised its negative consequences on his leg ischemia.      Primary hypertension  I have discontinued losartan/HCTZ, given his sepsis, confusion and dec oral intake.           VTE Risk Mitigation (From admission, onward)         Ordered     rivaroxaban tablet 20 mg  With dinner         10/07/22 0818     IP VTE HIGH RISK PATIENT  Once         10/06/22 1516     Place sequential compression device  Until discontinued         10/06/22 1516     Place sequential compression device  Until discontinued         10/02/22  0814                Discharge Planning   LAZ: 10/10/2022     Code Status: Full Code   Is the patient medically ready for discharge?: No    Reason for patient still in hospital (select all that apply): Patient trending condition  Discharge Plan A: Skilled Nursing Facility        I have spent more than 35 minutes taking care of Mr. Singh today with more than 50% of that time was spent coordinating care and reviewing records.           Alannah Hicks MD  Department of Hospital Medicine   [facility] - Cardiology Stepdown

## 2022-10-09 LAB
ALBUMIN SERPL BCP-MCNC: 1.9 G/DL (ref 3.5–5.2)
ALP SERPL-CCNC: 73 U/L (ref 55–135)
ALT SERPL W/O P-5'-P-CCNC: 21 U/L (ref 10–44)
ANION GAP SERPL CALC-SCNC: 10 MMOL/L (ref 8–16)
AST SERPL-CCNC: 38 U/L (ref 10–40)
BASOPHILS # BLD AUTO: 0.03 K/UL (ref 0–0.2)
BASOPHILS NFR BLD: 0.2 % (ref 0–1.9)
BILIRUB SERPL-MCNC: 0.6 MG/DL (ref 0.1–1)
BNP SERPL-MCNC: 59 PG/ML (ref 0–99)
BUN SERPL-MCNC: 12 MG/DL (ref 8–23)
CALCIUM SERPL-MCNC: 8 MG/DL (ref 8.7–10.5)
CHLORIDE SERPL-SCNC: 106 MMOL/L (ref 95–110)
CO2 SERPL-SCNC: 20 MMOL/L (ref 23–29)
CREAT SERPL-MCNC: 0.7 MG/DL (ref 0.5–1.4)
DIFFERENTIAL METHOD: ABNORMAL
EOSINOPHIL # BLD AUTO: 0.1 K/UL (ref 0–0.5)
EOSINOPHIL NFR BLD: 0.4 % (ref 0–8)
ERYTHROCYTE [DISTWIDTH] IN BLOOD BY AUTOMATED COUNT: 18.2 % (ref 11.5–14.5)
EST. GFR  (NO RACE VARIABLE): >60 ML/MIN/1.73 M^2
GLUCOSE SERPL-MCNC: 113 MG/DL (ref 70–110)
HCT VFR BLD AUTO: 27.3 % (ref 40–54)
HGB BLD-MCNC: 8.8 G/DL (ref 14–18)
IMM GRANULOCYTES # BLD AUTO: 0.17 K/UL (ref 0–0.04)
IMM GRANULOCYTES NFR BLD AUTO: 1 % (ref 0–0.5)
INFLUENZA A, MOLECULAR: NEGATIVE
INFLUENZA B, MOLECULAR: NEGATIVE
LACTATE SERPL-SCNC: 1.6 MMOL/L (ref 0.5–2.2)
LYMPHOCYTES # BLD AUTO: 2.8 K/UL (ref 1–4.8)
LYMPHOCYTES NFR BLD: 15.9 % (ref 18–48)
MAGNESIUM SERPL-MCNC: 2 MG/DL (ref 1.6–2.6)
MCH RBC QN AUTO: 26.9 PG (ref 27–31)
MCHC RBC AUTO-ENTMCNC: 32.2 G/DL (ref 32–36)
MCV RBC AUTO: 84 FL (ref 82–98)
MONOCYTES # BLD AUTO: 1.6 K/UL (ref 0.3–1)
MONOCYTES NFR BLD: 9.2 % (ref 4–15)
NEUTROPHILS # BLD AUTO: 12.9 K/UL (ref 1.8–7.7)
NEUTROPHILS NFR BLD: 73.3 % (ref 38–73)
NRBC BLD-RTO: 0 /100 WBC
PLATELET # BLD AUTO: 391 K/UL (ref 150–450)
PMV BLD AUTO: 8.8 FL (ref 9.2–12.9)
POCT GLUCOSE: 102 MG/DL (ref 70–110)
POCT GLUCOSE: 120 MG/DL (ref 70–110)
POCT GLUCOSE: 125 MG/DL (ref 70–110)
POTASSIUM SERPL-SCNC: 3.6 MMOL/L (ref 3.5–5.1)
PROCALCITONIN SERPL IA-MCNC: 0.53 NG/ML
PROT SERPL-MCNC: 5 G/DL (ref 6–8.4)
RBC # BLD AUTO: 3.27 M/UL (ref 4.6–6.2)
SARS-COV-2 RNA RESP QL NAA+PROBE: NOT DETECTED
SODIUM SERPL-SCNC: 136 MMOL/L (ref 136–145)
SPECIMEN SOURCE: NORMAL
VANCOMYCIN TROUGH SERPL-MCNC: 7 UG/ML (ref 10–22)
WBC # BLD AUTO: 17.59 K/UL (ref 3.9–12.7)

## 2022-10-09 PROCEDURE — 63600175 PHARM REV CODE 636 W HCPCS: Performed by: STUDENT IN AN ORGANIZED HEALTH CARE EDUCATION/TRAINING PROGRAM

## 2022-10-09 PROCEDURE — S4991 NICOTINE PATCH NONLEGEND: HCPCS | Performed by: STUDENT IN AN ORGANIZED HEALTH CARE EDUCATION/TRAINING PROGRAM

## 2022-10-09 PROCEDURE — 94761 N-INVAS EAR/PLS OXIMETRY MLT: CPT

## 2022-10-09 PROCEDURE — 63600175 PHARM REV CODE 636 W HCPCS: Performed by: SURGERY

## 2022-10-09 PROCEDURE — 25000003 PHARM REV CODE 250

## 2022-10-09 PROCEDURE — 94640 AIRWAY INHALATION TREATMENT: CPT

## 2022-10-09 PROCEDURE — 83605 ASSAY OF LACTIC ACID: CPT | Performed by: SURGERY

## 2022-10-09 PROCEDURE — 80053 COMPREHEN METABOLIC PANEL: CPT | Performed by: STUDENT IN AN ORGANIZED HEALTH CARE EDUCATION/TRAINING PROGRAM

## 2022-10-09 PROCEDURE — 36415 COLL VENOUS BLD VENIPUNCTURE: CPT | Performed by: HOSPITALIST

## 2022-10-09 PROCEDURE — 87086 URINE CULTURE/COLONY COUNT: CPT | Performed by: STUDENT IN AN ORGANIZED HEALTH CARE EDUCATION/TRAINING PROGRAM

## 2022-10-09 PROCEDURE — 87502 INFLUENZA DNA AMP PROBE: CPT

## 2022-10-09 PROCEDURE — U0005 INFEC AGEN DETEC AMPLI PROBE: HCPCS

## 2022-10-09 PROCEDURE — 87040 BLOOD CULTURE FOR BACTERIA: CPT | Performed by: INTERNAL MEDICINE

## 2022-10-09 PROCEDURE — 25000003 PHARM REV CODE 250: Performed by: STUDENT IN AN ORGANIZED HEALTH CARE EDUCATION/TRAINING PROGRAM

## 2022-10-09 PROCEDURE — 80202 ASSAY OF VANCOMYCIN: CPT | Performed by: SURGERY

## 2022-10-09 PROCEDURE — 99233 PR SUBSEQUENT HOSPITAL CARE,LEVL III: ICD-10-PCS | Mod: GC,,, | Performed by: HOSPITALIST

## 2022-10-09 PROCEDURE — 99233 SBSQ HOSP IP/OBS HIGH 50: CPT | Mod: GC,,, | Performed by: HOSPITALIST

## 2022-10-09 PROCEDURE — 25000003 PHARM REV CODE 250: Performed by: SURGERY

## 2022-10-09 PROCEDURE — U0003 INFECTIOUS AGENT DETECTION BY NUCLEIC ACID (DNA OR RNA); SEVERE ACUTE RESPIRATORY SYNDROME CORONAVIRUS 2 (SARS-COV-2) (CORONAVIRUS DISEASE [COVID-19]), AMPLIFIED PROBE TECHNIQUE, MAKING USE OF HIGH THROUGHPUT TECHNOLOGIES AS DESCRIBED BY CMS-2020-01-R: HCPCS

## 2022-10-09 PROCEDURE — 83880 ASSAY OF NATRIURETIC PEPTIDE: CPT | Performed by: HOSPITALIST

## 2022-10-09 PROCEDURE — 84145 PROCALCITONIN (PCT): CPT | Performed by: HOSPITALIST

## 2022-10-09 PROCEDURE — 36415 COLL VENOUS BLD VENIPUNCTURE: CPT | Performed by: SURGERY

## 2022-10-09 PROCEDURE — 25000003 PHARM REV CODE 250: Performed by: INTERNAL MEDICINE

## 2022-10-09 PROCEDURE — 20600001 HC STEP DOWN PRIVATE ROOM

## 2022-10-09 PROCEDURE — 85025 COMPLETE CBC W/AUTO DIFF WBC: CPT | Performed by: STUDENT IN AN ORGANIZED HEALTH CARE EDUCATION/TRAINING PROGRAM

## 2022-10-09 PROCEDURE — 94799 UNLISTED PULMONARY SVC/PX: CPT

## 2022-10-09 PROCEDURE — 83735 ASSAY OF MAGNESIUM: CPT | Performed by: SURGERY

## 2022-10-09 RX ORDER — ACETAMINOPHEN 325 MG/1
650 TABLET ORAL EVERY 4 HOURS PRN
Status: DISCONTINUED | OUTPATIENT
Start: 2022-10-09 | End: 2022-10-12 | Stop reason: HOSPADM

## 2022-10-09 RX ADMIN — GABAPENTIN 900 MG: 300 CAPSULE ORAL at 08:10

## 2022-10-09 RX ADMIN — ATORVASTATIN CALCIUM 80 MG: 40 TABLET, FILM COATED ORAL at 09:10

## 2022-10-09 RX ADMIN — CILOSTAZOL 100 MG: 100 TABLET ORAL at 09:10

## 2022-10-09 RX ADMIN — GABAPENTIN 900 MG: 300 CAPSULE ORAL at 09:10

## 2022-10-09 RX ADMIN — MUPIROCIN: 20 OINTMENT TOPICAL at 09:10

## 2022-10-09 RX ADMIN — GABAPENTIN 900 MG: 300 CAPSULE ORAL at 03:10

## 2022-10-09 RX ADMIN — TRAZODONE HYDROCHLORIDE 25 MG: 50 TABLET ORAL at 08:10

## 2022-10-09 RX ADMIN — PIPERACILLIN SODIUM AND TAZOBACTAM SODIUM 4.5 G: 4; .5 INJECTION, POWDER, LYOPHILIZED, FOR SOLUTION INTRAVENOUS at 03:10

## 2022-10-09 RX ADMIN — RIVAROXABAN 20 MG: 20 TABLET, FILM COATED ORAL at 05:10

## 2022-10-09 RX ADMIN — Medication 6 MG: at 08:10

## 2022-10-09 RX ADMIN — PIPERACILLIN SODIUM AND TAZOBACTAM SODIUM 4.5 G: 4; .5 INJECTION, POWDER, LYOPHILIZED, FOR SOLUTION INTRAVENOUS at 11:10

## 2022-10-09 RX ADMIN — CILOSTAZOL 100 MG: 100 TABLET ORAL at 10:10

## 2022-10-09 RX ADMIN — VANCOMYCIN HYDROCHLORIDE 1250 MG: 1.25 INJECTION, POWDER, LYOPHILIZED, FOR SOLUTION INTRAVENOUS at 08:10

## 2022-10-09 RX ADMIN — HYDROCODONE BITARTRATE AND ACETAMINOPHEN 1 TABLET: 5; 325 TABLET ORAL at 05:10

## 2022-10-09 RX ADMIN — ASPIRIN 81 MG: 81 TABLET, CHEWABLE ORAL at 09:10

## 2022-10-09 RX ADMIN — PIPERACILLIN SODIUM AND TAZOBACTAM SODIUM 4.5 G: 4; .5 INJECTION, POWDER, LYOPHILIZED, FOR SOLUTION INTRAVENOUS at 09:10

## 2022-10-09 RX ADMIN — ACETAMINOPHEN 650 MG: 325 TABLET ORAL at 01:10

## 2022-10-09 RX ADMIN — EZETIMIBE 10 MG: 10 TABLET ORAL at 09:10

## 2022-10-09 RX ADMIN — NICOTINE 1 PATCH: 14 PATCH, EXTENDED RELEASE TRANSDERMAL at 09:10

## 2022-10-09 RX ADMIN — VANCOMYCIN HYDROCHLORIDE 750 MG: 750 INJECTION, POWDER, LYOPHILIZED, FOR SOLUTION INTRAVENOUS at 11:10

## 2022-10-09 RX ADMIN — HYDROCODONE BITARTRATE AND ACETAMINOPHEN 1 TABLET: 5; 325 TABLET ORAL at 09:10

## 2022-10-09 NOTE — PLAN OF CARE
Patient alert and oriented X 3. Intermittently confused, patient reoriented and easily redirected. Telesitter at the bedside and bed alarm set. Comfort and safety maintained. Continue to monitor.    Problem: Adult Inpatient Plan of Care  Goal: Plan of Care Review  Outcome: Ongoing, Progressing  Goal: Patient-Specific Goal (Individualized)  Outcome: Ongoing, Progressing  Goal: Absence of Hospital-Acquired Illness or Injury  Outcome: Ongoing, Progressing  Goal: Optimal Comfort and Wellbeing  Outcome: Ongoing, Progressing  Goal: Readiness for Transition of Care  Outcome: Ongoing, Progressing     Problem: Impaired Wound Healing  Goal: Optimal Wound Healing  Outcome: Ongoing, Progressing     Problem: Fall Injury Risk  Goal: Absence of Fall and Fall-Related Injury  Outcome: Ongoing, Progressing     Problem: Skin Injury Risk Increased  Goal: Skin Health and Integrity  Outcome: Ongoing, Progressing     Problem: Pain Acute  Goal: Acceptable Pain Control and Functional Ability  Outcome: Ongoing, Progressing     Problem: Infection  Goal: Absence of Infection Signs and Symptoms  Outcome: Ongoing, Progressing     Problem: Bleeding (Sepsis/Septic Shock)  Goal: Absence of Bleeding  Outcome: Ongoing, Progressing     Problem: Glycemic Control Impaired (Sepsis/Septic Shock)  Goal: Blood Glucose Level Within Desired Range  Outcome: Ongoing, Progressing     Problem: Nutrition Impaired (Sepsis/Septic Shock)  Goal: Optimal Nutrition Intake  Outcome: Ongoing, Progressing

## 2022-10-09 NOTE — ASSESSMENT & PLAN NOTE
Duplex imaging of the right lower extremity veins on 10/5 demonstrates occlusive sub-acute thrombus of the anterior tibial vein (the anterior paired vessel).   Pt was initially on heparin ggt and now on xarelto 20 mg qhs.   - 10/9: likely not cause of new fever.   Continue to monitor

## 2022-10-09 NOTE — PROGRESS NOTES
Pharmacokinetic Assessment Follow Up: IV Vancomycin    Vancomycin serum concentration assessment(s):    Appropriately drawn trough = 7 this AM. Trough goal remains 15-20 for empiric therapy of sepsis and lower BP. I will increase vancomycin to 1250mg IV Q12h and repeat trough prior to the fourth dose on 10/11 @ 8:00. Cx NGTD.     Drug levels (last 3 results):  Recent Labs   Lab Result Units 10/09/22  1012   Vancomycin-Trough ug/mL 7.0*       Pharmacy will continue to follow and monitor vancomycin.    Please contact pharmacy at extension [phone] for questions regarding this assessment.    Thank you for the consult,   Rory Rowan      CBC (last 72 hours):  Recent Labs   Lab Result Units 10/06/22  1526 10/07/22  0231 10/08/22  0044 10/08/22  0228 10/09/22  0431   WBC K/uL 16.36*  16.36* 12.95* 13.79* 15.67* 17.59*   Hemoglobin g/dL 10.0*  10.0* 10.7* 10.0* 9.3* 8.8*   Hematocrit % 31.9*  31.9* 35.9* 31.4* 30.3* 27.3*   Platelets K/uL 500*  500* 532* 455* 473* 391   Gran % % 79.6*  79.6* 67.2 74.2* 73.1* 73.3*   Lymph % % 16.7*  16.7* 23.8 16.6* 17.7* 15.9*   Mono % % 2.9*  2.9* 8.4 8.2 8.4 9.2   Eosinophil % % 0.1  0.1 0.0 0.1 0.1 0.4   Basophil % % 0.2  0.2 0.2 0.3 0.3 0.2   Differential Method  Automated  Automated Automated Automated Automated Automated       Metabolic Panel (last 72 hours):  Recent Labs   Lab Result Units 10/06/22  1526 10/07/22  0231 10/07/22  1430 10/08/22  0044 10/08/22  0228 10/09/22  0431   Sodium mmol/L 136  136 143  --  139 138 136   Potassium mmol/L 3.8  3.8 3.8  --  3.5 3.4* 3.6   Chloride mmol/L 108  108 110  --  110 107 106   CO2 mmol/L 18*  18* 22*  --  21* 23 20*   Glucose mg/dL 146*  146* 116*  --  125* 108 113*   Glucose, UA   --   --  Negative  --   --   --    BUN mg/dL 7*  7* 10  --  12 11 12   Creatinine mg/dL 0.7  0.7 0.6  --  0.7 0.7 0.7   Albumin g/dL  --  2.6*  --   --  2.1* 1.9*   Total Bilirubin mg/dL  --  0.5  --   --  0.6 0.6   Alkaline Phosphatase U/L  --   69  --   --  59 73   AST U/L  --  27  --   --  26 38   ALT U/L  --  16  --   --  14 21   Magnesium mg/dL  --   --   --  1.7  --  2.0       Vancomycin Administrations:  vancomycin given in the last 96 hours                     vancomycin 750 mg in dextrose 5 % 250 mL IVPB (ready to mix system) (mg) 750 mg New Bag 10/09/22 1139     750 mg New Bag 10/08/22 2329     750 mg New Bag  1023    vancomycin in dextrose 5 % 1 gram/250 mL IVPB 1,000 mg (mg) 1,000 mg New Bag 10/07/22 2220                    Microbiologic Results:  Microbiology Results (last 7 days)       Procedure Component Value Units Date/Time    Influenza A & B by Molecular [[id number]] Collected: 10/09/22 1000    Order Status: Completed Specimen: Nasopharyngeal Swab Updated: 10/09/22 1132     Influenza A, Molecular Negative     Influenza B, Molecular Negative     Flu A & B Source NP    Urine culture [[id number]] Collected: 10/09/22 1109    Order Status: Sent Specimen: Urine, Clean Catch Updated: 10/09/22 1120    Blood culture [[id number]] Collected: 10/09/22 1012    Order Status: Sent Specimen: Blood Updated: 10/09/22 1046    Blood culture [[id number]] Collected: 10/07/22 2345    Order Status: Completed Specimen: Blood Updated: 10/09/22 0613     Blood Culture, Routine No Growth to date      No Growth to date    Blood culture [[id number]] Collected: 10/07/22 2345    Order Status: Completed Specimen: Blood Updated: 10/09/22 0613     Blood Culture, Routine No Growth to date      No Growth to date

## 2022-10-09 NOTE — HPI
Mr. 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 Underwent successful axio-fem-fem bypass on 10/6. ID consulted for sepsis of unknown etiology.    Per chart review, patient was first febrile on POD#1, 10/7 and again on 10/8. Leukocytosis present since 10/6. Started on empiric vanc and zosyn on 10/7. BCx 10/7 is NGTD. CXR revealed b/l edema. Pt denies SOB, dysuria, diarrhea, arthralgias, neck/ back pain. Reports a chronic cough. B/L fem surgical sites are covered with clean dressings. Lt chest incision has no fluctuance or drainage. RLE is erythematous with a skin tear where a blister was previously.

## 2022-10-09 NOTE — CARE UPDATE
"RAPID RESPONSE NURSE CHART REVIEW       Chart Reviewed: 10/09/2022, 2:01 AM    MRN: [id number]  Bed: 353/353 A    Dx: Critical limb ischemia of right lower extremity with ulceration of lower leg    Babatunde Singh has a past medical history of Hypertension and Peripheral arterial disease.    Last VS: /71   Pulse (!) 132   Temp (!) 101.9 °F (38.8 °C)   Resp 10   Ht 5' 7" (1.702 m)   Wt 54 kg (119 lb)   SpO2 (!) 94%   BMI 18.64 kg/m²     24H Vital Sign Range:  Temp:  [97.4 °F (36.3 °C)-101.9 °F (38.8 °C)]   Pulse:  [109-135]   Resp:  [10-28]   BP: (100-153)/(52-71)   SpO2:  [85 %-97 %]     Level of Consciousness (AVPU): alert    Recent Labs     10/07/22  0231 10/08/22  0044 10/08/22  0228   WBC 12.95* 13.79* 15.67*   HGB 10.7* 10.0* 9.3*   HCT 35.9* 31.4* 30.3*   * 455* 473*       Recent Labs     10/07/22  0231 10/08/22  0044 10/08/22  0228    139 138   K 3.8 3.5 3.4*    110 107   CO2 22* 21* 23   CREATININE 0.6 0.7 0.7   * 125* 108   MG  --  1.7  --         Recent Labs     10/06/22  1039 10/06/22  1245   PH 7.388 7.384   PCO2 36.5 38.7   PO2 214* 156*   HCO3 22.0* 23.1*   POCSATURATED 100 99   BE -3 -2        OXYGEN:  Flow (L/min): 3     O2 Device (Oxygen Therapy): room air    MEWS score: 3    Bedside RN, Khanh  contacted. Pt being connected to Visi monitor for frequent VS, MDs aware of pt condition, BSRN stated verbalized no concerns at this time. Instructed to call [phone] for further concerns or assistance.    Maxwell Thompson RN       "

## 2022-10-09 NOTE — SUBJECTIVE & OBJECTIVE
Interval History: AAOx4 this AM, fully interactive and coherent on exam. Febrile to 101.9 overnight with tachycardia to 115-130s. Remains tachy but downtrending, at 115 on exam. Documented hypoxemia to 92% overnight, satting 98% on RA this morning. WBC ~17 from 15 this AM. Denies subjective fever, chills, dyspnea, or palpitations. C/o of RLE pain. Some erythema present around ulcers, uncertain if changed from prior.     Romero removed, Urine Cx ordered. CXR w mild pulm edema. Blood Cx from 10/7 with NGTD.     Remains on Vanc/Zosyn, procal ordered.     Review of Systems   Constitutional:  Negative for chills and fever.   HENT:  Negative for sore throat.    Respiratory:  Negative for choking, chest tightness, shortness of breath and wheezing.    Cardiovascular:  Negative for chest pain and palpitations.   Gastrointestinal:  Negative for abdominal pain, nausea and vomiting.   Endocrine: Negative for cold intolerance and heat intolerance.   Genitourinary:  Negative for frequency, hematuria and urgency.   Musculoskeletal:  Negative for neck pain.        RLE pain has improved since his procedure on 10/6.   Skin:  Positive for wound.        Right LE ischemic ulcers.    Neurological:  Negative for dizziness, seizures, light-headedness and headaches.   Psychiatric/Behavioral:  Negative for confusion, decreased concentration and dysphoric mood. The patient is not nervous/anxious.    Objective:     Vital Signs (Most Recent):  Temp: 98.3 °F (36.8 °C) (10/09/22 0752)  Pulse: (!) 116 (10/09/22 0752)  Resp: 18 (10/09/22 0930)  BP: (!) 119/57 (10/09/22 0752)  SpO2: 98 % (10/09/22 0752)   Vital Signs (24h Range):  Temp:  [97.4 °F (36.3 °C)-101.9 °F (38.8 °C)] 98.3 °F (36.8 °C)  Pulse:  [109-137] 116  Resp:  [10-28] 18  SpO2:  [92 %-98 %] 98 %  BP: (104-153)/(56-88) 119/57     Weight: 54 kg (119 lb)  Body mass index is 18.64 kg/m².    Intake/Output Summary (Last 24 hours) at 10/9/2022 1030  Last data filed at 10/9/2022 0808  Gross per  24 hour   Intake 1646.26 ml   Output 2070 ml   Net -423.74 ml      Physical Exam  HENT:      Head: Normocephalic and atraumatic.      Right Ear: External ear normal.      Left Ear: External ear normal.      Mouth/Throat:      Mouth: Mucous membranes are dry.   Eyes:      Extraocular Movements: Extraocular movements intact.      Conjunctiva/sclera: Conjunctivae normal.   Cardiovascular:      Rate and Rhythm: Regular rhythm. Tachycardia present.      Pulses:           Dorsalis pedis pulses are 1+ on the right side.        Posterior tibial pulses are 1+ on the right side.      Heart sounds: Normal heart sounds.   Pulmonary:      Effort: Pulmonary effort is normal. No respiratory distress.      Breath sounds: Normal breath sounds. No wheezing.   Abdominal:      General: Abdomen is flat. Bowel sounds are normal. There is no distension.      Palpations: Abdomen is soft.      Tenderness: There is no abdominal tenderness.   Musculoskeletal:      Cervical back: Normal range of motion.      Comments: RLE exam; Multiple ischemic ulcers lateral aspect of mid shin extending to above the right ankle with mild erythema around the area. No purulent drainage. blister filled with liquid inferior aspect of anterior tibia chin. Tenderness has improved from prior exam.   Neurological:      Mental Status: He is alert and oriented to person, place, and time. Mental status is at baseline.       Significant Labs: Blood Culture:   Recent Labs   Lab 10/07/22  2345   LABBLOO No Growth to date  No Growth to date  No Growth to date  No Growth to date     CBC:   Recent Labs   Lab 10/08/22  0044 10/08/22  0228 10/09/22  0431   WBC 13.79* 15.67* 17.59*   HGB 10.0* 9.3* 8.8*   HCT 31.4* 30.3* 27.3*   * 473* 391     CMP:   Recent Labs   Lab 10/08/22  0044 10/08/22  0228 10/09/22  0431    138 136   K 3.5 3.4* 3.6    107 106   CO2 21* 23 20*   * 108 113*   BUN 12 11 12   CREATININE 0.7 0.7 0.7   CALCIUM 8.3* 8.3* 8.0*    PROT  --  4.9* 5.0*   ALBUMIN  --  2.1* 1.9*   BILITOT  --  0.6 0.6   ALKPHOS  --  59 73   AST  --  26 38   ALT  --  14 21   ANIONGAP 8 8 10     Lactic Acid:   Recent Labs   Lab 10/08/22  0044 10/09/22  0102   LACTATE 1.6 1.6     Urine Culture: pending  Urine Studies:   Recent Labs   Lab 10/07/22  1430   COLORU Yellow   APPEARANCEUA Clear   PHUR 7.0   SPECGRAV 1.015   PROTEINUA Negative   GLUCUA Negative   KETONESU Negative   BILIRUBINUA Negative   OCCULTUA 3+*   NITRITE Negative   LEUKOCYTESUR 1+*   RBCUA 72*   WBCUA 1       Significant Imaging: CXR- interval development of mild bilateral edema

## 2022-10-09 NOTE — PLAN OF CARE
Patient resting in bed comfortably with no acute complaints. He is alert and oriented x4 and is answering all questions appropriately. Wound dressing changed on right lower leg and heel boots applied. Romero removed and patient had first void.     Problem: Adult Inpatient Plan of Care  Goal: Plan of Care Review  Outcome: Ongoing, Progressing  Goal: Patient-Specific Goal (Individualized)  Outcome: Ongoing, Progressing  Goal: Absence of Hospital-Acquired Illness or Injury  Outcome: Ongoing, Progressing  Goal: Optimal Comfort and Wellbeing  Outcome: Ongoing, Progressing  Goal: Readiness for Transition of Care  Outcome: Ongoing, Progressing     Problem: Impaired Wound Healing  Goal: Optimal Wound Healing  Outcome: Ongoing, Progressing     Problem: Fall Injury Risk  Goal: Absence of Fall and Fall-Related Injury  Outcome: Ongoing, Progressing     Problem: Skin Injury Risk Increased  Goal: Skin Health and Integrity  Outcome: Ongoing, Progressing     Problem: Pain Acute  Goal: Acceptable Pain Control and Functional Ability  Outcome: Ongoing, Progressing     Problem: Restraint, Nonbehavioral (Nonviolent)  Goal: Absence of Harm or Injury  Outcome: Ongoing, Progressing     Problem: Adjustment to Illness (Sepsis/Septic Shock)  Goal: Optimal Coping  Outcome: Ongoing, Progressing     Problem: Bleeding (Sepsis/Septic Shock)  Goal: Absence of Bleeding  Outcome: Ongoing, Progressing     Problem: Glycemic Control Impaired (Sepsis/Septic Shock)  Goal: Blood Glucose Level Within Desired Range  Outcome: Ongoing, Progressing     Problem: Infection Progression (Sepsis/Septic Shock)  Goal: Absence of Infection Signs and Symptoms  Outcome: Ongoing, Progressing     Problem: Nutrition Impaired (Sepsis/Septic Shock)  Goal: Optimal Nutrition Intake  Outcome: Ongoing, Progressing

## 2022-10-09 NOTE — ASSESSMENT & PLAN NOTE
My overall impression is sepsis possibly 2/2 infected RLE ulcers. Vital signs were reviewed and noted in progress note.  Antibiotics given-   Antibiotics (From admission, onward)    Start     Stop Route Frequency Ordered    10/08/22 1000  vancomycin 750 mg in dextrose 5 % 250 mL IVPB (ready to mix system)         -- IV Every 12 hours (non-standard times) 10/07/22 2115    10/07/22 2145  piperacillin-tazobactam 4.5 g in sodium chloride 0.9% 100 mL IVPB (ready to mix system)         -- IV Every 8 hours (non-standard times) 10/07/22 2041    10/07/22 2140  vancomycin - pharmacy to dose  (vancomycin IVPB)        [facility]    -- IV pharmacy to manage frequency 10/07/22 2041    10/06/22 2100  mupirocin 2 % ointment         10/11 2059 Nasl 2 times daily 10/06/22 1517        Cultures were taken-   Microbiology Results (last 7 days)     Procedure Component Value Units Date/Time    Urine culture [[id number]] Collected: 10/09/22 1109    Order Status: Sent Specimen: Urine, Clean Catch Updated: 10/09/22 1109    Influenza A & B by Molecular [[id number]] Collected: 10/09/22 1000    Order Status: Sent Specimen: Nasopharyngeal Swab Updated: 10/09/22 1047    Blood culture [[id number]] Collected: 10/09/22 1012    Order Status: Sent Specimen: Blood Updated: 10/09/22 1046    Blood culture [[id number]] Collected: 10/07/22 2345    Order Status: Completed Specimen: Blood Updated: 10/09/22 0613     Blood Culture, Routine No Growth to date      No Growth to date    Blood culture [[id number]] Collected: 10/07/22 2345    Order Status: Completed Specimen: Blood Updated: 10/09/22 0613     Blood Culture, Routine No Growth to date      No Growth to date        Latest lactate reviewed, they are-  Recent Labs   Lab 10/08/22  0044 10/09/22  0102   LACTATE 1.6 1.6       Organ dysfunction indicated by Encephalopathy   Source- Possible infected leg wounds    Source control Achieved by- starting abx.     CXR negative for  PNA  Surgical site left chest wall was clean.  UA and UCX negative  Blood Cx 10/7: NGTD   No purulent discharge from ulcers or signs concerning for infection on exam. Clinical status improving on abx despite ongoing leukocytosis. Low index of suspicion for DVT. Will continue to monitor and follow-up pending studies.  Influenza and covid tests pending    - f/u ID recs, remains on vanc and cefepime at this time

## 2022-10-09 NOTE — NURSING
[id number] Telephone update given to patient's daughter Cindi. She is requesting for the doctor to call her and give her an update on patient's surgery.

## 2022-10-09 NOTE — ASSESSMENT & PLAN NOTE
Likely multifactorial related to infection, plus the anesthesia pt received during his surgical procedure.  Improved while being on abx. AAOx4 on 10/9. Responds appropriately and coherently on exam.  Continue to monitor.

## 2022-10-09 NOTE — ASSESSMENT & PLAN NOTE
Due to ischemia.   S/p intervention on 10/6 by vascular surgery, managed by primary  Pain improving since procedure

## 2022-10-09 NOTE — ASSESSMENT & PLAN NOTE
My overall impression is sepsis possibly 2/2 infected RLE ulcers. Vital signs were reviewed and noted in progress note.  Antibiotics given-   Antibiotics (From admission, onward)    Start     Stop Route Frequency Ordered    10/08/22 1000  vancomycin 750 mg in dextrose 5 % 250 mL IVPB (ready to mix system)         -- IV Every 12 hours (non-standard times) 10/07/22 2115    10/07/22 2145  piperacillin-tazobactam 4.5 g in sodium chloride 0.9% 100 mL IVPB (ready to mix system)         -- IV Every 8 hours (non-standard times) 10/07/22 2041    10/07/22 2140  vancomycin - pharmacy to dose  (vancomycin IVPB)        See \[facility]\""pace for full Linked Orders Report.    -- IV pharmacy to manage frequency 10/07/22 2041    10/06/22 2100  mupirocin 2 % ointment         10/11 2059 Nasl 2 times daily 10/06/22 1517        Cultures were taken-   Microbiology Results (last 7 days)     Procedure Component Value Units Date/Time    Influenza A & B by Molecular [[id number]] Collected: 10/09/22 1000    Order Status: Sent Specimen: Nasopharyngeal Swab Updated: 10/09/22 1047    Blood culture [[id number]] Collected: 10/09/22 1012    Order Status: Sent Specimen: Blood Updated: 10/09/22 1046    Urine culture [[id number]]     Order Status: No result Specimen: Urine, Clean Catch     Blood culture [[id number]] Collected: 10/07/22 2345    Order Status: Completed Specimen: Blood Updated: 10/09/22 0613     Blood Culture, Routine No Growth to date      No Growth to date    Blood culture [[id number]] Collected: 10/07/22 2345    Order Status: Completed Specimen: Blood Updated: 10/09/22 0613     Blood Culture, Routine No Growth to date      No Growth to date        Latest lactate reviewed, they are-  Recent Labs   Lab 10/08/22  0044 10/09/22  0102   LACTATE 1.6 1.6       Organ dysfunction indicated by Encephalopathy   Source- Possible infected leg wounds    Source control Achieved by- starting abx.     CXR negative for PNA  Surgical site left chest wall was  clean.  UA is negative. Urine Cx ordered on 10/9 following byers removal.  Blood Cx 10/7: NGTD   Procalcitonin ordered, pending.  No purulent discharge from ulcers or signs concerning for infection on exam. Clinical status improving on abx despite ongoing leukocytosis. Low index of suspicion for DVT. Will continue to monitor and follow-up pending studies.

## 2022-10-09 NOTE — PROGRESS NOTES
[facility] - Cardiology Stepdown  Vascular Surgery  Progress Note    Patient Name: Babatunde Singh  MRN: [id number]  Admission Date: 10/2/2022  Primary Care Provider: Esdras Ly MD    Subjective:     Interval History: Interval History:  febrile to 101.9 and tachycardic overnight in the 120-130 range, cont vancomycin and Zosyn urinalysis appears clean, chest x-ray without evidence of pneumonia, blood cultures ngtd. Right lower extremity with mild appearance of erythema which is new. Wbc 17.5 from 15.7.  Patient denies pain, fevers, chills, sweats, angina, or dyspnea.  Confusion resolved, patient is A x O x4    Medications:  Continuous Infusions:  Scheduled Meds:   aspirin  81 mg Oral Daily    atorvastatin  80 mg Oral Daily    cilostazoL  100 mg Oral BID    ezetimibe  10 mg Oral Daily    gabapentin  900 mg Oral TID    melatonin  6 mg Oral Nightly    mupirocin   Nasal BID    nicotine  1 patch Transdermal Daily    piperacillin-tazobactam (ZOSYN) IVPB  4.5 g Intravenous Q8H    rivaroxaban  20 mg Oral Daily with dinner    traZODone  25 mg Oral QHS    vancomycin (VANCOCIN) IVPB  750 mg Intravenous Q12H     PRN Meds:acetaminophen, haloperidol lactate, HYDROcodone-acetaminophen, ibuprofen, morphine, sodium chloride 0.9%, traMADoL, Pharmacy to dose Vancomycin consult **AND** vancomycin - pharmacy to dose     Objective:     Vital Signs (Most Recent):  Temp: 97.4 °F (36.3 °C) (10/08/22 1200)  Pulse: 109 (10/08/22 1200)  Resp: 18 (10/08/22 1200)  BP: (!) 153/67 (10/08/22 1200)  SpO2: 97 % (10/08/22 1200)   Vital Signs (24h Range):  Temp:  [97.4 °F (36.3 °C)-101.8 °F (38.8 °C)] 97.4 °F (36.3 °C)  Pulse:  [109-133] 109  Resp:  [15-18] 18  SpO2:  [85 %-97 %] 97 %  BP: (100-153)/(52-67) 153/67         Physical Exam  Physical Exam  Vitals and nursing note reviewed.   Constitutional:       Appearance: Normal appearance.   Cardiovascular:      Comments: R biphasic PT, R AT is biphasic L biphasic PT  L DP is monophasic  Warm,  well-perfused BLE   Pulmonary:      Effort: Pulmonary effort is normal.   Abdominal:      General: Abdomen is flat.   Musculoskeletal:         General: Tenderness present.   Skin:     Comments: Right calf dry gangrene lesions   Groins are clean dry intact no erythema  Chest incision is clean dry intact no erythema  Neurological:      Mental Status: Intact  Significant Labs:  Recent Lab Results         10/08/22  0228   10/08/22  0044   10/07/22  2345        Albumin 2.1           Alkaline Phosphatase 59           ALT 14           Anion Gap 8   8         AST 26           Baso # 0.04   0.04         Basophil % 0.3   0.3         BILIRUBIN TOTAL 0.6  Comment: For infants and newborns, interpretation of results should be based  on gestational age, weight and in agreement with clinical  observations.    Premature Infant recommended reference ranges:  Up to 24 hours.............<8.0 mg/dL  Up to 48 hours............<12.0 mg/dL  3-5 days..................<15.0 mg/dL  6-29 days.................<15.0 mg/dL             Blood Culture, Routine     No Growth to date  [P]            No Growth to date  [P]       BUN 11   12         Calcium 8.3   8.3         Chloride 107   110         CO2 23   21         Creatinine 0.7   0.7         Differential Method Automated   Automated         eGFR >60.0   >60.0         Eos # 0.0   0.0         Eosinophil % 0.1   0.1         Glucose 108   125         Gran # (ANC) 11.5   10.2         Gran % 73.1   74.2         Hematocrit 30.3   31.4         Hemoglobin 9.3   10.0         Immature Grans (Abs) 0.06  Comment: Mild elevation in immature granulocytes is non specific and   can be seen in a variety of conditions including stress response,   acute inflammation, trauma and pregnancy. Correlation with other   laboratory and clinical findings is essential.     0.08  Comment: Mild elevation in immature granulocytes is non specific and   can be seen in a variety of conditions including stress response,   acute  inflammation, trauma and pregnancy. Correlation with other   laboratory and clinical findings is essential.           Immature Granulocytes 0.4   0.6         Lactate, Lauri   1.6  Comment: Falsely low lactic acid results can be found in samples   containing >=13.0 mg/dL total bilirubin and/or >=3.5 mg/dL   direct bilirubin.           Lymph # 2.8   2.3         Lymph % 17.7   16.6         Magnesium   1.7         MCH 26.5   27.4         MCHC 30.7   31.8         MCV 86   86         Mono # 1.3   1.1         Mono % 8.4   8.2         MPV 8.6   8.6         nRBC 0   0         Platelets 473   455         Potassium 3.4   3.5         PROTEIN TOTAL 4.9           RBC 3.51   3.65         RDW 18.2   18.0         Sodium 138   139         WBC 15.67   13.79                  [P] - Preliminary Result               Significant Diagnostics:  CXR: X-Ray Chest AP Portable    Result Date: 10/8/2022  Interval development of mild bilateral edema. Electronically signed by: Víctor Heard MD Date:    10/08/2022 Time:    04:15     Post-Op Info:  Procedure(s) (LRB):  CREATION, BYPASS, ARTERIAL, AXILLARY TO FEMORAL, USING GRAFT (N/A)  CREATION, BYPASS, ARTERIAL, FEMORAL TO FEMORAL, USING GRAFT (N/A)   3 Days Post-Op     No new subjective & objective note has been filed under this hospital service since the last note was generated.  Assessment/Plan:     * Critical limb ischemia of right lower extremity with ulceration of lower leg  Mr Hardy is a 72 year old male with a pmh of smoking, severe PAD, dvt who presented with chronic limb ischemia and rest pain now s/p successful axio-fem-fem bypass on 10/6.    - Vanc/Zosyn cont  - follow up cultures  - Home Xarelto   - melatonin QHS and trazodone QHS PRN  - delirium precautions  - PT/OT   - PMNR consult for rehab eval   -continue asa/statin cilostazol   - Monitor fever curve and Follow up blood culture   - Consult ID         Leland Murillo MD  Vascular Surgery  [facility] - Cardiology [facility]

## 2022-10-09 NOTE — CARE UPDATE
RAPID RESPONSE NURSE PROACTIVE ROUNDING NOTE       Time of Visit: 1040    Admit Date: 10/2/2022  LOS: 7  Code Status: Full Code   Date of Visit: 10/09/2022  : 1950  Age: 72 y.o.  Sex: male  Race: Black or   Bed: 353/353 A:   MRN: [id number]  Was the patient discharged from an ICU this admission? No   Was the patient discharged from a PACU within last 24 hours? No   Did the patient receive conscious sedation/general anesthesia in last 24 hours? No  Was the patient in the ED within the past 24 hours? No  Was the patient on NIPPV within the past 24 hours? No   Attending Physician: LEI Ortiz III, MD  Primary Service: Vascular Surgery   Time spent at the bedside: < 15 min    SITUATION    Notified by [email or web address] patient alert.  Reason for alert: fever, tachycardia  Called to evaluate the patient for Circulatory    BACKGROUND     Why is the patient in the hospital?: Critical limb ischemia of right lower extremity with ulceration of lower leg    Patient has a past medical history of Hypertension and Peripheral arterial disease.    Last Vitals:  Temp: 99 °F (37.2 °C) (10/09 1127)  Pulse: 129 (10/09 1223)  Resp: 20 (10/09 1252)  BP: 115/78 (10/09 1223)  SpO2: 94 % (10/09 1223)    24 Hours Vitals Range:  Temp:  [98.3 °F (36.8 °C)-101.9 °F (38.8 °C)]   Pulse:  [114-137]   Resp:  [10-28]   BP: (100-130)/(54-88)   SpO2:  [92 %-98 %]     Labs:  Recent Labs     10/08/22  0044 10/08/22  0228 10/09/22  0431   WBC 13.79* 15.67* 17.59*   HGB 10.0* 9.3* 8.8*   HCT 31.4* 30.3* 27.3*   * 473* 391       Recent Labs     10/08/22  0044 10/08/22  0228 10/09/22  0431    138 136   K 3.5 3.4* 3.6    107 106   CO2 21* 23 20*   CREATININE 0.7 0.7 0.7   * 108 113*   MG 1.7  --  2.0        No results for input(s): PH, PCO2, PO2, HCO3, POCSATURATED, BE in the last 72 hours.     ASSESSMENT    Physical Exam  Vitals reviewed.   Cardiovascular:      Rate and Rhythm: Tachycardia present.   Pulmonary:       Effort: Pulmonary effort is normal.   Skin:     General: Skin is warm and dry.   Neurological:      Mental Status: He is alert and oriented to person, place, and time.       INTERVENTIONS    The patient was seen for Cardiac problem. Staff concerns included tachycardia. The following interventions were performed: continuous cardiac monitoring and No additional interventions needed at this time..    RECOMMENDATIONS    -Continue Cardiac monitoring  -Antibiotics as ordered    PROVIDER ESCALATION    Yes/No  no    Orders received and case discussed with NA.    Disposition: Remain in room 353.    FOLLOW-UP    charge Olga LYMAN  updated on plan of care. Instructed to call the Rapid Response Nurse, Sonal De La Cruz RN at [phone] for additional questions or concerns.

## 2022-10-09 NOTE — ASSESSMENT & PLAN NOTE
Pt is s/p left axillary to BILATERAL femoris profunda bypass with 8 mm PTFE on 10/6 by vascular surgery. Mgmt per primary  Continue statin, zetia, aspirin, and cilostazol.

## 2022-10-09 NOTE — ASSESSMENT & PLAN NOTE
73 yo M with hx of rt renal mass, HTN, HLD, recent diagnosis of DVT (9/8/22) on xarelto, PAD s/p  axio-fem-fem bypass on 10/6. ID consulted for sepsis of unknown etiology.    Per chart review, patient was first febrile on POD#1, 10/7 and again on 10/8. Leukocytosis present since 10/6. Started on empiric vanc and zosyn on 10/7. BCx 10/7 is NGTD. CXR revealed b/l edema. Pt denies SOB, dysuria, diarrhea, arthralgias, neck/ back pain. Reports a chronic cough. B/L fem surgical sites are covered with clean dressings. Lt chest incision has no fluctuance or drainage. RLE is erythematous with a skin tear wher a blister weas previously.     Differentials include post-op fever, surgical site infection, new DVT, RLE cellulitis    BCx 10/7 NGTD  BCX 10/9 ordered  UA bland; and asymptomatic  Flu neg    Recommendations:  --US b/l UE and LE to r/o blood clot  --Incentive spirometry  --continue vanc for now  --would switch pip-tazo to cefepime to avoid YAQUELIN  --will f/u BCx

## 2022-10-09 NOTE — PROGRESS NOTES
Patient heart rate sustaining > 130. -58, Temp 101.9 RR 28 SPO2 94% on room air. Initiated sepsis protocol. Called Vascular surgery on call SW Dr. Dawson. Gave tylenol for fever. No intervention for HR with concern of low blood pressure, continue to monitor and added continuous SPO2 monitoring. Patient denies chest pain, dizziness or discomfort. Advised to call with HR sustaining 140. Rapid team also called to notify, they were in an emergency.

## 2022-10-09 NOTE — SUBJECTIVE & OBJECTIVE
Past Medical History:   Diagnosis Date    Hypertension     Peripheral arterial disease        Past Surgical History:   Procedure Laterality Date    CREATION OF AXILLARY-FEMORAL ARTERY BYPASS WITH GRAFT N/A 10/6/2022    Procedure: CREATION, BYPASS, ARTERIAL, AXILLARY TO FEMORAL, USING GRAFT;  Surgeon: LEI Ortiz III, MD;  Location: [facility] OR [address] [place] [address];  Service: Peripheral Vascular;  Laterality: N/A;    CREATION OF FEMORAL-FEMORAL ARTERY BYPASS WITH GRAFT N/A 10/6/2022    Procedure: CREATION, BYPASS, ARTERIAL, FEMORAL TO FEMORAL, USING GRAFT;  Surgeon: LEI Ortiz III, MD;  [facility];  Service: Peripheral Vascular;  Laterality: N/A;    EXCISION OF HYDROCELE Left 4/26/2022    Procedure: HYDROCELECTOMY;  Surgeon: Damion Roberts MD;  Location: [facility];  Service: Urology;  Laterality: Left;    KNEE SURGERY  1972       Review of patient's allergies indicates:  No Known Allergies    Medications:  Medications Prior to Admission   Medication Sig    acetaminophen (TYLENOL) 325 MG tablet Take 2 tablets (650 mg total) by mouth every 8 (eight) hours as needed.    aspirin 81 MG Chew Chew and swallow 1 tablet (81 mg total) by mouth once daily.    atorvastatin (LIPITOR) 80 MG tablet Take 1 tablet (80 mg total) by mouth once daily.    bacitracin 500 unit/gram ointment Apply topically 2 (two) times daily.    cilostazoL (PLETAL) 100 MG Tab Take 1 tablet (100 mg total) by mouth 2 (two) times daily.    diclofenac sodium (VOLTAREN) 1 % Gel Apply 2 g topically 3 (three) times daily as needed (muscle spasm pain). Apply 2 grams to affected area 3 times daily as needed    ezetimibe (ZETIA) 10 mg tablet Take 1 tablet (10 mg total) by mouth once daily.    gabapentin (NEURONTIN) 300 MG capsule Take 3 capsules (900 mg total) by mouth 3 (three) times daily.    LIDOcaine (LIDODERM) 5 % Apply to affected area as needed for pain for 12 hours, then off for 12 hours. Discard after each use.  May use 4% lidocaine patch as  alternative.    losartan-hydrochlorothiazide 50-12.5 mg (HYZAAR) 50-12.5 mg per tablet Take 1 tablet by mouth once daily.    multivitamin (THERAGRAN) per tablet Take 1 tablet by mouth once daily.    mupirocin (BACTROBAN) 2 % ointment Apply topically 2 (two) times daily.    naproxen (NAPROSYN) 500 MG tablet Take 1 tablet (500 mg total) by mouth 2 (two) times daily as needed (pain).    rivaroxaban (XARELTO DVT-PE TREAT 30D START) 15 mg (42)- 20 mg (9) tablet dose pack Take 1 tablet (15 mg) by mouth twice daily with food for 21 days followed by 1 tablet (20 mg) by mouth once daily with food    terbinafine HCl (LAMISIL ORAL) Take by mouth once daily at 6am.     Antibiotics (From admission, onward)      Start     Stop Route Frequency Ordered    10/09/22 2100  vancomycin 1.25 g in dextrose 5% 250 mL IVPB (ready to mix)         -- IV Every 12 hours (non-standard times) 10/09/22 1159    10/07/22 2145  piperacillin-tazobactam 4.5 g in sodium chloride 0.9% 100 mL IVPB (ready to mix system)         -- IV Every 8 hours (non-standard times) 10/07/22 2041    10/07/22 2140  vancomycin - pharmacy to dose  (vancomycin IVPB)        See Hyperspace for full Linked Orders Report.    -- IV pharmacy to manage frequency 10/07/22 2041    10/06/22 2100  mupirocin 2 % ointment         10/11 2059 Nasl 2 times daily 10/06/22 1517          Antifungals (From admission, onward)      None          Antivirals (From admission, onward)      None             Immunization History   Administered Date(s) Administered    COVID-19, MRNA, LN-S, PF (MODERNA FULL 0.5 ML DOSE) 02/10/2021, 03/10/2021, 12/31/2021       Family History       Problem Relation (Age of Onset)    Hypertension Mother, Father          Social History     Socioeconomic History    Marital status:    Occupational History    Occupation: [employer]   Tobacco Use    Smoking status: Former     Packs/day: 0.50     Years: 50.00     Pack years: 25.00     Types: Cigarettes    Smokeless  tobacco: Never    Tobacco comments:     Cigarettes3-4 per day   Substance and Sexual Activity    Alcohol use: Never    Drug use: Never    Sexual activity: Yes     Partners: Female     Comment: wife     Review of Systems   Constitutional:  Positive for chills, fatigue and fever.   HENT:  Negative for congestion, ear pain, rhinorrhea and sore throat.    Eyes:  Negative for visual disturbance.   Respiratory:  Positive for cough. Negative for choking, chest tightness and shortness of breath.    Gastrointestinal:  Negative for abdominal pain, constipation, diarrhea, nausea and vomiting.   Genitourinary:  Negative for difficulty urinating, dysuria and frequency.   Musculoskeletal:  Negative for arthralgias, back pain, joint swelling and myalgias.   Neurological:  Negative for dizziness, facial asymmetry and headaches.   Psychiatric/Behavioral:  Negative for agitation and confusion.    Objective:     Vital Signs (Most Recent):  Temp: 99.7 °F (37.6 °C) (10/09/22 1554)  Pulse: (!) 119 (10/09/22 1554)  Resp: 18 (10/09/22 1554)  BP: (!) 118/57 (10/09/22 1554)  SpO2: (!) 94 % (10/09/22 1223)   Vital Signs (24h Range):  Temp:  [98.3 °F (36.8 °C)-101.9 °F (38.8 °C)] 99.7 °F (37.6 °C)  Pulse:  [114-137] 119  Resp:  [10-28] 18  SpO2:  [92 %-98 %] 94 %  BP: (100-130)/(54-88) 118/57     Weight: 54 kg (119 lb)  Body mass index is 18.64 kg/m².    Estimated Creatinine Clearance: 73.9 mL/min (based on SCr of 0.7 mg/dL).    Physical Exam  Vitals reviewed.   Constitutional:       Appearance: Normal appearance.   HENT:      Head: Normocephalic and atraumatic.      Nose: Nose normal.   Eyes:      Conjunctiva/sclera: Conjunctivae normal.      Pupils: Pupils are equal, round, and reactive to light.   Cardiovascular:      Rate and Rhythm: Tachycardia present.   Pulmonary:      Effort: Pulmonary effort is normal.      Breath sounds: Normal breath sounds.   Abdominal:      General: Abdomen is flat. There is no distension.      Palpations: Abdomen  is soft.      Tenderness: There is no abdominal tenderness.   Musculoskeletal:         General: Normal range of motion.      Cervical back: Normal range of motion and neck supple.      Comments: RLE is erythematous on anterior aspect with skin break and an intact blister distally- SEE image.    Skin:     Comments: B/l groin sites with clean dressing  Lt chest incision has no drainage or fluctuance   Neurological:      General: No focal deficit present.      Mental Status: He is alert and oriented to person, place, and time.       Significant Labs: Blood Culture:   Recent Labs   Lab 10/07/22  2345   LABBLOO No Growth to date  No Growth to date  No Growth to date  No Growth to date     All pertinent labs within the past 24 hours have been reviewed.    Significant Imaging: I have reviewed all pertinent imaging results/findings within the past 24 hours.

## 2022-10-09 NOTE — PROGRESS NOTES
[facility] Card[facility] Medicine  Progress Note    Patient Name: Babatunde Singh  MRN: [id number]  Patient Class: IP- Inpatient   Admission Date: 10/2/2022  Length of Stay: 7 days  Attending Physician: LEI Ortiz III, MD  Primary Care Provider: Esdras Ly MD        Subjective:     Principal Problem:Critical limb ischemia of right lower extremity with ulceration of lower leg        HPI:  Babatunde Singh is a 71 yo M withPAD, HTN, HLD, recent diagnosis of DVT (9/8) on xarelto who presents due to RLE pain. Of note, patient has frequent admission for lower extremity pain. He states that on the day of his presentation, he has had increasing 10/10 aching pain which woke him up from sleep every few hours. This has been progressing over the last 2-3 days prior to admission. He states at baseline he has pain every other night. In the ER, RLE distal pulses were absent on presentation, unable to palpate or obtain Doppler signal.           Overview/Hospital Course:  BRENNAN was performed which showed Right Leg with PVR waveforms suggest ischemic peripheral arterial occlusive disease. No audible Doppler signal detected to obtain a reliable BRENNAN. TBI of 0 with a Great toe pressure of 0 mmHg is noted. Left Leg PVR waveforms also suggested ischemic peripheral arterial occlusive disease. No audible Doppler signal detected to obtain a   reliable BRENNAN.     CTA runoff abdomen bilateral LE showed Possible worsening stenosis or interval occlusion of the right dorsalis pedis artery.  Overall evaluation of lower extremity arteries is otherwise grossly unchanged noting regions of persistent occlusion with two vessel runoff to the bilateral lower extremities. Xarelto was held and pt was started on heparin ggt. IR was consulted, but endoscopically is no option for vascularization. Vascular surgery was consulted, and they scheduled the pt for intervention. Cardiology was consulted for cardiac clearance, and pt has been cleared  with risk as per cardiology. Pt underwent L axillary to BILATERAL femoris profunda bypass with 8 mm PTFE on 10/6 by vascular.  He developed post operative delirium.     Regarding the right renal mass seen on the CT abdomen/pelvis, urology recommended outpt intervention.    On 10/7, pt became septic. CXR, UA and blood cx were ordered and he was started on empiric abx with zosyn and vanco. He was given 3 L bolus of lactate ringer and started on maintenance fluid. UA was negative. CXR ruled out pneumonia. Lactic acid is wnl. Blood cx is negative to date.     On 10/8; vascular surgery requested transfer of pt's care to their service for post operative management. Medicine will follow as a consultant.            No new subjective & objective note has been filed under this hospital service since the last note was generated.    Assessment/Plan:      * Critical limb ischemia of right lower extremity with ulceration of lower leg  CTA runoff was reviewed.  Pt is s/p left axillary to BILATERAL femoris profunda bypass with 8 mm PTFE on 10/6 by vascular surgery. Appreciate vascular input.  Continue aspirin, cilostazol and Zetia/statin.      Severe sepsis  My overall impression is sepsis possibly 2/2 infected RLE ulcers. Vital signs were reviewed and noted in progress note.  Antibiotics given-   Antibiotics (From admission, onward)      Start     Stop Route Frequency Ordered    10/08/22 1000  vancomycin 750 mg in dextrose 5 % 250 mL IVPB (ready to mix system)         -- IV Every 12 hours (non-standard times) 10/07/22 2115    10/07/22 2145  piperacillin-tazobactam 4.5 g in sodium chloride 0.9% 100 mL IVPB (ready to mix system)         -- IV Every 8 hours (non-standard times) 10/07/22 2041    10/07/22 2140  vancomycin - pharmacy to dose  (vancomycin IVPB)        See Edie for full Linked Orders Report.    -- IV pharmacy to manage frequency 10/07/22 2041    10/06/22 2100  mupirocin 2 % ointment         10/11 2059 Nasl 2 times  daily 10/06/22 1517          Cultures were taken-   Microbiology Results (last 7 days)       Procedure Component Value Units Date/Time    Urine culture [[id number]] Collected: 10/09/22 1109    Order Status: Sent Specimen: Urine, Clean Catch Updated: 10/09/22 1109    Influenza A & B by Molecular [[id number]] Collected: 10/09/22 1000    Order Status: Sent Specimen: Nasopharyngeal Swab Updated: 10/09/22 1047    Blood culture [[id number]] Collected: 10/09/22 1012    Order Status: Sent Specimen: Blood Updated: 10/09/22 1046    Blood culture [[id number]] Collected: 10/07/22 2345    Order Status: Completed Specimen: Blood Updated: 10/09/22 0613     Blood Culture, Routine No Growth to date      No Growth to date    Blood culture [[id number]] Collected: 10/07/22 2345    Order Status: Completed Specimen: Blood Updated: 10/09/22 0613     Blood Culture, Routine No Growth to date      No Growth to date          Latest lactate reviewed, they are-  Recent Labs   Lab 10/08/22  0044 10/09/22  0102   LACTATE 1.6 1.6       Organ dysfunction indicated by Encephalopathy   Source- Possible infected leg wounds    Source control Achieved by- starting abx.     CXR negative for PNA  Surgical site left chest wall was clean.  UA is negative. Urine Cx ordered on 10/9 following byers removal.  Blood Cx 10/7: NGTD   Procalcitonin ordered, pending.  No purulent discharge from ulcers or signs concerning for infection on exam. Clinical status improving on abx despite ongoing leukocytosis. Low index of suspicion for DVT. Will continue to monitor and follow-up pending studies.  Influenza and covid tests pending    Encephalopathy, metabolic  Likely multifactorial related to infection, plus the anesthesia pt received during his surgical procedure.  Improved while being on abx. AAOx4 on 10/9. Responds appropriately and coherently on exam.  Continue to monitor.       DVT (deep venous thrombosis)  Duplex imaging of the right lower extremity veins on 10/5  demonstrates occlusive sub-acute thrombus of the anterior tibial vein (the anterior paired vessel).   Pt was initially on heparin ggt and now on xarelto 20 mg qhs.   - 10/9: likely not cause of new fever.   Continue to monitor      Absent pedal pulses  Absent pedial pulse of the right foot.   Pt is s/p left axillary to BILATERAL femoris profunda bypass with 8 mm PTFE on 10/6 by vascular surgery.   Continue statin, aspirin, and cilostazol.      Atherosclerosis of native artery of extremity  Pt is s/p left axillary to BILATERAL femoris profunda bypass with 8 mm PTFE on 10/6 by vascular surgery. Mgmt per primary  Continue statin, zetia, aspirin, and cilostazol.    Right renal mass  Outpatient intervention with urology    Right leg pain  Due to ischemia.   S/p intervention on 10/6 by vascular surgery, managed by primary  Pain improving since procedure      Claudication of right lower extremity  Duo to ischemic leg. Improved post procedure.   - Continue cilostazol    Mixed hyperlipidemia  On Zetia and lipitor.      Tobacco abuse  Has been consulted over 10 minutes regarding need to quit smoking, and emphazised its negative consequences on his leg ischemia.      Primary hypertension  Continue to hold home losartan/HCTZ, given concern for sepsis and decreased PO intake     VTE Risk Mitigation (From admission, onward)           Ordered     rivaroxaban tablet 20 mg  With dinner         10/07/22 0818     IP VTE HIGH RISK PATIENT  Once         10/06/22 1516     Place sequential compression device  Until discontinued         10/06/22 1516     Place sequential compression device  Until discontinued         10/02/22 0814                    Discharge Planning   LAZ: 10/10/2022     Code Status: Full Code   Is the patient medically ready for discharge?: No    Reason for patient still in hospital (select all that apply): Patient unstable and Laboratory test  Discharge Plan A: Skilled Nursing Facility          Felix Dennis  MD  Department of Hospital Medicine   [facility] Stepdown

## 2022-10-09 NOTE — CONSULTS
[facility] - Cardiology Stepdown  Infectious Disease  Consult Note    Patient Name: Babatunde Singh  MRN: [id number]  Admission Date: 10/2/2022  Hospital Length of Stay: 7 days  Attending Physician: LEI Ortiz III, MD  Primary Care Provider: Esdras Ly MD     Isolation Status: No active isolations    Patient information was obtained from patient, past medical records and ER records.      Inpatient consult to Infectious Diseases  Consult performed by: Corina Lopez MD  Consult ordered by: Leland uMrillo MD        Assessment/Plan:     Lower limb ischemia  Fever  71 yo M with hx of rt renal mass, HTN, HLD, recent diagnosis of DVT (9/8/22) on xarelto, PAD s/p  axio-fem-fem bypass on 10/6. ID consulted for sepsis of unknown etiology.    Per chart review, patient was first febrile on POD#1, 10/7 and again on 10/8. Leukocytosis present since 10/6. Started on empiric vanc and zosyn on 10/7. BCx 10/7 is NGTD. CXR revealed b/l edema. Pt denies SOB, dysuria, diarrhea, arthralgias, neck/ back pain. Reports a chronic cough. B/L fem surgical sites are covered with clean dressings. Lt chest incision has no fluctuance or drainage. RLE is erythematous with a skin tear where a blister was previously.     Differentials include post-op fever, surgical site infection, new DVT (off AC prior to sx), RLE cellulitis.    BCx 10/7 NGTD  BCX 10/9 ordered  UA bland; and asymptomatic  Flu neg    Recommendations:  --US b/l UE and LE to r/o blood clot  --Incentive spirometry  --continue vanc for now  --would switch pip-tazo to cefepime to avoid YAQUELIN  --will f/u BCx        Right renal mass  --has planned outpatient f/u with urology         Thank you for your consult. I will follow-up with patient. Please contact us if you have any additional questions.    Corina Lopez MD  Infectious Disease  [facility] - [facility] Step[facility]    Subjective:     Principal Problem: Critical limb ischemia of right lower extremity with  ulceration of lower leg    HPI: Mr. 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 Underwent successful axio-fem-fem bypass on 10/6. ID consulted for sepsis of unknown etiology.    Per chart review, patient was first febrile on POD#1, 10/7 and again on 10/8. Leukocytosis present since 10/6. Started on empiric vanc and zosyn on 10/7. BCx 10/7 is NGTD. CXR revealed b/l edema. Pt denies SOB, dysuria, diarrhea, arthralgias, neck/ back pain. Reports a chronic cough. B/L fem surgical sites are covered with clean dressings. Lt chest incision has no fluctuance or drainage. RLE is erythematous with a skin tear where a blister was previously.       Past Medical History:   Diagnosis Date    Hypertension     Peripheral arterial disease        Past Surgical History:   Procedure Laterality Date    CREATION OF AXILLARY-FEMORAL ARTERY BYPASS WITH GRAFT N/A 10/6/2022    Procedure: CREATION, BYPASS, ARTERIAL, AXILLARY TO FEMORAL, USING GRAFT;  Surgeon: LEI Ortiz III, MD;  Location: [address];  Service: Peripheral Vascular;  Laterality: N/A;    CREATION OF FEMORAL-FEMORAL ARTERY BYPASS WITH GRAFT N/A 10/6/2022    Procedure: CREATION, BYPASS, ARTERIAL, FEMORAL TO FEMORAL, USING GRAFT;  Surgeon: LEI Ortiz III, MD;  Location: [address];  Service: Peripheral Vascular;  Laterality: N/A;    EXCISION OF HYDROCELE Left 4/26/2022    Procedure: HYDROCELECTOMY;  Surgeon: Damion Roberts MD;  Location: [facility];  Service: Urology;  Laterality: Left;    KNEE SURGERY  1972       Review of patient's allergies indicates:  No Known Allergies    Medications:  Medications Prior to Admission   Medication Sig    acetaminophen (TYLENOL) 325 MG tablet Take 2 tablets (650 mg total) by mouth every 8 (eight) hours as needed.    aspirin 81 MG Chew Chew and swallow 1 tablet (81 mg  total) by mouth once daily.    atorvastatin (LIPITOR) 80 MG tablet Take 1 tablet (80 mg total) by mouth once daily.    bacitracin 500 unit/gram ointment Apply topically 2 (two) times daily.    cilostazoL (PLETAL) 100 MG Tab Take 1 tablet (100 mg total) by mouth 2 (two) times daily.    diclofenac sodium (VOLTAREN) 1 % Gel Apply 2 g topically 3 (three) times daily as needed (muscle spasm pain). Apply 2 grams to affected area 3 times daily as needed    ezetimibe (ZETIA) 10 mg tablet Take 1 tablet (10 mg total) by mouth once daily.    gabapentin (NEURONTIN) 300 MG capsule Take 3 capsules (900 mg total) by mouth 3 (three) times daily.    LIDOcaine (LIDODERM) 5 % Apply to affected area as needed for pain for 12 hours, then off for 12 hours. Discard after each use.  May use 4% lidocaine patch as alternative.    losartan-hydrochlorothiazide 50-12.5 mg (HYZAAR) 50-12.5 mg per tablet Take 1 tablet by mouth once daily.    multivitamin (THERAGRAN) per tablet Take 1 tablet by mouth once daily.    mupirocin (BACTROBAN) 2 % ointment Apply topically 2 (two) times daily.    naproxen (NAPROSYN) 500 MG tablet Take 1 tablet (500 mg total) by mouth 2 (two) times daily as needed (pain).    rivaroxaban (XARELTO DVT-PE TREAT 30D START) 15 mg (42)- 20 mg (9) tablet dose pack Take 1 tablet (15 mg) by mouth twice daily with food for 21 days followed by 1 tablet (20 mg) by mouth once daily with food    terbinafine HCl (LAMISIL ORAL) Take by mouth once daily at 6am.     Antibiotics (From admission, onward)      Start     Stop Route Frequency Ordered    10/09/22 2100  vancomycin 1.25 g in dextrose 5% 250 mL IVPB (ready to mix)         -- IV Every 12 hours (non-standard times) 10/09/22 1159    10/07/22 2145  piperacillin-tazobactam 4.5 g in sodium chloride 0.9% 100 mL IVPB (ready to mix system)         -- IV Every 8 hours (non-standard times) 10/07/22 2041    10/07/22 2140  vancomycin - pharmacy to dose  (vancomycin IVPB)        See Hyperspace  for full Linked Orders Report.    -- IV pharmacy to manage frequency 10/07/22 2041    10/06/22 2100  mupirocin 2 % ointment         10/11 2059 Nasl 2 times daily 10/06/22 1517          Antifungals (From admission, onward)      None          Antivirals (From admission, onward)      None             Immunization History   Administered Date(s) Administered    COVID-19, MRNA, LN-S, PF (MODERNA FULL 0.5 ML DOSE) 02/10/2021, 03/10/2021, 12/31/2021       Family History       Problem Relation (Age of Onset)    Hypertension Mother, Father          Social History     Socioeconomic History    Marital status:    Occupational History    Occupation: [employer]   Tobacco Use    Smoking status: Former     Packs/day: 0.50     Years: 50.00     Pack years: 25.00     Types: Cigarettes    Smokeless tobacco: Never    Tobacco comments:     Cigarettes3-4 per day   Substance and Sexual Activity    Alcohol use: Never    Drug use: Never    Sexual activity: Yes     Partners: Female     Comment: wife     Review of Systems   Constitutional:  Positive for chills, fatigue and fever.   HENT:  Negative for congestion, ear pain, rhinorrhea and sore throat.    Eyes:  Negative for visual disturbance.   Respiratory:  Positive for cough. Negative for choking, chest tightness and shortness of breath.    Gastrointestinal:  Negative for abdominal pain, constipation, diarrhea, nausea and vomiting.   Genitourinary:  Negative for difficulty urinating, dysuria and frequency.   Musculoskeletal:  Negative for arthralgias, back pain, joint swelling and myalgias.   Neurological:  Negative for dizziness, facial asymmetry and headaches.   Psychiatric/Behavioral:  Negative for agitation and confusion.    Objective:     Vital Signs (Most Recent):  Temp: 99.7 °F (37.6 °C) (10/09/22 1554)  Pulse: (!) 119 (10/09/22 1554)  Resp: 18 (10/09/22 1554)  BP: (!) 118/57 (10/09/22 1554)  SpO2: (!) 94 % (10/09/22 1223)   Vital Signs (24h Range):  Temp:  [98.3 °F (36.8  °C)-101.9 °F (38.8 °C)] 99.7 °F (37.6 °C)  Pulse:  [114-137] 119  Resp:  [10-28] 18  SpO2:  [92 %-98 %] 94 %  BP: (100-130)/(54-88) 118/57     Weight: 54 kg (119 lb)  Body mass index is 18.64 kg/m².    Estimated Creatinine Clearance: 73.9 mL/min (based on SCr of 0.7 mg/dL).    Physical Exam  Vitals reviewed.   Constitutional:       Appearance: Normal appearance.   HENT:      Head: Normocephalic and atraumatic.      Nose: Nose normal.   Eyes:      Conjunctiva/sclera: Conjunctivae normal.      Pupils: Pupils are equal, round, and reactive to light.   Cardiovascular:      Rate and Rhythm: Tachycardia present.   Pulmonary:      Effort: Pulmonary effort is normal.      Breath sounds: Normal breath sounds.   Abdominal:      General: Abdomen is flat. There is no distension.      Palpations: Abdomen is soft.      Tenderness: There is no abdominal tenderness.   Musculoskeletal:         General: Normal range of motion.      Cervical back: Normal range of motion and neck supple.      Comments: RLE is erythematous on anterior aspect with skin break and an intact blister distally- SEE image.    Skin:     Comments: B/l groin sites with clean dressing  Lt chest incision has no drainage or fluctuance   Neurological:      General: No focal deficit present.      Mental Status: He is alert and oriented to person, place, and time.     10/2/22    10/9/22        Significant Labs: Blood Culture:   Recent Labs   Lab 10/07/22  2345   LABBLOO No Growth to date  No Growth to date  No Growth to date  No Growth to date     All pertinent labs within the past 24 hours have been reviewed.    Significant Imaging: I have reviewed all pertinent imaging results/findings within the past 24 hours.

## 2022-10-10 PROBLEM — R00.0 TACHYCARDIA: Status: ACTIVE | Noted: 2022-10-10

## 2022-10-10 LAB
ALBUMIN SERPL BCP-MCNC: 1.9 G/DL (ref 3.5–5.2)
ALP SERPL-CCNC: 70 U/L (ref 55–135)
ALT SERPL W/O P-5'-P-CCNC: 19 U/L (ref 10–44)
ANION GAP SERPL CALC-SCNC: 8 MMOL/L (ref 8–16)
ASCENDING AORTA: 3.16 CM
AST SERPL-CCNC: 33 U/L (ref 10–40)
AV INDEX (PROSTH): 0.69
AV MEAN GRADIENT: 10 MMHG
AV PEAK GRADIENT: 17 MMHG
AV VALVE AREA: 2.65 CM2
AV VELOCITY RATIO: 0.81
BACTERIA UR CULT: NORMAL
BASOPHILS # BLD AUTO: 0.04 K/UL (ref 0–0.2)
BASOPHILS NFR BLD: 0.3 % (ref 0–1.9)
BILIRUB SERPL-MCNC: 0.6 MG/DL (ref 0.1–1)
BNP SERPL-MCNC: 35 PG/ML (ref 0–99)
BSA FOR ECHO PROCEDURE: 1.6 M2
BUN SERPL-MCNC: 11 MG/DL (ref 8–23)
CALCIUM SERPL-MCNC: 8.2 MG/DL (ref 8.7–10.5)
CHLORIDE SERPL-SCNC: 106 MMOL/L (ref 95–110)
CO2 SERPL-SCNC: 22 MMOL/L (ref 23–29)
CREAT SERPL-MCNC: 0.7 MG/DL (ref 0.5–1.4)
CV ECHO LV RWT: 0.47 CM
DIFFERENTIAL METHOD: ABNORMAL
DOP CALC AO PEAK VEL: 2.08 M/S
DOP CALC AO VTI: 29.25 CM
DOP CALC LVOT AREA: 3.8 CM2
DOP CALC LVOT DIAMETER: 2.21 CM
DOP CALC LVOT PEAK VEL: 1.68 M/S
DOP CALC LVOT STROKE VOLUME: 77.6 CM3
DOP CALC MV VTI: 19.62 CM
DOP CALCLVOT PEAK VEL VTI: 20.24 CM
E WAVE DECELERATION TIME: 78.59 MSEC
E/A RATIO: 0.68
ECHO LV POSTERIOR WALL: 0.98 CM (ref 0.6–1.1)
EJECTION FRACTION: 75 %
EOSINOPHIL # BLD AUTO: 0.2 K/UL (ref 0–0.5)
EOSINOPHIL NFR BLD: 1.5 % (ref 0–8)
ERYTHROCYTE [DISTWIDTH] IN BLOOD BY AUTOMATED COUNT: 18.2 % (ref 11.5–14.5)
EST. GFR  (NO RACE VARIABLE): >60 ML/MIN/1.73 M^2
FRACTIONAL SHORTENING: 28 % (ref 28–44)
GLUCOSE SERPL-MCNC: 103 MG/DL (ref 70–110)
HCT VFR BLD AUTO: 27.5 % (ref 40–54)
HGB BLD-MCNC: 8.3 G/DL (ref 14–18)
IMM GRANULOCYTES # BLD AUTO: 0.11 K/UL (ref 0–0.04)
IMM GRANULOCYTES NFR BLD AUTO: 0.8 % (ref 0–0.5)
INTERVENTRICULAR SEPTUM: 0.96 CM (ref 0.6–1.1)
LA MAJOR: 4.4 CM
LA MINOR: 4.43 CM
LA WIDTH: 3.28 CM
LEFT ATRIUM SIZE: 3.79 CM
LEFT ATRIUM VOLUME INDEX MOD: 24.4 ML/M2
LEFT ATRIUM VOLUME INDEX: 28.8 ML/M2
LEFT ATRIUM VOLUME MOD: 39.59 CM3
LEFT ATRIUM VOLUME: 46.65 CM3
LEFT INTERNAL DIMENSION IN SYSTOLE: 2.97 CM (ref 2.1–4)
LEFT VENTRICLE DIASTOLIC VOLUME INDEX: 47.12 ML/M2
LEFT VENTRICLE DIASTOLIC VOLUME: 76.33 ML
LEFT VENTRICLE MASS INDEX: 80 G/M2
LEFT VENTRICLE SYSTOLIC VOLUME INDEX: 21.1 ML/M2
LEFT VENTRICLE SYSTOLIC VOLUME: 34.11 ML
LEFT VENTRICULAR INTERNAL DIMENSION IN DIASTOLE: 4.15 CM (ref 3.5–6)
LEFT VENTRICULAR MASS: 129.06 G
LYMPHOCYTES # BLD AUTO: 2.7 K/UL (ref 1–4.8)
LYMPHOCYTES NFR BLD: 18.6 % (ref 18–48)
MCH RBC QN AUTO: 26.3 PG (ref 27–31)
MCHC RBC AUTO-ENTMCNC: 30.2 G/DL (ref 32–36)
MCV RBC AUTO: 87 FL (ref 82–98)
MONOCYTES # BLD AUTO: 1.3 K/UL (ref 0.3–1)
MONOCYTES NFR BLD: 8.7 % (ref 4–15)
MV A" WAVE DURATION": 7.99 MSEC
MV MEAN GRADIENT: 1 MMHG
MV PEAK A VEL: 1.44 M/S
MV PEAK E VEL: 0.98 M/S
MV PEAK GRADIENT: 9 MMHG
MV STENOSIS PRESSURE HALF TIME: 22.79 MS
MV VALVE AREA BY CONTINUITY EQUATION: 3.96 CM2
MV VALVE AREA P 1/2 METHOD: 9.65 CM2
NEUTROPHILS # BLD AUTO: 10.2 K/UL (ref 1.8–7.7)
NEUTROPHILS NFR BLD: 70.1 % (ref 38–73)
NRBC BLD-RTO: 0 /100 WBC
PISA TR MAX VEL: 2.89 M/S
PLATELET # BLD AUTO: 422 K/UL (ref 150–450)
PMV BLD AUTO: 9.1 FL (ref 9.2–12.9)
POCT GLUCOSE: 191 MG/DL (ref 70–110)
POTASSIUM SERPL-SCNC: 3.6 MMOL/L (ref 3.5–5.1)
PROT SERPL-MCNC: 4.9 G/DL (ref 6–8.4)
PULM VEIN S/D RATIO: 1.87
PV PEAK D VEL: 0.3 M/S
PV PEAK S VEL: 0.56 M/S
RA MAJOR: 3.57 CM
RA PRESSURE: 3 MMHG
RA WIDTH: 2.71 CM
RBC # BLD AUTO: 3.16 M/UL (ref 4.6–6.2)
RIGHT VENTRICULAR END-DIASTOLIC DIMENSION: 2.29 CM
SINUS: 3.24 CM
SODIUM SERPL-SCNC: 136 MMOL/L (ref 136–145)
STJ: 3.16 CM
TR MAX PG: 33 MMHG
TRICUSPID ANNULAR PLANE SYSTOLIC EXCURSION: 2.3 CM
TROPONIN I SERPL DL<=0.01 NG/ML-MCNC: 0.25 NG/ML (ref 0–0.03)
TROPONIN I SERPL DL<=0.01 NG/ML-MCNC: 0.25 NG/ML (ref 0–0.03)
TV REST PULMONARY ARTERY PRESSURE: 36 MMHG
WBC # BLD AUTO: 14.55 K/UL (ref 3.9–12.7)

## 2022-10-10 PROCEDURE — 93010 ELECTROCARDIOGRAM REPORT: CPT | Mod: ,,, | Performed by: INTERNAL MEDICINE

## 2022-10-10 PROCEDURE — 97535 SELF CARE MNGMENT TRAINING: CPT

## 2022-10-10 PROCEDURE — 99222 1ST HOSP IP/OBS MODERATE 55: CPT | Mod: ,,, | Performed by: NURSE PRACTITIONER

## 2022-10-10 PROCEDURE — 25000003 PHARM REV CODE 250: Performed by: STUDENT IN AN ORGANIZED HEALTH CARE EDUCATION/TRAINING PROGRAM

## 2022-10-10 PROCEDURE — 99233 SBSQ HOSP IP/OBS HIGH 50: CPT | Mod: GC,,, | Performed by: HOSPITALIST

## 2022-10-10 PROCEDURE — 20600001 HC STEP DOWN PRIVATE ROOM

## 2022-10-10 PROCEDURE — S4991 NICOTINE PATCH NONLEGEND: HCPCS | Performed by: STUDENT IN AN ORGANIZED HEALTH CARE EDUCATION/TRAINING PROGRAM

## 2022-10-10 PROCEDURE — 93010 EKG 12-LEAD: ICD-10-PCS | Mod: ,,, | Performed by: INTERNAL MEDICINE

## 2022-10-10 PROCEDURE — 86580 TB INTRADERMAL TEST: CPT

## 2022-10-10 PROCEDURE — 87040 BLOOD CULTURE FOR BACTERIA: CPT | Performed by: INTERNAL MEDICINE

## 2022-10-10 PROCEDURE — 99222 PR INITIAL HOSPITAL CARE,LEVL II: ICD-10-PCS | Mod: ,,, | Performed by: NURSE PRACTITIONER

## 2022-10-10 PROCEDURE — 99233 PR SUBSEQUENT HOSPITAL CARE,LEVL III: ICD-10-PCS | Mod: GC,,, | Performed by: HOSPITALIST

## 2022-10-10 PROCEDURE — 99232 PR SUBSEQUENT HOSPITAL CARE,LEVL II: ICD-10-PCS | Mod: GC,,, | Performed by: INTERNAL MEDICINE

## 2022-10-10 PROCEDURE — 93005 ELECTROCARDIOGRAM TRACING: CPT

## 2022-10-10 PROCEDURE — 25500020 PHARM REV CODE 255: Performed by: SURGERY

## 2022-10-10 PROCEDURE — 36415 COLL VENOUS BLD VENIPUNCTURE: CPT | Performed by: STUDENT IN AN ORGANIZED HEALTH CARE EDUCATION/TRAINING PROGRAM

## 2022-10-10 PROCEDURE — 80053 COMPREHEN METABOLIC PANEL: CPT | Performed by: STUDENT IN AN ORGANIZED HEALTH CARE EDUCATION/TRAINING PROGRAM

## 2022-10-10 PROCEDURE — 30200315 PPD INTRADERMAL TEST REV CODE 302

## 2022-10-10 PROCEDURE — 97530 THERAPEUTIC ACTIVITIES: CPT

## 2022-10-10 PROCEDURE — 63600175 PHARM REV CODE 636 W HCPCS: Performed by: SURGERY

## 2022-10-10 PROCEDURE — 99232 SBSQ HOSP IP/OBS MODERATE 35: CPT | Mod: GC,,, | Performed by: INTERNAL MEDICINE

## 2022-10-10 PROCEDURE — 84484 ASSAY OF TROPONIN QUANT: CPT | Mod: 91 | Performed by: STUDENT IN AN ORGANIZED HEALTH CARE EDUCATION/TRAINING PROGRAM

## 2022-10-10 PROCEDURE — 25000003 PHARM REV CODE 250: Performed by: INTERNAL MEDICINE

## 2022-10-10 PROCEDURE — 97116 GAIT TRAINING THERAPY: CPT

## 2022-10-10 PROCEDURE — 36415 COLL VENOUS BLD VENIPUNCTURE: CPT | Performed by: INTERNAL MEDICINE

## 2022-10-10 PROCEDURE — 25000003 PHARM REV CODE 250: Performed by: SURGERY

## 2022-10-10 PROCEDURE — 94761 N-INVAS EAR/PLS OXIMETRY MLT: CPT

## 2022-10-10 PROCEDURE — 85025 COMPLETE CBC W/AUTO DIFF WBC: CPT | Performed by: STUDENT IN AN ORGANIZED HEALTH CARE EDUCATION/TRAINING PROGRAM

## 2022-10-10 PROCEDURE — 94640 AIRWAY INHALATION TREATMENT: CPT

## 2022-10-10 PROCEDURE — 83880 ASSAY OF NATRIURETIC PEPTIDE: CPT | Performed by: STUDENT IN AN ORGANIZED HEALTH CARE EDUCATION/TRAINING PROGRAM

## 2022-10-10 PROCEDURE — 25000003 PHARM REV CODE 250

## 2022-10-10 PROCEDURE — 99900035 HC TECH TIME PER 15 MIN (STAT)

## 2022-10-10 RX ORDER — CEFPODOXIME PROXETIL 200 MG/1
400 TABLET, FILM COATED ORAL EVERY 12 HOURS
Status: DISCONTINUED | OUTPATIENT
Start: 2022-10-10 | End: 2022-10-12 | Stop reason: HOSPADM

## 2022-10-10 RX ORDER — CEFEPIME HYDROCHLORIDE 1 G/50ML
2 INJECTION, SOLUTION INTRAVENOUS
Status: DISCONTINUED | OUTPATIENT
Start: 2022-10-10 | End: 2022-10-10

## 2022-10-10 RX ORDER — DOXYCYCLINE HYCLATE 100 MG
100 TABLET ORAL EVERY 12 HOURS
Status: DISCONTINUED | OUTPATIENT
Start: 2022-10-10 | End: 2022-10-12 | Stop reason: HOSPADM

## 2022-10-10 RX ADMIN — ASPIRIN 81 MG: 81 TABLET, CHEWABLE ORAL at 08:10

## 2022-10-10 RX ADMIN — TUBERCULIN PURIFIED PROTEIN DERIVATIVE 5 UNITS: 5 INJECTION, SOLUTION INTRADERMAL at 06:10

## 2022-10-10 RX ADMIN — DOXYCYCLINE HYCLATE 100 MG: 100 TABLET, COATED ORAL at 09:10

## 2022-10-10 RX ADMIN — CEFPODOXIME PROXETIL 400 MG: 200 TABLET, FILM COATED ORAL at 09:10

## 2022-10-10 RX ADMIN — NICOTINE 1 PATCH: 14 PATCH, EXTENDED RELEASE TRANSDERMAL at 08:10

## 2022-10-10 RX ADMIN — GABAPENTIN 900 MG: 300 CAPSULE ORAL at 04:10

## 2022-10-10 RX ADMIN — VANCOMYCIN HYDROCHLORIDE 1250 MG: 1.25 INJECTION, POWDER, LYOPHILIZED, FOR SOLUTION INTRAVENOUS at 08:10

## 2022-10-10 RX ADMIN — MUPIROCIN: 20 OINTMENT TOPICAL at 09:10

## 2022-10-10 RX ADMIN — CEFEPIME HYDROCHLORIDE 2 G: 2 INJECTION, SOLUTION INTRAVENOUS at 08:10

## 2022-10-10 RX ADMIN — TRAZODONE HYDROCHLORIDE 25 MG: 50 TABLET ORAL at 09:10

## 2022-10-10 RX ADMIN — RIVAROXABAN 20 MG: 20 TABLET, FILM COATED ORAL at 04:10

## 2022-10-10 RX ADMIN — GABAPENTIN 900 MG: 300 CAPSULE ORAL at 08:10

## 2022-10-10 RX ADMIN — Medication 6 MG: at 09:10

## 2022-10-10 RX ADMIN — CILOSTAZOL 100 MG: 100 TABLET ORAL at 09:10

## 2022-10-10 RX ADMIN — CILOSTAZOL 100 MG: 100 TABLET ORAL at 11:10

## 2022-10-10 RX ADMIN — EZETIMIBE 10 MG: 10 TABLET ORAL at 08:10

## 2022-10-10 RX ADMIN — GABAPENTIN 900 MG: 300 CAPSULE ORAL at 09:10

## 2022-10-10 RX ADMIN — HYDROCODONE BITARTRATE AND ACETAMINOPHEN 1 TABLET: 5; 325 TABLET ORAL at 04:10

## 2022-10-10 RX ADMIN — IOHEXOL 75 ML: 350 INJECTION, SOLUTION INTRAVENOUS at 04:10

## 2022-10-10 RX ADMIN — MUPIROCIN: 20 OINTMENT TOPICAL at 08:10

## 2022-10-10 RX ADMIN — ATORVASTATIN CALCIUM 80 MG: 40 TABLET, FILM COATED ORAL at 08:10

## 2022-10-10 NOTE — ASSESSMENT & PLAN NOTE
Mr Hardy is a 72 year old male with a pmh of smoking, severe PAD, dvt who presented with chronic limb ischemia and rest pain now s/p successful axio-fem-fem bypass on 10/6.  - Vanc/Zosyn transitioned to vanc cefepime per IR recs   - Home Xarelto   - melatonin QHS and trazodone QHS PRN  - delirium precautions  - PT/OT   - PMNR consult for rehab eval, will discuss placement with social work   - continue asa/statin cilostazol   - Blood cx and urine cx NGTD, WBC downtrending (14 from 17), pt has been afebrile x24H

## 2022-10-10 NOTE — CARE UPDATE
Patient with ongoing tachycardia today despite being afebrile for >24h and on appropriate abx therapy.  Given his high risk of thrombus/embolus with recent DVT, obtained CTA chest to rule out PE, as well as troponins/BNP/TTE for risk stratification.    Preliminary read on CTA is no PE, but awaiting final read.  TTE without severe RV strain or severely elevated PA pressure  Troponin elevated at 0.252. He does not complain of chest pain and feels well.    - EKG ordered to assess for dynamic changes, but suspect elevated troponin is due to demand ischemic in the setting of infection  - f/u official CTA read  - repeat troponin with next lab draw

## 2022-10-10 NOTE — HPI
Per chart review, Mr Hardy is a 72 year old male with a pmh of smoking, severe PAD, dvt who presented with chronic limb ischemia and rest pain now s/p successful axio-fem-fem bypass on 10/6.PM & R was consulted to evaluate pt for IPR placement.     Functional History: Patient lives  with spouse  in a single  story home with 4 steps  to enter.  Prior to admission,  pt was Mod I for mobility with SPC use and independent with ADLs.  DME: None.

## 2022-10-10 NOTE — SUBJECTIVE & OBJECTIVE
Past Medical History:   Diagnosis Date    Hypertension     Peripheral arterial disease      Past Surgical History:   Procedure Laterality Date    CREATION OF AXILLARY-FEMORAL ARTERY BYPASS WITH GRAFT N/A 10/6/2022    Procedure: CREATION, BYPASS, ARTERIAL, AXILLARY TO FEMORAL, USING GRAFT;  Surgeon: LEI Ortiz III, MD;  Location: [facility] OR [address] [place] [address];  Service: Peripheral Vascular;  Laterality: N/A;    CREATION OF FEMORAL-FEMORAL ARTERY BYPASS WITH GRAFT N/A 10/6/2022    Procedure: CREATION, BYPASS, ARTERIAL, FEMORAL TO FEMORAL, USING GRAFT;  Surgeon: LEI Ortiz III, MD;  Location: [facility] OR [address] [place] [address];  Service: Peripheral Vascular;  Laterality: N/A;    EXCISION OF HYDROCELE Left 4/26/2022    Procedure: HYDROCELECTOMY;  Surgeon: Damion Roberts MD;  Location: [facility];  Service: Urology;  Laterality: Left;    KNEE SURGERY  1972     Review of patient's allergies indicates:  No Known Allergies    Scheduled Medications:    aspirin  81 mg Oral Daily    atorvastatin  80 mg Oral Daily    ceFEPime (MAXIPIME) IVPB  2 g Intravenous Q8H    cilostazoL  100 mg Oral BID    ezetimibe  10 mg Oral Daily    gabapentin  900 mg Oral TID    melatonin  6 mg Oral Nightly    mupirocin   Nasal BID    nicotine  1 patch Transdermal Daily    rivaroxaban  20 mg Oral Daily with dinner    traZODone  25 mg Oral QHS    tuberculin  5 Units Intradermal Once    vancomycin (VANCOCIN) IVPB  1,250 mg Intravenous Q12H       PRN Medications: acetaminophen, haloperidol lactate, HYDROcodone-acetaminophen, ibuprofen, sodium chloride 0.9%, Pharmacy to dose Vancomycin consult **AND** vancomycin - pharmacy to dose    Family History       Problem Relation (Age of Onset)    Hypertension Mother, Father          Tobacco Use    Smoking status: Former     Packs/day: 0.50     Years: 50.00     Pack years: 25.00     Types: Cigarettes    Smokeless tobacco: Never    Tobacco comments:     Cigarettes3-4 per day   Substance and Sexual Activity    Alcohol use: Never    Drug  use: Never    Sexual activity: Yes     Partners: Female     Comment: wife     Review of Systems   Constitutional:  Positive for activity change.   Respiratory:  Negative for cough and shortness of breath.    Gastrointestinal:  Positive for constipation.   Musculoskeletal:  Positive for gait problem.   Skin:         RLE dsg with Kerlix in place.    Neurological:  Positive for dizziness and weakness.   Psychiatric/Behavioral:  Positive for decreased concentration. Negative for agitation, behavioral problems and confusion.    Objective:     Vital Signs (Most Recent):  Temp: 98.3 °F (36.8 °C) (10/10/22 1200)  Pulse: 110 (10/10/22 1200)  Resp: 18 (10/10/22 1200)  BP: 126/61 (10/10/22 1200)  SpO2: 95 % (10/10/22 1200)    Vital Signs (24h Range):  Temp:  [98.1 °F (36.7 °C)-99.7 °F (37.6 °C)] 98.3 °F (36.8 °C)  Pulse:  [110-129] 110  Resp:  [10-26] 18  SpO2:  [91 %-97 %] 95 %  BP: ()/(56-78) 126/61     Body mass index is 18.64 kg/m².    Physical Exam  Vitals and nursing note reviewed.   Constitutional:       General: He is awake.      Appearance: Normal appearance.   Pulmonary:      Effort: Pulmonary effort is normal.   Abdominal:      Palpations: Abdomen is soft.   Musculoskeletal:      Cervical back: Normal range of motion and neck supple.      Comments: Limited ROM RLE   Skin:     General: Skin is warm and dry.      Capillary Refill: Capillary refill takes 2 to 3 seconds.   Neurological:      General: No focal deficit present.      Mental Status: He is alert and oriented to person, place, and time.      GCS: GCS eye subscore is 4. GCS verbal subscore is 5. GCS motor subscore is 6.      Sensory: Sensation is intact.      Motor: Weakness present.      Gait: Gait abnormal.   Psychiatric:         Attention and Perception: Attention and perception normal.         Mood and Affect: Mood and affect normal.         Speech: Speech normal.         Behavior: Behavior is cooperative.         Cognition and Memory: Cognition and  memory normal.     NEUROLOGICAL EXAMINATION:     MENTAL STATUS   Oriented to person, place, and time.   Speech: speech is normal     Diagnostic Results: Labs: Reviewed

## 2022-10-10 NOTE — PT/OT/SLP PROGRESS
Occupational Therapy   Co-Treatment    Co-treat with PT due to medical complexity of pt and need for skilled hands for safe intervention.    Name: Babatunde Singh  MRN: [id number]  Admitting Diagnosis:  Critical limb ischemia of right lower extremity with ulceration of lower leg  4 Days Post-Op    Recommendations:     Discharge Recommendations: nursing facility, skilled  Discharge Equipment Recommendations:  other (see comments) (TBD)  Barriers to discharge:  Decreased caregiver support    Assessment:     Babatunde Singh is a 72 y.o. male with a medical diagnosis of Critical limb ischemia of right lower extremity with ulceration of lower leg.  Performance deficits affecting function are weakness, impaired balance, decreased safety awareness, impaired cognition, impaired self care skills, impaired functional mobility, gait instability, decreased coordination, impaired endurance, impaired skin. Pt was agreeable to PT/OT co-treat, AxOx4 however, pt presents w/ INC confusion and intermittent nonsensical speech throughout tx session. Pt is progressing towards fxnl goals as detailed below in progress report. Cont POC.     Rehab Prognosis:  Fair; patient would benefit from acute skilled OT services to address these deficits and reach maximum level of function.       Plan:     Patient to be seen 3 x/week to address the above listed problems via self-care/home management, therapeutic activities, therapeutic exercises  Plan of Care Expires: 11/06/22  Plan of Care Reviewed with: patient    Subjective     Pain/Comfort:  Pain Rating 1: 0/10    Objective:     Communicated with: nurse prior to session.  Patient found supine with telemetry, peripheral IV, byers catheter upon OT entry to room.    General Precautions: Standard, fall, other (see comments) (confused)   Orthopedic Precautions:    Braces: N/A  Respiratory Status: Room air    Bed Mobility:    Patient completed Scooting/Bridging with contact guard assistance  Patient completed  Supine to Sit with contact guard assistance     Functional Mobility/Transfers:  Patient completed Sit <> Stand Transfer with contact guard assistance  with  rolling walker   Patient completed Bed <> Chair Transfer using Step Transfer technique with contact guard assistance with rolling walker  Functional Mobility: pt ambulated ~80ft w/ CGA and RW, req'ing w/c follow for safety,as well as INC time and VC's/TCs for safety w/ RW management.    Activities of Daily Living:  Grooming: set up assist while seated EOC    Lower Body Dressing: maximal assistance seated EOB      AMPAC 6 Click ADL: 12    Treatment & Education:  Time provided for therapeutic counseling and discussion of health disposition.   Educated on importance of EOB/OOB mobility, maintaining routine, sitting up in chair, and maximizing independence with ADLs during admission   Pt completed ADLs and functional mobility for treatment session as noted above   Pt/caregiver verbalized understanding and expressed no further concerns/questions.      Patient left up in chair with all lines intact, call button in reach, bed alarm on, and sitter present    GOALS:   Multidisciplinary Problems       Occupational Therapy Goals          Problem: Occupational Therapy    Goal Priority Disciplines Outcome Interventions   Occupational Therapy Goal     OT, PT/OT Ongoing, Progressing    Description: Goals to be met by: 10/21/22     Patient will increase functional independence with ADLs by performing:    Feeding with Modified [place].  UE Dressing with Stand-by Assistance.  LE Dressing with Minimal Assistance.  Grooming while standing at sink with Contact Guard Assistance.  Toileting from toilet with Minimal Assistance for hygiene and clothing management.   Toilet transfer to toilet with Contact Guard Assistance.                         Time Tracking:     OT Date of Treatment: 10/10/22  OT Start Time: 0925  OT Stop Time: 0950  OT Total Time (min): 25 min    Billable  Minutes:Self Care/Home Management 25    OT/ALEX: OT          10/10/2022

## 2022-10-10 NOTE — SUBJECTIVE & OBJECTIVE
Interval History: Patient seen at bedside. Doing well with minimal pain only when getting out of bed but much improved since surgery. Leukocytosis resolving. Afebrile. Both likely due to procedure. May have some superimposed cellulitis.    Review of Systems   Constitutional:  Negative for chills, fatigue, fever and unexpected weight change.   HENT:  Negative for congestion, postnasal drip and trouble swallowing.    Respiratory:  Negative for cough, chest tightness and shortness of breath.    Cardiovascular:  Negative for chest pain, palpitations and leg swelling.   Gastrointestinal:  Negative for abdominal pain, blood in stool, constipation, diarrhea, nausea and vomiting.   Genitourinary:  Negative for difficulty urinating, dysuria and hematuria.   Musculoskeletal:  Positive for gait problem and myalgias. Negative for arthralgias and back pain.   Neurological:  Negative for dizziness and light-headedness.   Psychiatric/Behavioral:  Negative for confusion.    Objective:     Vital Signs (Most Recent):  Temp: 98.3 °F (36.8 °C) (10/10/22 1200)  Pulse: (!) 117 (10/10/22 1319)  Resp: 20 (10/10/22 1319)  BP: 126/61 (10/10/22 1200)  SpO2: 95 % (10/10/22 1319)   Vital Signs (24h Range):  Temp:  [98.1 °F (36.7 °C)-99.7 °F (37.6 °C)] 98.3 °F (36.8 °C)  Pulse:  [110-124] 117  Resp:  [10-22] 20  SpO2:  [91 %-97 %] 95 %  BP: ()/(56-72) 126/61     Weight: 54 kg (119 lb)  Body mass index is 18.64 kg/m².    Estimated Creatinine Clearance: 73.9 mL/min (based on SCr of 0.7 mg/dL).    Physical Exam  Constitutional:       General: He is not in acute distress.     Appearance: Normal appearance. He is not ill-appearing.   Cardiovascular:      Rate and Rhythm: Normal rate and regular rhythm.      Pulses: Normal pulses.      Heart sounds: Normal heart sounds. No murmur heard.    No friction rub. No gallop.   Pulmonary:      Effort: Pulmonary effort is normal. No respiratory distress.      Breath sounds: Normal breath sounds.    Abdominal:      General: Abdomen is flat. Bowel sounds are normal. There is no distension.      Palpations: Abdomen is soft.      Tenderness: There is no abdominal tenderness.   Skin:     General: Skin is warm and dry.      Comments: Some erythema involving RLE   Neurological:      Mental Status: He is alert.       Significant Labs: All pertinent labs within the past 24 hours have been reviewed.    Significant Imaging: I have reviewed all pertinent imaging results/findings within the past 24 hours.

## 2022-10-10 NOTE — SUBJECTIVE & OBJECTIVE
Interval history: NAEON. AF, tachycardic to 129.   Seen by ID  Feels well this am      Medications:  Continuous Infusions:  Scheduled Meds:   aspirin  81 mg Oral Daily    atorvastatin  80 mg Oral Daily    ceFEPime (MAXIPIME) IVPB  2 g Intravenous Q8H    cilostazoL  100 mg Oral BID    ezetimibe  10 mg Oral Daily    gabapentin  900 mg Oral TID    melatonin  6 mg Oral Nightly    mupirocin   Nasal BID    nicotine  1 patch Transdermal Daily    rivaroxaban  20 mg Oral Daily with dinner    traZODone  25 mg Oral QHS    vancomycin (VANCOCIN) IVPB  1,250 mg Intravenous Q12H     PRN Meds:acetaminophen, haloperidol lactate, HYDROcodone-acetaminophen, ibuprofen, sodium chloride 0.9%, Pharmacy to dose Vancomycin consult **AND** vancomycin - pharmacy to dose     Objective:     Vital Signs (Most Recent):  Temp: 98.8 °F (37.1 °C) (10/09/22 2336)  Pulse: (!) 113 (10/10/22 0717)  Resp: 10 (10/10/22 0600)  BP: 95/65 (10/10/22 0600)  SpO2: 97 % (10/10/22 0600)   Vital Signs (24h Range):  Temp:  [98.1 °F (36.7 °C)-99.7 °F (37.6 °C)] 98.8 °F (37.1 °C)  Pulse:  [110-129] 113  Resp:  [10-26] 10  SpO2:  [91 %-98 %] 97 %  BP: ()/(54-78) 95/65     Date 10/10/22 0700 - 10/11/22 0659   Shift 5675-3702 5596-5151 5689-4256 24 Hour Total   INTAKE   Shift Total(mL/kg)       OUTPUT   Urine(mL/kg/hr) 350   350   Shift Total(mL/kg) 350(6.5)   350(6.5)   Weight (kg) 54 54 54 54       Physical Exam  Vitals and nursing note reviewed.   Constitutional:       Appearance: Normal appearance.   Neck:      Comments: Axillary incision c/d/I. No erythema or hematoma.    Cardiovascular:      Comments: L monophasic DP, L biphasic PT  R biphasic PT, R biphasic AT   Warm, well-perfused BLE   Pulmonary:      Effort: Pulmonary effort is normal.   Abdominal:      General: Abdomen is flat.   Musculoskeletal:         General: Tenderness present.   Skin:     Comments: Right calf dry gangrene lesions.  Dressing replaced at the bedside, wrapped with Kerlix and  xeroform.     Neurological:      Mental Status: He is oriented to person, place, and time.       Significant Labs:  All pertinent labs from the last 24 hours have been reviewed.    Significant Diagnostics:  I have reviewed all pertinent imaging results/findings within the past 24 hours.

## 2022-10-10 NOTE — CONSULTS
[facility] - Cardiology Stepdown  Adult Nutrition  Consult Note    SUMMARY     Recommendations  1. Continue Regular diet-encourage adeuqate intake   2. Continue Boost TID for optimization of protein and calorie intake   3. RD following    Goals: Meet % of EEN/EPN by RD f/u date  Nutrition Goal Status: progressing towards goal  Communication of RD Recs: other (POC)    Assessment and Plan    Nutrition Problem  Increased protein/energy needs    Related to (etiology):   Physiological needs    Signs and Symptoms (as evidenced by):   Critical limb ischemia of right lower extremity with ulceration of lower leg    Interventions (treatment strategy):  Collaboration of nutrition care w/ other providers    Nutrition Diagnosis Status:   New    Reason for Assessment    Reason For Assessment: consult  Diagnosis: critical limb ischemia of right lower extremity with ulceration of lower leg  Relevant Medical History: HTN  Interdisciplinary Rounds: did not attend  General Information Comments: RD consulted for low albumin. Please note: Albumin and Prealbumin are neative acute-phase reactants that should not be used as nutrition indicators. Both can be affected by inflammation and fluid status. If CRP is elevated, this can cause Albumin to be falsely low.     10/10/22: Spoke w/ pt at bedside, regarding nutritional status. Pt reports a fair appetite at this time, reports consuming some rice and beans for breakfast this morning. Per RN documentation 50%-100% meal consumption noted at this time. RD observed Boost on pt's tray, pt reports not trying the ONS yet. Pt reports UBW of 190 lbs. Per chart review, noted significant weight loss of 17% weight loss x 1 month (some may be d/t fluid loss). Visual NFPE completed, age related wasting identified however pt w/ not enough criteria to diagnose malnutrition, at risk if po intake declines <50% meal consumption. LBM noted-10/8/22  Nutrition Discharge Planning: adequate nutrition  "intake    Nutrition Risk Screen    Nutrition Risk Screen: no indicators present    Nutrition/Diet History    Spiritual, Cultural Beliefs, Restorationist Practices, Values that Affect Care: no  Food Allergies: NKFA    Anthropometrics    Temp: 98.3 °F (36.8 °C)  Height Method: Stated  Height: 5' 7" (170.2 cm)  Height (inches): 67 in  Weight Method: Stated  Weight: 54 kg (119 lb)  Weight (lb): 119 lb  Ideal Body Weight (IBW), Male: 148 lb  % Ideal Body Weight, Male (lb): 80.41 %  BMI (Calculated): 18.6       Lab/Procedures/Meds    Pertinent Labs Reviewed: reviewed  Pertinent Labs Comments: -  Pertinent Medications Reviewed: reviewed  Pertinent Medications Comments: -    Estimated/Assessed Needs    Weight Used For Calorie Calculations: 54 kg (119 lb 0.8 oz)  Energy Calorie Requirements (kcal): 1620  Energy Need Method: Kcal/kg (30 kcal/kg)  Protein Requirements: 81 g (1.5 g/kg)  Weight Used For Protein Calculations: 54 kg (119 lb 0.8 oz)   RDA Method (mL): 1620       Nutrition Prescription Ordered    Current Diet Order: Regular diet    Evaluation of Received Nutrient/Fluid Intake    I/O: +3.5 L since admit  Energy Calories Required: meeting needs  Protein Required: meeting needs  Fluid Required: meeting needs  Total Fluid Intake (mL/kg): 1 ml or fluid per MD  Tolerance: tolerating  % Intake of Estimated Energy Needs: 50 - 75 %  % Meal Intake: 50 - 75 %    Nutrition Risk    Level of Risk/Frequency of Follow-up: low ((1x/week))       Monitor and Evaluation    Food and Nutrient Intake: food and beverage intake, energy intake  Food and Nutrient Adminstration: diet order, enteral and parenteral nutrition administration  Knowledge/Beliefs/Attitudes: food and nutrition knowledge/skill, beliefs and attitudes  Physical Activity and Function: nutrition-related ADLs and IADLs, factors affecting access to physical activity  Anthropometric Measurements: height/length, weight, weight change, body mass index, growth pattern " indices/percentile ranks  Biochemical Data, Medical Tests and Procedures: electrolyte and renal panel, gastrointestinal profile, glucose/endocrine profile, inflammatory profile, lipid profile  Nutrition-Focused Physical Findings: overall appearance, head and eyes, extremities, muscles and bones, skin       Nutrition Follow-Up    RD Follow-up?: Yes

## 2022-10-10 NOTE — PROGRESS NOTES
VANCOMYCIN DOSING BY PHARMACY DISCONTINUATION NOTE    Babatunde Singh is a 72 y.o. male who had been consulted for vancomycin dosing.    The pharmacy consult for vancomycin dosing has been discontinued.     Vancomycin Dosing by Pharmacy Consult will sign-off. Please reconsult if necessary. Thank you for allowing us to participate in this patient's care.     Víctor Mcnamara, PharmD, BCPS  Ext. [phone]

## 2022-10-10 NOTE — CARE UPDATE
RAPID RESPONSE NURSE ROUND       Rounding completed with charge RNNicol for  reports pt was 's earlier and is baseline tachycardic, current . No additional concerns verbalized at this time. Instructed to call [phone] for further concerns or assistance.

## 2022-10-10 NOTE — SUBJECTIVE & OBJECTIVE
Medications:  Continuous Infusions:  Scheduled Meds:   aspirin  81 mg Oral Daily    atorvastatin  80 mg Oral Daily    ceFEPime (MAXIPIME) IVPB  2 g Intravenous Q8H    cilostazoL  100 mg Oral BID    ezetimibe  10 mg Oral Daily    gabapentin  900 mg Oral TID    melatonin  6 mg Oral Nightly    mupirocin   Nasal BID    nicotine  1 patch Transdermal Daily    rivaroxaban  20 mg Oral Daily with dinner    traZODone  25 mg Oral QHS    vancomycin (VANCOCIN) IVPB  1,250 mg Intravenous Q12H     PRN Meds:acetaminophen, haloperidol lactate, HYDROcodone-acetaminophen, ibuprofen, morphine, sodium chloride 0.9%, traMADoL, Pharmacy to dose Vancomycin consult **AND** vancomycin - pharmacy to dose     Objective:     Vital Signs (Most Recent):  Temp: 98.8 °F (37.1 °C) (10/09/22 2336)  Pulse: (!) 113 (10/10/22 0717)  Resp: 10 (10/10/22 0600)  BP: 95/65 (10/10/22 0600)  SpO2: 97 % (10/10/22 0600)   Vital Signs (24h Range):  Temp:  [98.1 °F (36.7 °C)-99.7 °F (37.6 °C)] 98.8 °F (37.1 °C)  Pulse:  [110-129] 113  Resp:  [10-26] 10  SpO2:  [91 %-98 %] 97 %  BP: ()/(54-78) 95/65         Physical Exam  Vitals and nursing note reviewed.   Constitutional:       Appearance: Normal appearance.   Neck:      Comments: Axillary incision c/d/I. No erythema or hematoma.    Cardiovascular:      Comments: L monophasic DP, L biphasic PT  R biphasic PT, R biphasic AT  No DP signals   Warm, well-perfused BLE   Pulmonary:      Effort: Pulmonary effort is normal.   Abdominal:      General: Abdomen is flat.   Musculoskeletal:         General: Tenderness present.   Skin:     Comments: Right calf dry gangrene lesions.  Dressing replaced at the bedside, wrapped with Kerlix and xeroform.     Neurological:      Mental Status: He is oriented to person, place, and time.       Significant Labs:  All pertinent labs from the last 24 hours have been reviewed.    Significant Diagnostics:  I have reviewed all pertinent imaging results/findings within the  past 24 hours.

## 2022-10-10 NOTE — ASSESSMENT & PLAN NOTE
-s/p successful axio-fem-fem bypass on 10/6.  -Pain well controlled with current regimen.  -ID following due to elevated WBC. On Cefepime and Vanc.  -BCx neg 10/07, 10/09.  -US of lower ext and upper ext pending.  - On Home Xarelto.  -PT/OT rec for SNF. Pt deferred to daughter. Spoke to her. Amenable to SNF. Pt does not feel he can do IPR at this time.

## 2022-10-10 NOTE — PLAN OF CARE
[facility]    FACILITY TRANSFER ORDERS      Patient Name: Babatunde Singh  YOB: 1950    PCP: Esdras Ly MD   PCP Address: [address] [place] [address] / [place] [address]  PCP Phone Number: [phone]  PCP Fax: [phone]    Encounter Date: 10/10/2022    Admit to: Skilled nursing facility     Vital Signs:  Routine    Diagnoses:   Active Hospital Problems    Diagnosis  POA    *Critical limb ischemia of right lower extremity with ulceration of lower leg [I70.238]  Yes    Severe sepsis [A41.9, R65.20]  No    Encephalopathy, metabolic [G93.41]  No    Absent pedal pulses [R09.89]  Yes    DVT (deep venous thrombosis) [I82.409]  Yes    Lower limb ischemia [I99.8]  Yes    Atherosclerosis of native artery of extremity [I70.209]  Yes    Right renal mass [N28.89]  Yes    Mixed hyperlipidemia [E78.2]  Yes    Tobacco abuse [Z72.0]  Yes    Claudication of right lower extremity [I73.9]  Yes    Right leg pain [M79.604]  Yes    Primary hypertension [I10]  Yes      Resolved Hospital Problems    Diagnosis Date Resolved POA    Ulcer of right lower extremity, limited to breakdown of skin [L97.911] 10/06/2022 Yes       Allergies:Review of patient's allergies indicates:  No Known Allergies    Diet: regular diet    Activities: Activity as tolerated, Ambulate in lake, Bathroom privileges with assistance, Up with assistance, and Weight bearing as tolerated    Goals of Care Treatment Preferences:  Code Status: Full Code      Nursing:  Routine nursing care     Labs: CBC and BMP  Weekly or as needed      CONSULTS:    Physical Therapy to evaluate and treat.  and Occupational Therapy to evaluate and treat.    MISCELLANEOUS CARE:  N/A    WOUND CARE ORDERS  Yes: Right calf with dry gangrene/chronic ulcerations; Will need to have clean dry dressing daily. Apply xeroform gauze with kerlex wrapped around.     Medications: Review discharge medications with patient and family and provide education.      Current Discharge  Medication List        CONTINUE these medications which have NOT CHANGED    Details   acetaminophen (TYLENOL) 325 MG tablet Take 2 tablets (650 mg total) by mouth every 8 (eight) hours as needed.  Qty: 20 tablet, Refills: 0      aspirin 81 MG Chew Chew and swallow 1 tablet (81 mg total) by mouth once daily.  Qty: 30 tablet, Refills: 0      atorvastatin (LIPITOR) 80 MG tablet Take 1 tablet (80 mg total) by mouth once daily.  Qty: 30 tablet, Refills: 2    Associated Diagnoses: Hyperlipidemia, unspecified hyperlipidemia type      bacitracin 500 unit/gram ointment Apply topically 2 (two) times daily.  Qty: 28 g, Refills: 0      cilostazoL (PLETAL) 100 MG Tab Take 1 tablet (100 mg total) by mouth 2 (two) times daily.  Qty: 180 tablet, Refills: 3      diclofenac sodium (VOLTAREN) 1 % Gel Apply 2 g topically 3 (three) times daily as needed (muscle spasm pain). Apply 2 grams to affected area 3 times daily as needed  Qty: 100 g, Refills: 0      ezetimibe (ZETIA) 10 mg tablet Take 1 tablet (10 mg total) by mouth once daily.  Qty: 90 tablet, Refills: 3      gabapentin (NEURONTIN) 300 MG capsule Take 3 capsules (900 mg total) by mouth 3 (three) times daily.  Qty: 540 capsule, Refills: 3    Associated Diagnoses: Claudication of right lower extremity      LIDOcaine (LIDODERM) 5 % Apply to affected area as needed for pain for 12 hours, then off for 12 hours. Discard after each use.  May use 4% lidocaine patch as alternative.  Qty: 30 patch, Refills: 0      losartan-hydrochlorothiazide 50-12.5 mg (HYZAAR) 50-12.5 mg per tablet Take 1 tablet by mouth once daily.  Qty: 30 tablet, Refills: 2    Comments: .  Associated Diagnoses: Primary hypertension      multivitamin (THERAGRAN) per tablet Take 1 tablet by mouth once daily.      mupirocin (BACTROBAN) 2 % ointment Apply topically 2 (two) times daily.  Qty: 30 g, Refills: 1      naproxen (NAPROSYN) 500 MG tablet Take 1 tablet (500 mg total) by mouth 2 (two) times daily as needed  (pain).  Qty: 60 tablet, Refills: 0      rivaroxaban (XARELTO DVT-PE TREAT 30D START) 15 mg (42)- 20 mg (9) tablet dose pack Take 1 tablet (15 mg) by mouth twice daily with food for 21 days followed by 1 tablet (20 mg) by mouth once daily with food  Qty: 51 tablet, Refills: 0    Comments: 51 tablets total - (42) 15mg tab and (9) 20mg tabs  Sig:  15mg PO bid x 3wks, then  20mg PO QD      terbinafine HCl (LAMISIL ORAL) Take by mouth once daily at 6am.                Immunizations Administered as of 10/10/2022       Name Date Dose VIS Date Route Exp Date    COVID-19, MRNA, LN-S, PF (Moderna) 12/31/2021 0.25 mL -- Intramuscular --    Site: Left arm     Lot: [id number]     COVID-19, MRNA, LN-S, PF (Moderna) 3/10/2021 0.5 mL -- Intramuscular --    Site: Left arm     Lot: [id number]     COVID-19, MRNA, LN-S, PF (Moderna) 2/10/2021 0.5 mL -- Intramuscular --    Site: Left arm     Lot: [id number]             This patient has had both covid vaccinations    Some patients may experience side effects after vaccination.  These may include fever, headache, muscle or joint aches.  Most symptoms resolve with 24-48 hours and do not require urgent medical evaluation unless they persist for more than 72 hours or symptoms are concerning for an unrelated medical condition.          _________________________________  Arminda Mitchell MD  10/10/2022

## 2022-10-10 NOTE — PT/OT/SLP PROGRESS
"Physical Therapy Treatment    Patient Name:  Babatunde Singh   MRN:  [id number]    Recommendations:     Discharge Recommendations:  nursing facility, skilled   Discharge Equipment Recommendations: walker, rolling   Barriers to discharge:  requires increased assistance    Assessment:     Babatunde Singh is a 72 y.o. male admitted with a medical diagnosis of Critical limb ischemia of right lower extremity with ulceration of lower leg.  He presents with the following impairments/functional limitations:  weakness, impaired endurance, impaired functional mobility, gait instability, pain, impaired balance, impaired cognition . Patient mildly confused w/ some of conversation non-sensical. However, he was able to answer basic orientation questions and was pleasant and cooperative. He transfers w/ RW and CGA; ambulates w/ RW and CGA 80 feet, w/ decreased weight acceptance on RLE and w/ concern that he might hurt his foot if he bumps it..    Rehab Prognosis: Good; patient would benefit from acute skilled PT services to address these deficits and reach maximum level of function.    Recent Surgery: Procedure(s) (LRB):  CREATION, BYPASS, ARTERIAL, AXILLARY TO FEMORAL, USING GRAFT (N/A)  CREATION, BYPASS, ARTERIAL, FEMORAL TO FEMORAL, USING GRAFT (N/A) 4 Days Post-Op    Plan:     During this hospitalization, patient to be seen 3 x/week to address the identified rehab impairments via gait training, therapeutic exercises, therapeutic activities and progress toward the following goals:    Plan of Care Expires:  11/06/22    Subjective     Chief Complaint: worried he will hurt his Right foot  Patient/Family Comments/goals: agreeable to session. "Wants his bed changed"  Pain/Comfort:  Pain Rating 1: 0/10  Location - Side 1: Right  Location 1: foot  Pain Addressed 1: Reposition, Distraction, Cessation of Activity (patient fearful of hurting RLE/foot, but denies pain during session)  Pain Rating Post-Intervention 1: 0/10      Objective: "   co-treatment performed with OT due to low activity tolerance; level of assistance required for mobility and skilled treatment    Communicated with nurse prior to session.  Patient found HOB elevated with telemetry, peripheral IV, bed alarm (telesitter; right lower LE wrapped w/ kerlix) upon PT entry to room.     General Precautions: Standard, fall (confused)   Orthopedic Precautions:N/A   Braces: N/A  Respiratory Status: Room air     Functional Mobility:  Bed Mobility:     Supine to Sit: stand by assistance  Transfers:     Sit to Stand:  contact guard assistance with rolling walker  Bed to Chair: contact guard assistance with  rolling walker  using  Step Transfer  Gait: w/ RW and CGA to min assistance 80 feet. Chair follow for safety. Ambulates w/ decreased weight acceptance on RLE and tends to keep RLE outside RW and w/ some increased exernal rotattion.Forward flexed posture and pushing RW too far ahead as he fatigued.      AM-PAC 6 CLICK MOBILITY  Turning over in bed (including adjusting bedclothes, sheets and blankets)?: 3  Sitting down on and standing up from a chair with arms (e.g., wheelchair, bedside commode, etc.): 3  Moving from lying on back to sitting on the side of the bed?: 3  Moving to and from a bed to a chair (including a wheelchair)?: 3  Need to walk in hospital room?: 3  Climbing 3-5 steps with a railing?: 2  Basic Mobility Total Score: 17       Therapeutic Activities and Exercises:   Patient performs self care activities w/ OT.  Patient educated on PT POC, gait and trf technique, call for assistance    Patient left up in chair with all lines intact, call button in reach, and nurse notified.; telesitter called to confirm watching    GOALS:   Multidisciplinary Problems       Physical Therapy Goals          Problem: Physical Therapy    Goal Priority Disciplines Outcome Goal Variances Interventions   Physical Therapy Goal     PT, PT/OT Ongoing, Progressing     Description: Goals to be met by:  10/25/2022     Patient will increase functional independence with mobility by performin. Supine to sit with MInimal Assistance  2. Sit to stand transfer with Minimal Assistance using LRAD  3. Gait  x 25 feet with Minimal Assistance using LRAD.   4. Ascend/descend 4 stair with bilateral Handrails Moderate Assistance  5. Stand for 3 minutes with Minimal Assistance using LRAD                         Time Tracking:     PT Received On: 10/10/22  PT Start Time: 925     PT Stop Time: 957  PT Total Time (min): 32 min     Billable Minutes: Gait Training 15 and Therapeutic Activity 8    Treatment Type: Treatment  PT/PTA: PT     PTA Visit Number: 0     10/10/2022

## 2022-10-10 NOTE — CONSULTS
[facility]  Physical Medicine & Rehab  Consult Note    Patient Name: Babatunde Singh  MRN: [id number]  Admission Date: 10/2/2022  Hospital Length of Stay: 8 days  Attending Physician: PAVITHRA Ortiz MD  Consults  Subjective:     Principal Problem: Critical limb ischemia of right lower extremity with ulceration of lower leg    HPI: Per chart review, Mr Hardy is a 72 year old male with a pmh of smoking, severe PAD, dvt who presented with chronic limb ischemia and rest pain now s/p successful axio-fem-fem bypass on 10/6.PM & R was consulted to evaluate pt for IPR placement.     Functional History: Patient lives  with spouse  in a single  story home with 4 steps  to enter.  Prior to admission,  pt was Mod I for mobility with SPC use and independent with ADLs.  DME: None.       Hospital Course: PT- 10/07    Bed Mobility:   Supine to Sit: maximal assistance; HOB elevated   Scooting anteriorly to EOB to have both feet planted on floor: contact guard assistance   Sit to Supine: maximal assistance; HOB flat     Sitting Balance at Edge of Bed:  1. Assistance Level Required: fluctuating between CGA and max A depending on attention to task.   2. Time: 5 minutes  3. Comments:   ? Worked on activity tolerance sitting EOB, worked on tolerance to positional change, and worked on sitting balance   ? Pt demo'd right posterior lean that required max A to correct     Transfers:   1. Sit <> Stand Transfer: moderate assistance and of 2 persons with no assistive device from EOB  ? Pt approx 50% upright  ? No c/o R LE pain with weight bearing today   Gait:       Patient ambulated: 6 side steps to left    Patient required: maximal assist and of 2 persons   Patient used:  B HHA    OT- 10/07    Bed Mobility:     Patient completed Scooting/Bridging with contact guard assistance to EOB   Patient completed Supine to Sit with maximal assistance for initiation and redirection   Patient completed Sit to Supine with  maximal assistance     Functional Mobility/Transfers:   Patient completed Sit <> Stand Transfer with moderate assistance and of 2 persons  with  hand-held assist    Functional Mobility: Max A x 2 for sidestepping along EOB with ~50% upright posture     Activities of Daily Living:   Grooming: moderate assistance     Upper Body Dressing: moderate assistance     Lower Body Dressing: maximal assistance        Past Medical History:   Diagnosis Date    Hypertension     Peripheral arterial disease      Past Surgical History:   Procedure Laterality Date    CREATION OF AXILLARY-FEMORAL ARTERY BYPASS WITH GRAFT N/A 10/6/2022    Procedure: CREATION, BYPASS, ARTERIAL, AXILLARY TO FEMORAL, USING GRAFT;  Surgeon: LEI Ortiz III, MD;  Location: [address];  Service: Peripheral Vascular;  Laterality: N/A;    CREATION OF FEMORAL-FEMORAL ARTERY BYPASS WITH GRAFT N/A 10/6/2022    Procedure: CREATION, BYPASS, ARTERIAL, FEMORAL TO FEMORAL, USING GRAFT;  Surgeon: LEI Ortiz III, MD;  Location: [address];  Service: Peripheral Vascular;  Laterality: N/A;    EXCISION OF HYDROCELE Left 4/26/2022    Procedure: HYDROCELECTOMY;  Surgeon: Damion Roberts MD;  Location: [facility];  Service: Urology;  Laterality: Left;    KNEE SURGERY  1972     Review of patient's allergies indicates:  No Known Allergies    Scheduled Medications:    aspirin  81 mg Oral Daily    atorvastatin  80 mg Oral Daily    ceFEPime (MAXIPIME) IVPB  2 g Intravenous Q8H    cilostazoL  100 mg Oral BID    ezetimibe  10 mg Oral Daily    gabapentin  900 mg Oral TID    melatonin  6 mg Oral Nightly    mupirocin   Nasal BID    nicotine  1 patch Transdermal Daily    rivaroxaban  20 mg Oral Daily with dinner    traZODone  25 mg Oral QHS    tuberculin  5 Units Intradermal Once    vancomycin (VANCOCIN) IVPB  1,250 mg Intravenous Q12H       PRN Medications: acetaminophen, haloperidol lactate, HYDROcodone-acetaminophen, ibuprofen, sodium  chloride 0.9%, Pharmacy to dose Vancomycin consult **AND** vancomycin - pharmacy to dose    Family History       Problem Relation (Age of Onset)    Hypertension Mother, Father          Tobacco Use    Smoking status: Former     Packs/day: 0.50     Years: 50.00     Pack years: 25.00     Types: Cigarettes    Smokeless tobacco: Never    Tobacco comments:     Cigarettes3-4 per day   Substance and Sexual Activity    Alcohol use: Never    Drug use: Never    Sexual activity: Yes     Partners: Female     Comment: wife     Review of Systems   Constitutional:  Positive for activity change.   Respiratory:  Negative for cough and shortness of breath.    Gastrointestinal:  Positive for constipation.   Musculoskeletal:  Positive for gait problem.   Skin:         RLE dsg with Kerlix in place.    Neurological:  Positive for dizziness and weakness.   Psychiatric/Behavioral:  Positive for decreased concentration. Negative for agitation, behavioral problems and confusion.    Objective:     Vital Signs (Most Recent):  Temp: 98.3 °F (36.8 °C) (10/10/22 1200)  Pulse: 110 (10/10/22 1200)  Resp: 18 (10/10/22 1200)  BP: 126/61 (10/10/22 1200)  SpO2: 95 % (10/10/22 1200)    Vital Signs (24h Range):  Temp:  [98.1 °F (36.7 °C)-99.7 °F (37.6 °C)] 98.3 °F (36.8 °C)  Pulse:  [110-129] 110  Resp:  [10-26] 18  SpO2:  [91 %-97 %] 95 %  BP: ()/(56-78) 126/61     Body mass index is 18.64 kg/m².    Physical Exam  Vitals and nursing note reviewed.   Constitutional:       General: He is awake.      Appearance: Normal appearance.   Pulmonary:      Effort: Pulmonary effort is normal.   Abdominal:      Palpations: Abdomen is soft.   Musculoskeletal:      Cervical back: Normal range of motion and neck supple.      Comments: Limited ROM RLE   Skin:     General: Skin is warm and dry.      Capillary Refill: Capillary refill takes 2 to 3 seconds.   Neurological:      General: No focal deficit present.      Mental Status: He is alert and oriented to  person, place, and time.      GCS: GCS eye subscore is 4. GCS verbal subscore is 5. GCS motor subscore is 6.      Sensory: Sensation is intact.      Motor: Weakness present.      Gait: Gait abnormal.   Psychiatric:         Attention and Perception: Attention and perception normal.         Mood and Affect: Mood and affect normal.         Speech: Speech normal.         Behavior: Behavior is cooperative.         Cognition and Memory: Cognition and memory normal.     NEUROLOGICAL EXAMINATION:     MENTAL STATUS   Oriented to person, place, and time.   Speech: speech is normal     Diagnostic Results: Labs: Reviewed    Assessment/Plan:     * Critical limb ischemia of right lower extremity with ulceration of lower leg  -s/p successful axio-fem-fem bypass on 10/6.  -Pain well controlled with current regimen.  -ID following due to elevated WBC. On Cefepime and Vanc.  -BCx neg 10/07, 10/09.  -US of lower ext and upper ext pending.  - On Home Xarelto.  -PT/OT rec for SNF. Pt deferred to daughter. Spoke to her. Amenable to SNF. Pt does not feel he can do IPR at this time.       DVT (deep venous thrombosis)  -Continue Xarelto.  -US of bilateral upper and lower ext pending.     Right renal mass  -Found on CT. Pt to follow out pt with urology.    Mixed hyperlipidemia  - Continue statin and Zetia.     Tobacco abuse  -Smoking cessation counseling.     Primary hypertension  -Stable. Monitor.     PM&R Recommendation:     At this time, the PM&R team has reviewed this patient's ongoing medical case including inpatient diagnosis, medical history, clinical examination, labs, vitals, current social and functional history to provide the post-acute recommendation as follows:     RECOMMENDATIONS: skilled nursing facility due to patient poor tolerance for consistent therapy, once medically stable.            We will sign off.         Thank you for your consult.     Luna Lomas, NP  Department of Physical Medicine & Rehab  [facility] -  Cardiology Stepdown

## 2022-10-10 NOTE — PLAN OF CARE
Patient alert and oriented x4 and is resting comfortably with no complaints. Patient got up with PT and walked in the hallway. Patient now sitting in chair at bedside. Safety measures in place and call bell within reach.    Problem: Adult Inpatient Plan of Care  Goal: Plan of Care Review  Outcome: Ongoing, Progressing  Goal: Patient-Specific Goal (Individualized)  Outcome: Ongoing, Progressing  Goal: Absence of Hospital-Acquired Illness or Injury  Outcome: Ongoing, Progressing  Goal: Optimal Comfort and Wellbeing  Outcome: Ongoing, Progressing  Goal: Readiness for Transition of Care  Outcome: Ongoing, Progressing     Problem: Impaired Wound Healing  Goal: Optimal Wound Healing  Outcome: Ongoing, Progressing     Problem: Fall Injury Risk  Goal: Absence of Fall and Fall-Related Injury  Outcome: Ongoing, Progressing     Problem: Skin Injury Risk Increased  Goal: Skin Health and Integrity  Outcome: Ongoing, Progressing     Problem: Pain Acute  Goal: Acceptable Pain Control and Functional Ability  Outcome: Ongoing, Progressing     Problem: Infection  Goal: Absence of Infection Signs and Symptoms  Outcome: Ongoing, Progressing     Problem: Restraint, Nonbehavioral (Nonviolent)  Goal: Absence of Harm or Injury  Outcome: Ongoing, Progressing     Problem: Adjustment to Illness (Sepsis/Septic Shock)  Goal: Optimal Coping  Outcome: Ongoing, Progressing     Problem: Bleeding (Sepsis/Septic Shock)  Goal: Absence of Bleeding  Outcome: Ongoing, Progressing     Problem: Glycemic Control Impaired (Sepsis/Septic Shock)  Goal: Blood Glucose Level Within Desired Range  Outcome: Ongoing, Progressing     Problem: Infection Progression (Sepsis/Septic Shock)  Goal: Absence of Infection Signs and Symptoms  Outcome: Ongoing, Progressing     Problem: Nutrition Impaired (Sepsis/Septic Shock)  Goal: Optimal Nutrition Intake  Outcome: Ongoing, Progressing

## 2022-10-10 NOTE — PROGRESS NOTES
[facility]  General Surgery  Progress Note    Subjective:     History of Present Illness:  No notes on file    Post-Op Info:  Procedure(s) (LRB):  CREATION, BYPASS, ARTERIAL, AXILLARY TO FEMORAL, USING GRAFT (N/A)  CREATION, BYPASS, ARTERIAL, FEMORAL TO FEMORAL, USING GRAFT (N/A)   4 Days Post-Op     Interval history: NAEON. AF, tachycardic to 129.   Seen by ID  Feels well this am      Medications:  Continuous Infusions:  Scheduled Meds:   aspirin  81 mg Oral Daily    atorvastatin  80 mg Oral Daily    ceFEPime (MAXIPIME) IVPB  2 g Intravenous Q8H    cilostazoL  100 mg Oral BID    ezetimibe  10 mg Oral Daily    gabapentin  900 mg Oral TID    melatonin  6 mg Oral Nightly    mupirocin   Nasal BID    nicotine  1 patch Transdermal Daily    rivaroxaban  20 mg Oral Daily with dinner    traZODone  25 mg Oral QHS    vancomycin (VANCOCIN) IVPB  1,250 mg Intravenous Q12H     PRN Meds:acetaminophen, haloperidol lactate, HYDROcodone-acetaminophen, ibuprofen, sodium chloride 0.9%, Pharmacy to dose Vancomycin consult **AND** vancomycin - pharmacy to dose     Objective:     Vital Signs (Most Recent):  Temp: 98.8 °F (37.1 °C) (10/09/22 2336)  Pulse: (!) 113 (10/10/22 0717)  Resp: 10 (10/10/22 0600)  BP: 95/65 (10/10/22 0600)  SpO2: 97 % (10/10/22 0600)   Vital Signs (24h Range):  Temp:  [98.1 °F (36.7 °C)-99.7 °F (37.6 °C)] 98.8 °F (37.1 °C)  Pulse:  [110-129] 113  Resp:  [10-26] 10  SpO2:  [91 %-98 %] 97 %  BP: ()/(54-78) 95/65     Date 10/10/22 0700 - 10/11/22 0659   Shift 0429-9458 0476-1423 6431-5505 24 Hour Total   INTAKE   Shift Total(mL/kg)       OUTPUT   Urine(mL/kg/hr) 350   350   Shift Total(mL/kg) 350(6.5)   350(6.5)   Weight (kg) 54 54 54 54       Physical Exam  Vitals and nursing note reviewed.   Constitutional:       Appearance: Normal appearance.   Neck:      Comments: Axillary incision c/d/I. No erythema or hematoma.    Cardiovascular:      Comments: L monophasic DP, L biphasic  PT  R biphasic PT, R biphasic AT   Warm, well-perfused BLE   Pulmonary:      Effort: Pulmonary effort is normal.   Abdominal:      General: Abdomen is flat.   Musculoskeletal:         General: Tenderness present.   Skin:     Comments: Right calf dry gangrene lesions.  Dressing replaced at the bedside, wrapped with Kerlix and xeroform.     Neurological:      Mental Status: He is oriented to person, place, and time.       Significant Labs:  All pertinent labs from the last 24 hours have been reviewed.    Significant Diagnostics:  I have reviewed all pertinent imaging results/findings within the past 24 hours.    Assessment/Plan:     * Critical limb ischemia of right lower extremity with ulceration of lower leg  Mr Hardy is a 72 year old male with a pmh of smoking, severe PAD, dvt who presented with chronic limb ischemia and rest pain now s/p successful axio-fem-fem bypass on 10/6.     - ID consult for fevers and leukocytosis of unknown origin   - Vanc    - switch to cefepime from zosyn   - follow up cultures   - BCx 10/7: NGTD   - BCx 10/9: NGTD   - US bilateral upper and lower extremities, rule out new DVT  - Nutrition consult; regular diet with boost   - Home Xarelto   - melatonin QHS and trazodone QHS PRN  - delirium precautions  - PT/OT   - PMNR consult for rehab eval   - continue asa/statin cilostazol   - Monitor fever curve and Follow up blood culture            Arminda Mitchell MD  General Surgery  [facility]

## 2022-10-10 NOTE — ASSESSMENT & PLAN NOTE
Mr Hardy is a 72 year old male with a pmh of smoking, severe PAD, dvt who presented with chronic limb ischemia and rest pain now s/p successful axio-fem-fem bypass on 10/6.     - ID consult for fevers and leukocytosis of unknown origin   - Vanc    - switch to cefepime from zosyn   - follow up cultures   - BCx 10/7: NGTD   - BCx 10/9: NGTD   - US bilateral upper and lower extremities, rule out new DVT  - Nutrition consult; regular diet with boost   - Home Xarelto   - melatonin QHS and trazodone QHS PRN  - delirium precautions  - PT/OT   - PMNR consult for rehab eval   - continue asa/statin cilostazol   - Monitor fever curve and Follow up blood culture

## 2022-10-10 NOTE — PROGRESS NOTES
[facility]  Vascular Surgery  Progress Note    Patient Name: Babatunde Singh  MRN: [id number]  Admission Date: 10/2/2022  Primary Care Provider: Esdras Ly MD    Subjective:     Interval History: Pt much more alert and oriented this AM.  Afebrile, WBC downtrending, afebrile x24H. Still mildly tachycardic but downtrending.  Blood and urine cx NGTD.  Has reportedly been ambulating some with PT, they are recommending SNF placement.  Good signals in lower extremities.  Axillary incision c/d/i.  LLE dressing changed at bedside today.      Post-Op Info:  Procedure(s) (LRB):  CREATION, BYPASS, ARTERIAL, AXILLARY TO FEMORAL, USING GRAFT (N/A)  CREATION, BYPASS, ARTERIAL, FEMORAL TO FEMORAL, USING GRAFT (N/A)   4 Days Post-Op       Medications:  Continuous Infusions:  Scheduled Meds:   aspirin  81 mg Oral Daily    atorvastatin  80 mg Oral Daily    ceFEPime (MAXIPIME) IVPB  2 g Intravenous Q8H    cilostazoL  100 mg Oral BID    ezetimibe  10 mg Oral Daily    gabapentin  900 mg Oral TID    melatonin  6 mg Oral Nightly    mupirocin   Nasal BID    nicotine  1 patch Transdermal Daily    rivaroxaban  20 mg Oral Daily with dinner    traZODone  25 mg Oral QHS    vancomycin (VANCOCIN) IVPB  1,250 mg Intravenous Q12H     PRN Meds:acetaminophen, haloperidol lactate, HYDROcodone-acetaminophen, ibuprofen, morphine, sodium chloride 0.9%, traMADoL, Pharmacy to dose Vancomycin consult **AND** vancomycin - pharmacy to dose     Objective:     Vital Signs (Most Recent):  Temp: 98.8 °F (37.1 °C) (10/09/22 2336)  Pulse: (!) 113 (10/10/22 0717)  Resp: 10 (10/10/22 0600)  BP: 95/65 (10/10/22 0600)  SpO2: 97 % (10/10/22 0600)   Vital Signs (24h Range):  Temp:  [98.1 °F (36.7 °C)-99.7 °F (37.6 °C)] 98.8 °F (37.1 °C)  Pulse:  [110-129] 113  Resp:  [10-26] 10  SpO2:  [91 %-98 %] 97 %  BP: ()/(54-78) 95/65         Physical Exam  Vitals and nursing note reviewed.   Constitutional:       Appearance: Normal  appearance.   Neck:      Comments: Axillary incision c/d/I. No erythema or hematoma.    Cardiovascular:      Comments: L monophasic DP, L biphasic PT  R biphasic PT, R biphasic AT  No DP signals   Warm, well-perfused BLE   Pulmonary:      Effort: Pulmonary effort is normal.   Abdominal:      General: Abdomen is flat.   Musculoskeletal:         General: Tenderness present.   Skin:     Comments: Right calf dry gangrene lesions.  Dressing replaced at the bedside, wrapped with Kerlix and xeroform.     Neurological:      Mental Status: He is oriented to person, place, and time.       Significant Labs:  All pertinent labs from the last 24 hours have been reviewed.    Significant Diagnostics:  I have reviewed all pertinent imaging results/findings within the past 24 hours.    Assessment/Plan:     * Critical limb ischemia of right lower extremity with ulceration of lower leg  Mr Hardy is a 72 year old male with a pmh of smoking, severe PAD, dvt who presented with chronic limb ischemia and rest pain now s/p successful axio-fem-fem bypass on 10/6.  - Vanc/Zosyn transitioned to vanc cefepime per IR recs   - BUE US per ID recs   - nutrition consult   - Home Xarelto   - melatonin QHS and trazodone QHS PRN  - delirium precautions  - PT/OT   - will f/u PMNR consult for rehab eval, will discuss placement with social work   - continue asa/statin cilostazol   - Blood cx and urine cx NGTD, WBC downtrending (14 from 17), pt has been afebrile x24H           Sis Miles MD  Vascular Surgery  [facility]

## 2022-10-10 NOTE — HOSPITAL COURSE
PT- 10/07    Bed Mobility:  Supine to Sit: maximal assistance; HOB elevated  Scooting anteriorly to EOB to have both feet planted on floor: contact guard assistance  Sit to Supine: maximal assistance; HOB flat     Sitting Balance at Edge of Bed:  Assistance Level Required: fluctuating between CGA and max A depending on attention to task.   Time: 5 minutes  Comments:   Worked on activity tolerance sitting EOB, worked on tolerance to positional change, and worked on sitting balance   Pt demo'd right posterior lean that required max A to correct     Transfers:   Sit <> Stand Transfer: moderate assistance and of 2 persons with no assistive device from EOB  Pt approx 50% upright  No c/o R LE pain with weight bearing today   Gait:      Patient ambulated: 6 side steps to left   Patient required: maximal assist and of 2 persons  Patient used:  HINA FARRIS    OT- 10/07    Bed Mobility:    Patient completed Scooting/Bridging with contact guard assistance to EOB  Patient completed Supine to Sit with maximal assistance for initiation and redirection  Patient completed Sit to Supine with maximal assistance     Functional Mobility/Transfers:  Patient completed Sit <> Stand Transfer with moderate assistance and of 2 persons  with  hand-held assist   Functional Mobility: Max A x 2 for sidestepping along EOB with ~50% upright posture     Activities of Daily Living:  Grooming: moderate assistance    Upper Body Dressing: moderate assistance    Lower Body Dressing: maximal assistance

## 2022-10-10 NOTE — ASSESSMENT & PLAN NOTE
Patient with ongoing tachycardia to the 110-130s this admission  No source of infection identified.  Recent history of DVT.    - given he is high risk for embolism with recent DVTs, will fully evaluate with CTA chest, echo, troponin, BNP

## 2022-10-10 NOTE — PROGRESS NOTES
[facility] - Cardiology [facility] Medicine  Progress Note    Patient Name: Babatunde Singh  MRN: [id number]  Patient Class: IP- Inpatient   Admission Date: 10/2/2022  Length of Stay: 8 days  Attending Physician: LEI Ortiz III, MD  Primary Care Provider: Esdrsa Ly MD        Subjective:     Principal Problem:Critical limb ischemia of right lower extremity with ulceration of lower leg        HPI:  Babatunde Singh is a 73 yo M withPAD, HTN, HLD, recent diagnosis of DVT (9/8) on xarelto who presents due to RLE pain. Of note, patient has frequent admission for lower extremity pain. He states that on the day of his presentation, he has had increasing 10/10 aching pain which woke him up from sleep every few hours. This has been progressing over the last 2-3 days prior to admission. He states at baseline he has pain every other night. In the ER, RLE distal pulses were absent on presentation, unable to palpate or obtain Doppler signal.               Overview/Hospital Course:  BRENNAN was performed which showed Right Leg with PVR waveforms suggest ischemic peripheral arterial occlusive disease. No audible Doppler signal detected to obtain a reliable BRENNAN. TBI of 0 with a Great toe pressure of 0 mmHg is noted. Left Leg PVR waveforms also suggested ischemic peripheral arterial occlusive disease. No audible Doppler signal detected to obtain a   reliable BRENNAN.     CTA runoff abdomen bilateral LE showed Possible worsening stenosis or interval occlusion of the right dorsalis pedis artery.  Overall evaluation of lower extremity arteries is otherwise grossly unchanged noting regions of persistent occlusion with two vessel runoff to the bilateral lower extremities. Xarelto was held and pt was started on heparin ggt. IR was consulted, but endoscopically is no option for vascularization. Vascular surgery was consulted, and they scheduled the pt for intervention. Cardiology was consulted for cardiac clearance, and pt has been  cleared with risk as per cardiology. Pt underwent L axillary to BILATERAL femoris profunda bypass with 8 mm PTFE on 10/6 by vascular.  He developed post operative delirium.     Regarding the right renal mass seen on the CT abdomen/pelvis, urology recommended outpt intervention.    On 10/7, pt became septic. CXR, UA and blood cx were ordered and he was started on empiric abx with zosyn and vanco. He was given 3 L bolus of lactate ringer and started on maintenance fluid. UA was negative. CXR ruled out pneumonia. Lactic acid is wnl. Blood cx is negative to date.     On 10/8; vascular surgery requested transfer of pt's care to their service for post operative management. Medicine will follow as a consultant.              Interval History: Mental status remains improved frmo prior. He has ongoing tachycardia, but has not fevered in >24h. Denies chest pain or dyspnea this morning.    Review of Systems  Objective:     Vital Signs (Most Recent):  Temp: 98.3 °F (36.8 °C) (10/10/22 1200)  Pulse: (!) 117 (10/10/22 1319)  Resp: 20 (10/10/22 1319)  BP: 126/61 (10/10/22 1200)  SpO2: 95 % (10/10/22 1319)   Vital Signs (24h Range):  Temp:  [98.1 °F (36.7 °C)-99.7 °F (37.6 °C)] 98.3 °F (36.8 °C)  Pulse:  [110-124] 117  Resp:  [10-22] 20  SpO2:  [91 %-97 %] 95 %  BP: ()/(56-72) 126/61     Weight: 54 kg (119 lb)  Body mass index is 18.64 kg/m².    Intake/Output Summary (Last 24 hours) at 10/10/2022 1404  Last data filed at 10/10/2022 1145  Gross per 24 hour   Intake 490 ml   Output 1150 ml   Net -660 ml      Physical Exam  Vitals and nursing note reviewed.   Constitutional:       General: He is not in acute distress.     Appearance: He is not toxic-appearing or diaphoretic.   HENT:      Head: Normocephalic.   Cardiovascular:      Rate and Rhythm: Regular rhythm. Tachycardia present.   Pulmonary:      Effort: Pulmonary effort is normal. No respiratory distress.      Breath sounds: No wheezing.   Abdominal:      Palpations:  Abdomen is soft.      Tenderness: There is no guarding.   Musculoskeletal:      Right lower leg: No edema.      Left lower leg: No edema.   Skin:     General: Skin is warm and dry.   Neurological:      Mental Status: He is alert. Mental status is at baseline.      Cranial Nerves: No dysarthria.   Psychiatric:         Mood and Affect: Mood normal.         Behavior: Behavior normal.       Significant Labs: All pertinent labs within the past 24 hours have been reviewed.    Significant Imaging: I have reviewed all pertinent imaging results/findings within the past 24 hours.      Assessment/Plan:      * Critical limb ischemia of right lower extremity with ulceration of lower leg  CTA runoff was reviewed.  Pt is s/p left axillary to BILATERAL femoris profunda bypass with 8 mm PTFE on 10/6 by vascular surgery. Appreciate vascular input.  Continue aspirin, cilostazol and Zetia/statin.      Tachycardia  Patient with ongoing tachycardia to the 110-130s this admission  No source of infection identified.  Recent history of DVT.    - given he is high risk for embolism with recent DVTs, will fully evaluate with CTA chest, echo, troponin, BNP      Severe sepsis  My overall impression is sepsis possibly 2/2 infected RLE ulcers. Vital signs were reviewed and noted in progress note.  Antibiotics given-   Antibiotics (From admission, onward)    Start     Stop Route Frequency Ordered    10/08/22 1000  vancomycin 750 mg in dextrose 5 % 250 mL IVPB (ready to mix system)         -- IV Every 12 hours (non-standard times) 10/07/22 2115    10/07/22 2145  piperacillin-tazobactam 4.5 g in sodium chloride 0.9% 100 mL IVPB (ready to mix system)         -- IV Every 8 hours (non-standard times) 10/07/22 2041    10/07/22 2140  vancomycin - pharmacy to dose  (vancomycin IVPB)        See Edie for full Linked Orders Report.    -- IV pharmacy to manage frequency 10/07/22 2041    10/06/22 2100  mupirocin 2 % ointment         10/11 2059 Nasl 2  times daily 10/06/22 1517        Cultures were taken-   Microbiology Results (last 7 days)     Procedure Component Value Units Date/Time    Urine culture [[id number]] Collected: 10/09/22 1109    Order Status: Sent Specimen: Urine, Clean Catch Updated: 10/09/22 1109    Influenza A & B by Molecular [[id number]] Collected: 10/09/22 1000    Order Status: Sent Specimen: Nasopharyngeal Swab Updated: 10/09/22 1047    Blood culture [[id number]] Collected: 10/09/22 1012    Order Status: Sent Specimen: Blood Updated: 10/09/22 1046    Blood culture [[id number]] Collected: 10/07/22 2345    Order Status: Completed Specimen: Blood Updated: 10/09/22 0613     Blood Culture, Routine No Growth to date      No Growth to date    Blood culture [[id number]] Collected: 10/07/22 2345    Order Status: Completed Specimen: Blood Updated: 10/09/22 0613     Blood Culture, Routine No Growth to date      No Growth to date        Latest lactate reviewed, they are-  Recent Labs   Lab 10/08/22  0044 10/09/22  0102   LACTATE 1.6 1.6       Organ dysfunction indicated by Encephalopathy   Source- Possible infected leg wounds    Source control Achieved by- starting abx.     CXR negative for PNA  Surgical site left chest wall was clean.  UA and UCX negative  Blood Cx 10/7: NGTD   No purulent discharge from ulcers or signs concerning for infection on exam. Clinical status improving on abx despite ongoing leukocytosis. Low index of suspicion for DVT. Will continue to monitor and follow-up pending studies.  Influenza and covid tests pending    - f/u ID recs, remains on vanc and cefepime at this time    Encephalopathy, metabolic  Likely multifactorial related to infection, plus the anesthesia pt received during his surgical procedure.  Improved while being on abx. AAOx4 on 10/9. Responds appropriately and coherently on exam.  Continue to monitor.       DVT (deep venous thrombosis)  Duplex imaging of the right lower extremity veins on 10/5 demonstrates occlusive  sub-acute thrombus of the anterior tibial vein (the anterior paired vessel).   Pt was initially on heparin ggt and now on xarelto 20 mg qhs.   - 10/9: likely not cause of new fever.   Continue to monitor      Absent pedal pulses  Absent pedial pulse of the right foot.   Pt is s/p left axillary to BILATERAL femoris profunda bypass with 8 mm PTFE on 10/6 by vascular surgery.   Continue statin, aspirin, and cilostazol.      Lower limb ischemia        Atherosclerosis of native artery of extremity  Pt is s/p left axillary to BILATERAL femoris profunda bypass with 8 mm PTFE on 10/6 by vascular surgery. Mgmt per primary  Continue statin, zetia, aspirin, and cilostazol.    Right renal mass  Outpatient intervention with urology    Right leg pain  Due to ischemia.   S/p intervention on 10/6 by vascular surgery, managed by primary  Pain improving since procedure      Claudication of right lower extremity  Duo to ischemic leg. Improved post procedure.   - Continue cilostazol    Mixed hyperlipidemia  On Zetia and lipitor.      Tobacco abuse  Has been consulted over 10 minutes regarding need to quit smoking, and emphazised its negative consequences on his leg ischemia.      Primary hypertension  Continue to hold home losartan/HCTZ, given concern for sepsis and decreased PO intake       VTE Risk Mitigation (From admission, onward)         Ordered     rivaroxaban tablet 20 mg  With dinner         10/07/22 0818     IP VTE HIGH RISK PATIENT  Once         10/06/22 1516     Place sequential compression device  Until discontinued         10/06/22 1516                Discharge Planning   LAZ: 10/12/2022     Code Status: Full Code   Is the patient medically ready for discharge?: No    Reason for patient still in hospital (select all that apply): Patient trending condition  Discharge Plan A: Skilled Nursing Facility   Discharge Delays: None known at this time              Michelle Ye MD  Department of Hospital Medicine   [facility]  - Cardiology Stepdown

## 2022-10-10 NOTE — CARE UPDATE
"RAPID RESPONSE NURSE CHART REVIEW        Chart Reviewed: 10/10/2022, 4:08 PM    MRN: [id number]  Bed: 353/353 A    Dx: Critical limb ischemia of right lower extremity with ulceration of lower leg    Babatunde Singh has a past medical history of Hypertension and Peripheral arterial disease.    Last VS: /61 (BP Location: Left arm, Patient Position: Lying)   Pulse (!) 139   Temp 98.3 °F (36.8 °C) (Oral)   Resp 20   Ht 5' 7" (1.702 m)   Wt 54 kg (119 lb)   SpO2 95%   BMI 18.64 kg/m²     24H Vital Sign Range:  Temp:  [98.1 °F (36.7 °C)-98.8 °F (37.1 °C)]   Pulse:  [110-139]   Resp:  [10-22]   BP: ()/(56-72)   SpO2:  [91 %-97 %]     Level of Consciousness (AVPU): alert    Recent Labs     10/08/22  0228 10/09/22  0431 10/10/22  0404   WBC 15.67* 17.59* 14.55*   HGB 9.3* 8.8* 8.3*   HCT 30.3* 27.3* 27.5*   * 391 422       Recent Labs     10/08/22  0044 10/08/22  0228 10/09/22  0431 10/10/22  0404    138 136 136   K 3.5 3.4* 3.6 3.6    107 106 106   CO2 21* 23 20* 22*   CREATININE 0.7 0.7 0.7 0.7   * 108 113* 103   MG 1.7  --  2.0  --         No results for input(s): PH, PCO2, PO2, HCO3, POCSATURATED, BE in the last 72 hours.     OXYGEN:  Flow (L/min): 0  Oxygen Concentration (%): 0  O2 Device (Oxygen Therapy): room air    MEWS score: 4    CSU RN, Summer contacted for tachycardia.  Tachycardia with activity noted.   No additional concerns verbalized at this time.   Instructed to call [phone] for further concerns or assistance.    Jackie Hernández RN        " 24h Events:  Admitted to SICU s/p C6/7 corpectomy and fuxion of C3-T2. No change in neuro exam thus far. Maintaining goal MAP (85) without intervention    Subjective:  Pt offers no acute complaints. Denies abdominal pain, chest pain, shortness of breath, nausea, vomiting, diarrhea, headache, paresthesias, muscle weakness, changes in sensation.     ICU Vital Signs Last 24 Hrs  T(C): 37.2 (08 Mar 2021 23:45), Max: 37.3 (08 Mar 2021 19:15)  T(F): 99 (08 Mar 2021 23:45), Max: 99.2 (08 Mar 2021 19:15)  HR: 98 (09 Mar 2021 00:00) (71 - 103)  BP: 138/88 (08 Mar 2021 18:00) (118/74 - 138/88)  BP(mean): 100 (08 Mar 2021 18:00) (92 - 121)  ABP: 127/69 (09 Mar 2021 00:00) (107/79 - 162/80)  ABP(mean): 85 (09 Mar 2021 00:00) (85 - 103)  RR: 17 (09 Mar 2021 00:00) (15 - 24)  SpO2: 96% (09 Mar 2021 00:00) (93% - 100%)      ABG - ( 08 Mar 2021 17:17 )  pH, Arterial: 7.38  pH, Blood: x     /  pCO2: 34    /  pO2: 70    / HCO3: 21    / Base Excess: -4.2  /  SaO2: 96                  MEDICATIONS  (STANDING):  acetaminophen   Tablet .. 975 milliGRAM(s) Oral every 6 hours  aspirin enteric coated 325 milliGRAM(s) Oral daily  atorvastatin 20 milliGRAM(s) Oral at bedtime  BACItracin   Ointment 1 Application(s) Topical two times a day  celecoxib 200 milliGRAM(s) Oral daily  dexAMETHasone  Injectable 4 milliGRAM(s) IV Push four times a day  methocarbamol 750 milliGRAM(s) Oral three times a day  multiple electrolytes Injection Type 1 1000 milliLiter(s) (75 mL/Hr) IV Continuous <Continuous>  pantoprazole    Tablet 40 milliGRAM(s) Oral before breakfast    MEDICATIONS  (PRN):  aluminum hydroxide/magnesium hydroxide/simethicone Suspension 30 milliLiter(s) Oral every 12 hours PRN Indigestion  dexAMETHasone  IVPB 8 milliGRAM(s) IV Intermittent once PRN Nausea and/or Vomiting  diazepam    Tablet 2 milliGRAM(s) Oral every 8 hours PRN muscle spasm refractory to methocarbamol  fentaNYL    Injectable 25 MICROGram(s) IV Push every 5 minutes PRN Moderate Pain (4 - 6)  HYDROmorphone  Injectable 1 milliGRAM(s) IV Push every 10 minutes PRN Severe Pain (7 - 10)  morphine  - Injectable 2 milliGRAM(s) IV Push every 4 hours PRN severe pain not controlled with current meds  ondansetron Injectable 4 milliGRAM(s) IV Push once PRN Nausea and/or Vomiting  ondansetron Injectable 4 milliGRAM(s) IV Push every 6 hours PRN Nausea  oxyCODONE    IR 5 milliGRAM(s) Oral every 3 hours PRN Moderate Pain (4 - 6)  oxyCODONE    IR 10 milliGRAM(s) Oral every 3 hours PRN Severe Pain (7 - 10)          Physical Exam:    Gen: Resting comfortably in bed    HEENT: PERRL, EOMI    Neurological: Alert and oriented x3 without focal deficit, motor-sensori exam intact    Neck: Trachea midline, no evidence of JVD, FROM without pain, neck symmetric    Pulmonary: CTAB with decreased breath sounds at the bases    Cardiovascular: S1S2    Gastrointestinal: Soft, non-tender, non-distended    : Ni in place draining yellow urine    Extremities: Without c/c/e    Skin: Surgical dressing c/d/i    Musculoskeletal: FROM without pain, no deformity or areas of tenderness    LABS:  Pending      ASSESSMENT/PLAN:  47y Male s/p C6/7 corpectomy and fusion of C3-T2    Neuro: Pain control, delirium precautions, sleep hygiene. Cervical collar for comfort as per ortho spine    CV: Goal MAP of 85. Pt was maintaining this goal without intervention until recently. 1L bolus given. If no improvement will consider small dose of pressors. Hemodynamic monitoring via a-line.     Pulm: Encourage incentive spirometry, OOB as tolerated. Titrate supplemental oxygen to maintain SpO2 92-99%     GI/Nutrition: Regular diet, likely IVL today    /Renal: Consider ToV today, f/u am labs    ID: No active issues     Endo: Decadron as per operative service. ISS.     Skin: Repositioning for DTI prevention while in bed    Heme/DVT Prophylaxis: SCDs, ASA as per ortho spine

## 2022-10-10 NOTE — ASSESSMENT & PLAN NOTE
Fever and leukocytosis  71 yo M with hx of rt renal mass, HTN, HLD, recent diagnosis of DVT (9/8/22) on xarelto, PAD s/p  axio-fem-fem bypass on 10/6. ID consulted for sepsis of unknown etiology.  Per chart review, patient was first febrile on POD#1, 10/7 and again on 10/8. Leukocytosis present since 10/6. Started on empiric vanc and zosyn on 10/7. BCx 10/7 is NGTD. CXR revealed b/l edema. Pt denies SOB, dysuria, diarrhea, arthralgias, neck/ back pain. Reports a chronic cough. B/L fem surgical sites are covered with clean dressings. Lt chest incision has no fluctuance or drainage. RLE is erythematous with a skin tear wher a blister weas previously.   Differentials include post-op fever, surgical site infection, new DVT, RLE cellulitis    Afebrile since 10/8. Leukocytosis resolving. Patient clinically stable.     BCx 10/7 NGTD  BCX 10/9 NGTD  UA bland; and asymptomatic  Flu neg  US negative for clots     Recommendations:  --Incentive spirometry  --Can switch from vancomycin and cefepime to PO doxycycline 100 mg BID and cefpodoxime 400 mg BID to complete empiric 7 day skin soft tissue course given slight cellulitis around surgical site   --Anticipated stop date 10/13/22 including days patient has received IV antibiotics  --If cefpodoxime cost prohibitive can use cephalexin 500 mg Q6H   --Follow blood cultures     ID will sign off

## 2022-10-10 NOTE — PROGRESS NOTES
[facility] - Cardiology Stepdown  Infectious Disease  Progress Note    Patient Name: Babatunde Singh  MRN: [id number]  Admission Date: 10/2/2022  Length of Stay: 8 days  Attending Physician: LEI Ortiz III, MD  Primary Care Provider: Esdras Ly MD    Isolation Status: No active isolations  Assessment/Plan:      Lower limb ischemia  Fever and leukocytosis  73 yo M with hx of rt renal mass, HTN, HLD, recent diagnosis of DVT (9/8/22) on xarelto, PAD s/p  axio-fem-fem bypass on 10/6. ID consulted for sepsis of unknown etiology.  Per chart review, patient was first febrile on POD#1, 10/7 and again on 10/8. Leukocytosis present since 10/6. Started on empiric vanc and zosyn on 10/7. BCx 10/7 is NGTD. CXR revealed b/l edema. Pt denies SOB, dysuria, diarrhea, arthralgias, neck/ back pain. Reports a chronic cough. B/L fem surgical sites are covered with clean dressings. Lt chest incision has no fluctuance or drainage. RLE is erythematous with a skin tear wher a blister weas previously.   Differentials include post-op fever, surgical site infection, new DVT, RLE cellulitis    Afebrile since 10/8. Leukocytosis resolving. Patient clinically stable.     BCx 10/7 NGTD  BCX 10/9 NGTD  UA bland; and asymptomatic  Flu neg  US negative for clots     Recommendations:  --Incentive spirometry  --Can switch from vancomycin and cefepime to PO doxycycline 100 mg BID and cefpodoxime 400 mg BID to complete empiric 7 day skin soft tissue course given slight cellulitis around surgical site   --Anticipated stop date 10/13/22 including days patient has received IV antibiotics  --If cefpodoxime cost prohibitive can use cephalexin 500 mg Q6H   --Follow blood cultures     ID will sign off      Thank you for your consult. I will sign off. Please contact us if you have any additional questions.    Arnoldo Nunez, DO  Infectious Disease  [facility] - Cardiology Stepdown    Subjective:     Principal Problem:Critical limb ischemia of right lower  extremity with ulceration of lower leg    HPI: Mr. Singh is a 71 yo M with hx of rt renal mass, PAD, HTN, HLD, recent diagnosis of DVT (9/8/22) on xarelto who presented due to RLE pain and was found to have occlusion of the right dorsalis pedis artery. Underwent successful axio-fem-fem bypass on 10/6. ID consulted for sepsis of unknown etiology.    Per chart review, patient was first febrile on POD#1, 10/7 and again on 10/8. Leukocytosis present since 10/6. Started on empiric vanc and zosyn on 10/7. BCx 10/7 is NGTD. CXR revealed b/l edema. Pt denies SOB, dysuria, diarrhea, arthralgias, neck/ back pain. Reports a chronic cough. B/L fem surgical sites are covered with clean dressings. Lt chest incision has no fluctuance or drainage. RLE is erythematous with a skin tear where a blister was previously.     Interval History: Patient seen at bedside. Doing well with minimal pain only when getting out of bed but much improved since surgery. Leukocytosis resolving. Afebrile. Both likely due to procedure. May have some superimposed cellulitis.    Review of Systems   Constitutional:  Negative for chills, fatigue, fever and unexpected weight change.   HENT:  Negative for congestion, postnasal drip and trouble swallowing.    Respiratory:  Negative for cough, chest tightness and shortness of breath.    Cardiovascular:  Negative for chest pain, palpitations and leg swelling.   Gastrointestinal:  Negative for abdominal pain, blood in stool, constipation, diarrhea, nausea and vomiting.   Genitourinary:  Negative for difficulty urinating, dysuria and hematuria.   Musculoskeletal:  Positive for gait problem and myalgias. Negative for arthralgias and back pain.   Neurological:  Negative for dizziness and light-headedness.   Psychiatric/Behavioral:  Negative for confusion.    Objective:     Vital Signs (Most Recent):  Temp: 98.3 °F (36.8 °C) (10/10/22 1200)  Pulse: (!) 117 (10/10/22 1319)  Resp: 20 (10/10/22 1319)  BP: 126/61  (10/10/22 1200)  SpO2: 95 % (10/10/22 1319)   Vital Signs (24h Range):  Temp:  [98.1 °F (36.7 °C)-99.7 °F (37.6 °C)] 98.3 °F (36.8 °C)  Pulse:  [110-124] 117  Resp:  [10-22] 20  SpO2:  [91 %-97 %] 95 %  BP: ()/(56-72) 126/61     Weight: 54 kg (119 lb)  Body mass index is 18.64 kg/m².    Estimated Creatinine Clearance: 73.9 mL/min (based on SCr of 0.7 mg/dL).    Physical Exam  Constitutional:       General: He is not in acute distress.     Appearance: Normal appearance. He is not ill-appearing.   Cardiovascular:      Rate and Rhythm: Normal rate and regular rhythm.      Pulses: Normal pulses.      Heart sounds: Normal heart sounds. No murmur heard.    No friction rub. No gallop.   Pulmonary:      Effort: Pulmonary effort is normal. No respiratory distress.      Breath sounds: Normal breath sounds.   Abdominal:      General: Abdomen is flat. Bowel sounds are normal. There is no distension.      Palpations: Abdomen is soft.      Tenderness: There is no abdominal tenderness.   Skin:     General: Skin is warm and dry.      Comments: Some erythema involving RLE   Neurological:      Mental Status: He is alert.       Significant Labs: All pertinent labs within the past 24 hours have been reviewed.    Significant Imaging: I have reviewed all pertinent imaging results/findings within the past 24 hours.

## 2022-10-10 NOTE — PLAN OF CARE
10/10/22 1030   Post-Acute Status   Post-Acute Authorization Placement   Post-Acute Placement Status Referrals Sent   Discharge Delays None known at this time   Discharge Plan   Discharge Plan A Skilled Nursing Facility     Per MD, pt should be ready to dc in 1-2 days to SNF.     REYES spoke with pts flakita Puente, ref sent to [facility] (first choice), [facility], [facility] and [facility] (ref manually faxed).    REYES requested PPD be ordered.    Julieta Bynum LCSW  PRN

## 2022-10-10 NOTE — PLAN OF CARE
Patient calm and cooperative, supported with active listening. No events over night. Remained fee of falls or injury. Comfort and safety maintained. Continue to monitor.   Problem: Adult Inpatient Plan of Care  Goal: Plan of Care Review  10/10/2022 0734 by Rosa Isela Li RN  Outcome: Ongoing, Progressing  10/10/2022 0620 by Rosa Isela Li RN  Outcome: Ongoing, Progressing  Goal: Patient-Specific Goal (Individualized)  10/10/2022 0734 by Rosa Isela Li RN  Outcome: Ongoing, Progressing  10/10/2022 0620 by Rosa Isela Li RN  Outcome: Ongoing, Progressing  Goal: Absence of Hospital-Acquired Illness or Injury  10/10/2022 0734 by Rosa Isela Li RN  Outcome: Ongoing, Progressing  10/10/2022 0620 by Rosa Isela Li RN  Outcome: Ongoing, Progressing  Goal: Optimal Comfort and Wellbeing  10/10/2022 0734 by Rosa Isela Li RN  Outcome: Ongoing, Progressing  10/10/2022 0620 by Rosa Isela Li RN  Outcome: Ongoing, Progressing  Goal: Readiness for Transition of Care  10/10/2022 0734 by Rosa Isela Li RN  Outcome: Ongoing, Progressing  10/10/2022 0620 by Rosa Isela Li RN  Outcome: Ongoing, Progressing     Problem: Impaired Wound Healing  Goal: Optimal Wound Healing  10/10/2022 0734 by Rosa Isela Li RN  Outcome: Ongoing, Progressing  10/10/2022 0620 by Rosa Isela Li RN  Outcome: Ongoing, Progressing     Problem: Fall Injury Risk  Goal: Absence of Fall and Fall-Related Injury  10/10/2022 0734 by Rosa Isela Li RN  Outcome: Ongoing, Progressing  10/10/2022 0620 by Rosa Isela Li RN  Outcome: Ongoing, Progressing     Problem: Skin Injury Risk Increased  Goal: Skin Health and Integrity  10/10/2022 0734 by Rosa Isela Li RN  Outcome: Ongoing, Progressing  10/10/2022 0620 by Rosa Isela Li RN  Outcome: Ongoing, Progressing     Problem: Pain Acute  Goal: Acceptable Pain Control and Functional Ability  Outcome: Ongoing, Progressing     Problem: Infection  Goal: Absence of Infection Signs and Symptoms  Outcome: Ongoing, Progressing

## 2022-10-10 NOTE — PLAN OF CARE
Recommendations  1. Continue Regular diet-encourage adequate intake   2. Continue Boost TID for optimization of protein and calorie intake   3. RD following     Goals: Meet % of EEN/EPN by RD f/u date  Nutrition Goal Status: progressing towards goal  Communication of RD Recs: other (POC)

## 2022-10-10 NOTE — CONSULTS
Inpatient consult to Physical Medicine Rehab  Consult performed by: Luna Lomas NP  Consult ordered by: Sis Miles MD    Consult received.  Reviewed patient history and current admission.  PM&R following.    Luna Lomas NP  Physical Medicine & Rehabilitation   10/10/2022

## 2022-10-10 NOTE — SUBJECTIVE & OBJECTIVE
Interval History: Mental status remains improved frmo prior. He has ongoing tachycardia, but has not fevered in >24h. Denies chest pain or dyspnea this morning.    Review of Systems  Objective:     Vital Signs (Most Recent):  Temp: 98.3 °F (36.8 °C) (10/10/22 1200)  Pulse: (!) 117 (10/10/22 1319)  Resp: 20 (10/10/22 1319)  BP: 126/61 (10/10/22 1200)  SpO2: 95 % (10/10/22 1319)   Vital Signs (24h Range):  Temp:  [98.1 °F (36.7 °C)-99.7 °F (37.6 °C)] 98.3 °F (36.8 °C)  Pulse:  [110-124] 117  Resp:  [10-22] 20  SpO2:  [91 %-97 %] 95 %  BP: ()/(56-72) 126/61     Weight: 54 kg (119 lb)  Body mass index is 18.64 kg/m².    Intake/Output Summary (Last 24 hours) at 10/10/2022 1404  Last data filed at 10/10/2022 1145  Gross per 24 hour   Intake 490 ml   Output 1150 ml   Net -660 ml      Physical Exam  Vitals and nursing note reviewed.   Constitutional:       General: He is not in acute distress.     Appearance: He is not toxic-appearing or diaphoretic.   HENT:      Head: Normocephalic.   Cardiovascular:      Rate and Rhythm: Regular rhythm. Tachycardia present.   Pulmonary:      Effort: Pulmonary effort is normal. No respiratory distress.      Breath sounds: No wheezing.   Abdominal:      Palpations: Abdomen is soft.      Tenderness: There is no guarding.   Musculoskeletal:      Right lower leg: No edema.      Left lower leg: No edema.   Skin:     General: Skin is warm and dry.   Neurological:      Mental Status: He is alert. Mental status is at baseline.      Cranial Nerves: No dysarthria.   Psychiatric:         Mood and Affect: Mood normal.         Behavior: Behavior normal.       Significant Labs: All pertinent labs within the past 24 hours have been reviewed.    Significant Imaging: I have reviewed all pertinent imaging results/findings within the past 24 hours.

## 2022-10-11 LAB
ALBUMIN SERPL BCP-MCNC: 1.8 G/DL (ref 3.5–5.2)
ALP SERPL-CCNC: 70 U/L (ref 55–135)
ALT SERPL W/O P-5'-P-CCNC: 30 U/L (ref 10–44)
ANION GAP SERPL CALC-SCNC: 6 MMOL/L (ref 8–16)
AST SERPL-CCNC: 55 U/L (ref 10–40)
BASOPHILS # BLD AUTO: 0.04 K/UL (ref 0–0.2)
BASOPHILS NFR BLD: 0.3 % (ref 0–1.9)
BILIRUB SERPL-MCNC: 0.4 MG/DL (ref 0.1–1)
BUN SERPL-MCNC: 11 MG/DL (ref 8–23)
CALCIUM SERPL-MCNC: 8 MG/DL (ref 8.7–10.5)
CHLORIDE SERPL-SCNC: 106 MMOL/L (ref 95–110)
CO2 SERPL-SCNC: 23 MMOL/L (ref 23–29)
CREAT SERPL-MCNC: 0.6 MG/DL (ref 0.5–1.4)
DIFFERENTIAL METHOD: ABNORMAL
EOSINOPHIL # BLD AUTO: 0.2 K/UL (ref 0–0.5)
EOSINOPHIL NFR BLD: 1.9 % (ref 0–8)
ERYTHROCYTE [DISTWIDTH] IN BLOOD BY AUTOMATED COUNT: 18 % (ref 11.5–14.5)
EST. GFR  (NO RACE VARIABLE): >60 ML/MIN/1.73 M^2
GLUCOSE SERPL-MCNC: 111 MG/DL (ref 70–110)
HCT VFR BLD AUTO: 27.1 % (ref 40–54)
HGB BLD-MCNC: 8.4 G/DL (ref 14–18)
IMM GRANULOCYTES # BLD AUTO: 0.09 K/UL (ref 0–0.04)
IMM GRANULOCYTES NFR BLD AUTO: 0.7 % (ref 0–0.5)
LYMPHOCYTES # BLD AUTO: 3 K/UL (ref 1–4.8)
LYMPHOCYTES NFR BLD: 23.3 % (ref 18–48)
MCH RBC QN AUTO: 26.3 PG (ref 27–31)
MCHC RBC AUTO-ENTMCNC: 31 G/DL (ref 32–36)
MCV RBC AUTO: 85 FL (ref 82–98)
MONOCYTES # BLD AUTO: 1.3 K/UL (ref 0.3–1)
MONOCYTES NFR BLD: 10 % (ref 4–15)
NEUTROPHILS # BLD AUTO: 8.2 K/UL (ref 1.8–7.7)
NEUTROPHILS NFR BLD: 63.8 % (ref 38–73)
NRBC BLD-RTO: 0 /100 WBC
PLATELET # BLD AUTO: 458 K/UL (ref 150–450)
PMV BLD AUTO: 9 FL (ref 9.2–12.9)
POTASSIUM SERPL-SCNC: 3.7 MMOL/L (ref 3.5–5.1)
PROT SERPL-MCNC: 4.8 G/DL (ref 6–8.4)
RBC # BLD AUTO: 3.19 M/UL (ref 4.6–6.2)
SARS-COV-2 RNA RESP QL NAA+PROBE: NOT DETECTED
SODIUM SERPL-SCNC: 135 MMOL/L (ref 136–145)
TROPONIN I SERPL DL<=0.01 NG/ML-MCNC: 0.2 NG/ML (ref 0–0.03)
WBC # BLD AUTO: 12.78 K/UL (ref 3.9–12.7)

## 2022-10-11 PROCEDURE — 85025 COMPLETE CBC W/AUTO DIFF WBC: CPT | Performed by: STUDENT IN AN ORGANIZED HEALTH CARE EDUCATION/TRAINING PROGRAM

## 2022-10-11 PROCEDURE — 27000646 HC AEROBIKA DEVICE

## 2022-10-11 PROCEDURE — 25000003 PHARM REV CODE 250: Performed by: INTERNAL MEDICINE

## 2022-10-11 PROCEDURE — 25000003 PHARM REV CODE 250: Performed by: STUDENT IN AN ORGANIZED HEALTH CARE EDUCATION/TRAINING PROGRAM

## 2022-10-11 PROCEDURE — 80053 COMPREHEN METABOLIC PANEL: CPT | Performed by: STUDENT IN AN ORGANIZED HEALTH CARE EDUCATION/TRAINING PROGRAM

## 2022-10-11 PROCEDURE — 84484 ASSAY OF TROPONIN QUANT: CPT | Performed by: STUDENT IN AN ORGANIZED HEALTH CARE EDUCATION/TRAINING PROGRAM

## 2022-10-11 PROCEDURE — 20600001 HC STEP DOWN PRIVATE ROOM

## 2022-10-11 PROCEDURE — 99233 SBSQ HOSP IP/OBS HIGH 50: CPT | Mod: GC,,, | Performed by: HOSPITALIST

## 2022-10-11 PROCEDURE — 93923 PR NON-INVASIVE PHYSIOLOGIC STUDY EXTREMITY 3 LEVELS: ICD-10-PCS | Mod: 26,,, | Performed by: SURGERY

## 2022-10-11 PROCEDURE — U0005 INFEC AGEN DETEC AMPLI PROBE: HCPCS | Performed by: STUDENT IN AN ORGANIZED HEALTH CARE EDUCATION/TRAINING PROGRAM

## 2022-10-11 PROCEDURE — U0003 INFECTIOUS AGENT DETECTION BY NUCLEIC ACID (DNA OR RNA); SEVERE ACUTE RESPIRATORY SYNDROME CORONAVIRUS 2 (SARS-COV-2) (CORONAVIRUS DISEASE [COVID-19]), AMPLIFIED PROBE TECHNIQUE, MAKING USE OF HIGH THROUGHPUT TECHNOLOGIES AS DESCRIBED BY CMS-2020-01-R: HCPCS | Performed by: STUDENT IN AN ORGANIZED HEALTH CARE EDUCATION/TRAINING PROGRAM

## 2022-10-11 PROCEDURE — 94664 DEMO&/EVAL PT USE INHALER: CPT

## 2022-10-11 PROCEDURE — 99900035 HC TECH TIME PER 15 MIN (STAT)

## 2022-10-11 PROCEDURE — S4991 NICOTINE PATCH NONLEGEND: HCPCS | Performed by: STUDENT IN AN ORGANIZED HEALTH CARE EDUCATION/TRAINING PROGRAM

## 2022-10-11 PROCEDURE — 99233 PR SUBSEQUENT HOSPITAL CARE,LEVL III: ICD-10-PCS | Mod: GC,,, | Performed by: HOSPITALIST

## 2022-10-11 PROCEDURE — 94761 N-INVAS EAR/PLS OXIMETRY MLT: CPT

## 2022-10-11 PROCEDURE — 36415 COLL VENOUS BLD VENIPUNCTURE: CPT | Performed by: STUDENT IN AN ORGANIZED HEALTH CARE EDUCATION/TRAINING PROGRAM

## 2022-10-11 PROCEDURE — 25000003 PHARM REV CODE 250

## 2022-10-11 PROCEDURE — 93923 UPR/LXTR ART STDY 3+ LVLS: CPT | Mod: 26,,, | Performed by: SURGERY

## 2022-10-11 RX ORDER — METOPROLOL TARTRATE 1 MG/ML
5 INJECTION, SOLUTION INTRAVENOUS EVERY 5 MIN PRN
Status: DISCONTINUED | OUTPATIENT
Start: 2022-10-11 | End: 2022-10-12 | Stop reason: HOSPADM

## 2022-10-11 RX ADMIN — GABAPENTIN 900 MG: 300 CAPSULE ORAL at 07:10

## 2022-10-11 RX ADMIN — HYDROCODONE BITARTRATE AND ACETAMINOPHEN 1 TABLET: 5; 325 TABLET ORAL at 09:10

## 2022-10-11 RX ADMIN — CILOSTAZOL 100 MG: 100 TABLET ORAL at 07:10

## 2022-10-11 RX ADMIN — NICOTINE 1 PATCH: 14 PATCH, EXTENDED RELEASE TRANSDERMAL at 08:10

## 2022-10-11 RX ADMIN — EZETIMIBE 10 MG: 10 TABLET ORAL at 08:10

## 2022-10-11 RX ADMIN — GABAPENTIN 900 MG: 300 CAPSULE ORAL at 03:10

## 2022-10-11 RX ADMIN — Medication 6 MG: at 07:10

## 2022-10-11 RX ADMIN — TRAZODONE HYDROCHLORIDE 25 MG: 50 TABLET ORAL at 07:10

## 2022-10-11 RX ADMIN — RIVAROXABAN 20 MG: 20 TABLET, FILM COATED ORAL at 05:10

## 2022-10-11 RX ADMIN — ASPIRIN 81 MG: 81 TABLET, CHEWABLE ORAL at 08:10

## 2022-10-11 RX ADMIN — GABAPENTIN 900 MG: 300 CAPSULE ORAL at 08:10

## 2022-10-11 RX ADMIN — MUPIROCIN: 20 OINTMENT TOPICAL at 08:10

## 2022-10-11 RX ADMIN — CEFPODOXIME PROXETIL 400 MG: 200 TABLET, FILM COATED ORAL at 07:10

## 2022-10-11 RX ADMIN — ATORVASTATIN CALCIUM 80 MG: 40 TABLET, FILM COATED ORAL at 08:10

## 2022-10-11 RX ADMIN — HYDROCODONE BITARTRATE AND ACETAMINOPHEN 1 TABLET: 5; 325 TABLET ORAL at 05:10

## 2022-10-11 RX ADMIN — CEFPODOXIME PROXETIL 400 MG: 200 TABLET, FILM COATED ORAL at 08:10

## 2022-10-11 RX ADMIN — DOXYCYCLINE HYCLATE 100 MG: 100 TABLET, COATED ORAL at 07:10

## 2022-10-11 RX ADMIN — DOXYCYCLINE HYCLATE 100 MG: 100 TABLET, COATED ORAL at 08:10

## 2022-10-11 RX ADMIN — CILOSTAZOL 100 MG: 100 TABLET ORAL at 08:10

## 2022-10-11 NOTE — ASSESSMENT & PLAN NOTE
My overall impression is sepsis possibly 2/2 infected RLE ulcers. Vital signs were reviewed and noted in progress note.  Antibiotics given-   Antibiotics (From admission, onward)    Start     Stop Route Frequency Ordered    10/10/22 2100  doxycycline tablet 100 mg         10/15 0859 Oral Every 12 hours 10/10/22 1426    10/10/22 2100  cefpodoxime tablet 400 mg         10/15 0859 Oral Every 12 hours 10/10/22 1426    10/06/22 2100  mupirocin 2 % ointment         10/11 2059 Nasl 2 times daily 10/06/22 1517        Cultures were taken-   Microbiology Results (last 7 days)     Procedure Component Value Units Date/Time    Blood culture [[id number]] Collected: 10/09/22 1012    Order Status: Completed Specimen: Blood Updated: 10/11/22 1212     Blood Culture, Routine No Growth to date      No Growth to date      No Growth to date    Blood culture [[id number]] Collected: 10/10/22 0404    Order Status: Completed Specimen: Blood Updated: 10/11/22 0613     Blood Culture, Routine No Growth to date      No Growth to date    Blood culture [[id number]] Collected: 10/07/22 2345    Order Status: Completed Specimen: Blood Updated: 10/11/22 0612     Blood Culture, Routine No Growth to date      No Growth to date      No Growth to date      No Growth to date    Blood culture [[id number]] Collected: 10/07/22 2345    Order Status: Completed Specimen: Blood Updated: 10/11/22 0612     Blood Culture, Routine No Growth to date      No Growth to date      No Growth to date      No Growth to date    Urine culture [[id number]] Collected: 10/09/22 1109    Order Status: Completed Specimen: Urine, Clean Catch Updated: 10/10/22 1316     Urine Culture, Routine No significant growth    Narrative:      Indicated criteria for high risk culture:->Other  Other (specify):->post op    Influenza A & B by Molecular [[id number]] Collected: 10/09/22 1000    Order Status: Completed Specimen: Nasopharyngeal Swab Updated: 10/09/22 1132     Influenza A, Molecular Negative      Influenza B, Molecular Negative     Flu A & B Source NP        Latest lactate reviewed, they are-  Recent Labs   Lab 10/09/22  0102   LACTATE 1.6       Organ dysfunction indicated by Encephalopathy   Source- Possible infected leg wounds    Source control Achieved by- starting abx.     CXR negative for PNA  Surgical site left chest wall was clean.  UA and UCX negative  Blood Cx 10/7: NGTD   No purulent discharge from ulcers or signs concerning for infection on exam. Clinical status improving on abx despite ongoing leukocytosis. Low index of suspicion for DVT. Will continue to monitor and follow-up pending studies.  Influenza and covid tests pending    - f/u ID recs, was on vanc and cefepime switched to cefpodoximine and Doxycycline on 10/10 for 9 doses of both. ID has signed off

## 2022-10-11 NOTE — ASSESSMENT & PLAN NOTE
Mr Hardy is a 72 year old male with a pmh of smoking, severe PAD, dvt who presented with chronic limb ischemia and rest pain now s/p successful axio-fem-fem bypass on 10/6.  - Transitioned to PO Abx - cefpodoxime and doxycycline  - melatonin QHS and trazodone QHS PRN  - delirium precautions  - PT/OT   - continue asa/statin cilostazol, xarelto  - Blood cx and urine cx NGTD, WBC downtrending, pt has been afebrile x24H  - Wound care consult - will follow up recommendations  - SW: work on SNF placement

## 2022-10-11 NOTE — PLAN OF CARE
10/11/22 1546   Post-Acute Status   Post-Acute Authorization Placement   Post-Acute Placement Status Pending payor review/awaiting authorization (if required)   Discharge Delays None known at this time   Discharge Plan   Discharge Plan A Skilled Nursing Facility   Discharge Plan B Skilled Nursing Facility     [facility] does not have any beds at this time.  Boby with [facility] informed the SW that they can accept the pt. REYES contacted the pt's dtr to discuss SNF options that were sent. The pt's daughter wanted [facility].  REYES updated Boby who will contact her about paperwork.  REYES called in the loced and faxed the pasrr.  REYES sent to Boby the Pas[facility], chest xray, and ppd placement.  REYES asked the team to place a covid test for placement.  REYES will f/u as needed.    Lizbet Tejada, DANNIW   PRN

## 2022-10-11 NOTE — PROGRESS NOTES
[facility] - [facility] Step[facility]  Vascular Surgery  Progress Note    Patient Name: Babatunde Singh  MRN: [id number]  Admission Date: 10/2/2022  Primary Care Provider: Esdras Ly MD    Subjective:     Interval History: NAEO, AF, VSS. Pain is well controlled. Patient is oriented. Wound dressings were changed, consulted wound care for recommendations.     Post-Op Info:  Procedure(s) (LRB):  CREATION, BYPASS, ARTERIAL, AXILLARY TO FEMORAL, USING GRAFT (N/A)  CREATION, BYPASS, ARTERIAL, FEMORAL TO FEMORAL, USING GRAFT (N/A)   5 Days Post-Op       Medications:  Continuous Infusions:  Scheduled Meds:   aspirin  81 mg Oral Daily    atorvastatin  80 mg Oral Daily    cefpodoxime  400 mg Oral Q12H    cilostazoL  100 mg Oral BID    doxycycline  100 mg Oral Q12H    ezetimibe  10 mg Oral Daily    gabapentin  900 mg Oral TID    melatonin  6 mg Oral Nightly    mupirocin   Nasal BID    nicotine  1 patch Transdermal Daily    rivaroxaban  20 mg Oral Daily with dinner    traZODone  25 mg Oral QHS     PRN Meds:acetaminophen, haloperidol lactate, HYDROcodone-acetaminophen, ibuprofen, sodium chloride 0.9%     Objective:     Vital Signs (Most Recent):  Temp: 97.7 °F (36.5 °C) (10/11/22 0442)  Pulse: (!) 115 (10/11/22 0442)  Resp: 18 (10/11/22 0442)  BP: (!) 126/59 (10/11/22 0442)  SpO2: (!) 93 % (10/11/22 0442)   Vital Signs (24h Range):  Temp:  [97.7 °F (36.5 °C)-98.5 °F (36.9 °C)] 97.7 °F (36.5 °C)  Pulse:  [106-139] 115  Resp:  [18-22] 18  SpO2:  [92 %-95 %] 93 %  BP: (112-149)/(58-63) 126/59         Physical Exam  Vitals and nursing note reviewed.   Constitutional:       Appearance: Normal appearance.   Neck:      Comments: Axillary incision c/d/I. No erythema or hematoma.    Cardiovascular:      Comments: L biphasic DP, L biphasic PT  R biphasic PT, R biphasic DP    Warm, well-perfused BLE   Pulmonary:      Effort: Pulmonary effort is normal.   Abdominal:      General: Abdomen is flat.   Musculoskeletal:         General:  Tenderness present.   Skin:     Comments: Right calf lesions.  Dressing replaced at the bedside, wrapped with Kerlix and xeroform.     Neurological:      Mental Status: He is oriented to person, place, and time.       Significant Labs:  All pertinent labs from the last 24 hours have been reviewed.    Significant Diagnostics:  I have reviewed all pertinent imaging results/findings within the past 24 hours.    Assessment/Plan:     * Critical limb ischemia of right lower extremity with ulceration of lower leg  Mr Hardy is a 72 year old male with a pmh of smoking, severe PAD, dvt who presented with chronic limb ischemia and rest pain now s/p successful axio-fem-fem bypass on 10/6.  - Transitioned to PO Abx - cefpodoxime and doxycycline  - melatonin QHS and trazodone QHS PRN  - delirium precautions  - PT/OT   - continue asa/statin cilostazol, xarelto  - Blood cx and urine cx NGTD, WBC downtrending, pt has been afebrile x24H  - Wound care consult - will follow up recommendations  - SW: work on SNF placement           Edy Aly MD  Vascular Surgery  [facility] - [facility]

## 2022-10-11 NOTE — ASSESSMENT & PLAN NOTE
Pt is s/p left axillary to BILATERAL femoris profunda bypass with 8 mm PTFE on 10/6 by vascular surgery. Mgmt per primary  Continue statin, zetia, aspirin, and cilostazol.  Wound care is following for ulcers that are present. Continues improvement

## 2022-10-11 NOTE — PROGRESS NOTES
Patient alert and oriented X 4 this shift. Plan of care discussed and questions answered. Supported with active listening. Heart rate sinus tachycardia 1 teens.  MAP >65. Afebrile. Remained free  of falls and  injury. Comfort and safety maintained. Continued to monitor.

## 2022-10-11 NOTE — SUBJECTIVE & OBJECTIVE
Interval History: Mental status remains continues to improve. He has ongoing tachycardia in the 110s, but has not fevered in >48h. Denies chest pain or dyspnea this morning. Legs pain was not present during exam and stated his pain is controlled but it comes and goes. Wound care stated wounds were improving. Pt's albumin continues to be low. WBC count continues to decline and he is tolerating his antibiotics    Review of Systems  Objective:     Vital Signs (Most Recent):  Temp: 98.1 °F (36.7 °C) (10/11/22 1111)  Pulse: (!) 115 (10/11/22 1111)  Resp: 18 (10/11/22 1111)  BP: 138/64 (10/11/22 1111)  SpO2: 98 % (10/11/22 1111)   Vital Signs (24h Range):  Temp:  [97.7 °F (36.5 °C)-98.4 °F (36.9 °C)] 98.1 °F (36.7 °C)  Pulse:  [106-139] 115  Resp:  [18-22] 18  SpO2:  [92 %-98 %] 98 %  BP: (126-149)/(59-67) 138/64     Weight: 68.6 kg (151 lb 4.8 oz)  Body mass index is 23.7 kg/m².    Intake/Output Summary (Last 24 hours) at 10/11/2022 1511  Last data filed at 10/11/2022 1300  Gross per 24 hour   Intake 590 ml   Output 2375 ml   Net -1785 ml        Physical Exam  Vitals and nursing note reviewed.   Constitutional:       General: He is not in acute distress.     Appearance: He is normal weight. He is not toxic-appearing or diaphoretic.   HENT:      Head: Normocephalic.   Cardiovascular:      Rate and Rhythm: Regular rhythm. Tachycardia present.   Pulmonary:      Effort: Pulmonary effort is normal. No respiratory distress.      Breath sounds: No wheezing.   Abdominal:      Palpations: Abdomen is soft.      Tenderness: There is no abdominal tenderness. There is no guarding.   Musculoskeletal:      Right lower leg: No edema.      Left lower leg: No edema.   Skin:     General: Skin is warm and dry.      Findings: Lesion present.      Comments: Pt has wounds located on his lower extremity that were do to ischemia. Wound care is following.    Neurological:      Mental Status: He is alert. Mental status is at baseline.      Cranial  Nerves: No dysarthria.   Psychiatric:         Mood and Affect: Mood normal.         Behavior: Behavior normal. Behavior is cooperative.       Significant Labs: All pertinent labs within the past 24 hours have been reviewed.    Significant Imaging: I have reviewed all pertinent imaging results/findings within the past 24 hours.

## 2022-10-11 NOTE — PROGRESS NOTES
[facility] Medicine  Progress Note    Patient Name: Babatunde Singh  MRN: [id number]  Patient Class: IP- Inpatient   Admission Date: 10/2/2022  Length of Stay: 9 days  Attending Physician: LEI Ortiz III, MD  Primary Care Provider: Esdras Ly MD        Subjective:     Principal Problem:Critical limb ischemia of right lower extremity with ulceration of lower leg        HPI:  Babatunde Singh is a 73 yo M withPAD, HTN, HLD, recent diagnosis of DVT (9/8) on xarelto who presents due to RLE pain. Of note, patient has frequent admission for lower extremity pain. He states that on the day of his presentation, he has had increasing 10/10 aching pain which woke him up from sleep every few hours. This has been progressing over the last 2-3 days prior to admission. He states at baseline he has pain every other night. In the ER, RLE distal pulses were absent on presentation, unable to palpate or obtain Doppler signal.               Overview/Hospital Course:  BRENNAN was performed which showed Right Leg with PVR waveforms suggest ischemic peripheral arterial occlusive disease. No audible Doppler signal detected to obtain a reliable BRENNAN. TBI of 0 with a Great toe pressure of 0 mmHg is noted. Left Leg PVR waveforms also suggested ischemic peripheral arterial occlusive disease. No audible Doppler signal detected to obtain a   reliable BRENNAN.     CTA runoff abdomen bilateral LE showed Possible worsening stenosis or interval occlusion of the right dorsalis pedis artery.  Overall evaluation of lower extremity arteries is otherwise grossly unchanged noting regions of persistent occlusion with two vessel runoff to the bilateral lower extremities. Xarelto was held and pt was started on heparin ggt. IR was consulted, but endoscopically is no option for vascularization. Vascular surgery was consulted, and they scheduled the pt for intervention. Cardiology was consulted for cardiac clearance, and pt has been  cleared with risk as per cardiology. Pt underwent L axillary to BILATERAL femoris profunda bypass with 8 mm PTFE on 10/6 by vascular.  He developed post operative delirium.     Regarding the right renal mass seen on the CT abdomen/pelvis, urology recommended outpt intervention.    On 10/7, pt became septic. CXR, UA and blood cx were ordered and he was started on empiric abx with zosyn and vanco. He was given 3 L bolus of lactate ringer and started on maintenance fluid. UA was negative. CXR ruled out pneumonia. Lactic acid is wnl. Blood cx is negative to date.     On 10/8; vascular surgery requested transfer of pt's care to their service for post operative management. Medicine will follow as a consultant.              Interval History: Mental status remains continues to improve. He has ongoing tachycardia in the 110s, but has not fevered in >48h. Denies chest pain or dyspnea this morning. Legs pain was not present during exam and stated his pain is controlled but it comes and goes. Wound care stated wounds were improving. Pt's albumin continues to be low. WBC count continues to decline and he is tolerating his antibiotics    Review of Systems  Objective:     Vital Signs (Most Recent):  Temp: 98.1 °F (36.7 °C) (10/11/22 1111)  Pulse: (!) 115 (10/11/22 1111)  Resp: 18 (10/11/22 1111)  BP: 138/64 (10/11/22 1111)  SpO2: 98 % (10/11/22 1111)   Vital Signs (24h Range):  Temp:  [97.7 °F (36.5 °C)-98.4 °F (36.9 °C)] 98.1 °F (36.7 °C)  Pulse:  [106-139] 115  Resp:  [18-22] 18  SpO2:  [92 %-98 %] 98 %  BP: (126-149)/(59-67) 138/64     Weight: 68.6 kg (151 lb 4.8 oz)  Body mass index is 23.7 kg/m².    Intake/Output Summary (Last 24 hours) at 10/11/2022 1511  Last data filed at 10/11/2022 1300  Gross per 24 hour   Intake 590 ml   Output 2375 ml   Net -1785 ml        Physical Exam  Vitals and nursing note reviewed.   Constitutional:       General: He is not in acute distress.     Appearance: He is normal weight. He is not  toxic-appearing or diaphoretic.   HENT:      Head: Normocephalic.   Cardiovascular:      Rate and Rhythm: Regular rhythm. Tachycardia present.   Pulmonary:      Effort: Pulmonary effort is normal. No respiratory distress.      Breath sounds: No wheezing.   Abdominal:      Palpations: Abdomen is soft.      Tenderness: There is no abdominal tenderness. There is no guarding.   Musculoskeletal:      Right lower leg: No edema.      Left lower leg: No edema.   Skin:     General: Skin is warm and dry.      Findings: Lesion present.      Comments: Pt has wounds located on his lower extremity that were do to ischemia. Wound care is following.    Neurological:      Mental Status: He is alert. Mental status is at baseline.      Cranial Nerves: No dysarthria.   Psychiatric:         Mood and Affect: Mood normal.         Behavior: Behavior normal. Behavior is cooperative.       Significant Labs: All pertinent labs within the past 24 hours have been reviewed.    Significant Imaging: I have reviewed all pertinent imaging results/findings within the past 24 hours.      Assessment/Plan:      * Critical limb ischemia of right lower extremity with ulceration of lower leg  CTA runoff was reviewed.  Pt is s/p left axillary to BILATERAL femoris profunda bypass with 8 mm PTFE on 10/6 by vascular surgery. Appreciate vascular input.  Continue aspirin, cilostazol and Zetia/statin.      Tachycardia  Patient with ongoing tachycardia to the 110-130s this admission  No source of infection identified.  Recent history of DVT.    - given he is high risk for embolism with recent DVTs did full work up of CTA chest, echo, troponin, BNP  - CTA chest did not show a PE but was sub-optimial, ECHO showed normal LV size, EF 75%, and some concentric remodeling. Troponin was elevated at  0.25 peaked at this. BNP was not elevated.  -Pt's troponin thought to due to demand ischemia from recent sepsis. Pt is on room air and not SOB, think the risk of repeating CTA  outweighs the benefits. If pt has worsening SOB than low threshold to repeat.    Severe sepsis  My overall impression is sepsis possibly 2/2 infected RLE ulcers. Vital signs were reviewed and noted in progress note.  Antibiotics given-   Antibiotics (From admission, onward)    Start     Stop Route Frequency Ordered    10/10/22 2100  doxycycline tablet 100 mg         10/15 0859 Oral Every 12 hours 10/10/22 1426    10/10/22 2100  cefpodoxime tablet 400 mg         10/15 0859 Oral Every 12 hours 10/10/22 1426    10/06/22 2100  mupirocin 2 % ointment         10/11 2059 Nasl 2 times daily 10/06/22 1517        Cultures were taken-   Microbiology Results (last 7 days)     Procedure Component Value Units Date/Time    Blood culture [[id number]] Collected: 10/09/22 1012    Order Status: Completed Specimen: Blood Updated: 10/11/22 1212     Blood Culture, Routine No Growth to date      No Growth to date      No Growth to date    Blood culture [[id number]] Collected: 10/10/22 0404    Order Status: Completed Specimen: Blood Updated: 10/11/22 0613     Blood Culture, Routine No Growth to date      No Growth to date    Blood culture [[id number]] Collected: 10/07/22 2345    Order Status: Completed Specimen: Blood Updated: 10/11/22 0612     Blood Culture, Routine No Growth to date      No Growth to date      No Growth to date      No Growth to date    Blood culture [[id number]] Collected: 10/07/22 2345    Order Status: Completed Specimen: Blood Updated: 10/11/22 0612     Blood Culture, Routine No Growth to date      No Growth to date      No Growth to date      No Growth to date    Urine culture [[id number]] Collected: 10/09/22 1109    Order Status: Completed Specimen: Urine, Clean Catch Updated: 10/10/22 1316     Urine Culture, Routine No significant growth    Narrative:      Indicated criteria for high risk culture:->Other  Other (specify):->post op    Influenza A & B by Molecular [[id number]] Collected: 10/09/22 1000    Order Status:  Completed Specimen: Nasopharyngeal Swab Updated: 10/09/22 1132     Influenza A, Molecular Negative     Influenza B, Molecular Negative     Flu A & B Source NP        Latest lactate reviewed, they are-  Recent Labs   Lab 10/09/22  0102   LACTATE 1.6       Organ dysfunction indicated by Encephalopathy   Source- Possible infected leg wounds    Source control Achieved by- starting abx.     CXR negative for PNA  Surgical site left chest wall was clean.  UA and UCX negative  Blood Cx 10/7: NGTD   No purulent discharge from ulcers or signs concerning for infection on exam. Clinical status improving on abx despite ongoing leukocytosis. Low index of suspicion for DVT. Will continue to monitor and follow-up pending studies.  Influenza and covid tests pending    - f/u ID recs, was on vanc and cefepime switched to cefpodoximine and Doxycycline on 10/10 for 9 doses of both. ID has signed off    Encephalopathy, metabolic  Likely multifactorial related to infection, plus the anesthesia pt received during his surgical procedure.  Improved while being on abx. AAOx4 on 10/9. Responds appropriately and coherently on exam.  Continue to monitor.       DVT (deep venous thrombosis)  Duplex imaging of the right lower extremity veins on 10/5 demonstrates occlusive sub-acute thrombus of the anterior tibial vein (the anterior paired vessel).   Pt was initially on heparin ggt and now on xarelto 20 mg qhs.   - 10/9: likely not cause of new fever.   Continue to monitor      Absent pedal pulses  Absent pedial pulse of the right foot.   Pt is s/p left axillary to BILATERAL femoris profunda bypass with 8 mm PTFE on 10/6 by vascular surgery.   Continue statin, aspirin, and cilostazol.      Lower limb ischemia        PAD (peripheral artery disease)  Pt is s/p left axillary to BILATERAL femoris profunda bypass with 8 mm PTFE on 10/6 by vascular surgery. Mgmt per primary  Continue statin, zetia, aspirin, and cilostazol.  Wound care is following for  ulcers that are present. Continues improvement    Right renal mass  Outpatient intervention with urology    Right leg pain  Due to ischemia.   S/p intervention on 10/6 by vascular surgery, managed by primary  Pain improving since procedure      Claudication of right lower extremity  Duo to ischemic leg. Improved post procedure.   - Continue cilostazol    Mixed hyperlipidemia  On Zetia and lipitor.      Tobacco abuse  Has been consulted over 10 minutes regarding need to quit smoking, and emphazised its negative consequences on his leg ischemia.    Pt states he is done smoking after this hospitalization.      Primary hypertension  Continue to hold home losartan/HCTZ, given concern for sepsis and decreased PO intake       VTE Risk Mitigation (From admission, onward)         Ordered     rivaroxaban tablet 20 mg  With dinner         10/07/22 0818     IP VTE HIGH RISK PATIENT  Once         10/06/22 1516     Place sequential compression device  Until discontinued         10/06/22 1516                Discharge Planning   LAZ: 10/12/2022     Code Status: Full Code   Is the patient medically ready for discharge?: No    Reason for patient still in hospital (select all that apply): Patient trending condition  Discharge Plan A: Skilled Nursing Facility   Discharge Delays: None known at this time              Sixto Paz DO  Department of Hospital Medicine   [facility] Stepdown

## 2022-10-11 NOTE — ASSESSMENT & PLAN NOTE
Has been consulted over 10 minutes regarding need to quit smoking, and emphazised its negative consequences on his leg ischemia.    Pt states he is done smoking after this hospitalization.

## 2022-10-11 NOTE — SUBJECTIVE & OBJECTIVE
Medications:  Continuous Infusions:  Scheduled Meds:   aspirin  81 mg Oral Daily    atorvastatin  80 mg Oral Daily    cefpodoxime  400 mg Oral Q12H    cilostazoL  100 mg Oral BID    doxycycline  100 mg Oral Q12H    ezetimibe  10 mg Oral Daily    gabapentin  900 mg Oral TID    melatonin  6 mg Oral Nightly    mupirocin   Nasal BID    nicotine  1 patch Transdermal Daily    rivaroxaban  20 mg Oral Daily with dinner    traZODone  25 mg Oral QHS     PRN Meds:acetaminophen, haloperidol lactate, HYDROcodone-acetaminophen, ibuprofen, sodium chloride 0.9%     Objective:     Vital Signs (Most Recent):  Temp: 97.7 °F (36.5 °C) (10/11/22 0442)  Pulse: (!) 115 (10/11/22 0442)  Resp: 18 (10/11/22 0442)  BP: (!) 126/59 (10/11/22 0442)  SpO2: (!) 93 % (10/11/22 0442)   Vital Signs (24h Range):  Temp:  [97.7 °F (36.5 °C)-98.5 °F (36.9 °C)] 97.7 °F (36.5 °C)  Pulse:  [106-139] 115  Resp:  [18-22] 18  SpO2:  [92 %-95 %] 93 %  BP: (112-149)/(58-63) 126/59         Physical Exam  Vitals and nursing note reviewed.   Constitutional:       Appearance: Normal appearance.   Neck:      Comments: Axillary incision c/d/I. No erythema or hematoma.    Cardiovascular:      Comments: L biphasic DP, L biphasic PT  R biphasic PT, R biphasic DP    Warm, well-perfused BLE   Pulmonary:      Effort: Pulmonary effort is normal.   Abdominal:      General: Abdomen is flat.   Musculoskeletal:         General: Tenderness present.   Skin:     Comments: Right calf lesions.  Dressing replaced at the bedside, wrapped with Kerlix and xeroform.     Neurological:      Mental Status: He is oriented to person, place, and time.       Significant Labs:  All pertinent labs from the last 24 hours have been reviewed.    Significant Diagnostics:  I have reviewed all pertinent imaging results/findings within the past 24 hours.

## 2022-10-11 NOTE — ASSESSMENT & PLAN NOTE
Patient with ongoing tachycardia to the 110-130s this admission  No source of infection identified.  Recent history of DVT.    - given he is high risk for embolism with recent DVTs did full work up of CTA chest, echo, troponin, BNP  - CTA chest did not show a PE but was sub-optimial, ECHO showed normal LV size, EF 75%, and some concentric remodeling. Troponin was elevated at  0.25 peaked at this. BNP was not elevated.  -Pt's troponin thought to due to demand ischemia from recent sepsis. Pt is on room air and not SOB, think the risk of repeating CTA outweighs the benefits. If pt has worsening SOB than low threshold to repeat.

## 2022-10-11 NOTE — PLAN OF CARE
Patient awake eating breakfast in bed. He is alert and oriented x4 and has no complaints at this time. Heel boots remain on patient. Safety measures in place and call bell within reach.    Problem: Adult Inpatient Plan of Care  Goal: Plan of Care Review  Outcome: Ongoing, Not Progressing  Goal: Patient-Specific Goal (Individualized)  Outcome: Ongoing, Not Progressing  Goal: Absence of Hospital-Acquired Illness or Injury  Outcome: Ongoing, Not Progressing  Goal: Optimal Comfort and Wellbeing  Outcome: Ongoing, Not Progressing  Goal: Readiness for Transition of Care  Outcome: Ongoing, Not Progressing     Problem: Impaired Wound Healing  Goal: Optimal Wound Healing  Outcome: Ongoing, Not Progressing     Problem: Fall Injury Risk  Goal: Absence of Fall and Fall-Related Injury  Outcome: Ongoing, Not Progressing     Problem: Skin Injury Risk Increased  Goal: Skin Health and Integrity  Outcome: Ongoing, Not Progressing     Problem: Pain Acute  Goal: Acceptable Pain Control and Functional Ability  Outcome: Ongoing, Not Progressing     Problem: Infection  Goal: Absence of Infection Signs and Symptoms  Outcome: Ongoing, Not Progressing     Problem: Restraint, Nonbehavioral (Nonviolent)  Goal: Absence of Harm or Injury  Outcome: Ongoing, Not Progressing     Problem: Adjustment to Illness (Sepsis/Septic Shock)  Goal: Optimal Coping  Outcome: Ongoing, Not Progressing     Problem: Bleeding (Sepsis/Septic Shock)  Goal: Absence of Bleeding  Outcome: Ongoing, Not Progressing     Problem: Glycemic Control Impaired (Sepsis/Septic Shock)  Goal: Blood Glucose Level Within Desired Range  Outcome: Ongoing, Not Progressing     Problem: Infection Progression (Sepsis/Septic Shock)  Goal: Absence of Infection Signs and Symptoms  Outcome: Ongoing, Not Progressing     Problem: Nutrition Impaired (Sepsis/Septic Shock)  Goal: Optimal Nutrition Intake  Outcome: Ongoing, Not Progressing

## 2022-10-12 VITALS
RESPIRATION RATE: 18 BRPM | TEMPERATURE: 98 F | HEIGHT: 67 IN | WEIGHT: 151.31 LBS | BODY MASS INDEX: 23.75 KG/M2 | DIASTOLIC BLOOD PRESSURE: 63 MMHG | HEART RATE: 101 BPM | OXYGEN SATURATION: 95 % | SYSTOLIC BLOOD PRESSURE: 140 MMHG

## 2022-10-12 LAB
ALBUMIN SERPL BCP-MCNC: 1.8 G/DL (ref 3.5–5.2)
ALP SERPL-CCNC: 66 U/L (ref 55–135)
ALT SERPL W/O P-5'-P-CCNC: 40 U/L (ref 10–44)
ANION GAP SERPL CALC-SCNC: 6 MMOL/L (ref 8–16)
AST SERPL-CCNC: 59 U/L (ref 10–40)
BASOPHILS # BLD AUTO: 0.06 K/UL (ref 0–0.2)
BASOPHILS NFR BLD: 0.5 % (ref 0–1.9)
BILIRUB SERPL-MCNC: 0.4 MG/DL (ref 0.1–1)
BUN SERPL-MCNC: 14 MG/DL (ref 8–23)
CALCIUM SERPL-MCNC: 8.2 MG/DL (ref 8.7–10.5)
CHLORIDE SERPL-SCNC: 108 MMOL/L (ref 95–110)
CO2 SERPL-SCNC: 24 MMOL/L (ref 23–29)
CREAT SERPL-MCNC: 0.7 MG/DL (ref 0.5–1.4)
DIFFERENTIAL METHOD: ABNORMAL
EOSINOPHIL # BLD AUTO: 0.3 K/UL (ref 0–0.5)
EOSINOPHIL NFR BLD: 2.3 % (ref 0–8)
ERYTHROCYTE [DISTWIDTH] IN BLOOD BY AUTOMATED COUNT: 18.2 % (ref 11.5–14.5)
EST. GFR  (NO RACE VARIABLE): >60 ML/MIN/1.73 M^2
GLUCOSE SERPL-MCNC: 131 MG/DL (ref 70–110)
HCT VFR BLD AUTO: 25.2 % (ref 40–54)
HGB BLD-MCNC: 7.8 G/DL (ref 14–18)
IMM GRANULOCYTES # BLD AUTO: 0.07 K/UL (ref 0–0.04)
IMM GRANULOCYTES NFR BLD AUTO: 0.6 % (ref 0–0.5)
LYMPHOCYTES # BLD AUTO: 3.1 K/UL (ref 1–4.8)
LYMPHOCYTES NFR BLD: 26.6 % (ref 18–48)
MCH RBC QN AUTO: 26.6 PG (ref 27–31)
MCHC RBC AUTO-ENTMCNC: 31 G/DL (ref 32–36)
MCV RBC AUTO: 86 FL (ref 82–98)
MONOCYTES # BLD AUTO: 1.3 K/UL (ref 0.3–1)
MONOCYTES NFR BLD: 11.1 % (ref 4–15)
NEUTROPHILS # BLD AUTO: 6.8 K/UL (ref 1.8–7.7)
NEUTROPHILS NFR BLD: 58.9 % (ref 38–73)
NRBC BLD-RTO: 0 /100 WBC
PLATELET # BLD AUTO: 421 K/UL (ref 150–450)
PMV BLD AUTO: 9 FL (ref 9.2–12.9)
POTASSIUM SERPL-SCNC: 3.6 MMOL/L (ref 3.5–5.1)
PROT SERPL-MCNC: 4.8 G/DL (ref 6–8.4)
RBC # BLD AUTO: 2.93 M/UL (ref 4.6–6.2)
SODIUM SERPL-SCNC: 138 MMOL/L (ref 136–145)
WBC # BLD AUTO: 11.58 K/UL (ref 3.9–12.7)

## 2022-10-12 PROCEDURE — 85025 COMPLETE CBC W/AUTO DIFF WBC: CPT | Performed by: STUDENT IN AN ORGANIZED HEALTH CARE EDUCATION/TRAINING PROGRAM

## 2022-10-12 PROCEDURE — 36415 COLL VENOUS BLD VENIPUNCTURE: CPT | Performed by: STUDENT IN AN ORGANIZED HEALTH CARE EDUCATION/TRAINING PROGRAM

## 2022-10-12 PROCEDURE — 99900035 HC TECH TIME PER 15 MIN (STAT)

## 2022-10-12 PROCEDURE — 80053 COMPREHEN METABOLIC PANEL: CPT | Performed by: STUDENT IN AN ORGANIZED HEALTH CARE EDUCATION/TRAINING PROGRAM

## 2022-10-12 PROCEDURE — S4991 NICOTINE PATCH NONLEGEND: HCPCS | Performed by: STUDENT IN AN ORGANIZED HEALTH CARE EDUCATION/TRAINING PROGRAM

## 2022-10-12 PROCEDURE — 99231 PR SUBSEQUENT HOSPITAL CARE,LEVL I: ICD-10-PCS | Mod: GC,,, | Performed by: HOSPITALIST

## 2022-10-12 PROCEDURE — 25000003 PHARM REV CODE 250: Performed by: INTERNAL MEDICINE

## 2022-10-12 PROCEDURE — 99231 SBSQ HOSP IP/OBS SF/LOW 25: CPT | Mod: GC,,, | Performed by: HOSPITALIST

## 2022-10-12 PROCEDURE — 25000003 PHARM REV CODE 250: Performed by: STUDENT IN AN ORGANIZED HEALTH CARE EDUCATION/TRAINING PROGRAM

## 2022-10-12 PROCEDURE — 94761 N-INVAS EAR/PLS OXIMETRY MLT: CPT

## 2022-10-12 PROCEDURE — 27000646 HC AEROBIKA DEVICE

## 2022-10-12 PROCEDURE — 25000003 PHARM REV CODE 250

## 2022-10-12 PROCEDURE — 94664 DEMO&/EVAL PT USE INHALER: CPT

## 2022-10-12 RX ORDER — CEFADROXIL 500 MG/1
500 CAPSULE ORAL EVERY 12 HOURS
Qty: 60 CAPSULE | Refills: 0 | Status: ON HOLD | OUTPATIENT
Start: 2022-10-12 | End: 2022-10-21 | Stop reason: HOSPADM

## 2022-10-12 RX ORDER — HYDROCODONE BITARTRATE AND ACETAMINOPHEN 5; 325 MG/1; MG/1
1 TABLET ORAL EVERY 4 HOURS PRN
Qty: 10 TABLET | Refills: 0 | Status: ON HOLD | OUTPATIENT
Start: 2022-10-12 | End: 2022-10-31 | Stop reason: SDUPTHER

## 2022-10-12 RX ADMIN — HYDROCODONE BITARTRATE AND ACETAMINOPHEN 1 TABLET: 5; 325 TABLET ORAL at 10:10

## 2022-10-12 RX ADMIN — CILOSTAZOL 100 MG: 100 TABLET ORAL at 08:10

## 2022-10-12 RX ADMIN — NICOTINE 1 PATCH: 14 PATCH, EXTENDED RELEASE TRANSDERMAL at 08:10

## 2022-10-12 RX ADMIN — GABAPENTIN 900 MG: 300 CAPSULE ORAL at 03:10

## 2022-10-12 RX ADMIN — EZETIMIBE 10 MG: 10 TABLET ORAL at 08:10

## 2022-10-12 RX ADMIN — RIVAROXABAN 20 MG: 20 TABLET, FILM COATED ORAL at 05:10

## 2022-10-12 RX ADMIN — ASPIRIN 81 MG: 81 TABLET, CHEWABLE ORAL at 08:10

## 2022-10-12 RX ADMIN — CEFPODOXIME PROXETIL 400 MG: 200 TABLET, FILM COATED ORAL at 08:10

## 2022-10-12 RX ADMIN — ATORVASTATIN CALCIUM 80 MG: 40 TABLET, FILM COATED ORAL at 08:10

## 2022-10-12 RX ADMIN — GABAPENTIN 900 MG: 300 CAPSULE ORAL at 08:10

## 2022-10-12 RX ADMIN — DOXYCYCLINE HYCLATE 100 MG: 100 TABLET, COATED ORAL at 08:10

## 2022-10-12 NOTE — ASSESSMENT & PLAN NOTE
Mr Hardy is a 72 year old male with a pmh of smoking, severe PAD, dvt who presented with chronic limb ischemia and rest pain now s/p successful axio-fem-fem bypass on 10/6.    - Transitioned to PO Abx - cefpodoxime and doxycycline. Stop date 10/13 per ID   - melatonin QHS and trazodone QHS PRN  - delirium precautions  - PT/OT   - continue asa/statin cilostazol, xarelto  - Blood cx and urine cx NGTD, WBC downtrending, pt has been afebrile x24H  - Wound care consult - will follow up recommendations  - SW: work on SNF placement     Dispo: ready for DC, awaiting SNF placement

## 2022-10-12 NOTE — PROGRESS NOTES
[facility] Step[facility]  Vascular Surgery  Progress Note    Patient Name: Babatunde Singh  MRN: [id number]  Admission Date: 10/2/2022  Primary Care Provider: Esdras Ly MD    Subjective:     Interval History: NAEON. AF. Awaiting SNF placement       Post-Op Info:  Procedure(s) (LRB):  CREATION, BYPASS, ARTERIAL, AXILLARY TO FEMORAL, USING GRAFT (N/A)  CREATION, BYPASS, ARTERIAL, FEMORAL TO FEMORAL, USING GRAFT (N/A)   6 Days Post-Op       Medications:  Continuous Infusions:  Scheduled Meds:   aspirin  81 mg Oral Daily    atorvastatin  80 mg Oral Daily    cefpodoxime  400 mg Oral Q12H    cilostazoL  100 mg Oral BID    doxycycline  100 mg Oral Q12H    ezetimibe  10 mg Oral Daily    gabapentin  900 mg Oral TID    melatonin  6 mg Oral Nightly    nicotine  1 patch Transdermal Daily    rivaroxaban  20 mg Oral Daily with dinner    traZODone  25 mg Oral QHS     PRN Meds:acetaminophen, haloperidol lactate, HYDROcodone-acetaminophen, ibuprofen, metoprolol, sodium chloride 0.9%     Objective:     Vital Signs (Most Recent):  Temp: 98.2 °F (36.8 °C) (10/12/22 0726)  Pulse: 103 (10/12/22 0726)  Resp: 18 (10/12/22 0726)  BP: 137/62 (10/12/22 0726)  SpO2: 96 % (10/12/22 0726)   Vital Signs (24h Range):  Temp:  [98.1 °F (36.7 °C)-98.9 °F (37.2 °C)] 98.2 °F (36.8 °C)  Pulse:  [100-116] 103  Resp:  [15-20] 18  SpO2:  [92 %-98 %] 96 %  BP: (126-152)/(57-70) 137/62         Physical Exam  Vitals and nursing note reviewed.   Constitutional:       Appearance: Normal appearance.   Neck:      Comments: Axillary incision c/d/I. No erythema or hematoma.    Cardiovascular:      Comments: L biphasic DP, L biphasic PT  R biphasic PT, R biphasic DP    Warm, well-perfused BLE   Pulmonary:      Effort: Pulmonary effort is normal.   Abdominal:      General: Abdomen is flat.   Musculoskeletal:         General: Tenderness present.   Skin:     Comments: Right calf lesions.  Dressing replaced at the bedside, wrapped with Kerlix and  xeroform.     Neurological:      Mental Status: He is oriented to person, place, and time.       Significant Labs:  All pertinent labs from the last 24 hours have been reviewed.    Significant Diagnostics:  I have reviewed all pertinent imaging results/findings within the past 24 hours.    Assessment/Plan:     * Critical limb ischemia of right lower extremity with ulceration of lower leg  Mr Hardy is a 72 year old male with a pmh of smoking, severe PAD, dvt who presented with chronic limb ischemia and rest pain now s/p successful axio-fem-fem bypass on 10/6.    - Transitioned to PO Abx - cefpodoxime and doxycycline. Stop date 10/13 per ID   - melatonin QHS and trazodone QHS PRN  - delirium precautions  - PT/OT   - continue asa/statin cilostazol, xarelto  - Blood cx and urine cx NGTD, WBC downtrending, pt has been afebrile x24H  - Wound care consult - will follow up recommendations  - SW: work on SNF placement  - Will need FU in 4 weeks with BRENNAN/PVRs      Dispo: ready for DC, awaiting SNF placement         Arminda Mitchell MD  Vascular Surgery  [facility] Step[facility]

## 2022-10-12 NOTE — PLAN OF CARE
10/12/22 1424   Post-Acute Status   Post-Acute Authorization Placement   Post-Acute Placement Status Set-up Complete/Auth obtained  (pend family paperwork)     REYES informed by Sharifa with [facility] that pt has been approved for SNF.  REYES faxed orders to [facility] and confirmed with Boby in admissions that orders were received.  Per Boby still waiting on pt's daughter to finish and return the admissions paperwork.    UPDATE 2:26 PM  Updated orders faxed to [facility] and also emailed to Boby to admissions.    Sharifa Wade, SOPHIE  [facility]  [id number]

## 2022-10-12 NOTE — ASSESSMENT & PLAN NOTE
My overall impression is sepsis possibly 2/2 infected RLE ulcers. Vital signs were reviewed and noted in progress note.  Antibiotics given-   Antibiotics (From admission, onward)    Start     Stop Route Frequency Ordered    10/10/22 2100  doxycycline tablet 100 mg         10/15 0859 Oral Every 12 hours 10/10/22 1426    10/10/22 2100  cefpodoxime tablet 400 mg         10/15 0859 Oral Every 12 hours 10/10/22 1426    10/06/22 2100  mupirocin 2 % ointment         10/11 2059 Nasl 2 times daily 10/06/22 1517        Cultures were taken-   Microbiology Results (last 7 days)     Procedure Component Value Units Date/Time    Blood culture [[id number]] Collected: 10/09/22 1012    Order Status: Completed Specimen: Blood Updated: 10/12/22 1212     Blood Culture, Routine No Growth to date      No Growth to date      No Growth to date      No Growth to date    Blood culture [[id number]] Collected: 10/10/22 0404    Order Status: Completed Specimen: Blood Updated: 10/12/22 0613     Blood Culture, Routine No Growth to date      No Growth to date      No Growth to date    Blood culture [[id number]] Collected: 10/07/22 2345    Order Status: Completed Specimen: Blood Updated: 10/12/22 0612     Blood Culture, Routine No Growth to date      No Growth to date      No Growth to date      No Growth to date      No Growth to date    Blood culture [[id number]] Collected: 10/07/22 2345    Order Status: Completed Specimen: Blood Updated: 10/12/22 0612     Blood Culture, Routine No Growth to date      No Growth to date      No Growth to date      No Growth to date      No Growth to date    Urine culture [[id number]] Collected: 10/09/22 1109    Order Status: Completed Specimen: Urine, Clean Catch Updated: 10/10/22 1316     Urine Culture, Routine No significant growth    Narrative:      Indicated criteria for high risk culture:->Other  Other (specify):->post op    Influenza A & B by Molecular [[id number]] Collected: 10/09/22 1000    Order Status:  Completed Specimen: Nasopharyngeal Swab Updated: 10/09/22 1132     Influenza A, Molecular Negative     Influenza B, Molecular Negative     Flu A & B Source NP        Latest lactate reviewed, they are-  No results for input(s): LACTATE in the last 72 hours.    Organ dysfunction indicated by Encephalopathy   Source- Possible infected leg wounds    Source control Achieved by- starting abx.     CXR negative for PNA  Surgical site left chest wall was clean.  UA and UCX negative  Blood Cx 10/7: NGTD   No purulent discharge from ulcers or signs concerning for infection on exam. Clinical status improving on abx despite ongoing leukocytosis. Low index of suspicion for DVT. Will continue to monitor and follow-up pending studies.  Influenza and covid tests pending    - f/u ID recs, was on vanc and cefepime switched to cefpodoximine and Doxycycline on 10/10 for 9 doses of both. Should finish late on 10/13. ID has signed off

## 2022-10-12 NOTE — DISCHARGE SUMMARY
[facility] - Cardiology Stepdown  Vascular Surgery  Discharge Summary      Patient Name: Babatunde Singh  MRN: [id number]  Admission Date: 10/2/2022  Hospital Length of Stay: 10 days  Discharge Date and Time:  10/12/2022 4:44 PM  Attending Physician: LEI Ortiz III, MD   Discharging Provider: Edy Aly MD  Primary Care Provider: Esdras Ly MD    HPI:   Mr Singh is a 72 year old male with a pmh of smoking 50+pack years, hld, dvt, with chronic extensive PAD> The patient presents with right leg pain which he experiences at rest. This has been bothering him for at least three months. Interventional cardiology has seen the patient in the past.       Procedure(s) (LRB):  CREATION, BYPASS, ARTERIAL, AXILLARY TO FEMORAL, USING GRAFT (N/A)  CREATION, BYPASS, ARTERIAL, FEMORAL TO FEMORAL, USING GRAFT (N/A)     Hospital Course: Babatunde Singh underwent the morning of 10/6/2022 a axillo bifemoral bypass. The procedure was performed without complication and  was transferred to the floor for further post op care and monitoring. Their postoperative course was remarkable for some disorientation in the first 2 days and tachycardia with febrile episodes. Work up for infection was negative and patient has not had another febrile episode since. Patient's pain was controlled with a combination of IV and PO narcotic pain medications. Patient's diet has been advanced throughout  hospital stay. Is now tolerating a regular diet, pain is well controlled with PO pain medication, and is voiding appropriately.  Patient requires PT/OT and will continue on PO antibiotics until tomorrow. Will be transferred to SNF for continued care and follow up appointment has been set.       Goals of Care Treatment Preferences:  Code Status: Full Code      Consults:   Consults (From admission, onward)          Status Ordering Provider     Inpatient consult to Physical Medicine Rehab  Once        Provider:  (Not yet assigned)    Completed GARCÍA,  REX     Inpatient consult to Registered Dietitian/Nutritionist  Once        Provider:  (Not yet assigned)    Completed AYO MARTÍNEZ     Inpatient consult to Infectious Diseases  Once        Provider:  (Not yet assigned)    Completed NOMAN SUAREZ     Inpatient consult to Physical Medicine Rehab  Once        Provider:  (Not yet assigned)    Completed CHIKA CACERES     Inpatient consult to Physical Medicine Rehab  Once        Provider:  (Not yet assigned)    Completed REX GARCÍA     Inpatient consult to Urology  Once        Provider:  (Not yet assigned)    Completed NOMAN SUAREZ     Inpatient consult to Vascular Surgery  Once        Provider:  (Not yet assigned)    Completed YANN SUAREZ     Inpatient consult to Interventional Cardiology  Once        Provider:  (Not yet assigned)    Completed DARIAN NOVOA            Significant Diagnostic Studies: Labs:   BMP:   Recent Labs   Lab 10/11/22  0339 10/12/22  0303   * 131*   * 138   K 3.7 3.6    108   CO2 23 24   BUN 11 14   CREATININE 0.6 0.7   CALCIUM 8.0* 8.2*   , CMP   Recent Labs   Lab 10/11/22  0339 10/12/22  0303   * 138   K 3.7 3.6    108   CO2 23 24   * 131*   BUN 11 14   CREATININE 0.6 0.7   CALCIUM 8.0* 8.2*   PROT 4.8* 4.8*   ALBUMIN 1.8* 1.8*   BILITOT 0.4 0.4   ALKPHOS 70 66   AST 55* 59*   ALT 30 40   ANIONGAP 6* 6*   , CBC   Recent Labs   Lab 10/11/22  0339 10/12/22  0303   WBC 12.78* 11.58   HGB 8.4* 7.8*   HCT 27.1* 25.2*   * 421   , INR   Lab Results   Component Value Date    INR 1.4 (H) 10/02/2022    INR 1.2 09/15/2022    INR 1.0 09/09/2022   , Lipid Panel   Lab Results   Component Value Date    CHOL 138 03/10/2022    HDL 44 03/10/2022    LDLCALC 86.0 03/10/2022    TRIG 40 03/10/2022    CHOLHDL 31.9 03/10/2022   , Troponin   Recent Labs   Lab 10/10/22  1336 10/10/22  1832 10/11/22  0339   TROPONINI 0.252* 0.253* 0.200*   , A1C: No results for input(s): HGBA1C in the last 4320 hours. and All labs  "within the past 24 hours have been reviewed  Microbiology:   Blood Culture   Lab Results   Component Value Date    LABBLOO No Growth to date 10/10/2022    LABBLOO No Growth to date 10/10/2022    LABBLOO No Growth to date 10/10/2022   , Sputum Culture No results found for: GSRESP, RESPIRATORYC and Urine Culture    Lab Results   Component Value Date    LABURIN No significant growth 10/09/2022     Radiology: X-Ray: CXR: X-Ray Chest 1 View (CXR): No results found for this visit on 10/02/22. and X-Ray Chest PA and Lateral (CXR): No results found for this visit on 10/02/22. and KUB: X-Ray Abdomen AP 1 View (KUB): No results found for this visit on 10/02/22.  CT scan: CT ABDOMEN PELVIS WITH CONTRAST: No results found for this visit on 10/02/22. and CT ABDOMEN PELVIS WITHOUT CONTRAST: No results found for this visit on 10/02/22.  Cardiac Graphics: Echocardiogram:   2D echo with color flow doppler: No results found for this or any previous visit. and Transthoracic echo (TTE) complete (Cupid Only):   Results for orders placed or performed during the hospital encounter of 10/02/22   Echo   Result Value Ref Range    Ascending aorta 3.16 cm    STJ 3.16 cm    AV mean gradient 10 mmHg    Ao peak john 2.08 m/s    Ao VTI 29.25 cm    IVS 0.96 0.6 - 1.1 cm    LA size 3.79 cm    Left Atrium Major Axis 4.40 cm    Left Atrium Minor Axis 4.43 cm    LVIDd 4.15 3.5 - 6.0 cm    LVIDs 2.97 2.1 - 4.0 cm    LVOT diameter 2.21 cm    LVOT peak VTI 20.24 cm    Posterior Wall 0.98 0.6 - 1.1 cm    MV Peak A John 1.44 m/s    E wave deceleration time 78.59 msec    MV Peak E John 0.98 m/s    PV Peak D John 0.30 m/s    PV Peak S John 0.56 m/s    RA Major Axis 3.57 cm    RA Width 2.71 cm    RVDD 2.29 cm    Sinus 3.24 cm    TAPSE 2.30 cm    TR Max John 2.89 m/s    LA WIDTH 3.28 cm    MV stenosis pressure 1/2 time 22.79 ms    LV Diastolic Volume 76.33 mL    LV Systolic Volume 34.11 mL    LVOT peak john 1.68 m/s    LA volume (mod) 39.59 cm3    MV "A" wave duration " 7.99 msec    MV mean gradient 1 mmHg    MV peak gradient 9 mmHg    MV VTI 19.62 cm    FS 28 %    LA volume 46.65 cm3    LV mass 129.06 g    Left Ventricle Relative Wall Thickness 0.47 cm    AV valve area 2.65 cm2    AV Velocity Ratio 0.81     AV index (prosthetic) 0.69     MV valve area p 1/2 method 9.65 cm2    MV valve area by continuity eq 3.96 cm2    E/A ratio 0.68     Pulm vein S/D ratio 1.87     LVOT area 3.8 cm2    LVOT stroke volume 77.60 cm3    AV peak gradient 17 mmHg    LV Systolic Volume Index 21.1 mL/m2    LV Diastolic Volume Index 47.12 mL/m2    LA Volume Index 28.8 mL/m2    LV Mass Index 80 g/m2    Triscuspid Valve Regurgitation Peak Gradient 33 mmHg    LA Volume Index (Mod) 24.4 mL/m2    BSA 1.6 m2    Right Atrial Pressure (from IVC) 3 mmHg    TV rest pulmonary artery pressure 36 mmHg    EF 75 %    Narrative    · The left ventricle is normal in size with concentric remodeling and   hyperdynamic systolic function. The estimated ejection fraction is >70%.  · Normal right ventricular size with normal right ventricular systolic   function.  · Grade I left ventricular diastolic dysfunction.  · The estimated PA systolic pressure is 36 mmHg.  · Normal central venous pressure (3 mmHg).          Pending Diagnostic Studies:       None          Final Active Diagnoses:    Diagnosis Date Noted POA    PRINCIPAL PROBLEM:  Critical limb ischemia of right lower extremity with ulceration of lower leg [I70.238] 10/04/2022 Yes    Tachycardia [R00.0] 10/10/2022 No    Severe sepsis [A41.9, R65.20] 10/08/2022 No    Encephalopathy, metabolic [G93.41] 10/07/2022 No    Absent pedal pulses [R09.89] 10/05/2022 Yes    DVT (deep venous thrombosis) [I82.409] 10/05/2022 Yes    Lower limb ischemia [I99.8] 09/15/2022 Yes    PAD (peripheral artery disease) [I73.9] 08/25/2022 Yes    Right renal mass [N28.89] 08/11/2022 Yes    Mixed hyperlipidemia [E78.2] 07/26/2022 Yes    Tobacco abuse [Z72.0] 07/26/2022 Yes    Claudication of right  lower extremity [I73.9] 07/26/2022 Yes    Right leg pain [M79.604] 07/26/2022 Yes    Primary hypertension [I10] 03/10/2022 Yes      Problems Resolved During this Admission:    Diagnosis Date Noted Date Resolved POA    Ulcer of right lower extremity, limited to breakdown of skin [L97.911] 09/15/2022 10/06/2022 Yes      Discharged Condition: fair    Disposition: Skilled nursing facility    Patient Instructions:      Lifting restrictions   Order Comments: No heavy lifting greater than 10 pounds (about the weight of a gallon of milk) for 6 week postoperatively. This is to prevent new/recurrent hernia at your incision sites while they heal.     No driving until:   Order Comments: While taking narcotic pain medications.     Notify your health care provider if you experience any of the following:  temperature >100.4     Notify your health care provider if you experience any of the following:  persistent nausea and vomiting or diarrhea     Notify your health care provider if you experience any of the following:  severe uncontrolled pain     Notify your health care provider if you experience any of the following:  redness, tenderness, or signs of infection (pain, swelling, redness, odor or green/yellow discharge around incision site)     Notify your health care provider if you experience any of the following:  difficulty breathing or increased cough     Notify your health care provider if you experience any of the following:  severe persistent headache     Notify your health care provider if you experience any of the following:  worsening rash     Notify your health care provider if you experience any of the following:  persistent dizziness, light-headedness, or visual disturbances     Notify your health care provider if you experience any of the following:  increased confusion or weakness     Shower on day dressing removed (No bath)   Order Comments: You may SHOWER 48 hours after surgery. If you have gauze/tape dressings in  "place, you should remove them prior to showering. If you have Dermabond (skin glue) or white "Steri strips" in place, these will eventually peel off on their own after 1-2 weeks. NO SUBMERSION of your incisions (bath, pool, hot tub, etc) until your postop appointment. This allows your incisions to heal and to avoid a wound infection.     Medications:  Transfer Medications (for Discharge Readmit only):   Current Facility-Administered Medications   Medication Dose Route Frequency Provider Last Rate Last Admin    acetaminophen tablet 650 mg  650 mg Oral Q4H PRN Cecily Hermosillo MD   650 mg at 10/09/22 0102    aspirin chewable tablet 81 mg  81 mg Oral Daily Leland Murillo MD   81 mg at 10/12/22 0843    atorvastatin tablet 80 mg  80 mg Oral Daily Leland Murillo MD   80 mg at 10/12/22 0841    cefpodoxime tablet 400 mg  400 mg Oral Q12H Michelle H MD Ephraim   400 mg at 10/12/22 0842    cilostazoL tablet 100 mg  100 mg Oral BID Baldemar Veloz MD   100 mg at 10/12/22 0843    doxycycline tablet 100 mg  100 mg Oral Q12H Michelle H MD Ephraim   100 mg at 10/12/22 0844    ezetimibe tablet 10 mg  10 mg Oral Daily Baldemar Veloz MD   10 mg at 10/12/22 0844    gabapentin capsule 900 mg  900 mg Oral TID Baldemar Veloz MD   900 mg at 10/12/22 1528    haloperidol lactate injection 5 mg  5 mg Intravenous Q6H PRN Sis Miles MD   5 mg at 10/07/22 2322    HYDROcodone-acetaminophen 5-325 mg per tablet 1 tablet  1 tablet Oral Q4H PRN Leland Murillo MD   1 tablet at 10/12/22 1051    ibuprofen tablet 400 mg  400 mg Oral Q6H PRN Víctor Evangelista MD   400 mg at 10/07/22 2337    melatonin tablet 6 mg  6 mg Oral Nightly Sis Miles MD   6 mg at 10/11/22 1945    metoprolol injection 5 mg  5 mg Intravenous Q5 Min PRN Edy Aly MD        nicotine 14 mg/24 hr 1 patch  1 patch Transdermal Daily Leland Murillo MD   1 patch at 10/12/22 0845    rivaroxaban tablet 20 mg  20 mg Oral Daily with dinner Sis Miles MD   20 mg at 10/11/22 9000    " sodium chloride 0.9% flush 10 mL  10 mL Intravenous PRN Baldemar Veloz MD        trazodone split tablet 25 mg  25 mg Oral QHS Sis Miles MD   25 mg at 10/11/22 1945       Edy Aly MD  Vascular Surgery  [facility] - Cardiology Stepdown

## 2022-10-12 NOTE — SUBJECTIVE & OBJECTIVE
Medications:  Continuous Infusions:  Scheduled Meds:   aspirin  81 mg Oral Daily    atorvastatin  80 mg Oral Daily    cefpodoxime  400 mg Oral Q12H    cilostazoL  100 mg Oral BID    doxycycline  100 mg Oral Q12H    ezetimibe  10 mg Oral Daily    gabapentin  900 mg Oral TID    melatonin  6 mg Oral Nightly    nicotine  1 patch Transdermal Daily    rivaroxaban  20 mg Oral Daily with dinner    traZODone  25 mg Oral QHS     PRN Meds:acetaminophen, haloperidol lactate, HYDROcodone-acetaminophen, ibuprofen, metoprolol, sodium chloride 0.9%     Objective:     Vital Signs (Most Recent):  Temp: 98.2 °F (36.8 °C) (10/12/22 0726)  Pulse: 103 (10/12/22 0726)  Resp: 18 (10/12/22 0726)  BP: 137/62 (10/12/22 0726)  SpO2: 96 % (10/12/22 0726)   Vital Signs (24h Range):  Temp:  [98.1 °F (36.7 °C)-98.9 °F (37.2 °C)] 98.2 °F (36.8 °C)  Pulse:  [100-116] 103  Resp:  [15-20] 18  SpO2:  [92 %-98 %] 96 %  BP: (126-152)/(57-70) 137/62         Physical Exam  Vitals and nursing note reviewed.   Constitutional:       Appearance: Normal appearance.   Neck:      Comments: Axillary incision c/d/I. No erythema or hematoma.    Cardiovascular:      Comments: L biphasic DP, L biphasic PT  R biphasic PT, R biphasic DP    Warm, well-perfused BLE   Pulmonary:      Effort: Pulmonary effort is normal.   Abdominal:      General: Abdomen is flat.   Musculoskeletal:         General: Tenderness present.   Skin:     Comments: Right calf lesions.  Dressing replaced at the bedside, wrapped with Kerlix and xeroform.     Neurological:      Mental Status: He is oriented to person, place, and time.       Significant Labs:  All pertinent labs from the last 24 hours have been reviewed.    Significant Diagnostics:  I have reviewed all pertinent imaging results/findings within the past 24 hours.

## 2022-10-12 NOTE — PLAN OF CARE
[facility] - Cardiology Stepdown  Discharge Final Note    Primary Care Provider: Esdras Ly MD    Expected Discharge Date: 10/12/2022    Final Discharge Note (most recent)       Final Note - 10/12/22 1652          Final Note    Assessment Type Final Discharge Note     Anticipated Discharge Disposition Skilled Nursing Facility        Post-Acute Status    Post-Acute Authorization Placement     Post-Acute Placement Status Set-up Complete/Auth obtained                 REYES attempted multiple times to call Boby in admissions at [facility] but no answer so REYES called and spoke with NIKOLAY who reported that per Boby still waiting on daughter to complete and return the admissions paperwork.  REYES called pt's daughter Cindi to inquire about the status of the paperwork and Cindi explained that had planned to print and complete it tomorrow.  REYES explained that pt is ready to go and therefore paperwork needs to be completed today, of which Cindi explained she was not aware.  REYES then received call back from Boby in admissions who stated that he just spoke with Cindi and explained to her that they cannot take pt until paperwork is completed so Cindi is currently on her way to the facility to complete the paperwork.  Boby stated in the meantime REYES can schedule transport for a later time.  Transportation scheduled via wheelchair van for 6:00pm to transfer to [facility].  ESMER Restrepo to call report to [phone], room P104.  ESMER Restrepo was notified of the above information.    Sharifa Wade, SOPHIE  [facility]  [id number]

## 2022-10-12 NOTE — PT/OT/SLP PROGRESS
Occupational Therapy      Patient Name:  Babatunde Singh   MRN:  [id number]    Upon chart review, pt remains appropriate for continued OT services. Pt able to meet POC in upcoming visits. OT to check status at later date.     10/12/2022

## 2022-10-12 NOTE — PROGRESS NOTES
Wound care consult received for RLE.  Upon reviewing chart, noted that patient had recent revascularization procedure on right and left femoral arteries. Sx deemed successful.  On therapeutic med regiment post procedure.  Right pedal pulse noted per doopler post op.  Vascular services re-consulted wound care for recommendation on necrotic wound to posterior right lower leg. After view chart, communication send to vascular team regarding guidance on if they would prefer eschar to be debrided or not.  Waiting on answer. When visiting patient to update on POC, patient request for pain medication before wound care as the area is very painful when touching. Patient will need to be seen tomorrow after vascular gives recommendation on debridement.

## 2022-10-12 NOTE — DISCHARGE INSTRUCTIONS
VASCULAR SURGERY DISCHARGE INSTRUCTIONS    Woundcare:  - Take your dressing off 2 days after your surgery and take a shower. Shower daily and pad your incision site dry  - Leave sutures in place  - It is normal to notice some bruising at your incision site in the first 1-3 days after surgery    Activity:  - Activity is good for you. Try to walk frequently and increase walking distance every day  - No heavy lifting for 2 weeks  - No baths, swimming in pools, lakes, [place] etc. for 2 weeks     Diet:  -Resume your pre-operative home diet    Call Vascular Surgery Office at [phone] if you experience:  -Increased redness, warmth, tenderness, or draining pus from your incision  -Increased pain/swelling at your incision  -Worsening fevers, chills, nausea/vomiting  -Pain, weakness, coldness, or numbness in your legs  -Uncontrolled pain  -Your call will be returned within 24 hours and further instructions will be provided    Go to ER/Urgent Care if you experience:  -Worsening shortness of breath or chest pain  -Sudden severe pain and swelling at your incision site

## 2022-10-12 NOTE — PROGRESS NOTES
[facility] Medicine  Progress Note    Patient Name: Babatunde Singh  MRN: [id number]  Patient Class: IP- Inpatient   Admission Date: 10/2/2022  Length of Stay: 10 days  Attending Physician: LEI Ortiz III, MD  Primary Care Provider: Esdras Ly MD        Subjective:     Principal Problem:Critical limb ischemia of right lower extremity with ulceration of lower leg        HPI:  Babatunde Singh is a 73 yo M withPAD, HTN, HLD, recent diagnosis of DVT (9/8) on xarelto who presents due to RLE pain. Of note, patient has frequent admission for lower extremity pain. He states that on the day of his presentation, he has had increasing 10/10 aching pain which woke him up from sleep every few hours. This has been progressing over the last 2-3 days prior to admission. He states at baseline he has pain every other night. In the ER, RLE distal pulses were absent on presentation, unable to palpate or obtain Doppler signal.               Overview/Hospital Course:  BRENNAN was performed which showed Right Leg with PVR waveforms suggest ischemic peripheral arterial occlusive disease. No audible Doppler signal detected to obtain a reliable BRENNAN. TBI of 0 with a Great toe pressure of 0 mmHg is noted. Left Leg PVR waveforms also suggested ischemic peripheral arterial occlusive disease. No audible Doppler signal detected to obtain a   reliable BRENNAN.     CTA runoff abdomen bilateral LE showed Possible worsening stenosis or interval occlusion of the right dorsalis pedis artery.  Overall evaluation of lower extremity arteries is otherwise grossly unchanged noting regions of persistent occlusion with two vessel runoff to the bilateral lower extremities. Xarelto was held and pt was started on heparin ggt. IR was consulted, but endoscopically is no option for vascularization. Vascular surgery was consulted, and they scheduled the pt for intervention. Cardiology was consulted for cardiac clearance, and pt has been  cleared with risk as per cardiology. Pt underwent L axillary to BILATERAL femoris profunda bypass with 8 mm PTFE on 10/6 by vascular.  He developed post operative delirium.     Regarding the right renal mass seen on the CT abdomen/pelvis, urology recommended outpt intervention.    On 10/7, pt became septic. CXR, UA and blood cx were ordered and he was started on empiric abx with zosyn and vanco. He was given 3 L bolus of lactate ringer and started on maintenance fluid. UA was negative. CXR ruled out pneumonia. Lactic acid is wnl. Blood cx is negative to date.     On 10/8; vascular surgery requested transfer of pt's care to their service for post operative management. Medicine will follow as a consultant.              Interval History: He has ongoing tachycardia in the low 110s, has not fevered. Denies chest pain or dyspnea this morning. Legs pain was not present during exam and stated his pain is controlled. Wound care stated wounds were improving. Pt's albumin continues to be low. WBC count continues to decline and he is tolerating his antibiotics    Review of Systems  Objective:     Vital Signs (Most Recent):  Temp: 97.9 °F (36.6 °C) (10/12/22 1238)  Pulse: (!) 113 (10/12/22 1240)  Resp: 18 (10/12/22 1051)  BP: (!) 141/61 (10/12/22 1238)  SpO2: 95 % (10/12/22 1238)   Vital Signs (24h Range):  Temp:  [97.9 °F (36.6 °C)-98.9 °F (37.2 °C)] 97.9 °F (36.6 °C)  Pulse:  [100-116] 113  Resp:  [15-20] 18  SpO2:  [92 %-96 %] 95 %  BP: (126-152)/(57-70) 141/61     Weight: 68.6 kg (151 lb 4.8 oz)  Body mass index is 23.7 kg/m².    Intake/Output Summary (Last 24 hours) at 10/12/2022 1303  Last data filed at 10/12/2022 0919  Gross per 24 hour   Intake 440 ml   Output 1675 ml   Net -1235 ml        Physical Exam  Vitals and nursing note reviewed.   Constitutional:       General: He is not in acute distress.     Appearance: He is normal weight. He is not toxic-appearing or diaphoretic.   HENT:      Head: Normocephalic and  atraumatic.      Mouth/Throat:      Dentition: Abnormal dentition.   Eyes:      General: Lids are normal.   Cardiovascular:      Rate and Rhythm: Regular rhythm. Tachycardia present.   Pulmonary:      Effort: Pulmonary effort is normal. No respiratory distress.      Breath sounds: No wheezing.   Abdominal:      Palpations: Abdomen is soft.      Tenderness: There is no abdominal tenderness. There is no guarding.   Musculoskeletal:      Right lower leg: No edema.      Left lower leg: No edema.   Skin:     General: Skin is warm and dry.      Findings: Lesion present.      Comments: Pt has wounds located on his lower extremity that were do to ischemia. Wound care is following.    Neurological:      Mental Status: He is alert. Mental status is at baseline.      Cranial Nerves: No dysarthria.   Psychiatric:         Mood and Affect: Mood normal.         Behavior: Behavior normal. Behavior is cooperative.       Significant Labs: All pertinent labs within the past 24 hours have been reviewed.    Significant Imaging: I have reviewed all pertinent imaging results/findings within the past 24 hours.      Assessment/Plan:      * Critical limb ischemia of right lower extremity with ulceration of lower leg  CTA runoff was reviewed.  Pt is s/p left axillary to BILATERAL femoris profunda bypass with 8 mm PTFE on 10/6 by vascular surgery. Appreciate vascular input.  Continue aspirin, cilostazol and Zetia/statin.      Tachycardia  Patient with ongoing tachycardia to the 110-130s this admission  No source of infection identified.  Recent history of DVT.    - given he is high risk for embolism with recent DVTs did full work up of CTA chest, echo, troponin, BNP  - CTA chest did not show a PE but was sub-optimial, ECHO showed normal LV size, EF 75%, and some concentric remodeling. Troponin was elevated at  0.25 peaked at this. BNP was not elevated.  -Pt's troponin thought to due to demand ischemia from recent sepsis. Pt is on room air and  not SOB, think the risk of repeating CTA outweighs the benefits. If pt has worsening SOB than low threshold to repeat.    Severe sepsis  My overall impression is sepsis possibly 2/2 infected RLE ulcers. Vital signs were reviewed and noted in progress note.  Antibiotics given-   Antibiotics (From admission, onward)    Start     Stop Route Frequency Ordered    10/10/22 2100  doxycycline tablet 100 mg         10/15 0859 Oral Every 12 hours 10/10/22 1426    10/10/22 2100  cefpodoxime tablet 400 mg         10/15 0859 Oral Every 12 hours 10/10/22 1426    10/06/22 2100  mupirocin 2 % ointment         10/11 2059 Nasl 2 times daily 10/06/22 1517        Cultures were taken-   Microbiology Results (last 7 days)     Procedure Component Value Units Date/Time    Blood culture [[id number]] Collected: 10/09/22 1012    Order Status: Completed Specimen: Blood Updated: 10/12/22 1212     Blood Culture, Routine No Growth to date      No Growth to date      No Growth to date      No Growth to date    Blood culture [[id number]] Collected: 10/10/22 0404    Order Status: Completed Specimen: Blood Updated: 10/12/22 0613     Blood Culture, Routine No Growth to date      No Growth to date      No Growth to date    Blood culture [[id number]] Collected: 10/07/22 2345    Order Status: Completed Specimen: Blood Updated: 10/12/22 0612     Blood Culture, Routine No Growth to date      No Growth to date      No Growth to date      No Growth to date      No Growth to date    Blood culture [[id number]] Collected: 10/07/22 2345    Order Status: Completed Specimen: Blood Updated: 10/12/22 0612     Blood Culture, Routine No Growth to date      No Growth to date      No Growth to date      No Growth to date      No Growth to date    Urine culture [[id number]] Collected: 10/09/22 1109    Order Status: Completed Specimen: Urine, Clean Catch Updated: 10/10/22 1316     Urine Culture, Routine No significant growth    Narrative:      Indicated criteria for high risk  culture:->Other  Other (specify):->post op    Influenza A & B by Molecular [[id number]] Collected: 10/09/22 1000    Order Status: Completed Specimen: Nasopharyngeal Swab Updated: 10/09/22 1132     Influenza A, Molecular Negative     Influenza B, Molecular Negative     Flu A & B Source NP        Latest lactate reviewed, they are-  No results for input(s): LACTATE in the last 72 hours.    Organ dysfunction indicated by Encephalopathy   Source- Possible infected leg wounds    Source control Achieved by- starting abx.     CXR negative for PNA  Surgical site left chest wall was clean.  UA and UCX negative  Blood Cx 10/7: NGTD   No purulent discharge from ulcers or signs concerning for infection on exam. Clinical status improving on abx despite ongoing leukocytosis. Low index of suspicion for DVT. Will continue to monitor and follow-up pending studies.  Influenza and covid tests pending    - f/u ID recs, was on vanc and cefepime switched to cefpodoximine and Doxycycline on 10/10 for 9 doses of both. Should finish late on 10/13. ID has signed off    Encephalopathy, metabolic  Likely multifactorial related to infection, plus the anesthesia pt received during his surgical procedure.  Improved while being on abx. AAOx4 on 10/9. Responds appropriately and coherently on exam.  Continue to monitor.       DVT (deep venous thrombosis)  Duplex imaging of the right lower extremity veins on 10/5 demonstrates occlusive sub-acute thrombus of the anterior tibial vein (the anterior paired vessel).   Pt was initially on heparin ggt and now on xarelto 20 mg qhs.   - 10/9: likely not cause of new fever.   Continue to monitor      Absent pedal pulses  Absent pedial pulse of the right foot.   Pt is s/p left axillary to BILATERAL femoris profunda bypass with 8 mm PTFE on 10/6 by vascular surgery.   Continue statin, aspirin, and cilostazol.      Lower limb ischemia        PAD (peripheral artery disease)  Pt is s/p left axillary to BILATERAL  femoris profunda bypass with 8 mm PTFE on 10/6 by vascular surgery. Mgmt per primary  Continue statin, zetia, aspirin, and cilostazol.  Wound care is following for ulcers that are present. Continues improvement    Right renal mass  Outpatient intervention with urology    Right leg pain  Due to ischemia.   S/p intervention on 10/6 by vascular surgery, managed by primary  Pain improving since procedure      Claudication of right lower extremity  Duo to ischemic leg. Improved post procedure.   - Continue cilostazol    Mixed hyperlipidemia  On Zetia and lipitor.      Tobacco abuse  Has been consulted over 10 minutes regarding need to quit smoking, and emphazised its negative consequences on his leg ischemia.    Pt states he is done smoking after this hospitalization.      Primary hypertension  Continue to hold home losartan/HCTZ, given concern for sepsis and decreased PO intake       VTE Risk Mitigation (From admission, onward)         Ordered     rivaroxaban tablet 20 mg  With dinner         10/07/22 0818     IP VTE HIGH RISK PATIENT  Once         10/06/22 1516     Place sequential compression device  Until discontinued         10/06/22 1516                Discharge Planning   LAZ: 10/12/2022     Code Status: Full Code   Is the patient medically ready for discharge?: No    Reason for patient still in hospital (select all that apply): Patient trending condition  Discharge Plan A: Skilled Nursing Facility   Discharge Delays: None known at this time              Sixto Paz DO  Department of Hospital Medicine   [facility]

## 2022-10-12 NOTE — SUBJECTIVE & OBJECTIVE
Interval History: He has ongoing tachycardia in the low 110s, has not fevered. Denies chest pain or dyspnea this morning. Legs pain was not present during exam and stated his pain is controlled. Wound care stated wounds were improving. Pt's albumin continues to be low. WBC count continues to decline and he is tolerating his antibiotics    Review of Systems  Objective:     Vital Signs (Most Recent):  Temp: 97.9 °F (36.6 °C) (10/12/22 1238)  Pulse: (!) 113 (10/12/22 1240)  Resp: 18 (10/12/22 1051)  BP: (!) 141/61 (10/12/22 1238)  SpO2: 95 % (10/12/22 1238)   Vital Signs (24h Range):  Temp:  [97.9 °F (36.6 °C)-98.9 °F (37.2 °C)] 97.9 °F (36.6 °C)  Pulse:  [100-116] 113  Resp:  [15-20] 18  SpO2:  [92 %-96 %] 95 %  BP: (126-152)/(57-70) 141/61     Weight: 68.6 kg (151 lb 4.8 oz)  Body mass index is 23.7 kg/m².    Intake/Output Summary (Last 24 hours) at 10/12/2022 1303  Last data filed at 10/12/2022 0919  Gross per 24 hour   Intake 440 ml   Output 1675 ml   Net -1235 ml        Physical Exam  Vitals and nursing note reviewed.   Constitutional:       General: He is not in acute distress.     Appearance: He is normal weight. He is not toxic-appearing or diaphoretic.   HENT:      Head: Normocephalic and atraumatic.      Mouth/Throat:      Dentition: Abnormal dentition.   Eyes:      General: Lids are normal.   Cardiovascular:      Rate and Rhythm: Regular rhythm. Tachycardia present.   Pulmonary:      Effort: Pulmonary effort is normal. No respiratory distress.      Breath sounds: No wheezing.   Abdominal:      Palpations: Abdomen is soft.      Tenderness: There is no abdominal tenderness. There is no guarding.   Musculoskeletal:      Right lower leg: No edema.      Left lower leg: No edema.   Skin:     General: Skin is warm and dry.      Findings: Lesion present.      Comments: Pt has wounds located on his lower extremity that were do to ischemia. Wound care is following.    Neurological:      Mental Status: He is alert.  Mental status is at baseline.      Cranial Nerves: No dysarthria.   Psychiatric:         Mood and Affect: Mood normal.         Behavior: Behavior normal. Behavior is cooperative.       Significant Labs: All pertinent labs within the past 24 hours have been reviewed.    Significant Imaging: I have reviewed all pertinent imaging results/findings within the past 24 hours.

## 2022-10-12 NOTE — PLAN OF CARE
NURSING HOME ORDERS    10/12/2022  [facility]  [facility]  [address]  [facility] [id number]  Dept: [phone]  Loc: [phone]     Admit to Nursing Home:  skilled bed     Diagnoses:  Active Hospital Problems    Diagnosis  POA    *Critical limb ischemia of right lower extremity with ulceration of lower leg [I70.238]  Yes    Tachycardia [R00.0]  No    Severe sepsis [A41.9, R65.20]  No    Encephalopathy, metabolic [G93.41]  No    Absent pedal pulses [R09.89]  Yes    DVT (deep venous thrombosis) [I82.409]  Yes    Lower limb ischemia [I99.8]  Yes    PAD (peripheral artery disease) [I73.9]  Yes    Right renal mass [N28.89]  Yes    Mixed hyperlipidemia [E78.2]  Yes    Tobacco abuse [Z72.0]  Yes    Claudication of right lower extremity [I73.9]  Yes    Right leg pain [M79.604]  Yes    Primary hypertension [I10]  Yes      Resolved Hospital Problems    Diagnosis Date Resolved POA    Ulcer of right lower extremity, limited to breakdown of skin [L97.911] 10/06/2022 Yes       Patient is homebound due to:  Critical limb ischemia of right lower extremity with ulceration of lower leg    Allergies:Review of patient's allergies indicates:  No Known Allergies    Vitals:  Routine    Diet: regular diet    Activities:   Activity as tolerated    Goals of Care Treatment Preferences:  Code Status: Full Code      Labs:  CBC and BMP  Weekly or as needed     Nursing Precautions:  Fall    Consults:   PT to evaluate and treat- 3 times a week and OT to evaluate and treat- 3 times a week     Miscellaneous Care:     Wound care:Yes: Right calf with dry gangrene/chronic ulcerations; Will need to have clean dry dressing daily. Apply xeroform gauze with kerlex wrapped around.                          Medications: Review discharge medications with patient and family and provide education.     aspirin 81 MG Chew  Chew and swallow 1 tablet (81 mg total) by mouth once daily.      atorvastatin 80 MG  tablet  Commonly known as: LIPITOR  Take 1 tablet (80 mg total) by mouth once daily.      bacitracin 500 unit/gram ointment  Apply topically 2 (two) times daily.      cilostazoL 100 MG Tab  Commonly known as: PLETAL  Take 1 tablet (100 mg total) by mouth 2 (two) times daily.      diclofenac sodium 1 % Gel  Commonly known as: VOLTAREN  Apply 2 g topically 3 (three) times daily as needed (muscle spasm pain). Apply 2 grams to affected area 3 times daily as needed      ezetimibe 10 mg tablet  Commonly known as: ZETIA  Take 1 tablet (10 mg total) by mouth once daily.      gabapentin 300 MG capsule  Commonly known as: NEURONTIN  Take 3 capsules (900 mg total) by mouth 3 (three) times daily.     LIDOcaine 5 %  Commonly known as: LIDODERM  Apply to affected area as needed for pain for 12 hours, then off for 12 hours. Discard after each use.  May use 4% lidocaine patch as alternative.      losartan-hydrochlorothiazide 50-12.5 mg 50-12.5 mg per tablet  Commonly known as: HYZAAR  Take 1 tablet by mouth once daily.      multivitamin per tablet  Commonly known as: THERAGRAN  Take 1 tablet by mouth once daily.      mupirocin 2 % ointment  Commonly known as: BACTROBAN  Apply topically 2 (two) times daily.      naproxen 500 MG tablet  Commonly known as: NAPROSYN  Take 1 tablet (500 mg total) by mouth 2 (two) times daily as needed (pain).      XARELTO DVT-PE TREAT 30D START 15 mg (42)- 20 mg (9) tablet dose pack  Generic drug: rivaroxaban  Take 1 tablet (15 mg) by mouth twice daily with food for 21 days followed by 1 tablet (20 mg) by mouth once daily with food            Immunizations Administered as of 10/12/2022       Name Date Dose VIS Date Route Exp Date    COVID-19, MRNA, LN-S, PF (Moderna) 12/31/2021 0.25 mL -- Intramuscular --    Site: Left arm     Lot: [id number]     COVID-19, MRNA, LN-S, PF (Moderna) 3/10/2021 0.5 mL -- Intramuscular --    Site: Left arm     Lot: [id number]     COVID-19, MRNA, LN-S, PF (Moderna) 2/10/2021 0.5 mL  -- Intramuscular --    Site: Left arm     Lot: [id number]             This patient has had both covid vaccinations    Some patients may experience side effects after vaccination.  These may include fever, headache, muscle or joint aches.  Most symptoms resolve with 24-48 hours and do not require urgent medical evaluation unless they persist for more than 72 hours or symptoms are concerning for an unrelated medical condition.          _________________________________  Lisa Tang NP  10/12/2022

## 2022-10-13 LAB
BACTERIA BLD CULT: NORMAL
BACTERIA BLD CULT: NORMAL

## 2022-10-14 LAB — BACTERIA BLD CULT: NORMAL

## 2022-10-15 LAB — BACTERIA BLD CULT: NORMAL

## 2022-10-19 ENCOUNTER — HOSPITAL ENCOUNTER (INPATIENT)
Facility: HOSPITAL | Age: 72
LOS: 2 days | Discharge: NURSING, REHAB OR LONG-TERM CARE | DRG: 378 | End: 2022-10-21
Attending: EMERGENCY MEDICINE | Admitting: EMERGENCY MEDICINE
Payer: MEDICARE

## 2022-10-19 DIAGNOSIS — K92.2 GASTROINTESTINAL HEMORRHAGE, UNSPECIFIED GASTROINTESTINAL HEMORRHAGE TYPE: ICD-10-CM

## 2022-10-19 DIAGNOSIS — D50.0 ANEMIA DUE TO GI BLOOD LOSS: ICD-10-CM

## 2022-10-19 DIAGNOSIS — D64.9 ANEMIA: Primary | ICD-10-CM

## 2022-10-19 PROBLEM — F17.210 DEPENDENCE ON NICOTINE FROM CIGARETTES: Status: ACTIVE | Noted: 2022-07-26

## 2022-10-19 LAB
ABO + RH BLD: NORMAL
ALBUMIN SERPL BCP-MCNC: 2.6 G/DL (ref 3.5–5.2)
ALP SERPL-CCNC: 68 U/L (ref 55–135)
ALT SERPL W/O P-5'-P-CCNC: 29 U/L (ref 10–44)
ANION GAP SERPL CALC-SCNC: 6 MMOL/L (ref 8–16)
AST SERPL-CCNC: 33 U/L (ref 10–40)
BASOPHILS # BLD AUTO: 0.09 K/UL (ref 0–0.2)
BASOPHILS NFR BLD: 0.6 % (ref 0–1.9)
BILIRUB SERPL-MCNC: 0.2 MG/DL (ref 0.1–1)
BLD GP AB SCN CELLS X3 SERPL QL: NORMAL
BLD PROD TYP BPU: NORMAL
BLOOD UNIT EXPIRATION DATE: NORMAL
BLOOD UNIT TYPE CODE: 6200
BLOOD UNIT TYPE: NORMAL
BUN SERPL-MCNC: 11 MG/DL (ref 8–23)
BUN SERPL-MCNC: 12 MG/DL (ref 6–30)
CALCIUM SERPL-MCNC: 8.7 MG/DL (ref 8.7–10.5)
CHLORIDE SERPL-SCNC: 103 MMOL/L (ref 95–110)
CHLORIDE SERPL-SCNC: 105 MMOL/L (ref 95–110)
CO2 SERPL-SCNC: 24 MMOL/L (ref 23–29)
CODING SYSTEM: NORMAL
CREAT SERPL-MCNC: 0.8 MG/DL (ref 0.5–1.4)
CREAT SERPL-MCNC: 0.8 MG/DL (ref 0.5–1.4)
DIFFERENTIAL METHOD: ABNORMAL
DISPENSE STATUS: NORMAL
EOSINOPHIL # BLD AUTO: 0.2 K/UL (ref 0–0.5)
EOSINOPHIL NFR BLD: 1.3 % (ref 0–8)
ERYTHROCYTE [DISTWIDTH] IN BLOOD BY AUTOMATED COUNT: 19.6 % (ref 11.5–14.5)
EST. GFR  (NO RACE VARIABLE): >60 ML/MIN/1.73 M^2
GLUCOSE SERPL-MCNC: 103 MG/DL (ref 70–110)
GLUCOSE SERPL-MCNC: 99 MG/DL (ref 70–110)
HCT VFR BLD AUTO: 22.8 % (ref 40–54)
HCT VFR BLD CALC: 20 %PCV (ref 36–54)
HGB BLD-MCNC: 6.8 G/DL (ref 14–18)
IMM GRANULOCYTES # BLD AUTO: 0.26 K/UL (ref 0–0.04)
IMM GRANULOCYTES NFR BLD AUTO: 1.7 % (ref 0–0.5)
INR PPP: 1.3 (ref 0.8–1.2)
LYMPHOCYTES # BLD AUTO: 3.9 K/UL (ref 1–4.8)
LYMPHOCYTES NFR BLD: 26 % (ref 18–48)
MCH RBC QN AUTO: 26.6 PG (ref 27–31)
MCHC RBC AUTO-ENTMCNC: 29.8 G/DL (ref 32–36)
MCV RBC AUTO: 89 FL (ref 82–98)
MONOCYTES # BLD AUTO: 1.3 K/UL (ref 0.3–1)
MONOCYTES NFR BLD: 8.6 % (ref 4–15)
NEUTROPHILS # BLD AUTO: 9.3 K/UL (ref 1.8–7.7)
NEUTROPHILS NFR BLD: 61.8 % (ref 38–73)
NRBC BLD-RTO: 0 /100 WBC
NUM UNITS TRANS PACKED RBC: NORMAL
PLATELET # BLD AUTO: 511 K/UL (ref 150–450)
PMV BLD AUTO: 8.8 FL (ref 9.2–12.9)
POC IONIZED CALCIUM: 1.06 MMOL/L (ref 1.06–1.42)
POC TCO2 (MEASURED): 26 MMOL/L (ref 23–29)
POTASSIUM BLD-SCNC: 4 MMOL/L (ref 3.5–5.1)
POTASSIUM SERPL-SCNC: 4.1 MMOL/L (ref 3.5–5.1)
PROT SERPL-MCNC: 5.3 G/DL (ref 6–8.4)
PROTHROMBIN TIME: 13 SEC (ref 9–12.5)
RBC # BLD AUTO: 2.56 M/UL (ref 4.6–6.2)
SAMPLE: ABNORMAL
SODIUM BLD-SCNC: 137 MMOL/L (ref 136–145)
SODIUM SERPL-SCNC: 135 MMOL/L (ref 136–145)
WBC # BLD AUTO: 15.02 K/UL (ref 3.9–12.7)

## 2022-10-19 PROCEDURE — 80047 BASIC METABLC PNL IONIZED CA: CPT

## 2022-10-19 PROCEDURE — 85025 COMPLETE CBC W/AUTO DIFF WBC: CPT | Performed by: EMERGENCY MEDICINE

## 2022-10-19 PROCEDURE — 36430 TRANSFUSION BLD/BLD COMPNT: CPT

## 2022-10-19 PROCEDURE — P9016 RBC LEUKOCYTES REDUCED: HCPCS | Performed by: EMERGENCY MEDICINE

## 2022-10-19 PROCEDURE — 85027 COMPLETE CBC AUTOMATED: CPT

## 2022-10-19 PROCEDURE — 80053 COMPREHEN METABOLIC PANEL: CPT | Performed by: EMERGENCY MEDICINE

## 2022-10-19 PROCEDURE — 25000003 PHARM REV CODE 250

## 2022-10-19 PROCEDURE — 86920 COMPATIBILITY TEST SPIN: CPT | Performed by: EMERGENCY MEDICINE

## 2022-10-19 PROCEDURE — 63600175 PHARM REV CODE 636 W HCPCS: Performed by: EMERGENCY MEDICINE

## 2022-10-19 PROCEDURE — C9113 INJ PANTOPRAZOLE SODIUM, VIA: HCPCS | Performed by: EMERGENCY MEDICINE

## 2022-10-19 PROCEDURE — 85610 PROTHROMBIN TIME: CPT | Performed by: EMERGENCY MEDICINE

## 2022-10-19 PROCEDURE — 99291 CRITICAL CARE FIRST HOUR: CPT | Mod: ,,, | Performed by: EMERGENCY MEDICINE

## 2022-10-19 PROCEDURE — 86850 RBC ANTIBODY SCREEN: CPT | Performed by: EMERGENCY MEDICINE

## 2022-10-19 PROCEDURE — 93005 ELECTROCARDIOGRAM TRACING: CPT

## 2022-10-19 PROCEDURE — 99223 1ST HOSP IP/OBS HIGH 75: CPT | Mod: ,,,

## 2022-10-19 PROCEDURE — 36415 COLL VENOUS BLD VENIPUNCTURE: CPT

## 2022-10-19 PROCEDURE — 93010 ELECTROCARDIOGRAM REPORT: CPT | Mod: ,,, | Performed by: INTERNAL MEDICINE

## 2022-10-19 PROCEDURE — 93010 EKG 12-LEAD: ICD-10-PCS | Mod: ,,, | Performed by: INTERNAL MEDICINE

## 2022-10-19 PROCEDURE — 85007 BL SMEAR W/DIFF WBC COUNT: CPT

## 2022-10-19 PROCEDURE — 94761 N-INVAS EAR/PLS OXIMETRY MLT: CPT

## 2022-10-19 PROCEDURE — 99285 EMERGENCY DEPT VISIT HI MDM: CPT | Mod: 25

## 2022-10-19 PROCEDURE — 20600001 HC STEP DOWN PRIVATE ROOM

## 2022-10-19 PROCEDURE — 99223 PR INITIAL HOSPITAL CARE,LEVL III: ICD-10-PCS | Mod: ,,,

## 2022-10-19 PROCEDURE — 99291 PR CRITICAL CARE, E/M 30-74 MINUTES: ICD-10-PCS | Mod: ,,, | Performed by: EMERGENCY MEDICINE

## 2022-10-19 RX ORDER — EZETIMIBE 10 MG/1
10 TABLET ORAL DAILY
Status: DISCONTINUED | OUTPATIENT
Start: 2022-10-20 | End: 2022-10-21 | Stop reason: HOSPADM

## 2022-10-19 RX ORDER — HYDROCODONE BITARTRATE AND ACETAMINOPHEN 500; 5 MG/1; MG/1
TABLET ORAL
Status: DISCONTINUED | OUTPATIENT
Start: 2022-10-19 | End: 2022-10-21 | Stop reason: HOSPADM

## 2022-10-19 RX ORDER — CEFADROXIL 500 MG/1
500 CAPSULE ORAL EVERY 12 HOURS
Status: DISCONTINUED | OUTPATIENT
Start: 2022-10-19 | End: 2022-10-21

## 2022-10-19 RX ORDER — GABAPENTIN 300 MG/1
900 CAPSULE ORAL 3 TIMES DAILY
Status: DISCONTINUED | OUTPATIENT
Start: 2022-10-19 | End: 2022-10-21 | Stop reason: HOSPADM

## 2022-10-19 RX ORDER — PANTOPRAZOLE SODIUM 40 MG/10ML
40 INJECTION, POWDER, LYOPHILIZED, FOR SOLUTION INTRAVENOUS 2 TIMES DAILY
Status: DISCONTINUED | OUTPATIENT
Start: 2022-10-20 | End: 2022-10-20

## 2022-10-19 RX ORDER — GABAPENTIN 300 MG/1
900 CAPSULE ORAL 3 TIMES DAILY
Status: DISCONTINUED | OUTPATIENT
Start: 2022-10-20 | End: 2022-10-19

## 2022-10-19 RX ORDER — HALOPERIDOL 5 MG/ML
5 INJECTION INTRAMUSCULAR EVERY 6 HOURS PRN
Status: DISCONTINUED | OUTPATIENT
Start: 2022-10-19 | End: 2022-10-21 | Stop reason: HOSPADM

## 2022-10-19 RX ORDER — SODIUM CHLORIDE 0.9 % (FLUSH) 0.9 %
10 SYRINGE (ML) INJECTION
Status: DISCONTINUED | OUTPATIENT
Start: 2022-10-19 | End: 2022-10-21 | Stop reason: HOSPADM

## 2022-10-19 RX ORDER — ACETAMINOPHEN 325 MG/1
650 TABLET ORAL EVERY 4 HOURS PRN
Status: DISCONTINUED | OUTPATIENT
Start: 2022-10-19 | End: 2022-10-21 | Stop reason: HOSPADM

## 2022-10-19 RX ORDER — SODIUM CHLORIDE 0.9 % (FLUSH) 0.9 %
10 SYRINGE (ML) INJECTION
Status: CANCELLED | OUTPATIENT
Start: 2022-10-19

## 2022-10-19 RX ORDER — OXYCODONE HYDROCHLORIDE 5 MG/1
5 TABLET ORAL EVERY 6 HOURS PRN
Status: DISCONTINUED | OUTPATIENT
Start: 2022-10-19 | End: 2022-10-20

## 2022-10-19 RX ORDER — PANTOPRAZOLE SODIUM 40 MG/10ML
80 INJECTION, POWDER, LYOPHILIZED, FOR SOLUTION INTRAVENOUS
Status: COMPLETED | OUTPATIENT
Start: 2022-10-19 | End: 2022-10-19

## 2022-10-19 RX ORDER — SODIUM CHLORIDE 9 MG/ML
INJECTION, SOLUTION INTRAVENOUS CONTINUOUS
Status: ACTIVE | OUTPATIENT
Start: 2022-10-19 | End: 2022-10-20

## 2022-10-19 RX ORDER — ATORVASTATIN CALCIUM 20 MG/1
80 TABLET, FILM COATED ORAL DAILY
Status: DISCONTINUED | OUTPATIENT
Start: 2022-10-20 | End: 2022-10-21 | Stop reason: HOSPADM

## 2022-10-19 RX ORDER — TALC
6 POWDER (GRAM) TOPICAL NIGHTLY PRN
Status: DISCONTINUED | OUTPATIENT
Start: 2022-10-19 | End: 2022-10-21 | Stop reason: HOSPADM

## 2022-10-19 RX ORDER — HYDROCODONE BITARTRATE AND ACETAMINOPHEN 5; 325 MG/1; MG/1
1 TABLET ORAL EVERY 4 HOURS PRN
Status: DISCONTINUED | OUTPATIENT
Start: 2022-10-19 | End: 2022-10-21 | Stop reason: HOSPADM

## 2022-10-19 RX ADMIN — HYDROCODONE BITARTRATE AND ACETAMINOPHEN 1 TABLET: 5; 325 TABLET ORAL at 11:10

## 2022-10-19 RX ADMIN — GABAPENTIN 900 MG: 300 CAPSULE ORAL at 11:10

## 2022-10-19 RX ADMIN — PANTOPRAZOLE SODIUM 80 MG: 40 INJECTION, POWDER, FOR SOLUTION INTRAVENOUS at 08:10

## 2022-10-19 RX ADMIN — CEFADROXIL 500 MG: 500 CAPSULE ORAL at 10:10

## 2022-10-19 RX ADMIN — SODIUM CHLORIDE: 0.9 INJECTION, SOLUTION INTRAVENOUS at 10:10

## 2022-10-19 NOTE — ED PROVIDER NOTES
Encounter Date: 10/19/2022       History     Chief Complaint   Patient presents with    abnormal labs     [facility]     72-year-old male, history of severe PVD, status post BYPASS, ARTERIAL, AXILLARY TO FEMORAL, USING GRAFT (N/A), last week here at [facility] [place] critical limb ischemia, currently on Xarelto, with chronic anemia, sent back from SNF facility for hemoglobin of 6.8.  At the time of discharge on October 12 his hemoglobin was 7.8.  Patient reports 1 episode of black stools in the last few days but he cannot pinpoint when.  He denies any hematemesis, abdominal pain, bright red blood per rectum.    The history is provided by the patient and the EMS personnel. The history is limited by the absence of a caregiver.   Review of patient's allergies indicates:  No Known Allergies  Past Medical History:   Diagnosis Date    Hypertension     Peripheral arterial disease      Past Surgical History:   Procedure Laterality Date    CREATION OF AXILLARY-FEMORAL ARTERY BYPASS WITH GRAFT N/A 10/6/2022    Procedure: CREATION, BYPASS, ARTERIAL, AXILLARY TO FEMORAL, USING GRAFT;  Surgeon: LEI Ortiz III, MD;  Location: [address];  Service: Peripheral Vascular;  Laterality: N/A;    CREATION OF FEMORAL-FEMORAL ARTERY BYPASS WITH GRAFT N/A 10/6/2022    Procedure: CREATION, BYPASS, ARTERIAL, FEMORAL TO FEMORAL, USING GRAFT;  Surgeon: LEI Ortiz III, MD;  Location: [address];  Service: Peripheral Vascular;  Laterality: N/A;    EXCISION OF HYDROCELE Left 4/26/2022    Procedure: HYDROCELECTOMY;  Surgeon: Damion Roberts MD;  Location: [facility];  Service: Urology;  Laterality: Left;    KNEE SURGERY  1972     Family History   Problem Relation Age of Onset    Hypertension Mother     Hypertension Father      Social History     Tobacco Use    Smoking status: Former     Packs/day: 0.50     Years: 50.00     Pack years: 25.00     Types: Cigarettes    Smokeless tobacco: Never    Tobacco comments:      Cigarettes3-4 per day   Substance Use Topics    Alcohol use: Never    Drug use: Never     Review of Systems   Constitutional:  Negative for chills and fever.   Gastrointestinal:  Negative for abdominal pain, blood in stool, diarrhea, nausea and vomiting.   Neurological:  Negative for syncope and weakness.   All other systems reviewed and are negative.    Physical Exam     Initial Vitals [10/19/22 1706]   BP Pulse Resp Temp SpO2   (!) 112/56 102 18 98.3 °F (36.8 °C) 97 %      MAP       --         Physical Exam    Nursing note and vitals reviewed.  Constitutional: Vital signs are normal. He appears well-developed and well-nourished. He is not diaphoretic.  Non-toxic appearance. He does not appear ill. No distress.   HENT:   Head: Normocephalic and atraumatic.   Mouth/Throat: Mucous membranes are normal. Mucous membranes are not dry.   Eyes: Conjunctivae and lids are normal.   Neck: Neck supple.   Normal range of motion.  Cardiovascular:  Normal rate.           Pulmonary/Chest: No respiratory distress.   Abdominal: Abdomen is soft. He exhibits no distension. There is no abdominal tenderness. There is no rebound and no guarding.   Genitourinary: Rectum:      Guaiac result positive.   Guaiac positive stool. : Acceptable.   Genitourinary Comments: Black stool     Musculoskeletal:         General: Edema present.      Cervical back: Normal range of motion and neck supple.      Comments: Bulky dressing on right lower leg     Neurological: He is alert and oriented to person, place, and time. He has normal strength. GCS score is 15. GCS eye subscore is 4. GCS verbal subscore is 5. GCS motor subscore is 6.   Skin: Skin is warm, dry and intact. No pallor.   Psychiatric: He has a normal mood and affect. His speech is normal and behavior is normal.       ED Course   Procedures  Labs Reviewed   CBC W/ AUTO DIFFERENTIAL - Abnormal; Notable for the following components:       Result Value    WBC 15.02 (*)     RBC  2.56 (*)     Hemoglobin 6.8 (*)     Hematocrit 22.8 (*)     MCH 26.6 (*)     MCHC 29.8 (*)     RDW 19.6 (*)     Platelets 511 (*)     MPV 8.8 (*)     Immature Granulocytes 1.7 (*)     Gran # (ANC) 9.3 (*)     Immature Grans (Abs) 0.26 (*)     Mono # 1.3 (*)     All other components within normal limits   COMPREHENSIVE METABOLIC PANEL - Abnormal; Notable for the following components:    Sodium 135 (*)     Total Protein 5.3 (*)     Albumin 2.6 (*)     Anion Gap 6 (*)     All other components within normal limits   PROTIME-INR - Abnormal; Notable for the following components:    Prothrombin Time 13.0 (*)     INR 1.3 (*)     All other components within normal limits   ISTAT PROCEDURE - Abnormal; Notable for the following components:    POC Hematocrit 20 (*)     All other components within normal limits   TROPONIN I   TYPE & SCREEN   ISTAT CHEM8   PREPARE RBC SOFT          Imaging Results    None          Medications   0.9%  NaCl infusion (for blood administration) (has no administration in time range)   sodium chloride 0.9% flush 10 mL (has no administration in time range)   melatonin tablet 6 mg (has no administration in time range)   atorvastatin tablet 80 mg (has no administration in time range)   cefadroxil capsule 500 mg (500 mg Oral Given 10/19/22 2229)   ezetimibe tablet 10 mg (has no administration in time range)   gabapentin capsule 900 mg (has no administration in time range)   pantoprazole injection 40 mg (has no administration in time range)   0.9%  NaCl infusion ( Intravenous New Bag 10/19/22 2239)   pantoprazole injection 80 mg (80 mg Intravenous Given 10/19/22 2027)     Medical Decision Making:   History:   Old Medical Records: I decided to obtain old medical records.  Initial Assessment:   Acute on chronic anemia  Hemodynamically stable and clinically well-appearing  Possible report of melena this week and patient on anticoagulation    Unfortunately patient is in a hallway on my initial assessment and I  cannot perform a rectal exam  Differential Diagnosis:   Rule out GI bleed.    Clinical Tests:   Lab Tests: Ordered and Reviewed  Medical Tests: Ordered and Reviewed  ED Management:  IV x 2  Labs, including CBC, INR, T&S  Transfuse if Hgb < 7  Hold anticoagulant/antiplatelet agents  Protonix for suspect upper GI bleed with ulcer source  GI consult  Anticipate admission    7:42 PM  Hgb 6.8   1 unit PRBCs ordered and patient consented  Able to perform a rectal exam and patient did indeed have black stool that was guaiac positive  Will consult GI  Protonix 80 mg IV ordered  Will admit to medicine for presumed upper GI bleed  Hemodynamically stable at this time so will hold off on Kcentra.  Will hold Xarelto for now.              Attending Attestation:         Attending Critical Care:   Critical Care Times:   ==============================================================  Total Critical Care Time - exclusive of procedural time: 35 minutes.  ==============================================================  Critical care was necessary to treat or prevent imminent or life-threatening deterioration of the following conditions: GI bleeding.   Critical care was time spent personally by me on the following activities: obtaining history from patient or relative, examination of patient, review of old charts, review of x-rays / CT sent with the patient, development of treatment plan with patient or relative, ordering lab, x-rays, and/or EKG, ordering and performing treatments and interventions, evaluation of patient's response to treatment, discussion with consultants and re-evaluation of patient's conition.   Critical Care Condition: life-threatening                      Clinical Impression:   Final diagnoses:  [D64.9] Anemia (Primary)  [K92.2] Gastrointestinal hemorrhage, unspecified gastrointestinal hemorrhage type        ED Disposition Condition    Admit Stable                Becky Perez MD  10/19/22 6384

## 2022-10-20 ENCOUNTER — ANESTHESIA EVENT (OUTPATIENT)
Dept: ENDOSCOPY | Facility: HOSPITAL | Age: 72
DRG: 378 | End: 2022-10-20
Payer: MEDICARE

## 2022-10-20 ENCOUNTER — ANESTHESIA (OUTPATIENT)
Dept: ENDOSCOPY | Facility: HOSPITAL | Age: 72
DRG: 378 | End: 2022-10-20
Payer: MEDICARE

## 2022-10-20 PROBLEM — R60.0 BILATERAL LOWER EXTREMITY EDEMA: Status: ACTIVE | Noted: 2022-10-20

## 2022-10-20 LAB
ACANTHOCYTES BLD QL SMEAR: PRESENT
ACANTHOCYTES BLD QL SMEAR: PRESENT
ANION GAP SERPL CALC-SCNC: 7 MMOL/L (ref 8–16)
ANISOCYTOSIS BLD QL SMEAR: SLIGHT
ANISOCYTOSIS BLD QL SMEAR: SLIGHT
BASO STIPL BLD QL SMEAR: ABNORMAL
BASOPHILS # BLD AUTO: 0.09 K/UL (ref 0–0.2)
BASOPHILS # BLD AUTO: 0.09 K/UL (ref 0–0.2)
BASOPHILS # BLD AUTO: ABNORMAL K/UL (ref 0–0.2)
BASOPHILS NFR BLD: 0 % (ref 0–1.9)
BASOPHILS NFR BLD: 0.7 % (ref 0–1.9)
BASOPHILS NFR BLD: 0.7 % (ref 0–1.9)
BNP SERPL-MCNC: 183 PG/ML (ref 0–99)
BUN SERPL-MCNC: 11 MG/DL (ref 8–23)
BURR CELLS BLD QL SMEAR: ABNORMAL
BURR CELLS BLD QL SMEAR: ABNORMAL
CALCIUM SERPL-MCNC: 8 MG/DL (ref 8.7–10.5)
CHLORIDE SERPL-SCNC: 107 MMOL/L (ref 95–110)
CO2 SERPL-SCNC: 23 MMOL/L (ref 23–29)
CREAT SERPL-MCNC: 0.6 MG/DL (ref 0.5–1.4)
DACRYOCYTES BLD QL SMEAR: ABNORMAL
DACRYOCYTES BLD QL SMEAR: ABNORMAL
DIFFERENTIAL METHOD: ABNORMAL
EOSINOPHIL # BLD AUTO: 0.2 K/UL (ref 0–0.5)
EOSINOPHIL # BLD AUTO: 0.2 K/UL (ref 0–0.5)
EOSINOPHIL # BLD AUTO: ABNORMAL K/UL (ref 0–0.5)
EOSINOPHIL NFR BLD: 1 % (ref 0–8)
EOSINOPHIL NFR BLD: 1.2 % (ref 0–8)
EOSINOPHIL NFR BLD: 1.7 % (ref 0–8)
ERYTHROCYTE [DISTWIDTH] IN BLOOD BY AUTOMATED COUNT: 18.2 % (ref 11.5–14.5)
ERYTHROCYTE [DISTWIDTH] IN BLOOD BY AUTOMATED COUNT: 18.5 % (ref 11.5–14.5)
ERYTHROCYTE [DISTWIDTH] IN BLOOD BY AUTOMATED COUNT: 19.6 % (ref 11.5–14.5)
EST. GFR  (NO RACE VARIABLE): >60 ML/MIN/1.73 M^2
GIANT PLATELETS BLD QL SMEAR: PRESENT
GLUCOSE SERPL-MCNC: 86 MG/DL (ref 70–110)
HCT VFR BLD AUTO: 20.2 % (ref 40–54)
HCT VFR BLD AUTO: 24.9 % (ref 40–54)
HCT VFR BLD AUTO: 28.1 % (ref 40–54)
HGB BLD-MCNC: 5.9 G/DL (ref 14–18)
HGB BLD-MCNC: 7.9 G/DL (ref 14–18)
HGB BLD-MCNC: 9.1 G/DL (ref 14–18)
HOWELL-JOLLY BOD BLD QL SMEAR: ABNORMAL
HOWELL-JOLLY BOD BLD QL SMEAR: ABNORMAL
HYPOCHROMIA BLD QL SMEAR: ABNORMAL
HYPOCHROMIA BLD QL SMEAR: ABNORMAL
IMM GRANULOCYTES # BLD AUTO: 0.14 K/UL (ref 0–0.04)
IMM GRANULOCYTES # BLD AUTO: 0.16 K/UL (ref 0–0.04)
IMM GRANULOCYTES # BLD AUTO: ABNORMAL K/UL (ref 0–0.04)
IMM GRANULOCYTES NFR BLD AUTO: 1 % (ref 0–0.5)
IMM GRANULOCYTES NFR BLD AUTO: 1.2 % (ref 0–0.5)
IMM GRANULOCYTES NFR BLD AUTO: ABNORMAL % (ref 0–0.5)
LYMPHOCYTES # BLD AUTO: 3.6 K/UL (ref 1–4.8)
LYMPHOCYTES # BLD AUTO: 4.5 K/UL (ref 1–4.8)
LYMPHOCYTES # BLD AUTO: ABNORMAL K/UL (ref 1–4.8)
LYMPHOCYTES NFR BLD: 22 % (ref 18–48)
LYMPHOCYTES NFR BLD: 26.1 % (ref 18–48)
LYMPHOCYTES NFR BLD: 33.4 % (ref 18–48)
MCH RBC QN AUTO: 26.3 PG (ref 27–31)
MCH RBC QN AUTO: 28.3 PG (ref 27–31)
MCH RBC QN AUTO: 28.3 PG (ref 27–31)
MCHC RBC AUTO-ENTMCNC: 29.2 G/DL (ref 32–36)
MCHC RBC AUTO-ENTMCNC: 31.7 G/DL (ref 32–36)
MCHC RBC AUTO-ENTMCNC: 32.4 G/DL (ref 32–36)
MCV RBC AUTO: 87 FL (ref 82–98)
MCV RBC AUTO: 89 FL (ref 82–98)
MCV RBC AUTO: 90 FL (ref 82–98)
MONOCYTES # BLD AUTO: 1.1 K/UL (ref 0.3–1)
MONOCYTES # BLD AUTO: 1.2 K/UL (ref 0.3–1)
MONOCYTES # BLD AUTO: ABNORMAL K/UL (ref 0.3–1)
MONOCYTES NFR BLD: 4 % (ref 4–15)
MONOCYTES NFR BLD: 8.1 % (ref 4–15)
MONOCYTES NFR BLD: 8.7 % (ref 4–15)
NEUTROPHILS # BLD AUTO: 7.4 K/UL (ref 1.8–7.7)
NEUTROPHILS # BLD AUTO: 8.5 K/UL (ref 1.8–7.7)
NEUTROPHILS # BLD AUTO: ABNORMAL K/UL (ref 1.8–7.7)
NEUTROPHILS NFR BLD: 54.9 % (ref 38–73)
NEUTROPHILS NFR BLD: 62.3 % (ref 38–73)
NEUTROPHILS NFR BLD: 70 % (ref 38–73)
NEUTS BAND NFR BLD MANUAL: 3 %
NRBC BLD-RTO: 0 /100 WBC
OVALOCYTES BLD QL SMEAR: ABNORMAL
OVALOCYTES BLD QL SMEAR: ABNORMAL
PLATELET # BLD AUTO: 350 K/UL (ref 150–450)
PLATELET # BLD AUTO: 441 K/UL (ref 150–450)
PLATELET # BLD AUTO: 491 K/UL (ref 150–450)
PLATELET BLD QL SMEAR: ABNORMAL
PLATELET BLD QL SMEAR: ABNORMAL
PMV BLD AUTO: 10.6 FL (ref 9.2–12.9)
PMV BLD AUTO: 9 FL (ref 9.2–12.9)
PMV BLD AUTO: 9.3 FL (ref 9.2–12.9)
POIKILOCYTOSIS BLD QL SMEAR: SLIGHT
POIKILOCYTOSIS BLD QL SMEAR: SLIGHT
POLYCHROMASIA BLD QL SMEAR: ABNORMAL
POLYCHROMASIA BLD QL SMEAR: ABNORMAL
POTASSIUM SERPL-SCNC: 4 MMOL/L (ref 3.5–5.1)
RBC # BLD AUTO: 2.24 M/UL (ref 4.6–6.2)
RBC # BLD AUTO: 2.79 M/UL (ref 4.6–6.2)
RBC # BLD AUTO: 3.22 M/UL (ref 4.6–6.2)
SCHISTOCYTES BLD QL SMEAR: ABNORMAL
SCHISTOCYTES BLD QL SMEAR: PRESENT
SCHISTOCYTES BLD QL SMEAR: PRESENT
SMUDGE CELLS BLD QL SMEAR: PRESENT
SMUDGE CELLS BLD QL SMEAR: PRESENT
SODIUM SERPL-SCNC: 137 MMOL/L (ref 136–145)
SPHEROCYTES BLD QL SMEAR: ABNORMAL
SPHEROCYTES BLD QL SMEAR: ABNORMAL
TARGETS BLD QL SMEAR: ABNORMAL
TARGETS BLD QL SMEAR: ABNORMAL
WBC # BLD AUTO: 13.48 K/UL (ref 3.9–12.7)
WBC # BLD AUTO: 13.63 K/UL (ref 3.9–12.7)
WBC # BLD AUTO: 13.66 K/UL (ref 3.9–12.7)

## 2022-10-20 PROCEDURE — 20600001 HC STEP DOWN PRIVATE ROOM

## 2022-10-20 PROCEDURE — D9220A PRA ANESTHESIA: ICD-10-PCS | Mod: ANES,,, | Performed by: ANESTHESIOLOGY

## 2022-10-20 PROCEDURE — 80048 BASIC METABOLIC PNL TOTAL CA: CPT

## 2022-10-20 PROCEDURE — 94761 N-INVAS EAR/PLS OXIMETRY MLT: CPT

## 2022-10-20 PROCEDURE — D9220A PRA ANESTHESIA: ICD-10-PCS | Mod: CRNA,,, | Performed by: NURSE ANESTHETIST, CERTIFIED REGISTERED

## 2022-10-20 PROCEDURE — 25000003 PHARM REV CODE 250

## 2022-10-20 PROCEDURE — 37000008 HC ANESTHESIA 1ST 15 MINUTES: Performed by: INTERNAL MEDICINE

## 2022-10-20 PROCEDURE — 43235 PR EGD, FLEX, DIAGNOSTIC: ICD-10-PCS | Mod: ,,, | Performed by: INTERNAL MEDICINE

## 2022-10-20 PROCEDURE — 83880 ASSAY OF NATRIURETIC PEPTIDE: CPT

## 2022-10-20 PROCEDURE — 99222 PR INITIAL HOSPITAL CARE,LEVL II: ICD-10-PCS | Mod: 25,,, | Performed by: INTERNAL MEDICINE

## 2022-10-20 PROCEDURE — 37000009 HC ANESTHESIA EA ADD 15 MINS: Performed by: INTERNAL MEDICINE

## 2022-10-20 PROCEDURE — D9220A PRA ANESTHESIA: Mod: CRNA,,, | Performed by: NURSE ANESTHETIST, CERTIFIED REGISTERED

## 2022-10-20 PROCEDURE — 25000003 PHARM REV CODE 250: Performed by: NURSE ANESTHETIST, CERTIFIED REGISTERED

## 2022-10-20 PROCEDURE — 25000003 PHARM REV CODE 250: Performed by: INTERNAL MEDICINE

## 2022-10-20 PROCEDURE — 63600175 PHARM REV CODE 636 W HCPCS

## 2022-10-20 PROCEDURE — 36415 COLL VENOUS BLD VENIPUNCTURE: CPT

## 2022-10-20 PROCEDURE — D9220A PRA ANESTHESIA: Mod: ANES,,, | Performed by: ANESTHESIOLOGY

## 2022-10-20 PROCEDURE — 43235 EGD DIAGNOSTIC BRUSH WASH: CPT | Performed by: INTERNAL MEDICINE

## 2022-10-20 PROCEDURE — 43235 EGD DIAGNOSTIC BRUSH WASH: CPT | Mod: ,,, | Performed by: INTERNAL MEDICINE

## 2022-10-20 PROCEDURE — 85025 COMPLETE CBC W/AUTO DIFF WBC: CPT

## 2022-10-20 PROCEDURE — 99222 1ST HOSP IP/OBS MODERATE 55: CPT | Mod: 25,,, | Performed by: INTERNAL MEDICINE

## 2022-10-20 PROCEDURE — C9113 INJ PANTOPRAZOLE SODIUM, VIA: HCPCS

## 2022-10-20 RX ORDER — HYDROCODONE BITARTRATE AND ACETAMINOPHEN 10; 325 MG/1; MG/1
1 TABLET ORAL EVERY 6 HOURS PRN
Status: DISCONTINUED | OUTPATIENT
Start: 2022-10-20 | End: 2022-10-21 | Stop reason: HOSPADM

## 2022-10-20 RX ORDER — PROPOFOL 10 MG/ML
INJECTION, EMULSION INTRAVENOUS CONTINUOUS PRN
Status: DISCONTINUED | OUTPATIENT
Start: 2022-10-20 | End: 2022-10-20

## 2022-10-20 RX ORDER — SODIUM CHLORIDE 0.9 % (FLUSH) 0.9 %
3 SYRINGE (ML) INJECTION
Status: CANCELLED | OUTPATIENT
Start: 2022-10-20

## 2022-10-20 RX ORDER — NAPROXEN SODIUM 220 MG/1
81 TABLET, FILM COATED ORAL DAILY
Status: DISCONTINUED | OUTPATIENT
Start: 2022-10-21 | End: 2022-10-21 | Stop reason: HOSPADM

## 2022-10-20 RX ORDER — PANTOPRAZOLE SODIUM 40 MG/1
40 TABLET, DELAYED RELEASE ORAL
Status: DISCONTINUED | OUTPATIENT
Start: 2022-10-21 | End: 2022-10-21 | Stop reason: HOSPADM

## 2022-10-20 RX ORDER — PROPOFOL 10 MG/ML
INJECTION, EMULSION INTRAVENOUS
Status: DISCONTINUED | OUTPATIENT
Start: 2022-10-20 | End: 2022-10-20

## 2022-10-20 RX ORDER — CILOSTAZOL 100 MG/1
100 TABLET ORAL 2 TIMES DAILY
Status: DISCONTINUED | OUTPATIENT
Start: 2022-10-20 | End: 2022-10-21 | Stop reason: HOSPADM

## 2022-10-20 RX ORDER — LIDOCAINE HCL/PF 100 MG/5ML
SYRINGE (ML) INTRAVENOUS
Status: DISCONTINUED | OUTPATIENT
Start: 2022-10-20 | End: 2022-10-20

## 2022-10-20 RX ADMIN — GABAPENTIN 900 MG: 300 CAPSULE ORAL at 03:10

## 2022-10-20 RX ADMIN — CEFADROXIL 500 MG: 500 CAPSULE ORAL at 08:10

## 2022-10-20 RX ADMIN — PANTOPRAZOLE SODIUM 40 MG: 40 INJECTION, POWDER, FOR SOLUTION INTRAVENOUS at 08:10

## 2022-10-20 RX ADMIN — Medication 100 MG: at 11:10

## 2022-10-20 RX ADMIN — CEFADROXIL 500 MG: 500 CAPSULE ORAL at 09:10

## 2022-10-20 RX ADMIN — RIVAROXABAN 20 MG: 20 TABLET, FILM COATED ORAL at 10:10

## 2022-10-20 RX ADMIN — PROPOFOL 75 MCG/KG/MIN: 10 INJECTION, EMULSION INTRAVENOUS at 11:10

## 2022-10-20 RX ADMIN — GABAPENTIN 900 MG: 300 CAPSULE ORAL at 09:10

## 2022-10-20 RX ADMIN — ATORVASTATIN CALCIUM 80 MG: 20 TABLET, FILM COATED ORAL at 08:10

## 2022-10-20 RX ADMIN — SODIUM CHLORIDE: 0.9 INJECTION, SOLUTION INTRAVENOUS at 11:10

## 2022-10-20 RX ADMIN — HYDROCODONE BITARTRATE AND ACETAMINOPHEN 1 TABLET: 10; 325 TABLET ORAL at 10:10

## 2022-10-20 RX ADMIN — CILOSTAZOL 100 MG: 100 TABLET ORAL at 10:10

## 2022-10-20 RX ADMIN — HYDROCODONE BITARTRATE AND ACETAMINOPHEN 1 TABLET: 10; 325 TABLET ORAL at 08:10

## 2022-10-20 RX ADMIN — PROPOFOL 60 MG: 10 INJECTION, EMULSION INTRAVENOUS at 11:10

## 2022-10-20 RX ADMIN — EZETIMIBE 10 MG: 10 TABLET ORAL at 08:10

## 2022-10-20 RX ADMIN — GABAPENTIN 900 MG: 300 CAPSULE ORAL at 08:10

## 2022-10-20 NOTE — ANESTHESIA PREPROCEDURE EVALUATION
10/20/2022  Babatunde Singh is a 72 y.o., male.      Pre-op Assessment    I have reviewed the Patient Summary Reports.       I have reviewed the Medications.     Review of Systems  Anesthesia Hx:  No problems with previous Anesthesia  History of prior surgery of interest to airway management or planning: Previous anesthesia: General   Social:  Former Smoker 25 pack years   Hematology/Oncology:  Hematology Normal   Oncology Normal     EENT/Dental:EENT/Dental Normal   Cardiovascular:   Exercise tolerance: poor Hypertension, well controlled    Pulmonary:  Pulmonary Normal    Renal/:  Renal/ Normal     Hepatic/GI:  Hepatic/GI Normal    Musculoskeletal:  Musculoskeletal Normal    Neurological:   Neuromuscular Disease,    Endocrine:  Endocrine Normal    Dermatological:  Skin Normal    Psych:  Psychiatric Normal           Physical Exam  General: Well nourished, Cooperative, Alert and Oriented    Airway:  Mallampati: II   Mouth Opening: Normal  TM Distance: Normal  Tongue: Normal  Neck ROM: Normal ROM    Dental:  Intact        Anesthesia Plan  Type of Anesthesia, risks & benefits discussed:    Anesthesia Type: Gen Natural Airway  Intra-op Monitoring Plan: Standard ASA Monitors  Post Op Pain Control Plan: multimodal analgesia and IV/PO Opioids PRN  Induction:  IV  Informed Consent: Informed consent signed with the Patient and all parties understand the risks and agree with anesthesia plan.  All questions answered.   ASA Score: 3    Ready For Surgery From Anesthesia Perspective.     .

## 2022-10-20 NOTE — NURSING
Patient alert and oriented x4.  Patient voiding clear yellow urine with no bowel movements this shift.  Patient was NPO this am for EGD.  At this time EGD results results negative for any active bleeding.  Patient started on 2 gram low sodium diet.  Appetite is good.  Patient has a wound care consult for right leg venous ulcer with graft.  Patient arrived from rehab with wound.  Patient rating his pain 7/10 this am in right leg receiving 10 my of hydrocodone for pain.  Will continue to monitor and treat per physician orders.

## 2022-10-20 NOTE — HPI
Pt is a 72 y.o. M with hx of DVT, HTN, chronic anemia, and severe PAD recently admitted for critical limb ischemia s/p axillary to femoral arterial bypass on 10/6 on xarelto and NSAIDs who presented to the ED from [facility] for a drop in hemoglobin to 6.8. Pt reports 1 episode of melena approximately 1 week ago. Since that time, he reports being significantly constipated, straining to attempt BM without success, with associated gas. Pt reports passing a large volume, hard, black and tarry stool in the ED following rectal exam and now feels relieved from constipation. Pt denies abdominal pain, BRBPR, hematemesis, N/V/D, fever, chills, chest pain, palpitations, SOB, cough, dysuria, fatigue, weakness, confusion, HA, syncope or recent sick contacts. He does endorse significant RLE pain, as he has not had any scheduled pain medication since leaving the SNF.       In the ED: mildly hypotensive to 110s/50s,  othwerwise VSSAF. Hgb 6.8, WBC 15. Rectal exam performed in the ED significant for melanotic stools, guaiac +. PT 13, INR 1.3. Na 135. Pt was given 80 mg IV Protonix and 1 unit pRBCs.

## 2022-10-20 NOTE — ASSESSMENT & PLAN NOTE
- Controlled on admission   - Latest BP and vitals reviewed  - Home meds for HTN:   Hypertension Medications             losartan-hydrochlorothiazide 50-12.5 mg (HYZAAR) 50-12.5 mg per tablet Take 1 tablet by mouth once daily.      - Hospital antihypertensives: Will hold all hospital antihypertensives in the setting of acute bleeding - restart when clinically appropriate   - Goal -140. Utilize p.r.n. antihypertensives only if patient's BP >180/110 & develop symptoms of worsening CP or SOB.

## 2022-10-20 NOTE — PHYSICIAN QUERY
PT Name: Babatunde Singh  MR #: [id number]     DOCUMENTATION CLARIFICATION      CDS/: OBDULIA Pierson, RN, CCDS              Contact information: [email or web address]    This form is a permanent document in the medical record.     Query Date: October 20, 2022    Dear Provider,  By submitting this query, we are merely seeking further clarification of documentation.  Please utilize your independent clinical judgment when addressing the question(s) below.     The Medical Record contains the following:    Supporting Clinical Findings Location in Medical Record   EKG ordered to assess for dynamic changes, but suspect elevated troponin is due to demand ischemic in the setting of infection   Care update: Dr. Ye 10/10   Pt's troponin thought to due to demand ischemia from recent sepsis   HM PN: Dr. Elena 10/11   Lab 10/10/22  1336 10/10/22  1832 10/11/22  0339   TROPONINI 0.252* 0.253* 0.200*    DCS: Dr. Diana ARCEO 10/12     Vent. Rate : 132 BPM     Atrial Rate : 132 BPM      P-R Int : 142 ms          QRS Dur : 096 ms       QT Int : 364 ms       P-R-T Axes : 059 030 022 degrees      QTc Int : 539 ms     Sinus tachycardia with Fusion complexes   Nonspecific ST and T wave abnormality   Abnormal ECG    EKG: 10/8     Please clarify if the ____demand ischemia_______________________ diagnosis has been:    [  ] Ruled In   [  ] Ruled In, Now Resolved   [  x] Ruled Out   [  ] Still to be ruled out at the time of discharge   [  ] Other/Clarification of findings (please specify): _______________    [   ] Clinically undetermined     Please document in your progress notes daily for the duration of treatment, until resolved, and include in your discharge summary.    Form No. [id number]

## 2022-10-20 NOTE — ASSESSMENT & PLAN NOTE
72 y.o. with recent admission for acute RLE ischemia on xarelto and NSAIDs and no previous history of GI bleed presents with multiple episodes of melena and acute anemia with Hgb 6.8   - HDS, with mild tachycardia and hypotension, improving   - Receiving 1 unit pRBCs tonight   - PT 13, INR 1.3  - Protonix 80mg IV bolus x 1 then Protonix 40mg IV BID   - Discontinued all NSAIDs, ASA, xarelto, and cilostazol   - IVFs; maintain access with 2 large bore IVs  - Keep NPO  - Follow H/H with serial CBC   - Transfuse pRBCs as needed for Hgb <7, type and screen ordered & blood consented   - Orthostatic vitals Q shift   - Anti-emetics as needed  - Correct any coagulopathy with platelets and FFP to a goal of platelets >50K and INR <2.0  - GI consulted

## 2022-10-20 NOTE — TRANSFER OF CARE
"Anesthesia Transfer of Care Note    Patient: Babatunde Singh    Procedure(s) Performed: Procedure(s) (LRB):  EGD (ESOPHAGOGASTRODUODENOSCOPY) (N/A)    Patient location: PACU    Anesthesia Type: general    Transport from OR: Transported from OR on room air with adequate spontaneous ventilation    Post pain: adequate analgesia    Post assessment: no apparent anesthetic complications    Post vital signs: stable    Level of consciousness: awake    Nausea/Vomiting: no nausea/vomiting    Complications: none    Transfer of care protocol was followed      Last vitals:   Visit Vitals  BP (!) 118/59 (BP Location: Left arm, Patient Position: Lying)   Pulse 89   Temp 36.7 °C (98.1 °F)   Resp 16   Ht 5' 5" (1.651 m)   Wt 70 kg (154 lb 5.2 oz)   SpO2 98%   BMI 25.68 kg/m²     "

## 2022-10-20 NOTE — ASSESSMENT & PLAN NOTE
- Dangers of cigarette smoking were reviewed in detail for ~11 minutes and pt was encouraged to quit.  - Nicotine replacement options discussed

## 2022-10-20 NOTE — H&P
[facility]  History & Physical    Patient Name: Babatunde Singh  MRN: [id number]  Patient Class: IP- Inpatient  Admission Date: 10/19/2022  Attending Physician: Holden Emanuel MD   Primary Care Provider: Esdras Ly MD         Patient information was obtained from patient, past medical records, and ER records.     Subjective:     Principal Problem:Gastrointestinal hemorrhage    Chief Complaint:   Chief Complaint   Patient presents with    abnormal labs     From [facility] [place]gb/hct        HPI: Pt is a 72 y.o. M with hx of DVT, HTN, chronic anemia, and severe PAD recently admitted for critical limb ischemia s/p axillary to femoral arterial bypass on 10/6 on xarelto and NSAIDs who presented to the ED from [facility] for a drop in hemoglobin to 6.8. Pt reports 1 episode of melena approximately 1 week ago. Since that time, he reports being significantly constipated, straining to attempt BM without success, with associated gas. Pt reports passing a large volume, hard, black and tarry stool in the ED following rectal exam and now feels relieved from constipation. Pt denies abdominal pain, BRBPR, hematemesis, N/V/D, fever, chills, chest pain, palpitations, SOB, cough, dysuria, fatigue, weakness, confusion, HA, syncope or recent sick contacts. He does endorse significant RLE pain, as he has not had any scheduled pain medication since leaving the SNF.       In the ED: mildly hypotensive to 110s/50s,  othwerwise VSSAF. Hgb 6.8, WBC 15. Rectal exam performed in the ED significant for melanotic stools, guaiac +. PT 13, INR 1.3. Na 135. Pt was given 80 mg IV Protonix and 1 unit pRBCs.      Past Medical History:   Diagnosis Date    Hypertension     Peripheral arterial disease        Past Surgical History:   Procedure Laterality Date    CREATION OF AXILLARY-FEMORAL ARTERY BYPASS WITH GRAFT N/A 10/6/2022    Procedure: CREATION, BYPASS, ARTERIAL, AXILLARY TO FEMORAL, USING  GRAFT;  Surgeon: LEI Ortiz III, MD;  Location: [facility] OR 2ND FLR;  Service: Peripheral Vascular;  Laterality: N/A;    CREATION OF FEMORAL-FEMORAL ARTERY BYPASS WITH GRAFT N/A 10/6/2022    Procedure: CREATION, BYPASS, ARTERIAL, FEMORAL TO FEMORAL, USING GRAFT;  Surgeon: LEI Ortiz III, MD;  Location: [facility] OR 2ND FLR;  Service: Peripheral Vascular;  Laterality: N/A;    EXCISION OF HYDROCELE Left 4/26/2022    Procedure: HYDROCELECTOMY;  Surgeon: Damion Roberts MD;  Location: [facility];  Service: Urology;  Laterality: Left;    KNEE SURGERY  1972       Review of patient's allergies indicates:  No Known Allergies    No current facility-administered medications on file prior to encounter.     Current Outpatient Medications on File Prior to Encounter   Medication Sig    acetaminophen (TYLENOL) 325 MG tablet Take 2 tablets (650 mg total) by mouth every 8 (eight) hours as needed.    aspirin 81 MG Chew Chew and swallow 1 tablet (81 mg total) by mouth once daily.    atorvastatin (LIPITOR) 80 MG tablet Take 1 tablet (80 mg total) by mouth once daily.    bacitracin 500 unit/gram ointment Apply topically 2 (two) times daily.    cefadroxil (DURICEF) 500 MG Cap Take 1 capsule (500 mg total) by mouth every 12 (twelve) hours.    cilostazoL (PLETAL) 100 MG Tab Take 1 tablet (100 mg total) by mouth 2 (two) times daily.    diclofenac sodium (VOLTAREN) 1 % Gel Apply 2 g topically 3 (three) times daily as needed (muscle spasm pain). Apply 2 grams to affected area 3 times daily as needed    ezetimibe (ZETIA) 10 mg tablet Take 1 tablet (10 mg total) by mouth once daily.    gabapentin (NEURONTIN) 300 MG capsule Take 3 capsules (900 mg total) by mouth 3 (three) times daily.    HYDROcodone-acetaminophen (NORCO) 5-325 mg per tablet Take 1 tablet by mouth every 4 (four) hours as needed for Pain.    LIDOcaine (LIDODERM) 5 % Apply to affected area as needed for pain for 12 hours, then off for 12 hours. Discard after each use.  May use 4%  lidocaine patch as alternative.    losartan-hydrochlorothiazide 50-12.5 mg (HYZAAR) 50-12.5 mg per tablet Take 1 tablet by mouth once daily.    multivitamin (THERAGRAN) per tablet Take 1 tablet by mouth once daily.    mupirocin (BACTROBAN) 2 % ointment Apply topically 2 (two) times daily.    naproxen (NAPROSYN) 500 MG tablet Take 1 tablet (500 mg total) by mouth 2 (two) times daily as needed (pain).    rivaroxaban (XARELTO DVT-PE TREAT 30D START) 15 mg (42)- 20 mg (9) tablet dose pack Take 1 tablet (15 mg) by mouth twice daily with food for 21 days followed by 1 tablet (20 mg) by mouth once daily with food    terbinafine HCl (LAMISIL ORAL) Take by mouth once daily at 6am.     Family History       Problem Relation (Age of Onset)    Hypertension Mother, Father          Tobacco Use    Smoking status: Former     Packs/day: 0.50     Years: 50.00     Pack years: 25.00     Types: Cigarettes    Smokeless tobacco: Never    Tobacco comments:     Cigarettes3-4 per day   Substance and Sexual Activity    Alcohol use: Never    Drug use: Never    Sexual activity: Yes     Partners: Female     Comment: wife     Review of Systems   Constitutional:  Negative for activity change, chills, fatigue and fever.   HENT:  Negative for congestion and trouble swallowing.    Eyes:  Negative for photophobia and visual disturbance.   Respiratory:  Negative for chest tightness, shortness of breath and wheezing.    Cardiovascular:  Positive for leg swelling (BLE). Negative for chest pain and palpitations.   Gastrointestinal:  Positive for blood in stool and constipation. Negative for abdominal distention, abdominal pain, diarrhea, nausea, rectal pain and vomiting.   Genitourinary:  Negative for dysuria, frequency, hematuria and urgency.   Musculoskeletal:  Positive for arthralgias and gait problem. Negative for back pain.   Skin:  Negative for color change and rash.   Neurological:  Negative for dizziness, syncope, weakness, light-headedness,  numbness and headaches.   Psychiatric/Behavioral:  Negative for agitation and confusion. The patient is not nervous/anxious.    Objective:     Vital Signs (Most Recent):  Temp: 97.7 °F (36.5 °C) (10/19/22 2313)  Pulse: 100 (10/19/22 2313)  Resp: 18 (10/19/22 2313)  BP: (!) 121/56 (10/19/22 2313)  SpO2: 97 % (10/19/22 2313)   Vital Signs (24h Range):  Temp:  [97.7 °F (36.5 °C)-98.3 °F (36.8 °C)] 97.7 °F (36.5 °C)  Pulse:  [] 100  Resp:  [17-18] 18  SpO2:  [97 %-100 %] 97 %  BP: (112-143)/(54-63) 121/56     Weight: 70 kg (154 lb 5.2 oz)  Body mass index is 25.68 kg/m².    Physical Exam  Vitals and nursing note reviewed.   Constitutional:       General: He is not in acute distress.     Appearance: He is well-developed.   HENT:      Head: Normocephalic and atraumatic.      Mouth/Throat:      Mouth: Mucous membranes are moist.   Eyes:      Pupils: Pupils are equal, round, and reactive to light.      Comments: Conjunctival pallor    Cardiovascular:      Rate and Rhythm: Regular rhythm. Tachycardia present.      Heart sounds: Normal heart sounds.   Pulmonary:      Effort: Pulmonary effort is normal. No respiratory distress.      Breath sounds: Normal breath sounds. No wheezing.   Abdominal:      General: Bowel sounds are normal. There is no distension.      Palpations: Abdomen is soft.      Tenderness: There is no abdominal tenderness.   Musculoskeletal:         General: No tenderness. Normal range of motion.      Cervical back: Normal range of motion and neck supple.      Right lower leg: Edema (2+ pitting) present.      Left lower leg: Edema (2+ pitting) present.   Skin:     General: Skin is warm and dry.      Capillary Refill: Capillary refill takes less than 2 seconds.      Findings: No rash.   Neurological:      Mental Status: He is alert and oriented to person, place, and time.      Cranial Nerves: No cranial nerve deficit.      Sensory: No sensory deficit.      Coordination: Coordination normal.   Psychiatric:          Behavior: Behavior normal.         Thought Content: Thought content normal.         Judgment: Judgment normal.         CRANIAL NERVES     CN III, IV, VI   Pupils are equal, round, and reactive to light.     Significant Labs: All pertinent labs within the past 24 hours have been reviewed.    CBC:   Recent Labs   Lab 10/19/22  1824 10/19/22  1833   WBC 15.02*  --    HGB 6.8*  --    HCT 22.8* 20*   *  --      CMP:   Recent Labs   Lab 10/19/22  1824   *   K 4.1      CO2 24   GLU 99   BUN 11   CREATININE 0.8   CALCIUM 8.7   PROT 5.3*   ALBUMIN 2.6*   BILITOT 0.2   ALKPHOS 68   AST 33   ALT 29   ANIONGAP 6*       Significant Imaging: I have reviewed all pertinent imaging results/findings within the past 24 hours.    X-Ray Chest AP Portable  Narrative: EXAMINATION:  XR CHEST AP PORTABLE    CLINICAL HISTORY:  SNF placement;    TECHNIQUE:  Single frontal view of the chest was performed.    COMPARISON:  10/18/2022    FINDINGS:  Improved inspiration, less prominence of lung base interstitial markings, questionable interstitial edema, pulmonary vascular congestion.  Heart normal size.  Surgical clips left axilla.  Impression: No active process.    Electronically signed by: Rodney Dillon MD  Date:    10/12/2022  Time:    12:00   Assessment/Plan:     * Gastrointestinal hemorrhage  72 y.o. with recent admission for acute RLE ischemia on xarelto and NSAIDs and no previous history of GI bleed presents with multiple episodes of melena and acute anemia with Hgb 6.8   - HDS, with mild tachycardia and hypotension, improving   - Receiving 1 unit pRBCs tonight   - PT 13, INR 1.3  - Protonix 80mg IV bolus x 1 then Protonix 40mg IV BID   - Discontinued all NSAIDs, ASA, xarelto, and cilostazol   - IVFs; maintain access with 2 large bore IVs  - Keep NPO  - Follow H/H with serial CBC   - Transfuse pRBCs as needed for Hgb <7, type and screen ordered & blood consented   - Orthostatic vitals Q shift   - Anti-emetics  as needed  - Correct any coagulopathy with platelets and FFP to a goal of platelets >50K and INR <2.0  - GI consulted    Anemia  - CBC reviewed-   Lab Results   Component Value Date    HGB 6.8 (L) 10/19/2022    HCT 20 (LL) 10/19/2022     - Patient's anemia is currently uncontrolled. 1 unit of PRBCs  - Etiology 2/2 GI bleed   - Follow H/H with serial CBC  - Type and screen ordered, blood consented - transfuse if Hgb <7, Hct <21, or patient is acutely symptomatic    Lower limb ischemia  Peripheral arterial disease  Claudication of the R lower extremity  - Pt is s/p left axillary to BILATERAL femoris profunda bypass with 8 mm PTFE on 10/6 by vascular surgery.  - Continue statin, zetia  - Holding ASA, cilostazol, xarelto in setting of acute GI bleed  - Wound care consulted for ulcer care    Bilateral lower extremity edema  - Pt reports chronic bilateral lower extremity edema, with acute worsening, 2+ pitting edema to BLE  - Likely 2/2 vascular disease  - CXR, BNP pending    Results for orders placed during the hospital encounter of 10/02/22  Echo  Interpretation Summary  · The left ventricle is normal in size with concentric remodeling and hyperdynamic systolic function. The estimated ejection fraction is >70%.  · Normal right ventricular size with normal right ventricular systolic function.  · Grade I left ventricular diastolic dysfunction.  · The estimated PA systolic pressure is 36 mmHg.  · Normal central venous pressure (3 mmHg).    Right leg pain  - 2/2 limb ischemia  - Pain management with home gabapentin, PRN norco    Mixed hyperlipidemia  - Chronic and stable  - Continue home atorvastatin and ezetimibe     Dependence on nicotine from cigarettes  - Dangers of cigarette smoking were reviewed in detail for ~11 minutes and pt was encouraged to quit.  - Nicotine replacement options discussed     Primary hypertension  - Controlled on admission   - Latest BP and vitals reviewed  - Home meds for HTN:   Hypertension  Medications               losartan-hydrochlorothiazide 50-12.5 mg (HYZAAR) 50-12.5 mg per tablet Take 1 tablet by mouth once daily.        - Hospital antihypertensives: Will hold all hospital antihypertensives in the setting of acute bleeding - restart when clinically appropriate   - Goal -140. Utilize p.r.n. antihypertensives only if patient's BP >180/110 & develop symptoms of worsening CP or SOB.    VTE Risk Mitigation (From admission, onward)           Ordered     Reason for No Pharmacological VTE Prophylaxis  Once        Question:  Reasons:  Answer:  Active Bleeding    10/19/22 2213     IP VTE HIGH RISK PATIENT  Once         10/19/22 2213     Place sequential compression device  Until discontinued         10/19/22 2213                       Kaye Tang PA-C  Department of Hospital Medicine   [facility] - Telemetry Stepdown

## 2022-10-20 NOTE — SUBJECTIVE & OBJECTIVE
Past Medical History:   Diagnosis Date    Hypertension     Peripheral arterial disease        Past Surgical History:   Procedure Laterality Date    CREATION OF AXILLARY-FEMORAL ARTERY BYPASS WITH GRAFT N/A 10/6/2022    Procedure: CREATION, BYPASS, ARTERIAL, AXILLARY TO FEMORAL, USING GRAFT;  Surgeon: LIE Ortiz III, MD;  Location: [facility] OR [address] [place] [address];  Service: Peripheral Vascular;  Laterality: N/A;    CREATION OF FEMORAL-FEMORAL ARTERY BYPASS WITH GRAFT N/A 10/6/2022    Procedure: CREATION, BYPASS, ARTERIAL, FEMORAL TO FEMORAL, USING GRAFT;  Surgeon: LEI Ortiz III, MD;  Location: [facility] OR [address] [place] [address];  Service: Peripheral Vascular;  Laterality: N/A;    EXCISION OF HYDROCELE Left 4/26/2022    Procedure: HYDROCELECTOMY;  Surgeon: Damion Roberts MD;  Location: [facility];  Service: Urology;  Laterality: Left;    KNEE SURGERY  1972       Review of patient's allergies indicates:  No Known Allergies    No current facility-administered medications on file prior to encounter.     Current Outpatient Medications on File Prior to Encounter   Medication Sig    acetaminophen (TYLENOL) 325 MG tablet Take 2 tablets (650 mg total) by mouth every 8 (eight) hours as needed.    aspirin 81 MG Chew Chew and swallow 1 tablet (81 mg total) by mouth once daily.    atorvastatin (LIPITOR) 80 MG tablet Take 1 tablet (80 mg total) by mouth once daily.    bacitracin 500 unit/gram ointment Apply topically 2 (two) times daily.    cefadroxil (DURICEF) 500 MG Cap Take 1 capsule (500 mg total) by mouth every 12 (twelve) hours.    cilostazoL (PLETAL) 100 MG Tab Take 1 tablet (100 mg total) by mouth 2 (two) times daily.    diclofenac sodium (VOLTAREN) 1 % Gel Apply 2 g topically 3 (three) times daily as needed (muscle spasm pain). Apply 2 grams to affected area 3 times daily as needed    ezetimibe (ZETIA) 10 mg tablet Take 1 tablet (10 mg total) by mouth once daily.    gabapentin (NEURONTIN) 300 MG capsule Take 3 capsules (900 mg total) by mouth 3  (three) times daily.    HYDROcodone-acetaminophen (NORCO) 5-325 mg per tablet Take 1 tablet by mouth every 4 (four) hours as needed for Pain.    LIDOcaine (LIDODERM) 5 % Apply to affected area as needed for pain for 12 hours, then off for 12 hours. Discard after each use.  May use 4% lidocaine patch as alternative.    losartan-hydrochlorothiazide 50-12.5 mg (HYZAAR) 50-12.5 mg per tablet Take 1 tablet by mouth once daily.    multivitamin (THERAGRAN) per tablet Take 1 tablet by mouth once daily.    mupirocin (BACTROBAN) 2 % ointment Apply topically 2 (two) times daily.    naproxen (NAPROSYN) 500 MG tablet Take 1 tablet (500 mg total) by mouth 2 (two) times daily as needed (pain).    rivaroxaban (XARELTO DVT-PE TREAT 30D START) 15 mg (42)- 20 mg (9) tablet dose pack Take 1 tablet (15 mg) by mouth twice daily with food for 21 days followed by 1 tablet (20 mg) by mouth once daily with food    terbinafine HCl (LAMISIL ORAL) Take by mouth once daily at 6am.     Family History       Problem Relation (Age of Onset)    Hypertension Mother, Father          Tobacco Use    Smoking status: Former     Packs/day: 0.50     Years: 50.00     Pack years: 25.00     Types: Cigarettes    Smokeless tobacco: Never    Tobacco comments:     Cigarettes3-4 per day   Substance and Sexual Activity    Alcohol use: Never    Drug use: Never    Sexual activity: Yes     Partners: Female     Comment: wife     Review of Systems   Constitutional:  Negative for activity change, chills, fatigue and fever.   HENT:  Negative for congestion and trouble swallowing.    Eyes:  Negative for photophobia and visual disturbance.   Respiratory:  Negative for chest tightness, shortness of breath and wheezing.    Cardiovascular:  Positive for leg swelling (BLE). Negative for chest pain and palpitations.   Gastrointestinal:  Positive for blood in stool and constipation. Negative for abdominal distention, abdominal pain, diarrhea, nausea, rectal pain and vomiting.    Genitourinary:  Negative for dysuria, frequency, hematuria and urgency.   Musculoskeletal:  Positive for arthralgias and gait problem. Negative for back pain.   Skin:  Negative for color change and rash.   Neurological:  Negative for dizziness, syncope, weakness, light-headedness, numbness and headaches.   Psychiatric/Behavioral:  Negative for agitation and confusion. The patient is not nervous/anxious.    Objective:     Vital Signs (Most Recent):  Temp: 97.7 °F (36.5 °C) (10/19/22 2313)  Pulse: 100 (10/19/22 2313)  Resp: 18 (10/19/22 2313)  BP: (!) 121/56 (10/19/22 2313)  SpO2: 97 % (10/19/22 2313)   Vital Signs (24h Range):  Temp:  [97.7 °F (36.5 °C)-98.3 °F (36.8 °C)] 97.7 °F (36.5 °C)  Pulse:  [] 100  Resp:  [17-18] 18  SpO2:  [97 %-100 %] 97 %  BP: (112-143)/(54-63) 121/56     Weight: 70 kg (154 lb 5.2 oz)  Body mass index is 25.68 kg/m².    Physical Exam  Vitals and nursing note reviewed.   Constitutional:       General: He is not in acute distress.     Appearance: He is well-developed.   HENT:      Head: Normocephalic and atraumatic.      Mouth/Throat:      Mouth: Mucous membranes are moist.   Eyes:      Pupils: Pupils are equal, round, and reactive to light.      Comments: Conjunctival pallor    Cardiovascular:      Rate and Rhythm: Regular rhythm. Tachycardia present.      Heart sounds: Normal heart sounds.   Pulmonary:      Effort: Pulmonary effort is normal. No respiratory distress.      Breath sounds: Normal breath sounds. No wheezing.   Abdominal:      General: Bowel sounds are normal. There is no distension.      Palpations: Abdomen is soft.      Tenderness: There is no abdominal tenderness.   Musculoskeletal:         General: No tenderness. Normal range of motion.      Cervical back: Normal range of motion and neck supple.      Right lower leg: Edema (2+ pitting) present.      Left lower leg: Edema (2+ pitting) present.   Skin:     General: Skin is warm and dry.      Capillary Refill:  Capillary refill takes less than 2 seconds.      Findings: No rash.   Neurological:      Mental Status: He is alert and oriented to person, place, and time.      Cranial Nerves: No cranial nerve deficit.      Sensory: No sensory deficit.      Coordination: Coordination normal.   Psychiatric:         Behavior: Behavior normal.         Thought Content: Thought content normal.         Judgment: Judgment normal.         CRANIAL NERVES     CN III, IV, VI   Pupils are equal, round, and reactive to light.     Significant Labs: All pertinent labs within the past 24 hours have been reviewed.    CBC:   Recent Labs   Lab 10/19/22  1824 10/19/22  1833   WBC 15.02*  --    HGB 6.8*  --    HCT 22.8* 20*   *  --      CMP:   Recent Labs   Lab 10/19/22  1824   *   K 4.1      CO2 24   GLU 99   BUN 11   CREATININE 0.8   CALCIUM 8.7   PROT 5.3*   ALBUMIN 2.6*   BILITOT 0.2   ALKPHOS 68   AST 33   ALT 29   ANIONGAP 6*       Significant Imaging: I have reviewed all pertinent imaging results/findings within the past 24 hours.    X-Ray Chest AP Portable  Narrative: EXAMINATION:  XR CHEST AP PORTABLE    CLINICAL HISTORY:  SNF placement;    TECHNIQUE:  Single frontal view of the chest was performed.    COMPARISON:  10/18/2022    FINDINGS:  Improved inspiration, less prominence of lung base interstitial markings, questionable interstitial edema, pulmonary vascular congestion.  Heart normal size.  Surgical clips left axilla.  Impression: No active process.    Electronically signed by: Rodney Dillon MD  Date:    10/12/2022  Time:    12:00

## 2022-10-20 NOTE — PHYSICIAN QUERY
PT Name: Babatunde Singh  MR #: [id number]     Documentation Clarification      CDS/: OBDULIA Pierson, RN, CCDS               Contact information: [email or web address]    This form is a permanent document in the medical record.     Query Date: October 20, 2022    By submitting this query, we are merely seeking further clarification of documentation. Please utilize your independent clinical judgment when addressing the question(s) below.    The Medical Record reflects the following:    Clinical Findings Location in Medical Records   On 10/7, pt became septic. CXR, UA and blood cx were ordered and he was started on empiric abx with zosyn and vanco.     He was given 3 L bolus of lactate ringer and started on maintenance fluid. UA was negative.   CXR ruled out pneumonia. Lactic acid is wnl. Blood cx is negative to date.     T: 99.6; P: 129; RR: 18    sepsis possibly 2/2 infected RLE ulcers  organ dysfunction indicated by Encephalopathy   Source- Possible infected leg wounds  UA negative    PN: Dr. Hicks 10/8   Lactic acid: 1.6    patient was first febrile on POD#1, 10/7 and again on 10/8. Leukocytosis present since 10/6. S  tarted on empiric vanc and zosyn on 10/7. BCx 10/7 is NGTD. CXR revealed b/l edema    Leukocytosis resolving. Afebrile. Both likely due to procedure. May have some superimposed cellulitis.    Tachycardia  Patient with ongoing tachycardia to the 110-130s this admission  No source of infection identified    CXR negative for PNA  Surgical site left chest wall was clean.  UA and UCX negative  Blood Cx 10/7: NGTD   No purulent discharge from ulcers or signs concerning for infection on exam Lab: 10/8    ID consult: Dr. Lopez 10/9      ID PN: Dr. Sanchez 10/10       PN: Dr. Elena 10/11                                                                                                    Provider, please clarify the diagnosis of ___sepsis______________:      [  x ] Diagnosis is confirmed and  additional clinical support/decision-making indicators for the diagnosis include (please specify):____fever, elevated WBC____________   [   ] Above stated diagnosis is not confirmed and/or it has been ruled out   [   ] Above stated diagnosis is not confirmed and/or it has been ruled out, other diagnosis ruled in (please specify):_______________   [   ] Other clarification (please specify): ___________________   [  ] Clinically undetermined

## 2022-10-20 NOTE — ASSESSMENT & PLAN NOTE
Peripheral arterial disease  Claudication of the R lower extremity  - Pt is s/p left axillary to BILATERAL femoris profunda bypass with 8 mm PTFE on 10/6 by vascular surgery.  - Continue statin, zetia  - Holding ASA, cilostazol, xarelto in setting of acute GI bleed  - Wound care consulted for ulcer care

## 2022-10-20 NOTE — CONSULTS
[facility]  Gastroenterology  Consult Note    Patient Name: Babatunde Singh  MRN: [id number]  Admission Date: 10/19/2022  Hospital Length of Stay: 1 days  Code Status: Full Code   Attending Provider: Abiodun Dougherty MD   Consulting Provider: Dilshad Ye MD  Primary Care Physician: Esdras Ly MD  Principal Problem:Gastrointestinal hemorrhage    Inpatient consult to Gastroenterology  Consult performed by: Dilshad Ye MD  Consult ordered by: Kaye Tang PA-C      Subjective:     HPI: Babatunde Singh is a 72 y.o. male with history of DVT, HTN, severe PAD s/p fem bypass 10/6 who presents from SNF for low Hgb of 6.8 and melena. Had episode of melena 1.5 weeks ago. No Bms since, reported constipation.  Passed large, hard, black and tarry stool in ED following ADITI and reported relief. Takes xarelto, cilostazol, and naproxen. Mildly tachycardic on arrival, normotensive. Hgb 6.8 > 5.9, currently receiving 1u pRBC. Prior Hgb 10 early this month. Denies abdominal pain, reports only pain at recent surgical site and chronic R leg pain.        Past Medical History:   Diagnosis Date    DVT (deep venous thrombosis)     Hypertension     Peripheral arterial disease        Past Surgical History:   Procedure Laterality Date    CREATION OF AXILLARY-FEMORAL ARTERY BYPASS WITH GRAFT N/A 10/6/2022    Procedure: CREATION, BYPASS, ARTERIAL, AXILLARY TO FEMORAL, USING GRAFT;  Surgeon: LEI Ortiz III, MD;  Location: [address];  Service: Peripheral Vascular;  Laterality: N/A;    CREATION OF FEMORAL-FEMORAL ARTERY BYPASS WITH GRAFT N/A 10/6/2022    Procedure: CREATION, BYPASS, ARTERIAL, FEMORAL TO FEMORAL, USING GRAFT;  Surgeon: LEI Ortiz III, MD;  Location: [address];  Service: Peripheral Vascular;  Laterality: N/A;    EXCISION OF HYDROCELE Left 4/26/2022    Procedure: HYDROCELECTOMY;  Surgeon: Damion Roberts MD;  Location: [facility];  Service: Urology;  Laterality: Left;    KNEE  SURGERY  1971       Family History   Problem Relation Age of Onset    Hypertension Mother     Hypertension Father        Social History     Socioeconomic History    Marital status:    Occupational History    Occupation: doorman [employer]   Tobacco Use    Smoking status: Former     Packs/day: 0.50     Years: 50.00     Pack years: 25.00     Types: Cigarettes    Smokeless tobacco: Never    Tobacco comments:     Cigarettes3-4 per day   Substance and Sexual Activity    Alcohol use: Never    Drug use: Never    Sexual activity: Yes     Partners: Female     Comment: wife       No current facility-administered medications on file prior to encounter.     Current Outpatient Medications on File Prior to Encounter   Medication Sig Dispense Refill    acetaminophen (TYLENOL) 325 MG tablet Take 2 tablets (650 mg total) by mouth every 8 (eight) hours as needed. 20 tablet 0    aspirin 81 MG Chew Chew and swallow 1 tablet (81 mg total) by mouth once daily. 30 tablet 0    atorvastatin (LIPITOR) 80 MG tablet Take 1 tablet (80 mg total) by mouth once daily. 30 tablet 2    bacitracin 500 unit/gram ointment Apply topically 2 (two) times daily. 28 g 0    cefadroxil (DURICEF) 500 MG Cap Take 1 capsule (500 mg total) by mouth every 12 (twelve) hours. 60 capsule 0    cilostazoL (PLETAL) 100 MG Tab Take 1 tablet (100 mg total) by mouth 2 (two) times daily. 180 tablet 3    diclofenac sodium (VOLTAREN) 1 % Gel Apply 2 g topically 3 (three) times daily as needed (muscle spasm pain). Apply 2 grams to affected area 3 times daily as needed 100 g 0    ezetimibe (ZETIA) 10 mg tablet Take 1 tablet (10 mg total) by mouth once daily. 90 tablet 3    gabapentin (NEURONTIN) 300 MG capsule Take 3 capsules (900 mg total) by mouth 3 (three) times daily. 540 capsule 3    HYDROcodone-acetaminophen (NORCO) 5-325 mg per tablet Take 1 tablet by mouth every 4 (four) hours as needed for Pain. 10 tablet 0    LIDOcaine (LIDODERM) 5 % Apply to affected area as needed  for pain for 12 hours, then off for 12 hours. Discard after each use.  May use 4% lidocaine patch as alternative. 30 patch 0    losartan-hydrochlorothiazide 50-12.5 mg (HYZAAR) 50-12.5 mg per tablet Take 1 tablet by mouth once daily. 30 tablet 2    multivitamin (THERAGRAN) per tablet Take 1 tablet by mouth once daily.      mupirocin (BACTROBAN) 2 % ointment Apply topically 2 (two) times daily. 30 g 1    naproxen (NAPROSYN) 500 MG tablet Take 1 tablet (500 mg total) by mouth 2 (two) times daily as needed (pain). 60 tablet 0    rivaroxaban (XARELTO DVT-PE TREAT 30D START) 15 mg (42)- 20 mg (9) tablet dose pack Take 1 tablet (15 mg) by mouth twice daily with food for 21 days followed by 1 tablet (20 mg) by mouth once daily with food 51 tablet 0    terbinafine HCl (LAMISIL ORAL) Take by mouth once daily at 6am.         Review of patient's allergies indicates:  No Known Allergies    Review of Systems   Constitutional:  Negative for chills and fever.   HENT:  Negative for congestion and sore throat.    Eyes:  Negative for blurred vision and double vision.   Respiratory:  Negative for cough and shortness of breath.    Cardiovascular:  Negative for chest pain and palpitations.   Gastrointestinal:         See HPI   Genitourinary:  Negative for frequency and urgency.   Musculoskeletal:  Negative for joint pain and myalgias.   Skin:  Negative for itching and rash.   Neurological:  Negative for sensory change and focal weakness.      Objective:     Vitals:    10/20/22 0435   BP: (!) 145/68   Pulse: 102   Resp: 18   Temp: 98 °F (36.7 °C)         Constitutional:  not in acute distress and well developed  HENT: Head: Normal, normocephalic, atraumatic.  Eyes: conjunctiva clear and sclera nonicteric  Cardiovascular: regular rate and rhythm and no murmur  Respiratory: normal chest expansion & respiratory effort   and no accessory muscle use  GI: soft, non-tender, without masses or organomegaly  Musculoskeletal: no muscular tenderness  noted  Skin: normal color  Neurological: alert, oriented x3  Psychiatric: mood and affect are within normal limits, pt is a good historian; no memory problems were noted      Significant Labs:  Recent Labs   Lab 10/19/22  1824 10/19/22  2326   HGB 6.8* 5.9*       Lab Results   Component Value Date    WBC 13.63 (H) 10/19/2022    HGB 5.9 (LL) 10/19/2022    HCT 20.2 (L) 10/19/2022    MCV 90 10/19/2022     (H) 10/19/2022       Lab Results   Component Value Date     (L) 10/19/2022    K 4.1 10/19/2022     10/19/2022    CO2 24 10/19/2022    BUN 11 10/19/2022    CREATININE 0.8 10/19/2022    CALCIUM 8.7 10/19/2022    ANIONGAP 6 (L) 10/19/2022    ESTGFRAFRICA >60.0 03/10/2022    EGFRNONAA >60.0 03/10/2022       Lab Results   Component Value Date    ALT 29 10/19/2022    AST 33 10/19/2022    ALKPHOS 68 10/19/2022    BILITOT 0.2 10/19/2022       Lab Results   Component Value Date    INR 1.3 (H) 10/19/2022    INR 1.4 (H) 10/02/2022    INR 1.2 09/15/2022       Significant Imaging:  Reviewed pertinent radiology findings.       Assessment/Plan:     Babatunde Singh is a 72 y.o. male with history of DVT, HTN, severe PAD s/p fem bypass 10/6 for anemia and melena. Reports constipation. Had witnessed firm melenic stool in ED. HDS. Requires xarelto for DVT, will proceed with EGD today.    Problem List:  Melena  Acute on chronic anemia  DVT (on anticoagulation)  PAD s/p bypass    Recommendations:  - Plan for EGD today 10/20  - Trend Hgb q8 hrs. Transfuse for Hgb <7, unless otherwise indicated  - Maintain IV access with 2 large bore Ivs  - Intravascular resuscitation/support with IVFs   - Keep NPO  - Hold all NSAIDs and anticoagulants, unless contraindicated  - Bolus IV pantoprazole 80mg followed by 40mg BID  - Please correct any coagulopathy with platelets and FFP for goal of platelets >50K and INR <2.0  - Please notify GI team if there is significant change in patient's clinical status        Thank you for involving us  in the care of Babatunde Singh. Please call with any additional questions, concerns or changes in the patient's clinical status. We will continue to follow.     Dilshad Ye MD  Gastroenterology Fellow PGY IV  [facility]

## 2022-10-20 NOTE — ASSESSMENT & PLAN NOTE
- CBC reviewed-   Lab Results   Component Value Date    HGB 6.8 (L) 10/19/2022    HCT 20 (LL) 10/19/2022     - Patient's anemia is currently uncontrolled. 1 unit of PRBCs  - Etiology 2/2 GI bleed   - Follow H/H with serial CBC  - Type and screen ordered, blood consented - transfuse if Hgb <7, Hct <21, or patient is acutely symptomatic

## 2022-10-20 NOTE — TREATMENT PLAN
GI Post-Procedure Treatment Plan    Impression:            - Normal esophagus.                          - Z-line regular.                          - Gastritis.                          - Normal examined duodenum.                          - No specimens collected.     Recommendation:        - Patient has a contact number available for                          emergencies. The signs and symptoms of potential                          delayed complications were discussed with the                          patient. Return to normal activities tomorrow.                          Written discharge instructions were provided to                          the patient.                          - Resume previous diet.                          - Continue present medications.                          - Return patient to hospital [facility] for ongoing care.                          - Return to GI clinic.                          - To visualize the small bowel, perform video                          capsule endoscopy to be discussed in GI clinic.                          - Continue anticoagulant medication at prior dose.                          Refer to managing physician for further adjustment                          of therapy.     Dilshad Ye  Gastroenterology Fellow, PGY-IV

## 2022-10-20 NOTE — ANESTHESIA POSTPROCEDURE EVALUATION
Anesthesia Post Evaluation    Patient: Babatunde Singh    Procedure(s) Performed: Procedure(s) (LRB):  EGD (ESOPHAGOGASTRODUODENOSCOPY) (N/A)    Final Anesthesia Type: general      Patient location during evaluation: PACU  Patient participation: Yes- Able to Participate  Level of consciousness: awake and alert  Post-procedure vital signs: reviewed and stable  Pain management: adequate  Airway patency: patent    PONV status at discharge: No PONV  Anesthetic complications: no      Cardiovascular status: blood pressure returned to baseline  Respiratory status: unassisted  Hydration status: euvolemic  Follow-up not needed.          Vitals Value Taken Time   /57 10/20/22 1207   Temp 36.4 °C (97.5 °F) 10/20/22 1144   Pulse 87 10/20/22 1213   Resp 16 10/20/22 1210   SpO2 100 % 10/20/22 1213   Vitals shown include unvalidated device data.      Event Time   Out of Recovery 11:55:00         Pain/Cassie Score: Pain Rating Prior to Med Admin: 8 (10/20/2022  8:07 AM)  Pain Rating Post Med Admin: 6 (10/20/2022  9:07 AM)  Cassie Score: 10 (10/20/2022 11:44 AM)

## 2022-10-20 NOTE — PLAN OF CARE
Problem: Adult Inpatient Plan of Care  Goal: Plan of Care Review  Outcome: Ongoing, Progressing  Goal: Patient-Specific Goal (Individualized)  Outcome: Ongoing, Progressing  Goal: Absence of Hospital-Acquired Illness or Injury  Outcome: Ongoing, Progressing  Goal: Optimal Comfort and Wellbeing  Outcome: Ongoing, Progressing  Goal: Readiness for Transition of Care  Outcome: Ongoing, Progressing     Problem: Adjustment to Illness (Sepsis/Septic Shock)  Goal: Optimal Coping  Outcome: Ongoing, Progressing     Problem: Bleeding (Sepsis/Septic Shock)  Goal: Absence of Bleeding  Outcome: Ongoing, Progressing     Problem: Glycemic Control Impaired (Sepsis/Septic Shock)  Goal: Blood Glucose Level Within Desired Range  Outcome: Ongoing, Progressing     Problem: Infection Progression (Sepsis/Septic Shock)  Goal: Absence of Infection Signs and Symptoms  Outcome: Ongoing, Progressing     Problem: Nutrition Impaired (Sepsis/Septic Shock)  Goal: Optimal Nutrition Intake  Outcome: Ongoing, Progressing     Problem: Impaired Wound Healing  Goal: Optimal Wound Healing  Outcome: Ongoing, Progressing     Problem: Adjustment to Illness (Gastrointestinal Bleeding)  Goal: Optimal Coping with Acute Illness  Outcome: Ongoing, Progressing     Problem: Bleeding (Gastrointestinal Bleeding)  Goal: Hemostasis  Outcome: Ongoing, Progressing     Problem: Fall Injury Risk  Goal: Absence of Fall and Fall-Related Injury  Outcome: Ongoing, Progressing

## 2022-10-20 NOTE — NURSING TRANSFER
Nursing Transfer Note      10/20/2022     Reason patient is being transferred: post procedure    Transfer To: [id number], pt returning, report called to bedside RN at the time of this note    Transfer via stretcher    Transported by PCT    Medicines sent: na    Any special needs or follow-up needed: routine    Chart send with patient: Yes    Notified: family    Patient reassessed at: 1145 on 10/20   Speech Language Pathology  Facility/Department: [facility] PROGRESSIVE CARE  Speech/Language/Cognitive Therapy     NAME: Inder Riggins  : 1940   MRN: [id number]  ADMISSION DATE: 10/25/2020  ADMITTING DIAGNOSIS: has Idiopathic peripheral neuropathy; Chronic bilateral low back pain with bilateral sciatica; Acute on chronic combined systolic (congestive) and diastolic (congestive) heart failure ([facility] [address] Elevated d-dimer; Chronic cystitis; Arthritis; Cerebrovascular accident [facility]); Frequent falls; Gait abnormality; History of cerebrovascular accident (CVA) in adulthood; Hypercholesterolemia; Essential hypertension; Polymyalgia ([facility] [address] Pyelonephritis; Spinal stenosis of lumbosacral region; Respiratory arrest ([facility] [address] Palliative care patient; and Atrial fibrillation [facility]) on their problem list.    Date of Treat: 2020   Evaluating Therapist: CHATO Neff    Assessment:  Re-assessed patient's speech/language/cognitive functioning. Patient exhibited decreased sustained attention, slow processing, frequently delayed auditory comprehension of even simple questions and commands, moderately delayed verbalizations, decreased orientation (patient unable to verbalize address or phone number for safety purposes), impaired short-term memory with less than 5 minute delay, and delayed verbal reasoning/problem solving. It is noted that this date, patient verbalized PCP and pharmacy at independent level. Patient did not verbalize conditions in which she takes medications nor verbalized names of medications she currently takes independently. Patient did not verbalize medication schedule at independent level. Patient did verbalize appropriate, simple solutions to situations that could occur during activities of daily living independently (I.e. cuts, burns, fall, fever, nausea, reflux).  Patient did not verbalize 911; patient stated 411.     Subjective:  Chart Reviewed: Yes  Patient assessed for rehabilitation services?: Yes  Family / Caregiver Present: No     Objective: Auditory Comprehension  Comprehension:  (While delayed, patient demonstrated ability to answer simple yes/no questions regarding immediate environment and current state of being at independent level. While delayed, patient demonstrated ability to follow simple 1 step commands independently. Delays were noted and mild break down was observed during yes/no questions of increased complexity at independent level. While delayed, patient demonstrated ability to follow complex 1 and simple 2 step commands independently.)     Expression  Primary Mode of Expression:  (Confrontation naming of items in room was considered to be delayed. Structured responsive speech and responses in natural conversation were considered to be moderately delayed but appropriate.)     Overall Orientation Status:  (Patient demonstrated ability to verbalize name, birthday, age, city, state, hospital, and year at independent level. Patient did not verbalize address, phone number, month, JOANIE, or date independently. )     Attention:  (Patient demonstrated decreased sustained attention during treatment session. No adverse behaviors were noted with provision of redirections.)     Memory:  (Patient demonstrated appropriate immediate memory with sequences of unrelated numbers/words set up to 5 items without repetition provided. Patient was 1/3 short-term/working memory with less than 5 minute delay+distractions present at independent level. Patient demonstrated decreased working memory with sequences of unrelated numbers/words set up to 4 items. )     Problem Solving:  (It is noted that this date, patient verbalized PCP and pharmacy at independent level. Patient did not verbalize conditions in which she takes medications nor verbalized names of medications she currently takes independently. Patient did not verbalize medication schedule at independent level.  Patient did verbalize appropriate, simple solutions to situations that could occur during activities of daily living independently (I.e. cuts, burns, fall, fever, nausea, reflux).  Patient did not verbalize 911; patient stated 56.)    Electronically signed by CHATO Neff on 11/5/2020 at 9:53 AM

## 2022-10-20 NOTE — PHYSICIAN QUERY
PT Name: Babatunde Singh  MR #: [id number]     DOCUMENTATION CLARIFICATION      CDS/: OBDULIA Pierson, RN, CCDS               Contact information: [email or web address]    This form is a permanent document in the medical record.     Query Date: October 20, 2022    Dear Provider,  By submitting this query, we are merely seeking further clarification of documentation.  Please utilize your independent clinical judgment when addressing the question(s) below.     The Medical Record contains the following:    Supporting Clinical Findings Location in Medical Record   underwent the morning of 10/6/2022 a axillo bifemoral bypass. The procedure was performed without complication and  was transferred to the floor for further post op care and monitoring.     Their postoperative course was remarkable for some disorientation in the first 2 days and tachycardia with febrile episodes.     Work up for infection was negative and patient has not had another febrile episode since    Encephalopathy, metabolic   DCS: Dr. Ortiz 10/12   On 10/7, pt became septic. CXR, UA and blood cx were ordered and he was started on empiric abx with zosyn and vanco.     He was given 3 L bolus of lactate ringer and started on maintenance fluid. UA was negative. CXR ruled out pneumonia. Lactic acid is wnl. Blood cx is negative to date.     Encephalopathy, metabolic  Likely multifactorial related to infection, plus the anesthesia pt received during his surgical procedure.  Improved while being on abx. AAOx4 on 10/9. Responds appropriately and coherently on exam    PN: Dr. Elena 10/12   He was sleepy, drowsy when awaken and incoherent.   Second time was at 3:30 pm. He was AAO*3    Patient remains confused having conversations with himself and also appears to have tactile hallucinations     PN: Dr. Hicks 10/8      Plan of care: SUNNY Hughes RN 10/8   Confused, doesn't answer much questions, has incoherent speech    Encephalopathy, metabolic  Pt  has been confused post operatively. Suspect 2/2 the anesthesia he received.   Will rule out infection. Will order UA. Pt is sating 98% on RA with RR of 16, so will hold off ordering CXR.      PN: Dr. Hicks 10/7     Please clarify if the ____metabolic encepahlopathy_______________________ diagnosis has been:    [  x] Ruled In   [  ] Ruled In, Now Resolved   [  ] Ruled Out   [  ] Still to be ruled out at the time of discharge   [  ] Other/Clarification of findings (please specify): _______________    [   ] Clinically undetermined       Please document in your progress notes daily for the duration of treatment, until resolved, and include in your discharge summary.    Form No. [id number]

## 2022-10-20 NOTE — PLAN OF CARE
[facility] - Telemetry Stepdown  Initial Discharge Assessment       Primary Care Provider: Esdras Ly MD    Admission Diagnosis: Anemia [D64.9]  Gastrointestinal hemorrhage, unspecified gastrointestinal hemorrhage type [K92.2]    Admission Date: 10/19/2022  Expected Discharge Date: 10/24/2022    Discharge Barriers Identified: None    Payor: [employer] / Plan: [employer] 65 / Product Type: Medicare Advantage /     Extended Emergency Contact Information  Primary Emergency Contact: Cindi Matos  Mobile Phone: [phone]  Relation: Daughter  Preferred language: English   needed? No    Discharge Plan A: Skilled Nursing Facility  Discharge Plan B: [id number] - [place], [place] - [address] [place] [id number]  Phone: [phone] Fax: [phone]      Initial Assessment (most recent)       Adult Discharge Assessment - 10/20/22 1432          Discharge Assessment    Assessment Type Discharge Planning Assessment     Confirmed/corrected address, phone number and insurance Yes     Confirmed Demographics Correct on Facesheet     Source of Information patient     Communicated LAZ with patient/caregiver Yes     Reason For Admission Anemia     Lives With spouse     Do you expect to return to your current living situation? Yes     Do you have help at home or someone to help you manage your care at home? Yes     Who are your caregiver(s) and their phone number(s)? Cindi Matos (daughter) [phone]     Prior to hospitilization cognitive status: Alert/Oriented     Current cognitive status: Alert/Oriented     Walking or Climbing Stairs Difficulty ambulation difficulty, requires equipment     Dressing/Bathing Difficulty none     Equipment Currently Used at Home cane, straight;walker, rolling     Readmission within 30 days? Yes   Recently discharged from [facility] on 10/12.    Patient currently being followed by  outpatient case management? No     Do you currently have service(s) that help you manage your care at home? No     Do you take prescription medications? Yes     Do you have prescription coverage? Yes     Do you have any problems affording any of your prescribed medications? No     Is the patient taking medications as prescribed? yes     Who is going to help you get home at discharge? Patient's family will provide transportation home.     How do you get to doctors appointments? family or friend will provide     Are you on dialysis? No     Do you take coumadin? No     Discharge Plan A Skilled Nursing Facility     Discharge Plan B Home Health     DME Needed Upon Discharge  none     Discharge Plan discussed with: Patient     Discharge Barriers Identified None                   OlgaL idia Pizano RN  Ext [phone]

## 2022-10-20 NOTE — PROVATION PATIENT INSTRUCTIONS
Discharge Summary/Instructions after an Endoscopic Procedure  Patient Name: Babatunde Singh  Patient MRN: [id number]  Patient YOB: 1950  Thursday, October 20, 2022  Sixto Chicas MD  Dear patient,  As a result of recent federal legislation (The Federal Cures Act), you may   receive lab or pathology results from your procedure in your [facility]   account before your physician is able to contact you. Your physician or   their representative will relay the results to you with their   recommendations at their soonest availability.  Thank you,  RESTRICTIONS:  During your procedure today, you received medications for sedation.  These   medications may affect your judgment, balance and coordination.  Therefore,   for 24 hours, you have the following restrictions:   - DO NOT drive a car, operate machinery, make legal/financial decisions,   sign important papers or drink alcohol.    ACTIVITY:  Today: no heavy lifting, straining or running due to procedural   sedation/anesthesia.  The following day: return to full activity including work.  DIET:  Eat and drink normally unless instructed otherwise.     TREATMENT FOR COMMON SIDE EFFECTS:  - Mild abdominal pain, nausea, belching, bloating or excessive gas:  rest,   eat lightly and use a heating pad.  - Sore Throat: treat with throat lozenges and/or gargle with warm salt   water.  - Because air was used during the procedure, expelling large amounts of air   from your rectum or belching is normal.  - If a bowel prep was taken, you may not have a bowel movement for 1-3 days.    This is normal.  SYMPTOMS TO WATCH FOR AND REPORT TO YOUR PHYSICIAN:  1. Abdominal pain or bloating, other than gas cramps.  2. Chest pain.  3. Back pain.  4. Signs of infection such as: chills or fever occurring within 24 hours   after the procedure.  5. Rectal bleeding, which would show as bright red, maroon, or black stools.   (A tablespoon of blood from the rectum is not serious, especially if    hemorrhoids are present.)  6. Vomiting.  7. Weakness or dizziness.  GO DIRECTLY TO THE NEAREST EMERGENCY ROOM IF YOU HAVE ANY OF THE FOLLOWING:      Difficulty breathing              Chills and/or fever over 101 F   Persistent vomiting and/or vomiting blood   Severe abdominal pain   Severe chest pain   Black, tarry stools   Bleeding- more than one tablespoon   Any other symptom or condition that you feel may need urgent attention  Your doctor recommends these additional instructions:  If any biopsies were taken, your doctors clinic will contact you in 1 to 2   weeks with any results.  - Patient has a contact number available for emergencies.  The signs and   symptoms of potential delayed complications were discussed with the   patient.  Return to normal activities tomorrow.  Written discharge   instructions were provided to the patient.   - Resume previous diet.   - Continue present medications.   - Return patient to hospital [facility] for ongoing care.   - Return to GI clinic.   - To visualize the small bowel, perform video capsule endoscopy to be   discussed in GI clinic.   - Continue anticoagulant medication at prior dose.  Refer to managing   physician for further adjustment of therapy.   For questions, problems or results please call your physician - Sixto Chicas MD at Work:  [phone].  [facility], EMERGENCY ROOM PHONE NUMBER: [phone]  IF A COMPLICATION OR EMERGENCY SITUATION ARISES AND YOU ARE UNABLE TO REACH   YOUR PHYSICIAN - GO DIRECTLY TO THE EMERGENCY ROOM.  Sixto Chicas MD  10/20/2022 11:45:09 AM  This report has been verified and signed electronically.  Dear patient,  As a result of recent federal legislation (The Federal Cures Act), you may   receive lab or pathology results from your procedure in your [facility]   account before your physician is able to contact you. Your physician or   their representative will relay the results to you with their   recommendations at their soonest  availability.  Thank you,  PROVATION

## 2022-10-20 NOTE — ASSESSMENT & PLAN NOTE
- Pt reports chronic bilateral lower extremity edema, with acute worsening, 2+ pitting edema to BLE  - Likely 2/2 vascular disease  - CXR, BNP pending    Results for orders placed during the hospital encounter of 10/02/22  Echo  Interpretation Summary  · The left ventricle is normal in size with concentric remodeling and hyperdynamic systolic function. The estimated ejection fraction is >70%.  · Normal right ventricular size with normal right ventricular systolic function.  · Grade I left ventricular diastolic dysfunction.  · The estimated PA systolic pressure is 36 mmHg.  · Normal central venous pressure (3 mmHg).

## 2022-10-21 ENCOUNTER — TELEPHONE (OUTPATIENT)
Dept: ENDOSCOPY | Facility: HOSPITAL | Age: 72
End: 2022-10-21
Payer: MEDICARE

## 2022-10-21 VITALS
HEART RATE: 95 BPM | SYSTOLIC BLOOD PRESSURE: 133 MMHG | DIASTOLIC BLOOD PRESSURE: 60 MMHG | TEMPERATURE: 98 F | RESPIRATION RATE: 18 BRPM | WEIGHT: 154.31 LBS | OXYGEN SATURATION: 97 % | BODY MASS INDEX: 25.71 KG/M2 | HEIGHT: 65 IN

## 2022-10-21 LAB
ANION GAP SERPL CALC-SCNC: 6 MMOL/L (ref 8–16)
BASOPHILS # BLD AUTO: 0.08 K/UL (ref 0–0.2)
BASOPHILS NFR BLD: 0.8 % (ref 0–1.9)
BUN SERPL-MCNC: 10 MG/DL (ref 8–23)
CALCIUM SERPL-MCNC: 8.1 MG/DL (ref 8.7–10.5)
CHLORIDE SERPL-SCNC: 110 MMOL/L (ref 95–110)
CO2 SERPL-SCNC: 23 MMOL/L (ref 23–29)
CREAT SERPL-MCNC: 0.6 MG/DL (ref 0.5–1.4)
DIFFERENTIAL METHOD: ABNORMAL
EOSINOPHIL # BLD AUTO: 0.2 K/UL (ref 0–0.5)
EOSINOPHIL NFR BLD: 1.5 % (ref 0–8)
ERYTHROCYTE [DISTWIDTH] IN BLOOD BY AUTOMATED COUNT: 19.4 % (ref 11.5–14.5)
EST. GFR  (NO RACE VARIABLE): >60 ML/MIN/1.73 M^2
GLUCOSE SERPL-MCNC: 89 MG/DL (ref 70–110)
HCT VFR BLD AUTO: 25.1 % (ref 40–54)
HGB BLD-MCNC: 8.1 G/DL (ref 14–18)
IMM GRANULOCYTES # BLD AUTO: 0.08 K/UL (ref 0–0.04)
IMM GRANULOCYTES NFR BLD AUTO: 0.8 % (ref 0–0.5)
LYMPHOCYTES # BLD AUTO: 3.3 K/UL (ref 1–4.8)
LYMPHOCYTES NFR BLD: 31.8 % (ref 18–48)
MCH RBC QN AUTO: 28.9 PG (ref 27–31)
MCHC RBC AUTO-ENTMCNC: 32.3 G/DL (ref 32–36)
MCV RBC AUTO: 90 FL (ref 82–98)
MONOCYTES # BLD AUTO: 1.1 K/UL (ref 0.3–1)
MONOCYTES NFR BLD: 10.4 % (ref 4–15)
NEUTROPHILS # BLD AUTO: 5.7 K/UL (ref 1.8–7.7)
NEUTROPHILS NFR BLD: 54.7 % (ref 38–73)
NRBC BLD-RTO: 0 /100 WBC
PLATELET # BLD AUTO: 332 K/UL (ref 150–450)
PMV BLD AUTO: 9.4 FL (ref 9.2–12.9)
POTASSIUM SERPL-SCNC: 4.1 MMOL/L (ref 3.5–5.1)
RBC # BLD AUTO: 2.8 M/UL (ref 4.6–6.2)
SODIUM SERPL-SCNC: 139 MMOL/L (ref 136–145)
WBC # BLD AUTO: 10.49 K/UL (ref 3.9–12.7)

## 2022-10-21 PROCEDURE — 97535 SELF CARE MNGMENT TRAINING: CPT

## 2022-10-21 PROCEDURE — 99239 PR HOSPITAL DISCHARGE DAY,>30 MIN: ICD-10-PCS | Mod: ,,, | Performed by: INTERNAL MEDICINE

## 2022-10-21 PROCEDURE — 97165 OT EVAL LOW COMPLEX 30 MIN: CPT

## 2022-10-21 PROCEDURE — 25000003 PHARM REV CODE 250: Performed by: INTERNAL MEDICINE

## 2022-10-21 PROCEDURE — 36415 COLL VENOUS BLD VENIPUNCTURE: CPT

## 2022-10-21 PROCEDURE — 80048 BASIC METABOLIC PNL TOTAL CA: CPT

## 2022-10-21 PROCEDURE — 99239 HOSP IP/OBS DSCHRG MGMT >30: CPT | Mod: ,,, | Performed by: INTERNAL MEDICINE

## 2022-10-21 PROCEDURE — 97161 PT EVAL LOW COMPLEX 20 MIN: CPT

## 2022-10-21 PROCEDURE — 97116 GAIT TRAINING THERAPY: CPT

## 2022-10-21 PROCEDURE — 85025 COMPLETE CBC W/AUTO DIFF WBC: CPT

## 2022-10-21 PROCEDURE — 25000003 PHARM REV CODE 250

## 2022-10-21 RX ORDER — PANTOPRAZOLE SODIUM 40 MG/1
40 TABLET, DELAYED RELEASE ORAL DAILY
Qty: 30 TABLET | Refills: 11 | Status: ON HOLD
Start: 2022-10-21 | End: 2022-10-31 | Stop reason: SDUPTHER

## 2022-10-21 RX ADMIN — PANTOPRAZOLE SODIUM 40 MG: 40 TABLET, DELAYED RELEASE ORAL at 03:10

## 2022-10-21 RX ADMIN — PANTOPRAZOLE SODIUM 40 MG: 40 TABLET, DELAYED RELEASE ORAL at 06:10

## 2022-10-21 RX ADMIN — EZETIMIBE 10 MG: 10 TABLET ORAL at 08:10

## 2022-10-21 RX ADMIN — CEFADROXIL 500 MG: 500 CAPSULE ORAL at 08:10

## 2022-10-21 RX ADMIN — ASPIRIN 81 MG: 81 TABLET, CHEWABLE ORAL at 08:10

## 2022-10-21 RX ADMIN — GABAPENTIN 900 MG: 300 CAPSULE ORAL at 08:10

## 2022-10-21 RX ADMIN — GABAPENTIN 900 MG: 300 CAPSULE ORAL at 03:10

## 2022-10-21 RX ADMIN — ATORVASTATIN CALCIUM 80 MG: 20 TABLET, FILM COATED ORAL at 08:10

## 2022-10-21 RX ADMIN — CILOSTAZOL 100 MG: 100 TABLET ORAL at 08:10

## 2022-10-21 NOTE — PLAN OF CARE
Problem: Adult Inpatient Plan of Care  Goal: Plan of Care Review  Outcome: Ongoing, Progressing     Problem: Adult Inpatient Plan of Care  Goal: Optimal Comfort and Wellbeing  Outcome: Ongoing, Progressing     Problem: Adult Inpatient Plan of Care  Goal: Readiness for Transition of Care  Outcome: Ongoing, Progressing     Problem: Adjustment to Illness (Sepsis/Septic Shock)  Goal: Optimal Coping  Outcome: Ongoing, Progressing     Problem: Bleeding (Sepsis/Septic Shock)  Goal: Absence of Bleeding  Outcome: Ongoing, Progressing     Problem: Impaired Wound Healing  Goal: Optimal Wound Healing  Outcome: Ongoing, Progressing   POC and meds reviewed and updated.Will continue to moniotor.

## 2022-10-21 NOTE — PLAN OF CARE
10/21/22 1509   Post-Acute Status   Post-Acute Authorization Placement   Post-Acute Placement Status Set-up Complete/Auth obtained   Per Boby at [facility], nurse can call report to [phone] and SW can arrange transportation. Updated RN and MD.    REYES arranged wheelchair transport via [facility]. Requested  time is 3:45 PM. Requested  time does not guarantee arrival time.    July Garrison, MSW, LCSW  [facility]  Ext. [phone]

## 2022-10-21 NOTE — ASSESSMENT & PLAN NOTE
- CBC reviewed-   Lab Results   Component Value Date    HGB 9.1 (L) 10/20/2022    HCT 28.1 (L) 10/20/2022     - Patient's anemia is currently uncontrolled. 1 unit of PRBCs  - Etiology 2/2 GI bleed   - Follow H/H with serial CBC  - Type and screen ordered, blood consented - transfuse if Hgb <7, Hct <21, or patient is acutely symptomatic

## 2022-10-21 NOTE — NURSING
Patient is discharged to [facility].Iv access has been removed,called report to nurse receiving patient and answered all questions.Van to  patient at 1645PM.

## 2022-10-21 NOTE — PLAN OF CARE
Problem: Physical Therapy  Goal: Physical Therapy Goal  Description: Goals to be met by: 10/31/2022     Patient will increase functional independence with mobility by performin. Supine to sit with [place]  2. Sit to supine with [place]  3. Sit to stand transfer with [place]  4. Gait  x 250 feet with Modified [place] using LRAD.   5. Ascend/descend 5 stairs with bilateral Handrails Modified [place] using no AD.   6. Lower extremity exercise program x15 reps per handout, with independence    Outcome: Ongoing, Progressing   Goals established following evaluation.    Kaye Pina, SPT  10/21/2022

## 2022-10-21 NOTE — TELEPHONE ENCOUNTER
----- Message from Dilshad Ye MD sent at 10/21/2022  7:34 AM CDT -----  Regarding: Clinic Follow-up  Keena Gonzalez,    Can we get clinic follow-up in a month to discuss a pill cam for Mr. Singh?    Thanks,  Dilshad

## 2022-10-21 NOTE — PT/OT/SLP EVAL
Occupational Therapy   Evaluation/tx    Name: Babatunde Singh  MRN: [id number]  Admitting Diagnosis:  Gastrointestinal hemorrhage  Recent Surgery: Procedure(s) (LRB):  EGD (ESOPHAGOGASTRODUODENOSCOPY) (N/A) 1 Day Post-Op    Recommendations:     Discharge Recommendations: nursing facility, skilled [facility]  Discharge Equipment Recommendations:  none  Barriers to discharge:  Decreased caregiver support (pt. assists his spouse)    Assessment:     Babatunde Singh is a 72 y.o. male with a medical diagnosis of Gastrointestinal hemorrhage.  He presents with SBA level for ambulation within room with SC and  higher level ADL tasks involving standing. Pt. Also continues to have pain in RLE as well as edema noted in BLE.  Pt. Very motivated, pleasant and cooperative.  Pt. Provides care for his spouse and is recommended to return to SNF unit on d/c to further maximize safety and I with ADL tasks.  Performance deficits affecting function: weakness, impaired endurance, impaired functional mobility, gait instability, impaired self care skills, decreased lower extremity function, pain.      Rehab Prognosis: Good; patient would benefit from acute skilled OT services to address these deficits and reach maximum level of function.       Plan:     Patient to be seen 3 x/week to address the above listed problems via self-care/home management, therapeutic activities, therapeutic exercises  Plan of Care Expires: 11/20/22  Plan of Care Reviewed with: patient    Subjective     Chief Complaint: No specific complaints other than chronic pain in RLE and edema in BLE  Patient/Family Comments/goals: Pt. Anxious to return back home     Occupational Profile:  Living Environment: Pt. Resides in double with his spouse that has 4 steps to enter, WIS and seat as well as grab bar. Pt. Did report that he does have family support with spouse as daughter lives on other side of double as well as his adult grandchildren. 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 Report but they do work    Pain/Comfort:  Pain Rating 1:  (did not rate but reported chronic)  Location - Side 1: Right  Location 1: leg  Pain Addressed 1: Reposition, Distraction  Pain Rating Post-Intervention 1:  (no increases in pain noted)    Patients cultural, spiritual, Pentecostalism conflicts given the current situation: no    Objective:     Communicated with: nurse prior to session.  Patient found supine with telemetry upon OT entry to room.    General Precautions: Standard, fall   Orthopedic Precautions:N/A   Braces: N/A  Respiratory Status: Room air    Occupational Performance:    Bed Mobility:    Patient completed Supine to Sit with independence    Functional Mobility/Transfers:  Patient completed Sit <> Stand Transfer with stand by assistance  with  straight cane   Patient completed Toilet Transfer Stand Pivot technique with stand by assistance with  straight cane  Functional Mobility: Pt. Ambulated to and from the bathroom with SC and SBA    Activities of Daily Living:  Grooming: stand by assistance in stand at sink to wash hands  Lower Body Dressing: pt. Educated on technique and appears to be able to don/doff socks with great effort. Pt. May benefit from AE  Toileting: supervision with assist of grab bar    Cognitive/Visual Perceptual:  Cognitive/Psychosocial Skills:     -       Oriented to: Person, Place, Time, and Situation   -       Follows Commands/attention:Follows multistep  commands  -       Communication: clear/fluent  -       Memory: No Deficits noted  -       Safety awareness/insight to disability: intact   -       Mood/Affect/Coping skills/emotional control: Appropriate to situation  Visual/Perceptual:      -Intact  .    Physical Exam:  Balance: -       sit: good; stand SBA  Skin integrity: Visible skin intact  Edema:  Mild in BLE  Dominant hand: -       right  Upper Extremity Range of Motion:     -       Right Upper Extremity: WNL  -       Left Upper Extremity: WNL   Strength:    -       Right Upper Extremity: WNL  -       Left Upper Extremity: WNL    AMPAC 6 Click ADL:  AMPA Total Score: 21    Treatment & Education:  Pt. Educated on role of OT and POC  Pt. Educated on safety with ADL tasks and techniques and equipment to assist with LBD      Patient left up in chair with all lines intact, call button in reach, and bed alarm on    GOALS:   Multidisciplinary Problems       Occupational Therapy Goals          Problem: Occupational Therapy    Goal Priority Disciplines Outcome Interventions   Occupational Therapy Goal     OT, PT/OT Ongoing, Progressing    Description: Goals to be met by: 10-28-22     Patient will increase functional independence with ADLs by performing:    LE Dressing with Modified [place].  Grooming while standing at sink with Modified [place].  Toileting from toilet with Modified [place] for hygiene and clothing management.   Stand pivot transfers with Modified [place] with RW  Toilet transfer to bedside commode with Modified [place].  Pt. To be I with HEP to improve level of endurance                         History:     Past Medical History:   Diagnosis Date    DVT (deep venous thrombosis)     Hypertension     Peripheral arterial disease          Past Surgical History:   Procedure Laterality Date    CREATION OF AXILLARY-FEMORAL ARTERY BYPASS WITH GRAFT N/A 10/6/2022    Procedure: CREATION, BYPASS, ARTERIAL, AXILLARY TO FEMORAL, USING GRAFT;  Surgeon: LEI Ortiz III, MD;  Location: [facility] OR [address] [place] [address];  Service: Peripheral Vascular;  Laterality: N/A;    CREATION OF FEMORAL-FEMORAL ARTERY BYPASS WITH GRAFT N/A 10/6/2022    Procedure: CREATION, BYPASS, ARTERIAL, FEMORAL TO FEMORAL,  USING GRAFT;  Surgeon: LEI Ortiz III, MD;  Location: [facility] OR 2ND FLR;  Service: Peripheral Vascular;  Laterality: N/A;    ESOPHAGOGASTRODUODENOSCOPY N/A 10/20/2022    Procedure: EGD (ESOPHAGOGASTRODUODENOSCOPY);  Surgeon: Sixto Chicas MD;  Location: [facility] ENDO (2ND FLR);  Service: Endoscopy;  Laterality: N/A;    EXCISION OF HYDROCELE Left 4/26/2022    Procedure: HYDROCELECTOMY;  Surgeon: Damion Roberts MD;  Location: [facility];  Service: Urology;  Laterality: Left;    KNEE SURGERY  1972       Time Tracking:     OT Date of Treatment: 10/21/22  OT Start Time: 0855  OT Stop Time: 0915  OT Total Time (min): 20 min    Billable Minutes:Evaluation 10  Self Care/Home Management 10    10/21/2022

## 2022-10-21 NOTE — PLAN OF CARE
[facility] - Telemetry Stepdown  Discharge Final Note    Primary Care Provider: Esdras Ly MD    Expected Discharge Date: 10/21/2022      Future Appointments   Date Time Provider Department Center   10/25/2022  2:30 PM Moises Cuba MD [facility] UROLOGC [facility]   11/30/2022  8:00 AM [facility]IC-US1 MASTER [facility]TR [facility]   11/30/2022  9:15 AM [facility]XRAY [facility]   11/30/2022 10:15 AM [facility]   11/30/2022 10:30 AM [facility]IC EOS [facility]   11/30/2022 11:30 AM Karyna Fontenot NP [facility] NEUROS8 [facility]   12/1/2022  2:20 PM Dilshad Ye MD [facility] GASTRO [facility]   12/13/2022  8:00 AM Moises Cuba MD [facility] UROLOGC [facility]   12/16/2022 10:30 AM Cr Wallace MD PhD [facility] PERVASC [facility]          Final Discharge Note (most recent)       Final Note - 10/21/22 1525          Final Note    Assessment Type Final Discharge Note     Anticipated Discharge Disposition Skilled Nursing Facility     What phone number can be called within the next 1-3 days to see how you are doing after discharge? [phone]     Hospital Resources/Appts/Education Provided Appointments scheduled and added to AVS   Ambulatory referral sent to GI.       Post-Acute Status    Post-Acute Authorization Placement     Post-Acute Placement Status Set-up Complete/Auth obtained   Marilyn Walter [facility]                    Important Message from Medicare             Contact Info       MARILYN WALTER    [address] [facility] [address]   Phone: [phone]       Next Steps: Follow up          Olga Lidia Pizano, ESMER  Ext [phone]

## 2022-10-21 NOTE — PLAN OF CARE
Per Boby at [facility], will need orders prior to 2:30 PM today if patient is ready for dc. Does not need a new covid test since patient is within their policy time frame.    Spoke with N, patient does not need a new auth since N provides a 3 midnight grover period.    Per MD, patient is ready for dc today.  will send orders to [facility] when available.    2:56 PM  Sent SNF orders and updated clinicals to Boby at [facility].    July Garrison, MSW, [facility]  [facility]  Ext. [phone]

## 2022-10-21 NOTE — PLAN OF CARE
NURSING HOME ORDERS    10/21/2022  [facility]  KANDY WAGNER - TELEMETRY STEPDOWN  [address]  [place] [address]  Dept: [phone]  Loc: [phone]     Admit to Nursing Home:  Skilled Nursing Facility    Diagnoses:  Active Hospital Problems    Diagnosis  POA    *Gastrointestinal hemorrhage [K92.2]  Yes     EGD (10/20/22):   Impression:              - Normal esophagus.   - Z-line regular.   - Gastritis.   - Normal examined duodenum. - No specimens collected.         Bilateral lower extremity edema [R60.0]  Yes    Anemia [D64.9]  Yes    Lower limb ischemia [I99.8]  Yes    Dependence on nicotine from cigarettes [F17.210]  Yes    Mixed hyperlipidemia [E78.2]  Yes    Claudication of right lower extremity [I73.9]  Yes    Right leg pain [M79.604]  Yes    Primary hypertension [I10]  Yes      Resolved Hospital Problems   No resolved problems to display.       Patient is homebound due to:  Gastrointestinal hemorrhage    Allergies:Review of patient's allergies indicates:  No Known Allergies    Vitals:  Per facility protocol       Diet: 2 gram sodium diet    Activities:   Activity as tolerated    Goals of Care Treatment Preferences:  Code Status: Full Code      Labs:  Per facility protocol       Nursing Precautions:  Fall and Pressure ulcer prevention    Consults:   PT to evaluate and treat- 5 times a week, OT to evaluate and treat- 5 times a week, and Wound Care     Miscellaneous Care: Routine Skin for Bedridden Patients:  Apply moisture barrier cream to all  Wound Care:  Right calf with dry gangrene/chronic ulcerations; Will need to have clean dry dressing daily. Apply xeroform gauze with kerlex wrapped around.         Medications: Discontinue all previous medication orders, if any. See new list below.     Medication List        START taking these medications      pantoprazole 40 MG tablet  Commonly known as: PROTONIX  Take 1 tablet (40 mg total) by mouth once daily.             CONTINUE taking these medications      acetaminophen 325 MG tablet  Commonly known as: TYLENOL  Take 2 tablets (650 mg total) by mouth every 8 (eight) hours as needed.     aspirin 81 MG Chew  Chew and swallow 1 tablet (81 mg total) by mouth once daily.     atorvastatin 80 MG tablet  Commonly known as: LIPITOR  Take 1 tablet (80 mg total) by mouth once daily.     cilostazoL 100 MG Tab  Commonly known as: PLETAL  Take 1 tablet (100 mg total) by mouth 2 (two) times daily.     ezetimibe 10 mg tablet  Commonly known as: ZETIA  Take 1 tablet (10 mg total) by mouth once daily.     gabapentin 300 MG capsule  Commonly known as: NEURONTIN  Take 3 capsules (900 mg total) by mouth 3 (three) times daily.     HYDROcodone-acetaminophen 5-325 mg per tablet  Commonly known as: NORCO  Take 1 tablet by mouth every 4 (four) hours as needed for Pain.     LAMISIL ORAL  Take by mouth once daily at 6am.     losartan-hydrochlorothiazide 50-12.5 mg 50-12.5 mg per tablet  Commonly known as: HYZAAR  Take 1 tablet by mouth once daily.     multivitamin per tablet  Commonly known as: THERAGRAN  Take 1 tablet by mouth once daily.     mupirocin 2 % ointment  Commonly known as: BACTROBAN  Apply topically 2 (two) times daily.     naproxen 500 MG tablet  Commonly known as: NAPROSYN  Take 1 tablet (500 mg total) by mouth 2 (two) times daily as needed (pain).     XARELTO DVT-PE TREAT 30D START 15 mg (42)- 20 mg (9) tablet dose pack  Generic drug: rivaroxaban  Take 1 tablet (15 mg) by mouth twice daily with food for 21 days followed by 1 tablet (20 mg) by mouth once daily with food            STOP taking these medications      bacitracin 500 unit/gram ointment     cefadroxil 500 MG Cap  Commonly known as: DURICEF     diclofenac sodium 1 % Gel  Commonly known as: VOLTAREN     LIDOcaine 5 %  Commonly known as: LIDODERM                Immunizations Administered as of 10/21/2022       Name Date Dose VIS Date Route Exp Date    COVID-19, MRNA, LN-S, PF  (Moderna) 12/31/2021 0.25 mL -- Intramuscular --    Site: Left arm     Lot: [id number]     COVID-19, MRNA, LN-S, PF (Moderna) 3/10/2021 0.5 mL -- Intramuscular --    Site: Left arm     Lot: [id number]     COVID-19, MRNA, LN-S, PF (Moderna) 2/10/2021 0.5 mL -- Intramuscular --    Site: Left arm     Lot: [id number]             This patient has had both covid vaccinations    Some patients may experience side effects after vaccination.  These may include fever, headache, muscle or joint aches.  Most symptoms resolve with 24-48 hours and do not require urgent medical evaluation unless they persist for more than 72 hours or symptoms are concerning for an unrelated medical condition.            _________________________________  Abiodun Dougherty MD  10/21/2022

## 2022-10-21 NOTE — PROGRESS NOTES
[facility] - Telemetry Stepdown  Wound Care    Patient Name:  Babatunde Singh   MRN:  [id number]  Date: 10/21/2022  Diagnosis: Gastrointestinal hemorrhage    History:     Past Medical History:   Diagnosis Date    DVT (deep venous thrombosis)     Hypertension     Peripheral arterial disease        Social History     Socioeconomic History    Marital status:    Occupational History    Occupation: doorman [employer]   Tobacco Use    Smoking status: Former     Packs/day: 0.50     Years: 50.00     Pack years: 25.00     Types: Cigarettes    Smokeless tobacco: Never    Tobacco comments:     Cigarettes3-4 per day   Substance and Sexual Activity    Alcohol use: Never    Drug use: Never    Sexual activity: Yes     Partners: Female     Comment: wife       Precautions:     Allergies as of 10/19/2022    (No Known Allergies)       WO Assessment Details/Treatment     Received wound care consult from RN for right leg. Upon entering room, verified correct patient using 2 patient identifiers. Patient agreeable to consult. Assessment completed per flowsheet.  Patient being discharged to SNF facility where he was previously receiving wound care daily. Wound cleaned and new dressing applied.    No new orders, patient to follow up with wound care at SNF as prior to admission.        10/21/22 1554   WOCN Assessment   WOCN Total Time (mins) 40   Visit Date 10/21/22   Visit Time 1554   Consult Type New   WOCN Speciality Wound   Intervention assessed;changed;chart review;coordination of care;team conference   Teaching discharge        Altered Skin Integrity 10/04/22 1400 Right lower;medial;lateral Leg Ulceration   Date First Assessed/Time First Assessed: 10/04/22 1400   Side: Right  Orientation: lower;medial;lateral  Location: Leg  Primary Wound Type: (c) Ulceration   Wound Image    Description of Altered Skin Integrity Purple or maroon localized area of discolored intact skin or non-intact skin or a blood-filled blister.   Dressing Appearance  Dry;Intact;Dried drainage   Drainage Amount Small   Drainage Characteristics/Odor Serosanguineous   Appearance Pink;Black;Yellow;Slough   Periwound Area Dry   Wound Edges Irregular   Care Cleansed with:;Sterile normal saline;Applied:;Other (see comments)  (petroleum gauze)   Dressing Applied;Rolled gauze   Periwound Care Dry periwound area maintained         Sonali Boucher RN  Wound Care- Telemetry Stepdown  10/21/2022

## 2022-10-21 NOTE — PLAN OF CARE
Problem: Occupational Therapy  Goal: Occupational Therapy Goal  Description: Goals to be met by: 10-28-22     Patient will increase functional independence with ADLs by performing:    LE Dressing with Modified [place].  Grooming while standing at sink with Modified [place].  Toileting from toilet with Modified [place] for hygiene and clothing management.   Stand pivot transfers with Modified [place] with RW  Toilet transfer to bedside commode with Modified [place].  Pt. To be I with HEP to improve level of endurance    Outcome: Ongoing, Progressing

## 2022-10-21 NOTE — NURSING
Patient left unit via a wheelchair heading to [facility].Patient was AAOx4 with stable VS.

## 2022-10-21 NOTE — ASSESSMENT & PLAN NOTE
72 y.o. with recent admission for acute RLE ischemia on xarelto and NSAIDs and no previous history of GI bleed presents with multiple episodes of melena and acute anemia with Hgb 6.8   - HDS, with mild tachycardia and hypotension, improving   - Receiving 1 unit pRBCs tonight   - PT 13, INR 1.3  - Protonix 80mg IV bolus x 1 then Protonix 40mg IV BID. Switched to oral PPI BID  - IVFs; maintain access with 2 large bore IVs  - Keep NPO  - Follow H/H with serial CBC   - Transfuse pRBCs as needed for Hgb <7, type and screen ordered & blood consented   - Orthostatic vitals Q shift   - Anti-emetics as needed  - Correct any coagulopathy with platelets and FFP to a goal of platelets >50K and INR <2.0  - GI consulted. apprec recs  -- s/p EGD on 10/20 was normal except showed Gastritis.   -- outpatient GI followup to discuss performing video capsule endoscopy         -- ok to resume home  ASA, xarelto, and cilostazol

## 2022-10-21 NOTE — PROGRESS NOTES
[facility] - Telemetry [facility] Medicine  Progress Note    Patient Name: Babatunde Singh  MRN: [id number]  Patient Class: IP- Inpatient   Admission Date: 10/19/2022  Length of Stay: 1 days  Attending Physician: Abiodun Dougherty MD  Primary Care Provider: Esdras Ly MD        Subjective:     Principal Problem:Gastrointestinal hemorrhage        HPI:  Pt is a 72 y.o. M with hx of DVT, HTN, chronic anemia, and severe PAD recently admitted for critical limb ischemia s/p axillary to femoral arterial bypass on 10/6 on xarelto and NSAIDs who presented to the ED from [facility] for a drop in hemoglobin to 6.8. Pt reports 1 episode of melena approximately 1 week ago. Since that time, he reports being significantly constipated, straining to attempt BM without success, with associated gas. Pt reports passing a large volume, hard, black and tarry stool in the ED following rectal exam and now feels relieved from constipation. Pt denies abdominal pain, BRBPR, hematemesis, N/V/D, fever, chills, chest pain, palpitations, SOB, cough, dysuria, fatigue, weakness, confusion, HA, syncope or recent sick contacts. He does endorse significant RLE pain, as he has not had any scheduled pain medication since leaving the SNF.       In the ED: mildly hypotensive to 110s/50s,  othwerwise VSSAF. Hgb 6.8, WBC 15. Rectal exam performed in the ED significant for melanotic stools, guaiac +. PT 13, INR 1.3. Na 135. Pt was given 80 mg IV Protonix and 1 unit pRBCs.      Overview/Hospital Course:  No notes on file    Interval History: Patient lying in bed, no acute distress. No acute events overnight. Patient reports melena, fatigue and RLE pain around venous ulcer. Reports good appetite and UOP. Denies nausea, vomiting, SOB, chest pain, abdominal pain or dizziness. Scheduled for EGD today. GI following.     Review of Systems   Constitutional:  Positive for activity change and fatigue. Negative for appetite change, chills and fever.    HENT:  Negative for congestion and trouble swallowing.    Eyes:  Negative for visual disturbance.   Respiratory:  Negative for cough and shortness of breath.    Cardiovascular:  Negative for chest pain and leg swelling.   Gastrointestinal:  Positive for blood in stool and constipation. Negative for abdominal distention, abdominal pain, nausea and vomiting.   Endocrine: Negative for cold intolerance, heat intolerance, polydipsia and polyuria.   Genitourinary:  Negative for difficulty urinating and dysuria.   Musculoskeletal:  Positive for gait problem and myalgias. Negative for back pain.   Skin:  Negative for rash and wound.   Neurological:  Positive for weakness. Negative for dizziness and light-headedness.   Hematological:  Negative for adenopathy. Does not bruise/bleed easily.   Psychiatric/Behavioral:  Negative for confusion and sleep disturbance.    Objective:     Vital Signs (Most Recent):  Temp: 97.6 °F (36.4 °C) (10/20/22 2014)  Pulse: 91 (10/20/22 2025)  Resp: 20 (10/20/22 2014)  BP: 139/64 (10/20/22 2014)  SpO2: 100 % (10/20/22 2014) Vital Signs (24h Range):  Temp:  [96.1 °F (35.6 °C)-98.7 °F (37.1 °C)] 97.6 °F (36.4 °C)  Pulse:  [] 91  Resp:  [16-20] 20  SpO2:  [94 %-100 %] 100 %  BP: (117-148)/(56-79) 139/64     Weight: 70 kg (154 lb 5.2 oz)  Body mass index is 25.68 kg/m².    Intake/Output Summary (Last 24 hours) at 10/20/2022 2113  Last data filed at 10/20/2022 1851  Gross per 24 hour   Intake 2010 ml   Output 1375 ml   Net 635 ml      Physical Exam  Constitutional:       Appearance: Normal appearance. He is well-developed.   HENT:      Head: Normocephalic and atraumatic.   Eyes:      General: No scleral icterus.     Extraocular Movements: Extraocular movements intact.      Pupils: Pupils are equal, round, and reactive to light.   Neck:      Vascular: No JVD.   Cardiovascular:      Rate and Rhythm: Regular rhythm. Tachycardia present.      Heart sounds: No murmur heard.    No friction rub. No  gallop.   Pulmonary:      Effort: Pulmonary effort is normal. No respiratory distress.      Breath sounds: Normal breath sounds. No wheezing or rales.   Abdominal:      General: Bowel sounds are normal. There is no distension.      Palpations: Abdomen is soft.      Tenderness: There is no abdominal tenderness. There is no guarding or rebound.   Musculoskeletal:         General: No deformity. Normal range of motion.      Cervical back: Neck supple.   Lymphadenopathy:      Cervical: No cervical adenopathy.   Skin:     General: Skin is warm and dry.      Capillary Refill: Capillary refill takes less than 2 seconds.      Findings: No erythema or rash.      Comments: RLE venous ulcer   Neurological:      Mental Status: He is alert and oriented to person, place, and time.      Cranial Nerves: No cranial nerve deficit.      Sensory: No sensory deficit.   Psychiatric:         Judgment: Judgment normal.       Significant Labs: All pertinent labs within the past 24 hours have been reviewed.  CBC:   Recent Labs   Lab 10/19/22  2326 10/20/22  0847 10/20/22  1426   WBC 13.63* 13.66* 13.48*   HGB 5.9* 7.9* 9.1*   HCT 20.2* 24.9* 28.1*   * 441 350     CMP:   Recent Labs   Lab 10/19/22  1824 10/20/22  0847   * 137   K 4.1 4.0    107   CO2 24 23   GLU 99 86   BUN 11 11   CREATININE 0.8 0.6   CALCIUM 8.7 8.0*   PROT 5.3*  --    ALBUMIN 2.6*  --    BILITOT 0.2  --    ALKPHOS 68  --    AST 33  --    ALT 29  --    ANIONGAP 6* 7*     Coagulation:   Recent Labs   Lab 10/19/22  1824   INR 1.3*       Significant Imaging: I have reviewed all pertinent imaging results/findings within the past 24 hours.      Assessment/Plan:      * Gastrointestinal hemorrhage  72 y.o. with recent admission for acute RLE ischemia on xarelto and NSAIDs and no previous history of GI bleed presents with multiple episodes of melena and acute anemia with Hgb 6.8   - HDS, with mild tachycardia and hypotension, improving   - Receiving 1 unit pRBCs  tonight   - PT 13, INR 1.3  - Protonix 80mg IV bolus x 1 then Protonix 40mg IV BID. Switched to oral PPI BID  - IVFs; maintain access with 2 large bore IVs  - Keep NPO  - Follow H/H with serial CBC   - Transfuse pRBCs as needed for Hgb <7, type and screen ordered & blood consented   - Orthostatic vitals Q shift   - Anti-emetics as needed  - Correct any coagulopathy with platelets and FFP to a goal of platelets >50K and INR <2.0  - GI consulted. apprec recs  -- s/p EGD on 10/20 was normal except showed Gastritis.   -- outpatient GI followup to discuss performing video capsule endoscopy         -- ok to resume home  ASA, xarelto, and cilostazol               Bilateral lower extremity edema  - Pt reports chronic bilateral lower extremity edema, with acute worsening, 2+ pitting edema to BLE  - Likely 2/2 vascular disease  - CXR, BNP pending    Results for orders placed during the hospital encounter of 10/02/22  Echo  Interpretation Summary  · The left ventricle is normal in size with concentric remodeling and hyperdynamic systolic function. The estimated ejection fraction is >70%.  · Normal right ventricular size with normal right ventricular systolic function.  · Grade I left ventricular diastolic dysfunction.  · The estimated PA systolic pressure is 36 mmHg.  · Normal central venous pressure (3 mmHg).    Anemia  - CBC reviewed-   Lab Results   Component Value Date    HGB 9.1 (L) 10/20/2022    HCT 28.1 (L) 10/20/2022     - Patient's anemia is currently uncontrolled. 1 unit of PRBCs  - Etiology 2/2 GI bleed   - Follow H/H with serial CBC  - Type and screen ordered, blood consented - transfuse if Hgb <7, Hct <21, or patient is acutely symptomatic    Lower limb ischemia  Peripheral arterial disease  Claudication of the R lower extremity  - Pt is s/p left axillary to BILATERAL femoris profunda bypass with 8 mm PTFE on 10/6 by vascular surgery.  - Continue statin, zetia  - Holding ASA, cilostazol, xarelto in setting of  acute GI bleed  - Wound care consulted for ulcer care    Right leg pain  - 2/2 limb ischemia  - Pain management with home gabapentin, PRN norco    Mixed hyperlipidemia  - Chronic and stable  - Continue home atorvastatin and ezetimibe     Dependence on nicotine from cigarettes  - Dangers of cigarette smoking were reviewed in detail for ~11 minutes and pt was encouraged to quit.  - Nicotine replacement options discussed     Primary hypertension  - Controlled on admission   - Latest BP and vitals reviewed  - Home meds for HTN:   Hypertension Medications             losartan-hydrochlorothiazide 50-12.5 mg (HYZAAR) 50-12.5 mg per tablet Take 1 tablet by mouth once daily.      - Hospital antihypertensives: Will hold all hospital antihypertensives in the setting of acute bleeding - restart when clinically appropriate   - Goal -140. Utilize p.r.n. antihypertensives only if patient's BP >180/110 & develop symptoms of worsening CP or SOB.      VTE Risk Mitigation (From admission, onward)         Ordered     rivaroxaban tablet 20 mg  With dinner         10/20/22 2125     Reason for No Pharmacological VTE Prophylaxis  Once        Question:  Reasons:  Answer:  Active Bleeding    10/19/22 2213     IP VTE HIGH RISK PATIENT  Once         10/19/22 2213     Place sequential compression device  Until discontinued         10/19/22 2213                Discharge Planning   LAZ: 10/24/2022     Code Status: Full Code   Is the patient medically ready for discharge?: No    Reason for patient still in hospital (select all that apply): Patient trending condition, Laboratory test, Treatment and Consult recommendations  Discharge Plan A: Skilled Nursing Facility            Time spent in care of patient: > 35 minutes         Abiodun Dougherty MD  Department of Hospital Medicine   [facility] - Telemetry Stepdown

## 2022-10-24 ENCOUNTER — TELEPHONE (OUTPATIENT)
Dept: UROLOGY | Facility: CLINIC | Age: 72
End: 2022-10-24
Payer: MEDICARE

## 2022-10-24 ENCOUNTER — TELEPHONE (OUTPATIENT)
Dept: VASCULAR SURGERY | Facility: CLINIC | Age: 72
End: 2022-10-24
Payer: MEDICARE

## 2022-10-24 DIAGNOSIS — I73.9 PAD (PERIPHERAL ARTERY DISEASE): Primary | ICD-10-CM

## 2022-10-24 NOTE — TELEPHONE ENCOUNTER
Contacted Afia with [facility] to schedule post-op appt with Dr. Ortiz. Appt scehduled and states she will contact [facility] to schedule transport. Also contacted pt's daughter Cindi to confirm that she will be able to accompany pt to appt tomorrow. Notified Afia that daughter will accompany pt to appt. ----- Message from LEI Ortiz III, MD sent at 10/13/2022  9:38 AM CDT -----  F/u 2 weeks with Ax-fem-fem duplex

## 2022-10-24 NOTE — TELEPHONE ENCOUNTER
----- Message from LEI Ortiz III, MD sent at 10/24/2022  2:41 PM CDT -----  2 weeks  ----- Message -----  From: Moises Cuba MD  Sent: 10/24/2022  12:46 PM CDT  To: LEI Ortiz III, MD    Chip,    When can Mr Singh stop his Xeralto and Pletal for his partial nephrectomy.  I am OK with him staying on ASA for the case    Thanks    Moises Cuba  [phone]

## 2022-10-25 ENCOUNTER — OFFICE VISIT (OUTPATIENT)
Dept: VASCULAR SURGERY | Facility: CLINIC | Age: 72
End: 2022-10-25
Payer: MEDICARE

## 2022-10-25 ENCOUNTER — HOSPITAL ENCOUNTER (OUTPATIENT)
Dept: VASCULAR SURGERY | Facility: CLINIC | Age: 72
Discharge: HOME | End: 2022-10-25
Attending: SURGERY
Payer: MEDICARE

## 2022-10-25 ENCOUNTER — TELEPHONE (OUTPATIENT)
Dept: UROLOGY | Facility: CLINIC | Age: 72
End: 2022-10-25
Payer: MEDICARE

## 2022-10-25 VITALS
HEIGHT: 64 IN | WEIGHT: 147.69 LBS | TEMPERATURE: 98 F | HEART RATE: 89 BPM | DIASTOLIC BLOOD PRESSURE: 61 MMHG | SYSTOLIC BLOOD PRESSURE: 135 MMHG | BODY MASS INDEX: 25.21 KG/M2

## 2022-10-25 DIAGNOSIS — I70.238: ICD-10-CM

## 2022-10-25 DIAGNOSIS — I73.9 PAD (PERIPHERAL ARTERY DISEASE): ICD-10-CM

## 2022-10-25 DIAGNOSIS — I73.9 PAD (PERIPHERAL ARTERY DISEASE): Primary | ICD-10-CM

## 2022-10-25 PROCEDURE — 99499 UNLISTED E&M SERVICE: CPT | Mod: S$GLB,,, | Performed by: SURGERY

## 2022-10-25 PROCEDURE — 1159F MED LIST DOCD IN RCRD: CPT | Mod: CPTII,S$GLB,, | Performed by: SURGERY

## 2022-10-25 PROCEDURE — 3078F DIAST BP <80 MM HG: CPT | Mod: CPTII,S$GLB,, | Performed by: SURGERY

## 2022-10-25 PROCEDURE — 1126F AMNT PAIN NOTED NONE PRSNT: CPT | Mod: CPTII,S$GLB,, | Performed by: SURGERY

## 2022-10-25 PROCEDURE — 99024 PR POST-OP FOLLOW-UP VISIT: ICD-10-PCS | Mod: S$GLB,,, | Performed by: SURGERY

## 2022-10-25 PROCEDURE — 99999 PR PBB SHADOW E&M-EST. PATIENT-LVL IV: CPT | Mod: PBBFAC,,, | Performed by: SURGERY

## 2022-10-25 PROCEDURE — 1160F RVW MEDS BY RX/DR IN RCRD: CPT | Mod: CPTII,S$GLB,, | Performed by: SURGERY

## 2022-10-25 PROCEDURE — 3075F SYST BP GE 130 - 139MM HG: CPT | Mod: CPTII,S$GLB,, | Performed by: SURGERY

## 2022-10-25 PROCEDURE — 1101F PR PT FALLS ASSESS DOC 0-1 FALLS W/OUT INJ PAST YR: ICD-10-PCS | Mod: CPTII,S$GLB,, | Performed by: SURGERY

## 2022-10-25 PROCEDURE — 1126F PR PAIN SEVERITY QUANTIFIED, NO PAIN PRESENT: ICD-10-PCS | Mod: CPTII,S$GLB,, | Performed by: SURGERY

## 2022-10-25 PROCEDURE — 99024 POSTOP FOLLOW-UP VISIT: CPT | Mod: S$GLB,,, | Performed by: SURGERY

## 2022-10-25 PROCEDURE — 3078F PR MOST RECENT DIASTOLIC BLOOD PRESSURE < 80 MM HG: ICD-10-PCS | Mod: CPTII,S$GLB,, | Performed by: SURGERY

## 2022-10-25 PROCEDURE — 3288F FALL RISK ASSESSMENT DOCD: CPT | Mod: CPTII,S$GLB,, | Performed by: SURGERY

## 2022-10-25 PROCEDURE — 99499 RISK ADDL DX/OHS AUDIT: ICD-10-PCS | Mod: S$GLB,,, | Performed by: SURGERY

## 2022-10-25 PROCEDURE — 99999 PR PBB SHADOW E&M-EST. PATIENT-LVL IV: ICD-10-PCS | Mod: PBBFAC,,, | Performed by: SURGERY

## 2022-10-25 PROCEDURE — 3075F PR MOST RECENT SYSTOLIC BLOOD PRESS GE 130-139MM HG: ICD-10-PCS | Mod: CPTII,S$GLB,, | Performed by: SURGERY

## 2022-10-25 PROCEDURE — 3044F HG A1C LEVEL LT 7.0%: CPT | Mod: CPTII,S$GLB,, | Performed by: SURGERY

## 2022-10-25 PROCEDURE — 1159F PR MEDICATION LIST DOCUMENTED IN MEDICAL RECORD: ICD-10-PCS | Mod: CPTII,S$GLB,, | Performed by: SURGERY

## 2022-10-25 PROCEDURE — 1101F PT FALLS ASSESS-DOCD LE1/YR: CPT | Mod: CPTII,S$GLB,, | Performed by: SURGERY

## 2022-10-25 PROCEDURE — 4010F PR ACE/ARB THEARPY RXD/TAKEN: ICD-10-PCS | Mod: CPTII,S$GLB,, | Performed by: SURGERY

## 2022-10-25 PROCEDURE — 93925 PR DUPLEX LO EXTREM ART BILAT: ICD-10-PCS | Mod: 52,S$GLB,, | Performed by: SURGERY

## 2022-10-25 PROCEDURE — 3044F PR MOST RECENT HEMOGLOBIN A1C LEVEL <7.0%: ICD-10-PCS | Mod: CPTII,S$GLB,, | Performed by: SURGERY

## 2022-10-25 PROCEDURE — 1160F PR REVIEW ALL MEDS BY PRESCRIBER/CLIN PHARMACIST DOCUMENTED: ICD-10-PCS | Mod: CPTII,S$GLB,, | Performed by: SURGERY

## 2022-10-25 PROCEDURE — 4010F ACE/ARB THERAPY RXD/TAKEN: CPT | Mod: CPTII,S$GLB,, | Performed by: SURGERY

## 2022-10-25 PROCEDURE — 3288F PR FALLS RISK ASSESSMENT DOCUMENTED: ICD-10-PCS | Mod: CPTII,S$GLB,, | Performed by: SURGERY

## 2022-10-25 PROCEDURE — 93925 LOWER EXTREMITY STUDY: CPT | Mod: 52,S$GLB,, | Performed by: SURGERY

## 2022-10-25 NOTE — PROGRESS NOTES
VASCULAR SURGERY SERVICE    CHIEF COMPLAINT: severe PAD    HISTORY OF PRESENT ILLNESS: Babatunde Singh is a 72 y.o. male initially seen as an inpatient, who presented with severe chronic ischemic rest pain to the right foot, who become nonambulatory, also a recent diagnosis of a right renal mass.   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   He was initially seen by interventional cardiology who felt there were no revascularization options and suggested either palliative care or above-knee amputation.    He underwent a right axillary to bi- profunda bypass 10/06/2022.   Postoperatively, his ABIs went from 0 bilaterally preoperatively to 0.69 right, 0.74 left.  His severe ischemic rest pain resolved completely.  He was discharged to skilled nursing and is to be discharged tomorrow.  States he is now ambulating without assistance.    During his evaluation he is also noted to have significant full-thickness ulceration of his right calf.  He is receiving wound care at SNF    Due to the limited runoff of this bypass he was started on Xarelto and aspirin    Past Medical History:   Diagnosis Date    DVT (deep venous thrombosis)     Hypertension     Peripheral arterial disease        Past Surgical History:   Procedure Laterality Date    CREATION OF AXILLARY-FEMORAL ARTERY BYPASS WITH GRAFT N/A 10/6/2022    Procedure: CREATION, BYPASS, ARTERIAL, AXILLARY TO FEMORAL, USING GRAFT;  Surgeon: LEI Ortiz III, MD;  Location: [facility] OR [address] [place] [address];  Service: Peripheral Vascular;  Laterality: N/A;    CREATION OF FEMORAL-FEMORAL ARTERY BYPASS WITH GRAFT N/A 10/6/2022    Procedure: CREATION, BYPASS, ARTERIAL, FEMORAL TO FEMORAL, USING GRAFT;  Surgeon: LEI Ortiz III, MD;  Location: [facility] OR [address] [place] [address];  Service: Peripheral Vascular;  Laterality: N/A;     ESOPHAGOGASTRODUODENOSCOPY N/A 10/20/2022    Procedure: EGD (ESOPHAGOGASTRODUODENOSCOPY);  Surgeon: Sixto Chicas MD;  Location: [facility] ([address] [place] [address]);  Service: Endoscopy;  Laterality: N/A;    EXCISION OF HYDROCELE Left 4/26/2022    Procedure: HYDROCELECTOMY;  Surgeon: Damion Roberts MD;  Location: [facility];  Service: Urology;  Laterality: Left;    KNEE SURGERY  1972         Current Outpatient Medications:     acetaminophen (TYLENOL) 325 MG tablet, Take 2 tablets (650 mg total) by mouth every 8 (eight) hours as needed., Disp: 20 tablet, Rfl: 0    aspirin 81 MG Chew, Chew and swallow 1 tablet (81 mg total) by mouth once daily., Disp: 30 tablet, Rfl: 0    atorvastatin (LIPITOR) 80 MG tablet, Take 1 tablet (80 mg total) by mouth once daily., Disp: 30 tablet, Rfl: 2    cilostazoL (PLETAL) 100 MG Tab, Take 1 tablet (100 mg total) by mouth 2 (two) times daily., Disp: 180 tablet, Rfl: 3    ezetimibe (ZETIA) 10 mg tablet, Take 1 tablet (10 mg total) by mouth once daily., Disp: 90 tablet, Rfl: 3    gabapentin (NEURONTIN) 300 MG capsule, Take 3 capsules (900 mg total) by mouth 3 (three) times daily., Disp: 540 capsule, Rfl: 3    HYDROcodone-acetaminophen (NORCO) 5-325 mg per tablet, Take 1 tablet by mouth every 4 (four) hours as needed for Pain., Disp: 10 tablet, Rfl: 0    losartan-hydrochlorothiazide 50-12.5 mg (HYZAAR) 50-12.5 mg per tablet, Take 1 tablet by mouth once daily., Disp: 30 tablet, Rfl: 2    multivitamin (THERAGRAN) per tablet, Take 1 tablet by mouth once daily., Disp: , Rfl:     mupirocin (BACTROBAN) 2 % ointment, Apply topically 2 (two) times daily., Disp: 30 g, Rfl: 1    naproxen (NAPROSYN) 500 MG tablet, Take 1 tablet (500 mg total) by mouth 2 (two) times daily as needed (pain)., Disp: 60 tablet, Rfl: 0    pantoprazole (PROTONIX) 40 MG tablet, Take 1 tablet (40 mg total) by mouth once daily., Disp: 30 tablet, Rfl: 11    rivaroxaban (XARELTO DVT-PE TREAT 30D START) 15 mg (42)- 20 mg (9) tablet dose pack,  "Take 1 tablet (15 mg) by mouth twice daily with food for 21 days followed by 1 tablet (20 mg) by mouth once daily with food, Disp: 51 tablet, Rfl: 0    terbinafine HCl (LAMISIL ORAL), Take by mouth once daily at 6am., Disp: , Rfl:     Review of patient's allergies indicates:  No Known Allergies    Family History   Problem Relation Age of Onset    Hypertension Mother     Hypertension Father        Social History     Tobacco Use    Smoking status: Former     Packs/day: 0.50     Years: 50.00     Pack years: 25.00     Types: Cigarettes    Smokeless tobacco: Never    Tobacco comments:     Cigarettes3-4 per day   Substance Use Topics    Alcohol use: Never    Drug use: Never     PHYSICAL EXAM:   /61 (BP Location: Right arm, Patient Position: Sitting, BP Method: Large (Automatic))   Pulse 89   Temp 98 °F (36.7 °C) (Oral)   Ht 5' 4" (1.626 m)   Wt 67 kg (147 lb 11.3 oz)   BMI 25.35 kg/m²   Constitutional:  Alert,   Well-appearing  In no distress.   Neurological: Normal speech  no focal findings  CN II - XII grossly intact.    Psychiatric: Mood and affect appropriate and symmetric.   HEENT: Normocephalic / atraumatic  PERRLA  Midline trachea  No scars across the neck left infraclavicular incision is well healed   Cardiac: Regular rate and rhythm.   Pulmonary: Normal pulmonary effort.   Abdomen: Soft, not distended.     Skin: Warm and well perfused.    Vascular:  Good pedal signals bilaterally   Extremities/  Musculoskeletal: No edema.   Femoral incisions well healed.    Right posterior calf wounds markedly improved from preop, now with a ring of good granulation tissue tissue in both areas.  See picture    Moderate bilateral foot edema   10/25/2022      IMAGING:  Duplex of the ax fem-fem today demonstrates it to be patent without stenosis  ABIs 0.69 right, 0.74 left, compared with 0 bilaterally pre intervention    IMPRESSION:    Two weeks status post right ax fem-fem for critical limb ischemia.   His ischemic rest " pain is gone, and his pressure ulcerations of right calf show marked improvement in healing  Known right renal mass.  Have discussed this case with his treating urologist.  Okay to stop the Xarelto prior to his partial right nephrectomy    PLAN:    [facility] wound care.  He will need some enzymatic debridement and then likely some sharp debridement of the calf wounds.  Follow-up with me in 3 months with duplex was ax fem-fem and ABIs    W. Tyler Ortiz III, MD, FACS  Professor and Chief, Vascular and Endovascular Surgery

## 2022-10-25 NOTE — TELEPHONE ENCOUNTER
Spoke with pt's daughter Cindi about getting him rescheduled for his missed appointment on today. Offered a virtual visit for tomorrow 10/26 in the morning, pt will have to give a call back due to pt being in a rehab center. They are trying to figure out a good time.

## 2022-10-26 ENCOUNTER — TELEPHONE (OUTPATIENT)
Dept: VASCULAR SURGERY | Facility: CLINIC | Age: 72
End: 2022-10-26
Payer: MEDICARE

## 2022-10-26 NOTE — TELEPHONE ENCOUNTER
Attempted to contact caller in response to message. Staff at facility state there is no  there and nurse Tammy is unable to find any staff who may have placed call. Tammy states she is not aware of any documents that are needed.----- Message from Natalie Apple sent at 10/26/2022 10:11 AM CDT -----  Contact: [phone] fax [phone]  Case manger from [facility] is calling to get clinical notes from visit on yesterday mostly concerning wound please call and advise @  [phone] fax [phone]

## 2022-10-27 ENCOUNTER — HOSPITAL ENCOUNTER (OUTPATIENT)
Facility: HOSPITAL | Age: 72
Discharge: HOME HEALTH | DRG: 378 | End: 2022-10-31
Attending: EMERGENCY MEDICINE | Admitting: EMERGENCY MEDICINE
Payer: MEDICARE

## 2022-10-27 DIAGNOSIS — K92.2 GI BLEED: ICD-10-CM

## 2022-10-27 DIAGNOSIS — I73.9 CLAUDICATION OF RIGHT LOWER EXTREMITY: ICD-10-CM

## 2022-10-27 DIAGNOSIS — K92.2 GASTROINTESTINAL HEMORRHAGE, UNSPECIFIED GASTROINTESTINAL HEMORRHAGE TYPE: ICD-10-CM

## 2022-10-27 DIAGNOSIS — E78.5 HYPERLIPIDEMIA, UNSPECIFIED HYPERLIPIDEMIA TYPE: ICD-10-CM

## 2022-10-27 DIAGNOSIS — D64.9 ANEMIA, UNSPECIFIED TYPE: Primary | ICD-10-CM

## 2022-10-27 DIAGNOSIS — I10 PRIMARY HYPERTENSION: ICD-10-CM

## 2022-10-27 LAB
BASOPHILS # BLD AUTO: 0.06 K/UL (ref 0–0.2)
BASOPHILS # BLD AUTO: 0.06 K/UL (ref 0–0.2)
BASOPHILS NFR BLD: 0.5 % (ref 0–1.9)
BASOPHILS NFR BLD: 0.5 % (ref 0–1.9)
DIFFERENTIAL METHOD: ABNORMAL
DIFFERENTIAL METHOD: ABNORMAL
EOSINOPHIL # BLD AUTO: 0.2 K/UL (ref 0–0.5)
EOSINOPHIL # BLD AUTO: 0.2 K/UL (ref 0–0.5)
EOSINOPHIL NFR BLD: 1.3 % (ref 0–8)
EOSINOPHIL NFR BLD: 1.3 % (ref 0–8)
ERYTHROCYTE [DISTWIDTH] IN BLOOD BY AUTOMATED COUNT: 19.7 % (ref 11.5–14.5)
ERYTHROCYTE [DISTWIDTH] IN BLOOD BY AUTOMATED COUNT: 19.7 % (ref 11.5–14.5)
HCT VFR BLD AUTO: 21.2 % (ref 40–54)
HCT VFR BLD AUTO: 21.2 % (ref 40–54)
HGB BLD-MCNC: 6.7 G/DL (ref 14–18)
HGB BLD-MCNC: 6.7 G/DL (ref 14–18)
IMM GRANULOCYTES # BLD AUTO: 0.07 K/UL (ref 0–0.04)
IMM GRANULOCYTES # BLD AUTO: 0.07 K/UL (ref 0–0.04)
IMM GRANULOCYTES NFR BLD AUTO: 0.6 % (ref 0–0.5)
IMM GRANULOCYTES NFR BLD AUTO: 0.6 % (ref 0–0.5)
INR PPP: 3.2 (ref 0.8–1.2)
LYMPHOCYTES # BLD AUTO: 2.9 K/UL (ref 1–4.8)
LYMPHOCYTES # BLD AUTO: 2.9 K/UL (ref 1–4.8)
LYMPHOCYTES NFR BLD: 25.4 % (ref 18–48)
LYMPHOCYTES NFR BLD: 25.4 % (ref 18–48)
MCH RBC QN AUTO: 28.6 PG (ref 27–31)
MCH RBC QN AUTO: 28.6 PG (ref 27–31)
MCHC RBC AUTO-ENTMCNC: 31.6 G/DL (ref 32–36)
MCHC RBC AUTO-ENTMCNC: 31.6 G/DL (ref 32–36)
MCV RBC AUTO: 91 FL (ref 82–98)
MCV RBC AUTO: 91 FL (ref 82–98)
MONOCYTES # BLD AUTO: 1.4 K/UL (ref 0.3–1)
MONOCYTES # BLD AUTO: 1.4 K/UL (ref 0.3–1)
MONOCYTES NFR BLD: 12.4 % (ref 4–15)
MONOCYTES NFR BLD: 12.4 % (ref 4–15)
NEUTROPHILS # BLD AUTO: 6.7 K/UL (ref 1.8–7.7)
NEUTROPHILS # BLD AUTO: 6.7 K/UL (ref 1.8–7.7)
NEUTROPHILS NFR BLD: 59.8 % (ref 38–73)
NEUTROPHILS NFR BLD: 59.8 % (ref 38–73)
NRBC BLD-RTO: 0 /100 WBC
NRBC BLD-RTO: 0 /100 WBC
PLATELET # BLD AUTO: 533 K/UL (ref 150–450)
PLATELET # BLD AUTO: 533 K/UL (ref 150–450)
PMV BLD AUTO: 8.5 FL (ref 9.2–12.9)
PMV BLD AUTO: 8.5 FL (ref 9.2–12.9)
PROTHROMBIN TIME: 31.3 SEC (ref 9–12.5)
RBC # BLD AUTO: 2.34 M/UL (ref 4.6–6.2)
RBC # BLD AUTO: 2.34 M/UL (ref 4.6–6.2)
WBC # BLD AUTO: 11.21 K/UL (ref 3.9–12.7)
WBC # BLD AUTO: 11.21 K/UL (ref 3.9–12.7)

## 2022-10-27 PROCEDURE — 96374 THER/PROPH/DIAG INJ IV PUSH: CPT

## 2022-10-27 PROCEDURE — 99406 BEHAV CHNG SMOKING 3-10 MIN: CPT | Mod: ,,, | Performed by: NURSE PRACTITIONER

## 2022-10-27 PROCEDURE — 99291 PR CRITICAL CARE, E/M 30-74 MINUTES: ICD-10-PCS | Mod: ,,, | Performed by: EMERGENCY MEDICINE

## 2022-10-27 PROCEDURE — C9113 INJ PANTOPRAZOLE SODIUM, VIA: HCPCS

## 2022-10-27 PROCEDURE — 94761 N-INVAS EAR/PLS OXIMETRY MLT: CPT

## 2022-10-27 PROCEDURE — 63600175 PHARM REV CODE 636 W HCPCS

## 2022-10-27 PROCEDURE — 93010 EKG 12-LEAD: ICD-10-PCS | Mod: ,,, | Performed by: INTERNAL MEDICINE

## 2022-10-27 PROCEDURE — 99223 1ST HOSP IP/OBS HIGH 75: CPT | Mod: 25,,, | Performed by: NURSE PRACTITIONER

## 2022-10-27 PROCEDURE — 36430 TRANSFUSION BLD/BLD COMPNT: CPT

## 2022-10-27 PROCEDURE — 12000002 HC ACUTE/MED SURGE SEMI-PRIVATE ROOM

## 2022-10-27 PROCEDURE — 99223 PR INITIAL HOSPITAL CARE,LEVL III: ICD-10-PCS | Mod: 25,,, | Performed by: NURSE PRACTITIONER

## 2022-10-27 PROCEDURE — 80053 COMPREHEN METABOLIC PANEL: CPT

## 2022-10-27 PROCEDURE — 93005 ELECTROCARDIOGRAM TRACING: CPT

## 2022-10-27 PROCEDURE — 99285 EMERGENCY DEPT VISIT HI MDM: CPT | Mod: 25

## 2022-10-27 PROCEDURE — 93010 ELECTROCARDIOGRAM REPORT: CPT | Mod: ,,, | Performed by: INTERNAL MEDICINE

## 2022-10-27 PROCEDURE — 85610 PROTHROMBIN TIME: CPT

## 2022-10-27 PROCEDURE — 99406 PR TOBACCO USE CESSATION INTERMEDIATE 3-10 MINUTES: ICD-10-PCS | Mod: ,,, | Performed by: NURSE PRACTITIONER

## 2022-10-27 PROCEDURE — G0378 HOSPITAL OBSERVATION PER HR: HCPCS

## 2022-10-27 PROCEDURE — 85025 COMPLETE CBC W/AUTO DIFF WBC: CPT

## 2022-10-27 PROCEDURE — 99291 CRITICAL CARE FIRST HOUR: CPT | Mod: ,,, | Performed by: EMERGENCY MEDICINE

## 2022-10-27 RX ORDER — ACETAMINOPHEN 325 MG/1
650 TABLET ORAL EVERY 6 HOURS PRN
Status: DISCONTINUED | OUTPATIENT
Start: 2022-10-27 | End: 2022-10-31 | Stop reason: HOSPADM

## 2022-10-27 RX ORDER — ATORVASTATIN CALCIUM 40 MG/1
80 TABLET, FILM COATED ORAL DAILY
Status: DISCONTINUED | OUTPATIENT
Start: 2022-10-28 | End: 2022-10-31 | Stop reason: HOSPADM

## 2022-10-27 RX ORDER — PANTOPRAZOLE SODIUM 40 MG/10ML
40 INJECTION, POWDER, LYOPHILIZED, FOR SOLUTION INTRAVENOUS 2 TIMES DAILY
Status: DISCONTINUED | OUTPATIENT
Start: 2022-10-28 | End: 2022-10-31

## 2022-10-27 RX ORDER — SODIUM CHLORIDE 0.9 % (FLUSH) 0.9 %
10 SYRINGE (ML) INJECTION
Status: DISCONTINUED | OUTPATIENT
Start: 2022-10-27 | End: 2022-10-31 | Stop reason: HOSPADM

## 2022-10-27 RX ORDER — GABAPENTIN 300 MG/1
900 CAPSULE ORAL 3 TIMES DAILY
Status: DISCONTINUED | OUTPATIENT
Start: 2022-10-28 | End: 2022-10-31 | Stop reason: HOSPADM

## 2022-10-27 RX ORDER — HYDROCODONE BITARTRATE AND ACETAMINOPHEN 5; 325 MG/1; MG/1
1 TABLET ORAL EVERY 4 HOURS PRN
Status: DISCONTINUED | OUTPATIENT
Start: 2022-10-27 | End: 2022-10-31 | Stop reason: HOSPADM

## 2022-10-27 RX ORDER — HYDROCODONE BITARTRATE AND ACETAMINOPHEN 500; 5 MG/1; MG/1
TABLET ORAL
Status: DISCONTINUED | OUTPATIENT
Start: 2022-10-27 | End: 2022-10-31 | Stop reason: HOSPADM

## 2022-10-27 RX ORDER — PANTOPRAZOLE SODIUM 40 MG/10ML
40 INJECTION, POWDER, LYOPHILIZED, FOR SOLUTION INTRAVENOUS
Status: DISCONTINUED | OUTPATIENT
Start: 2022-10-27 | End: 2022-10-27

## 2022-10-27 RX ORDER — ONDANSETRON 2 MG/ML
4 INJECTION INTRAMUSCULAR; INTRAVENOUS EVERY 8 HOURS PRN
Status: DISCONTINUED | OUTPATIENT
Start: 2022-10-27 | End: 2022-10-31 | Stop reason: HOSPADM

## 2022-10-27 RX ORDER — PANTOPRAZOLE SODIUM 40 MG/10ML
80 INJECTION, POWDER, LYOPHILIZED, FOR SOLUTION INTRAVENOUS
Status: COMPLETED | OUTPATIENT
Start: 2022-10-27 | End: 2022-10-27

## 2022-10-27 RX ORDER — EZETIMIBE 10 MG/1
10 TABLET ORAL DAILY
Status: DISCONTINUED | OUTPATIENT
Start: 2022-10-28 | End: 2022-10-31 | Stop reason: HOSPADM

## 2022-10-27 RX ADMIN — PANTOPRAZOLE SODIUM 80 MG: 40 INJECTION, POWDER, FOR SOLUTION INTRAVENOUS at 09:10

## 2022-10-28 ENCOUNTER — ANESTHESIA EVENT (OUTPATIENT)
Dept: ENDOSCOPY | Facility: HOSPITAL | Age: 72
DRG: 378 | End: 2022-10-28
Payer: MEDICARE

## 2022-10-28 ENCOUNTER — ANESTHESIA (OUTPATIENT)
Dept: ENDOSCOPY | Facility: HOSPITAL | Age: 72
DRG: 378 | End: 2022-10-28
Payer: MEDICARE

## 2022-10-28 PROBLEM — D62 ACUTE BLOOD LOSS ANEMIA: Status: ACTIVE | Noted: 2022-10-19

## 2022-10-28 LAB
ABO + RH BLD: NORMAL
ALBUMIN SERPL BCP-MCNC: 2.5 G/DL (ref 3.5–5.2)
ALBUMIN SERPL BCP-MCNC: 2.6 G/DL (ref 3.5–5.2)
ALP SERPL-CCNC: 71 U/L (ref 55–135)
ALP SERPL-CCNC: 76 U/L (ref 55–135)
ALT SERPL W/O P-5'-P-CCNC: 17 U/L (ref 10–44)
ALT SERPL W/O P-5'-P-CCNC: 19 U/L (ref 10–44)
ANION GAP SERPL CALC-SCNC: 7 MMOL/L (ref 8–16)
ANION GAP SERPL CALC-SCNC: 8 MMOL/L (ref 8–16)
AST SERPL-CCNC: 18 U/L (ref 10–40)
AST SERPL-CCNC: 20 U/L (ref 10–40)
BASOPHILS # BLD AUTO: 0.05 K/UL (ref 0–0.2)
BASOPHILS # BLD AUTO: 0.06 K/UL (ref 0–0.2)
BASOPHILS # BLD AUTO: 0.06 K/UL (ref 0–0.2)
BASOPHILS NFR BLD: 0.4 % (ref 0–1.9)
BASOPHILS NFR BLD: 0.5 % (ref 0–1.9)
BASOPHILS NFR BLD: 0.5 % (ref 0–1.9)
BILIRUB SERPL-MCNC: 0.2 MG/DL (ref 0.1–1)
BILIRUB SERPL-MCNC: 1.2 MG/DL (ref 0.1–1)
BLD GP AB SCN CELLS X3 SERPL QL: NORMAL
BLD PROD TYP BPU: NORMAL
BLOOD UNIT EXPIRATION DATE: NORMAL
BLOOD UNIT TYPE CODE: 6200
BLOOD UNIT TYPE: NORMAL
BUN SERPL-MCNC: 16 MG/DL (ref 8–23)
BUN SERPL-MCNC: 17 MG/DL (ref 8–23)
CALCIUM SERPL-MCNC: 8.1 MG/DL (ref 8.7–10.5)
CALCIUM SERPL-MCNC: 8.2 MG/DL (ref 8.7–10.5)
CHLORIDE SERPL-SCNC: 108 MMOL/L (ref 95–110)
CHLORIDE SERPL-SCNC: 108 MMOL/L (ref 95–110)
CO2 SERPL-SCNC: 22 MMOL/L (ref 23–29)
CO2 SERPL-SCNC: 24 MMOL/L (ref 23–29)
CODING SYSTEM: NORMAL
CREAT SERPL-MCNC: 0.7 MG/DL (ref 0.5–1.4)
CREAT SERPL-MCNC: 0.8 MG/DL (ref 0.5–1.4)
DIFFERENTIAL METHOD: ABNORMAL
DISPENSE STATUS: NORMAL
EOSINOPHIL # BLD AUTO: 0.1 K/UL (ref 0–0.5)
EOSINOPHIL NFR BLD: 0.9 % (ref 0–8)
EOSINOPHIL NFR BLD: 0.9 % (ref 0–8)
EOSINOPHIL NFR BLD: 1.2 % (ref 0–8)
ERYTHROCYTE [DISTWIDTH] IN BLOOD BY AUTOMATED COUNT: 18.3 % (ref 11.5–14.5)
ERYTHROCYTE [DISTWIDTH] IN BLOOD BY AUTOMATED COUNT: 18.4 % (ref 11.5–14.5)
ERYTHROCYTE [DISTWIDTH] IN BLOOD BY AUTOMATED COUNT: 18.6 % (ref 11.5–14.5)
EST. GFR  (NO RACE VARIABLE): >60 ML/MIN/1.73 M^2
EST. GFR  (NO RACE VARIABLE): >60 ML/MIN/1.73 M^2
GLUCOSE SERPL-MCNC: 131 MG/DL (ref 70–110)
GLUCOSE SERPL-MCNC: 86 MG/DL (ref 70–110)
HCT VFR BLD AUTO: 24.3 % (ref 40–54)
HCT VFR BLD AUTO: 24.7 % (ref 40–54)
HCT VFR BLD AUTO: 26.2 % (ref 40–54)
HGB BLD-MCNC: 7.6 G/DL (ref 14–18)
HGB BLD-MCNC: 7.7 G/DL (ref 14–18)
HGB BLD-MCNC: 8.1 G/DL (ref 14–18)
IMM GRANULOCYTES # BLD AUTO: 0.04 K/UL (ref 0–0.04)
IMM GRANULOCYTES # BLD AUTO: 0.05 K/UL (ref 0–0.04)
IMM GRANULOCYTES # BLD AUTO: 0.06 K/UL (ref 0–0.04)
IMM GRANULOCYTES NFR BLD AUTO: 0.3 % (ref 0–0.5)
IMM GRANULOCYTES NFR BLD AUTO: 0.4 % (ref 0–0.5)
IMM GRANULOCYTES NFR BLD AUTO: 0.5 % (ref 0–0.5)
INR PPP: 1.2 (ref 0.8–1.2)
LYMPHOCYTES # BLD AUTO: 2.6 K/UL (ref 1–4.8)
LYMPHOCYTES # BLD AUTO: 2.6 K/UL (ref 1–4.8)
LYMPHOCYTES # BLD AUTO: 3.2 K/UL (ref 1–4.8)
LYMPHOCYTES NFR BLD: 20 % (ref 18–48)
LYMPHOCYTES NFR BLD: 21 % (ref 18–48)
LYMPHOCYTES NFR BLD: 29 % (ref 18–48)
MCH RBC QN AUTO: 28.4 PG (ref 27–31)
MCH RBC QN AUTO: 28.6 PG (ref 27–31)
MCH RBC QN AUTO: 28.7 PG (ref 27–31)
MCHC RBC AUTO-ENTMCNC: 30.9 G/DL (ref 32–36)
MCHC RBC AUTO-ENTMCNC: 31.2 G/DL (ref 32–36)
MCHC RBC AUTO-ENTMCNC: 31.3 G/DL (ref 32–36)
MCV RBC AUTO: 92 FL (ref 82–98)
MONOCYTES # BLD AUTO: 1.2 K/UL (ref 0.3–1)
MONOCYTES # BLD AUTO: 1.2 K/UL (ref 0.3–1)
MONOCYTES # BLD AUTO: 1.3 K/UL (ref 0.3–1)
MONOCYTES NFR BLD: 10.7 % (ref 4–15)
MONOCYTES NFR BLD: 9.9 % (ref 4–15)
MONOCYTES NFR BLD: 9.9 % (ref 4–15)
NEUTROPHILS # BLD AUTO: 6.5 K/UL (ref 1.8–7.7)
NEUTROPHILS # BLD AUTO: 8.5 K/UL (ref 1.8–7.7)
NEUTROPHILS # BLD AUTO: 8.8 K/UL (ref 1.8–7.7)
NEUTROPHILS NFR BLD: 58.3 % (ref 38–73)
NEUTROPHILS NFR BLD: 67.4 % (ref 38–73)
NEUTROPHILS NFR BLD: 68.2 % (ref 38–73)
NRBC BLD-RTO: 0 /100 WBC
PLATELET # BLD AUTO: 424 K/UL (ref 150–450)
PLATELET # BLD AUTO: 449 K/UL (ref 150–450)
PLATELET # BLD AUTO: 470 K/UL (ref 150–450)
PMV BLD AUTO: 8.4 FL (ref 9.2–12.9)
PMV BLD AUTO: 8.5 FL (ref 9.2–12.9)
PMV BLD AUTO: 8.8 FL (ref 9.2–12.9)
POTASSIUM SERPL-SCNC: 3.9 MMOL/L (ref 3.5–5.1)
POTASSIUM SERPL-SCNC: 4.2 MMOL/L (ref 3.5–5.1)
PROT SERPL-MCNC: 5 G/DL (ref 6–8.4)
PROT SERPL-MCNC: 5 G/DL (ref 6–8.4)
PROTHROMBIN TIME: 12 SEC (ref 9–12.5)
RBC # BLD AUTO: 2.65 M/UL (ref 4.6–6.2)
RBC # BLD AUTO: 2.69 M/UL (ref 4.6–6.2)
RBC # BLD AUTO: 2.85 M/UL (ref 4.6–6.2)
SODIUM SERPL-SCNC: 138 MMOL/L (ref 136–145)
SODIUM SERPL-SCNC: 139 MMOL/L (ref 136–145)
TRANS ERYTHROCYTES VOL PATIENT: NORMAL ML
WBC # BLD AUTO: 11.12 K/UL (ref 3.9–12.7)
WBC # BLD AUTO: 12.55 K/UL (ref 3.9–12.7)
WBC # BLD AUTO: 12.87 K/UL (ref 3.9–12.7)

## 2022-10-28 PROCEDURE — D9220A PRA ANESTHESIA: ICD-10-PCS | Mod: ANES,,, | Performed by: ANESTHESIOLOGY

## 2022-10-28 PROCEDURE — 43235 EGD DIAGNOSTIC BRUSH WASH: CPT | Mod: GC,,, | Performed by: STUDENT IN AN ORGANIZED HEALTH CARE EDUCATION/TRAINING PROGRAM

## 2022-10-28 PROCEDURE — 85025 COMPLETE CBC W/AUTO DIFF WBC: CPT | Mod: 91 | Performed by: NURSE PRACTITIONER

## 2022-10-28 PROCEDURE — 43235 EGD DIAGNOSTIC BRUSH WASH: CPT | Performed by: STUDENT IN AN ORGANIZED HEALTH CARE EDUCATION/TRAINING PROGRAM

## 2022-10-28 PROCEDURE — 99223 PR INITIAL HOSPITAL CARE,LEVL III: ICD-10-PCS | Mod: 25,GC,, | Performed by: STUDENT IN AN ORGANIZED HEALTH CARE EDUCATION/TRAINING PROGRAM

## 2022-10-28 PROCEDURE — 36415 COLL VENOUS BLD VENIPUNCTURE: CPT | Performed by: NURSE PRACTITIONER

## 2022-10-28 PROCEDURE — 37000009 HC ANESTHESIA EA ADD 15 MINS: Performed by: STUDENT IN AN ORGANIZED HEALTH CARE EDUCATION/TRAINING PROGRAM

## 2022-10-28 PROCEDURE — D9220A PRA ANESTHESIA: ICD-10-PCS | Mod: CRNA,,, | Performed by: NURSE ANESTHETIST, CERTIFIED REGISTERED

## 2022-10-28 PROCEDURE — 63600175 PHARM REV CODE 636 W HCPCS: Performed by: NURSE ANESTHETIST, CERTIFIED REGISTERED

## 2022-10-28 PROCEDURE — 96376 TX/PRO/DX INJ SAME DRUG ADON: CPT | Mod: 59

## 2022-10-28 PROCEDURE — G0378 HOSPITAL OBSERVATION PER HR: HCPCS

## 2022-10-28 PROCEDURE — C9113 INJ PANTOPRAZOLE SODIUM, VIA: HCPCS | Performed by: NURSE PRACTITIONER

## 2022-10-28 PROCEDURE — P9021 RED BLOOD CELLS UNIT: HCPCS | Performed by: HOSPITALIST

## 2022-10-28 PROCEDURE — 86901 BLOOD TYPING SEROLOGIC RH(D): CPT | Performed by: HOSPITALIST

## 2022-10-28 PROCEDURE — D9220A PRA ANESTHESIA: Mod: ANES,,, | Performed by: ANESTHESIOLOGY

## 2022-10-28 PROCEDURE — 25000003 PHARM REV CODE 250: Performed by: NURSE PRACTITIONER

## 2022-10-28 PROCEDURE — D9220A PRA ANESTHESIA: Mod: CRNA,,, | Performed by: NURSE ANESTHETIST, CERTIFIED REGISTERED

## 2022-10-28 PROCEDURE — 63600175 PHARM REV CODE 636 W HCPCS: Performed by: NURSE PRACTITIONER

## 2022-10-28 PROCEDURE — 25000003 PHARM REV CODE 250: Performed by: NURSE ANESTHETIST, CERTIFIED REGISTERED

## 2022-10-28 PROCEDURE — 99223 1ST HOSP IP/OBS HIGH 75: CPT | Mod: 25,GC,, | Performed by: STUDENT IN AN ORGANIZED HEALTH CARE EDUCATION/TRAINING PROGRAM

## 2022-10-28 PROCEDURE — 85610 PROTHROMBIN TIME: CPT | Performed by: STUDENT IN AN ORGANIZED HEALTH CARE EDUCATION/TRAINING PROGRAM

## 2022-10-28 PROCEDURE — 80053 COMPREHEN METABOLIC PANEL: CPT | Performed by: NURSE PRACTITIONER

## 2022-10-28 PROCEDURE — 11000001 HC ACUTE MED/SURG PRIVATE ROOM

## 2022-10-28 PROCEDURE — 37000008 HC ANESTHESIA 1ST 15 MINUTES: Performed by: STUDENT IN AN ORGANIZED HEALTH CARE EDUCATION/TRAINING PROGRAM

## 2022-10-28 PROCEDURE — 86920 COMPATIBILITY TEST SPIN: CPT | Performed by: HOSPITALIST

## 2022-10-28 PROCEDURE — 36430 TRANSFUSION BLD/BLD COMPNT: CPT

## 2022-10-28 PROCEDURE — 43235 PR EGD, FLEX, DIAGNOSTIC: ICD-10-PCS | Mod: GC,,, | Performed by: STUDENT IN AN ORGANIZED HEALTH CARE EDUCATION/TRAINING PROGRAM

## 2022-10-28 PROCEDURE — 25000003 PHARM REV CODE 250: Performed by: HOSPITALIST

## 2022-10-28 RX ORDER — POLYETHYLENE GLYCOL 3350, SODIUM SULFATE ANHYDROUS, SODIUM BICARBONATE, SODIUM CHLORIDE, POTASSIUM CHLORIDE 236; 22.74; 6.74; 5.86; 2.97 G/4L; G/4L; G/4L; G/4L; G/4L
4000 POWDER, FOR SOLUTION ORAL ONCE
Status: COMPLETED | OUTPATIENT
Start: 2022-10-30 | End: 2022-10-30

## 2022-10-28 RX ORDER — PHENYLEPHRINE HYDROCHLORIDE 10 MG/ML
INJECTION INTRAVENOUS
Status: DISCONTINUED | OUTPATIENT
Start: 2022-10-28 | End: 2022-10-28

## 2022-10-28 RX ORDER — LIDOCAINE HCL/PF 100 MG/5ML
SYRINGE (ML) INTRAVENOUS
Status: DISCONTINUED | OUTPATIENT
Start: 2022-10-28 | End: 2022-10-28

## 2022-10-28 RX ORDER — SODIUM CHLORIDE 0.9 % (FLUSH) 0.9 %
10 SYRINGE (ML) INJECTION
Status: DISCONTINUED | OUTPATIENT
Start: 2022-10-28 | End: 2022-10-28 | Stop reason: HOSPADM

## 2022-10-28 RX ORDER — PROPOFOL 10 MG/ML
INJECTION, EMULSION INTRAVENOUS
Status: DISCONTINUED | OUTPATIENT
Start: 2022-10-28 | End: 2022-10-28

## 2022-10-28 RX ORDER — PROPOFOL 10 MG/ML
INJECTION, EMULSION INTRAVENOUS CONTINUOUS PRN
Status: DISCONTINUED | OUTPATIENT
Start: 2022-10-28 | End: 2022-10-28

## 2022-10-28 RX ORDER — NAPROXEN SODIUM 220 MG/1
81 TABLET, FILM COATED ORAL DAILY
Status: DISCONTINUED | OUTPATIENT
Start: 2022-10-28 | End: 2022-10-30

## 2022-10-28 RX ADMIN — PROPOFOL 75 MCG/KG/MIN: 10 INJECTION, EMULSION INTRAVENOUS at 11:10

## 2022-10-28 RX ADMIN — SODIUM CHLORIDE: 0.9 INJECTION, SOLUTION INTRAVENOUS at 11:10

## 2022-10-28 RX ADMIN — PROPOFOL 40 MG: 10 INJECTION, EMULSION INTRAVENOUS at 11:10

## 2022-10-28 RX ADMIN — GABAPENTIN 900 MG: 300 CAPSULE ORAL at 03:10

## 2022-10-28 RX ADMIN — GABAPENTIN 900 MG: 300 CAPSULE ORAL at 08:10

## 2022-10-28 RX ADMIN — HYDROCODONE BITARTRATE AND ACETAMINOPHEN 1 TABLET: 5; 325 TABLET ORAL at 12:10

## 2022-10-28 RX ADMIN — PANTOPRAZOLE SODIUM 40 MG: 40 INJECTION, POWDER, FOR SOLUTION INTRAVENOUS at 08:10

## 2022-10-28 RX ADMIN — PHENYLEPHRINE HYDROCHLORIDE 100 MCG: 10 INJECTION INTRAVENOUS at 11:10

## 2022-10-28 RX ADMIN — Medication 100 MG: at 11:10

## 2022-10-28 RX ADMIN — ATORVASTATIN CALCIUM 80 MG: 40 TABLET, FILM COATED ORAL at 08:10

## 2022-10-28 RX ADMIN — EZETIMIBE 10 MG: 10 TABLET ORAL at 08:10

## 2022-10-28 RX ADMIN — PROPOFOL 70 MG: 10 INJECTION, EMULSION INTRAVENOUS at 11:10

## 2022-10-28 RX ADMIN — HYDROCODONE BITARTRATE AND ACETAMINOPHEN 1 TABLET: 5; 325 TABLET ORAL at 08:10

## 2022-10-28 RX ADMIN — ASPIRIN 81 MG: 81 TABLET, CHEWABLE ORAL at 03:10

## 2022-10-28 NOTE — ED NOTES
Pt c/o 9/10 pain to samina legs. Pt states this happens often and is from previous grafts. PRN pain meds administered.

## 2022-10-28 NOTE — ASSESSMENT & PLAN NOTE
72 y.o. with recent admission for acute RLE ischemia on xarelto, ASA, and cilostazol with recent admit for GI bleed requiring multiple blood transfusions, presents with multiple episodes of melena and acute anemia with Hgb 6.7   - HDS, with mild tachycardia and hypotension, improving   - Receiving 1 unit pRBCs tonight   - PT 31.3, INR 3.2  - Protonix 80mg IV bolus x 1 then Protonix 40mg IV BID   - Hold ASA, xarelto, and cilostazol   - IVFs; maintain access with 2 large bore IVs  - Keep NPO  - Follow H/H with serial CBC   - Transfuse pRBCs as needed for Hgb <7, type and screen ordered & blood consented   - Orthostatic vitals.  - Anti-emetics as needed  - GI consulted

## 2022-10-28 NOTE — CONSULTS
[facility] - [facility] Surg  Wound Care    Patient Name:  Babatunde Singh   MRN:  [id number]  Date: 10/28/2022  Diagnosis: Gastrointestinal hemorrhage    History:     Past Medical History:   Diagnosis Date    DVT (deep venous thrombosis)     Hypertension     Peripheral arterial disease        Social History     Socioeconomic History    Marital status:    Occupational History    Occupation: doorman [employer]   Tobacco Use    Smoking status: Former     Packs/day: 0.50     Years: 50.00     Pack years: 25.00     Types: Cigarettes    Smokeless tobacco: Never    Tobacco comments:     Cigarettes3-4 per day   Substance and Sexual Activity    Alcohol use: Never    Drug use: Never    Sexual activity: Yes     Partners: Female     Comment: wife       Precautions:     Allergies as of 10/27/2022    (No Known Allergies)       WO Assessment Details/Treatment       Patient seen for wound consult evaluation of RLE. History of severe PAD s/p fem bypass 10/6.  BRENNAN 10/11/22 was 0.69. Recently saw Dr. Ortiz with vascular surgery in clinic 10/25/22, with recommendations for enzymatic debridement and follow up with 3months.     Recommend daily santyl dressing changes to right calf/leg ulcerations. Right heel ulcer recommend every other day mepilex foam dressing changes.    Updated Dr. Gillian Watts MD approved recommendations.    Nursing to continue care. Contact wound care dept for any further needs.     10/28/22 1405        Altered Skin Integrity 10/20/22 0750 Right posterior;proximal Calf Ulceration   Date First Assessed/Time First Assessed: 10/20/22 0750   Altered Skin Integrity Present on Admission: (c) yes  Side: Right  Orientation: posterior;proximal  Location: Calf  Primary Wound Type: Ulceration   Drainage Amount Small   Drainage Characteristics/Odor Serous;No odor   Appearance Moist;Eschar;Slough;Red   Tissue loss description Full thickness   Black (%), Wound Tissue Color 95 %   Red (%), Wound Tissue Color 2.5 %   Yellow (%), Wound  Tissue Color 2.5 %   Periwound Area Intact   Wound Edges Undefined   Wound Length (cm) 5 cm   Wound Width (cm) 4 cm   Wound Surface Area (cm^2) 20 cm^2   Care Cleansed with:;Sterile normal saline   Dressing   (xeroform,kerlix, ace)        Altered Skin Integrity 10/28/22 1405 Right lower;distal Leg Ulceration   Date First Assessed/Time First Assessed: 10/28/22 1405   Altered Skin Integrity Present on Admission: yes  Side: Right  Orientation: lower;distal  Location: Leg  Primary Wound Type: Ulceration   Drainage Amount Scant   Drainage Characteristics/Odor Serous;No odor   Appearance Moist;Eschar;Slough;Pink   Tissue loss description Full thickness   Black (%), Wound Tissue Color 90 %   Red (%), Wound Tissue Color 5 %   Yellow (%), Wound Tissue Color 5 %   Periwound Area Intact   Wound Edges Undefined   Wound Length (cm) 10 cm   Wound Width (cm) 5 cm   Wound Surface Area (cm^2) 50 cm^2   Care Cleansed with:;Sterile normal saline   Dressing   (xeroform, kerlix, ace)        Altered Skin Integrity 10/28/22 1405 Right lateral Heel Ulceration   Date First Assessed/Time First Assessed: 10/28/22 1405   Altered Skin Integrity Present on Admission: yes  Side: Right  Orientation: lateral  Location: Heel  Primary Wound Type: Ulceration   Drainage Amount None   Drainage Characteristics/Odor No odor   Appearance Moist;Red   Tissue loss description Partial thickness   Red (%), Wound Tissue Color 100 %   Periwound Area Dry   Wound Edges Undefined   Wound Length (cm) 1 cm   Wound Width (cm) 1 cm   Wound Depth (cm) 0.1 cm   Wound Volume (cm^3) 0.1 cm^3   Wound Surface Area (cm^2) 1 cm^2   Care Cleansed with:;Sterile normal saline   Dressing Foam

## 2022-10-28 NOTE — NURSING TRANSFER
Nursing Transfer Note      10/28/2022     Transfer To: 650    Transfer via stretcher    Transfer with cardiac monitoring via centralized telemetry, cell phone     Transported by transport     Medicines sent: none    Chart send with patient: Yes    Notified daughter via text messaging system    Patient reassessed at: 10/28/2022 1230

## 2022-10-28 NOTE — ASSESSMENT & PLAN NOTE
Patient is chronically on statin.will continue for now. Monitor clinically. Last LDL was   Lab Results   Component Value Date    LDLCALC 86.0 03/10/2022

## 2022-10-28 NOTE — H&P
[facility] - Emergency Dept  [facility] Medicine  History & Physical    Patient Name: Babatunde Singh  MRN: [id number]  Patient Class: IP- Inpatient  Admission Date: 10/27/2022  Attending Physician: Gillian Watts MD   Primary Care Provider: Esdras Ly MD         Patient information was obtained from patient, past medical records, and ER records.     Subjective:     Principal Problem:Gastrointestinal hemorrhage    Chief Complaint:   Chief Complaint   Patient presents with    Anemia     Pt to ER via EMS from [facility] room number P104 for abnormal labs of Hgb 6.9, Hct 21.4, platelets 585. Denies GI bleed. On xarelto. He is currently AAOx4 & denies pain.         HPI: Babatunde Singh is a 72 y.o. male with a PMHx of DVT, HTN, and PAD s/p axillary to bi-profunda bypass who presented to the ED from [facility] for low blood counts and melena. He says he was recently seen for this a week ago. He has had constipation and a few episodes of black, tarry stools since admission. He is on xarelto and ASA. He says he feels great. Endorses mild RLE pain which is a chronic issue. He denies chest pain, SOB, dizziness, headache, abdominal pain, BRBPR, hematemesis, dizziness, weakness, or numbness.     In the ED: mildly hypotensive to 100s/60s,  othwerwise VSSAF. Hgb 6.7. Plt 533k. PT/INR 3.2/31.3. Rectal exam performed in the ED significant for melanotic stools, guaiac +. Pt was given 80 mg IV Protonix and 1 unit pRBCs.    Past Medical History:   Diagnosis Date    DVT (deep venous thrombosis)     Hypertension     Peripheral arterial disease        Past Surgical History:   Procedure Laterality Date    CREATION OF AXILLARY-FEMORAL ARTERY BYPASS WITH GRAFT N/A 10/6/2022    Procedure: CREATION, BYPASS, ARTERIAL, AXILLARY TO FEMORAL, USING GRAFT;  Surgeon: LEI Ortiz III, MD;  Location: [facility] OR [address] [place] [address];  Service: Peripheral Vascular;  Laterality: N/A;    CREATION OF FEMORAL-FEMORAL ARTERY BYPASS WITH GRAFT N/A  10/6/2022    Procedure: CREATION, BYPASS, ARTERIAL, FEMORAL TO FEMORAL, USING GRAFT;  Surgeon: LEI Ortiz III, MD;  Location: [facility] OR 2ND FLR;  Service: Peripheral Vascular;  Laterality: N/A;    ESOPHAGOGASTRODUODENOSCOPY N/A 10/20/2022    Procedure: EGD (ESOPHAGOGASTRODUODENOSCOPY);  Surgeon: Sixto Chicas MD;  Location: [facility] ENDO (2ND FLR);  Service: Endoscopy;  Laterality: N/A;    EXCISION OF HYDROCELE Left 4/26/2022    Procedure: HYDROCELECTOMY;  Surgeon: Damion Roberts MD;  Location: [facility];  Service: Urology;  Laterality: Left;    KNEE SURGERY  1972       Review of patient's allergies indicates:  No Known Allergies    No current facility-administered medications on file prior to encounter.     Current Outpatient Medications on File Prior to Encounter   Medication Sig    acetaminophen (TYLENOL) 325 MG tablet Take 2 tablets (650 mg total) by mouth every 8 (eight) hours as needed.    aspirin 81 MG Chew Chew and swallow 1 tablet (81 mg total) by mouth once daily.    atorvastatin (LIPITOR) 80 MG tablet Take 1 tablet (80 mg total) by mouth once daily.    cilostazoL (PLETAL) 100 MG Tab Take 1 tablet (100 mg total) by mouth 2 (two) times daily.    ezetimibe (ZETIA) 10 mg tablet Take 1 tablet (10 mg total) by mouth once daily.    gabapentin (NEURONTIN) 300 MG capsule Take 3 capsules (900 mg total) by mouth 3 (three) times daily.    HYDROcodone-acetaminophen (NORCO) 5-325 mg per tablet Take 1 tablet by mouth every 4 (four) hours as needed for Pain.    losartan-hydrochlorothiazide 50-12.5 mg (HYZAAR) 50-12.5 mg per tablet Take 1 tablet by mouth once daily.    multivitamin (THERAGRAN) per tablet Take 1 tablet by mouth once daily.    mupirocin (BACTROBAN) 2 % ointment Apply topically 2 (two) times daily.    naproxen (NAPROSYN) 500 MG tablet Take 1 tablet (500 mg total) by mouth 2 (two) times daily as needed (pain).    pantoprazole (PROTONIX) 40 MG tablet Take 1 tablet (40 mg total) by mouth once daily.     rivaroxaban (XARELTO DVT-PE TREAT 30D START) 15 mg (42)- 20 mg (9) tablet dose pack Take 1 tablet (15 mg) by mouth twice daily with food for 21 days followed by 1 tablet (20 mg) by mouth once daily with food    terbinafine HCl (LAMISIL ORAL) Take by mouth once daily at 6am.     Family History       Problem Relation (Age of Onset)    Hypertension Mother, Father          Tobacco Use    Smoking status: Former     Packs/day: 0.50     Years: 50.00     Pack years: 25.00     Types: Cigarettes    Smokeless tobacco: Never    Tobacco comments:     Cigarettes3-4 per day   Substance and Sexual Activity    Alcohol use: Never    Drug use: Never    Sexual activity: Yes     Partners: Female     Comment: wife     Review of Systems   Constitutional:  Negative for appetite change, chills, diaphoresis, fatigue and fever.   HENT:  Negative for congestion, rhinorrhea and sore throat.    Eyes:  Negative for photophobia and visual disturbance.   Respiratory:  Negative for cough, chest tightness and shortness of breath.    Cardiovascular:  Negative for chest pain, palpitations and leg swelling.   Gastrointestinal:  Positive for blood in stool and constipation. Negative for abdominal distention, abdominal pain, diarrhea, nausea and vomiting.   Genitourinary:  Negative for dysuria, flank pain and hematuria.   Musculoskeletal:  Positive for arthralgias. Negative for back pain and neck pain.   Skin:  Positive for wound (RLE). Negative for color change, pallor and rash.   Neurological:  Negative for dizziness, syncope, weakness and light-headedness.   Psychiatric/Behavioral:  Negative for confusion and hallucinations. The patient is not nervous/anxious.    Objective:     Vital Signs (Most Recent):  Temp: 98.5 °F (36.9 °C) (10/27/22 1907)  Pulse: 104 (10/27/22 1957)  Resp: 18 (10/27/22 1957)  BP: 103/65 (10/27/22 1907)  SpO2: 96 % (10/27/22 1957) Vital Signs (24h Range):  Temp:  [98.5 °F (36.9 °C)] 98.5 °F (36.9 °C)  Pulse:  [104] 104  Resp:   [18] 18  SpO2:  [96 %] 96 %  BP: (103)/(65) 103/65        There is no height or weight on file to calculate BMI.    Physical Exam  Vitals and nursing note reviewed.   Constitutional:       Appearance: He is not toxic-appearing or diaphoretic.   HENT:      Head: Normocephalic and atraumatic.      Nose: Nose normal.      Mouth/Throat:      Mouth: Mucous membranes are moist.   Eyes:      Pupils: Pupils are equal, round, and reactive to light.   Cardiovascular:      Rate and Rhythm: Regular rhythm. Tachycardia present.   Pulmonary:      Effort: Pulmonary effort is normal. No respiratory distress.      Breath sounds: No wheezing, rhonchi or rales.   Abdominal:      General: Bowel sounds are normal. There is no distension.      Palpations: Abdomen is soft.      Tenderness: There is no abdominal tenderness. There is no guarding.   Musculoskeletal:         General: No deformity. Normal range of motion.      Cervical back: Normal range of motion.      Right lower leg: Edema present.      Left lower leg: Edema present.   Skin:     General: Skin is warm and dry.      Capillary Refill: Capillary refill takes 2 to 3 seconds.      Comments: Dressing in place to RLE, C/D/I   Neurological:      General: No focal deficit present.      Mental Status: He is alert and oriented to person, place, and time.      Sensory: No sensory deficit.      Motor: No weakness.   Psychiatric:         Mood and Affect: Mood normal.         Behavior: Behavior normal.         CRANIAL NERVES     CN III, IV, VI   Pupils are equal, round, and reactive to light.     Significant Labs: All pertinent labs within the past 24 hours have been reviewed.  CBC:   Recent Labs   Lab 10/27/22  2042   WBC 11.21  11.21   HGB 6.7*  6.7*   HCT 21.2*  21.2*   *  533*     CMP:   Recent Labs   Lab 10/27/22  2042   CO2 24   *   BUN 17   CREATININE 0.8   CALCIUM 8.2*   PROT 5.0*   ALBUMIN 2.5*   BILITOT 0.2   ALKPHOS 76   AST 18   ALT 19   ANIONGAP 7*        Significant Imaging: I have reviewed all pertinent imaging results/findings within the past 24 hours.  Assessment/Plan:     * Gastrointestinal hemorrhage  72 y.o. with recent admission for acute RLE ischemia on xarelto, ASA, and cilostazol with recent admit for GI bleed requiring multiple blood transfusions, presents with multiple episodes of melena and acute anemia with Hgb 6.7   - HDS, with mild tachycardia and hypotension, improving   - Receiving 1 unit pRBCs tonight   - PT 31.3, INR 3.2  - Protonix 80mg IV bolus x 1 then Protonix 40mg IV BID   - Hold ASA, xarelto, and cilostazol   - IVFs; maintain access with 2 large bore IVs  - Keep NPO  - Follow H/H with serial CBC   - Transfuse pRBCs as needed for Hgb <7, type and screen ordered & blood consented   - Orthostatic vitals.  - Anti-emetics as needed  - GI consulted    Anemia  Patient's anemia is currently uncontrolled. S/p 1 units of PRBCs. Etiology likely d/t GIB  Current CBC reviewed-   Lab Results   Component Value Date    HGB 6.7 (L) 10/27/2022    HGB 6.7 (L) 10/27/2022    HCT 21.2 (L) 10/27/2022    HCT 21.2 (L) 10/27/2022     Monitor serial CBC and transfuse if patient becomes hemodynamically unstable, symptomatic or H/H drops below 7/21.     DVT (deep venous thrombosis)  Duplex imaging of the right lower extremity veins on 10/5 demonstrates occlusive sub-acute thrombus of the anterior tibial vein (the anterior paired vessel).   -Hold xarelto in the setting of acute GIB.    PAD (peripheral artery disease)  Lower limb ischemia  Pt is s/p left axillary to BILATERAL femoris profunda bypass with 8 mm PTFE on 10/6 by vascular surgery.   -Continue statin and zetia.  -Hold ASA and cilostazol in the setting of GIB  -Wound care consulted for ulcer care    Mixed hyperlipidemia   Patient is chronically on statin.will continue for now. Monitor clinically. Last LDL was   Lab Results   Component Value Date    LDLCALC 86.0 03/10/2022       Dependence on nicotine from  cigarettes  Assistance with smoking cessation was offered, including:  [x]  Medications  [x]  Counseling  []  Printed Information on Smoking Cessation  []  Referral to a Smoking Cessation Program    Patient was counseled regarding smoking for 3-10 minutes.    Primary hypertension  - Controlled on admission   - Latest BP and vitals reviewed  - Hold losartan/hctz in the setting of GIB.    VTE Risk Mitigation (From admission, onward)           Ordered     IP VTE HIGH RISK PATIENT  Once         10/27/22 2218     Place sequential compression device  Until discontinued         10/27/22 2218                       Amanda Schmitt NP  Department of Hospital Medicine   [facility] - Emergency Dept

## 2022-10-28 NOTE — NURSING
Pt arrived to room 650 with transport/nurse. Blood transfusion in progress. VSS. Right lower leg wound wrapped with dressing unable to view. Lower extremities with 3 + pitting edema noted. NPO except meds at this time. Safety maintained.Will monitor.

## 2022-10-28 NOTE — ED NOTES
Pt transported by RN with blood infusing. All consents and pt belongings with pt. Pt on tele. VSS.

## 2022-10-28 NOTE — ASSESSMENT & PLAN NOTE
Duplex imaging of the right lower extremity veins on 10/5 demonstrates occlusive sub-acute thrombus of the anterior tibial vein (the anterior paired vessel).   -Hold xarelto in the setting of acute GIB.

## 2022-10-28 NOTE — H&P
[facility]  Gastroenterology  Consult Note    Patient Name: Babatunde Singh  MRN: [id number]  Admission Date: 10/27/2022  Hospital Length of Stay: 1 days  Code Status: Full Code   Attending Provider:  Dr. Hopson  Consulting Provider: Jenaro Noland MD  Primary Care Physician: Esdras Ly MD  Principal Problem:Gastrointestinal hemorrhage    Inpatient consult to Gastroenterology  Consult performed by: Jenaro Noland MD  Consult ordered by: Amanda Schmitt NP  Reason for consult: Upper GI bleeding      Subjective:     HPI: Babatunde Singh is a 72 y.o. male with history of DVT, HTN, severe PAD s/p fem bypass 10/6 who presents to the ER for melena.  On arrival to the ER, his blood pressure was 100/60 mm of Hg and heart rate was 104 per minute.  Hemoglobin 1 arrival was 6.7 (down from 9).  He received 1 unit of blood.      Had an admission for melena 1 week ago, EGD at that time did not show evidence of bleeding.  He has never received a colonoscopy in the past.  His last dose of Xarelto was day before yesterday.  No history of liver disease.  Patient denies use of any over-the-counter NSAIDs.        Endoscopic hx:  EGD (10/20/2022):                         - Normal esophagus.                          - Z-line regular.                          - Gastritis.                          - Normal examined duodenum.                          - No specimens collected.       Past Medical History:   Diagnosis Date    DVT (deep venous thrombosis)     Hypertension     Peripheral arterial disease        Past Surgical History:   Procedure Laterality Date    CREATION OF AXILLARY-FEMORAL ARTERY BYPASS WITH GRAFT N/A 10/6/2022    Procedure: CREATION, BYPASS, ARTERIAL, AXILLARY TO FEMORAL, USING GRAFT;  Surgeon: LEI Ortiz III, MD;  Location: [facility] OR [address] [place] [address];  Service: Peripheral Vascular;  Laterality: N/A;    CREATION OF FEMORAL-FEMORAL ARTERY BYPASS WITH GRAFT N/A 10/6/2022    Procedure: CREATION, BYPASS,  ARTERIAL, FEMORAL TO FEMORAL, USING GRAFT;  Surgeon: LEI Ortiz III, MD;  Location: [facility] OR 2ND FLR;  Service: Peripheral Vascular;  Laterality: N/A;    ESOPHAGOGASTRODUODENOSCOPY N/A 10/20/2022    Procedure: EGD (ESOPHAGOGASTRODUODENOSCOPY);  Surgeon: Sixto Chicas MD;  Location: [facility] ENDO (2ND FLR);  Service: Endoscopy;  Laterality: N/A;    EXCISION OF HYDROCELE Left 4/26/2022    Procedure: HYDROCELECTOMY;  Surgeon: Damion Roberts MD;  Location: [facility] OR;  Service: Urology;  Laterality: Left;    KNEE SURGERY  1972       Family History   Problem Relation Age of Onset    Hypertension Mother     Hypertension Father        Social History     Socioeconomic History    Marital status:    Occupational History    Occupation: [email or web address]   Tobacco Use    Smoking status: Former     Packs/day: 0.50     Years: 50.00     Pack years: 25.00     Types: Cigarettes    Smokeless tobacco: Never    Tobacco comments:     Cigarettes3-4 per day   Substance and Sexual Activity    Alcohol use: Never    Drug use: Never    Sexual activity: Yes     Partners: Female     Comment: wife       No current facility-administered medications on file prior to encounter.     Current Outpatient Medications on File Prior to Encounter   Medication Sig Dispense Refill    acetaminophen (TYLENOL) 325 MG tablet Take 2 tablets (650 mg total) by mouth every 8 (eight) hours as needed. 20 tablet 0    aspirin 81 MG Chew Chew and swallow 1 tablet (81 mg total) by mouth once daily. 30 tablet 0    atorvastatin (LIPITOR) 80 MG tablet Take 1 tablet (80 mg total) by mouth once daily. 30 tablet 2    cilostazoL (PLETAL) 100 MG Tab Take 1 tablet (100 mg total) by mouth 2 (two) times daily. 180 tablet 3    ezetimibe (ZETIA) 10 mg tablet Take 1 tablet (10 mg total) by mouth once daily. 90 tablet 3    gabapentin (NEURONTIN) 300 MG capsule Take 3 capsules (900 mg total) by mouth 3 (three) times daily. 540 capsule 3    HYDROcodone-acetaminophen  (NORCO) 5-325 mg per tablet Take 1 tablet by mouth every 4 (four) hours as needed for Pain. 10 tablet 0    losartan-hydrochlorothiazide 50-12.5 mg (HYZAAR) 50-12.5 mg per tablet Take 1 tablet by mouth once daily. 30 tablet 2    multivitamin (THERAGRAN) per tablet Take 1 tablet by mouth once daily.      mupirocin (BACTROBAN) 2 % ointment Apply topically 2 (two) times daily. 30 g 1    naproxen (NAPROSYN) 500 MG tablet Take 1 tablet (500 mg total) by mouth 2 (two) times daily as needed (pain). 60 tablet 0    pantoprazole (PROTONIX) 40 MG tablet Take 1 tablet (40 mg total) by mouth once daily. 30 tablet 11    rivaroxaban (XARELTO DVT-PE TREAT 30D START) 15 mg (42)- 20 mg (9) tablet dose pack Take 1 tablet (15 mg) by mouth twice daily with food for 21 days followed by 1 tablet (20 mg) by mouth once daily with food 51 tablet 0    terbinafine HCl (LAMISIL ORAL) Take by mouth once daily at 6am.         Review of patient's allergies indicates:  No Known Allergies    Review of Systems   Constitutional:  Negative for chills and fever.   HENT:  Negative for congestion and sore throat.    Eyes:  Negative for blurred vision and double vision.   Respiratory:  Negative for cough and shortness of breath.    Cardiovascular:  Negative for chest pain and palpitations.   Gastrointestinal:         See HPI   Genitourinary:  Negative for frequency and urgency.   Musculoskeletal:  Negative for joint pain and myalgias.   Skin:  Negative for itching and rash.   Neurological:  Negative for sensory change and focal weakness.      Objective:     Vitals:    10/28/22 0845   BP:    Pulse:    Resp: 18   Temp:        Constitutional:  not in acute distress and well developed  HENT: Head: Normal, normocephalic, atraumatic.  Eyes: conjunctiva clear and sclera nonicteric  Cardiovascular: regular rate and rhythm and no murmur  Respiratory: normal chest expansion & respiratory effort   and no accessory muscle use  GI: soft, non-tender, without  masses or organomegaly. On rectal exam, no external hemorrhoids, dark blood on finger.   Musculoskeletal: no muscular tenderness noted  Skin: normal color  Neurological: alert, oriented x3  Psychiatric: mood and affect are within normal limits, pt is a good historian; no memory problems were noted       Significant Labs:  Recent Labs   Lab 10/27/22  2042 10/28/22  0755   HGB 6.7*  6.7* 7.7*  7.6*       Lab Results   Component Value Date    WBC 12.87 (H) 10/28/2022    WBC 12.55 10/28/2022    HGB 7.6 (L) 10/28/2022    HGB 7.7 (L) 10/28/2022    HCT 24.3 (L) 10/28/2022    HCT 24.7 (L) 10/28/2022    MCV 92 10/28/2022    MCV 92 10/28/2022     10/28/2022     10/28/2022       Lab Results   Component Value Date     10/27/2022    K 3.9 10/27/2022     10/27/2022    CO2 24 10/27/2022    BUN 17 10/27/2022    CREATININE 0.8 10/27/2022    CALCIUM 8.2 (L) 10/27/2022    ANIONGAP 7 (L) 10/27/2022    ESTGFRAFRICA >60.0 03/10/2022    EGFRNONAA >60.0 03/10/2022       Lab Results   Component Value Date    ALT 19 10/27/2022    AST 18 10/27/2022    ALKPHOS 76 10/27/2022    BILITOT 0.2 10/27/2022       Lab Results   Component Value Date    INR 1.2 10/28/2022    INR 3.2 (H) 10/27/2022    INR 1.3 (H) 10/19/2022       Significant Imaging:  Reviewed pertinent radiology findings.       Assessment/Plan:     Babatunde Singh is a 72 y.o. male with history of DVT, HTN, severe PAD s/p fem bypass 10/6 who presents to the ER for melena.  On arrival to the ER, his blood pressure was 100/60 mm of Hg and heart rate was 104 per minute.  Hemoglobin 1 arrival was 6.7 (down from 9).  He received 1 unit of blood.  Had an admission for melena 1 week ago, EGD at that time did not show evidence of bleeding.  He has never received a colonoscopy in the past.  His last dose of Xarelto was day before yesterday.      EGD (10/20/2022):                         - Normal esophagus.                          - Z-line regular.                           - Gastritis.                          - Normal examined duodenum.                          - No specimens collected.     Problem List:  Melena    Recommendations:  - Plan for EGD today. If EGD does not show a source of bleeding, we will consider colonoscopy - inpatient vs outpatient depending on his trajectory.   - Trend Hgb q8hrs. Transfuse for Hgb <7, unless otherwise indicated  - Maintain IV access with 2 large bore Ivs  - Intravascular resuscitation/support with IVFs   - NPO till procedure is completed  - Hold all NSAIDs and anticoagulants, unless contraindicated  - Bolus IV pantoprazole 80mg followed by 40mg BID  - Please correct any coagulopathy with platelets and FFP for goal of platelets >50K and INR <2.0  - Please notify GI team if there is significant change in patient's clinical status     Thank you for involving us in the care of Babatunde Singh. Please call with any additional questions, concerns or changes in the patient's clinical status. We will continue to follow.    Jenaro Noland MD  Gastroenterology Fellow PGY IV  [facility]

## 2022-10-28 NOTE — ASSESSMENT & PLAN NOTE
- Controlled on admission   - Latest BP and vitals reviewed  - Hold losartan/hctz in the setting of GIB.

## 2022-10-28 NOTE — SUBJECTIVE & OBJECTIVE
Past Medical History:   Diagnosis Date    DVT (deep venous thrombosis)     Hypertension     Peripheral arterial disease        Past Surgical History:   Procedure Laterality Date    CREATION OF AXILLARY-FEMORAL ARTERY BYPASS WITH GRAFT N/A 10/6/2022    Procedure: CREATION, BYPASS, ARTERIAL, AXILLARY TO FEMORAL, USING GRAFT;  Surgeon: LEI Ortiz III, MD;  [facility];  Service: Peripheral Vascular;  Laterality: N/A;    CREATION OF FEMORAL-FEMORAL ARTERY BYPASS WITH GRAFT N/A 10/6/2022    Procedure: CREATION, BYPASS, ARTERIAL, FEMORAL TO FEMORAL, USING GRAFT;  Surgeon: LEI Ortiz III, MD;  [facility];  Service: Peripheral Vascular;  Laterality: N/A;    ESOPHAGOGASTRODUODENOSCOPY N/A 10/20/2022    Procedure: EGD (ESOPHAGOGASTRODUODENOSCOPY);  Surgeon: Sixto Chicas MD;  Location: [facility] (2ND FLR);  Service: Endoscopy;  Laterality: N/A;    EXCISION OF HYDROCELE Left 4/26/2022    Procedure: HYDROCELECTOMY;  Surgeon: Damion Roberts MD;  Location: [facility];  Service: Urology;  Laterality: Left;    KNEE SURGERY  1972       Review of patient's allergies indicates:  No Known Allergies    No current facility-administered medications on file prior to encounter.     Current Outpatient Medications on File Prior to Encounter   Medication Sig    acetaminophen (TYLENOL) 325 MG tablet Take 2 tablets (650 mg total) by mouth every 8 (eight) hours as needed.    aspirin 81 MG Chew Chew and swallow 1 tablet (81 mg total) by mouth once daily.    atorvastatin (LIPITOR) 80 MG tablet Take 1 tablet (80 mg total) by mouth once daily.    cilostazoL (PLETAL) 100 MG Tab Take 1 tablet (100 mg total) by mouth 2 (two) times daily.    ezetimibe (ZETIA) 10 mg tablet Take 1 tablet (10 mg total) by mouth once daily.    gabapentin (NEURONTIN) 300 MG capsule Take 3 capsules (900 mg total) by mouth 3 (three) times daily.    HYDROcodone-acetaminophen (NORCO) 5-325 mg per tablet Take 1 tablet by mouth every 4  (four) hours as needed for Pain.    losartan-hydrochlorothiazide 50-12.5 mg (HYZAAR) 50-12.5 mg per tablet Take 1 tablet by mouth once daily.    multivitamin (THERAGRAN) per tablet Take 1 tablet by mouth once daily.    mupirocin (BACTROBAN) 2 % ointment Apply topically 2 (two) times daily.    naproxen (NAPROSYN) 500 MG tablet Take 1 tablet (500 mg total) by mouth 2 (two) times daily as needed (pain).    pantoprazole (PROTONIX) 40 MG tablet Take 1 tablet (40 mg total) by mouth once daily.    rivaroxaban (XARELTO DVT-PE TREAT 30D START) 15 mg (42)- 20 mg (9) tablet dose pack Take 1 tablet (15 mg) by mouth twice daily with food for 21 days followed by 1 tablet (20 mg) by mouth once daily with food    terbinafine HCl (LAMISIL ORAL) Take by mouth once daily at 6am.     Family History       Problem Relation (Age of Onset)    Hypertension Mother, Father          Tobacco Use    Smoking status: Former     Packs/day: 0.50     Years: 50.00     Pack years: 25.00     Types: Cigarettes    Smokeless tobacco: Never    Tobacco comments:     Cigarettes3-4 per day   Substance and Sexual Activity    Alcohol use: Never    Drug use: Never    Sexual activity: Yes     Partners: Female     Comment: wife     Review of Systems   Constitutional:  Negative for appetite change, chills, diaphoresis, fatigue and fever.   HENT:  Negative for congestion, rhinorrhea and sore throat.    Eyes:  Negative for photophobia and visual disturbance.   Respiratory:  Negative for cough, chest tightness and shortness of breath.    Cardiovascular:  Negative for chest pain, palpitations and leg swelling.   Gastrointestinal:  Positive for blood in stool and constipation. Negative for abdominal distention, abdominal pain, diarrhea, nausea and vomiting.   Genitourinary:  Negative for dysuria, flank pain and hematuria.   Musculoskeletal:  Positive for arthralgias. Negative for back pain and neck pain.   Skin:  Positive for wound (RLE). Negative for color change,  pallor and rash.   Neurological:  Negative for dizziness, syncope, weakness and light-headedness.   Psychiatric/Behavioral:  Negative for confusion and hallucinations. The patient is not nervous/anxious.    Objective:     Vital Signs (Most Recent):  Temp: 98.5 °F (36.9 °C) (10/27/22 1907)  Pulse: 104 (10/27/22 1957)  Resp: 18 (10/27/22 1957)  BP: 103/65 (10/27/22 1907)  SpO2: 96 % (10/27/22 1957) Vital Signs (24h Range):  Temp:  [98.5 °F (36.9 °C)] 98.5 °F (36.9 °C)  Pulse:  [104] 104  Resp:  [18] 18  SpO2:  [96 %] 96 %  BP: (103)/(65) 103/65        There is no height or weight on file to calculate BMI.    Physical Exam  Vitals and nursing note reviewed.   Constitutional:       Appearance: He is not toxic-appearing or diaphoretic.   HENT:      Head: Normocephalic and atraumatic.      Nose: Nose normal.      Mouth/Throat:      Mouth: Mucous membranes are moist.   Eyes:      Pupils: Pupils are equal, round, and reactive to light.   Cardiovascular:      Rate and Rhythm: Regular rhythm. Tachycardia present.   Pulmonary:      Effort: Pulmonary effort is normal. No respiratory distress.      Breath sounds: No wheezing, rhonchi or rales.   Abdominal:      General: Bowel sounds are normal. There is no distension.      Palpations: Abdomen is soft.      Tenderness: There is no abdominal tenderness. There is no guarding.   Musculoskeletal:         General: No deformity. Normal range of motion.      Cervical back: Normal range of motion.      Right lower leg: Edema present.      Left lower leg: Edema present.   Skin:     General: Skin is warm and dry.      Capillary Refill: Capillary refill takes 2 to 3 seconds.      Comments: Dressing in place to E, C/D/I   Neurological:      General: No focal deficit present.      Mental Status: He is alert and oriented to person, place, and time.      Sensory: No sensory deficit.      Motor: No weakness.   Psychiatric:         Mood and Affect: Mood normal.         Behavior: Behavior  normal.         CRANIAL NERVES     CN III, IV, VI   Pupils are equal, round, and reactive to light.     Significant Labs: All pertinent labs within the past 24 hours have been reviewed.  CBC:   Recent Labs   Lab 10/27/22  2042   WBC 11.21  11.21   HGB 6.7*  6.7*   HCT 21.2*  21.2*   *  533*     CMP:   Recent Labs   Lab 10/27/22  2042   CO2 24   *   BUN 17   CREATININE 0.8   CALCIUM 8.2*   PROT 5.0*   ALBUMIN 2.5*   BILITOT 0.2   ALKPHOS 76   AST 18   ALT 19   ANIONGAP 7*       Significant Imaging: I have reviewed all pertinent imaging results/findings within the past 24 hours.

## 2022-10-28 NOTE — ANESTHESIA PREPROCEDURE EVALUATION
[facility]  Anesthesia Pre-Operative Evaluation         Patient Name/: Babatunde Singh, 1950  MRN: [id number]    SUBJECTIVE:     Pre-operative evaluation for Procedure(s) (LRB):  COLONOSCOPY (N/A)     10/28/2022    Babatunde Singh is a 72 y.o. male w/ a significant PMHx of DVT, HTN, severe PAD s/p fem bypass 10/6 who presented to the ED for melena now s/p EGD 10/28/22, now planning for colonoscopy.    Patient now presents for the above procedure(s).    TTE 10/10/22  Summary     The left ventricle is normal in size with concentric remodeling and hyperdynamic systolic function. The estimated ejection fraction is >70%.   Normal right ventricular size with normal right ventricular systolic function.   Grade I left ventricular diastolic dysfunction.   The estimated PA systolic pressure is 36 mmHg.   Normal central venous pressure (3 mmHg).        Prev airway:   Intubation     Date/Time: 10/6/2022 9:56 AM  Performed by: Estefanía Chicas MD  Authorized by: Cr Juarez Jr., MD      Intubation:     Induction:  Intravenous    Intubated:  Postinduction    Mask Ventilation:  Easy mask    Attempts:  2    Attempted By:  Resident anesthesiologist    Method of Intubation:  Direct    Blade:  Ernst 2    Laryngeal View Grade: Grade III - only epiglottis visible      Attempted By (2nd Attempt):  Resident anesthesiologist    Method of Intubation (2nd Attempt):  Video laryngoscopy    Blade (2nd Attempt):  Holder 3    Laryngeal View Grade (2nd Attempt): Grade I - full view of cords      Difficult Airway Encountered?: No      Complications:  None    Airway Device:  Oral endotracheal tube    Airway Device Size:  7.5    Style/Cuff Inflation:  Cuffed (inflated to minimal occlusive pressure)    Tube secured:  22    LDA:        Peripheral IV - Single Lumen 10/27/22 2047 20 G Left Hand (Active)   Site Assessment Dry;Intact 10/28/22 1230   Extremity Assessment Distal to IV No abnormal discoloration 10/28/22 1230    Line Status Flushed;Saline locked 10/28/22 1230   Dressing Status Dry;Intact 10/28/22 1230   Dressing Intervention First dressing 10/27/22 2047   Dressing Change Due 10/30/22 10/28/22 0426   Number of days: 0            Peripheral IV - Single Lumen 10/27/22 2048 20 G Right Hand (Active)   Site Assessment Dry;Intact 10/28/22 1230   Line Status Flushed;Saline locked 10/28/22 1230   Dressing Status Dry;Intact 10/28/22 1230   Dressing Intervention First dressing 10/27/22 2048   Dressing Change Due 10/30/22 10/28/22 0426   Number of days: 0       Drips: None documented.      Patient Active Problem List   Diagnosis    Primary hypertension    Dependence on nicotine from cigarettes    Mixed hyperlipidemia    Claudication of right lower extremity    Right leg pain    Sciatica of right side    Numbness and tingling of right leg    Right renal mass    Allodynia (Right leg)    PAD (peripheral artery disease)    Lower limb ischemia    Critical limb ischemia of right lower extremity with ulceration of lower leg    Absent pedal pulses    DVT (deep venous thrombosis)    Encephalopathy, metabolic    Severe sepsis    Tachycardia    Gastrointestinal hemorrhage    Acute blood loss anemia    Bilateral lower extremity edema       Review of patient's allergies indicates:  No Known Allergies    Current Inpatient Medications:    atorvastatin  80 mg Oral Daily    ezetimibe  10 mg Oral Daily    gabapentin  900 mg Oral TID    pantoprazole  40 mg Intravenous BID    [START ON 10/30/2022] polyethylene glycol  4,000 mL Oral Once       No current facility-administered medications on file prior to encounter.     Current Outpatient Medications on File Prior to Encounter   Medication Sig Dispense Refill    acetaminophen (TYLENOL) 325 MG tablet Take 2 tablets (650 mg total) by mouth every 8 (eight) hours as needed. 20 tablet 0    aspirin 81 MG Chew Chew and swallow 1 tablet (81 mg total) by mouth once daily. 30 tablet 0     atorvastatin (LIPITOR) 80 MG tablet Take 1 tablet (80 mg total) by mouth once daily. 30 tablet 2    cilostazoL (PLETAL) 100 MG Tab Take 1 tablet (100 mg total) by mouth 2 (two) times daily. 180 tablet 3    ezetimibe (ZETIA) 10 mg tablet Take 1 tablet (10 mg total) by mouth once daily. 90 tablet 3    gabapentin (NEURONTIN) 300 MG capsule Take 3 capsules (900 mg total) by mouth 3 (three) times daily. 540 capsule 3    HYDROcodone-acetaminophen (NORCO) 5-325 mg per tablet Take 1 tablet by mouth every 4 (four) hours as needed for Pain. 10 tablet 0    losartan-hydrochlorothiazide 50-12.5 mg (HYZAAR) 50-12.5 mg per tablet Take 1 tablet by mouth once daily. 30 tablet 2    multivitamin (THERAGRAN) per tablet Take 1 tablet by mouth once daily.      mupirocin (BACTROBAN) 2 % ointment Apply topically 2 (two) times daily. 30 g 1    naproxen (NAPROSYN) 500 MG tablet Take 1 tablet (500 mg total) by mouth 2 (two) times daily as needed (pain). 60 tablet 0    pantoprazole (PROTONIX) 40 MG tablet Take 1 tablet (40 mg total) by mouth once daily. 30 tablet 11    rivaroxaban (XARELTO DVT-PE TREAT 30D START) 15 mg (42)- 20 mg (9) tablet dose pack Take 1 tablet (15 mg) by mouth twice daily with food for 21 days followed by 1 tablet (20 mg) by mouth once daily with food 51 tablet 0    terbinafine HCl (LAMISIL ORAL) Take by mouth once daily at 6am.         Past Surgical History:   Procedure Laterality Date    CREATION OF AXILLARY-FEMORAL ARTERY BYPASS WITH GRAFT N/A 10/6/2022    Procedure: CREATION, BYPASS, ARTERIAL, AXILLARY TO FEMORAL, USING GRAFT;  Surgeon: LEI Ortiz III, MD;  Location: [facility] OR [address] [place] [address];  Service: Peripheral Vascular;  Laterality: N/A;    CREATION OF FEMORAL-FEMORAL ARTERY BYPASS WITH GRAFT N/A 10/6/2022    Procedure: CREATION, BYPASS, ARTERIAL, FEMORAL TO FEMORAL, USING GRAFT;  Surgeon: LEI Ortiz III, MD;  Location: [facility] OR [address] [place] [address];  Service: Peripheral Vascular;  Laterality: N/A;     ESOPHAGOGASTRODUODENOSCOPY N/A 10/20/2022    Procedure: EGD (ESOPHAGOGASTRODUODENOSCOPY);  Surgeon: Sixto Chicas MD;  Location: [address]);  Service: Endoscopy;  Laterality: N/A;    EXCISION OF HYDROCELE Left 4/26/2022    Procedure: HYDROCELECTOMY;  Surgeon: Damion Roberts MD;  Location: [facility];  Service: Urology;  Laterality: Left;    KNEE SURGERY  1972       Social History:  Tobacco Use: Medium Risk    Smoking Tobacco Use: Former    Smokeless Tobacco Use: Never    Passive Exposure: Not on file       Alcohol Use: Not on file       OBJECTIVE:     Vital Signs Range:  BMI Readings from Last 1 Encounters:   10/28/22 25.35 kg/m²       Temp:  [36.7 °C (98 °F)-37.1 °C (98.8 °F)]   Pulse:  []   Resp:  [14-21]   BP: (106-137)/(55-67)   SpO2:  [97 %-100 %]        Significant Labs:        Component Value Date/Time    WBC 8.63 10/31/2022 0449    HGB 8.3 (L) 10/31/2022 0449    HCT 26.5 (L) 10/31/2022 0449    HCT 20 (LL) 10/19/2022 1833     10/31/2022 0449     10/30/2022 0610    K 3.8 10/30/2022 0610     10/30/2022 0610    CO2 22 (L) 10/30/2022 0610    GLU 96 10/30/2022 0610    BUN 10 10/30/2022 0610    CREATININE 0.7 10/30/2022 0610    MG 2.0 10/09/2022 0431    CALCIUM 8.2 (L) 10/30/2022 0610    ALBUMIN 2.3 (L) 10/30/2022 0610    PROT 4.8 (L) 10/30/2022 0610    ALKPHOS 76 10/30/2022 0610    BILITOT 0.3 10/30/2022 0610    AST 17 10/30/2022 0610    ALT 12 10/30/2022 0610    INR 1.2 10/28/2022 0817    HGBA1C 5.8 (H) 03/10/2022 1118        Please see Results Review for additional labs.     Diagnostic Studies: No relevant studies.    EKG:   Results for orders placed or performed during the hospital encounter of 10/27/22   EKG 12-lead    Collection Time: 10/27/22  8:48 PM    Narrative    Test Reason : K92.2,    Vent. Rate : 106 BPM     Atrial Rate : 106 BPM     P-R Int : 162 ms          QRS Dur : 080 ms      QT Int : 352 ms       P-R-T Axes : 063 058 018 degrees     QTc Int : 467  ms    Sinus tachycardia  Low anterior forces  Abnormal ECG  When compared with ECG of 19-OCT-2022 18:10,  No significant change was found  Confirmed by ANDERS MARIN MD ([id number]) on 10/28/2022 11:03:04 AM    Referred By: JUSTICE   SELF           Confirmed By:ANDERS MARIN MD       ECHO:  Results for orders placed during the hospital encounter of 10/02/22    Echo    Interpretation Summary  · The left ventricle is normal in size with concentric remodeling and hyperdynamic systolic function. The estimated ejection fraction is >70%.  · Normal right ventricular size with normal right ventricular systolic function.  · Grade I left ventricular diastolic dysfunction.  · The estimated PA systolic pressure is 36 mmHg.  · Normal central venous pressure (3 mmHg).        ASSESSMENT/PLAN:       Pre-op Assessment    I have reviewed the Patient Summary Reports.     I have reviewed the Nursing Notes. I have reviewed the NPO Status.   I have reviewed the Medications.     Review of Systems  Anesthesia Hx:  No problems with previous Anesthesia  History of prior surgery of interest to airway management or planning: Denies Family Hx of Anesthesia complications.   Denies Personal Hx of Anesthesia complications.   Social:  No Alcohol Use, Former Smoker    Hematology/Oncology:  Hematology Normal   Oncology Normal     EENT/Dental:EENT/Dental Normal   Cardiovascular:   Exercise tolerance: good Hypertension hyperlipidemia    Pulmonary:  Pulmonary Normal    Renal/:  Renal/ Normal     Hepatic/GI:  Hepatic/GI Normal    Neurological:   Neuromuscular Disease,    Endocrine:  Endocrine Normal    Psych:  Psychiatric Normal              Anesthesia Plan  Type of Anesthesia, risks & benefits discussed:    Anesthesia Type: Gen Natural Airway, MAC  Intra-op Monitoring Plan: Standard ASA Monitors  Post Op Pain Control Plan: multimodal analgesia and IV/PO Opioids PRN  Induction:  IV  Informed Consent: Informed consent signed with the Patient and all  parties understand the risks and agree with anesthesia plan.  All questions answered.   ASA Score: 3  Day of Surgery Review of History & Physical: H&P Update referred to the surgeon/provider.    Ready For Surgery From Anesthesia Perspective.     .

## 2022-10-28 NOTE — ASSESSMENT & PLAN NOTE
Patient's anemia is currently uncontrolled. S/p 1 units of PRBCs. Etiology likely d/t GIB  Current CBC reviewed-   Lab Results   Component Value Date    HGB 6.7 (L) 10/27/2022    HGB 6.7 (L) 10/27/2022    HCT 21.2 (L) 10/27/2022    HCT 21.2 (L) 10/27/2022     Monitor serial CBC and transfuse if patient becomes hemodynamically unstable, symptomatic or H/H drops below 7/21.

## 2022-10-28 NOTE — ED NOTES
Telemetry Verification   Patient placed on Telemetry Box  Verified with War Room  Box # [id number]   Rate 94   Rhythm NSR

## 2022-10-28 NOTE — PLAN OF CARE
Problem: Adult Inpatient Plan of Care  Goal: Plan of Care Review  Outcome: Ongoing, Progressing  Goal: Patient-Specific Goal (Individualized)  Outcome: Ongoing, Progressing  Goal: Absence of Hospital-Acquired Illness or Injury  Outcome: Ongoing, Progressing  Goal: Optimal Comfort and Wellbeing  Outcome: Ongoing, Progressing  Goal: Readiness for Transition of Care  Outcome: Ongoing, Progressing     Problem: Adjustment to Illness (Gastrointestinal Bleeding)  Goal: Optimal Coping with Acute Illness  Outcome: Ongoing, Progressing     Problem: Bleeding (Gastrointestinal Bleeding)  Goal: Hemostasis  Outcome: Ongoing, Progressing     Problem: Adjustment to Illness (Sepsis/Septic Shock)  Goal: Optimal Coping  Outcome: Ongoing, Progressing     Problem: Bleeding (Sepsis/Septic Shock)  Goal: Absence of Bleeding  Outcome: Ongoing, Progressing     Problem: Glycemic Control Impaired (Sepsis/Septic Shock)  Goal: Blood Glucose Level Within Desired Range  Outcome: Ongoing, Progressing     Problem: Infection Progression (Sepsis/Septic Shock)  Goal: Absence of Infection Signs and Symptoms  Outcome: Ongoing, Progressing     Problem: Nutrition Impaired (Sepsis/Septic Shock)  Goal: Optimal Nutrition Intake  Outcome: Ongoing, Progressing     Problem: Impaired Wound Healing  Goal: Optimal Wound Healing  Outcome: Ongoing, Progressing     Problem: Fall Injury Risk  Goal: Absence of Fall and Fall-Related Injury  Outcome: Ongoing, Progressing     Problem: Pain Chronic (Persistent)  Goal: Acceptable Pain Control and Functional Ability  Outcome: Ongoing, Progressing     EGD done today, all results normal. Scope scheduled for Monday 10/31/2022.

## 2022-10-28 NOTE — PLAN OF CARE
Problem: Adult Inpatient Plan of Care  Goal: Plan of Care Review  Outcome: Ongoing, Progressing     Problem: Bleeding (Sepsis/Septic Shock)  Goal: Absence of Bleeding  Outcome: Ongoing, Progressing     Problem: Impaired Wound Healing  Goal: Optimal Wound Healing  Outcome: Ongoing, Progressing     Problem: Fall Injury Risk  Goal: Absence of Fall and Fall-Related Injury  Outcome: Ongoing, Progressing     Problem: Pain Chronic (Persistent)  Goal: Acceptable Pain Control and Functional Ability  Outcome: Ongoing, Progressing       POC reviewed with patient, states understanding. NPO

## 2022-10-28 NOTE — ASSESSMENT & PLAN NOTE
Lower limb ischemia  Pt is s/p left axillary to BILATERAL femoris profunda bypass with 8 mm PTFE on 10/6 by vascular surgery.   -Continue statin and zetia.  -Hold ASA and cilostazol in the setting of GIB  -Wound care consulted for ulcer care

## 2022-10-28 NOTE — PROVATION PATIENT INSTRUCTIONS
Discharge Summary/Instructions after an Endoscopic Procedure  Patient Name: Babatunde Singh  Patient MRN: [id number]  Patient YOB: 1950  Friday, October 28, 2022  Marlee Hopson MD  Dear patient,  As a result of recent federal legislation (The Federal Cures Act), you may   receive lab or pathology results from your procedure in your [facility]   account before your physician is able to contact you. Your physician or   their representative will relay the results to you with their   recommendations at their soonest availability.  Thank you,  RESTRICTIONS:  During your procedure today, you received medications for sedation.  These   medications may affect your judgment, balance and coordination.  Therefore,   for 24 hours, you have the following restrictions:   - DO NOT drive a car, operate machinery, make legal/financial decisions,   sign important papers or drink alcohol.    ACTIVITY:  Today: no heavy lifting, straining or running due to procedural   sedation/anesthesia.  The following day: return to full activity including work.  DIET:  Eat and drink normally unless instructed otherwise.     TREATMENT FOR COMMON SIDE EFFECTS:  - Mild abdominal pain, nausea, belching, bloating or excessive gas:  rest,   eat lightly and use a heating pad.  - Sore Throat: treat with throat lozenges and/or gargle with warm salt   water.  - Because air was used during the procedure, expelling large amounts of air   from your rectum or belching is normal.  - If a bowel prep was taken, you may not have a bowel movement for 1-3 days.    This is normal.  SYMPTOMS TO WATCH FOR AND REPORT TO YOUR PHYSICIAN:  1. Abdominal pain or bloating, other than gas cramps.  2. Chest pain.  3. Back pain.  4. Signs of infection such as: chills or fever occurring within 24 hours   after the procedure.  5. Rectal bleeding, which would show as bright red, maroon, or black stools.   (A tablespoon of blood from the rectum is not serious, especially  if   hemorrhoids are present.)  6. Vomiting.  7. Weakness or dizziness.  GO DIRECTLY TO THE NEAREST EMERGENCY ROOM IF YOU HAVE ANY OF THE FOLLOWING:      Difficulty breathing              Chills and/or fever over 101 F   Persistent vomiting and/or vomiting blood   Severe abdominal pain   Severe chest pain   Black, tarry stools   Bleeding- more than one tablespoon   Any other symptom or condition that you feel may need urgent attention  Your doctor recommends these additional instructions:  If any biopsies were taken, your doctors clinic will contact you in 1 to 2   weeks with any results.  - Return patient to hospital [facility] for ongoing care.   - Resume previous diet.   - Continue to hold anticoagulation   - Monitor for any evidence of ongoing GI bleeding   - Tentatively plan for colonoscopy on Monday for further evaluation of GI   bleeding and anemia  For questions, problems or results please call your physician - Marlee Hopson MD at Work:  [phone].  [facility], EMERGENCY ROOM PHONE NUMBER: [phone]  IF A COMPLICATION OR EMERGENCY SITUATION ARISES AND YOU ARE UNABLE TO REACH   YOUR PHYSICIAN - GO DIRECTLY TO THE EMERGENCY ROOM.  Marlee Hopson MD  10/28/2022 11:48:12 AM  This report has been verified and signed electronically.  Dear patient,  As a result of recent federal legislation (The Federal Cures Act), you may   receive lab or pathology results from your procedure in your [facility]   account before your physician is able to contact you. Your physician or   their representative will relay the results to you with their   recommendations at their soonest availability.  Thank you,  PROVATION

## 2022-10-28 NOTE — HPI
Babatunde Singh is a 72 y.o. male with a PMHx of DVT, HTN, and PAD s/p axillary to bi-profunda bypass who presented to the ED from [facility] for low blood counts and melena. He says he was recently seen for this a week ago. He has had constipation and a few episodes of black, tarry stools since admission. He is on xarelto and ASA. He says he feels great. Endorses mild RLE pain which is a chronic issue. He denies chest pain, SOB, dizziness, headache, abdominal pain, BRBPR, hematemesis, dizziness, weakness, or numbness.     In the ED: mildly hypotensive to 100s/60s,  othwerwise VSSAF. Hgb 6.7. Plt 533k. PT/INR 3.2/31.3. Rectal exam performed in the ED significant for melanotic stools, guaiac +. Pt was given 80 mg IV Protonix and 1 unit pRBCs.

## 2022-10-28 NOTE — ANESTHESIA PREPROCEDURE EVALUATION
10/28/2022  Babatunde Singh is a 72 y.o., male   Pre-operative evaluation for Procedure(s) (LRB):  EGD (ESOPHAGOGASTRODUODENOSCOPY) (N/A)    Babatunde Singh is a 72 y.o. male     Patient Active Problem List   Diagnosis    Primary hypertension    Dependence on nicotine from cigarettes    Mixed hyperlipidemia    Claudication of right lower extremity    Right leg pain    Sciatica of right side    Numbness and tingling of right leg    Right renal mass    Allodynia (Right leg)    PAD (peripheral artery disease)    Lower limb ischemia    Critical limb ischemia of right lower extremity with ulceration of lower leg    Absent pedal pulses    DVT (deep venous thrombosis)    Encephalopathy, metabolic    Severe sepsis    Tachycardia    Gastrointestinal hemorrhage    Acute blood loss anemia    Bilateral lower extremity edema       Review of patient's allergies indicates:  No Known Allergies    No current facility-administered medications on file prior to encounter.     Current Outpatient Medications on File Prior to Encounter   Medication Sig Dispense Refill    acetaminophen (TYLENOL) 325 MG tablet Take 2 tablets (650 mg total) by mouth every 8 (eight) hours as needed. 20 tablet 0    aspirin 81 MG Chew Chew and swallow 1 tablet (81 mg total) by mouth once daily. 30 tablet 0    atorvastatin (LIPITOR) 80 MG tablet Take 1 tablet (80 mg total) by mouth once daily. 30 tablet 2    cilostazoL (PLETAL) 100 MG Tab Take 1 tablet (100 mg total) by mouth 2 (two) times daily. 180 tablet 3    ezetimibe (ZETIA) 10 mg tablet Take 1 tablet (10 mg total) by mouth once daily. 90 tablet 3    gabapentin (NEURONTIN) 300 MG capsule Take 3 capsules (900 mg total) by mouth 3 (three) times daily. 540 capsule 3    HYDROcodone-acetaminophen (NORCO) 5-325 mg per tablet Take 1 tablet by mouth every 4 (four) hours as needed for  Pain. 10 tablet 0    losartan-hydrochlorothiazide 50-12.5 mg (HYZAAR) 50-12.5 mg per tablet Take 1 tablet by mouth once daily. 30 tablet 2    multivitamin (THERAGRAN) per tablet Take 1 tablet by mouth once daily.      mupirocin (BACTROBAN) 2 % ointment Apply topically 2 (two) times daily. 30 g 1    naproxen (NAPROSYN) 500 MG tablet Take 1 tablet (500 mg total) by mouth 2 (two) times daily as needed (pain). 60 tablet 0    pantoprazole (PROTONIX) 40 MG tablet Take 1 tablet (40 mg total) by mouth once daily. 30 tablet 11    rivaroxaban (XARELTO DVT-PE TREAT 30D START) 15 mg (42)- 20 mg (9) tablet dose pack Take 1 tablet (15 mg) by mouth twice daily with food for 21 days followed by 1 tablet (20 mg) by mouth once daily with food 51 tablet 0    terbinafine HCl (LAMISIL ORAL) Take by mouth once daily at 6am.         Past Surgical History:   Procedure Laterality Date    CREATION OF AXILLARY-FEMORAL ARTERY BYPASS WITH GRAFT N/A 10/6/2022    Procedure: CREATION, BYPASS, ARTERIAL, AXILLARY TO FEMORAL, USING GRAFT;  Surgeon: LEI Ortiz III, MD;  Location: [address];  Service: Peripheral Vascular;  Laterality: N/A;    CREATION OF FEMORAL-FEMORAL ARTERY BYPASS WITH GRAFT N/A 10/6/2022    Procedure: CREATION, BYPASS, ARTERIAL, FEMORAL TO FEMORAL, USING GRAFT;  Surgeon: LEI Ortiz III, MD;  Location: [address];  Service: Peripheral Vascular;  Laterality: N/A;    ESOPHAGOGASTRODUODENOSCOPY N/A 10/20/2022    Procedure: EGD (ESOPHAGOGASTRODUODENOSCOPY);  Surgeon: Sixto Chicas MD;  Location: [facility] ([address] [place] [address]);  Service: Endoscopy;  Laterality: N/A;    EXCISION OF HYDROCELE Left 4/26/2022    Procedure: HYDROCELECTOMY;  Surgeon: Damion Roberts MD;  Location: [facility];  Service: Urology;  Laterality: Left;    KNEE SURGERY  1972       CBC:   Recent Labs     10/27/22  2042 10/28/22  0755   WBC 11.21  11.21 12.55  12.87*   RBC 2.34*  2.34* 2.69*  2.65*   HGB 6.7*  6.7* 7.7*  7.6*   HCT 21.2*   21.2* 24.7*  24.3*   *  533* 424  449   MCV 91  91 92  92   MCH 28.6  28.6 28.6  28.7   MCHC 31.6*  31.6* 31.2*  31.3*       CMP:   Recent Labs     10/27/22  2042      K 3.9      CO2 24   BUN 17   CREATININE 0.8   *   CALCIUM 8.2*   ALBUMIN 2.5*   PROT 5.0*   ALKPHOS 76   ALT 19   AST 18   BILITOT 0.2       INR  Recent Labs     10/27/22  2042 10/28/22  0817   INR 3.2* 1.2       Pre-op Assessment    I have reviewed the Patient Summary Reports.     I have reviewed the Nursing Notes. I have reviewed the NPO Status.   I have reviewed the Medications.     Review of Systems  Anesthesia Hx:  No problems with previous Anesthesia Denies Hx of Anesthetic complications  History of prior surgery of interest to airway management or planning: Denies Family Hx of Anesthesia complications.   Denies Personal Hx of Anesthesia complications.   Social:  No Alcohol Use, Former Smoker    Hematology/Oncology:  Hematology Normal   Oncology Normal     EENT/Dental:EENT/Dental Normal   Cardiovascular:   Exercise tolerance: good Hypertension hyperlipidemia    Pulmonary:  Pulmonary Normal    Renal/:  Renal/ Normal     Hepatic/GI:  Hepatic/GI Normal    Neurological:   Neuromuscular Disease,    Endocrine:  Endocrine Normal    Psych:  Psychiatric Normal           Physical Exam  General: Well nourished, Cooperative, Alert and Oriented    Airway:  Mallampati: III   Mouth Opening: Normal  TM Distance: Normal  Tongue: Normal  Neck ROM: Normal ROM    Dental:  Intact    Chest/Lungs:  Clear to auscultation    Heart:  Rate: Normal  Rhythm: Regular Rhythm        Anesthesia Plan  Type of Anesthesia, risks & benefits discussed:    Anesthesia Type: Gen ETT, Gen Natural Airway  Intra-op Monitoring Plan: Standard ASA Monitors  Post Op Pain Control Plan: multimodal analgesia and IV/PO Opioids PRN  Induction:  IV  Airway Plan: Direct  Informed Consent: Informed consent signed with the Patient and all parties understand the  risks and agree with anesthesia plan.  All questions answered. Patient consented to blood products? No  ASA Score: 3  Day of Surgery Review of History & Physical: H&P Update referred to the surgeon/provider.    Ready For Surgery From Anesthesia Perspective.     .

## 2022-10-28 NOTE — ED PROVIDER NOTES
Encounter Date: 10/27/2022       History     Chief Complaint   Patient presents with    Anemia     Pt to ER via EMS from [facility] room number P104 for abnormal labs of Hgb 6.9, Hct 21.4, platelets 585. Denies GI bleed. On xarelto. He is currently AAOx4 & denies pain.      HPI    Babatunde Singh is a 71yo M with a PMH of DVT, HTN, and PAD s/p axillary to bi-profunda bypass who presented to the ED for low blood counts and melena taken a [facility]. He says he was recently seen for this a week ago. He has had constipation and a few episodes of black, tarry stools since admission. He is on xarelto and ASA. He says he feels great. He denies chest pain, SOB, dizziness, headache, abdominal pain, dizziness, weakness, or numbness.     Review of patient's allergies indicates:  No Known Allergies  Past Medical History:   Diagnosis Date    DVT (deep venous thrombosis)     Hypertension     Peripheral arterial disease      Past Surgical History:   Procedure Laterality Date    CREATION OF AXILLARY-FEMORAL ARTERY BYPASS WITH GRAFT N/A 10/6/2022    Procedure: CREATION, BYPASS, ARTERIAL, AXILLARY TO FEMORAL, USING GRAFT;  Surgeon: LEI Ortiz III, MD;  Location: [address];  Service: Peripheral Vascular;  Laterality: N/A;    CREATION OF FEMORAL-FEMORAL ARTERY BYPASS WITH GRAFT N/A 10/6/2022    Procedure: CREATION, BYPASS, ARTERIAL, FEMORAL TO FEMORAL, USING GRAFT;  Surgeon: LEI Ortiz III, MD;  Location: [address];  Service: Peripheral Vascular;  Laterality: N/A;    ESOPHAGOGASTRODUODENOSCOPY N/A 10/20/2022    Procedure: EGD (ESOPHAGOGASTRODUODENOSCOPY);  Surgeon: Sixto Chicas MD;  Location: [facility] ([address] [place] [address]);  Service: Endoscopy;  Laterality: N/A;    EXCISION OF HYDROCELE Left 4/26/2022    Procedure: HYDROCELECTOMY;  Surgeon: Damion Roberts MD;  Location: [facility] OR;  Service: Urology;  Laterality: Left;    KNEE SURGERY  1972     Family History   Problem Relation Age of Onset    Hypertension  Mother     Hypertension Father      Social History     Tobacco Use    Smoking status: Former     Packs/day: 0.50     Years: 50.00     Pack years: 25.00     Types: Cigarettes    Smokeless tobacco: Never    Tobacco comments:     Cigarettes3-4 per day   Substance Use Topics    Alcohol use: Never    Drug use: Never     Review of Systems   Constitutional:  Negative for chills, fatigue and fever.   HENT:  Negative for rhinorrhea, sinus pain and trouble swallowing.    Eyes:  Negative for pain and visual disturbance.   Respiratory:  Negative for chest tightness and shortness of breath.    Cardiovascular:  Negative for chest pain and palpitations.   Gastrointestinal:  Negative for abdominal distention, diarrhea and vomiting.   Genitourinary:  Negative for difficulty urinating and hematuria.   Musculoskeletal:  Negative for arthralgias and myalgias.   Skin:  Positive for wound (RLE bandage).   Neurological:  Negative for dizziness, weakness and numbness.   Psychiatric/Behavioral:  Negative for agitation. The patient is not nervous/anxious.      Physical Exam     Initial Vitals [10/27/22 1907]   BP Pulse Resp Temp SpO2   103/65 104 18 98.5 °F (36.9 °C) 96 %      MAP       --         Physical Exam    Nursing note and vitals reviewed.  Constitutional: He appears well-developed and well-nourished.   HENT:   Head: Normocephalic and atraumatic.   Eyes: Conjunctivae and EOM are normal.   Neck: No JVD present.   Normal range of motion.  Cardiovascular:  Normal rate, regular rhythm and normal heart sounds.           Pulmonary/Chest: Breath sounds normal. No respiratory distress.   Abdominal: Abdomen is soft. Bowel sounds are normal. He exhibits no distension.   Genitourinary: Rectum:      Guaiac result positive.   Guaiac positive stool. : Acceptable.  Musculoskeletal:         General: No edema. Normal range of motion.      Cervical back: Normal range of motion.     Neurological: He is alert and oriented to person,  place, and time. He has normal strength.   Skin: Skin is warm.       ED Course   Procedures  Labs Reviewed   CBC W/ AUTO DIFFERENTIAL - Abnormal; Notable for the following components:       Result Value    RBC 2.34 (*)     Hemoglobin 6.7 (*)     Hematocrit 21.2 (*)     MCHC 31.6 (*)     RDW 19.7 (*)     Platelets 533 (*)     MPV 8.5 (*)     Immature Granulocytes 0.6 (*)     Immature Grans (Abs) 0.07 (*)     Mono # 1.4 (*)     All other components within normal limits   COMPREHENSIVE METABOLIC PANEL - Abnormal; Notable for the following components:    Glucose 131 (*)     Calcium 8.2 (*)     Total Protein 5.0 (*)     Albumin 2.5 (*)     Anion Gap 7 (*)     All other components within normal limits   CBC W/ AUTO DIFFERENTIAL - Abnormal; Notable for the following components:    RBC 2.34 (*)     Hemoglobin 6.7 (*)     Hematocrit 21.2 (*)     MCHC 31.6 (*)     RDW 19.7 (*)     Platelets 533 (*)     MPV 8.5 (*)     Immature Granulocytes 0.6 (*)     Immature Grans (Abs) 0.07 (*)     Mono # 1.4 (*)     All other components within normal limits   PROTIME-INR - Abnormal; Notable for the following components:    Prothrombin Time 31.3 (*)     INR 3.2 (*)     All other components within normal limits   TYPE & SCREEN   PREPARE RBC SOFT          Imaging Results    None          Medications   sodium chloride 0.9% flush 10 mL (has no administration in time range)   0.9%  NaCl infusion (for blood administration) (has no administration in time range)   pantoprazole injection 80 mg (80 mg Intravenous Given 10/27/22 2110)     Medical Decision Making:   Initial Assessment:   Babatunde Singh is a 71yo M with a PMH of DVT, HTN, and PAD s/p axillary to bi-profunda bypass who presented to the ED for low blood counts and melena taken a [facility]. He says he was recently seen for this a week ago. He has had constipation and a few episodes of black, tarry stools since admission. He is on xarelto and ASA. He says he feels great. He denies  chest pain, SOB, dizziness, headache, abdominal pain, dizziness, weakness, or numbness.   Differential Diagnosis:   GI bleed  Anemia     ED Management:  Guaiac result positive. CBC significant for h/h of 6.7/21.2. PT/INR 31.2/3.2. He was given 80mg of protonix and started on 1u pRBC. He will be admitted to internal medicine for further workup of GI bleeding.                         Clinical Impression:   Final diagnoses:  [K92.2] GI bleed  [D64.9] Anemia, unspecified type (Primary)        ED Disposition Condition    Admit Stable                Sixto Arnold MD  Resident  10/27/22 4945

## 2022-10-28 NOTE — ED NOTES
Bed: [facility] 04  Expected date:   Expected time:   Means of arrival:   Comments:  Francisco

## 2022-10-28 NOTE — PLAN OF CARE
[facility] - Endoscopy  Initial Discharge Assessment       Primary Care Provider: Esdras Ly MD    Admission Diagnosis: GI bleed [K92.2]  Anemia, unspecified type [D64.9]    Admission Date: 10/27/2022  Expected Discharge Date: 10/31/2022         Payor: [employer] MEDICARE / Plan: [employer] CHOICES 65 / Product Type: Medicare Advantage /     Extended Emergency Contact Information  Primary Emergency Contact: Cindi Matos  Mobile Phone: [phone]  Relation: Daughter  Preferred language: English   needed? No    Discharge Plan A: Skilled Nursing Facility  Discharge Plan B: Home with family      [id number] [address] [address]  Phone: [phone] Fax: [phone]             Pt to [facility] back to [facility].    DCP:snf    Eliane Elise RN [place] [facility] [phone]

## 2022-10-28 NOTE — PROGRESS NOTES
GI Post Procedure Treatment Plan    Interval history:  Push enteroscopy  completed.   Normal endoscopy. Some erythema in the duodenum.    Plan:  - advance diet as tolerated  - hold AC till colonoscopy is completed  - monitor for signs of bleeding  - Trend H&H. Transfuse to keep Hb > 7 g/dl.   - Inpatient colonoscopy on Monday. Clear liquids on Sunday. Prep ordered. NPO midnight before procedure.     Jenaro Noland MD  GI Fellow, PGY-4

## 2022-10-28 NOTE — TRANSFER OF CARE
Anesthesia Transfer of Care Note    Patient: Babatunde Singh    Procedure(s) Performed: Procedure(s) (LRB):  EGD (ESOPHAGOGASTRODUODENOSCOPY) (N/A)    Patient location: PACU    Anesthesia Type: general    Transport from OR: Transported from OR on room air with adequate spontaneous ventilation    Post pain: adequate analgesia    Post assessment: no apparent anesthetic complications    Post vital signs: stable    Level of consciousness: awake    Nausea/Vomiting: no nausea/vomiting    Complications: none    Transfer of care protocol was followed      Last vitals:   Visit Vitals  BP (!) 106/55   Pulse 89   Temp 36.7 °C (98 °F) (Temporal)   Resp 15   SpO2 100%

## 2022-10-29 LAB
BASOPHILS # BLD AUTO: 0.05 K/UL (ref 0–0.2)
BASOPHILS NFR BLD: 0.5 % (ref 0–1.9)
DIFFERENTIAL METHOD: ABNORMAL
EOSINOPHIL # BLD AUTO: 0.1 K/UL (ref 0–0.5)
EOSINOPHIL NFR BLD: 1.2 % (ref 0–8)
ERYTHROCYTE [DISTWIDTH] IN BLOOD BY AUTOMATED COUNT: 18.9 % (ref 11.5–14.5)
HCT VFR BLD AUTO: 25 % (ref 40–54)
HCT VFR BLD AUTO: 25.1 % (ref 40–54)
HGB BLD-MCNC: 7.6 G/DL (ref 14–18)
HGB BLD-MCNC: 7.8 G/DL (ref 14–18)
IMM GRANULOCYTES # BLD AUTO: 0.05 K/UL (ref 0–0.04)
IMM GRANULOCYTES NFR BLD AUTO: 0.5 % (ref 0–0.5)
LYMPHOCYTES # BLD AUTO: 2.3 K/UL (ref 1–4.8)
LYMPHOCYTES NFR BLD: 24.6 % (ref 18–48)
MCH RBC QN AUTO: 28.3 PG (ref 27–31)
MCHC RBC AUTO-ENTMCNC: 30.3 G/DL (ref 32–36)
MCV RBC AUTO: 93 FL (ref 82–98)
MONOCYTES # BLD AUTO: 1 K/UL (ref 0.3–1)
MONOCYTES NFR BLD: 11 % (ref 4–15)
NEUTROPHILS # BLD AUTO: 5.9 K/UL (ref 1.8–7.7)
NEUTROPHILS NFR BLD: 62.2 % (ref 38–73)
NRBC BLD-RTO: 0 /100 WBC
PLATELET # BLD AUTO: 423 K/UL (ref 150–450)
PMV BLD AUTO: 9.2 FL (ref 9.2–12.9)
RBC # BLD AUTO: 2.69 M/UL (ref 4.6–6.2)
WBC # BLD AUTO: 9.48 K/UL (ref 3.9–12.7)

## 2022-10-29 PROCEDURE — C9113 INJ PANTOPRAZOLE SODIUM, VIA: HCPCS | Performed by: NURSE PRACTITIONER

## 2022-10-29 PROCEDURE — 97530 THERAPEUTIC ACTIVITIES: CPT

## 2022-10-29 PROCEDURE — 97165 OT EVAL LOW COMPLEX 30 MIN: CPT

## 2022-10-29 PROCEDURE — 36415 COLL VENOUS BLD VENIPUNCTURE: CPT | Performed by: HOSPITALIST

## 2022-10-29 PROCEDURE — 96376 TX/PRO/DX INJ SAME DRUG ADON: CPT

## 2022-10-29 PROCEDURE — 97161 PT EVAL LOW COMPLEX 20 MIN: CPT

## 2022-10-29 PROCEDURE — 94761 N-INVAS EAR/PLS OXIMETRY MLT: CPT

## 2022-10-29 PROCEDURE — 63600175 PHARM REV CODE 636 W HCPCS: Performed by: NURSE PRACTITIONER

## 2022-10-29 PROCEDURE — 96366 THER/PROPH/DIAG IV INF ADDON: CPT

## 2022-10-29 PROCEDURE — 25000003 PHARM REV CODE 250: Performed by: HOSPITALIST

## 2022-10-29 PROCEDURE — 85025 COMPLETE CBC W/AUTO DIFF WBC: CPT | Performed by: HOSPITALIST

## 2022-10-29 PROCEDURE — G0378 HOSPITAL OBSERVATION PER HR: HCPCS

## 2022-10-29 PROCEDURE — 97535 SELF CARE MNGMENT TRAINING: CPT

## 2022-10-29 PROCEDURE — 25000003 PHARM REV CODE 250: Performed by: NURSE PRACTITIONER

## 2022-10-29 PROCEDURE — 85014 HEMATOCRIT: CPT | Mod: 91 | Performed by: HOSPITALIST

## 2022-10-29 PROCEDURE — 97116 GAIT TRAINING THERAPY: CPT

## 2022-10-29 PROCEDURE — 85018 HEMOGLOBIN: CPT | Mod: 91 | Performed by: HOSPITALIST

## 2022-10-29 PROCEDURE — 11000001 HC ACUTE MED/SURG PRIVATE ROOM

## 2022-10-29 RX ADMIN — HYDROCODONE BITARTRATE AND ACETAMINOPHEN 1 TABLET: 5; 325 TABLET ORAL at 08:10

## 2022-10-29 RX ADMIN — EZETIMIBE 10 MG: 10 TABLET ORAL at 09:10

## 2022-10-29 RX ADMIN — GABAPENTIN 900 MG: 300 CAPSULE ORAL at 09:10

## 2022-10-29 RX ADMIN — PANTOPRAZOLE SODIUM 40 MG: 40 INJECTION, POWDER, FOR SOLUTION INTRAVENOUS at 08:10

## 2022-10-29 RX ADMIN — PANTOPRAZOLE SODIUM 40 MG: 40 INJECTION, POWDER, FOR SOLUTION INTRAVENOUS at 09:10

## 2022-10-29 RX ADMIN — ATORVASTATIN CALCIUM 80 MG: 40 TABLET, FILM COATED ORAL at 09:10

## 2022-10-29 RX ADMIN — THERA TABS 1 TABLET: TAB at 09:10

## 2022-10-29 RX ADMIN — ASPIRIN 81 MG: 81 TABLET, CHEWABLE ORAL at 09:10

## 2022-10-29 RX ADMIN — GABAPENTIN 900 MG: 300 CAPSULE ORAL at 08:10

## 2022-10-29 RX ADMIN — GABAPENTIN 900 MG: 300 CAPSULE ORAL at 04:10

## 2022-10-29 RX ADMIN — COLLAGENASE SANTYL: 250 OINTMENT TOPICAL at 09:10

## 2022-10-29 NOTE — PT/OT/SLP EVAL
"Occupational Therapy   Evaluation    Name: Babatunde Singh  MRN: [id number]  Admitting Diagnosis:  Gastrointestinal hemorrhage  Recent Surgery: Procedure(s) (LRB):  EGD (ESOPHAGOGASTRODUODENOSCOPY) (N/A) 1 Day Post-Op    Recommendations:     Discharge Recommendations: home health OT  Discharge Equipment Recommendations:  none  Barriers to discharge:  None    Assessment:     Babatunde Singh is a 72 y.o. male with a medical diagnosis of Gastrointestinal hemorrhage.  He presents with gastrointestinal hemorrhage. Performance deficits affecting function: weakness, impaired endurance, impaired self care skills, impaired functional mobility, gait instability, impaired balance.      Rehab Prognosis: Good; patient would benefit from acute skilled OT services to address these deficits and reach maximum level of function.       Plan:     Patient to be seen 3 x/week to address the above listed problems via self-care/home management, therapeutic activities, therapeutic exercises, neuromuscular re-education  Plan of Care Expires: 11/28/22  Plan of Care Reviewed with: patient    Subjective     Chief Complaint: gastointestinal hemorrhage  Patient/Family Comments/goals: "I want coffee"    Occupational Profile:  Living Environment: Pt lives at home with wife in [facility] [address] [facility] with B handrail and walk in shower with built in bench  Previous level of function: Pt was independent prior to admission  Roles and Routines: Pt works as a manager for an apartment complex  Equipment Used at Home:  cane, straight, rollator  Assistance upon Discharge: HHOT recommended upon d/c    Pain/Comfort:  Pain Rating 1: 0/10  Pain Rating Post-Intervention 1: 0/10    Patients cultural, spiritual, Pentecostalism conflicts given the current situation: no    Objective:     Communicated with: RN prior to session.  Patient found HOB elevated with telemetry, pulse ox (continuous) upon OT entry to room.    General Precautions: Standard, fall   Orthopedic Precautions:N/A "   Braces: N/A  Respiratory Status: Room air    Occupational Performance:    Bed Mobility:    Patient completed Supine to Sit with supervision  Patient completed Sit to Supine with supervision    Functional Mobility/Transfers:  Patient completed Sit <> Stand Transfer with contact guard assistance  with  rolling walker   Patient completed Bed <> Chair Transfer using Step Transfer technique with contact guard assistance with rolling walker  Functional Mobility: completed to the bathroom with CGA for balance and safety in preparation for ADLs    Activities of Daily Living:  Grooming: stand by assistance and contact guard assistance standing at sink    Cognitive/Visual Perceptual:  Cognitive/Psychosocial Skills:     -       Oriented to: Person, Place, Time, and Situation     Physical Exam:  Upper Extremity Range of Motion:     -       Right Upper Extremity: WFL  -       Left Upper Extremity: WFL  Upper Extremity Strength:    -       Right Upper Extremity: WFL  -       Left Upper Extremity: WFL   Strength:    -       Right Upper Extremity: WFL  -       Left Upper Extremity: WFL  Fine Motor Coordination:    -       Intact    AMPAC 6 Click ADL:  AMPAC Total Score: 21    Treatment & Education:  Pt educated on OT POC  Pt educated on using call bell to request assistance for fxnl ambulation  All questions within OT scope of practice answered to satisfaction  Pt left with all lines intact and all needs met         Patient left HOB elevated with all lines intact, call button in reach, and RN notified    GOALS:   Multidisciplinary Problems       Occupational Therapy Goals          Problem: Occupational Therapy    Goal Priority Disciplines Outcome Interventions   Occupational Therapy Goal     OT, PT/OT Ongoing, Progressing    Description: Goals to be met by: 11/12/22     Patient will increase functional independence with ADLs by performing:    UE Dressing with Modified [place].  LE Dressing with Modified  [place].  Grooming while standing at sink with Modified [place].  Toileting from toilet with Modified [place] for hygiene and clothing management.   Toilet transfer to toilet with Modified [place].                         History:     Past Medical History:   Diagnosis Date    DVT (deep venous thrombosis)     Hypertension     Peripheral arterial disease          Past Surgical History:   Procedure Laterality Date    CREATION OF AXILLARY-FEMORAL ARTERY BYPASS WITH GRAFT N/A 10/6/2022    Procedure: CREATION, BYPASS, ARTERIAL, AXILLARY TO FEMORAL, USING GRAFT;  Surgeon: LEI Ortiz III, MD;  [address]  Service: Peripheral Vascular;  Laterality: N/A;    CREATION OF FEMORAL-FEMORAL ARTERY BYPASS WITH GRAFT N/A 10/6/2022    Procedure: CREATION, BYPASS, ARTERIAL, FEMORAL TO FEMORAL, USING GRAFT;  Surgeon: LEI Ortiz III, MD;  Location: [address];  Service: Peripheral Vascular;  Laterality: N/A;    ESOPHAGOGASTRODUODENOSCOPY N/A 10/20/2022    Procedure: EGD (ESOPHAGOGASTRODUODENOSCOPY);  Surgeon: Sixto Chicas MD;  [facility]  Service: Endoscopy;  Laterality: N/A;    ESOPHAGOGASTRODUODENOSCOPY N/A 10/28/2022    Procedure: EGD (ESOPHAGOGASTRODUODENOSCOPY);  Surgeon: Marlee Hopson MD;  [facility]  Service: Gastroenterology;  Laterality: N/A;    EXCISION OF HYDROCELE Left 4/26/2022    Procedure: HYDROCELECTOMY;  Surgeon: Damion Roberts MD;  Location: [facility];  Service: Urology;  Laterality: Left;    KNEE SURGERY  1972       Time Tracking:     OT Date of Treatment: 10/29/22  OT Start Time: 0810  OT Stop Time: 0841  OT Total Time (min): 31 min    Billable Minutes:Evaluation 8  Self Care/Home Management 10  Therapeutic Activity 13    10/29/2022

## 2022-10-29 NOTE — PT/OT/SLP EVAL
Physical Therapy Evaluation and treatment    Patient Name:  Babatunde Singh   MRN:  [id number]    Recommendations:     Discharge Recommendations:   (home no needs)   Discharge Equipment Recommendations: none   Barriers to discharge: None    Assessment:     Babatunde Singh is a 72 y.o. male admitted with a medical diagnosis of Gastrointestinal hemorrhage.  He presents with the following impairments/functional limitations:  impaired endurance, gait instability pt tolerated treatment well and will benefit from a few more sessions of PT. Pt will be able to discharge home with no needs when medically stable. Pt was admitted from [facility] SNF s/p L axillary to B femoral profunda bypass 10/6/22.    Rehab Prognosis: Good; patient would benefit from acute skilled PT services to address these deficits and reach maximum level of function.    Recent Surgery: Procedure(s) (LRB):  EGD (ESOPHAGOGASTRODUODENOSCOPY) (N/A) 1 Day Post-Op    Plan:     During this hospitalization, patient to be seen 3 x/week to address the identified rehab impairments via gait training and progress toward the following goals:    Plan of Care Expires:  11/27/22    Subjective     Chief Complaint: pt stated that he wanted to go home from hospital and not return to SNF.   Patient/Family Comments/goals: to get better and go home.   Pain/Comfort:  Pain Rating 1: 0/10  Pain Rating Post-Intervention 1: 0/10    Patients cultural, spiritual, Orthodoxy conflicts given the current situation: no    Living Environment:  Pt works full-time as doorman and lives with his retired wife in 1 story with 5 steps and R handrail to entrance.   Prior to admission, patients level of function was modified Independent using SC.  Equipment used at home: cane, straight, rollator (walk in shower with built in seat).  DME owned (not currently used): none.  Upon discharge, patient will have assistance from wife.    Objective:     Communicated with nurse prior to session.   Patient found supine with telemetry, pulse ox (continuous), blood pressure cuff (hep lock IV)  upon PT entry to room.    General Precautions: Standard, fall    Orthopedic Precautions:    Braces:    Respiratory Status: Room air    Exams:  Cognitive Exam:  Patient is oriented to Person, Place, Time, and Situation  RLE ROM: WFL  RLE Strength: WFL  LLE ROM: WFL  LLE Strength: WFL    Functional Mobility:  Bed Mobility:     Rolling Left:  independence  Supine to Sit: independence  Sit to Supine: independence    Transfers:     Sit to Stand:  independence with no AD    Gait: pt received gait training ~ 250 ft with CGA. Pt had mask on per his request.       AM-PAC 6 CLICK MOBILITY  Total Score:22       Treatment & Education:  Pt received verbal instructions in role of PT and POC. Pt verbally expressed understanding of such.     Patient left supine with all lines intact and call button in reach. Pt did not want to sit in chair.     GOALS:   Multidisciplinary Problems       Physical Therapy Goals          Problem: Physical Therapy    Goal Priority Disciplines Outcome Goal Variances Interventions   Physical Therapy Goal     PT, PT/OT Ongoing, Progressing     Description: Goals to be met by: 22     Patient will increase functional independence with mobility by performin. Gait  x 350 feet with Stand-by Assistance using Single-point Cane .   2. Ascend/descend 5 stair with right Handrails Contact Guard Assistance using Single-point Cane .                          History:     Past Medical History:   Diagnosis Date    DVT (deep venous thrombosis)     Hypertension     Peripheral arterial disease        Past Surgical History:   Procedure Laterality Date    CREATION OF AXILLARY-FEMORAL ARTERY BYPASS WITH GRAFT N/A 10/6/2022    Procedure: CREATION, BYPASS, ARTERIAL, AXILLARY TO FEMORAL, USING GRAFT;  Surgeon: LEI Ortiz III, MD;  Location: [facility] OR [address] [place] [address];  Service: Peripheral Vascular;  Laterality: N/A;    CREATION OF  FEMORAL-FEMORAL ARTERY BYPASS WITH GRAFT N/A 10/6/2022    Procedure: CREATION, BYPASS, ARTERIAL, FEMORAL TO FEMORAL, USING GRAFT;  Surgeon: LEI Ortiz III, MD;  [address];  Service: Peripheral Vascular;  Laterality: N/A;    ESOPHAGOGASTRODUODENOSCOPY N/A 10/20/2022    Procedure: EGD (ESOPHAGOGASTRODUODENOSCOPY);  Surgeon: Sixto Chicas MD;  Location: [facility] (2ND FLR);  Service: Endoscopy;  Laterality: N/A;    ESOPHAGOGASTRODUODENOSCOPY N/A 10/28/2022    Procedure: EGD (ESOPHAGOGASTRODUODENOSCOPY);  Surgeon: Marlee Hopson MD;  Location: [facility] (2ND FLR);  Service: Gastroenterology;  Laterality: N/A;    EXCISION OF HYDROCELE Left 4/26/2022    Procedure: HYDROCELECTOMY;  Surgeon: Damion Roberts MD;  Location: [facility];  Service: Urology;  Laterality: Left;    KNEE SURGERY  1972       Time Tracking:     PT Received On: 10/29/22  PT Start Time: 1342     PT Stop Time: 1358  PT Total Time (min): 16 min     Billable Minutes: Evaluation 8 min and Gait Training 8 min      10/29/2022

## 2022-10-29 NOTE — PROGRESS NOTES
[facility] Medicine  Progress Note    Patient Name: Babatunde Singh  MRN: [id number]  Patient Class: IP- Inpatient   Admission Date: 10/27/2022  Length of Stay: 2 days  Attending Physician: Gillian Watts MD  Primary Care Provider: Esdras Ly MD        Subjective:     Principal Problem:Gastrointestinal hemorrhage        HPI:  Babatunde Singh is a 72 y.o. male with a PMHx of DVT, HTN, and PAD s/p axillary to bi-profunda bypass who presented to the ED from [facility] for low blood counts and melena. He says he was recently seen for this a week ago. He has had constipation and a few episodes of black, tarry stools since admission. He is on xarelto and ASA. He says he feels great. Endorses mild RLE pain which is a chronic issue. He denies chest pain, SOB, dizziness, headache, abdominal pain, BRBPR, hematemesis, dizziness, weakness, or numbness.     In the ED: mildly hypotensive to 100s/60s,  othwerwise VSSAF. Hgb 6.7. Plt 533k. PT/INR 3.2/31.3. Rectal exam performed in the ED significant for melanotic stools, guaiac +. Pt was given 80 mg IV Protonix and 1 unit pRBCs.      Overview/Hospital Course:  No notes on file    Interval History: seen after UGI procedures, results reviewed and d/w patient. He voices no physical symptoms. Hungry and frustrated.    Review of Systems   Constitutional:  Negative for chills and fever.   Respiratory:  Negative for shortness of breath.    Cardiovascular:  Negative for chest pain.   Gastrointestinal:  Negative for abdominal pain, nausea and vomiting.   All other systems reviewed and are negative.  Objective:        Physical Exam  Vitals and nursing note reviewed.   Constitutional:       General: He is not in acute distress.     Appearance: He is not toxic-appearing.   HENT:      Head: Normocephalic and atraumatic.   Eyes:      General: No scleral icterus.     Extraocular Movements: Extraocular movements intact.   Cardiovascular:      Rate and Rhythm:  Normal rate.      Pulses: Normal pulses.   Pulmonary:      Effort: Pulmonary effort is normal. No respiratory distress.   Abdominal:      General: There is no distension.      Tenderness: There is no guarding.   Skin:     General: Skin is warm.   Neurological:      General: No focal deficit present.      Mental Status: He is alert and oriented to person, place, and time. Mental status is at baseline.   Psychiatric:         Behavior: Behavior normal.         Thought Content: Thought content normal.       Significant Labs: All pertinent labs within the past 24 hours have been reviewed.    Significant Imaging: I have reviewed all pertinent imaging results/findings within the past 24 hours.      Assessment/Plan:      * Gastrointestinal hemorrhage  72 y.o. with recent admission for acute RLE ischemia on xarelto, ASA, and cilostazol with recent admit for GI bleed requiring multiple blood transfusions, presents with multiple episodes of melena and acute anemia with Hgb 6.7. INR 3.2.  - HDS, with mild tachycardia and hypotension, improving   - s/p 1 unit pRBCs with expected correction.    GI consulted, recs reviewed and d/w GI fellow.  Now s/p push enteroscopy (results to be reported)  Normal endoscopy. Some erythema in the duodenum.    Plan: colonoscopy Monday. Hold Xarelto, ok to restart ASA 81 for limb ischemia/PVD.  Continue Protonix 40mg IV BID until colonoscopy.  Daily CBC, transfuse to keep Hgb>7    Acute blood loss anemia  Patient's anemia is currently uncontrolled. S/p 1 units of PRBCs. Etiology likely d/t GIB  Current CBC reviewed-   Lab Results   Component Value Date    HGB 6.7 (L) 10/27/2022    HGB 6.7 (L) 10/27/2022    HCT 21.2 (L) 10/27/2022    HCT 21.2 (L) 10/27/2022     Monitor serial CBC and transfuse if patient becomes hemodynamically unstable, symptomatic or H/H drops below 7/21.     DVT (deep venous thrombosis)  Duplex imaging of the right lower extremity veins on 10/5 demonstrates occlusive sub-acute  thrombus of the anterior tibial vein (the anterior paired vessel).   -Hold xarelto in the setting of acute GIB.    PAD (peripheral artery disease)  Lower limb ischemia  Pt is s/p left axillary to BILATERAL femoris profunda bypass with 8 mm PTFE on 10/6 by vascular surgery.   -Continue statin and zetia.  -Hold ASA and cilostazol in the setting of GIB  -Wound care consulted for ulcer care    Mixed hyperlipidemia   Patient is chronically on statin.will continue for now. Monitor clinically. Last LDL was   Lab Results   Component Value Date    LDLCALC 86.0 03/10/2022       Dependence on nicotine from cigarettes  Assistance with smoking cessation was offered, including:  [x]  Medications  [x]  Counseling  []  Printed Information on Smoking Cessation  []  Referral to a Smoking Cessation Program    Patient was counseled regarding smoking for 3-10 minutes.    Primary hypertension  - Controlled on admission   - Latest BP and vitals reviewed  - Hold losartan/hctz in the setting of GIB.    VTE Risk Mitigation (From admission, onward)           Ordered     IP VTE HIGH RISK PATIENT  Once         10/27/22 2218     Place sequential compression device  Until discontinued         10/27/22 2218                    Discharge Planning   LAZ: 10/31/2022     Code Status: Full Code   Is the patient medically ready for discharge?: No    Reason for patient still in hospital (select all that apply): Patient trending condition and Treatment  Discharge Plan A: Skilled Nursing Facility                  Gillian Watts MD  Department of Hospital Medicine   [facility] Surg

## 2022-10-29 NOTE — SUBJECTIVE & OBJECTIVE
Interval History: seen after UGI procedures, results reviewed and d/w patient. He voices no physical symptoms. Hungry and frustrated.    Review of Systems   Constitutional:  Negative for chills and fever.   Respiratory:  Negative for shortness of breath.    Cardiovascular:  Negative for chest pain.   Gastrointestinal:  Negative for abdominal pain, nausea and vomiting.   All other systems reviewed and are negative.  Objective:        Physical Exam  Vitals and nursing note reviewed.   Constitutional:       General: He is not in acute distress.     Appearance: He is not toxic-appearing.   HENT:      Head: Normocephalic and atraumatic.   Eyes:      General: No scleral icterus.     Extraocular Movements: Extraocular movements intact.   Cardiovascular:      Rate and Rhythm: Normal rate.      Pulses: Normal pulses.   Pulmonary:      Effort: Pulmonary effort is normal. No respiratory distress.   Abdominal:      General: There is no distension.      Tenderness: There is no guarding.   Skin:     General: Skin is warm.   Neurological:      General: No focal deficit present.      Mental Status: He is alert and oriented to person, place, and time. Mental status is at baseline.   Psychiatric:         Behavior: Behavior normal.         Thought Content: Thought content normal.       Significant Labs: All pertinent labs within the past 24 hours have been reviewed.    Significant Imaging: I have reviewed all pertinent imaging results/findings within the past 24 hours.

## 2022-10-29 NOTE — ASSESSMENT & PLAN NOTE
72 y.o. with recent admission for acute RLE ischemia on xarelto, ASA, and cilostazol with recent admit for GI bleed requiring multiple blood transfusions, presents with multiple episodes of melena and acute anemia with Hgb 6.7. INR 3.2.  - HDS, with mild tachycardia and hypotension, improving   - s/p 1 unit pRBCs with expected correction.    GI consulted, recs reviewed and d/w GI fellow.  Now s/p push enteroscopy (results to be reported)  Normal endoscopy. Some erythema in the duodenum.    Plan: colonoscopy Monday. Hold Xarelto, ok to restart ASA 81 for limb ischemia/PVD.  Continue Protonix 40mg IV BID until colonoscopy.  Daily CBC, transfuse to keep Hgb>7

## 2022-10-29 NOTE — PLAN OF CARE
Problem: Occupational Therapy  Goal: Occupational Therapy Goal  Description: Goals to be met by: 11/12/22     Patient will increase functional independence with ADLs by performing:    UE Dressing with Modified [place].  LE Dressing with Modified [place].  Grooming while standing at sink with Modified [place].  Toileting from toilet with Modified [place] for hygiene and clothing management.   Toilet transfer to toilet with Modified [place].    Outcome: Ongoing, Progressing

## 2022-10-29 NOTE — PLAN OF CARE
Problem: Physical Therapy  Goal: Physical Therapy Goal  Description: Goals to be met by: 22     Patient will increase functional independence with mobility by performin. Gait  x 350 feet with Stand-by Assistance using Single-point Cane .   2. Ascend/descend 5 stair with right Handrails Contact Guard Assistance using Single-point Cane .     Outcome: Ongoing, Progressing   Evaluation completed and goals appropriate. 10/29/2022

## 2022-10-30 LAB
ALBUMIN SERPL BCP-MCNC: 2.3 G/DL (ref 3.5–5.2)
ALP SERPL-CCNC: 76 U/L (ref 55–135)
ALT SERPL W/O P-5'-P-CCNC: 12 U/L (ref 10–44)
ANION GAP SERPL CALC-SCNC: 8 MMOL/L (ref 8–16)
AST SERPL-CCNC: 17 U/L (ref 10–40)
BASOPHILS # BLD AUTO: 0.04 K/UL (ref 0–0.2)
BASOPHILS NFR BLD: 0.5 % (ref 0–1.9)
BILIRUB SERPL-MCNC: 0.3 MG/DL (ref 0.1–1)
BUN SERPL-MCNC: 10 MG/DL (ref 8–23)
CALCIUM SERPL-MCNC: 8.2 MG/DL (ref 8.7–10.5)
CHLORIDE SERPL-SCNC: 108 MMOL/L (ref 95–110)
CO2 SERPL-SCNC: 22 MMOL/L (ref 23–29)
CREAT SERPL-MCNC: 0.7 MG/DL (ref 0.5–1.4)
DIFFERENTIAL METHOD: ABNORMAL
EOSINOPHIL # BLD AUTO: 0.2 K/UL (ref 0–0.5)
EOSINOPHIL NFR BLD: 1.8 % (ref 0–8)
ERYTHROCYTE [DISTWIDTH] IN BLOOD BY AUTOMATED COUNT: 18.3 % (ref 11.5–14.5)
EST. GFR  (NO RACE VARIABLE): >60 ML/MIN/1.73 M^2
GLUCOSE SERPL-MCNC: 96 MG/DL (ref 70–110)
HCT VFR BLD AUTO: 25.7 % (ref 40–54)
HCT VFR BLD AUTO: 29 % (ref 40–54)
HGB BLD-MCNC: 7.7 G/DL (ref 14–18)
HGB BLD-MCNC: 8.9 G/DL (ref 14–18)
IMM GRANULOCYTES # BLD AUTO: 0.03 K/UL (ref 0–0.04)
IMM GRANULOCYTES NFR BLD AUTO: 0.3 % (ref 0–0.5)
LYMPHOCYTES # BLD AUTO: 2.4 K/UL (ref 1–4.8)
LYMPHOCYTES NFR BLD: 27 % (ref 18–48)
MCH RBC QN AUTO: 28.2 PG (ref 27–31)
MCHC RBC AUTO-ENTMCNC: 30 G/DL (ref 32–36)
MCV RBC AUTO: 94 FL (ref 82–98)
MONOCYTES # BLD AUTO: 1.2 K/UL (ref 0.3–1)
MONOCYTES NFR BLD: 13.3 % (ref 4–15)
NEUTROPHILS # BLD AUTO: 5 K/UL (ref 1.8–7.7)
NEUTROPHILS NFR BLD: 57.1 % (ref 38–73)
NRBC BLD-RTO: 0 /100 WBC
PLATELET # BLD AUTO: 458 K/UL (ref 150–450)
PMV BLD AUTO: 9.3 FL (ref 9.2–12.9)
POTASSIUM SERPL-SCNC: 3.8 MMOL/L (ref 3.5–5.1)
PROT SERPL-MCNC: 4.8 G/DL (ref 6–8.4)
RBC # BLD AUTO: 2.73 M/UL (ref 4.6–6.2)
SODIUM SERPL-SCNC: 138 MMOL/L (ref 136–145)
WBC # BLD AUTO: 8.7 K/UL (ref 3.9–12.7)

## 2022-10-30 PROCEDURE — 96376 TX/PRO/DX INJ SAME DRUG ADON: CPT

## 2022-10-30 PROCEDURE — C9113 INJ PANTOPRAZOLE SODIUM, VIA: HCPCS | Performed by: NURSE PRACTITIONER

## 2022-10-30 PROCEDURE — 25000003 PHARM REV CODE 250: Performed by: HOSPITALIST

## 2022-10-30 PROCEDURE — 85014 HEMATOCRIT: CPT | Performed by: HOSPITALIST

## 2022-10-30 PROCEDURE — 85025 COMPLETE CBC W/AUTO DIFF WBC: CPT | Performed by: HOSPITALIST

## 2022-10-30 PROCEDURE — 25000003 PHARM REV CODE 250: Performed by: STUDENT IN AN ORGANIZED HEALTH CARE EDUCATION/TRAINING PROGRAM

## 2022-10-30 PROCEDURE — 11000001 HC ACUTE MED/SURG PRIVATE ROOM

## 2022-10-30 PROCEDURE — 25000003 PHARM REV CODE 250: Performed by: NURSE PRACTITIONER

## 2022-10-30 PROCEDURE — 36415 COLL VENOUS BLD VENIPUNCTURE: CPT | Performed by: NURSE PRACTITIONER

## 2022-10-30 PROCEDURE — 85018 HEMOGLOBIN: CPT | Performed by: HOSPITALIST

## 2022-10-30 PROCEDURE — 80053 COMPREHEN METABOLIC PANEL: CPT | Performed by: NURSE PRACTITIONER

## 2022-10-30 PROCEDURE — 63600175 PHARM REV CODE 636 W HCPCS: Performed by: NURSE PRACTITIONER

## 2022-10-30 PROCEDURE — 96366 THER/PROPH/DIAG IV INF ADDON: CPT

## 2022-10-30 PROCEDURE — 94761 N-INVAS EAR/PLS OXIMETRY MLT: CPT

## 2022-10-30 PROCEDURE — G0378 HOSPITAL OBSERVATION PER HR: HCPCS

## 2022-10-30 PROCEDURE — 36415 COLL VENOUS BLD VENIPUNCTURE: CPT | Performed by: HOSPITALIST

## 2022-10-30 RX ADMIN — GABAPENTIN 900 MG: 300 CAPSULE ORAL at 03:10

## 2022-10-30 RX ADMIN — ATORVASTATIN CALCIUM 80 MG: 40 TABLET, FILM COATED ORAL at 09:10

## 2022-10-30 RX ADMIN — PANTOPRAZOLE SODIUM 40 MG: 40 INJECTION, POWDER, FOR SOLUTION INTRAVENOUS at 08:10

## 2022-10-30 RX ADMIN — GABAPENTIN 900 MG: 300 CAPSULE ORAL at 09:10

## 2022-10-30 RX ADMIN — PANTOPRAZOLE SODIUM 40 MG: 40 INJECTION, POWDER, FOR SOLUTION INTRAVENOUS at 09:10

## 2022-10-30 RX ADMIN — POLYETHYLENE GLYCOL 3350, SODIUM SULFATE ANHYDROUS, SODIUM BICARBONATE, SODIUM CHLORIDE, POTASSIUM CHLORIDE 4000 ML: 236; 22.74; 6.74; 5.86; 2.97 POWDER, FOR SOLUTION ORAL at 12:10

## 2022-10-30 RX ADMIN — HYDROCODONE BITARTRATE AND ACETAMINOPHEN 1 TABLET: 5; 325 TABLET ORAL at 12:10

## 2022-10-30 RX ADMIN — GABAPENTIN 900 MG: 300 CAPSULE ORAL at 08:10

## 2022-10-30 RX ADMIN — COLLAGENASE SANTYL: 250 OINTMENT TOPICAL at 09:10

## 2022-10-30 RX ADMIN — THERA TABS 1 TABLET: TAB at 09:10

## 2022-10-30 RX ADMIN — EZETIMIBE 10 MG: 10 TABLET ORAL at 09:10

## 2022-10-30 NOTE — PLAN OF CARE
Pt had clear liquids and is nearly done with Go Lytely to prepare for Colostomy tomorrow. Wound care done on pt's right leg/calf and foam dressing placed on heel. Otherwise no major events during day shift today.   Problem: Adult Inpatient Plan of Care  Goal: Plan of Care Review  Outcome: Ongoing, Progressing  Goal: Patient-Specific Goal (Individualized)  Outcome: Ongoing, Progressing  Goal: Absence of Hospital-Acquired Illness or Injury  Outcome: Ongoing, Progressing  Goal: Optimal Comfort and Wellbeing  Outcome: Ongoing, Progressing  Goal: Readiness for Transition of Care  Outcome: Ongoing, Progressing     Problem: Adjustment to Illness (Gastrointestinal Bleeding)  Goal: Optimal Coping with Acute Illness  Outcome: Ongoing, Progressing     Problem: Bleeding (Gastrointestinal Bleeding)  Goal: Hemostasis  Outcome: Ongoing, Progressing     Problem: Bleeding (Sepsis/Septic Shock)  Goal: Absence of Bleeding  Outcome: Ongoing, Progressing     Problem: Infection Progression (Sepsis/Septic Shock)  Goal: Absence of Infection Signs and Symptoms  Outcome: Ongoing, Progressing     Problem: Pain Chronic (Persistent)  Goal: Acceptable Pain Control and Functional Ability  Outcome: Ongoing, Progressing

## 2022-10-30 NOTE — PLAN OF CARE
Problem: Adult Inpatient Plan of Care  Goal: Plan of Care Review  Outcome: Ongoing, Progressing  Goal: Patient-Specific Goal (Individualized)  Outcome: Ongoing, Progressing  Goal: Absence of Hospital-Acquired Illness or Injury  Outcome: Ongoing, Progressing  Goal: Optimal Comfort and Wellbeing  Outcome: Ongoing, Progressing  Goal: Readiness for Transition of Care  Outcome: Ongoing, Progressing     Problem: Adjustment to Illness (Gastrointestinal Bleeding)  Goal: Optimal Coping with Acute Illness  Outcome: Ongoing, Progressing     Problem: Bleeding (Gastrointestinal Bleeding)  Goal: Hemostasis  Outcome: Ongoing, Progressing     Problem: Adjustment to Illness (Sepsis/Septic Shock)  Goal: Optimal Coping  Outcome: Ongoing, Progressing     Problem: Bleeding (Sepsis/Septic Shock)  Goal: Absence of Bleeding  Outcome: Ongoing, Progressing     Problem: Glycemic Control Impaired (Sepsis/Septic Shock)  Goal: Blood Glucose Level Within Desired Range  Outcome: Ongoing, Progressing     Problem: Infection Progression (Sepsis/Septic Shock)  Goal: Absence of Infection Signs and Symptoms  Outcome: Ongoing, Progressing     Problem: Nutrition Impaired (Sepsis/Septic Shock)  Goal: Optimal Nutrition Intake  Outcome: Ongoing, Progressing     Problem: Impaired Wound Healing  Goal: Optimal Wound Healing  Outcome: Ongoing, Progressing     Problem: Fall Injury Risk  Goal: Absence of Fall and Fall-Related Injury  Outcome: Ongoing, Progressing     Problem: Pain Chronic (Persistent)  Goal: Acceptable Pain Control and Functional Ability  Outcome: Ongoing, Progressing

## 2022-10-30 NOTE — PLAN OF CARE
Pt's H&H will continue to be monitored. Pt is aware that his diet will change to Clear Liquid midnight tonight and will be NPO tomorrow midnight and prepare for Colonoscopy. Otherwise no major events during day shift.   Problem: Adult Inpatient Plan of Care  Goal: Plan of Care Review  Outcome: Ongoing, Progressing  Goal: Patient-Specific Goal (Individualized)  Outcome: Ongoing, Progressing  Goal: Absence of Hospital-Acquired Illness or Injury  Outcome: Ongoing, Progressing  Goal: Optimal Comfort and Wellbeing  Outcome: Ongoing, Progressing  Goal: Readiness for Transition of Care  Outcome: Ongoing, Progressing     Problem: Adjustment to Illness (Gastrointestinal Bleeding)  Goal: Optimal Coping with Acute Illness  Outcome: Ongoing, Progressing     Problem: Bleeding (Gastrointestinal Bleeding)  Goal: Hemostasis  Outcome: Ongoing, Progressing     Problem: Adjustment to Illness (Sepsis/Septic Shock)  Goal: Optimal Coping  Outcome: Ongoing, Progressing     Problem: Bleeding (Sepsis/Septic Shock)  Goal: Absence of Bleeding  Outcome: Ongoing, Progressing     Problem: Glycemic Control Impaired (Sepsis/Septic Shock)  Goal: Blood Glucose Level Within Desired Range  Outcome: Ongoing, Progressing     Problem: Infection Progression (Sepsis/Septic Shock)  Goal: Absence of Infection Signs and Symptoms  Outcome: Ongoing, Progressing     Problem: Nutrition Impaired (Sepsis/Septic Shock)  Goal: Optimal Nutrition Intake  Outcome: Ongoing, Progressing     Problem: Fall Injury Risk  Goal: Absence of Fall and Fall-Related Injury  Outcome: Ongoing, Progressing     Problem: Pain Chronic (Persistent)  Goal: Acceptable Pain Control and Functional Ability  Outcome: Ongoing, Progressing

## 2022-10-30 NOTE — PROGRESS NOTES
"[facility] Medicine  Progress Note    Patient Name: Babatunde Singh  MRN: [id number]  Patient Class: IP- Inpatient   Admission Date: 10/27/2022  Length of Stay: 3 days  Attending Physician: Gillian Watts MD  Primary Care Provider: Esdras Ly MD        Subjective:     Principal Problem:Gastrointestinal hemorrhage        HPI:  Babatunde Singh is a 72 y.o. male with a PMHx of DVT, HTN, and PAD s/p axillary to bi-profunda bypass who presented to the ED from [facility] for low blood counts and melena. He says he was recently seen for this a week ago. He has had constipation and a few episodes of black, tarry stools since admission. He is on xarelto and ASA. He says he feels great. Endorses mild RLE pain which is a chronic issue. He denies chest pain, SOB, dizziness, headache, abdominal pain, BRBPR, hematemesis, dizziness, weakness, or numbness.     In the ED: mildly hypotensive to 100s/60s,  othwerwise VSSAF. Hgb 6.7. Plt 533k. PT/INR 3.2/31.3. Rectal exam performed in the ED significant for melanotic stools, guaiac +. Pt was given 80 mg IV Protonix and 1 unit pRBCs.      Overview/Hospital Course:  No notes on file    Interval History: no major events, hds, had a BM earlier - similar appearance as before "black"    Review of Systems   Constitutional:  Negative for fatigue and fever.   Respiratory:  Negative for shortness of breath.    Cardiovascular:  Negative for chest pain.   Gastrointestinal:  Negative for abdominal pain, diarrhea, nausea and vomiting.   All other systems reviewed and are negative.  Objective:        Physical Exam  Vitals and nursing note reviewed.   Constitutional:       General: He is not in acute distress.     Appearance: He is not toxic-appearing.   HENT:      Head: Normocephalic and atraumatic.   Eyes:      General: No scleral icterus.     Extraocular Movements: Extraocular movements intact.   Cardiovascular:      Rate and Rhythm: Normal rate and regular " rhythm.      Pulses: Normal pulses.   Pulmonary:      Effort: Pulmonary effort is normal. No respiratory distress.      Breath sounds: Normal breath sounds.   Abdominal:      General: Bowel sounds are normal. There is no distension.      Palpations: Abdomen is soft.      Tenderness: There is no abdominal tenderness. There is no guarding.   Musculoskeletal:      Right lower leg: No edema.      Left lower leg: No edema.   Skin:     General: Skin is warm.   Neurological:      General: No focal deficit present.      Mental Status: He is alert and oriented to person, place, and time. Mental status is at baseline.   Psychiatric:         Behavior: Behavior normal.         Thought Content: Thought content normal.       Significant Labs: All pertinent labs within the past 24 hours have been reviewed.    Significant Imaging: I have reviewed all pertinent imaging results/findings within the past 24 hours.      Assessment/Plan:      * Gastrointestinal hemorrhage  72 y.o. with recent admission for acute RLE ischemia on xarelto, ASA, and cilostazol with recent admit for GI bleed requiring multiple blood transfusions, presents with multiple episodes of melena and acute anemia with Hgb 6.7. INR 3.2.  - HDS, with mild tachycardia and hypotension, improving   - s/p 1 unit pRBCs with expected correction.    GI consulted, recs reviewed and d/w GI fellow.  Now s/p push enteroscopy (results to be reported)  Normal endoscopy. Some erythema in the duodenum.    Plan: colonoscopy Monday. Hold Xarelto, ok to restart ASA 81 for limb ischemia/PVD.  Continue Protonix 40mg IV BID until colonoscopy.  Daily CBC, transfuse to keep Hgb>7    Acute blood loss anemia  Patient's anemia is currently uncontrolled. S/p 1 units of PRBCs. Etiology likely d/t GIB  Current CBC reviewed-   Lab Results   Component Value Date    HGB 6.7 (L) 10/27/2022    HGB 6.7 (L) 10/27/2022    HCT 21.2 (L) 10/27/2022    HCT 21.2 (L) 10/27/2022     Monitor serial CBC and  transfuse if patient becomes hemodynamically unstable, symptomatic or H/H drops below 7/21.     DVT (deep venous thrombosis)  Duplex imaging of the right lower extremity veins on 10/5 demonstrates occlusive sub-acute thrombus of the anterior tibial vein (the anterior paired vessel).   -Hold xarelto in the setting of acute GIB.    PAD (peripheral artery disease)  Lower limb ischemia  Pt is s/p left axillary to BILATERAL femoris profunda bypass with 8 mm PTFE on 10/6 by vascular surgery.   -Continue statin and zetia.  -Hold ASA and cilostazol in the setting of GIB  -Wound care consulted for ulcer care    Mixed hyperlipidemia   Patient is chronically on statin.will continue for now. Monitor clinically. Last LDL was   Lab Results   Component Value Date    LDLCALC 86.0 03/10/2022       Dependence on nicotine from cigarettes  Assistance with smoking cessation was offered, including:  [x]  Medications  [x]  Counseling  []  Printed Information on Smoking Cessation  []  Referral to a Smoking Cessation Program    Patient was counseled regarding smoking for 3-10 minutes.    Primary hypertension  - Controlled on admission   - Latest BP and vitals reviewed  - Hold losartan/hctz in the setting of GIB.      VTE Risk Mitigation (From admission, onward)         Ordered     IP VTE HIGH RISK PATIENT  Once         10/27/22 2218     Place sequential compression device  Until discontinued         10/27/22 2218                Discharge Planning   LAZ: 10/31/2022     Code Status: Full Code   Is the patient medically ready for discharge?: No    Reason for patient still in hospital (select all that apply): Patient trending condition, Laboratory test, Treatment and Consult recommendations  Discharge Plan A: Skilled Nursing Facility                  Gillian Watts MD  Department of Hospital Medicine   [facility] S[facility]

## 2022-10-30 NOTE — TREATMENT PLAN
GI Treatment Plan     Interval history:  Push enteroscopy completed on 10/28 - Normal endoscopy. Some erythema in the duodenum.    No acute events overnight. VSS. Hb 7.6>7.8.    Vitals:    10/30/22 0734   BP: 116/70   Pulse: 88   Resp: 18   Temp: 98.9 °F (37.2 °C)          Plan:  - Inpatient colonoscopy on Monday. Clear liquids today. Prep ordered. NPO midnight before procedure.   - hold AC till colonoscopy is completed  - monitor for signs of bleeding  - Trend H&H. Transfuse to keep Hb > 7 g/dl.        Jenaro Noland MD  GI Fellow, PGY-4

## 2022-10-30 NOTE — SUBJECTIVE & OBJECTIVE
"Interval History: no major events, hds, had a BM earlier - similar appearance as before "black"    Review of Systems   Constitutional:  Negative for fatigue and fever.   Respiratory:  Negative for shortness of breath.    Cardiovascular:  Negative for chest pain.   Gastrointestinal:  Negative for abdominal pain, diarrhea, nausea and vomiting.   All other systems reviewed and are negative.  Objective:        Physical Exam  Vitals and nursing note reviewed.   Constitutional:       General: He is not in acute distress.     Appearance: He is not toxic-appearing.   HENT:      Head: Normocephalic and atraumatic.   Eyes:      General: No scleral icterus.     Extraocular Movements: Extraocular movements intact.   Cardiovascular:      Rate and Rhythm: Normal rate and regular rhythm.      Pulses: Normal pulses.   Pulmonary:      Effort: Pulmonary effort is normal. No respiratory distress.      Breath sounds: Normal breath sounds.   Abdominal:      General: Bowel sounds are normal. There is no distension.      Palpations: Abdomen is soft.      Tenderness: There is no abdominal tenderness. There is no guarding.   Musculoskeletal:      Right lower leg: No edema.      Left lower leg: No edema.   Skin:     General: Skin is warm.   Neurological:      General: No focal deficit present.      Mental Status: He is alert and oriented to person, place, and time. Mental status is at baseline.   Psychiatric:         Behavior: Behavior normal.         Thought Content: Thought content normal.       Significant Labs: All pertinent labs within the past 24 hours have been reviewed.    Significant Imaging: I have reviewed all pertinent imaging results/findings within the past 24 hours.  "

## 2022-10-31 ENCOUNTER — ANESTHESIA (OUTPATIENT)
Dept: ENDOSCOPY | Facility: HOSPITAL | Age: 72
DRG: 378 | End: 2022-10-31
Payer: MEDICARE

## 2022-10-31 VITALS
WEIGHT: 147 LBS | SYSTOLIC BLOOD PRESSURE: 142 MMHG | HEIGHT: 64 IN | BODY MASS INDEX: 25.1 KG/M2 | RESPIRATION RATE: 16 BRPM | OXYGEN SATURATION: 95 % | DIASTOLIC BLOOD PRESSURE: 70 MMHG | HEART RATE: 90 BPM | TEMPERATURE: 99 F

## 2022-10-31 LAB
BASOPHILS # BLD AUTO: 0.06 K/UL (ref 0–0.2)
BASOPHILS NFR BLD: 0.7 % (ref 0–1.9)
DIFFERENTIAL METHOD: ABNORMAL
EOSINOPHIL # BLD AUTO: 0.1 K/UL (ref 0–0.5)
EOSINOPHIL NFR BLD: 1.4 % (ref 0–8)
ERYTHROCYTE [DISTWIDTH] IN BLOOD BY AUTOMATED COUNT: 17.8 % (ref 11.5–14.5)
HCT VFR BLD AUTO: 26.5 % (ref 40–54)
HGB BLD-MCNC: 8.3 G/DL (ref 14–18)
IMM GRANULOCYTES # BLD AUTO: 0.02 K/UL (ref 0–0.04)
IMM GRANULOCYTES NFR BLD AUTO: 0.2 % (ref 0–0.5)
LYMPHOCYTES # BLD AUTO: 2.5 K/UL (ref 1–4.8)
LYMPHOCYTES NFR BLD: 28.5 % (ref 18–48)
MCH RBC QN AUTO: 28.5 PG (ref 27–31)
MCHC RBC AUTO-ENTMCNC: 31.3 G/DL (ref 32–36)
MCV RBC AUTO: 91 FL (ref 82–98)
MONOCYTES # BLD AUTO: 0.9 K/UL (ref 0.3–1)
MONOCYTES NFR BLD: 10.1 % (ref 4–15)
NEUTROPHILS # BLD AUTO: 5.1 K/UL (ref 1.8–7.7)
NEUTROPHILS NFR BLD: 59.1 % (ref 38–73)
NRBC BLD-RTO: 0 /100 WBC
PLATELET # BLD AUTO: 407 K/UL (ref 150–450)
PMV BLD AUTO: 9.8 FL (ref 9.2–12.9)
RBC # BLD AUTO: 2.91 M/UL (ref 4.6–6.2)
WBC # BLD AUTO: 8.63 K/UL (ref 3.9–12.7)

## 2022-10-31 PROCEDURE — D9220A PRA ANESTHESIA: Mod: ANES,,, | Performed by: ANESTHESIOLOGY

## 2022-10-31 PROCEDURE — 27200997: Performed by: INTERNAL MEDICINE

## 2022-10-31 PROCEDURE — 45380 COLONOSCOPY AND BIOPSY: CPT | Mod: 59,,, | Performed by: INTERNAL MEDICINE

## 2022-10-31 PROCEDURE — 63600175 PHARM REV CODE 636 W HCPCS: Performed by: NURSE ANESTHETIST, CERTIFIED REGISTERED

## 2022-10-31 PROCEDURE — 85025 COMPLETE CBC W/AUTO DIFF WBC: CPT | Performed by: HOSPITALIST

## 2022-10-31 PROCEDURE — 45385 PR COLONOSCOPY,REMV LESN,SNARE: ICD-10-PCS | Mod: GC,,, | Performed by: INTERNAL MEDICINE

## 2022-10-31 PROCEDURE — 88305 TISSUE EXAM BY PATHOLOGIST: CPT | Mod: 59 | Performed by: STUDENT IN AN ORGANIZED HEALTH CARE EDUCATION/TRAINING PROGRAM

## 2022-10-31 PROCEDURE — 27201012 HC FORCEPS, HOT/COLD, DISP: Performed by: INTERNAL MEDICINE

## 2022-10-31 PROCEDURE — 37000008 HC ANESTHESIA 1ST 15 MINUTES: Performed by: INTERNAL MEDICINE

## 2022-10-31 PROCEDURE — 88305 TISSUE EXAM BY PATHOLOGIST: CPT | Mod: 26,,, | Performed by: STUDENT IN AN ORGANIZED HEALTH CARE EDUCATION/TRAINING PROGRAM

## 2022-10-31 PROCEDURE — 25000003 PHARM REV CODE 250: Performed by: HOSPITALIST

## 2022-10-31 PROCEDURE — 11000001 HC ACUTE MED/SURG PRIVATE ROOM

## 2022-10-31 PROCEDURE — 25000003 PHARM REV CODE 250: Performed by: NURSE ANESTHETIST, CERTIFIED REGISTERED

## 2022-10-31 PROCEDURE — 45385 COLONOSCOPY W/LESION REMOVAL: CPT | Mod: GC,,, | Performed by: INTERNAL MEDICINE

## 2022-10-31 PROCEDURE — G0378 HOSPITAL OBSERVATION PER HR: HCPCS

## 2022-10-31 PROCEDURE — 37000009 HC ANESTHESIA EA ADD 15 MINS: Performed by: INTERNAL MEDICINE

## 2022-10-31 PROCEDURE — 25000003 PHARM REV CODE 250: Performed by: NURSE PRACTITIONER

## 2022-10-31 PROCEDURE — D9220A PRA ANESTHESIA: Mod: CRNA,,, | Performed by: NURSE ANESTHETIST, CERTIFIED REGISTERED

## 2022-10-31 PROCEDURE — 27201089 HC SNARE, DISP (ANY): Performed by: INTERNAL MEDICINE

## 2022-10-31 PROCEDURE — 94761 N-INVAS EAR/PLS OXIMETRY MLT: CPT

## 2022-10-31 PROCEDURE — D9220A PRA ANESTHESIA: ICD-10-PCS | Mod: CRNA,,, | Performed by: NURSE ANESTHETIST, CERTIFIED REGISTERED

## 2022-10-31 PROCEDURE — 45380 PR COLONOSCOPY,BIOPSY: ICD-10-PCS | Mod: 59,,, | Performed by: INTERNAL MEDICINE

## 2022-10-31 PROCEDURE — 45380 COLONOSCOPY AND BIOPSY: CPT | Mod: 59 | Performed by: INTERNAL MEDICINE

## 2022-10-31 PROCEDURE — 36415 COLL VENOUS BLD VENIPUNCTURE: CPT | Performed by: HOSPITALIST

## 2022-10-31 PROCEDURE — D9220A PRA ANESTHESIA: ICD-10-PCS | Mod: ANES,,, | Performed by: ANESTHESIOLOGY

## 2022-10-31 PROCEDURE — 27202298: Performed by: INTERNAL MEDICINE

## 2022-10-31 PROCEDURE — 88305 TISSUE EXAM BY PATHOLOGIST: ICD-10-PCS | Mod: 26,,, | Performed by: STUDENT IN AN ORGANIZED HEALTH CARE EDUCATION/TRAINING PROGRAM

## 2022-10-31 PROCEDURE — 45385 COLONOSCOPY W/LESION REMOVAL: CPT | Performed by: INTERNAL MEDICINE

## 2022-10-31 RX ORDER — PROPOFOL 10 MG/ML
VIAL (ML) INTRAVENOUS
Status: DISCONTINUED | OUTPATIENT
Start: 2022-10-31 | End: 2022-10-31

## 2022-10-31 RX ORDER — HYDROCODONE BITARTRATE AND ACETAMINOPHEN 5; 325 MG/1; MG/1
1 TABLET ORAL EVERY 4 HOURS PRN
Qty: 10 TABLET | Refills: 0 | Status: SHIPPED | OUTPATIENT
Start: 2022-10-31 | End: 2022-11-05

## 2022-10-31 RX ORDER — NAPROXEN SODIUM 220 MG/1
81 TABLET, FILM COATED ORAL DAILY
Qty: 30 TABLET | Refills: 0 | Status: SHIPPED | OUTPATIENT
Start: 2022-10-31 | End: 2023-05-24 | Stop reason: ALTCHOICE

## 2022-10-31 RX ORDER — LOSARTAN POTASSIUM AND HYDROCHLOROTHIAZIDE 12.5; 5 MG/1; MG/1
1 TABLET ORAL DAILY
Qty: 30 TABLET | Refills: 0 | Status: SHIPPED | OUTPATIENT
Start: 2022-10-31 | End: 2022-11-25 | Stop reason: SDUPTHER

## 2022-10-31 RX ORDER — ATORVASTATIN CALCIUM 80 MG/1
80 TABLET, FILM COATED ORAL DAILY
Qty: 30 TABLET | Refills: 0 | Status: SHIPPED | OUTPATIENT
Start: 2022-10-31 | End: 2022-11-25 | Stop reason: SDUPTHER

## 2022-10-31 RX ORDER — PANTOPRAZOLE SODIUM 40 MG/1
40 TABLET, DELAYED RELEASE ORAL DAILY
Qty: 30 TABLET | Refills: 0
Start: 2022-10-31 | End: 2022-11-25 | Stop reason: SDUPTHER

## 2022-10-31 RX ORDER — GABAPENTIN 300 MG/1
900 CAPSULE ORAL 3 TIMES DAILY
Qty: 90 CAPSULE | Refills: 0 | Status: SHIPPED | OUTPATIENT
Start: 2022-10-31 | End: 2022-11-25 | Stop reason: SDUPTHER

## 2022-10-31 RX ORDER — LIDOCAINE HYDROCHLORIDE 20 MG/ML
INJECTION INTRAVENOUS
Status: DISCONTINUED | OUTPATIENT
Start: 2022-10-31 | End: 2022-10-31

## 2022-10-31 RX ORDER — EZETIMIBE 10 MG/1
10 TABLET ORAL DAILY
Qty: 30 TABLET | Refills: 0 | Status: SHIPPED | OUTPATIENT
Start: 2022-10-31 | End: 2022-11-25 | Stop reason: SDUPTHER

## 2022-10-31 RX ORDER — ATORVASTATIN CALCIUM 40 MG/1
80 TABLET, FILM COATED ORAL DAILY
Status: DISCONTINUED | OUTPATIENT
Start: 2022-10-31 | End: 2022-10-31

## 2022-10-31 RX ORDER — NAPROXEN SODIUM 220 MG/1
81 TABLET, FILM COATED ORAL DAILY
Status: DISCONTINUED | OUTPATIENT
Start: 2022-11-01 | End: 2022-10-31 | Stop reason: HOSPADM

## 2022-10-31 RX ORDER — PANTOPRAZOLE SODIUM 40 MG/1
40 TABLET, DELAYED RELEASE ORAL DAILY
Status: DISCONTINUED | OUTPATIENT
Start: 2022-10-31 | End: 2022-10-31 | Stop reason: HOSPADM

## 2022-10-31 RX ORDER — PROCHLORPERAZINE EDISYLATE 5 MG/ML
5 INJECTION INTRAMUSCULAR; INTRAVENOUS EVERY 30 MIN PRN
Status: DISCONTINUED | OUTPATIENT
Start: 2022-10-31 | End: 2022-10-31 | Stop reason: HOSPADM

## 2022-10-31 RX ORDER — PROPOFOL 10 MG/ML
VIAL (ML) INTRAVENOUS CONTINUOUS PRN
Status: DISCONTINUED | OUTPATIENT
Start: 2022-10-31 | End: 2022-10-31

## 2022-10-31 RX ORDER — LOSARTAN POTASSIUM AND HYDROCHLOROTHIAZIDE 12.5; 5 MG/1; MG/1
1 TABLET ORAL DAILY
Status: DISCONTINUED | OUTPATIENT
Start: 2022-10-31 | End: 2022-10-31 | Stop reason: HOSPADM

## 2022-10-31 RX ADMIN — GABAPENTIN 900 MG: 300 CAPSULE ORAL at 11:10

## 2022-10-31 RX ADMIN — PROPOFOL 50 MG: 10 INJECTION, EMULSION INTRAVENOUS at 08:10

## 2022-10-31 RX ADMIN — HYDROCODONE BITARTRATE AND ACETAMINOPHEN 1 TABLET: 5; 325 TABLET ORAL at 05:10

## 2022-10-31 RX ADMIN — LIDOCAINE HYDROCHLORIDE 30 MG: 20 INJECTION INTRAVENOUS at 08:10

## 2022-10-31 RX ADMIN — SODIUM CHLORIDE: 9 INJECTION, SOLUTION INTRAVENOUS at 07:10

## 2022-10-31 RX ADMIN — GABAPENTIN 900 MG: 300 CAPSULE ORAL at 04:10

## 2022-10-31 RX ADMIN — Medication 125 MCG/KG/MIN: at 08:10

## 2022-10-31 RX ADMIN — PANTOPRAZOLE SODIUM 40 MG: 40 TABLET, DELAYED RELEASE ORAL at 11:10

## 2022-10-31 RX ADMIN — EZETIMIBE 10 MG: 10 TABLET ORAL at 11:10

## 2022-10-31 RX ADMIN — COLLAGENASE SANTYL: 250 OINTMENT TOPICAL at 09:10

## 2022-10-31 RX ADMIN — LOSARTAN POTASSIUM AND HYDROCHLOROTHIAZIDE 1 TABLET: 50; 12.5 TABLET, FILM COATED ORAL at 01:10

## 2022-10-31 RX ADMIN — ATORVASTATIN CALCIUM 80 MG: 40 TABLET, FILM COATED ORAL at 11:10

## 2022-10-31 RX ADMIN — THERA TABS 1 TABLET: TAB at 11:10

## 2022-10-31 NOTE — PLAN OF CARE
[facility]      HOME HEALTH ORDERS  FACE TO FACE ENCOUNTER    Patient Name: Babatunde Singh  YOB: 1950    PCP: Esdras Ly MD   PCP Address: [address] / [place] [address]  PCP Phone Number: [phone]  PCP Fax: [phone]    Encounter Date: 10/27/22    Admit to Home Health    Diagnoses:  Active Hospital Problems    Diagnosis  POA    *Gastrointestinal hemorrhage [K92.2]  Yes     Priority: 24      EGD (10/20/22):   Impression:              - Normal esophagus.   - Z-line regular.   - Gastritis.   - Normal examined duodenum. - No specimens collected.         Acute blood loss anemia [D62]  Yes     Priority: 24     DVT (deep venous thrombosis) [I82.409]  Yes    Lower limb ischemia [I99.8]  Yes    PAD (peripheral artery disease) [I73.9]  Yes    Dependence on nicotine from cigarettes [F17.210]  Yes    Mixed hyperlipidemia [E78.2]  Yes    Primary hypertension [I10]  Yes      Resolved Hospital Problems   No resolved problems to display.       Follow Up Appointments:  Future Appointments   Date Time Provider Department Center   11/1/2022 11:15 AM Moises Cuba MD [facility] UROLOGC [facility]   11/1/2022  3:00 PM Sherry Davis PA-C [facility] WOUND [facility]   11/30/2022  8:00 AM N[facility] OIC-US1 MASTER [facility]   11/30/2022  9:15 AM [facility]XRAY [facility]   11/30/2022 10:15 AM [facility]   11/30/2022 10:30 AM [facility] EOS [facility]   11/30/2022 11:30 AM Karyna Fontenot NP [facility] NEUROS8 [facility]   12/1/2022  2:20 PM Dilshad Ye MD [facility] GASTRO [facility]   12/13/2022  8:00 AM Moises Cuba MD [facility] UROLOGC [facility]   12/16/2022 10:30 AM Cr Wallace MD PhD [facility] PERVASC [facility]   1/24/2023  9:00 AM VASCULAR, LAB [facility] VASCLAB [facility]   1/24/2023 10:00 AM VASCULAR, LAB [facility]   1/24/2023 10:45 AM [facility]       Allergies:Review of patient's allergies  indicates:  No Known Allergies    Medications: Review discharge medications with patient and family and provide education.    Current Facility-Administered Medications   Medication Dose Route Frequency Provider Last Rate Last Admin    0.9%  NaCl infusion (for blood administration)   Intravenous Q24H PRN Amanda Schmitt NP        acetaminophen tablet 650 mg  650 mg Oral Q6H PRN Amanda Schmitt NP        [START ON 11/1/2022] aspirin chewable tablet 81 mg  81 mg Oral Daily Gillian Watts MD        atorvastatin tablet 80 mg  80 mg Oral Daily Amanda Schmitt NP   80 mg at 10/31/22 1145    collagenase ointment   Topical (Top) Daily Gillian Watts MD   Given at 10/30/22 0925    ezetimibe tablet 10 mg  10 mg Oral Daily Amanda Schmitt NP   10 mg at 10/31/22 1147    gabapentin capsule 900 mg  900 mg Oral TID Amanda Schmitt NP   900 mg at 10/31/22 1144    HYDROcodone-acetaminophen 5-325 mg per tablet 1 tablet  1 tablet Oral Q4H PRN Amanda Schmitt NP   1 tablet at 10/31/22 0509    losartan-hydrochlorothiazide 50-12.5 mg per tablet 1 tablet  1 tablet Oral Daily Gillian Watts MD        multivitamin tablet  1 tablet Oral Daily Gillian Watts MD   1 tablet at 10/31/22 1147    ondansetron injection 4 mg  4 mg Intravenous Q8H PRN Amanda Schmitt NP        pantoprazole EC tablet 40 mg  40 mg Oral Daily Gillian Watts MD   40 mg at 10/31/22 1146    sodium chloride 0.9% flush 10 mL  10 mL Intravenous PRN Amanda Schmitt NP         Current Discharge Medication List        CONTINUE these medications which have NOT CHANGED    Details   acetaminophen (TYLENOL) 325 MG tablet Take 2 tablets (650 mg total) by mouth every 8 (eight) hours as needed.  Qty: 20 tablet, Refills: 0      aspirin 81 MG Chew Chew and swallow 1 tablet (81 mg total) by mouth once daily.  Qty: 30 tablet, Refills: 0      atorvastatin (LIPITOR) 80 MG tablet Take 1 tablet (80 mg total) by mouth once daily.  Qty: 30 tablet, Refills: 2     Associated Diagnoses: Hyperlipidemia, unspecified hyperlipidemia type      cilostazoL (PLETAL) 100 MG Tab Take 1 tablet (100 mg total) by mouth 2 (two) times daily.  Qty: 180 tablet, Refills: 3      ezetimibe (ZETIA) 10 mg tablet Take 1 tablet (10 mg total) by mouth once daily.  Qty: 90 tablet, Refills: 3      gabapentin (NEURONTIN) 300 MG capsule Take 3 capsules (900 mg total) by mouth 3 (three) times daily.  Qty: 540 capsule, Refills: 3    Associated Diagnoses: Claudication of right lower extremity      HYDROcodone-acetaminophen (NORCO) 5-325 mg per tablet Take 1 tablet by mouth every 4 (four) hours as needed for Pain.  Qty: 10 tablet, Refills: 0    Comments: Quantity prescribed more than 7 day supply? No      losartan-hydrochlorothiazide 50-12.5 mg (HYZAAR) 50-12.5 mg per tablet Take 1 tablet by mouth once daily.  Qty: 30 tablet, Refills: 2    Comments: .  Associated Diagnoses: Primary hypertension      multivitamin (THERAGRAN) per tablet Take 1 tablet by mouth once daily.      mupirocin (BACTROBAN) 2 % ointment Apply topically 2 (two) times daily.  Qty: 30 g, Refills: 1      naproxen (NAPROSYN) 500 MG tablet Take 1 tablet (500 mg total) by mouth 2 (two) times daily as needed (pain).  Qty: 60 tablet, Refills: 0      pantoprazole (PROTONIX) 40 MG tablet Take 1 tablet (40 mg total) by mouth once daily.  Qty: 30 tablet, Refills: 11      rivaroxaban (XARELTO DVT-PE TREAT 30D START) 15 mg (42)- 20 mg (9) tablet dose pack Take 1 tablet (15 mg) by mouth twice daily with food for 21 days followed by 1 tablet (20 mg) by mouth once daily with food  Qty: 51 tablet, Refills: 0    Comments: 51 tablets total - (42) 15mg tab and (9) 20mg tabs  Sig:  15mg PO bid x 3wks, then  20mg PO QD      terbinafine HCl (LAMISIL ORAL) Take by mouth once daily at 6am.               I have seen and examined this patient within the last 30 days. My clinical findings that support the need for the home health skilled services and home bound  status are the following:no   Weakness/numbness causing balance and gait disturbance due to Coronary Heart Disease and Anemia making it taxing to leave home.  Patient with medication mismanagement issues requiring home bound status as evidenced by  Poor understanding of medication regimen/dosage.     Diet:   cardiac diet    Referrals/ Consults  Occupational Therapy to evaluate and treat. Evaluate home environment for safety and equipment needs. Perform/Instruct on transfers, ADL training, ROM, and therapeutic exercises.   to evaluate for community resources/long-range planning.    Activities:   activity as tolerated    Nursing:   Agency to admit patient within 24 hours of hospital discharge unless specified on physician order or at patient request    SN to complete comprehensive assessment including routine vital signs. Instruct on disease process and s/s of complications to report to MD. Review/verify medication list sent home with the patient at time of discharge  and instruct patient/caregiver as needed. Frequency may be adjusted depending on start of care date.     Skilled nurse to perform up to 3 visits PRN for symptoms related to diagnosis    Notify MD if SBP > 160 or < 90; DBP > 90 or < 50; HR > 120 or < 50; Temp > 101; O2 < 88%    Ok to schedule additional visits based on staff availability and patient request on consecutive days within the home health episode.    When multiple disciplines ordered:    Start of Care occurs on Sunday - Wednesday schedule remaining discipline evaluations as ordered on separate consecutive days following the start of care.    Thursday SOC -schedule subsequent evaluations Friday and Monday the following week.     Friday - Saturday SOC - schedule subsequent discipline evaluations on consecutive days starting Monday of the following week.    For all post-discharge communication and subsequent orders please contact patient's primary care physician. If unable to reach  primary care physician or do not receive response within 30 minutes, please contact PCP for clinical staff order clarification    I certify that this patient is confined to his home and needs intermittent skilled nursing care and occupational therapy.

## 2022-10-31 NOTE — TRANSFER OF CARE
"Anesthesia Transfer of Care Note    Patient: Babatunde Singh    Procedure(s) Performed: Procedure(s) (LRB):  COLONOSCOPY (N/A)    Patient location: PACU    Anesthesia Type: general    Transport from OR: Transported from OR on room air with adequate spontaneous ventilation    Post pain: adequate analgesia    Post assessment: no apparent anesthetic complications and tolerated procedure well    Post vital signs: stable    Level of consciousness: awake, alert and oriented    Nausea/Vomiting: no nausea/vomiting    Complications: none    Transfer of care protocol was followed      Last vitals:   Visit Vitals  BP 96/54   Pulse 85   Temp 98   Resp 18   Ht 5' 4" (1.626 m)   Wt 66.7 kg (147 lb)   SpO2 98%   BMI 25.23 kg/m²     "

## 2022-10-31 NOTE — NURSING
Pt has left the floor with transport. Peripheral Ivs removed. Discharge paperwork received. Pt's daughter is waiting for him at front of hospital.

## 2022-10-31 NOTE — PROGRESS NOTES
[facility] Medicine  Progress Note    Patient Name: Babatunde Singh  MRN: [id number]  Patient Class: IP- Inpatient   Admission Date: 10/27/2022  Length of Stay: 3 days  Attending Physician: Gillian Watts MD  Primary Care Provider: Esdras Ly MD        Subjective:     Principal Problem:Gastrointestinal hemorrhage        HPI:  Babatunde Singh is a 72 y.o. male with a PMHx of DVT, HTN, and PAD s/p axillary to bi-profunda bypass who presented to the ED from [facility] for low blood counts and melena. He says he was recently seen for this a week ago. He has had constipation and a few episodes of black, tarry stools since admission. He is on xarelto and ASA. He says he feels great. Endorses mild RLE pain which is a chronic issue. He denies chest pain, SOB, dizziness, headache, abdominal pain, BRBPR, hematemesis, dizziness, weakness, or numbness.     In the ED: mildly hypotensive to 100s/60s,  othwerwise VSSAF. Hgb 6.7. Plt 533k. PT/INR 3.2/31.3. Rectal exam performed in the ED significant for melanotic stools, guaiac +. Pt was given 80 mg IV Protonix and 1 unit pRBCs.      Overview/Hospital Course:  No notes on file    Interval History: no major events, hds, colonoscopy prep today    Review of Systems   Constitutional:  Negative for fever.   Respiratory:  Negative for shortness of breath.    Cardiovascular:  Negative for chest pain.   Gastrointestinal:  Negative for abdominal pain, nausea and vomiting.   All other systems reviewed and are negative.  Objective:     Vital Signs (Most Recent):  Temp: 98.5 °F (36.9 °C) (10/30/22 2017)  Pulse: 83 (10/30/22 2017)  Resp: 16 (10/30/22 2017)  BP: (!) 115/57 (10/30/22 2017)  SpO2: 98 % (10/30/22 2017)   Vital Signs (24h Range):  Temp:  [98.1 °F (36.7 °C)-98.9 °F (37.2 °C)] 98.5 °F (36.9 °C)  Pulse:  [79-90] 83  Resp:  [16-18] 16  SpO2:  [92 %-99 %] 98 %  BP: (115-143)/(57-70) 115/57     Weight: 67 kg (147 lb 11.3 oz)  Body mass index is 25.35  kg/m².    Intake/Output Summary (Last 24 hours) at 10/30/2022 2138  Last data filed at 10/30/2022 0517  Gross per 24 hour   Intake --   Output 5700 ml   Net -5700 ml      Physical Exam  Vitals reviewed.   Pulmonary:      Effort: Pulmonary effort is normal. No respiratory distress.   Abdominal:      General: There is no distension.      Tenderness: There is no guarding.   Musculoskeletal:      Right lower leg: No edema.      Left lower leg: No edema.   Skin:     General: Skin is warm.      Coloration: Skin is not jaundiced.   Neurological:      General: No focal deficit present.      Mental Status: Mental status is at baseline.   Psychiatric:         Behavior: Behavior normal.         Thought Content: Thought content normal.       Significant Labs: All pertinent labs within the past 24 hours have been reviewed.    Significant Imaging: I have reviewed all pertinent imaging results/findings within the past 24 hours.      Assessment/Plan:      * Gastrointestinal hemorrhage  72 y.o. with recent admission for acute RLE ischemia on xarelto, ASA, and cilostazol with recent admit for GI bleed requiring multiple blood transfusions, presents with multiple episodes of melena and acute anemia with Hgb 6.7. INR 3.2.  - HDS, with mild tachycardia and hypotension, improving   - s/p 1 unit pRBCs with expected correction.    GI consulted, recs reviewed and d/w GI fellow.  Now s/p push enteroscopy (results to be reported)  Normal endoscopy. Some erythema in the duodenum.    Plan: colonoscopy tomorrow, prep today, clear diet. Hold Xarelto, hold aspirin.  Continue Protonix 40mg IV BID until colonoscopy.  Daily CBC, transfuse to keep Hgb>7. Stable trends.    Acute blood loss anemia  Patient's anemia is currently controlled. S/p 1 units of PRBCs. Etiology likely d/t GIB  Current CBC reviewed-   Lab Results   Component Value Date    HGB 8.9 (L) 10/30/2022    HCT 29.0 (L) 10/30/2022     Monitor serial CBC and transfuse if patient becomes  hemodynamically unstable, symptomatic or H/H drops below 7/21.     DVT (deep venous thrombosis)  Duplex imaging of the right lower extremity veins on 10/5 demonstrates occlusive sub-acute thrombus of the anterior tibial vein (the anterior paired vessel).   -Hold xarelto in the setting of acute GIB.    PAD (peripheral artery disease)  Lower limb ischemia  Pt is s/p left axillary to BILATERAL femoris profunda bypass with 8 mm PTFE on 10/6 by vascular surgery.   -Continue statin and zetia.  -Hold ASA and cilostazol in the setting of GIB  -Wound care consulted for ulcer care    Mixed hyperlipidemia   Patient is chronically on statin.will continue for now. Monitor clinically. Last LDL was   Lab Results   Component Value Date    LDLCALC 86.0 03/10/2022       Dependence on nicotine from cigarettes  Assistance with smoking cessation was offered, including:  [x]  Medications  [x]  Counseling  []  Printed Information on Smoking Cessation  []  Referral to a Smoking Cessation Program    Patient was counseled regarding smoking for 3-10 minutes.    Primary hypertension  - Controlled on admission   - Latest BP and vitals reviewed  - Hold losartan/hctz in the setting of GIB.      VTE Risk Mitigation (From admission, onward)         Ordered     IP VTE HIGH RISK PATIENT  Once         10/27/22 2218     Place sequential compression device  Until discontinued         10/27/22 2218                Discharge Planning   LAZ: 11/1/2022     Code Status: Full Code   Is the patient medically ready for discharge?:     Reason for patient still in hospital (select all that apply): Treatment and Consult recommendations  Discharge Plan A: Skilled Nursing Facility                  Gillian Watts MD  Department of Hospital Medicine   [facility]

## 2022-10-31 NOTE — PROGRESS NOTES
GI Post Procedure Treatment Plan    Interval history:  Colonoscopy completed.   Diverticulosis  No blood seen in the colon or terminal ileum  4 polyps removed    Plan:  - f/u pathology  - advance diet as tolerated  - repeat colonoscopy in 3 years for surveillance  - restart xarelto tomorrow  - the patient can be discharged from the GI standpoint, if bleeding recurs, will need a VCE  - we will sign off, please call with questions.    Erin Bryan MD  GI Fellow, PGY-6

## 2022-10-31 NOTE — SUBJECTIVE & OBJECTIVE
Interval History: no major events, hds, colonoscopy prep today    Review of Systems   Constitutional:  Negative for fever.   Respiratory:  Negative for shortness of breath.    Cardiovascular:  Negative for chest pain.   Gastrointestinal:  Negative for abdominal pain, nausea and vomiting.   All other systems reviewed and are negative.  Objective:     Vital Signs (Most Recent):  Temp: 98.5 °F (36.9 °C) (10/30/22 2017)  Pulse: 83 (10/30/22 2017)  Resp: 16 (10/30/22 2017)  BP: (!) 115/57 (10/30/22 2017)  SpO2: 98 % (10/30/22 2017)   Vital Signs (24h Range):  Temp:  [98.1 °F (36.7 °C)-98.9 °F (37.2 °C)] 98.5 °F (36.9 °C)  Pulse:  [79-90] 83  Resp:  [16-18] 16  SpO2:  [92 %-99 %] 98 %  BP: (115-143)/(57-70) 115/57     Weight: 67 kg (147 lb 11.3 oz)  Body mass index is 25.35 kg/m².    Intake/Output Summary (Last 24 hours) at 10/30/2022 2138  Last data filed at 10/30/2022 0517  Gross per 24 hour   Intake --   Output 5700 ml   Net -5700 ml      Physical Exam  Vitals reviewed.   Pulmonary:      Effort: Pulmonary effort is normal. No respiratory distress.   Abdominal:      General: There is no distension.      Tenderness: There is no guarding.   Musculoskeletal:      Right lower leg: No edema.      Left lower leg: No edema.   Skin:     General: Skin is warm.      Coloration: Skin is not jaundiced.   Neurological:      General: No focal deficit present.      Mental Status: Mental status is at baseline.   Psychiatric:         Behavior: Behavior normal.         Thought Content: Thought content normal.       Significant Labs: All pertinent labs within the past 24 hours have been reviewed.    Significant Imaging: I have reviewed all pertinent imaging results/findings within the past 24 hours.

## 2022-10-31 NOTE — ANESTHESIA POSTPROCEDURE EVALUATION
Anesthesia Post Evaluation    Patient: Babatunde Singh    Procedure(s) Performed: Procedure(s) (LRB):  EGD (ESOPHAGOGASTRODUODENOSCOPY) (N/A)    Final Anesthesia Type: general      Patient location during evaluation: GI PACU  Patient participation: Yes- Able to Participate  Level of consciousness: awake and alert and oriented  Post-procedure vital signs: reviewed and stable  Pain management: adequate  Airway patency: patent    PONV status at discharge: No PONV  Anesthetic complications: no      Cardiovascular status: blood pressure returned to baseline and hemodynamically stable  Respiratory status: unassisted, spontaneous ventilation and room air  Hydration status: euvolemic  Follow-up not needed.          Vitals Value Taken Time   /65 10/31/22 0931   Temp 36.2 °C (97.2 °F) 10/31/22 0930   Pulse 71 10/31/22 0940   Resp 20 10/31/22 0940   SpO2 93 % 10/31/22 0940   Vitals shown include unvalidated device data.      Event Time   Out of Recovery 12:53:00         Pain/Cassie Score: Pain Rating Prior to Med Admin: 9 (10/31/2022  5:09 AM)  Cassie Score: 10 (10/31/2022  9:15 AM)

## 2022-10-31 NOTE — H&P
Short Stay Endoscopy History and Physical    PCP - Esdras Ly MD    Procedure - Colonoscopy  ASA - per anesthesia  Mallampati - per anesthesia  History of Anesthesia problems - no  Family history Anesthesia problems - no   Plan of anesthesia - General    HPI:  This is a 72 y.o. male here for evaluation of : GI bleeding      ROS:  Constitutional: No fevers, chills  CV: No chest pain  Pulm: No shortness of breath  GI: see HPI  Derm: No rash    Medical History:  has a past medical history of DVT (deep venous thrombosis), Hypertension, and Peripheral arterial disease.    Surgical History:  has a past surgical history that includes Knee surgery (1972); Excision of hydrocele (Left, 4/26/2022); Creation of axillary-femoral artery bypass with graft (N/A, 10/6/2022); Creation of femoral-femoral artery bypass with graft (N/A, 10/6/2022); Esophagogastroduodenoscopy (N/A, 10/20/2022); and Esophagogastroduodenoscopy (N/A, 10/28/2022).    Family History: family history includes Hypertension in his father and mother.. Otherwise no colon cancer, inflammatory bowel disease, or GI malignancies.    Social History:  reports that he has quit smoking. His smoking use included cigarettes. He has a 25.00 pack-year smoking history. He has never used smokeless tobacco. He reports that he does not drink alcohol and does not use drugs.    Review of patient's allergies indicates:  No Known Allergies    Medications:   Medications Prior to Admission   Medication Sig Dispense Refill Last Dose    acetaminophen (TYLENOL) 325 MG tablet Take 2 tablets (650 mg total) by mouth every 8 (eight) hours as needed. 20 tablet 0     aspirin 81 MG Chew Chew and swallow 1 tablet (81 mg total) by mouth once daily. 30 tablet 0     atorvastatin (LIPITOR) 80 MG tablet Take 1 tablet (80 mg total) by mouth once daily. 30 tablet 2     cilostazoL (PLETAL) 100 MG Tab Take 1 tablet (100 mg total) by mouth 2 (two) times daily. 180 tablet 3     ezetimibe (ZETIA) 10  mg tablet Take 1 tablet (10 mg total) by mouth once daily. 90 tablet 3     gabapentin (NEURONTIN) 300 MG capsule Take 3 capsules (900 mg total) by mouth 3 (three) times daily. 540 capsule 3     HYDROcodone-acetaminophen (NORCO) 5-325 mg per tablet Take 1 tablet by mouth every 4 (four) hours as needed for Pain. 10 tablet 0     losartan-hydrochlorothiazide 50-12.5 mg (HYZAAR) 50-12.5 mg per tablet Take 1 tablet by mouth once daily. 30 tablet 2     multivitamin (THERAGRAN) per tablet Take 1 tablet by mouth once daily.       mupirocin (BACTROBAN) 2 % ointment Apply topically 2 (two) times daily. 30 g 1     naproxen (NAPROSYN) 500 MG tablet Take 1 tablet (500 mg total) by mouth 2 (two) times daily as needed (pain). 60 tablet 0     pantoprazole (PROTONIX) 40 MG tablet Take 1 tablet (40 mg total) by mouth once daily. 30 tablet 11     rivaroxaban (XARELTO DVT-PE TREAT 30D START) 15 mg (42)- 20 mg (9) tablet dose pack Take 1 tablet (15 mg) by mouth twice daily with food for 21 days followed by 1 tablet (20 mg) by mouth once daily with food 51 tablet 0     terbinafine HCl (LAMISIL ORAL) Take by mouth once daily at 6am.            Physical Exam:    Vital Signs:   Vitals:    10/31/22 0655   BP: (!) 115/58   Pulse: 80   Resp: 16   Temp: 98.6 °F (37 °C)       General Appearance: Well appearing in no acute distress  Eyes:    No scleral icterus  ENT: lips and tongue normal  Lungs: no use of accessory muscles  Heart:  normal rate, regular rhythm  Abdomen: Soft, non tender, non distended   Extremities: no edema  Skin: No rash      Labs:  Lab Results   Component Value Date    WBC 8.63 10/31/2022    HGB 8.3 (L) 10/31/2022    HCT 26.5 (L) 10/31/2022     10/31/2022    CHOL 138 03/10/2022    TRIG 40 03/10/2022    HDL 44 03/10/2022    ALT 12 10/30/2022    AST 17 10/30/2022     10/30/2022    K 3.8 10/30/2022     10/30/2022    CREATININE 0.7 10/30/2022    BUN 10 10/30/2022    CO2 22 (L) 10/30/2022    INR 1.2 10/28/2022     HGBA1C 5.8 (H) 03/10/2022       I have explained the risks and benefits of endoscopy procedures to the patient including but not limited to bleeding, perforation, infection, and death.  The patient was asked if they understand and allowed to ask any further questions to their satisfaction.    Erin Bryan MD

## 2022-10-31 NOTE — PT/OT/SLP PROGRESS
Occupational Therapy      Patient Name:  Babatunde Singh   MRN:  [id number]    Patient not seen today secondary to Off the floor for procedure/surgery. Will follow-up 10/31/22.    GUS Schaffer    10/31/2022

## 2022-10-31 NOTE — ANESTHESIA POSTPROCEDURE EVALUATION
Anesthesia Post Evaluation    Patient: Babatunde Singh    Procedure(s) Performed: Procedure(s) (LRB):  COLONOSCOPY (N/A)    Final Anesthesia Type: general      Patient location during evaluation: PACU  Patient participation: Yes- Able to Participate  Level of consciousness: awake and alert and oriented  Post-procedure vital signs: reviewed and stable  Pain management: adequate  Airway patency: patent    PONV status at discharge: No PONV  Anesthetic complications: no      Cardiovascular status: blood pressure returned to baseline and hemodynamically stable  Respiratory status: unassisted and spontaneous ventilation  Hydration status: euvolemic  Follow-up not needed.          Vitals Value Taken Time   /65 10/31/22 0931   Temp 36.2 °C (97.2 °F) 10/31/22 0930   Pulse 72 10/31/22 0935   Resp 18 10/31/22 0935   SpO2 99 % 10/31/22 0933   Vitals shown include unvalidated device data.      No case tracking events are documented in the log.      Pain/Cassie Score: Pain Rating Prior to Med Admin: 9 (10/31/2022  5:09 AM)  Cassie Score: 10 (10/31/2022  9:15 AM)         Notified pt of results. She verbalized understanding. Tickled.   She is scheduled for next Thursday for her postop

## 2022-10-31 NOTE — PT/OT/SLP PROGRESS
Physical Therapy      Patient Name:  Babatunde Singh   MRN:  [id number]    Patient not seen today secondary to  pt away at procedure. Will follow-up next scheduled treatment per PT POC.

## 2022-10-31 NOTE — PROVATION PATIENT INSTRUCTIONS
Discharge Summary/Instructions after an Endoscopic Procedure  Patient Name: Babatunde Singh  Patient MRN: [id number]  Patient YOB: 1950  Monday, October 31, 2022  Philip Shafer MD  Dear patient,  As a result of recent federal legislation (The Federal Cures Act), you may   receive lab or pathology results from your procedure in your [facility]   account before your physician is able to contact you. Your physician or   their representative will relay the results to you with their   recommendations at their soonest availability.  Thank you,  RESTRICTIONS:  During your procedure today, you received medications for sedation.  These   medications may affect your judgment, balance and coordination.  Therefore,   for 24 hours, you have the following restrictions:   - DO NOT drive a car, operate machinery, make legal/financial decisions,   sign important papers or drink alcohol.    ACTIVITY:  Today: no heavy lifting, straining or running due to procedural   sedation/anesthesia.  The following day: return to full activity including work.  DIET:  Eat and drink normally unless instructed otherwise.     TREATMENT FOR COMMON SIDE EFFECTS:  - Mild abdominal pain, nausea, belching, bloating or excessive gas:  rest,   eat lightly and use a heating pad.  - Sore Throat: treat with throat lozenges and/or gargle with warm salt   water.  - Because air was used during the procedure, expelling large amounts of air   from your rectum or belching is normal.  - If a bowel prep was taken, you may not have a bowel movement for 1-3 days.    This is normal.  SYMPTOMS TO WATCH FOR AND REPORT TO YOUR PHYSICIAN:  1. Abdominal pain or bloating, other than gas cramps.  2. Chest pain.  3. Back pain.  4. Signs of infection such as: chills or fever occurring within 24 hours   after the procedure.  5. Rectal bleeding, which would show as bright red, maroon, or black stools.   (A tablespoon of blood from the rectum is not serious, especially  if   hemorrhoids are present.)  6. Vomiting.  7. Weakness or dizziness.  GO DIRECTLY TO THE NEAREST EMERGENCY ROOM IF YOU HAVE ANY OF THE FOLLOWING:      Difficulty breathing              Chills and/or fever over 101 F   Persistent vomiting and/or vomiting blood   Severe abdominal pain   Severe chest pain   Black, tarry stools   Bleeding- more than one tablespoon   Any other symptom or condition that you feel may need urgent attention  Your doctor recommends these additional instructions:  If any biopsies were taken, your doctors clinic will contact you in 1 to 2   weeks with any results.  - Return patient to hospital [facility] for ongoing care.   - Resume previous diet.   - Resume Xarelto (rivaroxaban) at prior dose tomorrow.   - Await pathology results.   - Repeat colonoscopy in 3 years for surveillance.   - If melena recurs, would recommend VCE.  For questions, problems or results please call your physician - Philip Shafer MD at Work:  [phone].  [facility] [place], EMERGENCY ROOM PHONE NUMBER: [phone]  IF A COMPLICATION OR EMERGENCY SITUATION ARISES AND YOU ARE UNABLE TO REACH   YOUR PHYSICIAN - GO DIRECTLY TO THE EMERGENCY ROOM.  Philip Shafer MD  10/31/2022 9:03:48 AM  This report has been verified and signed electronically.  Dear patient,  As a result of recent federal legislation (The Federal Cures Act), you may   receive lab or pathology results from your procedure in your [facility]   account before your physician is able to contact you. Your physician or   their representative will relay the results to you with their   recommendations at their soonest availability.  Thank you,  PROVATION

## 2022-10-31 NOTE — ASSESSMENT & PLAN NOTE
Patient's anemia is currently controlled. S/p 1 units of PRBCs. Etiology likely d/t GIB  Current CBC reviewed-   Lab Results   Component Value Date    HGB 8.9 (L) 10/30/2022    HCT 29.0 (L) 10/30/2022     Monitor serial CBC and transfuse if patient becomes hemodynamically unstable, symptomatic or H/H drops below 7/21.

## 2022-10-31 NOTE — NURSING TRANSFER
Nursing Transfer Note      10/31/2022     Reason patient is being transferred: postop    Transfer To: 650    Transfer via stretcher    Transfer with cardiac monitoring, cell phone in hand    Transported by [facility]    Medicines sent: n/a    Any special needs or follow-up needed: n/a    Chart send with patient: Yes    Notified:     Patient reassessed at: 10/31/22    Upon arrival to floor: bed in lowest position  Report given to Anthony Feliz

## 2022-10-31 NOTE — ASSESSMENT & PLAN NOTE
72 y.o. with recent admission for acute RLE ischemia on xarelto, ASA, and cilostazol with recent admit for GI bleed requiring multiple blood transfusions, presents with multiple episodes of melena and acute anemia with Hgb 6.7. INR 3.2.  - HDS, with mild tachycardia and hypotension, improving   - s/p 1 unit pRBCs with expected correction.    GI consulted, recs reviewed and d/w GI fellow.  Now s/p push enteroscopy (results to be reported)  Normal endoscopy. Some erythema in the duodenum.    Plan: colonoscopy tomorrow, prep today, clear diet. Hold Xarelto, hold aspirin.  Continue Protonix 40mg IV BID until colonoscopy.  Daily CBC, transfuse to keep Hgb>7. Stable trends.

## 2022-10-31 NOTE — PLAN OF CARE
[facility].  Awaiting on home health assignment.   Will continue to follow-up.

## 2022-11-01 ENCOUNTER — OFFICE VISIT (OUTPATIENT)
Dept: WOUND CARE | Facility: CLINIC | Age: 72
End: 2022-11-01
Payer: MEDICARE

## 2022-11-01 ENCOUNTER — PATIENT OUTREACH (OUTPATIENT)
Dept: ADMINISTRATIVE | Facility: CLINIC | Age: 72
End: 2022-11-01
Payer: MEDICARE

## 2022-11-01 VITALS
DIASTOLIC BLOOD PRESSURE: 58 MMHG | WEIGHT: 138.88 LBS | BODY MASS INDEX: 23.71 KG/M2 | OXYGEN SATURATION: 98 % | HEIGHT: 64 IN | SYSTOLIC BLOOD PRESSURE: 115 MMHG | HEART RATE: 88 BPM | TEMPERATURE: 99 F

## 2022-11-01 DIAGNOSIS — S91.301A OPEN WOUND OF RIGHT HEEL, INITIAL ENCOUNTER: ICD-10-CM

## 2022-11-01 DIAGNOSIS — I10 PRIMARY HYPERTENSION: ICD-10-CM

## 2022-11-01 DIAGNOSIS — S81.801A WOUND OF RIGHT LOWER EXTREMITY, INITIAL ENCOUNTER: Primary | ICD-10-CM

## 2022-11-01 DIAGNOSIS — R60.0 BILATERAL LOWER EXTREMITY EDEMA: ICD-10-CM

## 2022-11-01 DIAGNOSIS — I73.9 PAD (PERIPHERAL ARTERY DISEASE): ICD-10-CM

## 2022-11-01 DIAGNOSIS — Z87.891 HISTORY OF SMOKING: ICD-10-CM

## 2022-11-01 PROCEDURE — 1111F PR DISCHARGE MEDS RECONCILED W/ CURRENT OUTPATIENT MED LIST: ICD-10-PCS | Mod: CPTII,S$GLB,,

## 2022-11-01 PROCEDURE — 1126F PR PAIN SEVERITY QUANTIFIED, NO PAIN PRESENT: ICD-10-PCS | Mod: CPTII,S$GLB,,

## 2022-11-01 PROCEDURE — 11045 DBRDMT SUBQ TISS EACH ADDL: CPT | Mod: S$GLB,,,

## 2022-11-01 PROCEDURE — 99205 PR OFFICE/OUTPT VISIT, NEW, LEVL V, 60-74 MIN: ICD-10-PCS | Mod: 25,S$GLB,,

## 2022-11-01 PROCEDURE — 3078F DIAST BP <80 MM HG: CPT | Mod: CPTII,S$GLB,,

## 2022-11-01 PROCEDURE — 11045 DEBRIDEMENT: ICD-10-PCS | Mod: S$GLB,,,

## 2022-11-01 PROCEDURE — 1126F AMNT PAIN NOTED NONE PRSNT: CPT | Mod: CPTII,S$GLB,,

## 2022-11-01 PROCEDURE — 3044F PR MOST RECENT HEMOGLOBIN A1C LEVEL <7.0%: ICD-10-PCS | Mod: CPTII,S$GLB,,

## 2022-11-01 PROCEDURE — 4010F PR ACE/ARB THEARPY RXD/TAKEN: ICD-10-PCS | Mod: CPTII,S$GLB,,

## 2022-11-01 PROCEDURE — 1111F DSCHRG MED/CURRENT MED MERGE: CPT | Mod: CPTII,S$GLB,,

## 2022-11-01 PROCEDURE — 99205 OFFICE O/P NEW HI 60 MIN: CPT | Mod: 25,S$GLB,,

## 2022-11-01 PROCEDURE — 99999 PR PBB SHADOW E&M-EST. PATIENT-LVL IV: CPT | Mod: PBBFAC,,,

## 2022-11-01 PROCEDURE — 3044F HG A1C LEVEL LT 7.0%: CPT | Mod: CPTII,S$GLB,,

## 2022-11-01 PROCEDURE — 3074F SYST BP LT 130 MM HG: CPT | Mod: CPTII,S$GLB,,

## 2022-11-01 PROCEDURE — 3008F BODY MASS INDEX DOCD: CPT | Mod: CPTII,S$GLB,,

## 2022-11-01 PROCEDURE — 11042 DBRDMT SUBQ TIS 1ST 20SQCM/<: CPT | Mod: S$GLB,,,

## 2022-11-01 PROCEDURE — 4010F ACE/ARB THERAPY RXD/TAKEN: CPT | Mod: CPTII,S$GLB,,

## 2022-11-01 PROCEDURE — 3008F PR BODY MASS INDEX (BMI) DOCUMENTED: ICD-10-PCS | Mod: CPTII,S$GLB,,

## 2022-11-01 PROCEDURE — 3078F PR MOST RECENT DIASTOLIC BLOOD PRESSURE < 80 MM HG: ICD-10-PCS | Mod: CPTII,S$GLB,,

## 2022-11-01 PROCEDURE — 11042 DEBRIDEMENT: ICD-10-PCS | Mod: S$GLB,,,

## 2022-11-01 PROCEDURE — 99999 PR PBB SHADOW E&M-EST. PATIENT-LVL IV: ICD-10-PCS | Mod: PBBFAC,,,

## 2022-11-01 PROCEDURE — 3074F PR MOST RECENT SYSTOLIC BLOOD PRESSURE < 130 MM HG: ICD-10-PCS | Mod: CPTII,S$GLB,,

## 2022-11-01 NOTE — PROCEDURES
"Debridement    Date/Time: 11/1/2022 3:00 PM  Performed by: Sherry Davis PA-C  Authorized by: Sherry Davis PA-C     Time out: Immediately prior to procedure a "time out" was called to verify the correct patient, procedure, equipment, support staff and site/side marked as required.    Consent Done?:  Yes (Written)  Local anesthesia used?: Yes    Local anesthetic:  Topical anesthetic (Topical 4% Lidocaine Hydrochloride)    Wound Details:    Location:  Right foot    Location:  Right Heel    Type of Debridement:  Excisional       Length (cm):  0.6       Area (sq cm):  0.84       Width (cm):  1.4       Percent Debrided (%):  100       Depth (cm):  0.1       Total Area Debrided (sq cm):  0.84    Depth of debridement:  Subcutaneous tissue    Tissue debrided:  Subcutaneous    Devitalized tissue debrided:  Biofilm, Exudate, Fibrin and Slough    Instruments:  Curette, Forceps and Scissors    Additional wounds:  3    2nd Wound Details:     Location:  Right leg (lateral proximal)    Type of Debridement:  Excisional       Length (cm):  4.7       Area (sq cm):  14.1       Width (cm):  3       Percent Debrided (%):  100       Depth (cm):  0.1       Total Area Debrided (sq cm):  14.1    Depth of debridement:  Subcutaneous tissue    Tissue debrided:  Subcutaneous    Devitalized tissue debrided:  Biofilm, Exudate, Fibrin, Necrotic/Eschar and Slough    Instruments:  Curette, Forceps and Scissors    3rd Wound Details:     Location:  Right leg (lateral)    Type of Debridement:  Excisional       Length (cm):  0.7       Area (sq cm):  0.56       Width (cm):  0.8       Percent Debrided (%):  100       Depth (cm):  0.1       Total Area Debrided (sq cm):  0.56    Depth of debridement:  Subcutaneous tissue    Tissue debrided:  Subcutaneous    Devitalized tissue debrided:  Biofilm, Exudate, Fibrin, Necrotic/Eschar and Slough    Instruments:  Curette, Forceps and Scissors    4th Wound Details:     Location:  Right leg (lateral distal)    " Type of Debridement:  Excisional       Length (cm):  8.3       Area (sq cm):  32.37       Width (cm):  3.9       Percent Debrided (%):  100       Depth (cm):  0.1       Total Area Debrided (sq cm):  32.37    Depth of debridement:  Subcutaneous tissue    Tissue debrided:  Subcutaneous    Devitalized tissue debrided:  Biofilm, Exudate, Slough and Necrotic/Eschar    Instruments:  Curette, Forceps and Scissors    Bleeding:  Minimal  Hemostasis Achieved: Yes    Method Used:  Pressure  Patient tolerance:  Patient tolerated the procedure well with no immediate complications

## 2022-11-01 NOTE — PROGRESS NOTES
C3 nurse spoke with Babatunde Singh ( daughter)  for a TCC post hospital discharge follow up call. The patient does not have a scheduled HOSFU appointment with Esdras Ly MD  within 5-7 days post hospital discharge date 10/31/2022. C3 nurse was unable to schedule HOSFU appointment in [facility].    Decline NP

## 2022-11-01 NOTE — PROGRESS NOTES
C3 nurse attempted to contact patient. The following occurred:   C3 nurse attempted to contact Babatunde Singh  for a TCC post hospital discharge follow up call. The patient is unable to conduct the call @ this time. The patient requested a callback.    The patient does not have a scheduled HOSFU appointment within 5-7 days post hospital discharge date 10/31/2022. Message sent to Physician staff to assist with HOSFU appointment scheduling.

## 2022-11-01 NOTE — PROGRESS NOTES
Subjective:       Patient ID: Babatunde Singh is a 72 y.o. male.    Chief Complaint: Wound Consult    Patient presents for an evaluation of right lower extremity and heel wounds. Patient follows with vascular surgery for PAD. Patient s/p right axillary to bi-profunda bypass on 10/6/22. Pt's ABIs preoperatively bilaterally were 0 to 0.69 on the right lower extremity and 0.74 on the left lower extremity. Pt was admitted to the ED on 10/27/22 for a GI bleed. Wound care was consulted- dressed with xeroform covered with kerlix and ACE wraps for compression to right lower extremity wounds and to right heel wound mepilex foam dressing covered with kerlix and ACE wraps. He states he was able to tolerate compression from ACE wraps. Pt has [facility]. Denies fever, chills, erythema, warmth, drainage, or pain.      Review of Systems   Constitutional:  Negative for activity change, chills, diaphoresis, fatigue and fever.   Respiratory:  Negative for apnea, chest tightness and shortness of breath.    Cardiovascular:  Positive for leg swelling. Negative for chest pain and palpitations.   Musculoskeletal:  Negative for gait problem and joint swelling.   Skin:  Positive for wound. Negative for pallor and rash.   Neurological:  Negative for syncope, weakness and numbness.   Psychiatric/Behavioral:  Negative for agitation. The patient is not nervous/anxious.    All other systems reviewed and are negative.    Objective:      Physical Exam  Vitals reviewed.   Constitutional:       General: He is not in acute distress.     Appearance: Normal appearance.   Cardiovascular:      Rate and Rhythm: Normal rate and regular rhythm.      Pulses: Normal pulses.   Pulmonary:      Effort: No respiratory distress.   Musculoskeletal:         General: No swelling.      Right lower leg: 3+ Edema present.      Left lower leg: 3+ Edema present.   Skin:     General: Skin is warm and dry.      Capillary Refill: Capillary refill takes less than 2  seconds.      Findings: Wound present. No erythema.          Neurological:      General: No focal deficit present.      Mental Status: He is alert and oriented to person, place, and time.   Psychiatric:         Mood and Affect: Mood normal.         Behavior: Behavior normal.         Thought Content: Thought content normal.         Judgment: Judgment normal.       Assessment:       1. Wound of right lower extremity, initial encounter    2. Open wound of right heel, initial encounter    3. Bilateral lower extremity edema    4. Primary hypertension    5. PAD (peripheral artery disease)           Altered Skin Integrity 10/20/22 0750 Right proximal;lateral Leg Ulceration (Active)   10/20/22 0750   Altered Skin Integrity Present on Admission: yes   Side: Right   Orientation: proximal;lateral   Location: Leg   Wound Number:    Is this injury device related?:    Primary Wound Type: Ulceration   Description of Altered Skin Integrity:    Ankle-Brachial Index:    Pulses:    Removal Indication and Assessment:    Wound Outcome:    (Retired) Wound Length (cm):    (Retired) Wound Width (cm):    (Retired) Depth (cm):    Wound Description (Comments):    Removal Indications:    Dressing Appearance Dry;Intact;Clean;Moist drainage 11/01/22 1555   Drainage Amount Small 11/01/22 1555   Drainage Characteristics/Odor Serous 11/01/22 1555   Black (%), Wound Tissue Color 70 % 11/01/22 1555   Yellow (%), Wound Tissue Color 30 % 11/01/22 1555   Periwound Area Intact;Edematous;Pink 11/01/22 1555   Wound Length (cm) 4.7 cm 11/01/22 1555   Wound Width (cm) 3 cm 11/01/22 1555   Wound Depth (cm) 0.1 cm 11/01/22 1555   Wound Volume (cm^3) 1.41 cm^3 11/01/22 1555   Wound Surface Area (cm^2) 14.1 cm^2 11/01/22 1555   Care Cleansed with:;Soap and water 11/01/22 1555   Dressing Applied;Calcium alginate;Honey;Compression wrap 11/01/22 1555   Periwound Care Moisture barrier applied 11/01/22 1555   Compression Two layer compression 11/01/22 1555             Altered Skin Integrity 10/28/22 1405 Right lower;distal;lateral Leg Ulceration (Active)   10/28/22 1405   Altered Skin Integrity Present on Admission: yes   Side: Right   Orientation: lower;distal;lateral   Location: Leg   Wound Number:    Is this injury device related?:    Primary Wound Type: Ulceration   Description of Altered Skin Integrity:    Ankle-Brachial Index:    Pulses:    Removal Indication and Assessment:    Wound Outcome:    (Retired) Wound Length (cm):    (Retired) Wound Width (cm):    (Retired) Depth (cm):    Wound Description (Comments):    Removal Indications:    Wound Image      11/01/22 1555   Dressing Appearance Dry;Intact;Clean;Moist drainage 11/01/22 1555   Drainage Amount Small 11/01/22 1555   Drainage Characteristics/Odor Serous 11/01/22 1555   Black (%), Wound Tissue Color 70 % 11/01/22 1555   Yellow (%), Wound Tissue Color 30 % 11/01/22 1555   Periwound Area Intact;Pink;Edematous 11/01/22 1555   Wound Length (cm) 8.3 cm 11/01/22 1555   Wound Width (cm) 3.9 cm 11/01/22 1555   Wound Depth (cm) 0.1 cm 11/01/22 1555   Wound Volume (cm^3) 3.237 cm^3 11/01/22 1555   Wound Surface Area (cm^2) 32.37 cm^2 11/01/22 1555   Care Cleansed with:;Soap and water 11/01/22 1555   Dressing Applied;Calcium alginate;Honey;Compression wrap 11/01/22 1555   Periwound Care Moisture barrier applied 11/01/22 1555   Compression Two layer compression 11/01/22 1555            Altered Skin Integrity 10/28/22 1405 Right lateral Heel Ulceration (Active)   10/28/22 1405   Altered Skin Integrity Present on Admission: yes   Side: Right   Orientation: lateral   Location: Heel   Wound Number:    Is this injury device related?:    Primary Wound Type: Ulceration   Description of Altered Skin Integrity:    Ankle-Brachial Index:    Pulses:    Removal Indication and Assessment:    Wound Outcome:    (Retired) Wound Length (cm):    (Retired) Wound Width (cm):    (Retired) Depth (cm):    Wound Description (Comments):    Removal  Indications:    Wound Image   11/01/22 1555   Dressing Appearance Dry;Intact;Clean;Moist drainage 11/01/22 1555   Drainage Amount Small 11/01/22 1555   Drainage Characteristics/Odor Serous 11/01/22 1555   Black (%), Wound Tissue Color 70 % 11/01/22 1555   Yellow (%), Wound Tissue Color 30 % 11/01/22 1555   Wound Length (cm) 0.6 cm 11/01/22 1555   Wound Width (cm) 1.4 cm 11/01/22 1555   Wound Depth (cm) 0.1 cm 11/01/22 1555   Wound Volume (cm^3) 0.084 cm^3 11/01/22 1555   Wound Surface Area (cm^2) 0.84 cm^2 11/01/22 1555   Care Cleansed with:;Soap and water 11/01/22 1555   Dressing Applied;Compression wrap;Other (comment) 11/01/22 1555   Periwound Care Moisture barrier applied 11/01/22 1555   Compression Two layer compression 11/01/22 1555            Altered Skin Integrity Right lower;lateral Leg (Active)       Altered Skin Integrity Present on Admission:    Side: Right   Orientation: lower;lateral   Location: Leg   Wound Number:    Is this injury device related?:    Primary Wound Type:    Description of Altered Skin Integrity:    Ankle-Brachial Index:    Pulses:    Removal Indication and Assessment:    Wound Outcome:    (Retired) Wound Length (cm):    (Retired) Wound Width (cm):    (Retired) Depth (cm):    Wound Description (Comments):    Removal Indications:    Dressing Appearance Dry;Intact;Clean;Moist drainage 11/01/22 1557   Drainage Amount Small 11/01/22 1557   Drainage Characteristics/Odor Serous 11/01/22 1557   Yellow (%), Wound Tissue Color 100 % 11/01/22 1557   Periwound Area Intact;Pink;Edematous 11/01/22 1557   Wound Length (cm) 0.7 cm 11/01/22 1557   Wound Width (cm) 0.8 cm 11/01/22 1557   Wound Depth (cm) 0.1 cm 11/01/22 1557   Wound Volume (cm^3) 0.056 cm^3 11/01/22 1557   Wound Surface Area (cm^2) 0.56 cm^2 11/01/22 1557   Care Cleansed with:;Soap and water 11/01/22 1557   Dressing Applied;Calcium alginate;Honey;Compression wrap 11/01/22 1557   Periwound Care Moisture barrier applied 11/01/22 1557    Compression Two layer compression 11/01/22 Brandon Fine was seen in the clinic room and placed in the supine position on the treatment table.  The dressing was removed with scissors and the leg was cleansed with Easi-clense sponges and dried thoroughly. No odor, redness, or warmth noted from patient's baseline.Placed topical 4% Lidocaine Hydrochloride to wound beds for 15 minutes. Sharp debridement with 5 mm curette to remove non-viable tissue. Pt tolerated procedure well.      Plan of Care: Right lower extremity wounds: Medihoney to wound bed, Calmoseptine to nader wound area, covered with a 2 layer zinc oxide wrap. Right heel wound: Polymem to wound bed, Calmoseptine to nader wound area, and covered with a 2 layer zinc oxide wrap.  The patient's foot was positioned at a 90 degree angle.  A zinc oxide wrap was applied using a spiral technique avoiding creases or folds.  The wrap was started behind the first metatarsal and ended below the tibial tubercle of the knee.  There was overlap of each turn half the width of the previous turn.  The compression wrap will be changed every 7 days.    Plan:       Orders Placed This Encounter   Procedures    Debridement     This order was created via procedure documentation     Right lower extremity dressed as detailed above.  Patient was instructed to not get the dressings wet and to use cast covers for showering.  Should the dressing become wet, he is to remove it, place a moist dressing over the wound, cover with gauze and roll gauze and use ace wraps for compression and to secure bandages.  He should then notify this office or home health as soon as possible to have a new dressing applied.  Instructed patient to remove wrap if he notices tingling or coldness to his RLE or if his toes become white, blue, or cold.    Discussed nutrition and the role of protein in wound healing with the patient. Instructed patient to optimize protein for wound healing. Gave a handout  illustrating healthy protein options.    Instructed patient to elevate legs whenever sedentary.    Faxed home health orders to [facility].  Home health 1x a week or PRN complications. Cleanse wound with mild soap and water or wound cleanser. Pat dry. Apply medihoney to right lower extremity wound beds, Calmoseptine to nader wound area, covered with a 2 layer zinc oxide wrap. Apply Polymem to right heel wound bed, Calmoseptine to nader wound area, and covered with a 2 layer zinc oxide wrap. Monitor for infection    Instructed patient to keep vascular surgery appointments.    Written and verbal instructions given to patient.  RTC in 3-4 weeks    Sherry Dvais PA-C           60  minutes of total time spent on the encounter, which includes face to face time and non-face to face time preparing to see the patient (eg, review of tests), Obtaining and/or reviewing separately obtained history, Documenting clinical information in the electronic or other health record, Independently interpreting results (not separately reported) and communicating results to the patient/family/caregiver, or Care coordination (not separately reported).

## 2022-11-04 ENCOUNTER — TELEPHONE (OUTPATIENT)
Dept: INTERNAL MEDICINE | Facility: CLINIC | Age: 72
End: 2022-11-04
Payer: MEDICARE

## 2022-11-04 LAB
FINAL PATHOLOGIC DIAGNOSIS: NORMAL
GROSS: NORMAL
Lab: NORMAL
MICROSCOPIC EXAM: NORMAL

## 2022-11-04 NOTE — TELEPHONE ENCOUNTER
----- Message from Chely Massey sent at 11/4/2022  2:08 PM CDT -----  Contact: Elaine [phone]  Nurse from home health is calling to get an appt for the pt she states for high blood pressure his stats she states were    11/03   162/78  84 pulse   O2 stat 99

## 2022-11-06 NOTE — DISCHARGE SUMMARY
[facility] - Telemetry [facility] Medicine  Discharge Summary      Patient Name: Babatunde Singh  MRN: [id number]  EMILIA: [id number]  Patient Class: IP- Inpatient  Admission Date: 10/19/2022  Hospital Length of Stay: 2 days  Discharge Date and Time: 10/21/2022  7:00 PM  Attending Physician: Celia att. providers found   Discharging Provider: Abiodun Dougherty MD  Primary Care Provider: Esdras Ly MD  Hospital Medicine Team: [facility] MED R Abiodun Dougherty MD  Primary Care Team: [facility] MED R    HPI:   Pt is a 72 y.o. M with hx of DVT, HTN, chronic anemia, and severe PAD recently admitted for critical limb ischemia s/p axillary to femoral arterial bypass on 10/6 on xarelto and NSAIDs who presented to the ED from [facility] for a drop in hemoglobin to 6.8. Pt reports 1 episode of melena approximately 1 week ago. Since that time, he reports being significantly constipated, straining to attempt BM without success, with associated gas. Pt reports passing a large volume, hard, black and tarry stool in the ED following rectal exam and now feels relieved from constipation. Pt denies abdominal pain, BRBPR, hematemesis, N/V/D, fever, chills, chest pain, palpitations, SOB, cough, dysuria, fatigue, weakness, confusion, HA, syncope or recent sick contacts. He does endorse significant RLE pain, as he has not had any scheduled pain medication since leaving the SNF.       In the ED: mildly hypotensive to 110s/50s,  othwerwise VSSAF. Hgb 6.8, WBC 15. Rectal exam performed in the ED significant for melanotic stools, guaiac +. PT 13, INR 1.3. Na 135. Pt was given 80 mg IV Protonix and 1 unit pRBCs.      Procedure(s) (LRB):  EGD (ESOPHAGOGASTRODUODENOSCOPY) (N/A)      Hospital Course:   Patient received 1 unit pRBCs. Protonix 80mg IV bolus x 1 then Protonix 40mg IV BID. Discontinued all NSAIDs, ASA, xarelto, and cilostazol. GI evaluated the patient and recommended EGD and to keep NPO. EGD showed Normal esophagus and  duodenum and also showed gastritis. Per GI, he was ok to resume previous diet and continue present medications including AC/AP. He was scheduled for outpatient GI followup and discuss video capsule endoscopy.                               Goals of Care Treatment Preferences:  Code Status: Full Code      Consults:   Consults (From admission, onward)        Status Ordering Provider     Inpatient consult to Gastroenterology  Once        Provider:  (Not yet assigned)    Completed MAHAMED VIVEROS          No new Assessment & Plan notes have been filed under this hospital service since the last note was generated.  Service: Hospital Medicine    Final Active Diagnoses:    Diagnosis Date Noted POA    PRINCIPAL PROBLEM:  Gastrointestinal hemorrhage [K92.2] 10/19/2022 Yes    Bilateral lower extremity edema [R60.0] 10/20/2022 Yes    Acute blood loss anemia [D62] 10/19/2022 Yes    Lower limb ischemia [I99.8] 09/15/2022 Yes    Dependence on nicotine from cigarettes [F17.210] 07/26/2022 Yes    Mixed hyperlipidemia [E78.2] 07/26/2022 Yes    Claudication of right lower extremity [I73.9] 07/26/2022 Yes    Right leg pain [M79.604] 07/26/2022 Yes    Primary hypertension [I10] 03/10/2022 Yes      Problems Resolved During this Admission:       Discharged Condition: good    Disposition: Skilled Nursing Facility    Follow Up:   Follow-up Information     [facility]    Contact information:  [address] [facility] [address] [phone]                     Patient Instructions:      Ambulatory referral/consult to Gastroenterology   Standing Status: Future   Referral Priority: Routine Referral Type: Consultation   Referral Reason: Specialty Services Required   Requested Specialty: Gastroenterology   Number of Visits Requested: 1       Significant Diagnostic Studies: Labs:       CBC:     Latest Reference Range & Units 10/21/22 05:53   WBC 3.90 - 12.70 K/uL 10.49   RBC 4.60 - 6.20 M/uL 2.80 (L)    Hemoglobin 14.0 - 18.0 g/dL 8.1 (L)   Hematocrit 40.0 - 54.0 % 25.1 (L)   MCV 82 - 98 fL 90   MCH 27.0 - 31.0 pg 28.9   MCHC 32.0 - 36.0 g/dL 32.3   RDW 11.5 - 14.5 % 19.4 (H)   Platelets 150 - 450 K/uL 332   (L): Data is abnormally low  (H): Data is abnormally high    CMP:     Latest Reference Range & Units 10/21/22 05:53   Sodium 136 - 145 mmol/L 139   Potassium 3.5 - 5.1 mmol/L 4.1   Chloride 95 - 110 mmol/L 110   CO2 23 - 29 mmol/L 23   Anion Gap 8 - 16 mmol/L 6 (L)   BUN 8 - 23 mg/dL 10   Creatinine 0.5 - 1.4 mg/dL 0.6   eGFR >60 mL/min/1.73 m^2 >60.0   Glucose 70 - 110 mg/dL 89   Calcium 8.7 - 10.5 mg/dL 8.1 (L)   (L): Data is abnormally low      Microbiology:   Microbiology Results (last 7 days)     ** No results found for the last 168 hours. **          Radiology:   Imaging Results    None           Pending Diagnostic Studies:     Procedure Component Value Units Date/Time    Troponin I [[id number]] Collected: 10/19/22 1838    Order Status: Sent Lab Status: In process Updated: 10/19/22 1839    Specimen: Blood          Medications:  Reconciled Home Medications:      Medication List      CONTINUE taking these medications    acetaminophen 325 MG tablet  Commonly known as: TYLENOL  Take 2 tablets (650 mg total) by mouth every 8 (eight) hours as needed.     cilostazoL 100 MG Tab  Commonly known as: PLETAL  Take 1 tablet (100 mg total) by mouth 2 (two) times daily.     LAMISIL ORAL  Take by mouth once daily at 6am.     multivitamin per tablet  Commonly known as: THERAGRAN  Take 1 tablet by mouth once daily.     mupirocin 2 % ointment  Commonly known as: BACTROBAN  Apply topically 2 (two) times daily.        STOP taking these medications    bacitracin 500 unit/gram ointment     cefadroxil 500 MG Cap  Commonly known as: DURICEF     diclofenac sodium 1 % Gel  Commonly known as: VOLTAREN     LIDOcaine 5 %  Commonly known as: LIDODERM            Indwelling Lines/Drains at time of discharge:   Lines/Drains/Airways      None                 Time spent on the discharge of patient: 45 minutes         Abiodun Dougherty MD  Department of Hospital Medicine  [facility] - Telemetry Stepdown

## 2022-11-06 NOTE — HOSPITAL COURSE
Patient received 1 unit pRBCs. Protonix 80mg IV bolus x 1 then Protonix 40mg IV BID. Discontinued all NSAIDs, ASA, xarelto, and cilostazol. GI evaluated the patient and recommended EGD and to keep NPO. EGD showed Normal esophagus and duodenum and also showed gastritis. Per GI, he was ok to resume previous diet and continue present medications including AC/AP. He was scheduled for outpatient GI followup and discuss video capsule endoscopy.

## 2022-11-07 ENCOUNTER — TELEPHONE (OUTPATIENT)
Dept: VASCULAR SURGERY | Facility: CLINIC | Age: 72
End: 2022-11-07
Payer: MEDICARE

## 2022-11-07 NOTE — TELEPHONE ENCOUNTER
Contacted patient's daughter Cindi in response to message. 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 States pt is hoping to return to work ASAP. Notified daughter that nurse will send message to Dr. Ortiz to discuss. Cindi states pt is also asking if he can eat leafy green vegetables and fruits with anticoagulant medications. Notified Cindi there are no dietary restrictions with ASA, Pletal, and Xarelto.----- Message from Jake Luna sent at 11/7/2022  3:35 PM CST -----  Type:  Needs Medical Advice    Who Called: Pt  Would the patient rather a call back or a response via [facility]? call  Best Call Back Number: [phone]  Additional Information: Pt is wanting to know when can he return to work please call

## 2022-11-08 ENCOUNTER — TELEPHONE (OUTPATIENT)
Dept: VASCULAR SURGERY | Facility: CLINIC | Age: 72
End: 2022-11-08
Payer: MEDICARE

## 2022-11-08 NOTE — TELEPHONE ENCOUNTER
Contacted pt's daughter Cindi with Dr. Ortiz's response. States pt will need letter stating pt is cleared to return to work. Notified daughter that nurse will have letter signed. States she will  letter from clinic today.   ----- Message from LEI Ortiz III, MD sent at 11/8/2022  7:49 AM CST -----  now  ----- Message -----  From: Anya Taylor RN  Sent: 11/7/2022   5:44 PM CST  To: LEI Ortiz III, MD    Mr. Singh is asking when he can return to work. He works at an [employer] complex and sits for his job. 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   ----- Message -----  From: Jake Luna  Sent: 11/7/2022   3:39 PM CST  To: Diana AUSTIN III Staff    Type:  Needs Medical Advice    Who Called: Pt  Would the patient rather a call back or a response via [facility]? call  Best Call Back Number: [phone]  Additional Information: Pt is wanting to know when can he return to work please call

## 2022-11-16 NOTE — ASSESSMENT & PLAN NOTE
Patient's anemia is currently controlled. S/p 1 units of PRBCs. Etiology likely d/t GIB  Current CBC reviewed-   Lab Results   Component Value Date    HGB 8.3 (L) 10/31/2022    HCT 26.5 (L) 10/31/2022     Monitor serial CBC and transfuse if patient becomes hemodynamically unstable, symptomatic or H/H drops below 7/21.

## 2022-11-16 NOTE — DISCHARGE SUMMARY
[facility] Medicine  Discharge Summary      Patient Name: Babatunde Singh  MRN: [id number]  EMILIA: [id number]  Patient Class: IP- Inpatient  Admission Date: 10/27/2022  Hospital Length of Stay: 4 days  Discharge Date and Time: 10/31/2022  5:03 PM  Attending Physician: Celia att. providers found   Discharging Provider: Gillian Watts MD  Primary Care Provider: Esdras Ly MD  [facility] Medicine Team: [facility] Gillian Watts MD  Primary Care Team: [facility]    HPI:   Babatunde Singh is a 72 y.o. male with a PMHx of DVT, HTN, and PAD s/p axillary to bi-profunda bypass who presented to the ED from [facility] for low blood counts and melena. He says he was recently seen for this a week ago. He has had constipation and a few episodes of black, tarry stools since admission. He is on xarelto and ASA. He says he feels great. Endorses mild RLE pain which is a chronic issue. He denies chest pain, SOB, dizziness, headache, abdominal pain, BRBPR, hematemesis, dizziness, weakness, or numbness.     In the ED: mildly hypotensive to 100s/60s,  othwerwise VSSAF. Hgb 6.7. Plt 533k. PT/INR 3.2/31.3. Rectal exam performed in the ED significant for melanotic stools, guaiac +. Pt was given 80 mg IV Protonix and 1 unit pRBCs.      Procedure(s) (LRB):  COLONOSCOPY (N/A)      Hospital Course:   No notes on file     Goals of Care Treatment Preferences:  Code Status: Full Code      Consults:   Consults (From admission, onward)        Status Ordering Provider     Inpatient consult to Gastroenterology  Once        Provider:  (Not yet assigned)    Completed JUAQUIN MORALES          * Gastrointestinal hemorrhage  72 y.o. with recent admission for acute RLE ischemia on xarelto, ASA, and cilostazol with recent admit for GI bleed requiring multiple blood transfusions, presents with multiple episodes of melena and acute anemia with Hgb 6.7. INR 3.2.  - HDS, with mild tachycardia and hypotension  - s/p 1  unit pRBCs with expected correction.    S/p push enteroscopy (results to be reported)  Normal endoscopy. Some erythema in the duodenum.  Colonoscopy: diverticulosis, no blood seen in the colon or terminal ileum, 4 polyps removed     Recs from GI:  - repeat colonoscopy in 3 years for surveillance  - restart xarelto tomorrow  - the patient can be discharged from the GI standpoint, if bleeding recurs, will need a VCE    Discharged today in improved condition.     Acute blood loss anemia  Patient's anemia is currently controlled. S/p 1 units of PRBCs. Etiology likely d/t GIB  Current CBC reviewed-   Lab Results   Component Value Date    HGB 8.3 (L) 10/31/2022    HCT 26.5 (L) 10/31/2022     Monitor serial CBC and transfuse if patient becomes hemodynamically unstable, symptomatic or H/H drops below 7/21.     DVT (deep venous thrombosis)  Duplex imaging of the right lower extremity veins on 10/5 demonstrates occlusive sub-acute thrombus of the anterior tibial vein (the anterior paired vessel).   -Hold xarelto in the setting of acute GIB.    PAD (peripheral artery disease)  Lower limb ischemia  Pt is s/p left axillary to BILATERAL femoris profunda bypass with 8 mm PTFE on 10/6 by vascular surgery.   -Continue statin and zetia.  -Hold ASA and cilostazol in the setting of GIB  -Wound care consulted for ulcer care    Primary hypertension  - Controlled on admission   - Latest BP and vitals reviewed  - Hold losartan/hctz in the setting of GIB.    Final Active Diagnoses:    Diagnosis Date Noted POA    PRINCIPAL PROBLEM:  Gastrointestinal hemorrhage [K92.2] 10/19/2022 Yes    Acute blood loss anemia [D62] 10/19/2022 Yes    DVT (deep venous thrombosis) [I82.409] 10/05/2022 Yes    Lower limb ischemia [I99.8] 09/15/2022 Yes    PAD (peripheral artery disease) [I73.9] 08/25/2022 Yes    Dependence on nicotine from cigarettes [F17.210] 07/26/2022 Yes    Mixed hyperlipidemia [E78.2] 07/26/2022 Yes    Primary hypertension [I10]  03/10/2022 Yes      Problems Resolved During this Admission:       Discharged Condition: stable    Disposition: Home-Health Care [facility]    Follow Up:   Follow-up Information     Esdras Ly MD. Schedule an appointment as soon as possible for a visit in 1 week(s).    Specialty: Internal Medicine  Why: Post-hospitalization follow up and to review meds.  Contact information:  [address]  [facility] [address]  [phone]             [facility] Follow up.    Why: home health  Contact information:  [address] [facility] [place] [address] [phone]                     Patient Instructions:   No discharge procedures on file.    Significant Diagnostic Studies: summarized above    Pending Diagnostic Studies:     None         Medications:  Reconciled Home Medications:      Medication List      START taking these medications    XARELTO 20 mg Tab  Generic drug: rivaroxaban  Take 1 tablet (20 mg total) by mouth daily with dinner or evening meal.  Replaces: XARELTO DVT-PE TREAT 30D START 15 mg (42)- 20 mg (9) tablet dose pack        CONTINUE taking these medications    acetaminophen 325 MG tablet  Commonly known as: TYLENOL  Take 2 tablets (650 mg total) by mouth every 8 (eight) hours as needed.     aspirin 81 MG Chew  Chew and swallow 1 tablet (81 mg total) by mouth once daily.     atorvastatin 80 MG tablet  Commonly known as: LIPITOR  Take 1 tablet (80 mg total) by mouth once daily.     cilostazoL 100 MG Tab  Commonly known as: PLETAL  Take 1 tablet (100 mg total) by mouth 2 (two) times daily.     ezetimibe 10 mg tablet  Commonly known as: ZETIA  Take 1 tablet (10 mg total) by mouth once daily.     gabapentin 300 MG capsule  Commonly known as: NEURONTIN  Take 3 capsules (900 mg total) by mouth 3 (three) times daily. for 10 days     LAMISIL ORAL  Take by mouth once daily at 6am.     losartan-hydrochlorothiazide 50-12.5 mg 50-12.5 mg per tablet  Commonly known as: HYZAAR  Take 1 tablet by mouth once  daily.     multivitamin per tablet  Commonly known as: THERAGRAN  Take 1 tablet by mouth once daily.     mupirocin 2 % ointment  Commonly known as: BACTROBAN  Apply topically 2 (two) times daily.     pantoprazole 40 MG tablet  Commonly known as: PROTONIX  Take 1 tablet (40 mg total) by mouth once daily.        STOP taking these medications    HYDROcodone-acetaminophen 5-325 mg per tablet  Commonly known as: NORCO     naproxen 500 MG tablet  Commonly known as: NAPROSYN     XARELTO DVT-PE TREAT 30D START 15 mg (42)- 20 mg (9) tablet dose pack  Generic drug: rivaroxaban  Replaced by: XARELTO 20 mg Tab        ASK your doctor about these medications    HYDROcodone-acetaminophen 5-325 mg per tablet  Commonly known as: NORCO  Take 1 tablet by mouth every 4 (four) hours as needed for Pain.  Ask about: Should I take this medication?            Indwelling Lines/Drains at time of discharge:   Lines/Drains/Airways     None                 Time spent on the discharge of patient: 45 minutes         Gillian Watts MD  Department of Hospital Medicine  [facility]

## 2022-11-16 NOTE — ASSESSMENT & PLAN NOTE
72 y.o. with recent admission for acute RLE ischemia on xarelto, ASA, and cilostazol with recent admit for GI bleed requiring multiple blood transfusions, presents with multiple episodes of melena and acute anemia with Hgb 6.7. INR 3.2.  - HDS, with mild tachycardia and hypotension  - s/p 1 unit pRBCs with expected correction.    S/p push enteroscopy (results to be reported)  Normal endoscopy. Some erythema in the duodenum.  Colonoscopy: diverticulosis, no blood seen in the colon or terminal ileum, 4 polyps removed     Recs from GI:  - repeat colonoscopy in 3 years for surveillance  - restart xarelto tomorrow  - the patient can be discharged from the GI standpoint, if bleeding recurs, will need a VCE    Discharged today in improved condition.

## 2022-11-19 ENCOUNTER — DOCUMENT SCAN (OUTPATIENT)
Dept: HOME HEALTH SERVICES | Facility: HOSPITAL | Age: 72
End: 2022-11-19
Payer: MEDICARE

## 2022-11-22 ENCOUNTER — OFFICE VISIT (OUTPATIENT)
Dept: WOUND CARE | Facility: CLINIC | Age: 72
End: 2022-11-22
Payer: MEDICARE

## 2022-11-22 VITALS
BODY MASS INDEX: 23.56 KG/M2 | TEMPERATURE: 98 F | SYSTOLIC BLOOD PRESSURE: 170 MMHG | HEIGHT: 64 IN | DIASTOLIC BLOOD PRESSURE: 75 MMHG | WEIGHT: 138 LBS | HEART RATE: 87 BPM

## 2022-11-22 DIAGNOSIS — Z87.891 HISTORY OF SMOKING: ICD-10-CM

## 2022-11-22 DIAGNOSIS — S81.801A WOUND OF RIGHT LOWER EXTREMITY, INITIAL ENCOUNTER: Primary | ICD-10-CM

## 2022-11-22 DIAGNOSIS — I10 PRIMARY HYPERTENSION: ICD-10-CM

## 2022-11-22 DIAGNOSIS — S91.301A OPEN WOUND OF RIGHT HEEL, INITIAL ENCOUNTER: ICD-10-CM

## 2022-11-22 DIAGNOSIS — I73.9 PAD (PERIPHERAL ARTERY DISEASE): ICD-10-CM

## 2022-11-22 DIAGNOSIS — L92.9 HYPERGRANULATION: ICD-10-CM

## 2022-11-22 DIAGNOSIS — R60.0 BILATERAL LOWER EXTREMITY EDEMA: ICD-10-CM

## 2022-11-22 PROCEDURE — 29580 STRAPPING UNNA BOOT: CPT | Mod: RT,S$GLB,,

## 2022-11-22 PROCEDURE — 1159F PR MEDICATION LIST DOCUMENTED IN MEDICAL RECORD: ICD-10-PCS | Mod: CPTII,S$GLB,,

## 2022-11-22 PROCEDURE — 3078F DIAST BP <80 MM HG: CPT | Mod: CPTII,S$GLB,,

## 2022-11-22 PROCEDURE — 99215 PR OFFICE/OUTPT VISIT, EST, LEVL V, 40-54 MIN: ICD-10-PCS | Mod: 25,S$GLB,,

## 2022-11-22 PROCEDURE — 29580 PR STRAPPING UNNA BOOT: ICD-10-PCS | Mod: RT,S$GLB,,

## 2022-11-22 PROCEDURE — 4010F ACE/ARB THERAPY RXD/TAKEN: CPT | Mod: CPTII,S$GLB,,

## 2022-11-22 PROCEDURE — 3008F BODY MASS INDEX DOCD: CPT | Mod: CPTII,S$GLB,,

## 2022-11-22 PROCEDURE — 17250 PR CHEM CAUTERY GRANULATN TISSUE: ICD-10-PCS | Mod: 51,S$GLB,,

## 2022-11-22 PROCEDURE — 1159F MED LIST DOCD IN RCRD: CPT | Mod: CPTII,S$GLB,,

## 2022-11-22 PROCEDURE — 3077F SYST BP >= 140 MM HG: CPT | Mod: CPTII,S$GLB,,

## 2022-11-22 PROCEDURE — 1101F PT FALLS ASSESS-DOCD LE1/YR: CPT | Mod: CPTII,S$GLB,,

## 2022-11-22 PROCEDURE — 1126F PR PAIN SEVERITY QUANTIFIED, NO PAIN PRESENT: ICD-10-PCS | Mod: CPTII,S$GLB,,

## 2022-11-22 PROCEDURE — 99215 OFFICE O/P EST HI 40 MIN: CPT | Mod: 25,S$GLB,,

## 2022-11-22 PROCEDURE — 3288F FALL RISK ASSESSMENT DOCD: CPT | Mod: CPTII,S$GLB,,

## 2022-11-22 PROCEDURE — 3044F PR MOST RECENT HEMOGLOBIN A1C LEVEL <7.0%: ICD-10-PCS | Mod: CPTII,S$GLB,,

## 2022-11-22 PROCEDURE — 17250 CHEM CAUT OF GRANLTJ TISSUE: CPT | Mod: 51,S$GLB,,

## 2022-11-22 PROCEDURE — 3078F PR MOST RECENT DIASTOLIC BLOOD PRESSURE < 80 MM HG: ICD-10-PCS | Mod: CPTII,S$GLB,,

## 2022-11-22 PROCEDURE — 99999 PR PBB SHADOW E&M-EST. PATIENT-LVL III: CPT | Mod: PBBFAC,,,

## 2022-11-22 PROCEDURE — 3288F PR FALLS RISK ASSESSMENT DOCUMENTED: ICD-10-PCS | Mod: CPTII,S$GLB,,

## 2022-11-22 PROCEDURE — 3077F PR MOST RECENT SYSTOLIC BLOOD PRESSURE >= 140 MM HG: ICD-10-PCS | Mod: CPTII,S$GLB,,

## 2022-11-22 PROCEDURE — 99999 PR PBB SHADOW E&M-EST. PATIENT-LVL III: ICD-10-PCS | Mod: PBBFAC,,,

## 2022-11-22 PROCEDURE — 1111F PR DISCHARGE MEDS RECONCILED W/ CURRENT OUTPATIENT MED LIST: ICD-10-PCS | Mod: CPTII,S$GLB,,

## 2022-11-22 PROCEDURE — 1126F AMNT PAIN NOTED NONE PRSNT: CPT | Mod: CPTII,S$GLB,,

## 2022-11-22 PROCEDURE — 3008F PR BODY MASS INDEX (BMI) DOCUMENTED: ICD-10-PCS | Mod: CPTII,S$GLB,,

## 2022-11-22 PROCEDURE — 4010F PR ACE/ARB THEARPY RXD/TAKEN: ICD-10-PCS | Mod: CPTII,S$GLB,,

## 2022-11-22 PROCEDURE — 1111F DSCHRG MED/CURRENT MED MERGE: CPT | Mod: CPTII,S$GLB,,

## 2022-11-22 PROCEDURE — 1101F PR PT FALLS ASSESS DOC 0-1 FALLS W/OUT INJ PAST YR: ICD-10-PCS | Mod: CPTII,S$GLB,,

## 2022-11-22 PROCEDURE — 3044F HG A1C LEVEL LT 7.0%: CPT | Mod: CPTII,S$GLB,,

## 2022-11-22 RX ORDER — HYDROCODONE BITARTRATE AND ACETAMINOPHEN 5; 325 MG/1; MG/1
1 TABLET ORAL EVERY 6 HOURS PRN
Qty: 35 TABLET | Refills: 0 | Status: ON HOLD | OUTPATIENT
Start: 2022-11-22 | End: 2023-02-15

## 2022-11-22 NOTE — PROGRESS NOTES
Subjective:       Patient ID: Babatunde Singh is a 72 y.o. male.    Chief Complaint: Wound Check    Patient presents for a reevaluation of right lower extremity and heel wounds. Patient follows with vascular surgery for PAD. Patient s/p right axillary to bi-profunda bypass on 10/6/22. Pt's ABIs preoperatively bilaterally were 0 to 0.69 on the right lower extremity and 0.74 on the left lower extremity. Pt has [facility] that comes out once a week. Denies fever, chills, erythema, warmth, drainage, or pain.      Patient unsure when his Achilles tendon became exposed. Home health did not contact this office to inform us of this change.    Review of Systems   Constitutional:  Negative for activity change, chills, diaphoresis, fatigue and fever.   Respiratory:  Negative for apnea, chest tightness and shortness of breath.    Cardiovascular:  Positive for leg swelling. Negative for chest pain and palpitations.   Musculoskeletal:  Negative for gait problem and joint swelling.   Skin:  Positive for wound. Negative for pallor and rash.   Neurological:  Negative for syncope, weakness and numbness.   Psychiatric/Behavioral:  Negative for agitation. The patient is not nervous/anxious.    All other systems reviewed and are negative.    Objective:      Physical Exam  Vitals reviewed.   Constitutional:       General: He is not in acute distress.     Appearance: Normal appearance.   Cardiovascular:      Rate and Rhythm: Normal rate and regular rhythm.      Pulses: Normal pulses.   Pulmonary:      Effort: No respiratory distress.   Musculoskeletal:         General: No swelling.      Right lower leg: 3+ Edema present.      Left lower leg: 3+ Edema present.   Skin:     General: Skin is warm and dry.      Capillary Refill: Capillary refill takes less than 2 seconds.      Findings: Wound present. No erythema.          Neurological:      General: No focal deficit present.      Mental Status: He is alert and oriented to person, place,  and time.   Psychiatric:         Mood and Affect: Mood normal.         Behavior: Behavior normal.         Thought Content: Thought content normal.         Judgment: Judgment normal.       Assessment:       1. Wound of right lower extremity, initial encounter    2. Open wound of right heel, initial encounter    3. Bilateral lower extremity edema    4. Primary hypertension    5. PAD (peripheral artery disease)    6. History of smoking    7. Hypergranulation           Altered Skin Integrity 10/20/22 0750 Right proximal;lateral Leg Ulceration (Active)   10/20/22 0750   Altered Skin Integrity Present on Admission: yes   Side: Right   Orientation: proximal;lateral   Location: Leg   Wound Number:    Is this injury device related?:    Primary Wound Type: Ulceration   Description of Altered Skin Integrity:    Ankle-Brachial Index:    Pulses:    Removal Indication and Assessment:    Wound Outcome:    (Retired) Wound Length (cm):    (Retired) Wound Width (cm):    (Retired) Depth (cm):    Wound Description (Comments):    Removal Indications:    Wound Image   11/22/22 1655   Dressing Appearance Dry;Intact;Clean;Moist drainage 11/22/22 1655   Drainage Amount Moderate 11/22/22 1655   Drainage Characteristics/Odor Garsia;Creamy 11/22/22 1655   Red (%), Wound Tissue Color 80 % 11/22/22 1655   Yellow (%), Wound Tissue Color 20 % 11/22/22 1655   Periwound Area Intact;Pink;Edematous 11/22/22 1655   Wound Length (cm) 3.3 cm 11/22/22 1655   Wound Width (cm) 2.7 cm 11/22/22 1655   Wound Depth (cm) 0.1 cm 11/22/22 1655   Wound Volume (cm^3) 0.891 cm^3 11/22/22 1655   Wound Surface Area (cm^2) 8.91 cm^2 11/22/22 1655   Care Cleansed with:;Soap and water 11/22/22 1655   Dressing Applied;Calcium alginate;Compression wrap 11/22/22 1655   Periwound Care Skin barrier film applied 11/22/22 1655   Compression Two layer compression 11/22/22 1655            Altered Skin Integrity 10/28/22 1405 Right lower;distal;lateral Leg Ulceration (Active)    10/28/22 1405   Altered Skin Integrity Present on Admission: yes   Side: Right   Orientation: lower;distal;lateral   Location: Leg   Wound Number:    Is this injury device related?:    Primary Wound Type: Ulceration   Description of Altered Skin Integrity:    Ankle-Brachial Index:    Pulses:    Removal Indication and Assessment:    Wound Outcome:    (Retired) Wound Length (cm):    (Retired) Wound Width (cm):    (Retired) Depth (cm):    Wound Description (Comments):    Removal Indications:    Wound Image   11/22/22 1655   Dressing Appearance Dry;Intact;Clean;Moist drainage 11/22/22 1655   Drainage Amount Moderate 11/22/22 1655   Drainage Characteristics/Odor Garsia;Creamy 11/22/22 1655   Appearance Tendon 11/22/22 1655   Red (%), Wound Tissue Color 50 % 11/22/22 1655   Yellow (%), Wound Tissue Color 50 % 11/22/22 1655   Periwound Area Intact;[place] 11/22/22 1655   Wound Length (cm) 5.9 cm 11/22/22 1655   Wound Width (cm) 5.3 cm 11/22/22 1655   Wound Depth (cm) 0.1 cm 11/22/22 1655   Wound Volume (cm^3) 3.127 cm^3 11/22/22 1655   Wound Surface Area (cm^2) 31.27 cm^2 11/22/22 1655   Care Cleansed with:;Soap and water 11/22/22 1655   Dressing Applied;Compression wrap;Calcium alginate;Other (comment);Silver 11/22/22 1655   Periwound Care Skin barrier film applied 11/22/22 1655   Compression Two layer compression 11/22/22 1655            Altered Skin Integrity 10/28/22 1405 Right lateral Heel Ulceration (Active)   10/28/22 1405   Altered Skin Integrity Present on Admission: yes   Side: Right   Orientation: lateral   Location: Heel   Wound Number:    Is this injury device related?:    Primary Wound Type: Ulceration   Description of Altered Skin Integrity:    Ankle-Brachial Index:    Pulses:    Removal Indication and Assessment:    Wound Outcome:    (Retired) Wound Length (cm):    (Retired) Wound Width (cm):    (Retired) Depth (cm):    Wound Description (Comments):    Removal Indications:    Wound Image   11/22/22 1655    Dressing Appearance Dry;Intact;Clean;Moist drainage 11/22/22 1655   Drainage Amount Small 11/22/22 1655   Drainage Characteristics/Odor Serous 11/22/22 1655   Yellow (%), Wound Tissue Color 100 % 11/22/22 1655   Periwound Area Intact;[place] 11/22/22 1655   Wound Length (cm) 0.8 cm 11/22/22 1655   Wound Width (cm) 0.8 cm 11/22/22 1655   Wound Depth (cm) 0.1 cm 11/22/22 1655   Wound Volume (cm^3) 0.064 cm^3 11/22/22 1655   Wound Surface Area (cm^2) 0.64 cm^2 11/22/22 1655   Care Cleansed with:;Soap and water 11/22/22 1655   Dressing Applied;Compression wrap;Other (comment) 11/22/22 1655   Periwound Care Skin barrier film applied 11/22/22 1655   Compression Two layer compression 11/22/22 1655         Babatunde was seen in the clinic room and placed in the supine position on the treatment table.  The dressing was removed with scissors and the leg was cleansed with Easi-clense sponges and dried thoroughly. No odor, redness, or warmth noted from patient's baseline. Hypergranulation and exposed Achilles tendon noted to distal lower extremity wound. Two silver nitrates needed for hypergranulation.      Plan of Care: Proximal right lower extremity wound: Aquacel to wound bed, Calmoseptine to nader wound area, covered with a 2 layer zinc oxide wrap.   Right heel wound: Polymem to wound bed, Calmoseptine to nader wound area, and covered with a 2 layer zinc oxide wrap.    Distal right lower extremity wound: Vaseline impregnated gauze to exposed tendon, silver nitrate to hypergranulation (not near tendon), aquacel to cover, and a two layer zinc oxide wrap.  The patient's foot was positioned at a 90 degree angle.  A zinc oxide wrap was applied using a spiral technique avoiding creases or folds.  The wrap was started behind the first metatarsal and ended below the tibial tubercle of the knee.  There was overlap of each turn half the width of the previous turn.     Plan:     Discussed patient with Dr. Seaman and Dr. Ortiz to  formulate treatment plan. Will attempt wound care vs amputation. Discussed at length the long process: development of granulation tissue and reepithelialization is slow and may not happen. Discussed the importance of keeping the tendon wet/moist and not letting the tendon dry out. Will increase home health to 2x a week and will follow patient every 2 weeks at the wound care clinic to monitor.    Right lower extremity dressed as detailed above.  Patient was instructed to not get the dressings wet and to use cast covers for showering.  Should the dressing become wet, he is to remove it, place a moist dressing over the wound, cover with gauze and roll gauze and use ace wraps for compression and to secure bandages.  He should then notify this office or home health as soon as possible to have a new dressing applied.  Instructed patient to remove wrap if he notices tingling or coldness to his RLE or if his toes become white, blue, or cold.    Discussed nutrition and the role of protein in wound healing with the patient. Instructed patient to optimize protein for wound healing. Gave a handout illustrating healthy protein options.    Instructed patient to elevate legs whenever sedentary.    Faxed home health orders to [facility].  Home health 2x a week or PRN complications. Cleanse wound with mild soap and water or wound cleanser. Pat dry. Apply:  Proximal right lower extremity wound: Aquacel to wound bed, Calmoseptine to nader wound area, covered with a 2 layer zinc oxide wrap.   Right heel wound: Polymem to wound bed, Calmoseptine to nader wound area, and covered with a 2 layer zinc oxide wrap.    Distal right lower extremity wound: Vaseline impregnated gauze to exposed tendon, aquacel to cover, and a two layer zinc oxide wrap.  Monitor tendon closely to maintain that it is wet and not dried out. Monitor for infection.     Instructed patient to keep vascular surgery appointments.    Written and verbal instructions  given to patient.  RTC in 2 weeks    Sherry Davis PA-C           50  minutes of total time spent on the encounter, which includes face to face time and non-face to face time preparing to see the patient (eg, review of tests), Obtaining and/or reviewing separately obtained history, Documenting clinical information in the electronic or other health record, Independently interpreting results (not separately reported) and communicating results to the patient/family/caregiver, or Care coordination (not separately reported).

## 2022-11-23 ENCOUNTER — TELEPHONE (OUTPATIENT)
Dept: WOUND CARE | Facility: CLINIC | Age: 72
End: 2022-11-23
Payer: MEDICARE

## 2022-11-23 NOTE — TELEPHONE ENCOUNTER
Called [facility] ([phone]). Informed that the patient's Home Health nurses is named Edil ([phone]).     Called Edil and spoke in regards to exposed Achilles tendon. Discussed how at the last visit on 11/1/22. Pt had eschar without visible tendon. At the visit yesterday, eschar was resolved, but there was an exposed tendon. Asked why I was not contacted. Edil states he did not know that was an exposed tendon. Gave strict instructions to keep tendon wet/moist and to call the office if tendon becomes dry.       Sherry Davis PA-C

## 2022-11-25 ENCOUNTER — TELEPHONE (OUTPATIENT)
Dept: VASCULAR SURGERY | Facility: CLINIC | Age: 72
End: 2022-11-25
Payer: MEDICARE

## 2022-11-25 DIAGNOSIS — I10 PRIMARY HYPERTENSION: ICD-10-CM

## 2022-11-25 DIAGNOSIS — I73.9 CLAUDICATION OF RIGHT LOWER EXTREMITY: ICD-10-CM

## 2022-11-25 DIAGNOSIS — E78.5 HYPERLIPIDEMIA, UNSPECIFIED HYPERLIPIDEMIA TYPE: ICD-10-CM

## 2022-11-25 RX ORDER — ATORVASTATIN CALCIUM 80 MG/1
80 TABLET, FILM COATED ORAL DAILY
Qty: 30 TABLET | Refills: 0 | Status: CANCELLED | OUTPATIENT
Start: 2022-11-25 | End: 2022-12-25

## 2022-11-25 RX ORDER — ATORVASTATIN CALCIUM 80 MG/1
80 TABLET, FILM COATED ORAL DAILY
Qty: 30 TABLET | Refills: 0 | Status: SHIPPED | OUTPATIENT
Start: 2022-11-25 | End: 2023-04-04 | Stop reason: SDUPTHER

## 2022-11-25 RX ORDER — CILOSTAZOL 100 MG/1
100 TABLET ORAL 2 TIMES DAILY
Qty: 180 TABLET | Refills: 3 | OUTPATIENT
Start: 2022-11-25 | End: 2023-11-25

## 2022-11-25 RX ORDER — LOSARTAN POTASSIUM AND HYDROCHLOROTHIAZIDE 12.5; 5 MG/1; MG/1
1 TABLET ORAL DAILY
Qty: 30 TABLET | Refills: 0 | Status: SHIPPED | OUTPATIENT
Start: 2022-11-25 | End: 2023-04-24 | Stop reason: SDUPTHER

## 2022-11-25 RX ORDER — DOXYCYCLINE 50 MG/1
50 CAPSULE ORAL EVERY 12 HOURS
Qty: 14 CAPSULE | Refills: 0 | OUTPATIENT
Start: 2022-11-25 | End: 2022-12-02

## 2022-11-25 RX ORDER — EZETIMIBE 10 MG/1
10 TABLET ORAL DAILY
Qty: 30 TABLET | Refills: 0 | Status: CANCELLED | OUTPATIENT
Start: 2022-11-25 | End: 2022-12-25

## 2022-11-25 RX ORDER — TERBINAFINE HYDROCHLORIDE 250 MG/1
TABLET ORAL
OUTPATIENT
Start: 2022-11-25 | End: 2022-12-25

## 2022-11-25 RX ORDER — LOSARTAN POTASSIUM AND HYDROCHLOROTHIAZIDE 12.5; 5 MG/1; MG/1
1 TABLET ORAL DAILY
Qty: 30 TABLET | Refills: 0 | Status: CANCELLED | OUTPATIENT
Start: 2022-11-25 | End: 2022-12-25

## 2022-11-25 RX ORDER — EZETIMIBE 10 MG/1
10 TABLET ORAL DAILY
Qty: 30 TABLET | Refills: 0 | Status: SHIPPED | OUTPATIENT
Start: 2022-11-25 | End: 2023-04-24 | Stop reason: SDUPTHER

## 2022-11-25 RX ORDER — GABAPENTIN 300 MG/1
900 CAPSULE ORAL 3 TIMES DAILY
Qty: 270 CAPSULE | Refills: 0 | Status: SHIPPED | OUTPATIENT
Start: 2022-11-25 | End: 2022-12-06

## 2022-11-25 RX ORDER — NAPROXEN 500 MG/1
500 TABLET ORAL 2 TIMES DAILY PRN
Qty: 60 TABLET | Refills: 0 | OUTPATIENT
Start: 2022-11-25

## 2022-11-25 RX ORDER — PANTOPRAZOLE SODIUM 40 MG/1
40 TABLET, DELAYED RELEASE ORAL DAILY
Qty: 30 TABLET | Refills: 0 | Status: ON HOLD | OUTPATIENT
Start: 2022-11-25 | End: 2023-02-15

## 2022-11-25 RX ORDER — CILOSTAZOL 100 MG/1
100 TABLET ORAL 2 TIMES DAILY
Qty: 180 TABLET | Refills: 3 | Status: CANCELLED | OUTPATIENT
Start: 2022-11-25 | End: 2023-11-25

## 2022-11-25 NOTE — TELEPHONE ENCOUNTER
No new care gaps identified.  [facility] Embedded Care Gaps. Reference number: [id number]. 11/25/2022   11:04:54 AM CST

## 2022-11-25 NOTE — TELEPHONE ENCOUNTER
"Pt was to come in for Rx questions and refills     [facility]     Pt did not specify which Rx's were needed aside from "the ones given after surgery"       Please advise, how do we proceed?   "

## 2022-11-25 NOTE — TELEPHONE ENCOUNTER
No new care gaps identified.  [facility] Embedded Care Gaps. Reference number: [id number]. 11/25/2022   10:53:38 AM CST

## 2022-11-25 NOTE — TELEPHONE ENCOUNTER
Spoke with the pt's daughter Cindi who's questioning whether the pt needs to still be taking xarelto prior to getting med refilled. Pt verbalized understanding of information received.Message sent to Dr Ortiz for clarity.    ----- Message from Gladys Hardin sent at 11/25/2022 10:50 AM CST -----  Contact: pt  Pt requesting a callback to speak with a nurse           Confirmed contact below:  Contact Name:Babatunde Francisco  Phone Number: [phone]

## 2022-11-25 NOTE — TELEPHONE ENCOUNTER
Called and spoke to pt. Pt was DC from hospital on 10/27, pt has not had a f/u visit as of yet. Pt calling because he is unsure what medications he needs to continue and states he will be out of all medications tomorrow. Pt encouraged to contact vascular to inquire on continuing Xarelto. Other medications pended for review. Pt schedueld for hospital f/u with Dr Ly on 12/6.

## 2022-11-25 NOTE — TELEPHONE ENCOUNTER
There were about 3 medications that was not on med list, but pt daughter states she's waiting to here back from the  Whom preformed his surgery to get those medications refilled. Called and confirmed medications along with pharmacy. Medications pended

## 2022-11-28 ENCOUNTER — TELEPHONE (OUTPATIENT)
Dept: VASCULAR SURGERY | Facility: CLINIC | Age: 72
End: 2022-11-28
Payer: MEDICARE

## 2022-11-28 ENCOUNTER — TELEPHONE (OUTPATIENT)
Dept: WOUND CARE | Facility: CLINIC | Age: 72
End: 2022-11-28
Payer: MEDICARE

## 2022-11-28 NOTE — TELEPHONE ENCOUNTER
Called and spoke with family member advised appointment time moved to 10:30am per patient request on 12/6/2022

## 2022-11-28 NOTE — TELEPHONE ENCOUNTER
----- Message from Ileana Cortez sent at 11/25/2022  1:55 PM CST -----  Contact: pt @ [phone]  GODWIN RASHID calling regarding Appointment Access  (message) for # pt is  to see if he can get appt pushed back to around 10-10:30 on 12/6. Asking for call back

## 2022-11-28 NOTE — TELEPHONE ENCOUNTER
----- Message from Olga Lidia Alvarez sent at 11/28/2022  9:40 AM CST -----  Regarding: PT req r/s appt for 12/6, please contact  Contact: PT daughter e @# [phone]  PT req r/s appt for 12/6, please contact to r/s nothing coming up for me to schedule  Please contact at your earliest convenience @# [phone]

## 2022-11-28 NOTE — TELEPHONE ENCOUNTER
Contacted patient's daughter Cindi in reference to request for Xarelto refill. After verifying medication and pt's preferred pharmacy nurse notified daughter that message would be sent to staff requesting refill.

## 2022-11-30 ENCOUNTER — HOSPITAL ENCOUNTER (OUTPATIENT)
Dept: RADIOLOGY | Facility: HOSPITAL | Age: 72
Discharge: HOME | End: 2022-11-30
Attending: UROLOGY
Payer: MEDICARE

## 2022-11-30 ENCOUNTER — HOSPITAL ENCOUNTER (OUTPATIENT)
Dept: RADIOLOGY | Facility: HOSPITAL | Age: 72
Discharge: HOME | End: 2022-11-30
Attending: NURSE PRACTITIONER
Payer: MEDICARE

## 2022-11-30 ENCOUNTER — OFFICE VISIT (OUTPATIENT)
Dept: NEUROSURGERY | Facility: CLINIC | Age: 72
End: 2022-11-30
Payer: MEDICARE

## 2022-11-30 VITALS
SYSTOLIC BLOOD PRESSURE: 167 MMHG | BODY MASS INDEX: 23.69 KG/M2 | HEART RATE: 78 BPM | DIASTOLIC BLOOD PRESSURE: 77 MMHG | HEIGHT: 64 IN

## 2022-11-30 DIAGNOSIS — N28.89 RENAL MASS: ICD-10-CM

## 2022-11-30 DIAGNOSIS — M79.604 RIGHT LEG PAIN: ICD-10-CM

## 2022-11-30 DIAGNOSIS — M54.16 LUMBAR RADICULOPATHY: Primary | ICD-10-CM

## 2022-11-30 DIAGNOSIS — R20.0 NUMBNESS AND TINGLING OF RIGHT LEG: ICD-10-CM

## 2022-11-30 DIAGNOSIS — M54.31 SCIATICA OF RIGHT SIDE: ICD-10-CM

## 2022-11-30 DIAGNOSIS — R20.2 NUMBNESS AND TINGLING OF RIGHT LEG: ICD-10-CM

## 2022-11-30 PROCEDURE — 1125F PR PAIN SEVERITY QUANTIFIED, PAIN PRESENT: ICD-10-PCS | Mod: CPTII,S$GLB,, | Performed by: NURSE PRACTITIONER

## 2022-11-30 PROCEDURE — 76700 US EXAM ABDOM COMPLETE: CPT | Mod: 26,,, | Performed by: RADIOLOGY

## 2022-11-30 PROCEDURE — 72082 XR SCOLIOSIS COMPLETE: ICD-10-PCS | Mod: 26,,, | Performed by: INTERNAL MEDICINE

## 2022-11-30 PROCEDURE — 3044F PR MOST RECENT HEMOGLOBIN A1C LEVEL <7.0%: ICD-10-PCS | Mod: CPTII,S$GLB,, | Performed by: NURSE PRACTITIONER

## 2022-11-30 PROCEDURE — 1101F PR PT FALLS ASSESS DOC 0-1 FALLS W/OUT INJ PAST YR: ICD-10-PCS | Mod: CPTII,S$GLB,, | Performed by: NURSE PRACTITIONER

## 2022-11-30 PROCEDURE — 1160F PR REVIEW ALL MEDS BY PRESCRIBER/CLIN PHARMACIST DOCUMENTED: ICD-10-PCS | Mod: CPTII,S$GLB,, | Performed by: NURSE PRACTITIONER

## 2022-11-30 PROCEDURE — 76700 US EXAM ABDOM COMPLETE: CPT | Mod: TC

## 2022-11-30 PROCEDURE — 99999 PR PBB SHADOW E&M-EST. PATIENT-LVL III: CPT | Mod: PBBFAC,,, | Performed by: NURSE PRACTITIONER

## 2022-11-30 PROCEDURE — 1101F PT FALLS ASSESS-DOCD LE1/YR: CPT | Mod: CPTII,S$GLB,, | Performed by: NURSE PRACTITIONER

## 2022-11-30 PROCEDURE — 71046 X-RAY EXAM CHEST 2 VIEWS: CPT | Mod: 26,,, | Performed by: RADIOLOGY

## 2022-11-30 PROCEDURE — 1111F DSCHRG MED/CURRENT MED MERGE: CPT | Mod: CPTII,S$GLB,, | Performed by: NURSE PRACTITIONER

## 2022-11-30 PROCEDURE — 4010F PR ACE/ARB THEARPY RXD/TAKEN: ICD-10-PCS | Mod: CPTII,S$GLB,, | Performed by: NURSE PRACTITIONER

## 2022-11-30 PROCEDURE — 1159F MED LIST DOCD IN RCRD: CPT | Mod: CPTII,S$GLB,, | Performed by: NURSE PRACTITIONER

## 2022-11-30 PROCEDURE — 3077F PR MOST RECENT SYSTOLIC BLOOD PRESSURE >= 140 MM HG: ICD-10-PCS | Mod: CPTII,S$GLB,, | Performed by: NURSE PRACTITIONER

## 2022-11-30 PROCEDURE — 4010F ACE/ARB THERAPY RXD/TAKEN: CPT | Mod: CPTII,S$GLB,, | Performed by: NURSE PRACTITIONER

## 2022-11-30 PROCEDURE — 3008F PR BODY MASS INDEX (BMI) DOCUMENTED: ICD-10-PCS | Mod: CPTII,S$GLB,, | Performed by: NURSE PRACTITIONER

## 2022-11-30 PROCEDURE — 3288F FALL RISK ASSESSMENT DOCD: CPT | Mod: CPTII,S$GLB,, | Performed by: NURSE PRACTITIONER

## 2022-11-30 PROCEDURE — 3078F DIAST BP <80 MM HG: CPT | Mod: CPTII,S$GLB,, | Performed by: NURSE PRACTITIONER

## 2022-11-30 PROCEDURE — 3288F PR FALLS RISK ASSESSMENT DOCUMENTED: ICD-10-PCS | Mod: CPTII,S$GLB,, | Performed by: NURSE PRACTITIONER

## 2022-11-30 PROCEDURE — 71046 X-RAY EXAM CHEST 2 VIEWS: CPT | Mod: TC

## 2022-11-30 PROCEDURE — 71046 XR CHEST PA AND LATERAL: ICD-10-PCS | Mod: 26,,, | Performed by: RADIOLOGY

## 2022-11-30 PROCEDURE — 72114 X-RAY EXAM L-S SPINE BENDING: CPT | Mod: TC

## 2022-11-30 PROCEDURE — 72082 X-RAY EXAM ENTIRE SPI 2/3 VW: CPT | Mod: 26,,, | Performed by: INTERNAL MEDICINE

## 2022-11-30 PROCEDURE — 99999 PR PBB SHADOW E&M-EST. PATIENT-LVL III: ICD-10-PCS | Mod: PBBFAC,,, | Performed by: NURSE PRACTITIONER

## 2022-11-30 PROCEDURE — 1159F PR MEDICATION LIST DOCUMENTED IN MEDICAL RECORD: ICD-10-PCS | Mod: CPTII,S$GLB,, | Performed by: NURSE PRACTITIONER

## 2022-11-30 PROCEDURE — 99213 OFFICE O/P EST LOW 20 MIN: CPT | Mod: S$GLB,,, | Performed by: NURSE PRACTITIONER

## 2022-11-30 PROCEDURE — 3008F BODY MASS INDEX DOCD: CPT | Mod: CPTII,S$GLB,, | Performed by: NURSE PRACTITIONER

## 2022-11-30 PROCEDURE — 99213 PR OFFICE/OUTPT VISIT, EST, LEVL III, 20-29 MIN: ICD-10-PCS | Mod: S$GLB,,, | Performed by: NURSE PRACTITIONER

## 2022-11-30 PROCEDURE — 76700 US ABDOMEN COMPLETE: ICD-10-PCS | Mod: 26,,, | Performed by: RADIOLOGY

## 2022-11-30 PROCEDURE — 1111F PR DISCHARGE MEDS RECONCILED W/ CURRENT OUTPATIENT MED LIST: ICD-10-PCS | Mod: CPTII,S$GLB,, | Performed by: NURSE PRACTITIONER

## 2022-11-30 PROCEDURE — 3078F PR MOST RECENT DIASTOLIC BLOOD PRESSURE < 80 MM HG: ICD-10-PCS | Mod: CPTII,S$GLB,, | Performed by: NURSE PRACTITIONER

## 2022-11-30 PROCEDURE — 72114 XR LUMBAR SPINE 5 VIEW WITH FLEX AND EXT: ICD-10-PCS | Mod: 26,,, | Performed by: INTERNAL MEDICINE

## 2022-11-30 PROCEDURE — 1160F RVW MEDS BY RX/DR IN RCRD: CPT | Mod: CPTII,S$GLB,, | Performed by: NURSE PRACTITIONER

## 2022-11-30 PROCEDURE — 3044F HG A1C LEVEL LT 7.0%: CPT | Mod: CPTII,S$GLB,, | Performed by: NURSE PRACTITIONER

## 2022-11-30 PROCEDURE — 72114 X-RAY EXAM L-S SPINE BENDING: CPT | Mod: 26,,, | Performed by: INTERNAL MEDICINE

## 2022-11-30 PROCEDURE — 3077F SYST BP >= 140 MM HG: CPT | Mod: CPTII,S$GLB,, | Performed by: NURSE PRACTITIONER

## 2022-11-30 PROCEDURE — 1125F AMNT PAIN NOTED PAIN PRSNT: CPT | Mod: CPTII,S$GLB,, | Performed by: NURSE PRACTITIONER

## 2022-11-30 PROCEDURE — 72082 X-RAY EXAM ENTIRE SPI 2/3 VW: CPT | Mod: TC

## 2022-12-01 ENCOUNTER — OFFICE VISIT (OUTPATIENT)
Dept: GASTROENTEROLOGY | Facility: CLINIC | Age: 72
End: 2022-12-01
Payer: MEDICARE

## 2022-12-01 ENCOUNTER — TELEPHONE (OUTPATIENT)
Dept: SURGERY | Facility: CLINIC | Age: 72
End: 2022-12-01
Payer: MEDICARE

## 2022-12-01 VITALS
BODY MASS INDEX: 25.44 KG/M2 | WEIGHT: 149 LBS | DIASTOLIC BLOOD PRESSURE: 67 MMHG | HEART RATE: 92 BPM | HEIGHT: 64 IN | SYSTOLIC BLOOD PRESSURE: 134 MMHG

## 2022-12-01 DIAGNOSIS — K92.1 MELENA: Primary | ICD-10-CM

## 2022-12-01 PROCEDURE — 3078F DIAST BP <80 MM HG: CPT | Mod: CPTII,GC,S$GLB, | Performed by: STUDENT IN AN ORGANIZED HEALTH CARE EDUCATION/TRAINING PROGRAM

## 2022-12-01 PROCEDURE — 3288F PR FALLS RISK ASSESSMENT DOCUMENTED: ICD-10-PCS | Mod: CPTII,GC,S$GLB, | Performed by: STUDENT IN AN ORGANIZED HEALTH CARE EDUCATION/TRAINING PROGRAM

## 2022-12-01 PROCEDURE — 3075F SYST BP GE 130 - 139MM HG: CPT | Mod: CPTII,GC,S$GLB, | Performed by: STUDENT IN AN ORGANIZED HEALTH CARE EDUCATION/TRAINING PROGRAM

## 2022-12-01 PROCEDURE — 3008F BODY MASS INDEX DOCD: CPT | Mod: CPTII,GC,S$GLB, | Performed by: STUDENT IN AN ORGANIZED HEALTH CARE EDUCATION/TRAINING PROGRAM

## 2022-12-01 PROCEDURE — 3078F PR MOST RECENT DIASTOLIC BLOOD PRESSURE < 80 MM HG: ICD-10-PCS | Mod: CPTII,GC,S$GLB, | Performed by: STUDENT IN AN ORGANIZED HEALTH CARE EDUCATION/TRAINING PROGRAM

## 2022-12-01 PROCEDURE — 1101F PR PT FALLS ASSESS DOC 0-1 FALLS W/OUT INJ PAST YR: ICD-10-PCS | Mod: CPTII,GC,S$GLB, | Performed by: STUDENT IN AN ORGANIZED HEALTH CARE EDUCATION/TRAINING PROGRAM

## 2022-12-01 PROCEDURE — 4010F PR ACE/ARB THEARPY RXD/TAKEN: ICD-10-PCS | Mod: CPTII,GC,S$GLB, | Performed by: STUDENT IN AN ORGANIZED HEALTH CARE EDUCATION/TRAINING PROGRAM

## 2022-12-01 PROCEDURE — 99213 OFFICE O/P EST LOW 20 MIN: CPT | Mod: GC,S$GLB,, | Performed by: STUDENT IN AN ORGANIZED HEALTH CARE EDUCATION/TRAINING PROGRAM

## 2022-12-01 PROCEDURE — 3288F FALL RISK ASSESSMENT DOCD: CPT | Mod: CPTII,GC,S$GLB, | Performed by: STUDENT IN AN ORGANIZED HEALTH CARE EDUCATION/TRAINING PROGRAM

## 2022-12-01 PROCEDURE — 4010F ACE/ARB THERAPY RXD/TAKEN: CPT | Mod: CPTII,GC,S$GLB, | Performed by: STUDENT IN AN ORGANIZED HEALTH CARE EDUCATION/TRAINING PROGRAM

## 2022-12-01 PROCEDURE — 3008F PR BODY MASS INDEX (BMI) DOCUMENTED: ICD-10-PCS | Mod: CPTII,GC,S$GLB, | Performed by: STUDENT IN AN ORGANIZED HEALTH CARE EDUCATION/TRAINING PROGRAM

## 2022-12-01 PROCEDURE — 1159F PR MEDICATION LIST DOCUMENTED IN MEDICAL RECORD: ICD-10-PCS | Mod: CPTII,GC,S$GLB, | Performed by: STUDENT IN AN ORGANIZED HEALTH CARE EDUCATION/TRAINING PROGRAM

## 2022-12-01 PROCEDURE — 3044F HG A1C LEVEL LT 7.0%: CPT | Mod: CPTII,GC,S$GLB, | Performed by: STUDENT IN AN ORGANIZED HEALTH CARE EDUCATION/TRAINING PROGRAM

## 2022-12-01 PROCEDURE — 1159F MED LIST DOCD IN RCRD: CPT | Mod: CPTII,GC,S$GLB, | Performed by: STUDENT IN AN ORGANIZED HEALTH CARE EDUCATION/TRAINING PROGRAM

## 2022-12-01 PROCEDURE — 99999 PR PBB SHADOW E&M-EST. PATIENT-LVL III: ICD-10-PCS | Mod: PBBFAC,GC,, | Performed by: STUDENT IN AN ORGANIZED HEALTH CARE EDUCATION/TRAINING PROGRAM

## 2022-12-01 PROCEDURE — 99213 PR OFFICE/OUTPT VISIT, EST, LEVL III, 20-29 MIN: ICD-10-PCS | Mod: GC,S$GLB,, | Performed by: STUDENT IN AN ORGANIZED HEALTH CARE EDUCATION/TRAINING PROGRAM

## 2022-12-01 PROCEDURE — 1101F PT FALLS ASSESS-DOCD LE1/YR: CPT | Mod: CPTII,GC,S$GLB, | Performed by: STUDENT IN AN ORGANIZED HEALTH CARE EDUCATION/TRAINING PROGRAM

## 2022-12-01 PROCEDURE — 3075F PR MOST RECENT SYSTOLIC BLOOD PRESS GE 130-139MM HG: ICD-10-PCS | Mod: CPTII,GC,S$GLB, | Performed by: STUDENT IN AN ORGANIZED HEALTH CARE EDUCATION/TRAINING PROGRAM

## 2022-12-01 PROCEDURE — 3044F PR MOST RECENT HEMOGLOBIN A1C LEVEL <7.0%: ICD-10-PCS | Mod: CPTII,GC,S$GLB, | Performed by: STUDENT IN AN ORGANIZED HEALTH CARE EDUCATION/TRAINING PROGRAM

## 2022-12-01 PROCEDURE — 99999 PR PBB SHADOW E&M-EST. PATIENT-LVL III: CPT | Mod: PBBFAC,GC,, | Performed by: STUDENT IN AN ORGANIZED HEALTH CARE EDUCATION/TRAINING PROGRAM

## 2022-12-01 NOTE — PROGRESS NOTES
[facility]    Reason for visit: The encounter diagnosis was Melena.  Referring Provider/PCP: Esdras Ly MD    History of Present Illness:  Babatunde Singh is a 72 y.o. male with a history of PAD s/p fem bypass, DVT, HTN  who is presenting for initial evaluation of recent melena. Hospitalized twice in 10/2022 for melena. EGD, small bowel enteroscopy and colonoscopy all unrevealing. Planned for video capsule endoscopy as outpatient. Today he reports no recurrent episodes of melena since discharge. Confirms he is taking xarelto and cilostazol, no longer on aspirin. Denies abdominal pain. Of note, does report he had ongoing workup for possible gallbladder neoplasm for which he has upcoming surg onc appointment.    PEndoHx:  EGD 10/28/22  Impression:            - Normal esophagus.                          - Normal stomach.                          - Erythematous duodenopathy.                          - Normal examined jejunum.                          - No specimens collected.     Push enteroscopy 10/28  -  Patchy erythema in duodenal bulb, otherwise normal    Colonoscopy 10/31/22  - 2mm and 3mm polyp in transverse - jumbo forceps  - 7 mm polyp in sigmoid - cold snare  - 12 mm polyp in sigmoid - hot snare  - Diverticulosis in sigmoid      Review of Systems   Constitutional:  Negative for chills and fever.   HENT:  Negative for congestion and sore throat.    Eyes:  Negative for blurred vision and double vision.   Respiratory:  Negative for cough and shortness of breath.    Cardiovascular:  Negative for chest pain and palpitations.   Gastrointestinal:  Negative for abdominal pain, nausea and vomiting.   Genitourinary:  Negative for frequency and urgency.   Musculoskeletal:  Negative for joint pain and myalgias.   Skin:  Negative for itching and rash.   Neurological:  Negative for sensory change and focal weakness.     Medical History:  Past Medical History:   Diagnosis Date    DVT (deep venous  thrombosis)     Hypertension     Peripheral arterial disease        Past Surgical History:   Procedure Laterality Date    COLONOSCOPY N/A 10/31/2022    Procedure: COLONOSCOPY;  Surgeon: Philip Shafer MD;  Location: [facility] ENDO (2ND FLR);  Service: Endoscopy;  Laterality: N/A;    CREATION OF AXILLARY-FEMORAL ARTERY BYPASS WITH GRAFT N/A 10/6/2022    Procedure: CREATION, BYPASS, ARTERIAL, AXILLARY TO FEMORAL, USING GRAFT;  Surgeon: LEI Ortiz III, MD;  Location: [facility] OR 2ND FLR;  Service: Peripheral Vascular;  Laterality: N/A;    CREATION OF FEMORAL-FEMORAL ARTERY BYPASS WITH GRAFT N/A 10/6/2022    Procedure: CREATION, BYPASS, ARTERIAL, FEMORAL TO FEMORAL, USING GRAFT;  Surgeon: LEI Ortiz III, MD;  Location: [facility] OR 2ND FLR;  Service: Peripheral Vascular;  Laterality: N/A;    ESOPHAGOGASTRODUODENOSCOPY N/A 10/20/2022    Procedure: EGD (ESOPHAGOGASTRODUODENOSCOPY);  Surgeon: Sixto Chicas MD;  Location: [facility] ENDO (2ND FLR);  Service: Endoscopy;  Laterality: N/A;    ESOPHAGOGASTRODUODENOSCOPY N/A 10/28/2022    Procedure: EGD (ESOPHAGOGASTRODUODENOSCOPY);  Surgeon: Marlee Hopson MD;  Location: [facility] (2ND FLR);  Service: Gastroenterology;  Laterality: N/A;    EXCISION OF HYDROCELE Left 4/26/2022    Procedure: HYDROCELECTOMY;  Surgeon: Damion Roberts MD;  Location: [facility];  Service: Urology;  Laterality: Left;    KNEE SURGERY  1972       Family History   Problem Relation Age of Onset    Hypertension Mother     Hypertension Father        Social History     Socioeconomic History    Marital status:    Occupational History    Occupation: [employer]   Tobacco Use    Smoking status: Former     Packs/day: 0.50     Years: 50.00     Pack years: 25.00     Types: Cigarettes    Smokeless tobacco: Never    Tobacco comments:     Cigarettes3-4 per day   Substance and Sexual Activity    Alcohol use: Never    Drug use: Never    Sexual activity: Yes     Partners: Female     Comment: wife       Current  Outpatient Medications on File Prior to Visit   Medication Sig Dispense Refill    acetaminophen (TYLENOL) 325 MG tablet Take 2 tablets (650 mg total) by mouth every 8 (eight) hours as needed. 20 tablet 0    atorvastatin (LIPITOR) 80 MG tablet Take 1 tablet (80 mg total) by mouth once daily. 30 tablet 0    cilostazoL (PLETAL) 100 MG Tab Take 1 tablet (100 mg total) by mouth 2 (two) times daily. 180 tablet 3    ezetimibe (ZETIA) 10 mg tablet Take 1 tablet (10 mg total) by mouth once daily. 30 tablet 0    gabapentin (NEURONTIN) 300 MG capsule Take 3 capsules (900 mg total) by mouth 3 (three) times daily. 270 capsule 0    HYDROcodone-acetaminophen (NORCO) 5-325 mg per tablet Take 1 tablet by mouth every 6 (six) hours as needed for Pain. 35 tablet 0    losartan-hydrochlorothiazide 50-12.5 mg (HYZAAR) 50-12.5 mg per tablet Take 1 tablet by mouth once daily. 30 tablet 0    multivitamin (THERAGRAN) per tablet Take 1 tablet by mouth once daily.      mupirocin (BACTROBAN) 2 % ointment Apply topically 2 (two) times daily. 30 g 1    pantoprazole (PROTONIX) 40 MG tablet Take 1 tablet (40 mg total) by mouth once daily. 30 tablet 0    rivaroxaban (XARELTO) 20 mg Tab Take 1 tablet (20 mg total) by mouth daily with dinner or evening meal. 30 tablet 0    terbinafine HCl (LAMISIL ORAL) Take by mouth once daily at 6am.      aspirin 81 MG Chew Chew and swallow 1 tablet (81 mg total) by mouth once daily. 30 tablet 0     No current facility-administered medications on file prior to visit.       Review of patient's allergies indicates:  No Known Allergies    Physical Exam:  Constitutional:  not in acute distress and well developed  HENT: Head: Normal, normocephalic, atraumatic.  Eyes: conjunctiva clear and sclera nonicteric  Cardiovascular: regular rate and rhythm and no murmur  Respiratory: normal chest expansion & respiratory effort   and no accessory muscle use  GI: soft, non-tender, without masses or organomegaly  Musculoskeletal: no  muscular tenderness noted  Skin: normal color  Neurological: alert, oriented x3  Psychiatric: mood and affect are within normal limits, pt is a good historian; no memory problems were noted    Laboratory:  Lab Results   Component Value Date     10/30/2022    K 3.8 10/30/2022     10/30/2022    CO2 22 (L) 10/30/2022    BUN 10 10/30/2022    CREATININE 0.7 10/30/2022    CALCIUM 8.2 (L) 10/30/2022    ANIONGAP 8 10/30/2022    ESTGFRAFRICA >60.0 03/10/2022    EGFRNONAA >60.0 03/10/2022       Lab Results   Component Value Date    ALT 12 10/30/2022    AST 17 10/30/2022    ALKPHOS 76 10/30/2022    BILITOT 0.3 10/30/2022       Lab Results   Component Value Date    WBC 8.63 10/31/2022    HGB 8.3 (L) 10/31/2022    HCT 26.5 (L) 10/31/2022    MCV 91 10/31/2022     10/31/2022       Microbiology:  No Pertinent Microbiology    Imaging:  No Pertinent Imaging    Assessment:  Babatunde Singh is a 72 y.o. male with PMH of PAD s/p fem bypass, DVT who is presenting for post-hospitalization follow-up of melena. Here to discuss video capsule endoscopy after two recent hospitalizations for melena with unrevealing EGD, push enteroscopy and colonoscopy. No recurrent episodes of melena since discharge despite xarelto and cilostazol. Discussed that this lowers utility of pill cam if active/recent bleeding. Will recheck CBC in one month and if not uptrending.    Problems:  Melena, resolved  PAD s/p fem bypass (on Xarelto, cilostazol)      Plan:  CBC in one month (first week of January). Would consider pill cam endoscopy if anemia is not improving  Follow up if symptoms worsen or fail to improve.    Dilshad Ye MD  Gastroenterology Fellow    Orders Placed This Encounter   Procedures    CBC W/ AUTO DIFFERENTIAL

## 2022-12-01 NOTE — PROGRESS NOTES
Neurosurgery  History & Physical    SUBJECTIVE:     History of Present Illness:  Babatunde Singh is a 72 y.o. male with PAD, HTN, and smoker. He is being seen in clinic today as a referral from Dr. Wallace to discuss concerns with lumbar radiculopathy with neurogenic claudication. The patient has struggled with back pain for several years; however, over the past couple of months the pain has progressed. Describes the pain as constant and aching across the low back with radiation into the right hip extending down the posterior and lateral aspect of the leg intermittently he has left leg symptoms. Rates the pain as 9/10. Aggravating factors include standing and walking. Alleviating factors include rest and medications. Denies b/b dysfunction, saddle anesthesia, or gait instability.  Endorses numbness and tingling and weakness in the right foot. He has not obtained conservative therapy.    Interval Hx 11/30/22: After the last appointment, it was recommended that the patient obtain conservative therapy while he was undergoing additional testing for the renal mass and vascular surgery for PAD. He underwent a right axillary to bi-profunda bypass on 10/6/22 and his ABIs went from 0 to 0.69 on the right lower extremity and 0.74 on the left lower extremity. Today, he reports that his leg pain significantly improved with that procedure. Denies any back pain. He is being followed by wound care for a RLE wound. The patient is also being followed by urology for the renal mass with plans for surgical options possibly in February. Denies new neurological complaints.       Review of patient's allergies indicates:  No Known Allergies    Current Outpatient Medications   Medication Sig Dispense Refill    acetaminophen (TYLENOL) 325 MG tablet Take 2 tablets (650 mg total) by mouth every 8 (eight) hours as needed. 20 tablet 0    aspirin 81 MG Chew Chew and swallow 1 tablet (81 mg total) by mouth once daily. 30 tablet 0    atorvastatin  (LIPITOR) 80 MG tablet Take 1 tablet (80 mg total) by mouth once daily. 30 tablet 0    cilostazoL (PLETAL) 100 MG Tab Take 1 tablet (100 mg total) by mouth 2 (two) times daily. 180 tablet 3    ezetimibe (ZETIA) 10 mg tablet Take 1 tablet (10 mg total) by mouth once daily. 30 tablet 0    gabapentin (NEURONTIN) 300 MG capsule Take 3 capsules (900 mg total) by mouth 3 (three) times daily. 270 capsule 0    HYDROcodone-acetaminophen (NORCO) 5-325 mg per tablet Take 1 tablet by mouth every 6 (six) hours as needed for Pain. 35 tablet 0    losartan-hydrochlorothiazide 50-12.5 mg (HYZAAR) 50-12.5 mg per tablet Take 1 tablet by mouth once daily. 30 tablet 0    multivitamin (THERAGRAN) per tablet Take 1 tablet by mouth once daily.      mupirocin (BACTROBAN) 2 % ointment Apply topically 2 (two) times daily. 30 g 1    pantoprazole (PROTONIX) 40 MG tablet Take 1 tablet (40 mg total) by mouth once daily. 30 tablet 0    rivaroxaban (XARELTO) 20 mg Tab Take 1 tablet (20 mg total) by mouth daily with dinner or evening meal. 30 tablet 0    terbinafine HCl (LAMISIL ORAL) Take by mouth once daily at 6am.       No current facility-administered medications for this visit.       Past Medical History:   Diagnosis Date    DVT (deep venous thrombosis)     Hypertension     Peripheral arterial disease      Past Surgical History:   Procedure Laterality Date    COLONOSCOPY N/A 10/31/2022    Procedure: COLONOSCOPY;  Surgeon: Philip Shafer MD;  Location: [facility] ([address] [place] [address]);  Service: Endoscopy;  Laterality: N/A;    CREATION OF AXILLARY-FEMORAL ARTERY BYPASS WITH GRAFT N/A 10/6/2022    Procedure: CREATION, BYPASS, ARTERIAL, AXILLARY TO FEMORAL, USING GRAFT;  Surgeon: LEI Ortiz III, MD;  Location: [facility] OR [address] [place] [address];  Service: Peripheral Vascular;  Laterality: N/A;    CREATION OF FEMORAL-FEMORAL ARTERY BYPASS WITH GRAFT N/A 10/6/2022    Procedure: CREATION, BYPASS, ARTERIAL, FEMORAL TO FEMORAL, USING GRAFT;  Surgeon: LEI Ortiz III  MD;  Location: [facility] OR 2ND FLR;  Service: Peripheral Vascular;  Laterality: N/A;    ESOPHAGOGASTRODUODENOSCOPY N/A 10/20/2022    Procedure: EGD (ESOPHAGOGASTRODUODENOSCOPY);  Surgeon: Sixto Chicas MD;  Location: [facility] ENDO (2ND FLR);  Service: Endoscopy;  Laterality: N/A;    ESOPHAGOGASTRODUODENOSCOPY N/A 10/28/2022    Procedure: EGD (ESOPHAGOGASTRODUODENOSCOPY);  Surgeon: Marlee Hopson MD;  Location: [facility] ENDO (2ND FLR);  Service: Gastroenterology;  Laterality: N/A;    EXCISION OF HYDROCELE Left 4/26/2022    Procedure: HYDROCELECTOMY;  Surgeon: Damion Roberts MD;  Location: [facility];  Service: Urology;  Laterality: Left;    KNEE SURGERY  1972     Family History       Problem Relation (Age of Onset)    Hypertension Mother, Father          Social History     Socioeconomic History    Marital status:    Occupational History    Occupation: [employer]   Tobacco Use    Smoking status: Former     Packs/day: 0.50     Years: 50.00     Pack years: 25.00     Types: Cigarettes    Smokeless tobacco: Never    Tobacco comments:     Cigarettes3-4 per day   Substance and Sexual Activity    Alcohol use: Never    Drug use: Never    Sexual activity: Yes     Partners: Female     Comment: wife       Review of Systems   Constitutional:  Negative for activity change, appetite change and fever.   HENT:  Negative for hearing loss and postnasal drip.    Eyes:  Negative for pain and visual disturbance.   Respiratory:  Negative for shortness of breath.    Cardiovascular:  Negative for chest pain and palpitations.   Gastrointestinal:  Negative for abdominal pain.   Endocrine: Negative for polydipsia, polyphagia and polyuria.   Musculoskeletal:  Positive for arthralgias and myalgias. Negative for back pain and gait problem.   Neurological:  Positive for weakness. Negative for dizziness, numbness and headaches.   Psychiatric/Behavioral:  Negative for confusion and dysphoric mood.      OBJECTIVE:     Vital Signs  Pulse: 78  BP: (!)  "167/77  Pain Score:   4  Height: 5' 4" (162.6 cm)  Body mass index is 23.69 kg/m².      Neurosurgery Physical Exam  General: well developed, well nourished, no distress.   Head: normocephalic, atraumatic  Neurologic: Alert and oriented. Thought content appropriate.  GCS: Motor: 6/Verbal: 5/Eyes: 4 GCS Total: 15  Mental Status: Awake, Alert, Oriented x 4  Language: No aphasia  Speech: No dysarthria  Cranial nerves: face symmetric, tongue midline, CN II-XII grossly intact.   Eyes: pupils equal, round, reactive to light with accomodation, EOMI.   Pulmonary: normal respirations, no signs of respiratory distress  Abdomen: soft, non-distended, not tender to palpation  Skin: Skin is warm, dry and intact.  Sensory: intact to light touch throughout  Motor Strength:Moves all extremities spontaneously with good tone. No abnormal movements seen.     Diagnostic Results:  I have personally reviewed the x-ray lumbar flex/ex and scoliosis series dated 11/30/22.    X-ray shows grade 1 anterolisthesis is present at L4-5. Mild degenerative changes. No instability.    2. Scoliosis series shows no significant imbalance.     ASSESSMENT/PLAN:   Babatunde Singh is a 72 y.o. male seen in clinic today to follow-up on his RLE weakness and leg pain. He underwent a right axillary to bi-profunda bypass on 10/6/22 and his ABIs went from 0 to 0.69 on the right lower extremity and 0.74 on the left lower extremity. Today, he reported that his leg pain significantly improved with that procedure. The patient is uninterested in additional treatment options for his back at this time as he denies any pain and is focused now on the renal mass and wound healing.     I would like the patient to follow-up in clinic as needed. I have encouraged him to contact the clinic with any questions, concerns, or adverse clinical changes. He verbalized understanding.      CRISTIANE Henderson  Neurosurgery  [facility].      Note dictated with voice " recognition software, please excuse any grammatical errors.

## 2022-12-01 NOTE — PATIENT INSTRUCTIONS
We will check your blood counts in one month  Come to the lab on the second floor at [facility] during the first week of January to get the labs drawn.  If your blood counts are still low, we will proceed with the pill cam endoscopy.

## 2022-12-06 ENCOUNTER — DOCUMENTATION ONLY (OUTPATIENT)
Dept: VASCULAR SURGERY | Facility: CLINIC | Age: 72
End: 2022-12-06
Payer: MEDICARE

## 2022-12-06 ENCOUNTER — OFFICE VISIT (OUTPATIENT)
Dept: INTERNAL MEDICINE | Facility: CLINIC | Age: 72
End: 2022-12-06
Payer: MEDICARE

## 2022-12-06 ENCOUNTER — OFFICE VISIT (OUTPATIENT)
Dept: WOUND CARE | Facility: CLINIC | Age: 72
End: 2022-12-06
Payer: MEDICARE

## 2022-12-06 VITALS
BODY MASS INDEX: 24.75 KG/M2 | SYSTOLIC BLOOD PRESSURE: 138 MMHG | DIASTOLIC BLOOD PRESSURE: 60 MMHG | WEIGHT: 145 LBS | OXYGEN SATURATION: 100 % | HEIGHT: 64 IN | HEART RATE: 96 BPM

## 2022-12-06 VITALS
BODY MASS INDEX: 24.72 KG/M2 | HEIGHT: 64 IN | WEIGHT: 144.81 LBS | TEMPERATURE: 98 F | HEART RATE: 80 BPM | SYSTOLIC BLOOD PRESSURE: 138 MMHG | DIASTOLIC BLOOD PRESSURE: 63 MMHG

## 2022-12-06 DIAGNOSIS — I10 ESSENTIAL HYPERTENSION, BENIGN: ICD-10-CM

## 2022-12-06 DIAGNOSIS — S91.301A OPEN WOUND OF RIGHT HEEL, INITIAL ENCOUNTER: ICD-10-CM

## 2022-12-06 DIAGNOSIS — I73.9 PERIPHERAL ARTERIAL DISEASE: Primary | ICD-10-CM

## 2022-12-06 DIAGNOSIS — I73.9 PAD (PERIPHERAL ARTERY DISEASE): ICD-10-CM

## 2022-12-06 DIAGNOSIS — I10 PRIMARY HYPERTENSION: ICD-10-CM

## 2022-12-06 DIAGNOSIS — K82.8 GALLBLADDER MASS: ICD-10-CM

## 2022-12-06 DIAGNOSIS — S81.801A WOUND OF RIGHT LOWER EXTREMITY, INITIAL ENCOUNTER: Primary | ICD-10-CM

## 2022-12-06 DIAGNOSIS — R60.0 BILATERAL LOWER EXTREMITY EDEMA: ICD-10-CM

## 2022-12-06 DIAGNOSIS — Z87.891 HISTORY OF SMOKING: ICD-10-CM

## 2022-12-06 DIAGNOSIS — S81.801S OPEN LEG WOUND, RIGHT, SEQUELA: ICD-10-CM

## 2022-12-06 DIAGNOSIS — S81.801A NON-HEALING WOUND OF RIGHT LOWER EXTREMITY: Primary | ICD-10-CM

## 2022-12-06 PROCEDURE — 99499 RISK ADDL DX/OHS AUDIT: ICD-10-PCS | Mod: S$GLB,,,

## 2022-12-06 PROCEDURE — 99999 PR PBB SHADOW E&M-EST. PATIENT-LVL IV: CPT | Mod: PBBFAC,,,

## 2022-12-06 PROCEDURE — 3288F FALL RISK ASSESSMENT DOCD: CPT | Mod: CPTII,S$GLB,,

## 2022-12-06 PROCEDURE — 99999 PR PBB SHADOW E&M-EST. PATIENT-LVL IV: CPT | Mod: PBBFAC,,, | Performed by: INTERNAL MEDICINE

## 2022-12-06 PROCEDURE — 1126F PR PAIN SEVERITY QUANTIFIED, NO PAIN PRESENT: ICD-10-PCS | Mod: CPTII,S$GLB,,

## 2022-12-06 PROCEDURE — 99999 PR PBB SHADOW E&M-EST. PATIENT-LVL IV: ICD-10-PCS | Mod: PBBFAC,,, | Performed by: INTERNAL MEDICINE

## 2022-12-06 PROCEDURE — 1159F MED LIST DOCD IN RCRD: CPT | Mod: CPTII,S$GLB,, | Performed by: INTERNAL MEDICINE

## 2022-12-06 PROCEDURE — 1125F PR PAIN SEVERITY QUANTIFIED, PAIN PRESENT: ICD-10-PCS | Mod: CPTII,S$GLB,, | Performed by: INTERNAL MEDICINE

## 2022-12-06 PROCEDURE — 3044F HG A1C LEVEL LT 7.0%: CPT | Mod: CPTII,S$GLB,, | Performed by: INTERNAL MEDICINE

## 2022-12-06 PROCEDURE — 4010F PR ACE/ARB THEARPY RXD/TAKEN: ICD-10-PCS | Mod: CPTII,S$GLB,,

## 2022-12-06 PROCEDURE — 1101F PT FALLS ASSESS-DOCD LE1/YR: CPT | Mod: CPTII,S$GLB,, | Performed by: INTERNAL MEDICINE

## 2022-12-06 PROCEDURE — 4010F ACE/ARB THERAPY RXD/TAKEN: CPT | Mod: CPTII,S$GLB,,

## 2022-12-06 PROCEDURE — 3075F SYST BP GE 130 - 139MM HG: CPT | Mod: CPTII,S$GLB,, | Performed by: INTERNAL MEDICINE

## 2022-12-06 PROCEDURE — 99499 UNLISTED E&M SERVICE: CPT | Mod: S$GLB,,,

## 2022-12-06 PROCEDURE — 3008F PR BODY MASS INDEX (BMI) DOCUMENTED: ICD-10-PCS | Mod: CPTII,S$GLB,,

## 2022-12-06 PROCEDURE — 1101F PR PT FALLS ASSESS DOC 0-1 FALLS W/OUT INJ PAST YR: ICD-10-PCS | Mod: CPTII,S$GLB,, | Performed by: INTERNAL MEDICINE

## 2022-12-06 PROCEDURE — 1126F AMNT PAIN NOTED NONE PRSNT: CPT | Mod: CPTII,S$GLB,,

## 2022-12-06 PROCEDURE — 99215 OFFICE O/P EST HI 40 MIN: CPT | Mod: 25,S$GLB,,

## 2022-12-06 PROCEDURE — 3288F FALL RISK ASSESSMENT DOCD: CPT | Mod: CPTII,S$GLB,, | Performed by: INTERNAL MEDICINE

## 2022-12-06 PROCEDURE — 1160F PR REVIEW ALL MEDS BY PRESCRIBER/CLIN PHARMACIST DOCUMENTED: ICD-10-PCS | Mod: CPTII,S$GLB,, | Performed by: INTERNAL MEDICINE

## 2022-12-06 PROCEDURE — 3044F PR MOST RECENT HEMOGLOBIN A1C LEVEL <7.0%: ICD-10-PCS | Mod: CPTII,S$GLB,, | Performed by: INTERNAL MEDICINE

## 2022-12-06 PROCEDURE — 3078F DIAST BP <80 MM HG: CPT | Mod: CPTII,S$GLB,,

## 2022-12-06 PROCEDURE — 99214 OFFICE O/P EST MOD 30 MIN: CPT | Mod: S$GLB,,, | Performed by: INTERNAL MEDICINE

## 2022-12-06 PROCEDURE — 1159F PR MEDICATION LIST DOCUMENTED IN MEDICAL RECORD: ICD-10-PCS | Mod: CPTII,S$GLB,, | Performed by: INTERNAL MEDICINE

## 2022-12-06 PROCEDURE — 3078F PR MOST RECENT DIASTOLIC BLOOD PRESSURE < 80 MM HG: ICD-10-PCS | Mod: CPTII,S$GLB,,

## 2022-12-06 PROCEDURE — 3075F PR MOST RECENT SYSTOLIC BLOOD PRESS GE 130-139MM HG: ICD-10-PCS | Mod: CPTII,S$GLB,, | Performed by: INTERNAL MEDICINE

## 2022-12-06 PROCEDURE — 3044F HG A1C LEVEL LT 7.0%: CPT | Mod: CPTII,S$GLB,,

## 2022-12-06 PROCEDURE — 4010F PR ACE/ARB THEARPY RXD/TAKEN: ICD-10-PCS | Mod: CPTII,S$GLB,, | Performed by: INTERNAL MEDICINE

## 2022-12-06 PROCEDURE — 3288F PR FALLS RISK ASSESSMENT DOCUMENTED: ICD-10-PCS | Mod: CPTII,S$GLB,, | Performed by: INTERNAL MEDICINE

## 2022-12-06 PROCEDURE — 1160F RVW MEDS BY RX/DR IN RCRD: CPT | Mod: CPTII,S$GLB,, | Performed by: INTERNAL MEDICINE

## 2022-12-06 PROCEDURE — 1125F AMNT PAIN NOTED PAIN PRSNT: CPT | Mod: CPTII,S$GLB,, | Performed by: INTERNAL MEDICINE

## 2022-12-06 PROCEDURE — 1159F MED LIST DOCD IN RCRD: CPT | Mod: CPTII,S$GLB,,

## 2022-12-06 PROCEDURE — 3008F BODY MASS INDEX DOCD: CPT | Mod: CPTII,S$GLB,, | Performed by: INTERNAL MEDICINE

## 2022-12-06 PROCEDURE — 99999 PR PBB SHADOW E&M-EST. PATIENT-LVL IV: ICD-10-PCS | Mod: PBBFAC,,,

## 2022-12-06 PROCEDURE — 3075F PR MOST RECENT SYSTOLIC BLOOD PRESS GE 130-139MM HG: ICD-10-PCS | Mod: CPTII,S$GLB,,

## 2022-12-06 PROCEDURE — 3008F BODY MASS INDEX DOCD: CPT | Mod: CPTII,S$GLB,,

## 2022-12-06 PROCEDURE — 1101F PT FALLS ASSESS-DOCD LE1/YR: CPT | Mod: CPTII,S$GLB,,

## 2022-12-06 PROCEDURE — 11042 DBRDMT SUBQ TIS 1ST 20SQCM/<: CPT | Mod: S$GLB,,,

## 2022-12-06 PROCEDURE — 99215 PR OFFICE/OUTPT VISIT, EST, LEVL V, 40-54 MIN: ICD-10-PCS | Mod: 25,S$GLB,,

## 2022-12-06 PROCEDURE — 3078F PR MOST RECENT DIASTOLIC BLOOD PRESSURE < 80 MM HG: ICD-10-PCS | Mod: CPTII,S$GLB,, | Performed by: INTERNAL MEDICINE

## 2022-12-06 PROCEDURE — 1101F PR PT FALLS ASSESS DOC 0-1 FALLS W/OUT INJ PAST YR: ICD-10-PCS | Mod: CPTII,S$GLB,,

## 2022-12-06 PROCEDURE — 3078F DIAST BP <80 MM HG: CPT | Mod: CPTII,S$GLB,, | Performed by: INTERNAL MEDICINE

## 2022-12-06 PROCEDURE — 11042 DEBRIDEMENT: ICD-10-PCS | Mod: S$GLB,,,

## 2022-12-06 PROCEDURE — 1159F PR MEDICATION LIST DOCUMENTED IN MEDICAL RECORD: ICD-10-PCS | Mod: CPTII,S$GLB,,

## 2022-12-06 PROCEDURE — 4010F ACE/ARB THERAPY RXD/TAKEN: CPT | Mod: CPTII,S$GLB,, | Performed by: INTERNAL MEDICINE

## 2022-12-06 PROCEDURE — 3044F PR MOST RECENT HEMOGLOBIN A1C LEVEL <7.0%: ICD-10-PCS | Mod: CPTII,S$GLB,,

## 2022-12-06 PROCEDURE — 3288F PR FALLS RISK ASSESSMENT DOCUMENTED: ICD-10-PCS | Mod: CPTII,S$GLB,,

## 2022-12-06 PROCEDURE — 99214 PR OFFICE/OUTPT VISIT, EST, LEVL IV, 30-39 MIN: ICD-10-PCS | Mod: S$GLB,,, | Performed by: INTERNAL MEDICINE

## 2022-12-06 PROCEDURE — 3075F SYST BP GE 130 - 139MM HG: CPT | Mod: CPTII,S$GLB,,

## 2022-12-06 PROCEDURE — 3008F PR BODY MASS INDEX (BMI) DOCUMENTED: ICD-10-PCS | Mod: CPTII,S$GLB,, | Performed by: INTERNAL MEDICINE

## 2022-12-06 RX ORDER — GABAPENTIN 800 MG/1
800 TABLET ORAL 3 TIMES DAILY
Qty: 90 TABLET | Refills: 11 | Status: SHIPPED | OUTPATIENT
Start: 2022-12-06 | End: 2024-01-12 | Stop reason: SDUPTHER

## 2022-12-06 NOTE — PROGRESS NOTES
RLE wound debridement scheduled with Dr. Ortiz on Thursday 12/8/22 per Dr. Ortiz's order. Pre-op instructions given including time and location of arrival, fasting starting at midnight before procedure, two showers with antibacterial soap, morning medications, updates to visitor policy, etc. Also instructed pt to hold Xarelto starting now per Dr. Ortiz's order. Pt repeated instructions to nurse and verbalized understanding. Contacted pt's daughter Cindi per pt's request to notify her of scheduled procedure. Daughter verbalized understanding.

## 2022-12-06 NOTE — PROGRESS NOTES
Subjective:       Patient ID: Babatunde Singh is a 72 y.o. male.    Chief Complaint: Wound Check    Patient presents for a reevaluation of right lower extremity and heel wounds. Patient follows with vascular surgery for PAD. Patient s/p right axillary to bi-profunda bypass on 10/6/22. Pt's ABIs preoperatively bilaterally were 0 to 0.69 on the right lower extremity and 0.74 on the left lower extremity. Pt has [facility] that comes out 2x a week. Denies fever, chills, erythema, warmth, drainage, or pain.      Patient unsure when his Achilles tendon started to dr out. Home health did not contact this office to inform us of this change.    Review of Systems   Constitutional:  Negative for activity change, chills, diaphoresis, fatigue and fever.   Respiratory:  Negative for apnea, chest tightness and shortness of breath.    Cardiovascular:  Positive for leg swelling. Negative for chest pain and palpitations.   Musculoskeletal:  Negative for gait problem and joint swelling.   Skin:  Positive for wound. Negative for pallor and rash.   Neurological:  Negative for syncope, weakness and numbness.   Psychiatric/Behavioral:  Negative for agitation. The patient is not nervous/anxious.    All other systems reviewed and are negative.    Objective:      Physical Exam  Vitals reviewed.   Constitutional:       General: He is not in acute distress.     Appearance: Normal appearance.   Cardiovascular:      Rate and Rhythm: Normal rate and regular rhythm.      Pulses: Normal pulses.   Pulmonary:      Effort: No respiratory distress.   Musculoskeletal:         General: No swelling.      Right lower le+ Edema present.      Left lower le+ Edema present.   Skin:     General: Skin is warm and dry.      Capillary Refill: Capillary refill takes less than 2 seconds.      Findings: Wound present. No erythema.          Neurological:      General: No focal deficit present.      Mental Status: He is alert and oriented to person, place,  and time.   Psychiatric:         Mood and Affect: Mood normal.         Behavior: Behavior normal.         Thought Content: Thought content normal.         Judgment: Judgment normal.       Assessment:       1. Wound of right lower extremity, initial encounter    2. Open wound of right heel, initial encounter    3. Bilateral lower extremity edema    4. Primary hypertension    5. PAD (peripheral artery disease)    6. History of smoking           Altered Skin Integrity 10/20/22 0750 Right proximal;lateral Leg Ulceration (Active)   10/20/22 0750   Altered Skin Integrity Present on Admission: yes   Side: Right   Orientation: proximal;lateral   Location: Leg   Wound Number:    Is this injury device related?:    Primary Wound Type: Ulceration   Description of Altered Skin Integrity:    Ankle-Brachial Index:    Pulses:    Removal Indication and Assessment:    Wound Outcome:    (Retired) Wound Length (cm):    (Retired) Wound Width (cm):    (Retired) Depth (cm):    Wound Description (Comments):    Removal Indications:    Wound Image    12/06/22 1225   Dressing Appearance Intact;Clean;Moist drainage;Dry 12/06/22 1225   Drainage Amount Small 12/06/22 1225   Drainage Characteristics/Odor Garsia;Creamy 12/06/22 1225   Yellow (%), Wound Tissue Color 100 % 12/06/22 1225   Periwound Area Intact;Edematous;Pink 12/06/22 1225   Wound Length (cm) 0.8 cm 12/06/22 1225   Wound Width (cm) 1.2 cm 12/06/22 1225   Wound Depth (cm) 0.1 cm 12/06/22 1225   Wound Volume (cm^3) 0.096 cm^3 12/06/22 1225   Wound Surface Area (cm^2) 0.96 cm^2 12/06/22 1225   Care Cleansed with:;Soap and water 12/06/22 1225   Dressing Applied;Other (comment);Compression wrap 12/06/22 1225   Periwound Care Skin barrier film applied 12/06/22 1225   Compression Two layer compression 12/06/22 1225            Altered Skin Integrity 10/28/22 1405 Right lower;distal;lateral Leg Ulceration (Active)   10/28/22 1405   Altered Skin Integrity Present on Admission: yes   Side: Right    Orientation: lower;distal;lateral   Location: Leg   Wound Number:    Is this injury device related?:    Primary Wound Type: Ulceration   Description of Altered Skin Integrity:    Ankle-Brachial Index:    Pulses:    Removal Indication and Assessment:    Wound Outcome:    (Retired) Wound Length (cm):    (Retired) Wound Width (cm):    (Retired) Depth (cm):    Wound Description (Comments):    Removal Indications:    Wound Image   12/06/22 1225   Dressing Appearance Dry;Intact;Clean;Moist drainage 12/06/22 1225   Drainage Amount Moderate 12/06/22 1225   Drainage Characteristics/Odor Bleeding uncontrolled;Yellow;Creamy 12/06/22 1225   Appearance Tendon 12/06/22 1225   Red (%), Wound Tissue Color 100 % 12/06/22 1225   Periwound Area Intact;Edematous;Pink 12/06/22 1225   Wound Length (cm) 4.2 cm 12/06/22 1225   Wound Width (cm) 5.2 cm 12/06/22 1225   Wound Depth (cm) 0.1 cm 12/06/22 1225   Wound Volume (cm^3) 2.184 cm^3 12/06/22 1225   Wound Surface Area (cm^2) 21.84 cm^2 12/06/22 1225   Care Cleansed with:;Soap and water 12/06/22 1225   Dressing Applied;Compression wrap;Other (comment);Calcium alginate 12/06/22 1225   Periwound Care Skin barrier film applied 12/06/22 1225   Compression Two layer compression 12/06/22 1225            Altered Skin Integrity 10/28/22 1405 Right lateral Heel Ulceration (Active)   10/28/22 1405   Altered Skin Integrity Present on Admission: yes   Side: Right   Orientation: lateral   Location: Heel   Wound Number:    Is this injury device related?:    Primary Wound Type: Ulceration   Description of Altered Skin Integrity:    Ankle-Brachial Index:    Pulses:    Removal Indication and Assessment:    Wound Outcome:    (Retired) Wound Length (cm):    (Retired) Wound Width (cm):    (Retired) Depth (cm):    Wound Description (Comments):    Removal Indications:    Wound Image   12/06/22 1225   Dressing Appearance Dry;Intact;Clean;Moist drainage 12/06/22 1225   Drainage Amount Small 12/06/22 1225    Drainage Characteristics/Odor Tan;Yellow;Creamy 12/06/22 1225   Yellow (%), Wound Tissue Color 100 % 12/06/22 1225   Periwound Area Intact;Pink;Edematous 12/06/22 1225   Wound Length (cm) 0.8 cm 12/06/22 1225   Wound Width (cm) 0.9 cm 12/06/22 1225   Wound Depth (cm) 0.1 cm 12/06/22 1225   Wound Volume (cm^3) 0.072 cm^3 12/06/22 1225   Wound Surface Area (cm^2) 0.72 cm^2 12/06/22 1225   Care Cleansed with:;Soap and water 12/06/22 1225   Dressing Applied;Compression wrap;Other (comment) 12/06/22 1225   Periwound Care Skin barrier film applied 12/06/22 1225   Compression Two layer compression 12/06/22 1225         Babatunde was seen in the clinic room and placed in the supine position on the treatment table.  The dressing was removed with scissors and the leg was cleansed with Easi-clense sponges and dried thoroughly. No odor, redness, or warmth noted from patient's baseline. Sharp debridement with scissors and pickups to right proximal lower extremity wound to remove non-viable tissue. Pt tolerated procedure well. Pt declined debridement with 5 mm curette.      Plan of Care: Proximal right lower extremity wound: Polymem to wound bed, Calmoseptine to nader wound area, covered with a 2 layer zinc oxide wrap.   Right heel wound: Polymem to wound bed, Calmoseptine to nader wound area, and covered with a 2 layer zinc oxide wrap.    Distal right lower extremity wound: Vaseline impregnated gauze to exposed tendon, aquacel to cover, and a two layer zinc oxide wrap.  The patient's foot was positioned at a 90 degree angle.  A zinc oxide wrap was applied using a spiral technique avoiding creases or folds.  The wrap was started behind the first metatarsal and ended below the tibial tubercle of the knee.  There was overlap of each turn half the width of the previous turn.     Plan:     Discussed patient with Dr. Seaman and Dr. Ortiz to formulate treatment plan.  Discussed consult to plastic surgery for a flap vs wound vac vs  surgical debridement.   To OR with Dr. Ortiz on 12/8/22 for surgical debridement of Achilles Tendon  Will attempt wound care vs amputation. Discussed at length the long process: development of granulation tissue and reepithelialization is slow and may not happen. Discussed the importance of keeping the tendon wet/moist and not letting the tendon desiccate. Will increase home health to 2x a week and will follow patient every 2 weeks at the wound care clinic to monitor.     Right lower extremity dressed as detailed above.  Patient was instructed to not get the dressings wet and to use cast covers for showering.  Should the dressing become wet, he is to remove it, place a moist dressing over the wound, cover with gauze and roll gauze and use ace wraps for compression and to secure bandages.  He should then notify this office or home health as soon as possible to have a new dressing applied.  Instructed patient to remove wrap if he notices tingling or coldness to his RLE or if his toes become white, blue, or cold.    Discussed nutrition and the role of protein in wound healing with the patient. Instructed patient to continue to optimize protein for wound healing.     Instructed patient to elevate legs whenever sedentary.    Faxed home health orders to [facility].  Home health 2x a week or PRN complications. Cleanse wound with mild soap and water or wound cleanser. Pat dry. Apply:  Proximal right lower extremity wound: Polymem to wound bed, Calmoseptine to nader wound area, covered with a 2 layer zinc oxide wrap.   Right heel wound: Polymem to wound bed, Calmoseptine to nader wound area, and covered with a 2 layer zinc oxide wrap.    Distal right lower extremity wound: Vaseline impregnated gauze to exposed tendon, aquacel to cover, and a two layer zinc oxide wrap.  Monitor tendon closely to maintain that it is wet and not desiccated. Monitor for infection.     Instructed patient to keep vascular surgery  appointments.    Written and verbal instructions given to patient.  RTC in 2 weeks    Sherry Davis PA-C           60  minutes of total time spent on the encounter, which includes face to face time and non-face to face time preparing to see the patient (eg, review of tests), Obtaining and/or reviewing separately obtained history, Documenting clinical information in the electronic or other health record, Independently interpreting results (not separately reported) and communicating results to the patient/family/caregiver, or Care coordination (not separately reported).

## 2022-12-06 NOTE — PROGRESS NOTES
"    CHIEF COMPLAINT     Chief Complaint   Patient presents with    HOSPITAL F/U        [facility]     Babatunde Singh is a 72 y.o. male  PAD, hypertension,hyperlipidemia, tobaccohere today for     Quit smoking-     Patient underwent right axillary to by profunda bypass 10/06/2022.  Reports improvement in leg pain with bypass.  Postoperative course was complicated by melena and GI bleed.  Resubmitted on October 30th was given 1 unit of red blood cells.  Patient underwent endoscopy and colonoscopy without source of bleeding.  Discussed trial of pill endoscopy but bleeding has stopped.  Plan is to repeat his CBC in a month.    Personally Reviewed Patient's Medical, surgical, family and social hx. Changes updated in [employer].  Care Team updated in Epic    Review of Systems:  Review of Systems    Health Maintenance:   Reviewed with patient  Due for the following:      PHYSICAL EXAM     BP (!) 140/58 (BP Location: Right arm, Patient Position: Sitting, BP Method: Medium (Manual))   Pulse 96   Ht 5' 4" (1.626 m)   Wt 65.8 kg (145 lb)   SpO2 100%   BMI 24.89 kg/m²     Gen: Well Appearing, NAD  HEENT: PERR, EOMI  Neck: FROM, no thyromegaly, no cervical adenopathy  CVD: RRR, no M/R/G  Pulm: Normal work of breathing, CTAB, no wheezing  Abd:  Soft, NT, ND non TTP, no mass  MSK: no LE edema, wrapped  Neuro: A&Ox3, gait normal, speech normal  Mood; Mood normal, behavior normal, thought process linear       LABS     Labs reviewed;   Lab Results   Component Value Date    WBC 8.63 10/31/2022    HGB 8.3 (L) 10/31/2022    HCT 26.5 (L) 10/31/2022    MCV 91 10/31/2022     10/31/2022         Chemistry        Component Value Date/Time     10/30/2022 0610    K 3.8 10/30/2022 0610     10/30/2022 0610    CO2 22 (L) 10/30/2022 0610    BUN 10 10/30/2022 0610    CREATININE 0.7 10/30/2022 0610    GLU 96 10/30/2022 0610        Component Value Date/Time    CALCIUM 8.2 (L) 10/30/2022 0610    ALKPHOS 76 10/30/2022 0610    AST 17 " 10/30/2022 0610    ALT 12 10/30/2022 0610    BILITOT 0.3 10/30/2022 0610    ESTGFRAFRICA >60.0 03/10/2022 1118    EGFRNONAA >60.0 03/10/2022 1118        Lab Results   Component Value Date    LDLCALC 86.0 03/10/2022         ASSESSMENT     1. Peripheral arterial disease  gabapentin (NEURONTIN) 800 MG tablet      2. Gallbladder mass  Ambulatory referral/consult to Surgical Oncology      3. Essential hypertension, benign        4. Open leg wound, right, sequela                Plan     Babatunde Singh is a 72 y.o. male with  PAD, hypertension,hyperlipidemia, tobacco  1. Peripheral arterial disease  Improvement in leg symptoms after recent bypass  Congratulated patient on smoking cessation   Continue rivaroxaban continue cilostazol  - gabapentin (NEURONTIN) 800 MG tablet; Take 1 tablet (800 mg total) by mouth 3 (three) times daily.  Dispense: 90 tablet; Refill: 11    2. Gallbladder mass  Refer patient to surgeon for evaluation  - Ambulatory referral/consult to Surgical Oncology; Future    3. Essential hypertension, benign  At goal continue current regimen    4. Open leg wound, right, sequela  Continue with wound care  Hoping with that with improved circulation we can have some healing  Esdras Ly MD

## 2022-12-06 NOTE — PROCEDURES
Debridement    Date/Time: 12/6/2022 10:30 AM  Performed by: Sherry Davis PA-C  Authorized by: Sherry Davis PA-C     Consent Done?:  Yes (Written)  Local anesthesia used?: No      Wound Details:    Location:  Right leg (proximal)    Type of Debridement:  Excisional       Length (cm):  0.8       Area (sq cm):  0.96       Width (cm):  1.2       Percent Debrided (%):  100       Depth (cm):  0.1       Total Area Debrided (sq cm):  0.96    Depth of debridement:  Subcutaneous tissue    Tissue debrided:  Subcutaneous    Devitalized tissue debrided:  Biofilm, Exudate, Fibrin and Slough    Instruments:  Forceps and Scissors    Bleeding:  Minimal  Hemostasis Achieved: Yes    Method Used:  Pressure  Patient tolerance:  Patient tolerated the procedure well with no immediate complications

## 2022-12-07 ENCOUNTER — TELEPHONE (OUTPATIENT)
Dept: VASCULAR SURGERY | Facility: CLINIC | Age: 72
End: 2022-12-07
Payer: MEDICARE

## 2022-12-07 ENCOUNTER — TELEPHONE (OUTPATIENT)
Dept: WOUND CARE | Facility: CLINIC | Age: 72
End: 2022-12-07
Payer: MEDICARE

## 2022-12-07 NOTE — TELEPHONE ENCOUNTER
----- Message from Umair Luna sent at 12/7/2022 12:38 PM CST -----  Regarding: appt / procedure on 12/8/22  Contact: PT @ [phone] and [phone] (call both)  Pt is calling to speak to someone in the office to discuss arrival time for his procedure on 12/8/22. Pt is asking for a call soon; he has to schedule and ask someone to bring him in for his procedure. Pt is asking for a return call soon. Thanks.

## 2022-12-07 NOTE — PRE-PROCEDURE INSTRUCTIONS
PRE-OP INSTRUCTIONS:  Instructed to have nothing by mouth past midnight.  Instructed to avoid taking vitamins,supplements or ibuprofen the am of surgery.    Patient reported that his medications are given to him and he isn't sure exactly what he takes or when he takes it.Instructed patient to take no medications the morning of surgery but to bring them with him to the hospital

## 2022-12-07 NOTE — TELEPHONE ENCOUNTER
Called and spoke to Edil (the home health nurse) to discuss the patient's exposed Achilles Tendon. Discussed how the tendon is beginning to desiccate. Asked why he did not contact me, and when did it start to desiccate. Edil reports that he went to the patient's house on Saturday. He states the tendon was shiny and did not looked dried out. Pt will be going to the OR tomorrow with Dr. Ortiz for a tendon debridement. Discussed with Edil what a desiccated tendon presents like. Strict instructions to contact office of any changes. Will change home health orders to 3x a week. Verbal orders for 3x a week.         Refaxed orders to [facility].      Sherry Davis PA-C

## 2022-12-08 ENCOUNTER — HOSPITAL ENCOUNTER (INPATIENT)
Facility: HOSPITAL | Age: 72
LOS: 5 days | Discharge: HOME | DRG: 264 | End: 2022-12-13
Attending: SURGERY | Admitting: SURGERY
Payer: MEDICARE

## 2022-12-08 ENCOUNTER — ANESTHESIA EVENT (OUTPATIENT)
Dept: SURGERY | Facility: HOSPITAL | Age: 72
DRG: 264 | End: 2022-12-08
Payer: MEDICARE

## 2022-12-08 ENCOUNTER — ANESTHESIA (OUTPATIENT)
Dept: SURGERY | Facility: HOSPITAL | Age: 72
DRG: 264 | End: 2022-12-08
Payer: MEDICARE

## 2022-12-08 DIAGNOSIS — G93.41 ENCEPHALOPATHY, METABOLIC: ICD-10-CM

## 2022-12-08 DIAGNOSIS — R60.0 BILATERAL LOWER EXTREMITY EDEMA: ICD-10-CM

## 2022-12-08 DIAGNOSIS — R00.0 TACHYCARDIA: ICD-10-CM

## 2022-12-08 DIAGNOSIS — M54.31 SCIATICA OF RIGHT SIDE: ICD-10-CM

## 2022-12-08 DIAGNOSIS — I10 PRIMARY HYPERTENSION: ICD-10-CM

## 2022-12-08 DIAGNOSIS — D62 ACUTE BLOOD LOSS ANEMIA: ICD-10-CM

## 2022-12-08 DIAGNOSIS — R65.20 SEVERE SEPSIS: ICD-10-CM

## 2022-12-08 DIAGNOSIS — A41.9 SEVERE SEPSIS: ICD-10-CM

## 2022-12-08 DIAGNOSIS — I70.238: ICD-10-CM

## 2022-12-08 DIAGNOSIS — N28.89 RIGHT RENAL MASS: ICD-10-CM

## 2022-12-08 DIAGNOSIS — R09.89 ABSENT PEDAL PULSES: ICD-10-CM

## 2022-12-08 DIAGNOSIS — R20.2 NUMBNESS AND TINGLING OF RIGHT LEG: ICD-10-CM

## 2022-12-08 DIAGNOSIS — I73.9 CLAUDICATION OF RIGHT LOWER EXTREMITY: ICD-10-CM

## 2022-12-08 DIAGNOSIS — R20.8 ALLODYNIA: ICD-10-CM

## 2022-12-08 DIAGNOSIS — R20.0 NUMBNESS AND TINGLING OF RIGHT LEG: ICD-10-CM

## 2022-12-08 DIAGNOSIS — M79.604 RIGHT LEG PAIN: ICD-10-CM

## 2022-12-08 DIAGNOSIS — S81.801D LEG WOUND, RIGHT, SUBSEQUENT ENCOUNTER: Primary | ICD-10-CM

## 2022-12-08 DIAGNOSIS — E78.2 MIXED HYPERLIPIDEMIA: ICD-10-CM

## 2022-12-08 DIAGNOSIS — I99.8 LOWER LIMB ISCHEMIA: ICD-10-CM

## 2022-12-08 DIAGNOSIS — I73.9 PAD (PERIPHERAL ARTERY DISEASE): ICD-10-CM

## 2022-12-08 LAB
CREAT SERPL-MCNC: 0.9 MG/DL (ref 0.5–1.4)
EST. GFR  (NO RACE VARIABLE): >60 ML/MIN/1.73 M^2

## 2022-12-08 PROCEDURE — 25000003 PHARM REV CODE 250: Performed by: STUDENT IN AN ORGANIZED HEALTH CARE EDUCATION/TRAINING PROGRAM

## 2022-12-08 PROCEDURE — 37000008 HC ANESTHESIA 1ST 15 MINUTES: Performed by: SURGERY

## 2022-12-08 PROCEDURE — D9220A PRA ANESTHESIA: Mod: CRNA,,, | Performed by: NURSE ANESTHETIST, CERTIFIED REGISTERED

## 2022-12-08 PROCEDURE — 63600175 PHARM REV CODE 636 W HCPCS: Performed by: STUDENT IN AN ORGANIZED HEALTH CARE EDUCATION/TRAINING PROGRAM

## 2022-12-08 PROCEDURE — 82565 ASSAY OF CREATININE: CPT | Performed by: SURGERY

## 2022-12-08 PROCEDURE — 63600175 PHARM REV CODE 636 W HCPCS

## 2022-12-08 PROCEDURE — 11042 DBRDMT SUBQ TIS 1ST 20SQCM/<: CPT | Mod: 78,,, | Performed by: SURGERY

## 2022-12-08 PROCEDURE — 27201423 OPTIME MED/SURG SUP & DEVICES STERILE SUPPLY: Performed by: SURGERY

## 2022-12-08 PROCEDURE — D9220A PRA ANESTHESIA: Mod: ANES,,, | Performed by: SURGERY

## 2022-12-08 PROCEDURE — 94761 N-INVAS EAR/PLS OXIMETRY MLT: CPT

## 2022-12-08 PROCEDURE — 11042 PR DEBRIDEMENT, SKIN, SUB-Q TISSUE,=<20 SQ CM: ICD-10-PCS | Mod: 78,,, | Performed by: SURGERY

## 2022-12-08 PROCEDURE — 71000016 HC POSTOP RECOV ADDL HR: Performed by: SURGERY

## 2022-12-08 PROCEDURE — 25000003 PHARM REV CODE 250: Performed by: NURSE ANESTHETIST, CERTIFIED REGISTERED

## 2022-12-08 PROCEDURE — 36000706: Performed by: SURGERY

## 2022-12-08 PROCEDURE — 36000707: Performed by: SURGERY

## 2022-12-08 PROCEDURE — 63600175 PHARM REV CODE 636 W HCPCS: Performed by: NURSE ANESTHETIST, CERTIFIED REGISTERED

## 2022-12-08 PROCEDURE — 71000033 HC RECOVERY, INTIAL HOUR: Performed by: SURGERY

## 2022-12-08 PROCEDURE — 71000015 HC POSTOP RECOV 1ST HR: Performed by: SURGERY

## 2022-12-08 PROCEDURE — 37000009 HC ANESTHESIA EA ADD 15 MINS: Performed by: SURGERY

## 2022-12-08 PROCEDURE — D9220A PRA ANESTHESIA: ICD-10-PCS | Mod: CRNA,,, | Performed by: NURSE ANESTHETIST, CERTIFIED REGISTERED

## 2022-12-08 PROCEDURE — 63600175 PHARM REV CODE 636 W HCPCS: Performed by: SURGERY

## 2022-12-08 PROCEDURE — D9220A PRA ANESTHESIA: ICD-10-PCS | Mod: ANES,,, | Performed by: SURGERY

## 2022-12-08 RX ORDER — HYDRALAZINE HYDROCHLORIDE 20 MG/ML
10 INJECTION INTRAMUSCULAR; INTRAVENOUS EVERY 4 HOURS PRN
Status: DISCONTINUED | OUTPATIENT
Start: 2022-12-08 | End: 2022-12-13 | Stop reason: HOSPADM

## 2022-12-08 RX ORDER — FENTANYL CITRATE 50 UG/ML
INJECTION, SOLUTION INTRAMUSCULAR; INTRAVENOUS
Status: COMPLETED
Start: 2022-12-08 | End: 2022-12-08

## 2022-12-08 RX ORDER — OXYCODONE HYDROCHLORIDE 5 MG/1
5 TABLET ORAL EVERY 4 HOURS PRN
Status: DISCONTINUED | OUTPATIENT
Start: 2022-12-08 | End: 2022-12-13 | Stop reason: HOSPADM

## 2022-12-08 RX ORDER — PHENYLEPHRINE HYDROCHLORIDE 10 MG/ML
INJECTION INTRAVENOUS
Status: DISCONTINUED | OUTPATIENT
Start: 2022-12-08 | End: 2022-12-08

## 2022-12-08 RX ORDER — SODIUM CHLORIDE 0.9 % (FLUSH) 0.9 %
10 SYRINGE (ML) INJECTION
Status: DISCONTINUED | OUTPATIENT
Start: 2022-12-08 | End: 2022-12-08 | Stop reason: SDUPTHER

## 2022-12-08 RX ORDER — FENTANYL CITRATE 50 UG/ML
INJECTION, SOLUTION INTRAMUSCULAR; INTRAVENOUS
Status: DISCONTINUED | OUTPATIENT
Start: 2022-12-08 | End: 2022-12-08

## 2022-12-08 RX ORDER — LIDOCAINE HYDROCHLORIDE 20 MG/ML
INJECTION INTRAVENOUS
Status: DISCONTINUED | OUTPATIENT
Start: 2022-12-08 | End: 2022-12-08

## 2022-12-08 RX ORDER — ACETAMINOPHEN 325 MG/1
650 TABLET ORAL EVERY 4 HOURS PRN
Status: DISCONTINUED | OUTPATIENT
Start: 2022-12-08 | End: 2022-12-13 | Stop reason: HOSPADM

## 2022-12-08 RX ORDER — ENOXAPARIN SODIUM 100 MG/ML
40 INJECTION SUBCUTANEOUS EVERY 24 HOURS
Status: DISCONTINUED | OUTPATIENT
Start: 2022-12-08 | End: 2022-12-10

## 2022-12-08 RX ORDER — SODIUM CHLORIDE 0.9 % (FLUSH) 0.9 %
10 SYRINGE (ML) INJECTION
Status: DISCONTINUED | OUTPATIENT
Start: 2022-12-08 | End: 2022-12-13 | Stop reason: HOSPADM

## 2022-12-08 RX ORDER — ACETAMINOPHEN 325 MG/1
650 TABLET ORAL EVERY 8 HOURS PRN
Status: DISCONTINUED | OUTPATIENT
Start: 2022-12-08 | End: 2022-12-13 | Stop reason: HOSPADM

## 2022-12-08 RX ORDER — ONDANSETRON 8 MG/1
8 TABLET, ORALLY DISINTEGRATING ORAL EVERY 8 HOURS PRN
Status: DISCONTINUED | OUTPATIENT
Start: 2022-12-08 | End: 2022-12-13 | Stop reason: HOSPADM

## 2022-12-08 RX ORDER — ONDANSETRON 2 MG/ML
INJECTION INTRAMUSCULAR; INTRAVENOUS
Status: DISCONTINUED | OUTPATIENT
Start: 2022-12-08 | End: 2022-12-08

## 2022-12-08 RX ORDER — MUPIROCIN 20 MG/G
OINTMENT TOPICAL
Status: DISCONTINUED | OUTPATIENT
Start: 2022-12-08 | End: 2022-12-08

## 2022-12-08 RX ORDER — TALC
6 POWDER (GRAM) TOPICAL NIGHTLY PRN
Status: DISCONTINUED | OUTPATIENT
Start: 2022-12-08 | End: 2022-12-13 | Stop reason: HOSPADM

## 2022-12-08 RX ORDER — LIDOCAINE HYDROCHLORIDE 10 MG/ML
1 INJECTION, SOLUTION EPIDURAL; INFILTRATION; INTRACAUDAL; PERINEURAL ONCE
Status: DISCONTINUED | OUTPATIENT
Start: 2022-12-08 | End: 2022-12-08

## 2022-12-08 RX ORDER — FENTANYL CITRATE 50 UG/ML
25 INJECTION, SOLUTION INTRAMUSCULAR; INTRAVENOUS EVERY 5 MIN PRN
Status: COMPLETED | OUTPATIENT
Start: 2022-12-08 | End: 2022-12-08

## 2022-12-08 RX ORDER — PROPOFOL 10 MG/ML
VIAL (ML) INTRAVENOUS
Status: DISCONTINUED | OUTPATIENT
Start: 2022-12-08 | End: 2022-12-08

## 2022-12-08 RX ORDER — ONDANSETRON 2 MG/ML
4 INJECTION INTRAMUSCULAR; INTRAVENOUS ONCE AS NEEDED
Status: DISCONTINUED | OUTPATIENT
Start: 2022-12-08 | End: 2022-12-08 | Stop reason: HOSPADM

## 2022-12-08 RX ORDER — LIDOCAINE HYDROCHLORIDE 10 MG/ML
1 INJECTION, SOLUTION EPIDURAL; INFILTRATION; INTRACAUDAL; PERINEURAL ONCE AS NEEDED
Status: DISCONTINUED | OUTPATIENT
Start: 2022-12-08 | End: 2022-12-13 | Stop reason: HOSPADM

## 2022-12-08 RX ADMIN — FENTANYL CITRATE 25 MCG: 50 INJECTION INTRAMUSCULAR; INTRAVENOUS at 11:12

## 2022-12-08 RX ADMIN — SODIUM CHLORIDE: 0.9 INJECTION, SOLUTION INTRAVENOUS at 10:12

## 2022-12-08 RX ADMIN — OXYCODONE 5 MG: 5 TABLET ORAL at 10:12

## 2022-12-08 RX ADMIN — FENTANYL CITRATE 25 MCG: 50 INJECTION INTRAMUSCULAR; INTRAVENOUS at 12:12

## 2022-12-08 RX ADMIN — LIDOCAINE HYDROCHLORIDE 100 MG: 20 INJECTION INTRAVENOUS at 10:12

## 2022-12-08 RX ADMIN — ONDANSETRON 4 MG: 2 INJECTION INTRAMUSCULAR; INTRAVENOUS at 10:12

## 2022-12-08 RX ADMIN — ENOXAPARIN SODIUM 40 MG: 40 INJECTION SUBCUTANEOUS at 04:12

## 2022-12-08 RX ADMIN — PROPOFOL 110 MG: 10 INJECTION, EMULSION INTRAVENOUS at 10:12

## 2022-12-08 RX ADMIN — PHENYLEPHRINE HYDROCHLORIDE 100 MCG: 10 INJECTION INTRAVENOUS at 10:12

## 2022-12-08 RX ADMIN — OXYCODONE 5 MG: 5 TABLET ORAL at 11:12

## 2022-12-08 RX ADMIN — FENTANYL CITRATE 25 MCG: 50 INJECTION, SOLUTION INTRAMUSCULAR; INTRAVENOUS at 10:12

## 2022-12-08 RX ADMIN — FENTANYL CITRATE 50 MCG: 50 INJECTION, SOLUTION INTRAMUSCULAR; INTRAVENOUS at 10:12

## 2022-12-08 RX ADMIN — CEFAZOLIN 2 G: 2 INJECTION, POWDER, FOR SOLUTION INTRAMUSCULAR; INTRAVENOUS at 10:12

## 2022-12-08 RX ADMIN — OXYCODONE 5 MG: 5 TABLET ORAL at 03:12

## 2022-12-08 RX ADMIN — ACETAMINOPHEN 650 MG: 325 TABLET ORAL at 10:12

## 2022-12-08 RX ADMIN — MUPIROCIN: 20 OINTMENT TOPICAL at 09:12

## 2022-12-08 RX ADMIN — ACETAMINOPHEN 650 MG: 325 TABLET ORAL at 11:12

## 2022-12-08 NOTE — TRANSFER OF CARE
"Anesthesia Transfer of Care Note    Patient: Babatunde Singh    Procedure(s) Performed: Procedure(s) (LRB):  DEBRIDEMENT, WOUND (Right)  APPLICATION, WOUND VAC (Right)    Patient location: PACU    Anesthesia Type: general    Transport from OR: Transported from OR on room air with adequate spontaneous ventilation    Post pain: adequate analgesia    Post assessment: no apparent anesthetic complications and tolerated procedure well    Post vital signs: stable    Level of consciousness: awake    Nausea/Vomiting: no nausea/vomiting    Complications: none    Transfer of care protocol was followed      Last vitals:   Visit Vitals  BP (!) 146/64 (BP Location: Right arm, Patient Position: Lying)   Pulse 81   Temp 36.5 °C (97.7 °F) (Temporal)   Resp 15   Ht 5' 6" (1.676 m)   Wt 64 kg (141 lb)   SpO2 100%   BMI 22.76 kg/m²     "

## 2022-12-08 NOTE — ANESTHESIA PROCEDURE NOTES
Intubation    Date/Time: 12/8/2022 10:20 AM  Performed by: Cb Guadarrama CRNA  Authorized by: Alexei Sams MD     Intubation:     Induction:  Intravenous    Intubated:  Postinduction    Mask Ventilation:  Easy mask    Attempts:  1    Attempted By:  Student    Difficult Airway Encountered?: No      Complications:  None    Airway Device:  Supraglottic airway/LMA    Airway Device Size:  3.5    Placement Verified By:  Capnometry    Complicating Factors:  None    Findings Post-Intubation:  BS equal bilateral and atraumatic/condition of teeth unchanged

## 2022-12-08 NOTE — BRIEF OP NOTE
[facility] Wiley - Surgery ([facility])  Brief Operative Note    SUMMARY     Surgery Date: 12/8/2022     Surgeon(s) and Role:     * LEI Ortiz III, MD - Primary     * Nora Barrios MD - Resident - Assisting        Pre-op Diagnosis:  Non-healing wound of right lower extremity [S81.801A]    Post-op Diagnosis:  Post-Op Diagnosis Codes:     * Non-healing wound of right lower extremity [S81.801A]    Procedure(s) (LRB):  DEBRIDEMENT, WOUND (Right)  APPLICATION, WOUND VAC (Right)    Anesthesia: General    Operative Findings: RLE wound debridement with placement of wound vac. 4 x 3 x 0.5cm, black sponge.     Estimated Blood Loss: minimal    Estimated Blood Loss has been documented.         Specimens:   Specimen (24h ago, onward)      None            [id number]

## 2022-12-08 NOTE — H&P
CHIEF COMPLAINT: severe PAD     HISTORY OF PRESENT ILLNESS: Babatunde Singh is a 72 y.o. male initially seen as an inpatient, who presented with severe chronic ischemic rest pain to the right foot, who become nonambulatory, also a recent diagnosis of a right renal mass.   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   He was initially seen by interventional cardiology who felt there were no revascularization options and suggested either palliative care or above-knee amputation.     He underwent a right axillary to bi- profunda bypass 10/06/2022.   Postoperatively, his ABIs went from 0 bilaterally preoperatively to 0.69 right, 0.74 left.  His severe ischemic rest pain resolved completely.  He was discharged to skilled nursing and is to be discharged tomorrow.  States he is now ambulating without assistance.     During his evaluation he is also noted to have significant full-thickness ulceration of his right calf.  He is receiving wound care at [facility]     Due to the limited runoff of this bypass he was started on Xarelto and aspirin          Past Medical History:   Diagnosis Date    DVT (deep venous thrombosis)      Hypertension      Peripheral arterial disease                 Past Surgical History:   Procedure Laterality Date    CREATION OF AXILLARY-FEMORAL ARTERY BYPASS WITH GRAFT N/A 10/6/2022     Procedure: CREATION, BYPASS, ARTERIAL, AXILLARY TO FEMORAL, USING GRAFT;  Surgeon: LEI Ortiz III, MD;  Location: [facility] OR [address] [place] [address];  Service: Peripheral Vascular;  Laterality: N/A;    CREATION OF FEMORAL-FEMORAL ARTERY BYPASS WITH GRAFT N/A 10/6/2022     Procedure: CREATION, BYPASS, ARTERIAL, FEMORAL TO FEMORAL, USING GRAFT;  Surgeon: LEI Ortiz III, MD;  Location: [facility] OR [address] [place] [address];  Service: Peripheral Vascular;  Laterality: N/A;     ESOPHAGOGASTRODUODENOSCOPY N/A 10/20/2022     Procedure: EGD (ESOPHAGOGASTRODUODENOSCOPY);  Surgeon: Sixto Chicas MD;  Location: [facility] ([address] [place] [address]);  Service: Endoscopy;  Laterality: N/A;    EXCISION OF HYDROCELE Left 4/26/2022     Procedure: HYDROCELECTOMY;  Surgeon: Damion Roberts MD;  Location: [facility];  Service: Urology;  Laterality: Left;    KNEE SURGERY   1972            Current Outpatient Medications:     acetaminophen (TYLENOL) 325 MG tablet, Take 2 tablets (650 mg total) by mouth every 8 (eight) hours as needed., Disp: 20 tablet, Rfl: 0    aspirin 81 MG Chew, Chew and swallow 1 tablet (81 mg total) by mouth once daily., Disp: 30 tablet, Rfl: 0    atorvastatin (LIPITOR) 80 MG tablet, Take 1 tablet (80 mg total) by mouth once daily., Disp: 30 tablet, Rfl: 2    cilostazoL (PLETAL) 100 MG Tab, Take 1 tablet (100 mg total) by mouth 2 (two) times daily., Disp: 180 tablet, Rfl: 3    ezetimibe (ZETIA) 10 mg tablet, Take 1 tablet (10 mg total) by mouth once daily., Disp: 90 tablet, Rfl: 3    gabapentin (NEURONTIN) 300 MG capsule, Take 3 capsules (900 mg total) by mouth 3 (three) times daily., Disp: 540 capsule, Rfl: 3    HYDROcodone-acetaminophen (NORCO) 5-325 mg per tablet, Take 1 tablet by mouth every 4 (four) hours as needed for Pain., Disp: 10 tablet, Rfl: 0    losartan-hydrochlorothiazide 50-12.5 mg (HYZAAR) 50-12.5 mg per tablet, Take 1 tablet by mouth once daily., Disp: 30 tablet, Rfl: 2    multivitamin (THERAGRAN) per tablet, Take 1 tablet by mouth once daily., Disp: , Rfl:     mupirocin (BACTROBAN) 2 % ointment, Apply topically 2 (two) times daily., Disp: 30 g, Rfl: 1    naproxen (NAPROSYN) 500 MG tablet, Take 1 tablet (500 mg total) by mouth 2 (two) times daily as needed (pain)., Disp: 60 tablet, Rfl: 0    pantoprazole (PROTONIX) 40 MG tablet, Take 1 tablet (40 mg total) by mouth once daily., Disp: 30 tablet, Rfl: 11    rivaroxaban (XARELTO DVT-PE TREAT 30D START) 15 mg (42)- 20 mg (9) tablet dose  "pack, Take 1 tablet (15 mg) by mouth twice daily with food for 21 days followed by 1 tablet (20 mg) by mouth once daily with food, Disp: 51 tablet, Rfl: 0    terbinafine HCl (LAMISIL ORAL), Take by mouth once daily at 6am., Disp: , Rfl:      Review of patient's allergies indicates:  No Known Allergies           Family History   Problem Relation Age of Onset    Hypertension Mother      Hypertension Father           Social History            Tobacco Use    Smoking status: Former       Packs/day: 0.50       Years: 50.00       Pack years: 25.00       Types: Cigarettes    Smokeless tobacco: Never    Tobacco comments:       Cigarettes3-4 per day   Substance Use Topics    Alcohol use: Never    Drug use: Never      PHYSICAL EXAM:   /61 (BP Location: Right arm, Patient Position: Sitting, BP Method: Large (Automatic))   Pulse 89   Temp 98 °F (36.7 °C) (Oral)   Ht 5' 4" (1.626 m)   Wt 67 kg (147 lb 11.3 oz)   BMI 25.35 kg/m²   Constitutional:  Alert,   Well-appearing  In no distress.   Neurological: Normal speech  no focal findings  CN II - XII grossly intact.    Psychiatric: Mood and affect appropriate and symmetric.   HEENT: Normocephalic / atraumatic  PERRLA  Midline trachea  No scars across the neck left infraclavicular incision is well healed   Cardiac: Regular rate and rhythm.   Pulmonary: Normal pulmonary effort.   Abdomen: Soft, not distended.     Skin: Warm and well perfused.    Vascular:  Good pedal signals bilaterally   Extremities/  Musculoskeletal: No edema.   Femoral incisions well healed.    Right posterior calf wounds markedly improved from preop, now with a ring of good granulation tissue tissue in both areas.  See picture     Moderate bilateral foot edema   10/25/2022     IMAGING:  Duplex of the ax fem-fem today demonstrates it to be patent without stenosis  ABIs 0.69 right, 0.74 left, compared with 0 bilaterally pre intervention    IMPRESSION:    Two weeks status post right ax fem-fem for critical " limb ischemia.   His ischemic rest pain is gone, and his pressure ulcerations of right calf show marked improvement in healing  Known right renal mass.  Have discussed this case with his treating urologist.  Okay to stop the Xarelto prior to his partial right nephrectomy     PLAN:     RLE wound debridement scheduled with Dr. Ortiz on Thursday 12/8/22 per Dr. Ortiz's order. Pre-op instructions given including time and location of arrival, fasting starting at midnight before procedure, two showers with antibacterial soap, morning medications, updates to visitor policy, etc. Also instructed pt to hold Xarelto starting now per Dr. Ortiz's order. Pt repeated instructions to nurse and verbalized understanding. Contacted pt's daughter Cindi per pt's request to notify her of scheduled procedure. Daughter verbalized understanding.    Leland Murillo MD PGY7  Vascular Surgery Fellow  [phone]

## 2022-12-08 NOTE — BRIEF OP NOTE
Pal Wiley - Surgery ([facility])  Brief Operative Note    SUMMARY     Surgery Date: 12/8/2022     Surgeon(s) and Role:     * LEI Ortiz III, MD - Primary     * Nora Barrios MD - Resident - Assisting    Pre-op Diagnosis:  Non-healing wound of right lower extremity [S81.801A]    Post-op Diagnosis:  Post-Op Diagnosis Codes:     * Non-healing wound of right lower extremity [S81.801A]    Procedure(s) (LRB):  DEBRIDEMENT, WOUND (Right)  APPLICATION, WOUND VAC (Right)    Anesthesia: General    Operative Findings: R lower calf exposed tendon debrided, VAC applied    Estimated Blood Loss: <10cc          Specimens:   Specimen (24h ago, onward)      None            [id number]

## 2022-12-08 NOTE — ANESTHESIA PREPROCEDURE EVALUATION
12/08/2022  Babatunde Singh is a 72 y.o., male.      Pre-op Assessment    I have reviewed the Patient Summary Reports.     I have reviewed the Nursing Notes. I have reviewed the NPO Status.   I have reviewed the Medications.     Review of Systems  Anesthesia Hx:  No problems with previous Anesthesia  History of prior surgery of interest to airway management or planning: Denies Family Hx of Anesthesia complications.   Denies Personal Hx of Anesthesia complications.   Social:  No Alcohol Use, Former Smoker    Hematology/Oncology:  Hematology Normal   Oncology Normal     EENT/Dental:EENT/Dental Normal   Cardiovascular:   Exercise tolerance: good Hypertension PVD hyperlipidemia ECG has been reviewed. -The left ventricle is normal in size with concentric remodeling and hyperdynamic systolic function. The estimated ejection fraction is >70%.  -Normal right ventricular size with normal right ventricular systolic function.  -Grade I left ventricular diastolic dysfunction.  -The estimated PA systolic pressure is 36 mmHg.  -Normal central venous pressure (3 mmHg).   Pulmonary:  Pulmonary Normal    Renal/:  Renal/ Normal     Hepatic/GI:  Hepatic/GI Normal    Neurological:   Neuromuscular Disease,    Endocrine:  Endocrine Normal    Psych:  Psychiatric Normal           Physical Exam  General: Well nourished, Cooperative, Alert and Oriented    Airway:  Mallampati: III   Mouth Opening: Normal  TM Distance: Normal  Tongue: Normal  Neck ROM: Normal ROM    Dental:  Intact        Anesthesia Plan  Type of Anesthesia, risks & benefits discussed:    Anesthesia Type: Gen Supraglottic Airway, Gen ETT  Intra-op Monitoring Plan: Standard ASA Monitors  Post Op Pain Control Plan: multimodal analgesia  Induction:  IV  Airway Plan: Video, Post-Induction  Informed Consent: Patient consented to blood products? No  ASA Score: 3  Day of  Surgery Review of History & Physical: H&P Update referred to the surgeon/provider.    Ready For Surgery From Anesthesia Perspective.     .

## 2022-12-08 NOTE — NURSING TRANSFER
Nursing Transfer Note      12/8/2022     Reason patient is being transferred: INPT    Transfer To: 1048    Transfer via stretcher    Transfer with wound vac    Transported by pct    Medicines sent: none    Any special needs or follow-up needed: none    Chart send with patient: Yes    Notified: daughter    Patient reassessed at: 12/8/22

## 2022-12-08 NOTE — ANESTHESIA POSTPROCEDURE EVALUATION
Anesthesia Post Evaluation    Patient: Babatunde Singh    Procedure(s) Performed: Procedure(s) (LRB):  DEBRIDEMENT, WOUND (Right)  APPLICATION, WOUND VAC (Right)    Final Anesthesia Type: general      Patient location during evaluation: PACU  Patient participation: Yes- Able to Participate  Level of consciousness: awake and alert and oriented  Post-procedure vital signs: reviewed and stable  Pain management: adequate  Airway patency: patent  ERVIN mitigation strategies: Multimodal analgesia  PONV status at discharge: No PONV  Anesthetic complications: no      Cardiovascular status: blood pressure returned to baseline and hemodynamically stable  Respiratory status: unassisted, spontaneous ventilation and room air  Hydration status: euvolemic  Follow-up not needed.          Vitals Value Taken Time   /67 12/08/22 1332   Temp 36.5 °C (97.7 °F) 12/08/22 1105   Pulse 85 12/08/22 1344   Resp 27 12/08/22 1344   SpO2 100 % 12/08/22 1344   Vitals shown include unvalidated device data.      Event Time   Out of Recovery 11:35:47         Pain/Cassie Score: Pain Rating Prior to Med Admin: 8 (12/8/2022 11:49 AM)  Pain Rating Post Med Admin: 8 (12/8/2022 12:00 PM)  Cassei Score: 10 (12/8/2022 11:35 AM)

## 2022-12-09 LAB
ANION GAP SERPL CALC-SCNC: 8 MMOL/L (ref 8–16)
BASOPHILS # BLD AUTO: 0.06 K/UL (ref 0–0.2)
BASOPHILS NFR BLD: 0.7 % (ref 0–1.9)
BUN SERPL-MCNC: 10 MG/DL (ref 8–23)
CALCIUM SERPL-MCNC: 8.6 MG/DL (ref 8.7–10.5)
CHLORIDE SERPL-SCNC: 107 MMOL/L (ref 95–110)
CO2 SERPL-SCNC: 22 MMOL/L (ref 23–29)
CREAT SERPL-MCNC: 0.7 MG/DL (ref 0.5–1.4)
DIFFERENTIAL METHOD: ABNORMAL
EOSINOPHIL # BLD AUTO: 0.2 K/UL (ref 0–0.5)
EOSINOPHIL NFR BLD: 1.9 % (ref 0–8)
ERYTHROCYTE [DISTWIDTH] IN BLOOD BY AUTOMATED COUNT: 18.9 % (ref 11.5–14.5)
EST. GFR  (NO RACE VARIABLE): >60 ML/MIN/1.73 M^2
GLUCOSE SERPL-MCNC: 92 MG/DL (ref 70–110)
HCT VFR BLD AUTO: 25.4 % (ref 40–54)
HGB BLD-MCNC: 7.6 G/DL (ref 14–18)
IMM GRANULOCYTES # BLD AUTO: 0.02 K/UL (ref 0–0.04)
IMM GRANULOCYTES NFR BLD AUTO: 0.2 % (ref 0–0.5)
LYMPHOCYTES # BLD AUTO: 2.4 K/UL (ref 1–4.8)
LYMPHOCYTES NFR BLD: 27.8 % (ref 18–48)
MAGNESIUM SERPL-MCNC: 1.8 MG/DL (ref 1.6–2.6)
MCH RBC QN AUTO: 23.5 PG (ref 27–31)
MCHC RBC AUTO-ENTMCNC: 29.9 G/DL (ref 32–36)
MCV RBC AUTO: 79 FL (ref 82–98)
MONOCYTES # BLD AUTO: 1 K/UL (ref 0.3–1)
MONOCYTES NFR BLD: 11.5 % (ref 4–15)
NEUTROPHILS # BLD AUTO: 5 K/UL (ref 1.8–7.7)
NEUTROPHILS NFR BLD: 57.9 % (ref 38–73)
NRBC BLD-RTO: 0 /100 WBC
PHOSPHATE SERPL-MCNC: 3.5 MG/DL (ref 2.7–4.5)
PLATELET # BLD AUTO: 290 K/UL (ref 150–450)
PMV BLD AUTO: 10 FL (ref 9.2–12.9)
POTASSIUM SERPL-SCNC: 3.7 MMOL/L (ref 3.5–5.1)
RBC # BLD AUTO: 3.23 M/UL (ref 4.6–6.2)
SODIUM SERPL-SCNC: 137 MMOL/L (ref 136–145)
WBC # BLD AUTO: 8.55 K/UL (ref 3.9–12.7)

## 2022-12-09 PROCEDURE — 36415 COLL VENOUS BLD VENIPUNCTURE: CPT | Performed by: STUDENT IN AN ORGANIZED HEALTH CARE EDUCATION/TRAINING PROGRAM

## 2022-12-09 PROCEDURE — 63600175 PHARM REV CODE 636 W HCPCS: Performed by: STUDENT IN AN ORGANIZED HEALTH CARE EDUCATION/TRAINING PROGRAM

## 2022-12-09 PROCEDURE — 20600001 HC STEP DOWN PRIVATE ROOM

## 2022-12-09 PROCEDURE — 83735 ASSAY OF MAGNESIUM: CPT | Performed by: STUDENT IN AN ORGANIZED HEALTH CARE EDUCATION/TRAINING PROGRAM

## 2022-12-09 PROCEDURE — 80048 BASIC METABOLIC PNL TOTAL CA: CPT | Performed by: STUDENT IN AN ORGANIZED HEALTH CARE EDUCATION/TRAINING PROGRAM

## 2022-12-09 PROCEDURE — 25000003 PHARM REV CODE 250: Performed by: STUDENT IN AN ORGANIZED HEALTH CARE EDUCATION/TRAINING PROGRAM

## 2022-12-09 PROCEDURE — 85025 COMPLETE CBC W/AUTO DIFF WBC: CPT | Performed by: STUDENT IN AN ORGANIZED HEALTH CARE EDUCATION/TRAINING PROGRAM

## 2022-12-09 PROCEDURE — 84100 ASSAY OF PHOSPHORUS: CPT | Performed by: STUDENT IN AN ORGANIZED HEALTH CARE EDUCATION/TRAINING PROGRAM

## 2022-12-09 RX ADMIN — ENOXAPARIN SODIUM 40 MG: 40 INJECTION SUBCUTANEOUS at 04:12

## 2022-12-09 RX ADMIN — OXYCODONE 5 MG: 5 TABLET ORAL at 07:12

## 2022-12-09 RX ADMIN — ACETAMINOPHEN 650 MG: 325 TABLET ORAL at 11:12

## 2022-12-09 RX ADMIN — OXYCODONE 5 MG: 5 TABLET ORAL at 11:12

## 2022-12-09 RX ADMIN — ACETAMINOPHEN 650 MG: 325 TABLET ORAL at 07:12

## 2022-12-09 NOTE — OP NOTE
Operative Note     12/8/2022    PRE-OP DIAGNOSIS: Non-healing wound of right lower extremity [S81.801A]      POST-OP DIAGNOSIS: Post-Op Diagnosis Codes:     * Non-healing wound of right lower extremity [S81.801A]    Procedure(s):  DEBRIDEMENT, WOUND  APPLICATION, WOUND VAC     SURGEON: Surgeon(s) and Role:     * LEI Ortiz III, MD - Primary     * Nora Barrios MD - Resident - Assisting    ANESTHESIA: General     OPERATIVE FINDINGS: R lower calf exposed tendon debrided, VAC applied    INDICATION FOR PROCEDURE: This patient presents with a history of open RLE wound with tendon exposed.     PROCEDURE IN DETAIL:  Babatunde Singh is a 72 y.o. male brought to the operating room for definitive surgery of wound debridement with vac placement. The patient was informed of the possible risks and complications of the procedure, including but not limited to anesthetic risks, bleeding, infection, and need for additional surgery.  The patient concurred with the proposed plan, and has given informed consent.  The site of surgery was properly noted/marked in the preoperative holding area.    The patient was then brought to the operating room and placed in the supine position with both upper extremities extended.  IV sedation anesthesia was administered. Perioperative antibiotics were administered consisting of Ancef and a time out was performed confirming the patient, site, and procedure.   The RLE was then prepped and draped in the usual sterile fashion.    We sharply debrided the non-viable tissue located within the wound down to bleeding viable tissue. The area was then thoroughly washed out and dried. Hemostasis achieved. The wound was measured to be 4 x 3 x 0.5 cm in size. Black sponge was cut to the appropriate size and was placed over the wound. Suction was set to -125 mmHg and an adequate seal was established.     ESTIMATED BLOOD LOSS: minimal    COMPLICATIONS: none    DISPOSITION: PACU - hemodynamically  stable.

## 2022-12-09 NOTE — PLAN OF CARE
12/09/22 1138   Post-Acute Status   Post-Acute Authorization Other   Hospital Resources/Appts/Education Provided Provided patient/caregiver with written discharge plan information   Discharge Delays None known at this time   Discharge Plan   Discharge Plan A Return to nursing home     Wound vac form and clinicals sent to [facility] and [facility].    Jennyfer Saenz LCSW  Case Management/[facility]  [phone]

## 2022-12-09 NOTE — PLAN OF CARE
[facility]  Initial Discharge Assessment       Primary Care Provider: Esdras Ly MD    Admission Diagnosis: Non-healing wound of right lower extremity [S81.801A]  PAD (peripheral artery disease) [I73.9]    Admission Date: 12/8/2022  Expected Discharge Date: 12/13/2022    Discharge Barriers Identified: None    Payor: [employer]ARE / Plan: [employer] CHOICES 65 / Product Type: Medicare Advantage /     Extended Emergency Contact Information  Primary Emergency Contact: Cindi Matos  Mobile Phone: [phone]  Relation: Daughter  Preferred language: English   needed? No    Discharge Plan A: [id number] [place], [place] - [address] [place] [address]  Phone: [phone] Fax: [phone]    [facility]  [address] [facility] [address]  Phone: [phone] Fax: [phone]      Initial Assessment (most recent)       Adult Discharge Assessment - 12/09/22 1238          Discharge Assessment    Assessment Type Discharge Planning Brief Assessment     Confirmed/corrected address, phone number and insurance Yes     Confirmed Demographics Correct on Facesheet     Source of Information patient     When was your last doctors appointment? 12/06/22     Does patient/caregiver understand observation status Yes     Communicated LAZ with patient/caregiver Yes     Reason For Admission Non-healing wound     People in Home significant other     Do you expect to return to your current living situation? Yes     Do you have help at home or someone to help you manage your care at home? Yes     Who are your caregiver(s) and their phone number(s)? Cindi-[phone]     Prior to hospitilization cognitive status: Alert/Oriented     Current cognitive status: Alert/Oriented     Walking or Climbing Stairs ambulation difficulty, requires equipment     Mobility Management Cane     Home  Layout Able to live on 1st floor     Equipment Currently Used at Home none     Readmission within 30 days? No     Patient currently being followed by outpatient case management? No     Do you currently have service(s) that help you manage your care at home? Yes     How Many hours does patient receive services 9     Name and Contact number of agency Daughter to contact  with info     Is the pt/caregiver preference to resume services with current agency Yes     Do you take prescription medications? Yes     Do you have prescription coverage? Yes     Coverage PHN     Do you have any problems affording any of your prescribed medications? No     Is the patient taking medications as prescribed? yes     Who is going to help you get home at discharge? [phone]     How do you get to doctors appointments? car, drives self     Are you on dialysis? No     Do you take coumadin? No     Discharge Plan A Home Health     DME Needed Upon Discharge  negative pressure wound therapy device     Discharge Plan discussed with: Adult children     Discharge Barriers Identified None        OTHER    Name(s) of People in Home [phone]                        Spoke to pt. Pt lives at home with wife. Post hospital  stay [phone] will be pt support person and pt. has transportation at d/c with family. There have been no hospitalizations within the last 30 days per pt. Verified pt PCP and preferred pharmacy. Pt stated not on Coumadin and is not receiving dialysis. All questions answered regarding case management/ discharge planning , pt verbalized understanding. Discharge booklet with SW contact information given to pt. Pt is current with an  agency. Family to contact  with HH name.    Jennyfer Saenz LCSW  Case Management/[facility]  [phone]

## 2022-12-09 NOTE — PLAN OF CARE
C/o pain to lower extremities throughout the night 10/10 oxycodone and acetaminophen was effective for patient. Wound vac intact.

## 2022-12-09 NOTE — PLAN OF CARE
Transferred from PACU in stable condition, no acute distress, POC reviewed with patient, verbalized understanding, AAOX4, complains of mild pain to RLE, RLE with wound vac intact at 125 continuous suction therapy, seal intact, VSS, will continue to monitor.    Problem: Adjustment to Illness (Sepsis/Septic Shock)  Goal: Optimal Coping  Outcome: Ongoing, Progressing     Problem: Bleeding (Sepsis/Septic Shock)  Goal: Absence of Bleeding  Outcome: Ongoing, Progressing     Problem: Glycemic Control Impaired (Sepsis/Septic Shock)  Goal: Blood Glucose Level Within Desired Range  Outcome: Ongoing, Progressing     Problem: Infection Progression (Sepsis/Septic Shock)  Goal: Absence of Infection Signs and Symptoms  Outcome: Ongoing, Progressing     Problem: Nutrition Impaired (Sepsis/Septic Shock)  Goal: Optimal Nutrition Intake  Outcome: Ongoing, Progressing     Problem: Adult Inpatient Plan of Care  Goal: Plan of Care Review  Outcome: Ongoing, Progressing  Goal: Patient-Specific Goal (Individualized)  Outcome: Ongoing, Progressing  Goal: Absence of Hospital-Acquired Illness or Injury  Outcome: Ongoing, Progressing  Goal: Optimal Comfort and Wellbeing  Outcome: Ongoing, Progressing  Goal: Readiness for Transition of Care  Outcome: Ongoing, Progressing     Problem: Impaired Wound Healing  Goal: Optimal Wound Healing  Outcome: Ongoing, Progressing

## 2022-12-10 PROBLEM — S81.801D LEG WOUND, RIGHT, SUBSEQUENT ENCOUNTER: Status: ACTIVE | Noted: 2022-12-10

## 2022-12-10 LAB
ANION GAP SERPL CALC-SCNC: 7 MMOL/L (ref 8–16)
BASOPHILS # BLD AUTO: 0.07 K/UL (ref 0–0.2)
BASOPHILS NFR BLD: 0.7 % (ref 0–1.9)
BUN SERPL-MCNC: 9 MG/DL (ref 8–23)
CALCIUM SERPL-MCNC: 8.5 MG/DL (ref 8.7–10.5)
CHLORIDE SERPL-SCNC: 107 MMOL/L (ref 95–110)
CO2 SERPL-SCNC: 23 MMOL/L (ref 23–29)
CREAT SERPL-MCNC: 0.7 MG/DL (ref 0.5–1.4)
DIFFERENTIAL METHOD: ABNORMAL
EOSINOPHIL # BLD AUTO: 0.2 K/UL (ref 0–0.5)
EOSINOPHIL NFR BLD: 1.7 % (ref 0–8)
ERYTHROCYTE [DISTWIDTH] IN BLOOD BY AUTOMATED COUNT: 18.9 % (ref 11.5–14.5)
EST. GFR  (NO RACE VARIABLE): >60 ML/MIN/1.73 M^2
GLUCOSE SERPL-MCNC: 91 MG/DL (ref 70–110)
HCT VFR BLD AUTO: 25.5 % (ref 40–54)
HGB BLD-MCNC: 7.6 G/DL (ref 14–18)
IMM GRANULOCYTES # BLD AUTO: 0.02 K/UL (ref 0–0.04)
IMM GRANULOCYTES NFR BLD AUTO: 0.2 % (ref 0–0.5)
LYMPHOCYTES # BLD AUTO: 2.7 K/UL (ref 1–4.8)
LYMPHOCYTES NFR BLD: 28.3 % (ref 18–48)
MAGNESIUM SERPL-MCNC: 1.8 MG/DL (ref 1.6–2.6)
MCH RBC QN AUTO: 23.2 PG (ref 27–31)
MCHC RBC AUTO-ENTMCNC: 29.8 G/DL (ref 32–36)
MCV RBC AUTO: 78 FL (ref 82–98)
MONOCYTES # BLD AUTO: 1.2 K/UL (ref 0.3–1)
MONOCYTES NFR BLD: 12.7 % (ref 4–15)
NEUTROPHILS # BLD AUTO: 5.4 K/UL (ref 1.8–7.7)
NEUTROPHILS NFR BLD: 56.4 % (ref 38–73)
NRBC BLD-RTO: 0 /100 WBC
PHOSPHATE SERPL-MCNC: 3.3 MG/DL (ref 2.7–4.5)
PLATELET # BLD AUTO: 284 K/UL (ref 150–450)
PMV BLD AUTO: 10.1 FL (ref 9.2–12.9)
POTASSIUM SERPL-SCNC: 3.7 MMOL/L (ref 3.5–5.1)
RBC # BLD AUTO: 3.27 M/UL (ref 4.6–6.2)
SODIUM SERPL-SCNC: 137 MMOL/L (ref 136–145)
WBC # BLD AUTO: 9.52 K/UL (ref 3.9–12.7)

## 2022-12-10 PROCEDURE — 85025 COMPLETE CBC W/AUTO DIFF WBC: CPT | Performed by: STUDENT IN AN ORGANIZED HEALTH CARE EDUCATION/TRAINING PROGRAM

## 2022-12-10 PROCEDURE — 25000003 PHARM REV CODE 250: Performed by: STUDENT IN AN ORGANIZED HEALTH CARE EDUCATION/TRAINING PROGRAM

## 2022-12-10 PROCEDURE — 83735 ASSAY OF MAGNESIUM: CPT | Performed by: STUDENT IN AN ORGANIZED HEALTH CARE EDUCATION/TRAINING PROGRAM

## 2022-12-10 PROCEDURE — 20600001 HC STEP DOWN PRIVATE ROOM

## 2022-12-10 PROCEDURE — 84100 ASSAY OF PHOSPHORUS: CPT | Performed by: STUDENT IN AN ORGANIZED HEALTH CARE EDUCATION/TRAINING PROGRAM

## 2022-12-10 PROCEDURE — 36415 COLL VENOUS BLD VENIPUNCTURE: CPT | Performed by: STUDENT IN AN ORGANIZED HEALTH CARE EDUCATION/TRAINING PROGRAM

## 2022-12-10 PROCEDURE — 25000003 PHARM REV CODE 250

## 2022-12-10 PROCEDURE — 80048 BASIC METABOLIC PNL TOTAL CA: CPT | Performed by: STUDENT IN AN ORGANIZED HEALTH CARE EDUCATION/TRAINING PROGRAM

## 2022-12-10 RX ORDER — NAPROXEN SODIUM 220 MG/1
81 TABLET, FILM COATED ORAL DAILY
Status: DISCONTINUED | OUTPATIENT
Start: 2022-12-10 | End: 2022-12-13 | Stop reason: HOSPADM

## 2022-12-10 RX ORDER — GABAPENTIN 400 MG/1
800 CAPSULE ORAL 3 TIMES DAILY
Status: DISCONTINUED | OUTPATIENT
Start: 2022-12-10 | End: 2022-12-13 | Stop reason: HOSPADM

## 2022-12-10 RX ORDER — ATORVASTATIN CALCIUM 40 MG/1
80 TABLET, FILM COATED ORAL DAILY
Status: DISCONTINUED | OUTPATIENT
Start: 2022-12-10 | End: 2022-12-13 | Stop reason: HOSPADM

## 2022-12-10 RX ADMIN — RIVAROXABAN 20 MG: 20 TABLET, FILM COATED ORAL at 04:12

## 2022-12-10 RX ADMIN — OXYCODONE 5 MG: 5 TABLET ORAL at 06:12

## 2022-12-10 RX ADMIN — OXYCODONE 5 MG: 5 TABLET ORAL at 01:12

## 2022-12-10 RX ADMIN — GABAPENTIN 800 MG: 400 CAPSULE ORAL at 09:12

## 2022-12-10 RX ADMIN — OXYCODONE 5 MG: 5 TABLET ORAL at 10:12

## 2022-12-10 RX ADMIN — ATORVASTATIN CALCIUM 80 MG: 40 TABLET, FILM COATED ORAL at 08:12

## 2022-12-10 RX ADMIN — ASPIRIN 81 MG: 81 TABLET, CHEWABLE ORAL at 08:12

## 2022-12-10 NOTE — NURSING
B-received report from Nubia LYMAN,on 72 years old male,alert,orient,can make needs known,lying in bed on his back with head of bed elevated,siderails up,call light within reach,received pain medication from day shift nurse,breath sounds clear bilateral,bowel sounds active X 4 quadrants,wound vac intact to outer right lower extremity,no signs of hypo/hypertension,will continue to monitor.  I-assess for safety/fall prevention,assess for pain/discomfort,assess for signs of infection,assess for hypo/hypertension,throughout shift.  R-no falls,adherence to taking medications as prescribed by doctor/CRNP,pain will remain 0 on a pain scale of 0-10,blood pressure will remain within normal,remain infection free/afebrile.  P-continue with current plan of care.

## 2022-12-10 NOTE — PLAN OF CARE
Home health orders faxed to [facility] for home health authorization.

## 2022-12-10 NOTE — PLAN OF CARE
[facility]       HOME  HEALTH ORDERS                                    FACE TO FACE ENCOUNTER      Patient Name: Babatunde Singh  YOB: 1950    PCP: Esdras Ly MD   PCP Address: [address] / [place] [address]  PCP Phone Number: [phone]  PCP Fax: [phone]    Encounter Date: 12/10/2022    Admit to Home Health    Diagnoses:  Active Hospital Problems    Diagnosis  POA    *Leg wound, right, subsequent encounter [S81.801D]  Not Applicable    PAD (peripheral artery disease) [I73.9]  Yes      Resolved Hospital Problems   No resolved problems to display.       Future Appointments   Date Time Provider Department Center   12/13/2022  8:00 AM Moises Cuba MD [facility] UROLOGFormerly [facility]   12/13/2022  9:00 AM Tolu Gavin MD [facility] SURON [facility]   12/16/2022 10:30 AM Cr Wallace MD PhD [facility] PERVASFormerly [facility], [facility]   12/20/2022  8:30 AM Sherry Davis PA-C [facility] WOUND [facility]   1/24/2023  9:00 AM VASCULAR, LAB [facility] VASCLAB [facility]   1/24/2023 10:00 AM VASCULAR, LAB [facility] VASCLAB [facility]   1/24/2023 10:45 AM LEI Ortiz III, MD [facility]           I have seen and examined this patient face to face today. My clinical findings that support the need for the home health skilled services and home bound status are the following:  Medical restrictions requiring assistance of another human to leave home due to  Wound care needs.    Allergies:Review of patient's allergies indicates:  No Known Allergies    Diet: regular diet    Activities: activity as tolerated    Nursing:   SN to complete comprehensive assessment including routine vital signs. Instruct on disease process and s/s of complications to report to MD. Review/verify medication list sent home with the patient at time of discharge  and instruct patient/caregiver as needed. Frequency may be adjusted depending on start of care date.    Notify MD if SBP > 160 or < 90;  DBP > 90 or < 50; HR > 120 or < 50; Temp > 101; Other:         WOUND CARE ORDERS  yes:  Wound Vac:   Location:  Right lower calf            Dressing changes every Monday, Wednesday and Friday.        ET Consult      Medications: Review discharge medications with patient and family and provide education.      Current Discharge Medication List        CONTINUE these medications which have NOT CHANGED    Details   aspirin 81 MG Chew Chew and swallow 1 tablet (81 mg total) by mouth once daily.  Qty: 30 tablet, Refills: 0      atorvastatin (LIPITOR) 80 MG tablet Take 1 tablet (80 mg total) by mouth once daily.  Qty: 30 tablet, Refills: 0    Associated Diagnoses: Hyperlipidemia, unspecified hyperlipidemia type      ezetimibe (ZETIA) 10 mg tablet Take 1 tablet (10 mg total) by mouth once daily.  Qty: 30 tablet, Refills: 0      gabapentin (NEURONTIN) 800 MG tablet Take 1 tablet (800 mg total) by mouth 3 (three) times daily.  Qty: 90 tablet, Refills: 11    Associated Diagnoses: Peripheral arterial disease      HYDROcodone-acetaminophen (NORCO) 5-325 mg per tablet Take 1 tablet by mouth every 6 (six) hours as needed for Pain.  Qty: 35 tablet, Refills: 0    Comments: Quantity prescribed more than 7 day supply? Yes, quantity medically necessary      losartan-hydrochlorothiazide 50-12.5 mg (HYZAAR) 50-12.5 mg per tablet Take 1 tablet by mouth once daily.  Qty: 30 tablet, Refills: 0    Comments: .  Associated Diagnoses: Primary hypertension      multivitamin (THERAGRAN) per tablet Take 1 tablet by mouth once daily.      pantoprazole (PROTONIX) 40 MG tablet Take 1 tablet (40 mg total) by mouth once daily.  Qty: 30 tablet, Refills: 0      rivaroxaban (XARELTO) 20 mg Tab Take 1 tablet (20 mg total) by mouth daily with dinner or evening meal.  Qty: 30 tablet, Refills: 0      acetaminophen (TYLENOL) 325 MG tablet Take 2 tablets (650 mg total) by mouth every 8 (eight) hours as needed.  Qty: 20 tablet, Refills: 0             I certify  that this patient is confined to his home and needs intermittent skilled nursing care.          Electronically Signed  _________________________________  Arti Lyons MD  12/10/2022

## 2022-12-10 NOTE — PLAN OF CARE
Problem: Adjustment to Illness (Sepsis/Septic Shock)  Goal: Optimal Coping  Outcome: Ongoing, Progressing     Problem: Bleeding (Sepsis/Septic Shock)  Goal: Absence of Bleeding  Outcome: Ongoing, Progressing     Problem: Infection Progression (Sepsis/Septic Shock)  Goal: Absence of Infection Signs and Symptoms  Outcome: Ongoing, Progressing     Problem: Impaired Wound Healing  Goal: Optimal Wound Healing  Outcome: Ongoing, Progressing     Problem: Fall Injury Risk  Goal: Absence of Fall and Fall-Related Injury  Outcome: Ongoing, Progressing

## 2022-12-10 NOTE — PLAN OF CARE
POC reviewed with pt.  - VSS on RA, AAOx4  - R leg incision with WV in place at 125 mmhg minimal drainage in cannister   - Ambulates with standby assist in hallway  - Adequate urine output  - no c/o nausea regular diet  - No BM this shift  - Call light in reach, WCTM

## 2022-12-10 NOTE — ASSESSMENT & PLAN NOTE
Babatunde Singh is a 72 y.o M with severe chronic ischemic rest pain to the right foot. Patient is now s/p RLE wound washout and wound vac placement.     Plan:   - awaiting home wound vac   - regular diet   - on xarelto   - PRN pain control   - continue home meds

## 2022-12-10 NOTE — PLAN OF CARE
POC reviewed w/ pt, verbalized understanding. Pt AAOx4. VSS on RA.     - pain managed with PRN pain meds  - RLE wound vac in place, C/D/I  - tolerating regular diet with no c/o nausea  - voids per urinal, with adequate UOP, no BM    Turns in bed Q2. Encouraged ambulation. Frequent rounds made for pt safety. Call light in reach and bed in lowest position. Will continue to monitor POC.

## 2022-12-11 LAB
ANION GAP SERPL CALC-SCNC: 9 MMOL/L (ref 8–16)
BASOPHILS # BLD AUTO: 0.07 K/UL (ref 0–0.2)
BASOPHILS NFR BLD: 0.7 % (ref 0–1.9)
BUN SERPL-MCNC: 10 MG/DL (ref 8–23)
CALCIUM SERPL-MCNC: 8.9 MG/DL (ref 8.7–10.5)
CHLORIDE SERPL-SCNC: 109 MMOL/L (ref 95–110)
CO2 SERPL-SCNC: 24 MMOL/L (ref 23–29)
CREAT SERPL-MCNC: 0.8 MG/DL (ref 0.5–1.4)
DIFFERENTIAL METHOD: ABNORMAL
EOSINOPHIL # BLD AUTO: 0.2 K/UL (ref 0–0.5)
EOSINOPHIL NFR BLD: 1.8 % (ref 0–8)
ERYTHROCYTE [DISTWIDTH] IN BLOOD BY AUTOMATED COUNT: 18.9 % (ref 11.5–14.5)
EST. GFR  (NO RACE VARIABLE): >60 ML/MIN/1.73 M^2
GLUCOSE SERPL-MCNC: 95 MG/DL (ref 70–110)
HCT VFR BLD AUTO: 27.1 % (ref 40–54)
HGB BLD-MCNC: 8 G/DL (ref 14–18)
IMM GRANULOCYTES # BLD AUTO: 0.03 K/UL (ref 0–0.04)
IMM GRANULOCYTES NFR BLD AUTO: 0.3 % (ref 0–0.5)
LYMPHOCYTES # BLD AUTO: 2.6 K/UL (ref 1–4.8)
LYMPHOCYTES NFR BLD: 26.7 % (ref 18–48)
MAGNESIUM SERPL-MCNC: 1.9 MG/DL (ref 1.6–2.6)
MCH RBC QN AUTO: 23.1 PG (ref 27–31)
MCHC RBC AUTO-ENTMCNC: 29.5 G/DL (ref 32–36)
MCV RBC AUTO: 78 FL (ref 82–98)
MONOCYTES # BLD AUTO: 1.2 K/UL (ref 0.3–1)
MONOCYTES NFR BLD: 11.6 % (ref 4–15)
NEUTROPHILS # BLD AUTO: 5.8 K/UL (ref 1.8–7.7)
NEUTROPHILS NFR BLD: 58.9 % (ref 38–73)
NRBC BLD-RTO: 0 /100 WBC
PHOSPHATE SERPL-MCNC: 3.4 MG/DL (ref 2.7–4.5)
PLATELET # BLD AUTO: 317 K/UL (ref 150–450)
PMV BLD AUTO: 9.8 FL (ref 9.2–12.9)
POTASSIUM SERPL-SCNC: 4 MMOL/L (ref 3.5–5.1)
RBC # BLD AUTO: 3.47 M/UL (ref 4.6–6.2)
SODIUM SERPL-SCNC: 142 MMOL/L (ref 136–145)
WBC # BLD AUTO: 9.9 K/UL (ref 3.9–12.7)

## 2022-12-11 PROCEDURE — 84100 ASSAY OF PHOSPHORUS: CPT | Performed by: STUDENT IN AN ORGANIZED HEALTH CARE EDUCATION/TRAINING PROGRAM

## 2022-12-11 PROCEDURE — 25000003 PHARM REV CODE 250: Performed by: STUDENT IN AN ORGANIZED HEALTH CARE EDUCATION/TRAINING PROGRAM

## 2022-12-11 PROCEDURE — 85025 COMPLETE CBC W/AUTO DIFF WBC: CPT | Performed by: STUDENT IN AN ORGANIZED HEALTH CARE EDUCATION/TRAINING PROGRAM

## 2022-12-11 PROCEDURE — 25000003 PHARM REV CODE 250

## 2022-12-11 PROCEDURE — 36415 COLL VENOUS BLD VENIPUNCTURE: CPT | Performed by: STUDENT IN AN ORGANIZED HEALTH CARE EDUCATION/TRAINING PROGRAM

## 2022-12-11 PROCEDURE — 80048 BASIC METABOLIC PNL TOTAL CA: CPT | Performed by: STUDENT IN AN ORGANIZED HEALTH CARE EDUCATION/TRAINING PROGRAM

## 2022-12-11 PROCEDURE — 20600001 HC STEP DOWN PRIVATE ROOM

## 2022-12-11 PROCEDURE — 83735 ASSAY OF MAGNESIUM: CPT | Performed by: STUDENT IN AN ORGANIZED HEALTH CARE EDUCATION/TRAINING PROGRAM

## 2022-12-11 RX ORDER — PANTOPRAZOLE SODIUM 40 MG/1
40 TABLET, DELAYED RELEASE ORAL DAILY
Status: DISCONTINUED | OUTPATIENT
Start: 2022-12-12 | End: 2022-12-13 | Stop reason: HOSPADM

## 2022-12-11 RX ADMIN — GABAPENTIN 800 MG: 400 CAPSULE ORAL at 02:12

## 2022-12-11 RX ADMIN — OXYCODONE 5 MG: 5 TABLET ORAL at 07:12

## 2022-12-11 RX ADMIN — GABAPENTIN 800 MG: 400 CAPSULE ORAL at 09:12

## 2022-12-11 RX ADMIN — RIVAROXABAN 20 MG: 20 TABLET, FILM COATED ORAL at 04:12

## 2022-12-11 RX ADMIN — ATORVASTATIN CALCIUM 80 MG: 40 TABLET, FILM COATED ORAL at 09:12

## 2022-12-11 RX ADMIN — GABAPENTIN 800 MG: 400 CAPSULE ORAL at 08:12

## 2022-12-11 RX ADMIN — OXYCODONE 5 MG: 5 TABLET ORAL at 09:12

## 2022-12-11 RX ADMIN — ASPIRIN 81 MG: 81 TABLET, CHEWABLE ORAL at 09:12

## 2022-12-11 NOTE — PLAN OF CARE
Patient aox4, resting quietly in bed, denied pain, wound dressing WNL with wound vac on continuous suction, wnl, safety and plan of care discussed, voiced understanding, will continue to monitor.

## 2022-12-11 NOTE — PROGRESS NOTES
[facility]  Vascular Surgery  Progress Note    Patient Name: Babatunde Singh  MRN: [id number]  Admission Date: 12/8/2022  Primary Care Provider: Esdras Ly MD    Subjective:     Interval History: NAEO. VSS. Patient doing well this morning with no complaints. Wound vac with good suction/seal, to be changed today.     Post-Op Info:  Procedure(s) (LRB):  DEBRIDEMENT, WOUND (Right)  APPLICATION, WOUND VAC (Right)   3 Days Post-Op       Medications:  Continuous Infusions:  Scheduled Meds:   aspirin  81 mg Oral Daily    atorvastatin  80 mg Oral Daily    gabapentin  800 mg Oral TID    rivaroxaban  20 mg Oral Daily with dinner     PRN Meds:acetaminophen, acetaminophen, hydrALAZINE, LIDOcaine (PF) 10 mg/ml (1%), melatonin, ondansetron, oxyCODONE, sodium chloride 0.9%     Objective:     Vital Signs (Most Recent):  Temp: 98.5 °F (36.9 °C) (12/11/22 0730)  Pulse: 77 (12/11/22 0730)  Resp: 14 (12/11/22 0730)  BP: (!) 145/67 (12/11/22 0730)  SpO2: 98 % (12/11/22 0730)   Vital Signs (24h Range):  Temp:  [97.7 °F (36.5 °C)-98.5 °F (36.9 °C)] 98.5 °F (36.9 °C)  Pulse:  [77-89] 77  Resp:  [14-19] 14  SpO2:  [96 %-98 %] 98 %  BP: (143-168)/(64-76) 145/67     Date 12/11/22 0700 - 12/12/22 0659   Shift 6997-0182 6186-7637 0353-3128 24 Hour Total   INTAKE   Shift Total(mL/kg)       OUTPUT   Urine(mL/kg/hr) 350   350   Shift Total(mL/kg) 350(5.5)   350(5.5)   Weight (kg) 64 64 64 64       Physical Exam  Cardiovascular:      Rate and Rhythm: Normal rate.      Pulses: Normal pulses.   Pulmonary:      Effort: Pulmonary effort is normal.   Abdominal:      General: Abdomen is flat.      Palpations: Abdomen is soft.   Musculoskeletal:         General: Normal range of motion.   Skin:     General: Skin is warm.      Comments: RLE wound with wound vac in place - good suction/seal      Neurological:      Mental Status: He is alert.       Significant Labs:  CBC:   Recent Labs   Lab 12/11/22  0545   WBC 9.90   RBC 3.47*   HGB 8.0*   HCT  27.1*      MCV 78*   MCH 23.1*   MCHC 29.5*     CMP:   Recent Labs   Lab 12/11/22  0545   GLU 95   CALCIUM 8.9      K 4.0   CO2 24      BUN 10   CREATININE 0.8       Significant Diagnostics:  I have reviewed all pertinent imaging results/findings within the past 24 hours.    Assessment/Plan:     * Leg wound, right, subsequent encounter  Babatunde Singh is a 72 y.o M with severe chronic ischemic rest pain to the right foot. Patient is now s/p RLE wound washout and wound vac placement.     Plan:   - awaiting HH and home wound vac approval   - regular diet   - on xarelto   - PRN pain control   - continue home meds   - will change wound vac today     Dispo: pending authorization       Arti Lyons MD  Vascular Surgery  [facility]

## 2022-12-11 NOTE — ASSESSMENT & PLAN NOTE
Babautnde Singh is a 72 y.o M with severe chronic ischemic rest pain to the right foot. Patient is now s/p RLE wound washout and wound vac placement.     Plan:   - awaiting HH and home wound vac approval   - regular diet   - on xarelto   - PRN pain control   - continue home meds   - will change wound vac today     Dispo: pending authorization

## 2022-12-12 ENCOUNTER — TELEPHONE (OUTPATIENT)
Dept: VASCULAR SURGERY | Facility: CLINIC | Age: 72
End: 2022-12-12
Payer: MEDICARE

## 2022-12-12 LAB
ANION GAP SERPL CALC-SCNC: 7 MMOL/L (ref 8–16)
BASOPHILS # BLD AUTO: 0.07 K/UL (ref 0–0.2)
BASOPHILS NFR BLD: 0.7 % (ref 0–1.9)
BUN SERPL-MCNC: 12 MG/DL (ref 8–23)
CALCIUM SERPL-MCNC: 8.9 MG/DL (ref 8.7–10.5)
CHLORIDE SERPL-SCNC: 106 MMOL/L (ref 95–110)
CO2 SERPL-SCNC: 24 MMOL/L (ref 23–29)
CREAT SERPL-MCNC: 0.8 MG/DL (ref 0.5–1.4)
DIFFERENTIAL METHOD: ABNORMAL
EOSINOPHIL # BLD AUTO: 0.2 K/UL (ref 0–0.5)
EOSINOPHIL NFR BLD: 1.4 % (ref 0–8)
ERYTHROCYTE [DISTWIDTH] IN BLOOD BY AUTOMATED COUNT: 19.1 % (ref 11.5–14.5)
EST. GFR  (NO RACE VARIABLE): >60 ML/MIN/1.73 M^2
GLUCOSE SERPL-MCNC: 151 MG/DL (ref 70–110)
HCT VFR BLD AUTO: 28.2 % (ref 40–54)
HGB BLD-MCNC: 8.4 G/DL (ref 14–18)
IMM GRANULOCYTES # BLD AUTO: 0.03 K/UL (ref 0–0.04)
IMM GRANULOCYTES NFR BLD AUTO: 0.3 % (ref 0–0.5)
LYMPHOCYTES # BLD AUTO: 2.2 K/UL (ref 1–4.8)
LYMPHOCYTES NFR BLD: 20.1 % (ref 18–48)
MAGNESIUM SERPL-MCNC: 1.9 MG/DL (ref 1.6–2.6)
MCH RBC QN AUTO: 23.1 PG (ref 27–31)
MCHC RBC AUTO-ENTMCNC: 29.8 G/DL (ref 32–36)
MCV RBC AUTO: 78 FL (ref 82–98)
MONOCYTES # BLD AUTO: 0.9 K/UL (ref 0.3–1)
MONOCYTES NFR BLD: 8.8 % (ref 4–15)
NEUTROPHILS # BLD AUTO: 7.4 K/UL (ref 1.8–7.7)
NEUTROPHILS NFR BLD: 68.7 % (ref 38–73)
NRBC BLD-RTO: 0 /100 WBC
PHOSPHATE SERPL-MCNC: 3.5 MG/DL (ref 2.7–4.5)
PLATELET # BLD AUTO: 411 K/UL (ref 150–450)
PMV BLD AUTO: 9.4 FL (ref 9.2–12.9)
POTASSIUM SERPL-SCNC: 3.9 MMOL/L (ref 3.5–5.1)
RBC # BLD AUTO: 3.64 M/UL (ref 4.6–6.2)
SODIUM SERPL-SCNC: 137 MMOL/L (ref 136–145)
WBC # BLD AUTO: 10.74 K/UL (ref 3.9–12.7)

## 2022-12-12 PROCEDURE — 25000003 PHARM REV CODE 250: Performed by: STUDENT IN AN ORGANIZED HEALTH CARE EDUCATION/TRAINING PROGRAM

## 2022-12-12 PROCEDURE — 80048 BASIC METABOLIC PNL TOTAL CA: CPT

## 2022-12-12 PROCEDURE — 36415 COLL VENOUS BLD VENIPUNCTURE: CPT

## 2022-12-12 PROCEDURE — 83735 ASSAY OF MAGNESIUM: CPT | Performed by: STUDENT IN AN ORGANIZED HEALTH CARE EDUCATION/TRAINING PROGRAM

## 2022-12-12 PROCEDURE — 25000003 PHARM REV CODE 250

## 2022-12-12 PROCEDURE — 36415 COLL VENOUS BLD VENIPUNCTURE: CPT | Performed by: STUDENT IN AN ORGANIZED HEALTH CARE EDUCATION/TRAINING PROGRAM

## 2022-12-12 PROCEDURE — 20600001 HC STEP DOWN PRIVATE ROOM

## 2022-12-12 PROCEDURE — 84100 ASSAY OF PHOSPHORUS: CPT | Performed by: STUDENT IN AN ORGANIZED HEALTH CARE EDUCATION/TRAINING PROGRAM

## 2022-12-12 PROCEDURE — 85025 COMPLETE CBC W/AUTO DIFF WBC: CPT

## 2022-12-12 RX ADMIN — GABAPENTIN 800 MG: 400 CAPSULE ORAL at 08:12

## 2022-12-12 RX ADMIN — ATORVASTATIN CALCIUM 80 MG: 40 TABLET, FILM COATED ORAL at 09:12

## 2022-12-12 RX ADMIN — GABAPENTIN 800 MG: 400 CAPSULE ORAL at 03:12

## 2022-12-12 RX ADMIN — PANTOPRAZOLE SODIUM 40 MG: 40 TABLET, DELAYED RELEASE ORAL at 09:12

## 2022-12-12 RX ADMIN — OXYCODONE 5 MG: 5 TABLET ORAL at 08:12

## 2022-12-12 RX ADMIN — ASPIRIN 81 MG: 81 TABLET, CHEWABLE ORAL at 09:12

## 2022-12-12 RX ADMIN — GABAPENTIN 800 MG: 400 CAPSULE ORAL at 09:12

## 2022-12-12 RX ADMIN — RIVAROXABAN 20 MG: 20 TABLET, FILM COATED ORAL at 05:12

## 2022-12-12 NOTE — PLAN OF CARE
Patient aox4, resting quietly in bed, denied pain, wound  WNL with wound vac on 125 continuous suction, wnl, safety and plan of care discussed, voiced understanding, will continue to monitor.

## 2022-12-12 NOTE — PLAN OF CARE
12/12/22 1303   Post-Acute Status   Post-Acute Authorization HME;Other  (Wound vac)   HME Status Pending payor review   Discharge Delays None known at this time   Discharge Plan   Discharge Plan A Home with family   Discharge Plan B Home     REYES called [employer] Liaison, Mahnaz Harman ([phone]). She stated that they are waiting on the benefits to be verified. SW informed her that the pt will dc today if it is delivered.  Per team, the pt's dtr will be doing the dressing changes. SW will f/u as needed.    Update: 4:13 PM    SW was just notified by pt's insurance that they are giving auth now.  SW called KAREN back and they have not gotten the auth yet.  Once they do, they will process it and try to get it out this evening. However, their delivery drivers stop at 6:30.  REYES updated the ONcall CM about possible delivery.      Update: 4:24 PM  REYES updated the pt and called his dtr on the phone per his request.  They do want HH.  REYES called [facility] and the pt has been set up again with [facility].    Lizbet Tejada, DANNIW   PRN

## 2022-12-12 NOTE — TELEPHONE ENCOUNTER
Contacted patient and pt's daughter Cindi to schedule appt. Appointment scheduled, pt verified.  ----- Message from Herminio Rivera MD sent at 12/12/2022  7:10 AM CST -----  Hello,     This patient needs a 1 week f/u for wound check .    Thanks,   Herminio Rivera

## 2022-12-12 NOTE — PLAN OF CARE
POC reviewed with pt, verbalized understanding. AAOx4, VSS on RA. Denies pain. Wound vac to R posterior ankle. Minimal output. Regular diet, good appetite. Urinal to void, adequate UOP. Ambulated in halls, tolerated well. Awaiting portable wound vac. All needs met, call bell within reach. Frequent rounds for safety.   Problem: Adjustment to Illness (Sepsis/Septic Shock)  Goal: Optimal Coping  Outcome: Ongoing, Progressing     Problem: Bleeding (Sepsis/Septic Shock)  Goal: Absence of Bleeding  Outcome: Ongoing, Progressing     Problem: Infection Progression (Sepsis/Septic Shock)  Goal: Absence of Infection Signs and Symptoms  Outcome: Ongoing, Progressing     Problem: Nutrition Impaired (Sepsis/Septic Shock)  Goal: Optimal Nutrition Intake  Outcome: Ongoing, Progressing     Problem: Adult Inpatient Plan of Care  Goal: Plan of Care Review  Outcome: Ongoing, Progressing     Problem: Impaired Wound Healing  Goal: Optimal Wound Healing  Outcome: Ongoing, Progressing     Problem: Fall Injury Risk  Goal: Absence of Fall and Fall-Related Injury  Outcome: Ongoing, Progressing

## 2022-12-12 NOTE — HPI
HISTORY OF PRESENT ILLNESS: Babatunde Singh is a 72 y.o. male initially seen as an inpatient, who presented with severe chronic ischemic rest pain to the right foot, who become nonambulatory, also a recent diagnosis of a right renal mass.   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   He was initially seen by interventional cardiology who felt there were no revascularization options and suggested either palliative care or above-knee amputation.     He underwent a right axillary to bi- profunda bypass 10/06/2022.   Postoperatively, his ABIs went from 0 bilaterally preoperatively to 0.69 right, 0.74 left.  His severe ischemic rest pain resolved completely.  He was discharged to skilled nursing and is to be discharged tomorrow.  States he is now ambulating without assistance.     During his evaluation he is also noted to have significant full-thickness ulceration of his right calf.  He is receiving wound care at [facility]     Due to the limited runoff of this bypass he was started on Xarelto and aspirin

## 2022-12-12 NOTE — ASSESSMENT & PLAN NOTE
Babatunde Singh is a 72 y.o M with severe chronic ischemic rest pain to the right foot. Patient is now s/p RLE wound washout and wound vac placement.     Plan:   - HH ordered, case management sent forms for auth 12/10  - regular diet   - on xarelto   - PRN pain control   - continue home meds   - WV will be changed when home WV arrives    Dispo: pending insurance authorization.

## 2022-12-12 NOTE — PROGRESS NOTES
[facility]  Vascular Surgery  Progress Note    Patient Name: Babatunde Singh  MRN: [id number]  Admission Date: 12/8/2022  Primary Care Provider: Esdras Ly MD    Subjective:     Interval History:   Overnight events: NAEON    Vitals: VSS, AF    Need to know: S/P wound debridement 12/08, awaiting wound vac authorization for dispo          Post-Op Info:  Procedure(s) (LRB):  DEBRIDEMENT, WOUND (Right)  APPLICATION, WOUND VAC (Right)   4 Days Post-Op       Medications:  Continuous Infusions:  Scheduled Meds:   aspirin  81 mg Oral Daily    atorvastatin  80 mg Oral Daily    gabapentin  800 mg Oral TID    pantoprazole  40 mg Oral Daily    rivaroxaban  20 mg Oral Daily with dinner     PRN Meds:acetaminophen, acetaminophen, hydrALAZINE, influenza 65up-adj, LIDOcaine (PF) 10 mg/ml (1%), melatonin, ondansetron, oxyCODONE, sodium chloride 0.9%     Objective:     Vital Signs (Most Recent):  Temp: 97.4 °F (36.3 °C) (12/12/22 0329)  Pulse: 86 (12/12/22 0329)  Resp: 16 (12/12/22 0329)  BP: (!) 124/58 (12/12/22 0329)  SpO2: (!) 94 % (12/12/22 0329)   Vital Signs (24h Range):  Temp:  [97.4 °F (36.3 °C)-98.6 °F (37 °C)] 97.4 °F (36.3 °C)  Pulse:  [77-88] 86  Resp:  [14-20] 16  SpO2:  [94 %-98 %] 94 %  BP: (124-163)/(58-69) 124/58         Physical Exam  Vitals reviewed.   Constitutional:       General: He is not in acute distress.     Appearance: Normal appearance. He is not ill-appearing.   Pulmonary:      Effort: Pulmonary effort is normal. No respiratory distress.   Abdominal:      General: Abdomen is flat.      Palpations: Abdomen is soft.   Skin:     General: Skin is warm and dry.   Neurological:      Mental Status: He is alert and oriented to person, place, and time.       Significant Labs:  CBC:   Recent Labs   Lab 12/11/22  0545   WBC 9.90   RBC 3.47*   HGB 8.0*   HCT 27.1*      MCV 78*   MCH 23.1*   MCHC 29.5*     CMP:   Recent Labs   Lab 12/11/22  0545   GLU 95   CALCIUM 8.9      K 4.0   CO2 24   CL  109   BUN 10   CREATININE 0.8     All pertinent labs from the last 24 hours have been reviewed.    Significant Diagnostics:  I have reviewed and interpreted all pertinent imaging results/findings within the past 24 hours.    Assessment/Plan:     * Leg wound, right, subsequent encounter  Babatunde Singh is a 72 y.o M with severe chronic ischemic rest pain to the right foot. Patient is now s/p RLE wound washout and wound vac placement.     Plan:   - HH ordered, case management sent forms for auth 12/10  - regular diet   - on xarelto   - PRN pain control   - continue home meds   - WV will be changed when home WV arrives    Dispo: pending insurance authorization.           MARIZA SOUTH MD  Vascular Surgery  [facility]

## 2022-12-13 ENCOUNTER — HOSPITAL ENCOUNTER (EMERGENCY)
Facility: HOSPITAL | Age: 72
Discharge: HOME | End: 2022-12-14
Attending: STUDENT IN AN ORGANIZED HEALTH CARE EDUCATION/TRAINING PROGRAM
Payer: MEDICARE

## 2022-12-13 VITALS
OXYGEN SATURATION: 99 % | SYSTOLIC BLOOD PRESSURE: 142 MMHG | DIASTOLIC BLOOD PRESSURE: 65 MMHG | TEMPERATURE: 99 F | HEIGHT: 66 IN | BODY MASS INDEX: 22.66 KG/M2 | RESPIRATION RATE: 14 BRPM | HEART RATE: 85 BPM | WEIGHT: 141 LBS

## 2022-12-13 DIAGNOSIS — Z51.89 VISIT FOR WOUND CHECK: Primary | ICD-10-CM

## 2022-12-13 DIAGNOSIS — Z46.89 ENCOUNTER FOR MANAGEMENT OF WOUND VAC: ICD-10-CM

## 2022-12-13 LAB
MAGNESIUM SERPL-MCNC: 2 MG/DL (ref 1.6–2.6)
PHOSPHATE SERPL-MCNC: 3.8 MG/DL (ref 2.7–4.5)

## 2022-12-13 PROCEDURE — 83735 ASSAY OF MAGNESIUM: CPT | Performed by: STUDENT IN AN ORGANIZED HEALTH CARE EDUCATION/TRAINING PROGRAM

## 2022-12-13 PROCEDURE — 25000003 PHARM REV CODE 250: Performed by: STUDENT IN AN ORGANIZED HEALTH CARE EDUCATION/TRAINING PROGRAM

## 2022-12-13 PROCEDURE — 84100 ASSAY OF PHOSPHORUS: CPT | Performed by: STUDENT IN AN ORGANIZED HEALTH CARE EDUCATION/TRAINING PROGRAM

## 2022-12-13 PROCEDURE — 25000003 PHARM REV CODE 250

## 2022-12-13 PROCEDURE — 36415 COLL VENOUS BLD VENIPUNCTURE: CPT | Performed by: STUDENT IN AN ORGANIZED HEALTH CARE EDUCATION/TRAINING PROGRAM

## 2022-12-13 PROCEDURE — 99281 EMR DPT VST MAYX REQ PHY/QHP: CPT

## 2022-12-13 PROCEDURE — 99282 PR EMERGENCY DEPT VISIT,LEVEL II: ICD-10-PCS | Mod: ,,, | Performed by: STUDENT IN AN ORGANIZED HEALTH CARE EDUCATION/TRAINING PROGRAM

## 2022-12-13 PROCEDURE — 99282 EMERGENCY DEPT VISIT SF MDM: CPT | Mod: ,,, | Performed by: STUDENT IN AN ORGANIZED HEALTH CARE EDUCATION/TRAINING PROGRAM

## 2022-12-13 RX ADMIN — GABAPENTIN 800 MG: 400 CAPSULE ORAL at 02:12

## 2022-12-13 RX ADMIN — PANTOPRAZOLE SODIUM 40 MG: 40 TABLET, DELAYED RELEASE ORAL at 08:12

## 2022-12-13 RX ADMIN — GABAPENTIN 800 MG: 400 CAPSULE ORAL at 08:12

## 2022-12-13 RX ADMIN — ASPIRIN 81 MG: 81 TABLET, CHEWABLE ORAL at 08:12

## 2022-12-13 RX ADMIN — ATORVASTATIN CALCIUM 80 MG: 40 TABLET, FILM COATED ORAL at 08:12

## 2022-12-13 NOTE — HOSPITAL COURSE
Babatunde Singh is a 72 y.o M with severe chronic ischemic rest pain to the right foot. Patient is now s/p RLE wound washout and wound vac placement on 12/9. The patient did not have any complications post-operatively.  He stayed on the floor over the weekend waiting for wound vac delivery.  The wound vac was delivered on the afternoon of 12/13 and the patient was deemed ready for discharge.

## 2022-12-13 NOTE — PLAN OF CARE
[facility]      HOME HEALTH ORDERS  FACE TO FACE ENCOUNTER    Patient Name: Babatunde Singh  YOB: 1950    PCP: Esdras Ly MD   PCP Address: [address] / [place] [address]  PCP Phone Number: [phone]  PCP Fax: [phone]    Encounter Date: 12/6/22    Admit to Home Health    Diagnoses:  Active Hospital Problems    Diagnosis  POA    *Leg wound, right, subsequent encounter [S81.801D]  Not Applicable    PAD (peripheral artery disease) [I73.9]  Yes      Resolved Hospital Problems   No resolved problems to display.       Follow Up Appointments:  Future Appointments   Date Time Provider Department Center   12/16/2022  8:45 AM [facility]   12/16/2022 10:30 AM [facility]   12/20/2022  8:30 AM Sherry Davis PA-C [facility] WOUND [facility]   1/24/2023  9:00 AM VASCULAR, LAB [facility] VASCLAB [facility]   1/24/2023 10:00 AM VASCULAR, LAB [facility] VASCLAB [facility]   1/24/2023 10:45 AM LEI Ortiz III, MD [facility]       Allergies:Review of patient's allergies indicates:  No Known Allergies    Medications: Review discharge medications with patient and family and provide education.    Current Facility-Administered Medications   Medication Dose Route Frequency Provider Last Rate Last Admin    acetaminophen tablet 650 mg  650 mg Oral Q8H PRN Nora Barrios MD        acetaminophen tablet 650 mg  650 mg Oral Q4H PRN Nora Barrios MD   650 mg at 12/09/22 1923    aspirin chewable tablet 81 mg  81 mg Oral Daily Leland Murillo MD   81 mg at 12/13/22 0824    atorvastatin tablet 80 mg  80 mg Oral Daily Leland Murillo MD   80 mg at 12/13/22 0824    gabapentin capsule 800 mg  800 mg Oral TID Arti Lyons MD   800 mg at 12/13/22 1427    hydrALAZINE injection 10 mg  10 mg Intravenous Q4H PRN Nora Barrios MD        influenza 65up-adj (QUADRIVALENT ADJUVANTED PF) vaccine 0.5 mL  0.5 mL Intramuscular vaccine x 1 dose W  SUNNY Ortiz III, MD        LIDOcaine (PF) 10 mg/ml (1%) injection 10 mg  1 mL Intradermal Once PRN Nora Barrios MD        melatonin tablet 6 mg  6 mg Oral Nightly PRN Nora Barrios MD        ondansetron disintegrating tablet 8 mg  8 mg Oral Q8H PRN Nora Barrios MD        oxyCODONE immediate release tablet 5 mg  5 mg Oral Q4H PRN Nora Barrios MD   5 mg at 12/12/22 2044    pantoprazole EC tablet 40 mg  40 mg Oral Daily Nora Barrios MD   40 mg at 12/13/22 0824    rivaroxaban tablet 20 mg  20 mg Oral Daily with dinner Leland Murillo MD   20 mg at 12/12/22 1735    sodium chloride 0.9% flush 10 mL  10 mL Intravenous PRN Nora Barrios MD         Current Discharge Medication List        CONTINUE these medications which have NOT CHANGED    Details   aspirin 81 MG Chew Chew and swallow 1 tablet (81 mg total) by mouth once daily.  Qty: 30 tablet, Refills: 0      atorvastatin (LIPITOR) 80 MG tablet Take 1 tablet (80 mg total) by mouth once daily.  Qty: 30 tablet, Refills: 0    Associated Diagnoses: Hyperlipidemia, unspecified hyperlipidemia type      ezetimibe (ZETIA) 10 mg tablet Take 1 tablet (10 mg total) by mouth once daily.  Qty: 30 tablet, Refills: 0      gabapentin (NEURONTIN) 800 MG tablet Take 1 tablet (800 mg total) by mouth 3 (three) times daily.  Qty: 90 tablet, Refills: 11    Associated Diagnoses: Peripheral arterial disease      HYDROcodone-acetaminophen (NORCO) 5-325 mg per tablet Take 1 tablet by mouth every 6 (six) hours as needed for Pain.  Qty: 35 tablet, Refills: 0    Comments: Quantity prescribed more than 7 day supply? Yes, quantity medically necessary      losartan-hydrochlorothiazide 50-12.5 mg (HYZAAR) 50-12.5 mg per tablet Take 1 tablet by mouth once daily.  Qty: 30 tablet, Refills: 0    Comments: .  Associated Diagnoses: Primary hypertension      multivitamin (THERAGRAN) per tablet Take 1 tablet by mouth once daily.      pantoprazole (PROTONIX) 40 MG tablet  Take 1 tablet (40 mg total) by mouth once daily.  Qty: 30 tablet, Refills: 0      rivaroxaban (XARELTO) 20 mg Tab Take 1 tablet (20 mg total) by mouth daily with dinner or evening meal.  Qty: 30 tablet, Refills: 0      acetaminophen (TYLENOL) 325 MG tablet Take 2 tablets (650 mg total) by mouth every 8 (eight) hours as needed.  Qty: 20 tablet, Refills: 0               I have seen and examined this patient within the last 30 days. My clinical findings that support the need for the home health skilled services and home bound status are the following:no   Requiring assistive device to leave home due to unsteady gait caused by  Surgery.     Diet:   cardiac diet    Labs:      Referrals/ Consults      Activities:   activity as tolerated    Nursing:   Agency to admit patient within 24 hours of hospital discharge unless specified on physician order or at patient request    SN to complete comprehensive assessment including routine vital signs. Instruct on disease process and s/s of complications to report to MD. Review/verify medication list sent home with the patient at time of discharge  and instruct patient/caregiver as needed. Frequency may be adjusted depending on start of care date.     Skilled nurse to perform up to 3 visits PRN for symptoms related to diagnosis    Notify MD if SBP > 160 or < 90; DBP > 90 or < 50; HR > 120 or < 50; Temp > 101; O2 < 88%; Other:       Ok to schedule additional visits based on staff availability and patient request on consecutive days within the home health episode.    When multiple disciplines ordered:    Start of Care occurs on Sunday - Wednesday schedule remaining discipline evaluations as ordered on separate consecutive days following the start of care.    Thursday SOC -schedule subsequent evaluations Friday and Monday the following week.     Friday - Saturday SOC - schedule subsequent discipline evaluations on consecutive days starting Monday of the following week.    For all  post-discharge communication and subsequent orders please contact patient's primary care physician. If unable to reach primary care physician or do not receive response within 30 minutes, please contact [phone] for clinical staff order clarification    Miscellaneous       Home Health Aide:  Nursing Three times weekly    Wound Care Orders  yes:  Wound Vac:   Location:  RLE         Dressing changes every Monday, Wednesday and Friday.        ET Consult    I certify that this patient is confined to his home and needs intermittent skilled nursing care.

## 2022-12-13 NOTE — ASSESSMENT & PLAN NOTE
Babtaunde Singh is a 72 y.o M with severe chronic ischemic rest pain to the right foot. Patient is now s/p RLE wound washout and wound vac placement.     Plan:   - HH ordered, case management sent forms for auth 12/10  - regular diet   - on xarelto   - PRN pain control   - continue home meds       Dispo: will DC when pt has WV

## 2022-12-13 NOTE — ASSESSMENT & PLAN NOTE
Babatunde Singh is a 72 y.o M with severe chronic ischemic rest pain to the right foot. Patient is now s/p RLE wound washout and wound vac placement.     Plan:   - HH ordered, case management sent forms for auth 12/10  - regular diet   - on xarelto   - PRN pain control   - continue home meds   - W[place] will be changed when home WV arrives    Dispo: will DC when pt has WV

## 2022-12-13 NOTE — DISCHARGE SUMMARY
[facility]  Vascular Surgery  Discharge Summary      Patient Name: Babatunde Singh  MRN: [id number]  Admission Date: 12/8/2022  Hospital Length of Stay: 5 days  Discharge Date and Time:  12/13/2022 2:13 PM  Attending Physician: LEI Ortiz III, MD   Discharging Provider: Lisa Tang NP  Primary Care Provider: Esdras Ly MD    HPI:   HISTORY OF PRESENT ILLNESS: Babatunde Singh is a 72 y.o. male initially seen as an inpatient, who presented with severe chronic ischemic rest pain to the right foot, who become nonambulatory, also a recent diagnosis of a right renal mass.   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   He was initially seen by interventional cardiology who felt there were no revascularization options and suggested either palliative care or above-knee amputation.     He underwent a right axillary to bi- profunda bypass 10/06/2022.   Postoperatively, his ABIs went from 0 bilaterally preoperatively to 0.69 right, 0.74 left.  His severe ischemic rest pain resolved completely.  He was discharged to skilled nursing and is to be discharged tomorrow.  States he is now ambulating without assistance.     During his evaluation he is also noted to have significant full-thickness ulceration of his right calf.  He is receiving wound care at SNF     Due to the limited runoff of this bypass he was started on Xarelto and aspirin      Procedure(s) (LRB):  DEBRIDEMENT, WOUND (Right)  APPLICATION, WOUND VAC (Right)     Hospital Course: Babatunde Singh is a 72 y.o M with severe chronic ischemic rest pain to the right foot. Patient is now s/p RLE wound washout and wound vac placement on 12/9. The patient did not have any complications post-operatively.  He stayed on the floor over the weekend waiting for wound vac delivery.  The wound  vac was delivered on the afternoon of 12/13 and the patient was deemed ready for discharge.           Goals of Care Treatment Preferences:  Code Status: Full Code      Consults:     Significant Diagnostic Studies: Labs:   CMP   Recent Labs   Lab 12/12/22  0907      K 3.9      CO2 24   *   BUN 12   CREATININE 0.8   CALCIUM 8.9   ANIONGAP 7*    and CBC   Recent Labs   Lab 12/12/22  0907   WBC 10.74   HGB 8.4*   HCT 28.2*        Physical Exam  Constitutional:       Appearance: Normal appearance.   HENT:      Head: Normocephalic and atraumatic.      Nose: Nose normal.      Mouth/Throat:      Mouth: Mucous membranes are dry.   Cardiovascular:      Rate and Rhythm: Normal rate and regular rhythm.      Pulses: Normal pulses.   Pulmonary:      Effort: Pulmonary effort is normal.   Abdominal:      General: Abdomen is flat.   Musculoskeletal:         General: Normal range of motion.      Cervical back: Normal range of motion.   Skin:     General: Skin is warm and dry.      Capillary Refill: Capillary refill takes less than 2 seconds.      Comments: Wound vac to right lower extremity    Neurological:      General: No focal deficit present.      Mental Status: He is alert.   Psychiatric:         Mood and Affect: Mood normal.       Pending Diagnostic Studies:     None        Final Active Diagnoses:    Diagnosis Date Noted POA    PRINCIPAL PROBLEM:  Leg wound, right, subsequent encounter [S81.801D] 12/10/2022 Not Applicable    PAD (peripheral artery disease) [I73.9] 08/25/2022 Yes      Problems Resolved During this Admission:      Discharged Condition: good    Disposition: Home or Self Care    Follow Up:      Patient Instructions:      Diet Cardiac     Notify your health care provider if you experience any of the following:  temperature >100.4     Notify your health care provider if you experience any of the following:  severe uncontrolled pain     Notify your health care provider if you experience any  of the following:  redness, tenderness, or signs of infection (pain, swelling, redness, odor or green/yellow discharge around incision site)     Notify your health care provider if you experience any of the following:  persistent dizziness, light-headedness, or visual disturbances     Change dressing (specify)   Order Comments: Pt daughter (RN) to change wound vac three times weekly.  Please call the clinic if any issues arise: [phone]     Activity as tolerated     Medications:  Reconciled Home Medications:      Medication List      CONTINUE taking these medications    acetaminophen 325 MG tablet  Commonly known as: TYLENOL  Take 2 tablets (650 mg total) by mouth every 8 (eight) hours as needed.     aspirin 81 MG Chew  Chew and swallow 1 tablet (81 mg total) by mouth once daily.     atorvastatin 80 MG tablet  Commonly known as: LIPITOR  Take 1 tablet (80 mg total) by mouth once daily.     ezetimibe 10 mg tablet  Commonly known as: ZETIA  Take 1 tablet (10 mg total) by mouth once daily.     gabapentin 800 MG tablet  Commonly known as: NEURONTIN  Take 1 tablet (800 mg total) by mouth 3 (three) times daily.     HYDROcodone-acetaminophen 5-325 mg per tablet  Commonly known as: NORCO  Take 1 tablet by mouth every 6 (six) hours as needed for Pain.     losartan-hydrochlorothiazide 50-12.5 mg 50-12.5 mg per tablet  Commonly known as: HYZAAR  Take 1 tablet by mouth once daily.     multivitamin per tablet  Commonly known as: THERAGRAN  Take 1 tablet by mouth once daily.     pantoprazole 40 MG tablet  Commonly known as: PROTONIX  Take 1 tablet (40 mg total) by mouth once daily.     rivaroxaban 20 mg Tab  Commonly known as: XARELTO  Take 1 tablet (20 mg total) by mouth daily with dinner or evening meal.            Lisa Tang NP  Vascular Surgery  [facility]   Metronidazole Pregnancy And Lactation Text: This medication is Pregnancy Category B and considered safe during pregnancy.  It is also excreted in breast milk. Cellcept Pregnancy And Lactation Text: This medication is Pregnancy Category D and isn't considered safe during pregnancy. It is unknown if this medication is excreted in breast milk. Glycopyrrolate Counseling:  I discussed with the patient the risks of glycopyrrolate including but not limited to skin rash, drowsiness, dry mouth, difficulty urinating, and blurred vision. Dapsone Pregnancy And Lactation Text: This medication is Pregnancy Category C and is not considered safe during pregnancy or breast feeding. Infliximab Counseling:  I discussed with the patient the risks of infliximab including but not limited to myelosuppression, immunosuppression, autoimmune hepatitis, demyelinating diseases, lymphoma, and serious infections.  The patient understands that monitoring is required including a PPD at baseline and must alert us or the primary physician if symptoms of infection or other concerning signs are noted. Hydroxychloroquine Pregnancy And Lactation Text: This medication has been shown to cause fetal harm but it isn't assigned a Pregnancy Risk Category. There are small amounts excreted in breast milk. Minocycline Pregnancy And Lactation Text: This medication is Pregnancy Category D and not consider safe during pregnancy. It is also excreted in breast milk. Fluconazole Pregnancy And Lactation Text: This medication is Pregnancy Category C and it isn't know if it is safe during pregnancy. It is also excreted in breast milk. Metronidazole Counseling:  I discussed with the patient the risks of metronidazole including but not limited to seizures, nausea/vomiting, a metallic taste in the mouth, nausea/vomiting and severe allergy. Thalidomide Counseling: I discussed with the patient the risks of thalidomide including but not limited to birth defects, anxiety, weakness, chest pain, dizziness, cough and severe allergy. Dapsone Counseling: I discussed with the patient the risks of dapsone including but not limited to hemolytic anemia, agranulocytosis, rashes, methemoglobinemia, kidney failure, peripheral neuropathy, headaches, GI upset, and liver toxicity.  Patients who start dapsone require monitoring including baseline LFTs and weekly CBCs for the first month, then every month thereafter.  The patient verbalized understanding of the proper use and possible adverse effects of dapsone.  All of the patient's questions and concerns were addressed. Xolair Counseling:  Patient informed of potential adverse effects including but not limited to fever, muscle aches, rash and allergic reactions.  The patient verbalized understanding of the proper use and possible adverse effects of Xolair.  All of the patient's questions and concerns were addressed. High Dose Vitamin A Pregnancy And Lactation Text: High dose vitamin A therapy is contraindicated during pregnancy and breast feeding. Dupixent Pregnancy And Lactation Text: This medication likely crosses the placenta but the risk for the fetus is uncertain. This medication is excreted in breast milk. Erythromycin Counseling:  I discussed with the patient the risks of erythromycin including but not limited to GI upset, allergic reaction, drug rash, diarrhea, increase in liver enzymes, and yeast infections. Erivedge Counseling- I discussed with the patient the risks of Erivedge including but not limited to nausea, vomiting, diarrhea, constipation, weight loss, changes in the sense of taste, decreased appetite, muscle spasms, and hair loss.  The patient verbalized understanding of the proper use and possible adverse effects of Erivedge.  All of the patient's questions and concerns were addressed. Itraconazole Counseling:  I discussed with the patient the risks of itraconazole including but not limited to liver damage, nausea/vomiting, neuropathy, and severe allergy.  The patient understands that this medication is best absorbed when taken with acidic beverages such as non-diet cola or ginger ale.  The patient understands that monitoring is required including baseline LFTs and repeat LFTs at intervals.  The patient understands that they are to contact us or the primary physician if concerning signs are noted. Stelara Counseling:  I discussed with the patient the risks of ustekinumab including but not limited to immunosuppression, malignancy, posterior leukoencephalopathy syndrome, and serious infections.  The patient understands that monitoring is required including a PPD at baseline and must alert us or the primary physician if symptoms of infection or other concerning signs are noted. Bexarotene Pregnancy And Lactation Text: This medication is Pregnancy Category X and should not be given to women who are pregnant or may become pregnant. This medication should not be used if you are breast feeding. Solaraze Counseling:  I discussed with the patient the risks of Solaraze including but not limited to erythema, scaling, itching, weeping, crusting, and pain. Protopic Pregnancy And Lactation Text: This medication is Pregnancy Category C. It is unknown if this medication is excreted in breast milk when applied topically. Cephalexin Pregnancy And Lactation Text: This medication is Pregnancy Category B and considered safe during pregnancy.  It is also excreted in breast milk but can be used safely for shorter doses. Spironolactone Pregnancy And Lactation Text: This medication can cause feminization of the male fetus and should be avoided during pregnancy. The active metabolite is also found in breast milk. Topical Sulfur Applications Pregnancy And Lactation Text: This medication is Pregnancy Category C and has an unknown safety profile during pregnancy. It is unknown if this topical medication is excreted in breast milk. Cimzia Counseling:  I discussed with the patient the risks of Cimzia including but not limited to immunosuppression, allergic reactions and infections.  The patient understands that monitoring is required including a PPD at baseline and must alert us or the primary physician if symptoms of infection or other concerning signs are noted. Albendazole Counseling:  I discussed with the patient the risks of albendazole including but not limited to cytopenia, kidney damage, nausea/vomiting and severe allergy.  The patient understands that this medication is being used in an off-label manner. Ivermectin Counseling:  Patient instructed to take medication on an empty stomach with a full glass of water.  Patient informed of potential adverse effects including but not limited to nausea, diarrhea, dizziness, itching, and swelling of the extremities or lymph nodes.  The patient verbalized understanding of the proper use and possible adverse effects of ivermectin.  All of the patient's questions and concerns were addressed. Prednisone Counseling:  I discussed with the patient the risks of prolonged use of prednisone including but not limited to weight gain, insomnia, osteoporosis, mood changes, diabetes, susceptibility to infection, glaucoma and high blood pressure.  In cases where prednisone use is prolonged, patients should be monitored with blood pressure checks, serum glucose levels and an eye exam.  Additionally, the patient may need to be placed on GI prophylaxis, PCP prophylaxis, and calcium and vitamin D supplementation and/or a bisphosphonate.  The patient verbalized understanding of the proper use and the possible adverse effects of prednisone.  All of the patient's questions and concerns were addressed. Colchicine Pregnancy And Lactation Text: This medication is Pregnancy Category C and isn't considered safe during pregnancy. It is excreted in breast milk. Enbrel Counseling:  I discussed with the patient the risks of etanercept including but not limited to myelosuppression, immunosuppression, autoimmune hepatitis, demyelinating diseases, lymphoma, and infections.  The patient understands that monitoring is required including a PPD at baseline and must alert us or the primary physician if symptoms of infection or other concerning signs are noted. Solaraze Pregnancy And Lactation Text: This medication is Pregnancy Category B and is considered safe. There is some data to suggest avoiding during the third trimester. It is unknown if this medication is excreted in breast milk. Carac Pregnancy And Lactation Text: This medication is Pregnancy Category X and contraindicated in pregnancy and in women who may become pregnant. It is unknown if this medication is excreted in breast milk. Rifampin Pregnancy And Lactation Text: This medication is Pregnancy Category C and it isn't know if it is safe during pregnancy. It is also excreted in breast milk and should not be used if you are breast feeding. Erivedge Pregnancy And Lactation Text: This medication is Pregnancy Category X and is absolutely contraindicated during pregnancy. It is unknown if it is excreted in breast milk. 5-Fu Counseling: 5-Fluorouracil Counseling:  I discussed with the patient the risks of 5-fluorouracil including but not limited to erythema, scaling, itching, weeping, crusting, and pain. Birth Control Pills Counseling: Birth Control Pill Counseling: I discussed with the patient the potential side effects of OCPs including but not limited to increased risk of stroke, heart attack, thrombophlebitis, deep venous thrombosis, hepatic adenomas, breast changes, GI upset, headaches, and depression.  The patient verbalized understanding of the proper use and possible adverse effects of OCPs. All of the patient's questions and concerns were addressed. Isotretinoin Pregnancy And Lactation Text: This medication is Pregnancy Category X and is considered extremely dangerous during pregnancy. It is unknown if it is excreted in breast milk. Siliq Pregnancy And Lactation Text: The risk during pregnancy and breastfeeding is uncertain with this medication. Prednisone Pregnancy And Lactation Text: This medication is Pregnancy Category C and it isn't know if it is safe during pregnancy. This medication is excreted in breast milk. Ivermectin Pregnancy And Lactation Text: This medication is Pregnancy Category C and it isn't known if it is safe during pregnancy. It is also excreted in breast milk. Quinolones Counseling:  I discussed with the patient the risks of fluoroquinolones including but not limited to GI upset, allergic reaction, drug rash, diarrhea, dizziness, photosensitivity, yeast infections, liver function test abnormalities, tendonitis/tendon rupture. Siliq Counseling:  I discussed with the patient the risks of Siliq including but not limited to new or worsening depression, suicidal thoughts and behavior, immunosuppression, malignancy, posterior leukoencephalopathy syndrome, and serious infections.  The patient understands that monitoring is required including a PPD at baseline and must alert us or the primary physician if symptoms of infection or other concerning signs are noted. There is also a special program designed to monitor depression which is required with Siliq. Acitretin Counseling:  I discussed with the patient the risks of acitretin including but not limited to hair loss, dry lips/skin/eyes, liver damage, hyperlipidemia, depression/suicidal ideation, photosensitivity.  Serious rare side effects can include but are not limited to pancreatitis, pseudotumor cerebri, bony changes, clot formation/stroke/heart attack.  Patient understands that alcohol is contraindicated since it can result in liver toxicity and significantly prolong the elimination of the drug by many years. Hydroxyzine Pregnancy And Lactation Text: This medication is not safe during pregnancy and should not be taken. It is also excreted in breast milk and breast feeding isn't recommended. Doxepin Pregnancy And Lactation Text: This medication is Pregnancy Category C and it isn't known if it is safe during pregnancy. It is also excreted in breast milk and breast feeding isn't recommended. Simponi Counseling:  I discussed with the patient the risks of golimumab including but not limited to myelosuppression, immunosuppression, autoimmune hepatitis, demyelinating diseases, lymphoma, and serious infections.  The patient understands that monitoring is required including a PPD at baseline and must alert us or the primary physician if symptoms of infection or other concerning signs are noted. Acitretin Pregnancy And Lactation Text: This medication is Pregnancy Category X and should not be given to women who are pregnant or may become pregnant in the future. This medication is excreted in breast milk. Zyclara Pregnancy And Lactation Text: This medication is Pregnancy Category C. It is unknown if this medication is excreted in breast milk. Cellcept Counseling:  I discussed with the patient the risks of mycophenolate mofetil including but not limited to infection/immunosuppression, GI upset, hypokalemia, hypercholesterolemia, bone marrow suppression, lymphoproliferative disorders, malignancy, GI ulceration/bleed/perforation, colitis, interstitial lung disease, kidney failure, progressive multifocal leukoencephalopathy, and birth defects.  The patient understands that monitoring is required including a baseline creatinine and regular CBC testing. In addition, patient must alert us immediately if symptoms of infection or other concerning signs are noted. Stelara Pregnancy And Lactation Text: This medication is Pregnancy Category B and is considered safe during pregnancy. It is unknown if this medication is excreted in breast milk. Hydroquinone Counseling:  Patient advised that medication may result in skin irritation, lightening (hypopigmentation), dryness, and burning.  In the event of skin irritation, the patient was advised to reduce the amount of the drug applied or use it less frequently.  Rarely, spots that are treated with hydroquinone can become darker (pseudoochronosis).  Should this occur, patient instructed to stop medication and call the office. The patient verbalized understanding of the proper use and possible adverse effects of hydroquinone.  All of the patient's questions and concerns were addressed. Azathioprine Counseling:  I discussed with the patient the risks of azathioprine including but not limited to myelosuppression, immunosuppression, hepatotoxicity, lymphoma, and infections.  The patient understands that monitoring is required including baseline LFTs, Creatinine, possible TPMP genotyping and weekly CBCs for the first month and then every 2 weeks thereafter.  The patient verbalized understanding of the proper use and possible adverse effects of azathioprine.  All of the patient's questions and concerns were addressed. Cyclosporine Counseling:  I discussed with the patient the risks of cyclosporine including but not limited to hypertension, gingival hyperplasia,myelosuppression, immunosuppression, liver damage, kidney damage, neurotoxicity, lymphoma, and serious infections. The patient understands that monitoring is required including baseline blood pressure, CBC, CMP, lipid panel and uric acid, and then 1-2 times monthly CMP and blood pressure. Ketoconazole Pregnancy And Lactation Text: This medication is Pregnancy Category C and it isn't know if it is safe during pregnancy. It is also excreted in breast milk and breast feeding isn't recommended. Methotrexate Pregnancy And Lactation Text: This medication is Pregnancy Category X and is known to cause fetal harm. This medication is excreted in breast milk. Arava Counseling:  Patient counseled regarding adverse effects of Arava including but not limited to nausea, vomiting, abnormalities in liver function tests. Patients may develop mouth sores, rash, diarrhea, and abnormalities in blood counts. The patient understands that monitoring is required including LFTs and blood counts.  There is a rare possibility of scarring of the liver and lung problems that can occur when taking methotrexate. Persistent nausea, loss of appetite, pale stools, dark urine, cough, and shortness of breath should be reported immediately. Patient advised to discontinue Arava treatment and consult with a physician prior to attempting conception. The patient will have to undergo a treatment to eliminate Arava from the body prior to conception. Protopic Counseling: Patient may experience a mild burning sensation during topical application. Protopic is not approved in children less than 2 years of age. There have been case reports of hematologic and skin malignancies in patients using topical calcineurin inhibitors although causality is questionable. Tremfya Counseling: I discussed with the patient the risks of guselkumab including but not limited to immunosuppression, serious infections, worsening of inflammatory bowel disease and drug reactions.  The patient understands that monitoring is required including a PPD at baseline and must alert us or the primary physician if symptoms of infection or other concerning signs are noted. Oxybutynin Counseling:  I discussed with the patient the risks of oxybutynin including but not limited to skin rash, drowsiness, dry mouth, difficulty urinating, and blurred vision. Cephalexin Counseling: I counseled the patient regarding use of cephalexin as an antibiotic for prophylactic and/or therapeutic purposes. Cephalexin (commonly prescribed under brand name Keflex) is a cephalosporin antibiotic which is active against numerous classes of bacteria, including most skin bacteria. Side effects may include nausea, diarrhea, gastrointestinal upset, rash, hives, yeast infections, and in rare cases, hepatitis, kidney disease, seizures, fever, confusion, neurologic symptoms, and others. Patients with severe allergies to penicillin medications are cautioned that there is about a 10% incidence of cross-reactivity with cephalosporins. When possible, patients with penicillin allergies should use alternatives to cephalosporins for antibiotic therapy. Tazorac Counseling:  Patient advised that medication is irritating and drying.  Patient may need to apply sparingly and wash off after an hour before eventually leaving it on overnight.  The patient verbalized understanding of the proper use and possible adverse effects of tazorac.  All of the patient's questions and concerns were addressed. Birth Control Pills Pregnancy And Lactation Text: This medication should be avoided if pregnant and for the first 30 days post-partum. Isotretinoin Counseling: Patient should get monthly blood tests, not donate blood, not drive at night if vision affected, not share medication, and not undergo elective surgery for 6 months after tx completed. Side effects reviewed, pt to contact office should one occur. Doxepin Counseling:  Patient advised that the medication is sedating and not to drive a car after taking this medication. Patient informed of potential adverse effects including but not limited to dry mouth, urinary retention, and blurry vision.  The patient verbalized understanding of the proper use and possible adverse effects of doxepin.  All of the patient's questions and concerns were addressed. Eucrisa Pregnancy And Lactation Text: This medication has not been assigned a Pregnancy Risk Category but animal studies failed to show danger with the topical medication. It is unknown if the medication is excreted in breast milk. Drysol Pregnancy And Lactation Text: This medication is considered safe during pregnancy and breast feeding. Eucrisa Counseling: Patient may experience a mild burning sensation during topical application. Eucrisa is not approved in children less than 2 years of age. Dupixent Counseling: I discussed with the patient the risks of dupilumab including but not limited to eye infection and irritation, cold sores, injection site reactions, worsening of asthma, allergic reactions and increased risk of parasitic infection.  Live vaccines should be avoided while taking dupilumab. Dupilumab will also interact with certain medications such as warfarin and cyclosporine. The patient understands that monitoring is required and they must alert us or the primary physician if symptoms of infection or other concerning signs are noted. Cosentyx Counseling:  I discussed with the patient the risks of Cosentyx including but not limited to worsening of Crohn's disease, immunosuppression, allergic reactions and infections.  The patient understands that monitoring is required including a PPD at baseline and must alert us or the primary physician if symptoms of infection or other concerning signs are noted. Ketoconazole Counseling:   Patient counseled regarding improving absorption with orange juice.  Adverse effects include but are not limited to breast enlargement, headache, diarrhea, nausea, upset stomach, liver function test abnormalities, taste disturbance, and stomach pain.  There is a rare possibility of liver failure that can occur when taking ketoconazole. The patient understands that monitoring of LFTs may be required, especially at baseline. The patient verbalized understanding of the proper use and possible adverse effects of ketoconazole.  All of the patient's questions and concerns were addressed. Erythromycin Pregnancy And Lactation Text: This medication is Pregnancy Category B and is considered safe during pregnancy. It is also excreted in breast milk. Otezla Counseling: The side effects of Otezla were discussed with the patient, including but not limited to worsening or new depression, weight loss, diarrhea, nausea, upper respiratory tract infection, and headache. Patient instructed to call the office should any adverse effect occur.  The patient verbalized understanding of the proper use and possible adverse effects of Otezla.  All the patient's questions and concerns were addressed. Topical Sulfur Applications Counseling: Topical Sulfur Counseling: Patient counseled that this medication may cause skin irritation or allergic reactions.  In the event of skin irritation, the patient was advised to reduce the amount of the drug applied or use it less frequently.   The patient verbalized understanding of the proper use and possible adverse effects of topical sulfur application.  All of the patient's questions and concerns were addressed. Cyclophosphamide Counseling:  I discussed with the patient the risks of cyclophosphamide including but not limited to hair loss, hormonal abnormalities, decreased fertility, abdominal pain, diarrhea, nausea and vomiting, bone marrow suppression and infection. The patient understands that monitoring is required while taking this medication. Benzoyl Peroxide Counseling: Patient counseled that medicine may cause skin irritation and bleach clothing.  In the event of skin irritation, the patient was advised to reduce the amount of the drug applied or use it less frequently.   The patient verbalized understanding of the proper use and possible adverse effects of benzoyl peroxide.  All of the patient's questions and concerns were addressed. Terbinafine Counseling: Patient counseling regarding adverse effects of terbinafine including but not limited to headache, diarrhea, rash, upset stomach, liver function test abnormalities, itching, taste/smell disturbance, nausea, abdominal pain, and flatulence.  There is a rare possibility of liver failure that can occur when taking terbinafine.  The patient understands that a baseline LFT and kidney function test may be required. The patient verbalized understanding of the proper use and possible adverse effects of terbinafine.  All of the patient's questions and concerns were addressed. Otezla Pregnancy And Lactation Text: This medication is Pregnancy Category C and it isn't known if it is safe during pregnancy. It is unknown if it is excreted in breast milk. Methotrexate Counseling:  Patient counseled regarding adverse effects of methotrexate including but not limited to nausea, vomiting, abnormalities in liver function tests. Patients may develop mouth sores, rash, diarrhea, and abnormalities in blood counts. The patient understands that monitoring is required including LFT's and blood counts.  There is a rare possibility of scarring of the liver and lung problems that can occur when taking methotrexate. Persistent nausea, loss of appetite, pale stools, dark urine, cough, and shortness of breath should be reported immediately. Patient advised to discontinue methotrexate treatment at least three months before attempting to become pregnant.  I discussed the need for folate supplements while taking methotrexate.  These supplements can decrease side effects during methotrexate treatment. The patient verbalized understanding of the proper use and possible adverse effects of methotrexate.  All of the patient's questions and concerns were addressed. Hydroxyzine Counseling: Patient advised that the medication is sedating and not to drive a car after taking this medication.  Patient informed of potential adverse effects including but not limited to dry mouth, urinary retention, and blurry vision.  The patient verbalized understanding of the proper use and possible adverse effects of hydroxyzine.  All of the patient's questions and concerns were addressed. Detail Level: Detailed Nsaids Pregnancy And Lactation Text: These medications are considered safe up to 30 weeks gestation. It is excreted in breast milk. Terbinafine Pregnancy And Lactation Text: This medication is Pregnancy Category B and is considered safe during pregnancy. It is also excreted in breast milk and breast feeding isn't recommended. Carac Counseling:  I discussed with the patient the risks of Carac including but not limited to erythema, scaling, itching, weeping, crusting, and pain. Tetracycline Counseling: Patient counseled regarding possible photosensitivity and increased risk for sunburn.  Patient instructed to avoid sunlight, if possible.  When exposed to sunlight, patients should wear protective clothing, sunglasses, and sunscreen.  The patient was instructed to call the office immediately if the following severe adverse effects occur:  hearing changes, easy bruising/bleeding, severe headache, or vision changes.  The patient verbalized understanding of the proper use and possible adverse effects of tetracycline.  All of the patient's questions and concerns were addressed. Patient understands to avoid pregnancy while on therapy due to potential birth defects. Azithromycin Counseling:  I discussed with the patient the risks of azithromycin including but not limited to GI upset, allergic reaction, drug rash, diarrhea, and yeast infections. Imiquimod Counseling:  I discussed with the patient the risks of imiquimod including but not limited to erythema, scaling, itching, weeping, crusting, and pain.  Patient understands that the inflammatory response to imiquimod is variable from person to person and was educated regarded proper titration schedule.  If flu-like symptoms develop, patient knows to discontinue the medication and contact us. Topical Clindamycin Counseling: Patient counseled that this medication may cause skin irritation or allergic reactions.  In the event of skin irritation, the patient was advised to reduce the amount of the drug applied or use it less frequently.   The patient verbalized understanding of the proper use and possible adverse effects of clindamycin.  All of the patient's questions and concerns were addressed. Drysol Counseling:  I discussed with the patient the risks of drysol/aluminum chloride including but not limited to skin rash, itching, irritation, burning. Glycopyrrolate Pregnancy And Lactation Text: This medication is Pregnancy Category B and is considered safe during pregnancy. It is unknown if it is excreted breast milk. Rituxan Pregnancy And Lactation Text: This medication is Pregnancy Category C and it isn't know if it is safe during pregnancy. It is unknown if this medication is excreted in breast milk but similar antibodies are known to be excreted. Doxycycline Pregnancy And Lactation Text: This medication is Pregnancy Category D and not consider safe during pregnancy. It is also excreted in breast milk but is considered safe for shorter treatment courses. Azithromycin Pregnancy And Lactation Text: This medication is considered safe during pregnancy and is also secreted in breast milk. Tazorac Pregnancy And Lactation Text: This medication is not safe during pregnancy. It is unknown if this medication is excreted in breast milk. SSKI Counseling:  I discussed with the patient the risks of SSKI including but not limited to thyroid abnormalities, metallic taste, GI upset, fever, headache, acne, arthralgias, paraesthesias, lymphadenopathy, easy bleeding, arrhythmias, and allergic reaction. Gabapentin Counseling: I discussed with the patient the risks of gabapentin including but not limited to dizziness, somnolence, fatigue and ataxia. Bactrim Pregnancy And Lactation Text: This medication is Pregnancy Category D and is known to cause fetal risk.  It is also excreted in breast milk. Clofazimine Counseling:  I discussed with the patient the risks of clofazimine including but not limited to skin and eye pigmentation, liver damage, nausea/vomiting, gastrointestinal bleeding and allergy. Oxybutynin Pregnancy And Lactation Text: This medication is Pregnancy Category B and is considered safe during pregnancy. It is unknown if it is excreted in breast milk. Elidel Counseling: Patient may experience a mild burning sensation during topical application. Elidel is not approved in children less than 2 years of age. There have been case reports of hematologic and skin malignancies in patients using topical calcineurin inhibitors although causality is questionable. Griseofulvin Pregnancy And Lactation Text: This medication is Pregnancy Category X and is known to cause serious birth defects. It is unknown if this medication is excreted in breast milk but breast feeding should be avoided. Clindamycin Counseling: I counseled the patient regarding use of clindamycin as an antibiotic for prophylactic and/or therapeutic purposes. Clindamycin is active against numerous classes of bacteria, including skin bacteria. Side effects may include nausea, diarrhea, gastrointestinal upset, rash, hives, yeast infections, and in rare cases, colitis. Bexarotene Counseling:  I discussed with the patient the risks of bexarotene including but not limited to hair loss, dry lips/skin/eyes, liver abnormalities, hyperlipidemia, pancreatitis, depression/suicidal ideation, photosensitivity, drug rash/allergic reactions, hypothyroidism, anemia, leukopenia, infection, cataracts, and teratogenicity.  Patient understands that they will need regular blood tests to check lipid profile, liver function tests, white blood cell count, thyroid function tests and pregnancy test if applicable. Zyclara Counseling:  I discussed with the patient the risks of imiquimod including but not limited to erythema, scaling, itching, weeping, crusting, and pain.  Patient understands that the inflammatory response to imiquimod is variable from person to person and was educated regarded proper titration schedule.  If flu-like symptoms develop, patient knows to discontinue the medication and contact us. Colchicine Counseling:  Patient counseled regarding adverse effects including but not limited to stomach upset (nausea, vomiting, stomach pain, or diarrhea).  Patient instructed to limit alcohol consumption while taking this medication.  Colchicine may reduce blood counts especially with prolonged use.  The patient understands that monitoring of kidney function and blood counts may be required, especially at baseline. The patient verbalized understanding of the proper use and possible adverse effects of colchicine.  All of the patient's questions and concerns were addressed. Doxycycline Counseling:  Patient counseled regarding possible photosensitivity and increased risk for sunburn.  Patient instructed to avoid sunlight, if possible.  When exposed to sunlight, patients should wear protective clothing, sunglasses, and sunscreen.  The patient was instructed to call the office immediately if the following severe adverse effects occur:  hearing changes, easy bruising/bleeding, severe headache, or vision changes.  The patient verbalized understanding of the proper use and possible adverse effects of doxycycline.  All of the patient's questions and concerns were addressed. Topical Retinoid counseling:  Patient advised to apply a pea-sized amount only at bedtime and wait 30 minutes after washing their face before applying.  If too drying, patient may add a non-comedogenic moisturizer. The patient verbalized understanding of the proper use and possible adverse effects of retinoids.  All of the patient's questions and concerns were addressed. Cyclophosphamide Pregnancy And Lactation Text: This medication is Pregnancy Category D and it isn't considered safe during pregnancy. This medication is excreted in breast milk. Taltz Counseling: I discussed with the patient the risks of ixekizumab including but not limited to immunosuppression, serious infections, worsening of inflammatory bowel disease and drug reactions.  The patient understands that monitoring is required including a PPD at baseline and must alert us or the primary physician if symptoms of infection or other concerning signs are noted. Clindamycin Pregnancy And Lactation Text: This medication can be used in pregnancy if certain situations. Clindamycin is also present in breast milk. Xeljanz Counseling: I discussed with the patient the risks of Xeljanz therapy including increased risk of infection, liver issues, headache, diarrhea, or cold symptoms. Live vaccines should be avoided. They were instructed to call if they have any problems. Hydroxychloroquine Counseling:  I discussed with the patient that a baseline ophthalmologic exam is needed at the start of therapy and every year thereafter while on therapy. A CBC may also be warranted for monitoring.  The side effects of this medication were discussed with the patient, including but not limited to agranulocytosis, aplastic anemia, seizures, rashes, retinopathy, and liver toxicity. Patient instructed to call the office should any adverse effect occur.  The patient verbalized understanding of the proper use and possible adverse effects of Plaquenil.  All the patient's questions and concerns were addressed. Picato Counseling:  I discussed with the patient the risks of Picato including but not limited to erythema, scaling, itching, weeping, crusting, and pain. Rifampin Counseling: I discussed with the patient the risks of rifampin including but not limited to liver damage, kidney damage, red-orange body fluids, nausea/vomiting and severe allergy. Valtrex Counseling: I discussed with the patient the risks of valacyclovir including but not limited to kidney damage, nausea, vomiting and severe allergy.  The patient understands that if the infection seems to be worsening or is not improving, they are to call. Benzoyl Peroxide Pregnancy And Lactation Text: This medication is Pregnancy Category C. It is unknown if benzoyl peroxide is excreted in breast milk. Spironolactone Counseling: Patient advised regarding risks of diarrhea, abdominal pain, hyperkalemia, birth defects (for female patients), liver toxicity and renal toxicity. The patient may need blood work to monitor liver and kidney function and potassium levels while on therapy. The patient verbalized understanding of the proper use and possible adverse effects of spironolactone.  All of the patient's questions and concerns were addressed. Xolair Pregnancy And Lactation Text: This medication is Pregnancy Category B and is considered safe during pregnancy. This medication is excreted in breast milk. Cimzia Pregnancy And Lactation Text: This medication crosses the placenta but can be considered safe in certain situations. Cimzia may be excreted in breast milk. Minocycline Counseling: Patient advised regarding possible photosensitivity and discoloration of the teeth, skin, lips, tongue and gums.  Patient instructed to avoid sunlight, if possible.  When exposed to sunlight, patients should wear protective clothing, sunglasses, and sunscreen.  The patient was instructed to call the office immediately if the following severe adverse effects occur:  hearing changes, easy bruising/bleeding, severe headache, or vision changes.  The patient verbalized understanding of the proper use and possible adverse effects of minocycline.  All of the patient's questions and concerns were addressed. Minoxidil Counseling: Minoxidil is a topical medication which can increase blood flow where it is applied. It is uncertain how this medication increases hair growth. Side effects are uncommon and include stinging and allergic reactions. Nsaids Counseling: NSAID Counseling: I discussed with the patient that NSAIDs should be taken with food. Prolonged use of NSAIDs can result in the development of stomach ulcers.  Patient advised to stop taking NSAIDs if abdominal pain occurs.  The patient verbalized understanding of the proper use and possible adverse effects of NSAIDs.  All of the patient's questions and concerns were addressed. Griseofulvin Counseling:  I discussed with the patient the risks of griseofulvin including but not limited to photosensitivity, cytopenia, liver damage, nausea/vomiting and severe allergy.  The patient understands that this medication is best absorbed when taken with a fatty meal (e.g., ice cream or french fries). Xelmadihaz Pregnancy And Lactation Text: This medication is Pregnancy Category D and is not considered safe during pregnancy.  The risk during breast feeding is also uncertain. Rituxan Counseling:  I discussed with the patient the risks of Rituxan infusions. Side effects can include infusion reactions, severe drug rashes including mucocutaneous reactions, reactivation of latent hepatitis and other infections and rarely progressive multifocal leukoencephalopathy.  All of the patient's questions and concerns were addressed. Valtrex Pregnancy And Lactation Text: this medication is Pregnancy Category B and is considered safe during pregnancy. This medication is not directly found in breast milk but it's metabolite acyclovir is present. Bactrim Counseling:  I discussed with the patient the risks of sulfa antibiotics including but not limited to GI upset, allergic reaction, drug rash, diarrhea, dizziness, photosensitivity, and yeast infections.  Rarely, more serious reactions can occur including but not limited to aplastic anemia, agranulocytosis, methemoglobinemia, blood dyscrasias, liver or kidney failure, lung infiltrates or desquamative/blistering drug rashes. Fluconazole Counseling:  Patient counseled regarding adverse effects of fluconazole including but not limited to headache, diarrhea, nausea, upset stomach, liver function test abnormalities, taste disturbance, and stomach pain.  There is a rare possibility of liver failure that can occur when taking fluconazole.  The patient understands that monitoring of LFTs and kidney function test may be required, especially at baseline. The patient verbalized understanding of the proper use and possible adverse effects of fluconazole.  All of the patient's questions and concerns were addressed. Cimetidine Counseling:  I discussed with the patient the risks of Cimetidine including but not limited to gynecomastia, headache, diarrhea, nausea, drowsiness, arrhythmias, pancreatitis, skin rashes, psychosis, bone marrow suppression and kidney toxicity. High Dose Vitamin A Counseling: Side effects reviewed, pt to contact office should one occur. Humira Counseling:  I discussed with the patient the risks of adalimumab including but not limited to myelosuppression, immunosuppression, autoimmune hepatitis, demyelinating diseases, lymphoma, and serious infections.  The patient understands that monitoring is required including a PPD at baseline and must alert us or the primary physician if symptoms of infection or other concerning signs are noted. Odomzo Counseling- I discussed with the patient the risks of Odomzo including but not limited to nausea, vomiting, diarrhea, constipation, weight loss, changes in the sense of taste, decreased appetite, muscle spasms, and hair loss.  The patient verbalized understanding of the proper use and possible adverse effects of Odomzo.  All of the patient's questions and concerns were addressed. Sski Pregnancy And Lactation Text: This medication is Pregnancy Category D and isn't considered safe during pregnancy. It is excreted in breast milk.

## 2022-12-13 NOTE — PROGRESS NOTES
[facility]  Vascular Surgery  Progress Note    Patient Name: Babatunde Singh  MRN: [id number]  Admission Date: 12/8/2022  Primary Care Provider: Esdras Ly MD    Subjective:     Interval History:   Overnight events: NAEON    Vitals: VSS, AF    Need to know:   - Patient's daughter contacted, and pt has follow up scheduled.   - Pt awaiting home wound vac for DC today.           Post-Op Info:  Procedure(s) (LRB):  DEBRIDEMENT, WOUND (Right)  APPLICATION, WOUND VAC (Right)   5 Days Post-Op       Medications:  Continuous Infusions:  Scheduled Meds:   aspirin  81 mg Oral Daily    atorvastatin  80 mg Oral Daily    gabapentin  800 mg Oral TID    pantoprazole  40 mg Oral Daily    rivaroxaban  20 mg Oral Daily with dinner     PRN Meds:acetaminophen, acetaminophen, hydrALAZINE, influenza 65up-adj, LIDOcaine (PF) 10 mg/ml (1%), melatonin, ondansetron, oxyCODONE, sodium chloride 0.9%     Objective:     Vital Signs (Most Recent):  Temp: 98 °F (36.7 °C) (12/13/22 0430)  Pulse: 80 (12/13/22 0430)  Resp: 20 (12/13/22 0430)  BP: (!) 144/67 (12/13/22 0430)  SpO2: 97 % (12/13/22 0430)   Vital Signs (24h Range):  Temp:  [97 °F (36.1 °C)-98.8 °F (37.1 °C)] 98 °F (36.7 °C)  Pulse:  [80-93] 80  Resp:  [16-20] 20  SpO2:  [95 %-98 %] 97 %  BP: (141-168)/(66-72) 144/67         Physical Exam  Vitals reviewed.   Constitutional:       General: He is not in acute distress.     Appearance: Normal appearance. He is not ill-appearing.   Pulmonary:      Effort: Pulmonary effort is normal. No respiratory distress.   Abdominal:      General: Abdomen is flat.      Palpations: Abdomen is soft.   Skin:     General: Skin is warm and dry.   Neurological:      Mental Status: He is alert and oriented to person, place, and time.       Significant Labs:  CBC:   Recent Labs   Lab 12/12/22  0907   WBC 10.74   RBC 3.64*   HGB 8.4*   HCT 28.2*      MCV 78*   MCH 23.1*   MCHC 29.8*     CMP:   Recent Labs   Lab 12/12/22  0907   *   CALCIUM  8.9      K 3.9   CO2 24      BUN 12   CREATININE 0.8       Significant Diagnostics:  I have reviewed all pertinent imaging results/findings within the past 24 hours.    Assessment/Plan:     * Leg wound, right, subsequent encounter  Babatunde Singh is a 72 y.o M with severe chronic ischemic rest pain to the right foot. Patient is now s/p RLE wound washout and wound vac placement.     Plan:   - HH ordered, case management sent forms for auth 12/10  - regular diet   - on xarelto   - PRN pain control   - continue home meds   - WV will be changed when home WV arrives    Dispo: will DC when pt has WV          MARIZA SOUTH MD  Vascular Surgery  [facility]

## 2022-12-13 NOTE — PLAN OF CARE
Patient aox4, resting quietly in bed, c/o  pain at the beginning of shift, prn oxy and scheduled gabapentin administered with good effect, wound  WNL with wound vac on 125 continuous suction, wnl, safety and plan of care discussed, voiced understanding, will continue to monitor.

## 2022-12-14 ENCOUNTER — HOSPITAL ENCOUNTER (EMERGENCY)
Facility: HOSPITAL | Age: 72
Discharge: HOME | End: 2022-12-14
Attending: EMERGENCY MEDICINE
Payer: MEDICARE

## 2022-12-14 ENCOUNTER — TELEPHONE (OUTPATIENT)
Dept: VASCULAR SURGERY | Facility: CLINIC | Age: 72
End: 2022-12-14
Payer: MEDICARE

## 2022-12-14 ENCOUNTER — PATIENT OUTREACH (OUTPATIENT)
Dept: ADMINISTRATIVE | Facility: CLINIC | Age: 72
End: 2022-12-14
Payer: MEDICARE

## 2022-12-14 VITALS
BODY MASS INDEX: 23.3 KG/M2 | RESPIRATION RATE: 20 BRPM | SYSTOLIC BLOOD PRESSURE: 146 MMHG | DIASTOLIC BLOOD PRESSURE: 77 MMHG | TEMPERATURE: 98 F | WEIGHT: 140 LBS | OXYGEN SATURATION: 100 % | HEART RATE: 108 BPM

## 2022-12-14 VITALS
HEART RATE: 82 BPM | HEIGHT: 65 IN | WEIGHT: 140 LBS | SYSTOLIC BLOOD PRESSURE: 137 MMHG | RESPIRATION RATE: 18 BRPM | TEMPERATURE: 98 F | BODY MASS INDEX: 23.32 KG/M2 | DIASTOLIC BLOOD PRESSURE: 73 MMHG | OXYGEN SATURATION: 98 %

## 2022-12-14 DIAGNOSIS — Z46.89 ENCOUNTER FOR MANAGEMENT OF VACUUM-ASSISTED CLOSURE (VAC) OF WOUND: Primary | ICD-10-CM

## 2022-12-14 PROCEDURE — 99282 EMERGENCY DEPT VISIT SF MDM: CPT

## 2022-12-14 PROCEDURE — 99281 PR EMERGENCY DEPT VISIT,LEVEL I: ICD-10-PCS | Mod: ,,, | Performed by: PHYSICIAN ASSISTANT

## 2022-12-14 PROCEDURE — 99281 EMR DPT VST MAYX REQ PHY/QHP: CPT | Mod: ,,, | Performed by: PHYSICIAN ASSISTANT

## 2022-12-14 NOTE — ED NOTES
Patient states he is unable to take his pants off due to when  coming to ED yesterday, they did not put in thru his pants, states a Doctor fixed it 15 min ago but did not do it correctly

## 2022-12-14 NOTE — CONSULTS
[facility] Emergency Dept  General Surgery  Consult Note     Consults  Subjective:      Chief Complaint/Reason for Admission: wound vac replacement                History of Present Illness:   Patient is a 72y M w/ hx of recent RLE debridement last week who presents to the ED today with detachment of his wound vac. He was discharged earlier this afternoon with plans for wound vac changes 3 times weekly. He has his home wound vac machine with him. No other issues or changes since we last saw him. Wound clean and dry.          Current Facility-Administered Medications on File Prior to Encounter   Medication    [DISCONTINUED] acetaminophen tablet 650 mg    [DISCONTINUED] acetaminophen tablet 650 mg    [DISCONTINUED] aspirin chewable tablet 81 mg    [DISCONTINUED] atorvastatin tablet 80 mg    [DISCONTINUED] gabapentin capsule 800 mg    [DISCONTINUED] hydrALAZINE injection 10 mg    [DISCONTINUED] influenza 65up-adj (QUADRIVALENT ADJUVANTED PF) vaccine 0.5 mL    [DISCONTINUED] LIDOcaine (PF) 10 mg/ml (1%) injection 10 mg    [DISCONTINUED] melatonin tablet 6 mg    [DISCONTINUED] ondansetron disintegrating tablet 8 mg    [DISCONTINUED] oxyCODONE immediate release tablet 5 mg    [DISCONTINUED] pantoprazole EC tablet 40 mg    [DISCONTINUED] rivaroxaban tablet 20 mg    [DISCONTINUED] sodium chloride 0.9% flush 10 mL           Current Outpatient Medications on File Prior to Encounter   Medication Sig    acetaminophen (TYLENOL) 325 MG tablet Take 2 tablets (650 mg total) by mouth every 8 (eight) hours as needed.    aspirin 81 MG Chew Chew and swallow 1 tablet (81 mg total) by mouth once daily.    atorvastatin (LIPITOR) 80 MG tablet Take 1 tablet (80 mg total) by mouth once daily.    ezetimibe (ZETIA) 10 mg tablet Take 1 tablet (10 mg total) by mouth once daily.    gabapentin (NEURONTIN) 800 MG tablet Take 1 tablet (800 mg total) by mouth 3 (three) times daily.    HYDROcodone-acetaminophen (NORCO) 5-325 mg per tablet Take 1  tablet by mouth every 6 (six) hours as needed for Pain.    losartan-hydrochlorothiazide 50-12.5 mg (HYZAAR) 50-12.5 mg per tablet Take 1 tablet by mouth once daily.    multivitamin (THERAGRAN) per tablet Take 1 tablet by mouth once daily.    pantoprazole (PROTONIX) 40 MG tablet Take 1 tablet (40 mg total) by mouth once daily.    rivaroxaban (XARELTO) 20 mg Tab Take 1 tablet (20 mg total) by mouth daily with dinner or evening meal.         Review of patient's allergies indicates:  No Known Allergies          Past Medical History:   Diagnosis Date    DVT (deep venous thrombosis)      Hypertension      Peripheral arterial disease              Past Surgical History:   Procedure Laterality Date    APPLICATION OF WOUND VACUUM-ASSISTED CLOSURE DEVICE Right 12/8/2022     Procedure: APPLICATION, WOUND VAC;  Surgeon: LEI Ortiz III, MD;  Location[facility] OR [address] [place] [address];  Service: Peripheral Vascular;  Laterality: Right;    COLONOSCOPY N/A 10/31/2022     Procedure: COLONOSCOPY;  Surgeon: Philip Shafer MD;  Location: [facility] ([address] [place] [address]);  Service: Endoscopy;  Laterality: N/A;    CREATION OF AXILLARY-FEMORAL ARTERY BYPASS WITH GRAFT N/A 10/6/2022     Procedure: CREATION, BYPASS, ARTERIAL, AXILLARY TO FEMORAL, USING GRAFT;  Surgeon: LEI Ortiz III, MD;  [facility];  Service: Peripheral Vascular;  Laterality: N/A;    CREATION OF FEMORAL-FEMORAL ARTERY BYPASS WITH GRAFT N/A 10/6/2022     Procedure: CREATION, BYPASS, ARTERIAL, FEMORAL TO FEMORAL, USING GRAFT;  Surgeon: LEI Ortiz III, MD;  [facility];  Service: Peripheral Vascular;  Laterality: N/A;    ESOPHAGOGASTRODUODENOSCOPY N/A 10/20/2022     Procedure: EGD (ESOPHAGOGASTRODUODENOSCOPY);  Surgeon: Sixto Chicas MD;  [facility]  Service: Endoscopy;  Laterality: N/A;    ESOPHAGOGASTRODUODENOSCOPY N/A 10/28/2022     Procedure: EGD (ESOPHAGOGASTRODUODENOSCOPY);  Surgeon: Marlee Hopson MD;  Location: [facility] ENDO ([facility]);  Service: Gastroenterology;  Laterality: N/A;    EXCISION OF HYDROCELE Left 2022     Procedure: HYDROCELECTOMY;  Surgeon: Damion Roberts MD;  Location: [facility] OR;  Service: Urology;  Laterality: Left;    KNEE SURGERY   1972    WOUND DEBRIDEMENT Right 2022     Procedure: DEBRIDEMENT, WOUND;  Surgeon: LEI Ortiz III, MD;  [facility];  Service: Peripheral Vascular;  Laterality: Right;  RLE wound debridement      Family History         Problem Relation (Age of Onset)     Hypertension Mother, Father                   Tobacco Use    Smoking status: Former       Packs/day: 0.50       Years: 55.00       Pack years: 27.50       Types: Cigarettes       Quit date: 10/11/2022       Years since quittin.1    Smokeless tobacco: Never    Tobacco comments:       Cigarettes3-4 per day   Substance and Sexual Activity    Alcohol use: Never    Drug use: Never    Sexual activity: Yes       Partners: Female       Comment: wife      Review of Systems  Objective:      Vital Signs (Most Recent):  Temp: 98.1 °F (36.7 °C) (22)  Pulse: 102 (22)  Resp: 18 (22)  BP: (!) 158/72 (22)  SpO2: 99 % (22)    Vital Signs (24h Range):  Temp:  [98 °F (36.7 °C)-99.3 °F (37.4 °C)] 98.1 °F (36.7 °C)  Pulse:  [] 102  Resp:  [14-20] 18  SpO2:  [97 %-99 %] 99 %  BP: (119-158)/(63-72) 158/72      Weight: 63.5 kg (140 lb)  Body mass index is 23.3 kg/m².     No intake or output data in the 24 hours ending 22 0047     Physical Exam     Significant Labs:  All pertinent labs from the last 24 hours have been reviewed.     Significant Diagnostics:  I have reviewed all pertinent imaging results/findings within the past 24 hours.     Assessment/Plan:      Patient is a 72y M w/ hx of RLE wound debridement last Thursday who presents w/ woudnd vac issues wo the ER tonight. Wound vac replaced at bedside. Wound clean and dry. Patient OK to continue with wound vac  changes per home health. If this happens in future, he is OK to place clean wet-to-dry gauze dressing and for his home-health nurse to replace the vac.      Thank you for your consult. I will sign off. Please contact us if you have any additional questions.    Herminio Rivera, PGY1  Vascular Surgery

## 2022-12-14 NOTE — TELEPHONE ENCOUNTER
C3 nurse attempted to contact Babatunde Singh(DAUGHTER) for a TCC post hospital discharge follow up call. The patient is unable to conduct the call @ this time. The patient requested a callback.    The patient does not have a scheduled HOSFU appointment within 5-7 days post hospital discharge date 12/13/22. Message sent to Physician staff to assist with HOSFU appointment scheduling.

## 2022-12-14 NOTE — PROVIDER PROGRESS NOTES - EMERGENCY DEPT.
Signout Note    I received signout from the previous provider.     Chief complaint:  Wound Check (Wound vac in place to wound on R leg, states wound vac came disconnected and needs to be reconnected. No other complaints at this time, was discharged from hospital today. Pt ambulatory with cane)      Pertinent history &exam:  Babatunde Singh is a 72 y.o. male who presented to emergency department with complaint of wound VAC problem.  He was signed out pending replacement of his wound vac by vascular surgery for discharge home.    Vitals:    12/14/22 0122   BP: 137/73   Pulse: 82   Resp: 18   Temp: 98.2 °F (36.8 °C)       Imaging Studies:    No orders to display       Medications Given:  Medications - No data to display    Pending Items/ MDM:  72 y.o. male with wound VAC issue.  Wound VAC was replaced patient was discharged.    Diagnostic Impression:    1. Visit for wound check    2. Encounter for management of wound VAC         ED Disposition Condition    Discharge Stable          ED Prescriptions    None       Follow-up Information       Follow up With Specialties Details Why Contact Info    Esdras Ly MD Internal Medicine Schedule an appointment as soon as possible for a visit   [address]  [place] [address]  [phone]              Patient and/or family understands the plan and is in agreement, verbalized understanding, questions answered    Becky Dumont MD  Emergency Medicine

## 2022-12-14 NOTE — TELEPHONE ENCOUNTER
Unable to reach daughter in response to message. Contacted pt. States he has just reported to ED at [facility] for eval. Vascular surgery team notified.  ----- Message from Joie Jackson CMA sent at 12/14/2022 10:15 AM CST -----  Regarding: Urgent- Please call  Contact: Cindi [phone]  Pt daughter called stating the pt went to the ER last night due to the wound vac disconnecting and its disconnected again this morning. I was able to verify appt scheduled for 12/16/22 at 843z. Please call    Thank you

## 2022-12-14 NOTE — ED TRIAGE NOTES
Dc from hospital today with wound vac  to rle, pt presents with c/o wound vac disconnecting from pt at wound site. Dr at bedside.  Past Medical History:   Diagnosis Date    DVT (deep venous thrombosis)     Hypertension     Peripheral arterial disease        Past Surgical History:   Procedure Laterality Date    APPLICATION OF WOUND VACUUM-ASSISTED CLOSURE DEVICE Right 12/8/2022    Procedure: APPLICATION, WOUND VAC;  Surgeon: LEI Ortiz III, MD;  [address];  Service: Peripheral Vascular;  Laterality: Right;    COLONOSCOPY N/A 10/31/2022    Procedure: COLONOSCOPY;  Surgeon: Philip Shafer MD;  Location: [facility] (2ND FLR);  Service: Endoscopy;  Laterality: N/A;    CREATION OF AXILLARY-FEMORAL ARTERY BYPASS WITH GRAFT N/A 10/6/2022    Procedure: CREATION, BYPASS, ARTERIAL, AXILLARY TO FEMORAL, USING GRAFT;  Surgeon: LEI Ortiz III, MD;  [address];  Service: Peripheral Vascular;  Laterality: N/A;    CREATION OF FEMORAL-FEMORAL ARTERY BYPASS WITH GRAFT N/A 10/6/2022    Procedure: CREATION, BYPASS, ARTERIAL, FEMORAL TO FEMORAL, USING GRAFT;  Surgeon: LEI Ortiz III, MD;  [facility];  Service: Peripheral Vascular;  Laterality: N/A;    ESOPHAGOGASTRODUODENOSCOPY N/A 10/20/2022    Procedure: EGD (ESOPHAGOGASTRODUODENOSCOPY);  Surgeon: Sixto Chicas MD;  Location: [facility] (2ND FLR);  Service: Endoscopy;  Laterality: N/A;    ESOPHAGOGASTRODUODENOSCOPY N/A 10/28/2022    Procedure: EGD (ESOPHAGOGASTRODUODENOSCOPY);  Surgeon: Marlee Hopson MD;  Location: [facility] ([facility]);  Service: Gastroenterology;  Laterality: N/A;    EXCISION OF HYDROCELE Left 4/26/2022    Procedure: HYDROCELECTOMY;  Surgeon: Damion Roberts MD;  Location: [facility];  Service: Urology;  Laterality: Left;    KNEE SURGERY  1972    WOUND DEBRIDEMENT Right 12/8/2022    Procedure: DEBRIDEMENT, WOUND;  Surgeon: LEI Ortiz III, MD;  Location: [address];  Service: Peripheral  Vascular;  Laterality: Right;  RLE wound debridement       Family History   Problem Relation Age of Onset    Hypertension Mother     Hypertension Father        Social History     Socioeconomic History    Marital status:    Occupational History    Occupation: doorman [employer]   Tobacco Use    Smoking status: Former     Packs/day: 0.50     Years: 55.00     Pack years: 27.50     Types: Cigarettes     Quit date: 10/11/2022     Years since quittin.1    Smokeless tobacco: Never    Tobacco comments:     Cigarettes3-4 per day   Substance and Sexual Activity    Alcohol use: Never    Drug use: Never    Sexual activity: Yes     Partners: Female     Comment: wife       No current facility-administered medications for this encounter.     Current Outpatient Medications   Medication Sig Dispense Refill    acetaminophen (TYLENOL) 325 MG tablet Take 2 tablets (650 mg total) by mouth every 8 (eight) hours as needed. 20 tablet 0    aspirin 81 MG Chew Chew and swallow 1 tablet (81 mg total) by mouth once daily. 30 tablet 0    atorvastatin (LIPITOR) 80 MG tablet Take 1 tablet (80 mg total) by mouth once daily. 30 tablet 0    ezetimibe (ZETIA) 10 mg tablet Take 1 tablet (10 mg total) by mouth once daily. 30 tablet 0    gabapentin (NEURONTIN) 800 MG tablet Take 1 tablet (800 mg total) by mouth 3 (three) times daily. 90 tablet 11    HYDROcodone-acetaminophen (NORCO) 5-325 mg per tablet Take 1 tablet by mouth every 6 (six) hours as needed for Pain. 35 tablet 0    losartan-hydrochlorothiazide 50-12.5 mg (HYZAAR) 50-12.5 mg per tablet Take 1 tablet by mouth once daily. 30 tablet 0    multivitamin (THERAGRAN) per tablet Take 1 tablet by mouth once daily.      pantoprazole (PROTONIX) 40 MG tablet Take 1 tablet (40 mg total) by mouth once daily. 30 tablet 0    rivaroxaban (XARELTO) 20 mg Tab Take 1 tablet (20 mg total) by mouth daily with dinner or evening meal. 30 tablet 0       Review of patient's allergies indicates:  No Known Allergies

## 2022-12-14 NOTE — ED PROVIDER NOTES
Encounter Date: 12/13/2022       History     Chief Complaint   Patient presents with    Wound Check     Wound vac in place to wound on R leg, states wound vac came disconnected and needs to be reconnected. No other complaints at this time, was discharged from hospital today. Pt ambulatory with cane     73 y/o male with PMH of HTN, HLD, PAD, s/p wound debridement of RLE on 12/9 with wound vac placement on 12/13 presents to the emergency department due to wound vac disconnection from extremity. Pt was discharged from hospital earlier today. He states when he returned home,  he took a nap on the sofa and woke up noticing the wound vac disconnected from his right leg. He denies any falls or trauma to extremity. No new onset of pain, fever, nausea, vomiting or falls since discharge today.     Review of patient's allergies indicates:  No Known Allergies  Past Medical History:   Diagnosis Date    DVT (deep venous thrombosis)     Hypertension     Peripheral arterial disease      Past Surgical History:   Procedure Laterality Date    APPLICATION OF WOUND VACUUM-ASSISTED CLOSURE DEVICE Right 12/8/2022    Procedure: APPLICATION, WOUND VAC;  Surgeon: LEI Ortiz III, MD;  Location: [address];  Service: Peripheral Vascular;  Laterality: Right;    COLONOSCOPY N/A 10/31/2022    Procedure: COLONOSCOPY;  Surgeon: Philip Shafer MD;  Location: [address]);  Service: Endoscopy;  Laterality: N/A;    CREATION OF AXILLARY-FEMORAL ARTERY BYPASS WITH GRAFT N/A 10/6/2022    Procedure: CREATION, BYPASS, ARTERIAL, AXILLARY TO FEMORAL, USING GRAFT;  Surgeon: LEI Ortiz III, MD;  Location: [address];  Service: Peripheral Vascular;  Laterality: N/A;    CREATION OF FEMORAL-FEMORAL ARTERY BYPASS WITH GRAFT N/A 10/6/2022    Procedure: CREATION, BYPASS, ARTERIAL, FEMORAL TO FEMORAL, USING GRAFT;  Surgeon: LEI Ortiz III, MD;  Location: [address];  Service: Peripheral Vascular;  Laterality: N/A;     ESOPHAGOGASTRODUODENOSCOPY N/A 10/20/2022    Procedure: EGD (ESOPHAGOGASTRODUODENOSCOPY);  Surgeon: Sixto Chicas MD;  Location: [facility] ENDO (2ND FLR);  Service: Endoscopy;  Laterality: N/A;    ESOPHAGOGASTRODUODENOSCOPY N/A 10/28/2022    Procedure: EGD (ESOPHAGOGASTRODUODENOSCOPY);  Surgeon: Marlee Hopson MD;  Location: [facility] ENDO (2ND FLR);  Service: Gastroenterology;  Laterality: N/A;    EXCISION OF HYDROCELE Left 2022    Procedure: HYDROCELECTOMY;  Surgeon: Damion Roberts MD;  Location: [facility];  Service: Urology;  Laterality: Left;    KNEE SURGERY  1972    WOUND DEBRIDEMENT Right 2022    Procedure: DEBRIDEMENT, WOUND;  Surgeon: LEI Ortiz III, MD;  Location: [facility] OR 2ND FLR;  Service: Peripheral Vascular;  Laterality: Right;  RLE wound debridement     Family History   Problem Relation Age of Onset    Hypertension Mother     Hypertension Father      Social History     Tobacco Use    Smoking status: Former     Packs/day: 0.50     Years: 55.00     Pack years: 27.50     Types: Cigarettes     Quit date: 10/11/2022     Years since quittin.1    Smokeless tobacco: Never    Tobacco comments:     Cigarettes3-4 per day   Substance Use Topics    Alcohol use: Never    Drug use: Never     Review of Systems   Constitutional:  Negative for activity change and fever.   HENT:  Negative for sore throat.    Respiratory:  Negative for shortness of breath.    Cardiovascular:  Negative for chest pain.   Gastrointestinal:  Negative for nausea.   Genitourinary:  Negative for dysuria.   Musculoskeletal:  Negative for back pain.   Skin:  Negative for rash.        Wound Vac dislodged from lower right extremity    Neurological:  Negative for weakness.   Hematological:  Does not bruise/bleed easily.     Physical Exam     Initial Vitals [22]   BP Pulse Resp Temp SpO2   (!) 158/72 102 18 98.1 °F (36.7 °C) 99 %      MAP       --         Physical Exam    Vitals reviewed.  Constitutional: He appears  well-developed and well-nourished.   HENT:   Head: Normocephalic and atraumatic.   Eyes: EOM are normal.   Neck:   Normal range of motion.  Cardiovascular:  Intact distal pulses.           DP and PT ausculted with doppler  Sensation intact of lower extremity    Pulmonary/Chest: Breath sounds normal.   Musculoskeletal:         General: Normal range of motion.      Cervical back: Normal range of motion.     Neurological: He is alert and oriented to person, place, and time.   Skin: Skin is warm and dry.   Wound RLE lateral aspect  No edema, drainage or  tenderness    Psychiatric: He has a normal mood and affect. Thought content normal.       ED Course   Procedures  Labs Reviewed - No data to display       Imaging Results    None          Medications - No data to display        APC / Resident Notes:   PT evaluated  after discharged from hospital today after wound vac placement that has become dislodged from extremity. He denies any new sxs since hospitalization   Vascular surgery consulted to assess wound and reconnect wound vac.   PT signed out to on coming night team.                    Clinical Impression:   Final diagnoses:  [Z51.89] Visit for wound check (Primary)  [Z46.89] Encounter for management of wound VAC        ED Disposition Condition    Discharge Stable          ED Prescriptions    None       Follow-up Information       Follow up With Specialties Details Why Contact Info    Esdras Ly MD Internal Medicine Schedule an appointment as soon as possible for a visit   [address]  [place] [address]  [phone]               Jordyn Wang PA-C  12/14/22 0117

## 2022-12-14 NOTE — ED NOTES
This RN and Chaya RN at bedside to thread tubing thur pants as directed by patient to be able ot remove pants while at home.

## 2022-12-14 NOTE — ED PROVIDER NOTES
Encounter Date: 12/14/2022       History     Chief Complaint   Patient presents with    wound vac problem     Wound vac not working     The patient is a 72 year old male who has a documented past medical history of HTN, HLD, PAD, s/p wound debridement of RLE on 12/9 with wound vac placement on 12/13. He was seen in the ER last night due to wound vac tubing disconnection. The wound vac was checked and re-commented and he reports that he was discharged without any complications or incidents. He returns to the ER again this afternoon requesting that the wound vac tubing be ran under his clothing, up his pant leg, etc. He denies any complications or malfunction. No additional concerns.      Review of patient's allergies indicates:  No Known Allergies  Past Medical History:   Diagnosis Date    DVT (deep venous thrombosis)     Hypertension     Peripheral arterial disease      Past Surgical History:   Procedure Laterality Date    APPLICATION OF WOUND VACUUM-ASSISTED CLOSURE DEVICE Right 12/8/2022    Procedure: APPLICATION, WOUND VAC;  Surgeon: LEI Ortiz III, MD;  Location: [facility] OR [address] [place] [address];  Service: Peripheral Vascular;  Laterality: Right;    COLONOSCOPY N/A 10/31/2022    Procedure: COLONOSCOPY;  Surgeon: Philip Shafer MD;  Location: [address]);  Service: Endoscopy;  Laterality: N/A;    CREATION OF AXILLARY-FEMORAL ARTERY BYPASS WITH GRAFT N/A 10/6/2022    Procedure: CREATION, BYPASS, ARTERIAL, AXILLARY TO FEMORAL, USING GRAFT;  Surgeon: LEI Ortiz III, MD;  Location: [address];  Service: Peripheral Vascular;  Laterality: N/A;    CREATION OF FEMORAL-FEMORAL ARTERY BYPASS WITH GRAFT N/A 10/6/2022    Procedure: CREATION, BYPASS, ARTERIAL, FEMORAL TO FEMORAL, USING GRAFT;  Surgeon: LEI Ortiz III, MD;  Location: [facility] OR [address] [place] [address];  Service: Peripheral Vascular;  Laterality: N/A;    ESOPHAGOGASTRODUODENOSCOPY N/A 10/20/2022    Procedure: EGD (ESOPHAGOGASTRODUODENOSCOPY);  Surgeon:  Sixto Chicas MD;  Location: [facility] ENDO (2ND FLR);  Service: Endoscopy;  Laterality: N/A;    ESOPHAGOGASTRODUODENOSCOPY N/A 10/28/2022    Procedure: EGD (ESOPHAGOGASTRODUODENOSCOPY);  Surgeon: Marlee Hopson MD;  Location: [facility] ENDO (2ND FLR);  Service: Gastroenterology;  Laterality: N/A;    EXCISION OF HYDROCELE Left 2022    Procedure: HYDROCELECTOMY;  Surgeon: Damion Roberts MD;  Location: [facility];  Service: Urology;  Laterality: Left;    KNEE SURGERY  1972    WOUND DEBRIDEMENT Right 2022    Procedure: DEBRIDEMENT, WOUND;  Surgeon: LEI Ortiz III, MD;  Location: [facility] 2ND FLR;  Service: Peripheral Vascular;  Laterality: Right;  RLE wound debridement     Family History   Problem Relation Age of Onset    Hypertension Mother     Hypertension Father      Social History     Tobacco Use    Smoking status: Former     Packs/day: 0.50     Years: 55.00     Pack years: 27.50     Types: Cigarettes     Quit date: 10/11/2022     Years since quittin.1    Smokeless tobacco: Never    Tobacco comments:     Cigarettes3-4 per day   Substance Use Topics    Alcohol use: Never    Drug use: Never     Review of Systems   Constitutional:  Negative for chills and fever.   Respiratory:  Negative for shortness of breath.    Cardiovascular:  Negative for chest pain and leg swelling.   Gastrointestinal:  Negative for abdominal pain, diarrhea, nausea and vomiting.   Musculoskeletal:  Negative for arthralgias.   Skin:  Positive for wound.   Neurological:  Negative for dizziness, syncope, speech difficulty, weakness, light-headedness, numbness and headaches.   Psychiatric/Behavioral:  Negative for confusion.      Physical Exam     Initial Vitals [22 1135]   BP Pulse Resp Temp SpO2   (!) 146/77 108 20 98 °F (36.7 °C) 100 %      MAP       --         Physical Exam    Nursing note and vitals reviewed.  Constitutional: He appears well-developed and well-nourished. No distress.   HENT:   Head: Normocephalic.    Eyes: Conjunctivae are normal.   Cardiovascular:  Normal rate.           Pulmonary/Chest: No respiratory distress.   Abdominal: He exhibits no distension. There is no abdominal tenderness.   Musculoskeletal:         General: Normal range of motion.      Comments: Wound vac in place to RLE     Neurological: He is alert and oriented to person, place, and time. He has normal strength. No sensory deficit.   Skin: Skin is warm and dry.   Psychiatric: He has a normal mood and affect. His behavior is normal.       ED Course   Procedures  Labs Reviewed - No data to display       Imaging Results    None          Medications - No data to display  Medical Decision Making:   Initial Assessment:   71 yo male, here requesting that his wound vac tubing be re-routed up his pant leg and underneath his clothes. No physical complaints.   Differential Diagnosis:   Wound check  Clinical Tests:   Lab Tests: Reviewed  ED Management:  Vital signs reviewed   Records reviewed   Wound vac in place and disconnected then re-routed under clothes without any difficulty   Pt advised to follow up closely with his regular physician in the next 1-2 days   Pt advised to return to the ER promptly if worse in any way                         Clinical Impression:   Final diagnoses:  [Z46.89] Encounter for management of vacuum-assisted closure (VAC) of wound (Primary)        ED Disposition Condition    Discharge Stable          ED Prescriptions    None       Follow-up Information       Follow up With Specialties Details Why Contact Info    Esdras Ly MD Internal Medicine Schedule an appointment as soon as possible for a visit in 2 days  [address]  [place] [address]  [phone]      [facility] - Emergency Dept Emergency Medicine  If symptoms worsen [address] [facility] [id number] [phone]             Blanca Wolfe PA-C  12/14/22 1536

## 2022-12-14 NOTE — PLAN OF CARE
[facility]  Discharge Final Note    Primary Care Provider: Esdras Ly MD    Expected Discharge Date: 12/13/2022    Final Discharge Note (most recent)       Final Note - 12/14/22 1049          Final Note    Assessment Type Final Discharge Note     Anticipated Discharge Disposition Home-Health Care [facility]     Hospital Resources/Appts/Education Provided Provided patient/caregiver with written discharge plan information        Post-Acute Status    Post-Acute Authorization Home Health     Home Health Status Set-up Complete/Auth obtained     Coverage PHN     Patient choice form signed by patient/caregiver List with quality metrics by geographic area provided     Discharge Delays None known at this time                     Important Message from Medicare  Important Message from Medicare regarding Discharge Appeal Rights: Given to patient/caregiver, Explained to patient/caregiver, Signed/date by patient/caregiver     Date IMM was signed: 12/13/22  Time IMM was signed: 1012      Pt d.c home with family. Pt current with [facility], orders sent via fax. No additional needs at this time.    Jennyfer Saenz LCSW  Case Management/[facility]  [phone]    Patient called office back and upon further discussion-patient states has had the diarrhea since taking the antibiotic in April. His sinus's are clear, but he has not recovered from the diarrhea, and has completed \"60 days of anti-diarrhea medicine\", and is now taking immodium on and off right now. Patient states he has contacted PCP and PCP has ordered a stool collection. Informed patient that at this time, so far removed from completing antibiotic, the PCP should and would be best, to manage the diarrhea problem. Patient verbalized agreement. States he will keep/see Dr Henny Branham appointment on Monday 6/8/20.

## 2022-12-16 ENCOUNTER — TELEPHONE (OUTPATIENT)
Dept: INTERNAL MEDICINE | Facility: CLINIC | Age: 72
End: 2022-12-16
Payer: MEDICARE

## 2022-12-16 ENCOUNTER — TELEPHONE (OUTPATIENT)
Dept: SURGERY | Facility: CLINIC | Age: 72
End: 2022-12-16
Payer: MEDICARE

## 2022-12-16 ENCOUNTER — TELEPHONE (OUTPATIENT)
Dept: VASCULAR SURGERY | Facility: CLINIC | Age: 72
End: 2022-12-16

## 2022-12-16 ENCOUNTER — OFFICE VISIT (OUTPATIENT)
Dept: CARDIOLOGY | Facility: CLINIC | Age: 72
End: 2022-12-16
Payer: MEDICARE

## 2022-12-16 ENCOUNTER — TELEPHONE (OUTPATIENT)
Dept: WOUND CARE | Facility: CLINIC | Age: 72
End: 2022-12-16
Payer: MEDICARE

## 2022-12-16 VITALS
SYSTOLIC BLOOD PRESSURE: 126 MMHG | BODY MASS INDEX: 24.17 KG/M2 | HEART RATE: 82 BPM | WEIGHT: 145.06 LBS | HEIGHT: 65 IN | DIASTOLIC BLOOD PRESSURE: 62 MMHG

## 2022-12-16 DIAGNOSIS — I73.9 CLAUDICATION OF RIGHT LOWER EXTREMITY: ICD-10-CM

## 2022-12-16 DIAGNOSIS — I99.8 LOWER LIMB ISCHEMIA: ICD-10-CM

## 2022-12-16 DIAGNOSIS — I10 PRIMARY HYPERTENSION: ICD-10-CM

## 2022-12-16 DIAGNOSIS — M79.604 RIGHT LEG PAIN: ICD-10-CM

## 2022-12-16 DIAGNOSIS — R20.2 NUMBNESS AND TINGLING OF RIGHT LEG: ICD-10-CM

## 2022-12-16 DIAGNOSIS — M54.31 SCIATICA OF RIGHT SIDE: ICD-10-CM

## 2022-12-16 DIAGNOSIS — I73.9 PAD (PERIPHERAL ARTERY DISEASE): ICD-10-CM

## 2022-12-16 DIAGNOSIS — I70.238: ICD-10-CM

## 2022-12-16 DIAGNOSIS — E78.2 MIXED HYPERLIPIDEMIA: ICD-10-CM

## 2022-12-16 DIAGNOSIS — F17.211 CIGARETTE NICOTINE DEPENDENCE IN REMISSION: ICD-10-CM

## 2022-12-16 DIAGNOSIS — I82.401 DEEP VEIN THROMBOSIS (DVT) OF RIGHT LOWER EXTREMITY, UNSPECIFIED CHRONICITY, UNSPECIFIED VEIN: ICD-10-CM

## 2022-12-16 DIAGNOSIS — R60.0 BILATERAL LOWER EXTREMITY EDEMA: ICD-10-CM

## 2022-12-16 DIAGNOSIS — N28.89 RIGHT RENAL MASS: ICD-10-CM

## 2022-12-16 DIAGNOSIS — R20.0 NUMBNESS AND TINGLING OF RIGHT LEG: ICD-10-CM

## 2022-12-16 DIAGNOSIS — S81.801D LEG WOUND, RIGHT, SUBSEQUENT ENCOUNTER: Primary | ICD-10-CM

## 2022-12-16 PROCEDURE — 99999 PR PBB SHADOW E&M-EST. PATIENT-LVL III: CPT | Mod: PBBFAC,,, | Performed by: INTERNAL MEDICINE

## 2022-12-16 PROCEDURE — 1101F PR PT FALLS ASSESS DOC 0-1 FALLS W/OUT INJ PAST YR: ICD-10-PCS | Mod: CPTII,S$GLB,, | Performed by: INTERNAL MEDICINE

## 2022-12-16 PROCEDURE — 1159F PR MEDICATION LIST DOCUMENTED IN MEDICAL RECORD: ICD-10-PCS | Mod: CPTII,S$GLB,, | Performed by: INTERNAL MEDICINE

## 2022-12-16 PROCEDURE — 1111F DSCHRG MED/CURRENT MED MERGE: CPT | Mod: CPTII,S$GLB,, | Performed by: INTERNAL MEDICINE

## 2022-12-16 PROCEDURE — 4010F PR ACE/ARB THEARPY RXD/TAKEN: ICD-10-PCS | Mod: CPTII,S$GLB,, | Performed by: INTERNAL MEDICINE

## 2022-12-16 PROCEDURE — 3044F PR MOST RECENT HEMOGLOBIN A1C LEVEL <7.0%: ICD-10-PCS | Mod: CPTII,S$GLB,, | Performed by: INTERNAL MEDICINE

## 2022-12-16 PROCEDURE — 1101F PT FALLS ASSESS-DOCD LE1/YR: CPT | Mod: CPTII,S$GLB,, | Performed by: INTERNAL MEDICINE

## 2022-12-16 PROCEDURE — 99215 OFFICE O/P EST HI 40 MIN: CPT | Mod: S$GLB,,, | Performed by: INTERNAL MEDICINE

## 2022-12-16 PROCEDURE — 3074F SYST BP LT 130 MM HG: CPT | Mod: CPTII,S$GLB,, | Performed by: INTERNAL MEDICINE

## 2022-12-16 PROCEDURE — 1125F AMNT PAIN NOTED PAIN PRSNT: CPT | Mod: CPTII,S$GLB,, | Performed by: INTERNAL MEDICINE

## 2022-12-16 PROCEDURE — 3288F PR FALLS RISK ASSESSMENT DOCUMENTED: ICD-10-PCS | Mod: CPTII,S$GLB,, | Performed by: INTERNAL MEDICINE

## 2022-12-16 PROCEDURE — 3288F FALL RISK ASSESSMENT DOCD: CPT | Mod: CPTII,S$GLB,, | Performed by: INTERNAL MEDICINE

## 2022-12-16 PROCEDURE — 1159F MED LIST DOCD IN RCRD: CPT | Mod: CPTII,S$GLB,, | Performed by: INTERNAL MEDICINE

## 2022-12-16 PROCEDURE — 3078F PR MOST RECENT DIASTOLIC BLOOD PRESSURE < 80 MM HG: ICD-10-PCS | Mod: CPTII,S$GLB,, | Performed by: INTERNAL MEDICINE

## 2022-12-16 PROCEDURE — 99215 PR OFFICE/OUTPT VISIT, EST, LEVL V, 40-54 MIN: ICD-10-PCS | Mod: S$GLB,,, | Performed by: INTERNAL MEDICINE

## 2022-12-16 PROCEDURE — 3008F PR BODY MASS INDEX (BMI) DOCUMENTED: ICD-10-PCS | Mod: CPTII,S$GLB,, | Performed by: INTERNAL MEDICINE

## 2022-12-16 PROCEDURE — 3078F DIAST BP <80 MM HG: CPT | Mod: CPTII,S$GLB,, | Performed by: INTERNAL MEDICINE

## 2022-12-16 PROCEDURE — 1111F PR DISCHARGE MEDS RECONCILED W/ CURRENT OUTPATIENT MED LIST: ICD-10-PCS | Mod: CPTII,S$GLB,, | Performed by: INTERNAL MEDICINE

## 2022-12-16 PROCEDURE — 4010F ACE/ARB THERAPY RXD/TAKEN: CPT | Mod: CPTII,S$GLB,, | Performed by: INTERNAL MEDICINE

## 2022-12-16 PROCEDURE — 3044F HG A1C LEVEL LT 7.0%: CPT | Mod: CPTII,S$GLB,, | Performed by: INTERNAL MEDICINE

## 2022-12-16 PROCEDURE — 3008F BODY MASS INDEX DOCD: CPT | Mod: CPTII,S$GLB,, | Performed by: INTERNAL MEDICINE

## 2022-12-16 PROCEDURE — 3074F PR MOST RECENT SYSTOLIC BLOOD PRESSURE < 130 MM HG: ICD-10-PCS | Mod: CPTII,S$GLB,, | Performed by: INTERNAL MEDICINE

## 2022-12-16 PROCEDURE — 99999 PR PBB SHADOW E&M-EST. PATIENT-LVL III: ICD-10-PCS | Mod: PBBFAC,,, | Performed by: INTERNAL MEDICINE

## 2022-12-16 PROCEDURE — 1125F PR PAIN SEVERITY QUANTIFIED, PAIN PRESENT: ICD-10-PCS | Mod: CPTII,S$GLB,, | Performed by: INTERNAL MEDICINE

## 2022-12-16 NOTE — PATIENT INSTRUCTIONS
Assessment/Plan:  Babatunde Singh is a 72 y.o. male with PAD with RLE CLI s/p left axillary to bilateral femoris profunda bypass with 8 mm PTFE (10/6/2022), HTN, HLD, history of RLE DVT, former smoker, who presents for a follow up appointment.     1. RLE CLI s/p left axillary to bilateral femoris profunda bypass with 8 mm PTFE (10/6/2022)- Pt is doing well with no significant LE claudication.  RLE appears warm and well perfused with wound vac in place.  Pt scheduled to follow up with Dr. Ortiz and wound care on 12/20/2022.  Continue ASA 81 mg daily, Xarelto 20 mg daily, atorvastatin 80 mg daily, zetia 10 mg daily.      2. HLD- Continue atorvastatin 80 mg daily and zetia 10 mg daily.    3. HTN- Continue current medications.      4. Smoking- Pt states he has stopped smoking.  Encourage smoking cessation.    5. Renal Mass- Follow  up with Urology as scheduled.     Follow up in 3 months

## 2022-12-16 NOTE — TELEPHONE ENCOUNTER
Contacted pt in reference to pt not arriving for visit with Dr. Ortiz today. Pt states he was told to reschedule FU with Dr. Ortiz to next week after being seen in ED. Also states wound vac is still in place and functioning well. Appt rescheduled, pt confirmed.

## 2022-12-16 NOTE — TELEPHONE ENCOUNTER
----- Message from Anamaria Cope sent at 12/16/2022  1:37 PM CST -----  Contact: Naima    [facility]    [phone]  Calling to inform Sherry that they have been trying to see pt to do a wound vac change today and pts wife said he was not home that he was out working.  Pls call.

## 2022-12-16 NOTE — TELEPHONE ENCOUNTER
----- Message from Sis Mandujano sent at 12/16/2022  1:24 PM CST -----  Contact: Naima Flores   Ms. Naima PATTERSON is calling in regards to going to see the pt and the wife stated he was at work. Naima stated they cant see a pt who is not home bound. Pt was d/c with a wound vac. Naima stated they will not be able to change the wound vac.  Please call and advise.

## 2022-12-16 NOTE — TELEPHONE ENCOUNTER
Returned call and spoke with Naima from  agency, advised I will call to contact patient.    Called patient, advised  agency has been trying to reach him.  Patient stated they need to call [phone] to reach him the other number is his daughter.  I also advised patient that home health services are for patients who are home bound and that he cannot be working.  Patient given MsMelanie Naima's number with [facility].

## 2022-12-16 NOTE — PROGRESS NOTES
"[facility]      Chief Complaint   Patient presents with    Peripheral Arterial Disease    Deep Vein Thrombosis    Tachycardia       Patient ID: Babatunde Singh is a 72 y.o. male with PAD with RLE CLI s/p left axillary to bilateral femoris profunda bypass with 8 mm PTFE (10/6/2022), HTN, HLD, history of RLE DVT, former smoker, who presents for a follow up appointment.  Pertinent history/events are as follows:     -Pt kindly referred by Dr. Avelar for evaluation of right leg pain (per her note on 7/10/2022):  "71 yo w htn here with chronic discomfort to the R LE, paresthesias and throbbing pain. Suspect claudication, without concern for acute ischemia. Will obtain US to ensure no dvt, treat v sciatica and likely dc home w outpt fu."    -At our initial clinic visit on 7/26/2022, Mr. Singh reports first noticing right leg pain approximately 4-5 years ago.  States right leg pain became acutely worse 2 weeks ago after moving furniture.  At that time he notes "shooting pain down my right leg".  He reports pain in both calves after walking less than 1 block, which is relieved with rest.  He hs no rest pain or tissue loss.  Smokes up to 1.5 packs a day for total 58 years.  RLE venous ultrasound on 7/10/2022 revealed no evidence of deep venous thrombosis in the right lower extremity.  Plan:    Right leg pain- Etiology likely multifactorial with contribution from possible lumbar degenerative disc disease with sciatica, and flow limiting PAD.  Check MRI lumbar spine and CTA abd/pelvis with iliofemoral runoff.  Check echo.  If EF is normal and CTA shows evidence of PAD, start cilostazol 100 mg bid.  Pt to start walking program with goal of at least 30 minutes a day/5 days a week.  Refer to Neurosurgery for evaluation of neurogenic claudication and RLE numbness.   HLD- Start zetia 10 mg daily.  Continue atorvastatin 80 mg daily and ASA 81 mg daily.  HTN- Continue current medications.    Smoking- Encourage " smoking cessation.    Results addendum 8/11/2022:  CTA abd/pelvis on 8/3/2022 revealed severe bilateral LE PAD.   The study also revealed a 6cm soft tissue density mass with borderline enhancement at right upper pole kidney.  Echo on 8/5/2022 with normal LV systolic function EF 70%.    Plan:  -Start cilostazol 100 mg bid  -Check CT renal mass protocol and refer to Urology for evaluation  -Results and plan discussed in detail with Mr. Singh who voiced understanding.  -Follow-up is scheduled.    -On 10/6/2022, pt underwent left axillary to bilateral femoris profunda bypass with 8 mm PTFE (Dr. Ortiz of Vascular Surgery) due to RLE critical limb ischemia.         HPI:  Mr. Singh reports doing well with no chest pain, SOB, TIA symptoms or syncope.  RLE appears warm and well perfused with wound vac in place.  Pt scheduled to follow up with Dr. Ortiz and wound care on 12/20/2022.      Past Medical History:   Diagnosis Date    DVT (deep venous thrombosis)     Hypertension     Peripheral arterial disease      Past Surgical History:   Procedure Laterality Date    APPLICATION OF WOUND VACUUM-ASSISTED CLOSURE DEVICE Right 12/8/2022    Procedure: APPLICATION, WOUND VAC;  Surgeon: LEI Ortiz III, MD;  [facility];  Service: Peripheral Vascular;  Laterality: Right;    COLONOSCOPY N/A 10/31/2022    Procedure: COLONOSCOPY;  Surgeon: Philip Shafer MD;  Location: [facility] (2ND FLR);  Service: Endoscopy;  Laterality: N/A;    CREATION OF AXILLARY-FEMORAL ARTERY BYPASS WITH GRAFT N/A 10/6/2022    Procedure: CREATION, BYPASS, ARTERIAL, AXILLARY TO FEMORAL, USING GRAFT;  Surgeon: LEI Ortiz III, MD;  [facility];  Service: Peripheral Vascular;  Laterality: N/A;    CREATION OF FEMORAL-FEMORAL ARTERY BYPASS WITH GRAFT N/A 10/6/2022    Procedure: CREATION, BYPASS, ARTERIAL, FEMORAL TO FEMORAL, USING GRAFT;  Surgeon: LEI Ortiz III, MD;  Location: [facility] OR [facility] FLR;  Service:  Peripheral Vascular;  Laterality: N/A;    ESOPHAGOGASTRODUODENOSCOPY N/A 10/20/2022    Procedure: EGD (ESOPHAGOGASTRODUODENOSCOPY);  Surgeon: Sixto Chicas MD;  Location: [facility] ENDO (2ND FLR);  Service: Endoscopy;  Laterality: N/A;    ESOPHAGOGASTRODUODENOSCOPY N/A 10/28/2022    Procedure: EGD (ESOPHAGOGASTRODUODENOSCOPY);  Surgeon: Marlee Hopson MD;  Location: [facility] ENDO (2ND FLR);  Service: Gastroenterology;  Laterality: N/A;    EXCISION OF HYDROCELE Left 2022    Procedure: HYDROCELECTOMY;  Surgeon: Damion Roberts MD;  Location: [facility];  Service: Urology;  Laterality: Left;    KNEE SURGERY  1972    WOUND DEBRIDEMENT Right 2022    Procedure: DEBRIDEMENT, WOUND;  Surgeon: LEI Ortiz III, MD;  Location: [facility] OR 2ND FLR;  Service: Peripheral Vascular;  Laterality: Right;  RLE wound debridement     Social History     Socioeconomic History    Marital status:    Occupational History    Occupation: Ne[employer]   Tobacco Use    Smoking status: Former     Packs/day: 0.50     Years: 55.00     Pack years: 27.50     Types: Cigarettes     Quit date: 10/11/2022     Years since quittin.1    Smokeless tobacco: Never    Tobacco comments:     Cigarettes3-4 per day   Substance and Sexual Activity    Alcohol use: Never    Drug use: Never    Sexual activity: Yes     Partners: Female     Comment: wife     Family History   Problem Relation Age of Onset    Hypertension Mother     Hypertension Father        Review of patient's allergies indicates:  No Known Allergies    Medication List with Changes/Refills   Current Medications    ACETAMINOPHEN (TYLENOL) 325 MG TABLET    Take 2 tablets (650 mg total) by mouth every 8 (eight) hours as needed.    ASPIRIN 81 MG CHEW    Chew and swallow 1 tablet (81 mg total) by mouth once daily.    ATORVASTATIN (LIPITOR) 80 MG TABLET    Take 1 tablet (80 mg total) by mouth once daily.    EZETIMIBE (ZETIA) 10 MG TABLET    Take 1 tablet (10 mg total) by mouth once daily.     "GABAPENTIN (NEURONTIN) 800 MG TABLET    Take 1 tablet (800 mg total) by mouth 3 (three) times daily.    HYDROCODONE-ACETAMINOPHEN (NORCO) 5-325 MG PER TABLET    Take 1 tablet by mouth every 6 (six) hours as needed for Pain.    LOSARTAN-HYDROCHLOROTHIAZIDE 50-12.5 MG (HYZAAR) 50-12.5 MG PER TABLET    Take 1 tablet by mouth once daily.    MULTIVITAMIN (THERAGRAN) PER TABLET    Take 1 tablet by mouth once daily.    PANTOPRAZOLE (PROTONIX) 40 MG TABLET    Take 1 tablet (40 mg total) by mouth once daily.    RIVAROXABAN (XARELTO) 20 MG TAB    Take 1 tablet (20 mg total) by mouth daily with dinner or evening meal.       Review of Systems  Constitution: Denies chills, fever, and sweats.  HENT: Denies headaches or blurry vision.  Cardiovascular: Denies chest pain or irregular heart beat.  Respiratory: Denies cough or shortness of breath.  Gastrointestinal: Denies abdominal pain, nausea, or vomiting.  Musculoskeletal: Negative for right leg pain.   Neurological: Denies dizziness or focal weakness.  Psychiatric/Behavioral: Normal mental status.  Hematologic/Lymphatic: Denies bleeding problem or easy bruising/bleeding.  Skin: Denies rash or suspicious lesions    Physical Examination  /62   Pulse 82   Ht 5' 5" (1.651 m)   Wt 65.8 kg (145 lb 1 oz)   BMI 24.14 kg/m²     Constitutional: No acute distress, conversant  HEENT: Sclera anicteric, Pupils equal, round and reactive to light, extraocular motions intact, Oropharynx clear  Neck: No JVD, no carotid bruits  Cardiovascular: regular rate and rhythm, no murmur, rubs or gallops, normal S1/S2  Pulmonary: Clear to auscultation bilaterally  Abdominal: Abdomen soft, nontender, nondistended, positive bowel sounds  Extremities: No lower extremity edema,   Pulses:  Carotid pulses are 2+ on the right side, and 2+ on the left side.  Radial pulses are 2+ on the right side, and 2+ on the left side.   Femoral pulses are 2+ on the right side, and 2+ on the left side.  Popliteal pulses " are 2+ on the right side, and 2+ on the left side.   Dorsalis pedis pulses are 2+ on the right side, and 2+ on the left side.   Posterior tibial pulses are 2+ on the right side, and 2+ on the left side.    Skin: No ecchymosis, erythema, or ulcers  Psych: Alert and oriented x 3, appropriate affect  Neuro: CNII-XII intact, no focal deficits    Labs:  Most Recent Data  CBC:   Lab Results   Component Value Date    WBC 10.74 12/12/2022    HGB 8.4 (L) 12/12/2022    HCT 28.2 (L) 12/12/2022     12/12/2022    MCV 78 (L) 12/12/2022    RDW 19.1 (H) 12/12/2022     BMP:   Lab Results   Component Value Date     12/12/2022    K 3.9 12/12/2022     12/12/2022    CO2 24 12/12/2022    BUN 12 12/12/2022    CREATININE 0.8 12/12/2022     (H) 12/12/2022    CALCIUM 8.9 12/12/2022    MG 2.0 12/13/2022    PHOS 3.8 12/13/2022     LFTS;   Lab Results   Component Value Date    PROT 4.8 (L) 10/30/2022    ALBUMIN 2.3 (L) 10/30/2022    BILITOT 0.3 10/30/2022    AST 17 10/30/2022    ALKPHOS 76 10/30/2022    ALT 12 10/30/2022     COAGS:   Lab Results   Component Value Date    INR 1.2 10/28/2022     FLP:   Lab Results   Component Value Date    CHOL 138 03/10/2022    HDL 44 03/10/2022    LDLCALC 86.0 03/10/2022    TRIG 40 03/10/2022    CHOLHDL 31.9 03/10/2022     CARDIAC:   Lab Results   Component Value Date    TROPONINI 0.200 (H) 10/11/2022     (H) 10/20/2022     Imaging:    RLE Venous Ultrasound 7/10/2022:  No evidence of deep venous thrombosis in the right lower extremity.    Assessment/Plan:  Babatunde Singh is a 72 y.o. male with PAD with RLE CLI s/p left axillary to bilateral femoris profunda bypass with 8 mm PTFE (10/6/2022), HTN, HLD, history of RLE DVT, former smoker, who presents for a follow up appointment.     1. RLE CLI s/p left axillary to bilateral femoris profunda bypass with 8 mm PTFE (10/6/2022)- Pt is doing well with no significant LE claudication.  RLE appears warm and well perfused with wound vac in  place.  Pt scheduled to follow up with Dr. Ortiz and wound care on 12/20/2022.  Continue ASA 81 mg daily, Xarelto 20 mg daily, atorvastatin 80 mg daily, zetia 10 mg daily.      2. HLD- Continue atorvastatin 80 mg daily and zetia 10 mg daily.    3. HTN- Continue current medications.      4. Smoking- Pt states he has stopped smoking.  Encourage smoking cessation.    5. Renal Mass- Follow  up with Urology as scheduled.     Follow up in 3 months    Total duration of face to face visit time 45 minutes.  Total time spent counseling greater than fifty percent of total visit time.  Counseling included discussion regarding imaging findings, diagnosis, possibilities, treatment options, risks and benefits.  The patient had many questions regarding the options and long-term effects.    Cr Wallace MD, PhD  Interventional Cardiology

## 2022-12-19 NOTE — PHYSICIAN QUERY
"PT Name: Babatunde Singh  MR #: [id number]    DOCUMENTATION CLARIFICATION     CDS/: OBDULIA Pierson, RN, CCDS              Contact information: [email or web address]  This form is a permanent document in the medical record.    Query Date: December 19, 2022  By submitting this query, we are merely seeking further clarification of documentation. Please utilize your independent clinical judgment when addressing the question(s) below.    The Medical Record contains the following:   Indicator Supporting Clinical Findings Location in Medical Record   X Documentation of "Debridement" PRE-OP DIAGNOSIS: Non-healing wound of right lower extremity   Procedure(s):  DEBRIDEMENT, WOUND  APPLICATION, WOUND VAC     OPERATIVE FINDINGS: R lower calf exposed tendon debrided, VAC applied     INDICATION FOR PROCEDURE: This patient presents with a history of open RLE wound with tendon exposed.     We sharply debrided the non-viable tissue located within the wound down to bleeding viable tissue. The area was then thoroughly washed out and dried. Hemostasis achieved. The wound was measured to be 4 x 3 x 0.5 cm in size. Op Note: Dr. Ortiz 12/9    Documentation of "I&D"      Other       Excisional debridement is the surgical removal or cutting away of such tissue, necrosis, or slough and is classified to the root operation "Excision." Use of a sharp instrument does not always indicate that an excisional debridement was performed. Minor removal of loose fragments with scissors or using a sharp instrument to scrape away tissue is not an excisional debridement. Excisional debridement involves the use of a scalpel to remove devitalized tissue.  Nonexcisional debridement is the nonoperative brushing, irrigating, scrubbing, or washing of devitalized tissue, necrosis, slough, or foreign material. Most nonexcisional debridement procedures are classified to the root operation "Extraction" (pulling or stripping out or off all or a portion of " a body part by the use of force).     Provider, please provide further clarification on the procedure performed on _right lower extremity___         :    [ x  ] Excisional Debridement of subcutaneous tissue/fascia   [   ] Excisional Debridement of muscle   [   ] Excisional Debridement of tendon        [  ] Clinically Undetermined     Reference:    ICD-10-CM/PCS Coding Clinic Third Quarter ICD-10, Effective with discharges: October 7, 2015 [facility] § Excisional and nonexcisional debridement (2015).    Form No. [id number]

## 2022-12-20 ENCOUNTER — OFFICE VISIT (OUTPATIENT)
Dept: WOUND CARE | Facility: CLINIC | Age: 72
End: 2022-12-20
Payer: MEDICARE

## 2022-12-20 VITALS
HEART RATE: 84 BPM | BODY MASS INDEX: 23.47 KG/M2 | TEMPERATURE: 99 F | DIASTOLIC BLOOD PRESSURE: 63 MMHG | WEIGHT: 140.88 LBS | HEIGHT: 65 IN | SYSTOLIC BLOOD PRESSURE: 136 MMHG

## 2022-12-20 DIAGNOSIS — I73.9 PAD (PERIPHERAL ARTERY DISEASE): ICD-10-CM

## 2022-12-20 DIAGNOSIS — S81.801A WOUND OF RIGHT LOWER EXTREMITY, INITIAL ENCOUNTER: Primary | ICD-10-CM

## 2022-12-20 DIAGNOSIS — Z87.891 HISTORY OF SMOKING: ICD-10-CM

## 2022-12-20 DIAGNOSIS — I10 PRIMARY HYPERTENSION: ICD-10-CM

## 2022-12-20 DIAGNOSIS — S91.301A OPEN WOUND OF RIGHT HEEL, INITIAL ENCOUNTER: ICD-10-CM

## 2022-12-20 DIAGNOSIS — R60.0 BILATERAL LOWER EXTREMITY EDEMA: ICD-10-CM

## 2022-12-20 PROCEDURE — 1101F PR PT FALLS ASSESS DOC 0-1 FALLS W/OUT INJ PAST YR: ICD-10-PCS | Mod: CPTII,S$GLB,,

## 2022-12-20 PROCEDURE — 3075F SYST BP GE 130 - 139MM HG: CPT | Mod: CPTII,S$GLB,,

## 2022-12-20 PROCEDURE — 99214 PR OFFICE/OUTPT VISIT, EST, LEVL IV, 30-39 MIN: ICD-10-PCS | Mod: 25,S$GLB,,

## 2022-12-20 PROCEDURE — 4010F PR ACE/ARB THEARPY RXD/TAKEN: ICD-10-PCS | Mod: CPTII,S$GLB,,

## 2022-12-20 PROCEDURE — 3075F PR MOST RECENT SYSTOLIC BLOOD PRESS GE 130-139MM HG: ICD-10-PCS | Mod: CPTII,S$GLB,,

## 2022-12-20 PROCEDURE — 3288F FALL RISK ASSESSMENT DOCD: CPT | Mod: CPTII,S$GLB,,

## 2022-12-20 PROCEDURE — 1125F PR PAIN SEVERITY QUANTIFIED, PAIN PRESENT: ICD-10-PCS | Mod: CPTII,S$GLB,,

## 2022-12-20 PROCEDURE — 3008F PR BODY MASS INDEX (BMI) DOCUMENTED: ICD-10-PCS | Mod: CPTII,S$GLB,,

## 2022-12-20 PROCEDURE — 99999 PR PBB SHADOW E&M-EST. PATIENT-LVL III: CPT | Mod: PBBFAC,,,

## 2022-12-20 PROCEDURE — 99999 PR PBB SHADOW E&M-EST. PATIENT-LVL III: ICD-10-PCS | Mod: PBBFAC,,,

## 2022-12-20 PROCEDURE — 3078F PR MOST RECENT DIASTOLIC BLOOD PRESSURE < 80 MM HG: ICD-10-PCS | Mod: CPTII,S$GLB,,

## 2022-12-20 PROCEDURE — 97605 NEG PRS WND THER DME<=50SQCM: CPT | Mod: S$GLB,,,

## 2022-12-20 PROCEDURE — 1101F PT FALLS ASSESS-DOCD LE1/YR: CPT | Mod: CPTII,S$GLB,,

## 2022-12-20 PROCEDURE — 3044F PR MOST RECENT HEMOGLOBIN A1C LEVEL <7.0%: ICD-10-PCS | Mod: CPTII,S$GLB,,

## 2022-12-20 PROCEDURE — 1111F PR DISCHARGE MEDS RECONCILED W/ CURRENT OUTPATIENT MED LIST: ICD-10-PCS | Mod: CPTII,S$GLB,,

## 2022-12-20 PROCEDURE — 1111F DSCHRG MED/CURRENT MED MERGE: CPT | Mod: CPTII,S$GLB,,

## 2022-12-20 PROCEDURE — 3078F DIAST BP <80 MM HG: CPT | Mod: CPTII,S$GLB,,

## 2022-12-20 PROCEDURE — 3288F PR FALLS RISK ASSESSMENT DOCUMENTED: ICD-10-PCS | Mod: CPTII,S$GLB,,

## 2022-12-20 PROCEDURE — 3044F HG A1C LEVEL LT 7.0%: CPT | Mod: CPTII,S$GLB,,

## 2022-12-20 PROCEDURE — 1159F MED LIST DOCD IN RCRD: CPT | Mod: CPTII,S$GLB,,

## 2022-12-20 PROCEDURE — 97605 PR NEG PRESS WOUND THERAPY (NPWT) W/NON-DISPOSABLE WOUND VAC DEVICE (DME), <=50 CM: ICD-10-PCS | Mod: S$GLB,,,

## 2022-12-20 PROCEDURE — 3008F BODY MASS INDEX DOCD: CPT | Mod: CPTII,S$GLB,,

## 2022-12-20 PROCEDURE — 1125F AMNT PAIN NOTED PAIN PRSNT: CPT | Mod: CPTII,S$GLB,,

## 2022-12-20 PROCEDURE — 1159F PR MEDICATION LIST DOCUMENTED IN MEDICAL RECORD: ICD-10-PCS | Mod: CPTII,S$GLB,,

## 2022-12-20 PROCEDURE — 4010F ACE/ARB THERAPY RXD/TAKEN: CPT | Mod: CPTII,S$GLB,,

## 2022-12-20 PROCEDURE — 99214 OFFICE O/P EST MOD 30 MIN: CPT | Mod: 25,S$GLB,,

## 2022-12-20 NOTE — PROGRESS NOTES
Subjective:       Patient ID: Babatunde Singh is a 72 y.o. male.    Chief Complaint: Wound Check      Patient presents for a reevaluation of right lower extremity and heel wounds. Patient follows with vascular surgery for PAD. Patient s/p right axillary to bi-profunda bypass on 10/6/22. Pt's ABIs preoperatively bilaterally were 0 to 0.69 on the right lower extremity and 0.74 on the left lower extremity. Patient s/p surgical debridement of his Achilles tendon on 22 with Dr. Ortiz. Following this, patient had a wound vac placed to his right distal lateral lower extremity wound. Pt has [email or web address] that comes out 2x a week- pt reports that he stopped working. He reports a new right anterior lower extremity wound that started from the wound vac dressing. Denies fever, chills, erythema, warmth, drainage, or pain.    Review of Systems   Constitutional:  Negative for activity change, chills, diaphoresis, fatigue and fever.   Respiratory:  Negative for apnea, chest tightness and shortness of breath.    Cardiovascular:  Negative for chest pain, palpitations and leg swelling.   Musculoskeletal:  Negative for gait problem and joint swelling.   Skin:  Positive for wound. Negative for pallor and rash.   Neurological:  Negative for syncope, weakness and numbness.   Psychiatric/Behavioral:  Negative for agitation. The patient is not nervous/anxious.    All other systems reviewed and are negative.    Objective:      Physical Exam  Vitals reviewed.   Constitutional:       General: He is not in acute distress.     Appearance: Normal appearance.   Cardiovascular:      Rate and Rhythm: Normal rate and regular rhythm.      Pulses: Normal pulses.   Pulmonary:      Effort: No respiratory distress.   Musculoskeletal:         General: No swelling.      Right lower le+ Edema present.      Left lower le+ Edema present.   Skin:     General: Skin is warm and dry.      Capillary Refill: Capillary refill takes less than 2  seconds.      Findings: Wound present. No erythema.          Neurological:      General: No focal deficit present.      Mental Status: He is alert and oriented to person, place, and time.   Psychiatric:         Mood and Affect: Mood normal.         Behavior: Behavior normal.         Thought Content: Thought content normal.         Judgment: Judgment normal.       Assessment:       1. Wound of right lower extremity, initial encounter    2. Open wound of right heel, initial encounter    3. Bilateral lower extremity edema    4. Primary hypertension    5. PAD (peripheral artery disease)    6. History of smoking           Altered Skin Integrity 10/20/22 0750 Right proximal;lateral Leg Ulceration (Active)   10/20/22 0750   Altered Skin Integrity Present on Admission: yes   Side: Right   Orientation: proximal;lateral   Location: Leg   Wound Number:    Is this injury device related?:    Primary Wound Type: Ulceration   Description of Altered Skin Integrity:    Ankle-Brachial Index:    Pulses:    Removal Indication and Assessment:    Wound Outcome:    (Retired) Wound Length (cm):    (Retired) Wound Width (cm):    (Retired) Depth (cm):    Wound Description (Comments):    Removal Indications:    Dressing Appearance Dry;Intact;Clean 12/20/22 0905   Drainage Amount None 12/20/22 0905   Care Cleansed with:;Soap and water 12/20/22 0905            Altered Skin Integrity 10/28/22 1405 Right lower;distal;lateral Leg Ulceration (Active)   10/28/22 1405   Altered Skin Integrity Present on Admission: yes   Side: Right   Orientation: lower;distal;lateral   Location: Leg   Wound Number:    Is this injury device related?:    Primary Wound Type: Ulceration   Description of Altered Skin Integrity:    Ankle-Brachial Index:    Pulses:    Removal Indication and Assessment:    Wound Outcome:    (Retired) Wound Length (cm):    (Retired) Wound Width (cm):    (Retired) Depth (cm):    Wound Description (Comments):    Removal Indications:    Wound  Image   12/20/22 0905   Dressing Appearance Dry;Intact;Clean;Moist drainage 12/20/22 0905   Drainage Amount Small 12/20/22 0905   Drainage Characteristics/Odor Bleeding controlled;Tan;Yellow 12/20/22 0905   Red (%), Wound Tissue Color 100 % 12/20/22 0905   Periwound Area Intact;Pink 12/20/22 0905   Wound Length (cm) 4.4 cm 12/20/22 0905   Wound Width (cm) 4.2 cm 12/20/22 0905   Wound Depth (cm) 0.1 cm 12/20/22 0905   Wound Volume (cm^3) 1.848 cm^3 12/20/22 0905   Wound Surface Area (cm^2) 18.48 cm^2 12/20/22 0905   Care Cleansed with:;Soap and water 12/20/22 0905   Dressing Applied;Other (comment) 12/20/22 0905            Altered Skin Integrity 10/28/22 1405 Right lateral Heel Ulceration (Active)   10/28/22 1405   Altered Skin Integrity Present on Admission: yes   Side: Right   Orientation: lateral   Location: Heel   Wound Number:    Is this injury device related?:    Primary Wound Type: Ulceration   Description of Altered Skin Integrity:    Ankle-Brachial Index:    Pulses:    Removal Indication and Assessment:    Wound Outcome:    (Retired) Wound Length (cm):    (Retired) Wound Width (cm):    (Retired) Depth (cm):    Wound Description (Comments):    Removal Indications:    Wound Image   12/20/22 0905   Dressing Appearance Open to air 12/20/22 0905   Drainage Amount None 12/20/22 0905   Red (%), Wound Tissue Color 100 % 12/20/22 0905   Periwound Area Intact;Pink 12/20/22 0905   Wound Length (cm) 0.2 cm 12/20/22 0905   Wound Width (cm) 0.4 cm 12/20/22 0905   Wound Depth (cm) 0.1 cm 12/20/22 0905   Wound Volume (cm^3) 0.008 cm^3 12/20/22 0905   Wound Surface Area (cm^2) 0.08 cm^2 12/20/22 0905   Care Cleansed with:;Soap and water 12/20/22 0905   Dressing Applied;Other (comment) 12/20/22 0905            Altered Skin Integrity Right anterior;lower Leg (Active)       Altered Skin Integrity Present on Admission:    Side: Right   Orientation: anterior;lower   Location: Leg   Wound Number:    Is this injury device related?:     Primary Wound Type:    Description of Altered Skin Integrity:    Ankle-Brachial Index:    Pulses:    Removal Indication and Assessment:    Wound Outcome:    (Retired) Wound Length (cm):    (Retired) Wound Width (cm):    (Retired) Depth (cm):    Wound Description (Comments):    Removal Indications:    Wound Image   12/20/22 0905   Dressing Appearance Dry;Intact;Clean;Moist drainage 12/20/22 0905   Drainage Amount Scant 12/20/22 0905   Drainage Characteristics/Odor Serous 12/20/22 0905   Red (%), Wound Tissue Color 100 % 12/20/22 0905   Periwound Area Intact;Pink 12/20/22 0905   Wound Length (cm) 1.3 cm 12/20/22 0905   Wound Width (cm) 0.4 cm 12/20/22 0905   Wound Depth (cm) 0.1 cm 12/20/22 0905   Wound Volume (cm^3) 0.052 cm^3 12/20/22 0905   Wound Surface Area (cm^2) 0.52 cm^2 12/20/22 0905   Care Cleansed with:;Soap and water 12/20/22 0905   Dressing Applied;Silicone 12/20/22 0905         Babatunde was seen in the clinic room and placed in the supine position on the treatment table.  The wound dressings and the drape and black sponge was removed from the wound bed. The leg was cleansed with Easi-clense sponges and dried thoroughly. No odor, redness, or warmth noted from patient's baseline.  Right proximal lateral lower extremity wound noted to be healed. New wound noted to right anterior lower extremity.    Plan of Care:   Right lateral lower extremity wound: NPWT. Skin prep was applied to the periwound area.  Black sponge was placed in the wound.  The periwound skin was protected with drape and black sponge covered the wound.  Another piece of drape was used to seal the sponge to the wound.  The trac pad was connected to the black sponge and then to the wound vac.  The vac was run on continuous suction at 125 mmHg.  The patient tolerated the procedure well.   Right anterior lower extremity wound: Mepilex lite  Right lateral heel wound: Polymem  ABD pad was placed to right anterior shin to prevent skin  breakdown.  Right lower extremity was covered with rolled gauze and 1 loose ACE wrap to secure dressing.    Plan:     Discussed patient with Dr. Ortiz.    Discussed wound care vs amputation. Discussed at length the long process: development of granulation tissue and reepithelialization is slow and may not happen. Discussed the importance of keeping the tendon wet/moist and not letting the tendon desiccate. Will increase home health to 3x a week.    Right lower extremity dressed as detailed above.    Faxed home health orders to [facility].  Home health 3x a week or PRN complications. Remove old dressing. Cleanse wound with mild soap and water or wound cleanser. Pat dry. Apply: Skin barrier wipe to periwound  ABD pad to right anterior shin to prevent skin breakdown.  Right anterior lower extremity wound: Mepilex to wound bed  Right heel wound: Polymem to wound bed  Right lateral lower extremity wound: NPWT. Fill wound with Black GranuFoam, NPWT setting at 125 mmHg continuous. Cover right lower extremity with rolled gauze and 1 loose ACE wrap to secure dressing in place.  Skilled nursing instruction to patient/caregiver to include: VAC safety alarms, Troubleshooting, Canister changes, Waste disposal, Patching leaks, s/s reportable to skilled nurse/MD, Customer service phone number, Alternate dressing placement, VAC Patient handbook, and Universal precautions;  Alternate dressing change PRN, VAC therapy off for greater than 2 hours: Moist dressing to wound bed    Discussed nutrition and the role of protein in wound healing with the patient. Instructed patient to continue to optimize protein for wound healing. Gave samples of Ensure.    Instructed patient to elevate legs whenever sedentary.    Instructed patient to keep vascular surgery appointments.    Written and verbal instructions given to patient.  RTC in 3 weeks    Sherry Davis PA-C

## 2022-12-20 NOTE — PROGRESS NOTES
"  Encounter Date:  2022    Patient ID: Babatunde Singh  Age:  72 y.o. :  1950    Chief Complaint   Patient presents with    Consult     History:    Mr. Singh is a 72 y.o. male who presents with an incidental, abnormal finding in gallbladder.  He arrives to clinic alone from [facility], ambulating with a cane today.     Referred by: Dr. Moises Cuba    Dx with DVT in 2022 and remains on Xarelto. Imaging in 2022 revealed R kidney mass which was evaluated by urology, Dr. Cuba. The tentative plan was to perform partial vs total nephrectomy when anticoagulation is finished in 2023. Updated imaging from 2022 identified abnormal finding in gallbladder with gallstones.  He underwent L axillary to bilateral femoris profunda bypass on 10/6/22; post op complicated by GI bleed. He had upper and lower endoscopies for melena evaluation in 10/22. He underwent surgical debridement of Achilles tendon on 22 per Dr. Ortiz who placed a wound vac to RLE wound. He is followed closely by wound care with home health 3 x week.      Today he reports "I feel great". He denies abd pain, reports good appetite, tolerating oral diet without N/VD. Has regular BMs. His diet has changed b/c his daughter is now helping with cooking/ meal prep. He has wound vac to RLE.   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   Denies CP, SOB. Denies MI, CVA    Prior abd surgery: none  No family hx of CA  Currently taking anticoagulation - taking Xarelto  Quit smoking 10/2022, smoked since age 16 <1ppd    Data:     Radiology:  Dr. Gavin and I personally reviewed these images:   2022: U/S   FINDINGS:  Liver: Enlarged in size measuring 16.4 cm. Heterogeneous echotexture.  The hepatorenal index is1.4 suggesting around  5% steatosis.  No focal hepatic lesions.     Gallbladder: Multiple echogenic foci are present within the gallbladder with posterior shadowing consistent with " cholelithiasis.  In addition superior to the gallstones is what appears to be a space-occupying solid lesion image from gallbladder walls.  If the patient is truly NPO this is abnormal and could represent a neoplastic lesion within the gallbladder.  This area measures at 10.2 x 3.5 cm there is a small amount of flow of present within this lesion.  Neoplastic disease cannot be excluded..  There are also multiple echogenic gallstones.  No sonographic Veloz's sign.  Perhaps further assessment can be obtained with ultrasound contrast study to see if this is truly hypervascular.      Right kidney:  measuring 10.6 cm.  Previously reported mass again noted in this study measuring 7.3 x 6.4 x 6.0 cm.  The lesion is solid-appearing and heterogeneously hyperechoic with a small cystic center.  Neoplastic disease must be strongly considered.  Further assessment could be obtained with a dedicated renal ultrasound or dedicated renal ultrasound with ultrasound contrast      Impression:     Solid right renal mass concerning for neoplasm.  A dedicated renal ultrasound with contrast or MRI may be helpful to evaluate possible invasion to adjacent vasculature.     Cholelithiasis and changes suggesting a gallbladder space-occupying lesion.   As the patient stated NPO status, findings above are suspicious for gallbladder neoplasm.  A dedicated gallbladder ultrasound with contrast or MRI may be helpful.     Mild hepatomegaly      10/10/2022: CTA chest:  Suboptimal examination of pulmonary arteries demonstrates no evidence of pulmonary thromboembolism as clinically questioned.     No acute parenchymal abnormality within the lungs.  No consolidation or pleural effusion.     Postop change of axillary to femoral bypass graft with mild surrounding edema and few foci of gas, likely postprocedural.  No significant completion is observed within the chest.     5.7 superior pole renal mass, similar to prior.     High density material noted in the  gallbladder fundus, which may relate to vicarious excretion of contrast or biliary sludge/stones.      Labs:    Lab Results   Component Value Date    WBC 10.74 12/12/2022    HGB 8.4 (L) 12/12/2022    HCT 28.2 (L) 12/12/2022    MCV 78 (L) 12/12/2022     12/12/2022       Chemistry        Component Value Date/Time     12/12/2022 0907    K 3.9 12/12/2022 0907     12/12/2022 0907    CO2 24 12/12/2022 0907    BUN 12 12/12/2022 0907    CREATININE 0.8 12/12/2022 0907     (H) 12/12/2022 0907        Component Value Date/Time    CALCIUM 8.9 12/12/2022 0907    ALKPHOS 76 10/30/2022 0610    AST 17 10/30/2022 0610    ALT 12 10/30/2022 0610    BILITOT 0.3 10/30/2022 0610    ESTGFRAFRICA >60.0 03/10/2022 1118    EGFRNONAA >60.0 03/10/2022 1118        Lab Results   Component Value Date    HGBA1C 5.8 (H) 03/10/2022       EGD 10/28/22  Impression:    - Normal esophagus.                          - Normal stomach.                          - Erythematous duodenopathy.                          - Normal examined jejunum.                          - No specimens collected.      Push enteroscopy 10/28  -  Patchy erythema in duodenal bulb, otherwise normal     Colonoscopy 10/31/22  - 2mm and 3mm polyp in transverse - jumbo forceps  - 7 mm polyp in sigmoid - cold snare  - 12 mm polyp in sigmoid - hot snare  - Diverticulosis in sigmoid       Past Medical History:   Diagnosis Date    DVT (deep venous thrombosis)     Hypertension     Peripheral arterial disease      Past Surgical History:   Procedure Laterality Date    APPLICATION OF WOUND VACUUM-ASSISTED CLOSURE DEVICE Right 12/8/2022    Procedure: APPLICATION, WOUND VAC;  Surgeon: LEI Ortiz III, MD;  Location: [facility] OR [address] [place] [address];  Service: Peripheral Vascular;  Laterality: Right;    COLONOSCOPY N/A 10/31/2022    Procedure: COLONOSCOPY;  Surgeon: Philip Shafer MD;  [facility]  Service: Endoscopy;  Laterality: N/A;    CREATION OF  AXILLARY-FEMORAL ARTERY BYPASS WITH GRAFT N/A 10/6/2022    Procedure: CREATION, BYPASS, ARTERIAL, AXILLARY TO FEMORAL, USING GRAFT;  Surgeon: LEI Ortiz III, MD;  Location: [facility] OR 2ND FLR;  Service: Peripheral Vascular;  Laterality: N/A;    CREATION OF FEMORAL-FEMORAL ARTERY BYPASS WITH GRAFT N/A 10/6/2022    Procedure: CREATION, BYPASS, ARTERIAL, FEMORAL TO FEMORAL, USING GRAFT;  Surgeon: LEI Ortiz III, MD;  Location: [facility] OR 2ND FLR;  Service: Peripheral Vascular;  Laterality: N/A;    ESOPHAGOGASTRODUODENOSCOPY N/A 10/20/2022    Procedure: EGD (ESOPHAGOGASTRODUODENOSCOPY);  Surgeon: Sixto Chicas MD;  Location: [facility] ENDO (2ND FLR);  Service: Endoscopy;  Laterality: N/A;    ESOPHAGOGASTRODUODENOSCOPY N/A 10/28/2022    Procedure: EGD (ESOPHAGOGASTRODUODENOSCOPY);  Surgeon: Marlee Hopson MD;  Location: [facility] ENDO (2ND FLR);  Service: Gastroenterology;  Laterality: N/A;    EXCISION OF HYDROCELE Left 4/26/2022    Procedure: HYDROCELECTOMY;  Surgeon: Damion Roberts MD;  Location: [facility];  Service: Urology;  Laterality: Left;    KNEE SURGERY  1972    WOUND DEBRIDEMENT Right 12/8/2022    Procedure: DEBRIDEMENT, WOUND;  Surgeon: LEI Ortiz III, MD;  Location: [facility] OR 2ND FLR;  Service: Peripheral Vascular;  Laterality: Right;  RLE wound debridement     Current Outpatient Medications on File Prior to Visit   Medication Sig Dispense Refill    acetaminophen (TYLENOL) 325 MG tablet Take 2 tablets (650 mg total) by mouth every 8 (eight) hours as needed. 20 tablet 0    aspirin 81 MG Chew Chew and swallow 1 tablet (81 mg total) by mouth once daily. 30 tablet 0    atorvastatin (LIPITOR) 80 MG tablet Take 1 tablet (80 mg total) by mouth once daily. 30 tablet 0    ezetimibe (ZETIA) 10 mg tablet Take 1 tablet (10 mg total) by mouth once daily. 30 tablet 0    gabapentin (NEURONTIN) 800 MG tablet Take 1 tablet (800 mg total) by mouth 3 (three) times daily. 90 tablet 11    HYDROcodone-acetaminophen (NORCO) 5-325  "mg per tablet Take 1 tablet by mouth every 6 (six) hours as needed for Pain. 35 tablet 0    losartan-hydrochlorothiazide 50-12.5 mg (HYZAAR) 50-12.5 mg per tablet Take 1 tablet by mouth once daily. 30 tablet 0    multivitamin (THERAGRAN) per tablet Take 1 tablet by mouth once daily.      pantoprazole (PROTONIX) 40 MG tablet Take 1 tablet (40 mg total) by mouth once daily. 30 tablet 0    rivaroxaban (XARELTO) 20 mg Tab Take 1 tablet (20 mg total) by mouth daily with dinner or evening meal. 30 tablet 0     No current facility-administered medications on file prior to visit.     Review of patient's allergies indicates:  No Known Allergies    Family History:  His family history includes Hypertension in his father and mother.     Social History:   reports that he quit smoking about 2 months ago. His smoking use included cigarettes. He has a 27.50 pack-year smoking history. He has never used smokeless tobacco. He reports that he does not drink alcohol and does not use drugs.     ROS:    Pertinent positive/negatives detailed in HPI, all other systems negative.     Physical Exam:  BP (!) 145/65   Pulse 88   Temp 98.2 °F (36.8 °C)   Ht 5' 5" (1.651 m)   Wt 67.4 kg (148 lb 9.4 oz)   SpO2 100%   BMI 24.73 kg/m²     Constitutional:  Non-toxic, no acute distress.    Eyes:  Sclerae anicteric, gaze symmetrical  Neck:  Trachea midline, FROM  Resp:  Even and unlabored resp  Abd:  Soft, non-tender, no masses, no ascites, no superficial varices  Musculoskeletal:  Ambulatory, RLE wound vac in place  Neuro:  No gross deficits  Psych:  Awake, alert, oriented.  Answers and asks questions appropriately    Assessment:  Gallbladder mass, abnormal finding on diagnostic imaging of digestive tract and R kidney, mass in R kidney  RLE wound, with wound vac in place    Plan:  This 71 y/o gentleman had incidental finding of abnormality in gallbladder, with possible mass in addition to R kidney mass. He has had several hospital admissions " since 9/2022 with DVT now on Xarelto, PAD, s/p RLE vascular sx, melena, then debridement of Achilles tendon in 12/2022.  Imaging noted gallstones with possible gallbladder mass. He is tolerating diet without N/V/D, denies abd pain. Obtain MRI/ MRCP to further characterize if gallbladder mass is present.  If mass is present, could consider cholecystectomy at time of nephrectomy next year. If no gallbladder mass is present, would not recommend cholecystectomy given he is asymptomatic.     Pt seen in conjunction with Dr. Gavin today.         Annamarie Pelletier NP  Upper GI / Hepatobiliary Surgical Oncology  [facility] [place], [place]  Office: [phone]  Fax: [phone]     ADDENDUM: MRI scheduled for 1/4/23 at 5pm.

## 2022-12-22 ENCOUNTER — TELEPHONE (OUTPATIENT)
Dept: SURGERY | Facility: CLINIC | Age: 72
End: 2022-12-22
Payer: MEDICARE

## 2022-12-22 ENCOUNTER — OFFICE VISIT (OUTPATIENT)
Dept: SURGERY | Facility: CLINIC | Age: 72
End: 2022-12-22
Payer: MEDICARE

## 2022-12-22 VITALS
TEMPERATURE: 98 F | SYSTOLIC BLOOD PRESSURE: 145 MMHG | HEIGHT: 65 IN | OXYGEN SATURATION: 100 % | DIASTOLIC BLOOD PRESSURE: 65 MMHG | HEART RATE: 88 BPM | BODY MASS INDEX: 24.75 KG/M2 | WEIGHT: 148.56 LBS

## 2022-12-22 DIAGNOSIS — R93.2 ABNORMAL FINDINGS ON DIAGNOSTIC IMAGING OF LIVER AND BILIARY TRACT: ICD-10-CM

## 2022-12-22 DIAGNOSIS — K82.8 GALLBLADDER MASS: ICD-10-CM

## 2022-12-22 DIAGNOSIS — R93.3 ABNORMAL FINDINGS ON DIAGNOSTIC IMAGING OF OTHER PARTS OF DIGESTIVE TRACT: ICD-10-CM

## 2022-12-22 DIAGNOSIS — G25.2 OTHER SPECIFIED FORMS OF TREMOR: ICD-10-CM

## 2022-12-22 DIAGNOSIS — R93.421 ABNORMAL RADIOLOGIC FINDINGS ON DIAGNOSTIC IMAGING OF RIGHT KIDNEY: ICD-10-CM

## 2022-12-22 DIAGNOSIS — N28.89 MASS OF KIDNEY: Primary | ICD-10-CM

## 2022-12-22 DIAGNOSIS — R93.5 ABNORMAL FINDINGS ON DIAGNOSTIC IMAGING OF OTHER ABDOMINAL REGIONS, INCLUDING RETROPERITONEUM: ICD-10-CM

## 2022-12-22 DIAGNOSIS — F17.201 TOBACCO USE DISORDER, SEVERE, IN EARLY REMISSION: ICD-10-CM

## 2022-12-22 PROBLEM — G93.41 ENCEPHALOPATHY, METABOLIC: Status: RESOLVED | Noted: 2022-10-07 | Resolved: 2022-12-22

## 2022-12-22 PROBLEM — F17.210 DEPENDENCE ON NICOTINE FROM CIGARETTES: Status: RESOLVED | Noted: 2022-07-26 | Resolved: 2022-12-22

## 2022-12-22 PROBLEM — A41.9 SEVERE SEPSIS: Status: RESOLVED | Noted: 2022-10-08 | Resolved: 2022-12-22

## 2022-12-22 PROBLEM — D62 ACUTE BLOOD LOSS ANEMIA: Status: RESOLVED | Noted: 2022-10-19 | Resolved: 2022-12-22

## 2022-12-22 PROBLEM — R65.20 SEVERE SEPSIS: Status: RESOLVED | Noted: 2022-10-08 | Resolved: 2022-12-22

## 2022-12-22 PROBLEM — K92.2 GASTROINTESTINAL HEMORRHAGE: Status: RESOLVED | Noted: 2022-10-19 | Resolved: 2022-12-22

## 2022-12-22 PROCEDURE — 3288F PR FALLS RISK ASSESSMENT DOCUMENTED: ICD-10-PCS | Mod: CPTII,S$GLB,, | Performed by: NURSE PRACTITIONER

## 2022-12-22 PROCEDURE — 1160F RVW MEDS BY RX/DR IN RCRD: CPT | Mod: CPTII,S$GLB,, | Performed by: NURSE PRACTITIONER

## 2022-12-22 PROCEDURE — 1111F DSCHRG MED/CURRENT MED MERGE: CPT | Mod: CPTII,S$GLB,, | Performed by: NURSE PRACTITIONER

## 2022-12-22 PROCEDURE — 1111F PR DISCHARGE MEDS RECONCILED W/ CURRENT OUTPATIENT MED LIST: ICD-10-PCS | Mod: CPTII,S$GLB,, | Performed by: NURSE PRACTITIONER

## 2022-12-22 PROCEDURE — 3044F PR MOST RECENT HEMOGLOBIN A1C LEVEL <7.0%: ICD-10-PCS | Mod: CPTII,S$GLB,, | Performed by: NURSE PRACTITIONER

## 2022-12-22 PROCEDURE — 4010F PR ACE/ARB THEARPY RXD/TAKEN: ICD-10-PCS | Mod: CPTII,S$GLB,, | Performed by: NURSE PRACTITIONER

## 2022-12-22 PROCEDURE — 3008F BODY MASS INDEX DOCD: CPT | Mod: CPTII,S$GLB,, | Performed by: NURSE PRACTITIONER

## 2022-12-22 PROCEDURE — 99214 OFFICE O/P EST MOD 30 MIN: CPT | Mod: S$GLB,,, | Performed by: NURSE PRACTITIONER

## 2022-12-22 PROCEDURE — 3077F PR MOST RECENT SYSTOLIC BLOOD PRESSURE >= 140 MM HG: ICD-10-PCS | Mod: CPTII,S$GLB,, | Performed by: NURSE PRACTITIONER

## 2022-12-22 PROCEDURE — 3008F PR BODY MASS INDEX (BMI) DOCUMENTED: ICD-10-PCS | Mod: CPTII,S$GLB,, | Performed by: NURSE PRACTITIONER

## 2022-12-22 PROCEDURE — 3077F SYST BP >= 140 MM HG: CPT | Mod: CPTII,S$GLB,, | Performed by: NURSE PRACTITIONER

## 2022-12-22 PROCEDURE — 3044F HG A1C LEVEL LT 7.0%: CPT | Mod: CPTII,S$GLB,, | Performed by: NURSE PRACTITIONER

## 2022-12-22 PROCEDURE — 1160F PR REVIEW ALL MEDS BY PRESCRIBER/CLIN PHARMACIST DOCUMENTED: ICD-10-PCS | Mod: CPTII,S$GLB,, | Performed by: NURSE PRACTITIONER

## 2022-12-22 PROCEDURE — 3288F FALL RISK ASSESSMENT DOCD: CPT | Mod: CPTII,S$GLB,, | Performed by: NURSE PRACTITIONER

## 2022-12-22 PROCEDURE — 99999 PR PBB SHADOW E&M-EST. PATIENT-LVL IV: ICD-10-PCS | Mod: PBBFAC,,, | Performed by: NURSE PRACTITIONER

## 2022-12-22 PROCEDURE — 1159F MED LIST DOCD IN RCRD: CPT | Mod: CPTII,S$GLB,, | Performed by: NURSE PRACTITIONER

## 2022-12-22 PROCEDURE — 4010F ACE/ARB THERAPY RXD/TAKEN: CPT | Mod: CPTII,S$GLB,, | Performed by: NURSE PRACTITIONER

## 2022-12-22 PROCEDURE — 3078F DIAST BP <80 MM HG: CPT | Mod: CPTII,S$GLB,, | Performed by: NURSE PRACTITIONER

## 2022-12-22 PROCEDURE — 1101F PT FALLS ASSESS-DOCD LE1/YR: CPT | Mod: CPTII,S$GLB,, | Performed by: NURSE PRACTITIONER

## 2022-12-22 PROCEDURE — 1159F PR MEDICATION LIST DOCUMENTED IN MEDICAL RECORD: ICD-10-PCS | Mod: CPTII,S$GLB,, | Performed by: NURSE PRACTITIONER

## 2022-12-22 PROCEDURE — 99999 PR PBB SHADOW E&M-EST. PATIENT-LVL IV: CPT | Mod: PBBFAC,,, | Performed by: NURSE PRACTITIONER

## 2022-12-22 PROCEDURE — 1126F AMNT PAIN NOTED NONE PRSNT: CPT | Mod: CPTII,S$GLB,, | Performed by: NURSE PRACTITIONER

## 2022-12-22 PROCEDURE — 1126F PR PAIN SEVERITY QUANTIFIED, NO PAIN PRESENT: ICD-10-PCS | Mod: CPTII,S$GLB,, | Performed by: NURSE PRACTITIONER

## 2022-12-22 PROCEDURE — 99214 PR OFFICE/OUTPT VISIT, EST, LEVL IV, 30-39 MIN: ICD-10-PCS | Mod: S$GLB,,, | Performed by: NURSE PRACTITIONER

## 2022-12-22 PROCEDURE — 1101F PR PT FALLS ASSESS DOC 0-1 FALLS W/OUT INJ PAST YR: ICD-10-PCS | Mod: CPTII,S$GLB,, | Performed by: NURSE PRACTITIONER

## 2022-12-22 PROCEDURE — 3078F PR MOST RECENT DIASTOLIC BLOOD PRESSURE < 80 MM HG: ICD-10-PCS | Mod: CPTII,S$GLB,, | Performed by: NURSE PRACTITIONER

## 2022-12-22 NOTE — TELEPHONE ENCOUNTER
Called to schedule pt for MRI/MRCP, pt is requesting an early morning scan if possible. Notified the pt that the soonest early appointment at [facility] is 1/18/23. Will attempt to call CT to inquire of pt can be seen sooner.

## 2022-12-22 NOTE — PROGRESS NOTES
I saw Mr. Singh with Annamarie WOLFE. This is a 72 year old man with history of PAD and DVT on Xarelto who was sent to me by Dr. Cuba for evaluation of a possible gallbladder mass. Dr. Cuba is planning a robotic partial vs total right nephrectomy for a right renal mass in February 2023, and a possible gallbladder mass was seen on abdominal US last month. He has had multiple CTAs over the past several months, which have consistently showed gallstones without a clear gallbladder mass. He denies any abdominal pain, nausea, vomiting, or other symptoms of biliary colic. I reviewed the CTAs, and the gallbladder does appear abnormal, although I do not see a definite mass.     He was recently hospitalized for debridement of a right lower extremity wound. He was a long time smoker but recently quit. He walks with a cane but is quite functional, and I do think he would tolerate surgery if indicated. I will get an MRI of the Abdomen with and without Contrast with an MRCP to further evaluate this possible. In the absence of a mass on MRCP, I don't think removal of the gallbladder would be indicated given that he is asymptomatic. If there is still concern for a mass on the MRCP, I will see him back in clinic to discuss next steps.     Tolu Gavin MD  Surgical Oncology

## 2023-01-03 ENCOUNTER — LAB VISIT (OUTPATIENT)
Dept: LAB | Facility: HOSPITAL | Age: 73
End: 2023-01-03
Payer: MEDICARE

## 2023-01-03 ENCOUNTER — TELEPHONE (OUTPATIENT)
Dept: SURGERY | Facility: CLINIC | Age: 73
End: 2023-01-03
Payer: MEDICARE

## 2023-01-03 ENCOUNTER — TELEPHONE (OUTPATIENT)
Dept: GASTROENTEROLOGY | Facility: CLINIC | Age: 73
End: 2023-01-03
Payer: MEDICARE

## 2023-01-03 ENCOUNTER — PATIENT MESSAGE (OUTPATIENT)
Dept: GASTROENTEROLOGY | Facility: HOSPITAL | Age: 73
End: 2023-01-03
Payer: MEDICARE

## 2023-01-03 DIAGNOSIS — D50.0 ANEMIA DUE TO GI BLOOD LOSS: ICD-10-CM

## 2023-01-03 DIAGNOSIS — K92.1 MELENA: ICD-10-CM

## 2023-01-03 DIAGNOSIS — D64.9 ANEMIA, UNSPECIFIED TYPE: Primary | ICD-10-CM

## 2023-01-03 LAB
BASOPHILS # BLD AUTO: 0.09 K/UL (ref 0–0.2)
BASOPHILS NFR BLD: 0.9 % (ref 0–1.9)
DIFFERENTIAL METHOD: ABNORMAL
EOSINOPHIL # BLD AUTO: 0.1 K/UL (ref 0–0.5)
EOSINOPHIL NFR BLD: 1 % (ref 0–8)
ERYTHROCYTE [DISTWIDTH] IN BLOOD BY AUTOMATED COUNT: 19.4 % (ref 11.5–14.5)
HCT VFR BLD AUTO: 26.5 % (ref 40–54)
HGB BLD-MCNC: 7.6 G/DL (ref 14–18)
IMM GRANULOCYTES # BLD AUTO: 0.05 K/UL (ref 0–0.04)
IMM GRANULOCYTES NFR BLD AUTO: 0.5 % (ref 0–0.5)
LYMPHOCYTES # BLD AUTO: 2.7 K/UL (ref 1–4.8)
LYMPHOCYTES NFR BLD: 26 % (ref 18–48)
MCH RBC QN AUTO: 21.5 PG (ref 27–31)
MCHC RBC AUTO-ENTMCNC: 28.7 G/DL (ref 32–36)
MCV RBC AUTO: 75 FL (ref 82–98)
MONOCYTES # BLD AUTO: 1 K/UL (ref 0.3–1)
MONOCYTES NFR BLD: 9.4 % (ref 4–15)
NEUTROPHILS # BLD AUTO: 6.5 K/UL (ref 1.8–7.7)
NEUTROPHILS NFR BLD: 62.2 % (ref 38–73)
NRBC BLD-RTO: 0 /100 WBC
PLATELET # BLD AUTO: 323 K/UL (ref 150–450)
PMV BLD AUTO: 9.8 FL (ref 9.2–12.9)
RBC # BLD AUTO: 3.54 M/UL (ref 4.6–6.2)
WBC # BLD AUTO: 10.37 K/UL (ref 3.9–12.7)

## 2023-01-03 PROCEDURE — 36415 COLL VENOUS BLD VENIPUNCTURE: CPT | Performed by: STUDENT IN AN ORGANIZED HEALTH CARE EDUCATION/TRAINING PROGRAM

## 2023-01-03 PROCEDURE — 85025 COMPLETE CBC W/AUTO DIFF WBC: CPT | Performed by: STUDENT IN AN ORGANIZED HEALTH CARE EDUCATION/TRAINING PROGRAM

## 2023-01-03 NOTE — TELEPHONE ENCOUNTER
Call placed to pt as a reminder for MRI scheduled tomorrow 1/4/23 @ [facility] for 5 pm. No answer, detailed message left on voicemail with contact info.

## 2023-01-04 ENCOUNTER — HOSPITAL ENCOUNTER (OUTPATIENT)
Dept: RADIOLOGY | Facility: HOSPITAL | Age: 73
Discharge: HOME | End: 2023-01-04
Attending: NURSE PRACTITIONER
Payer: MEDICARE

## 2023-01-04 DIAGNOSIS — N28.89 MASS OF KIDNEY: ICD-10-CM

## 2023-01-04 DIAGNOSIS — R93.421 ABNORMAL RADIOLOGIC FINDINGS ON DIAGNOSTIC IMAGING OF RIGHT KIDNEY: ICD-10-CM

## 2023-01-04 DIAGNOSIS — G25.2 OTHER SPECIFIED FORMS OF TREMOR: ICD-10-CM

## 2023-01-04 PROCEDURE — 76376 3D RENDER W/INTRP POSTPROCES: CPT | Mod: 26,,, | Performed by: RADIOLOGY

## 2023-01-04 PROCEDURE — 76376 MRI ABDOMEN WITH AND WO_INC MRCP (XPD): ICD-10-PCS | Mod: 26,,, | Performed by: RADIOLOGY

## 2023-01-04 PROCEDURE — A9585 GADOBUTROL INJECTION: HCPCS | Performed by: NURSE PRACTITIONER

## 2023-01-04 PROCEDURE — 74183 MRI ABD W/O CNTR FLWD CNTR: CPT | Mod: 26,,, | Performed by: RADIOLOGY

## 2023-01-04 PROCEDURE — 25500020 PHARM REV CODE 255: Performed by: NURSE PRACTITIONER

## 2023-01-04 PROCEDURE — 76376 3D RENDER W/INTRP POSTPROCES: CPT | Mod: TC

## 2023-01-04 PROCEDURE — 74183 MRI ABD W/O CNTR FLWD CNTR: CPT | Mod: TC

## 2023-01-04 PROCEDURE — 74183 MRI ABDOMEN WITH AND WO_INC MRCP (XPD): ICD-10-PCS | Mod: 26,,, | Performed by: RADIOLOGY

## 2023-01-04 RX ORDER — GADOBUTROL 604.72 MG/ML
10 INJECTION INTRAVENOUS
Status: COMPLETED | OUTPATIENT
Start: 2023-01-04 | End: 2023-01-04

## 2023-01-04 RX ADMIN — GADOBUTROL 10 ML: 604.72 INJECTION INTRAVENOUS at 01:01

## 2023-01-05 ENCOUNTER — TELEPHONE (OUTPATIENT)
Dept: WOUND CARE | Facility: CLINIC | Age: 73
End: 2023-01-05
Payer: MEDICARE

## 2023-01-05 NOTE — TELEPHONE ENCOUNTER
Spoke with Gabino she stated that she needed progress notes on patient dates 12/6/2022 and 12/20/2022.

## 2023-01-05 NOTE — TELEPHONE ENCOUNTER
Spoke with Ac at [facility] and she stated that [facility] needed information on Sherry Davis PA-C

## 2023-01-05 NOTE — TELEPHONE ENCOUNTER
----- Message from Ricky Bravo sent at 1/5/2023 10:42 AM CST -----  Contact: [phone] Gabino  Pt home health nurse is calling in ref to his supplies -- states hes running low and wants to know if they can be ordered she also have other questions in ref to pt  -- please give her a call back [phone] Gabino

## 2023-01-05 NOTE — TELEPHONE ENCOUNTER
----- Message from Corina Alvarez sent at 1/5/2023  1:33 PM CST -----  Regarding: Pt Advice  Contact: Ac [phone] Ext [phone]  Saleem/KAREN VARELA is calling in ref to the pt wound care, they are the provider for the Wound Vac.  Please call back Ac [phone] Ext [phone]

## 2023-01-06 ENCOUNTER — TELEPHONE (OUTPATIENT)
Dept: SURGERY | Facility: CLINIC | Age: 73
End: 2023-01-06
Payer: MEDICARE

## 2023-01-06 DIAGNOSIS — K82.8 GALLBLADDER MASS: Primary | ICD-10-CM

## 2023-01-06 DIAGNOSIS — Z09 ENCOUNTER FOR FOLLOW-UP EXAMINATION AFTER COMPLETED TREATMENT FOR CONDITIONS OTHER THAN MALIGNANT NEOPLASM: ICD-10-CM

## 2023-01-06 DIAGNOSIS — D37.6 NEOPLASM OF UNCERTAIN BEHAVIOR OF LIVER, GALLBLADDER AND BILE DUCTS: ICD-10-CM

## 2023-01-06 NOTE — TELEPHONE ENCOUNTER
Notified pt of lab and clinic appointment that was scheduled on 1/19/23 per pts request. Pt aware of times and locations of appointments, verbalized understanding.

## 2023-01-09 ENCOUNTER — OFFICE VISIT (OUTPATIENT)
Dept: WOUND CARE | Facility: CLINIC | Age: 73
End: 2023-01-09
Payer: MEDICARE

## 2023-01-09 VITALS
HEART RATE: 91 BPM | TEMPERATURE: 98 F | HEIGHT: 65 IN | BODY MASS INDEX: 24.53 KG/M2 | SYSTOLIC BLOOD PRESSURE: 150 MMHG | WEIGHT: 147.25 LBS | DIASTOLIC BLOOD PRESSURE: 63 MMHG

## 2023-01-09 DIAGNOSIS — R60.0 BILATERAL LOWER EXTREMITY EDEMA: ICD-10-CM

## 2023-01-09 DIAGNOSIS — Z87.828 HEALED WOUND: ICD-10-CM

## 2023-01-09 DIAGNOSIS — I73.9 PAD (PERIPHERAL ARTERY DISEASE): ICD-10-CM

## 2023-01-09 DIAGNOSIS — S81.801A WOUND OF RIGHT LOWER EXTREMITY, INITIAL ENCOUNTER: Primary | ICD-10-CM

## 2023-01-09 DIAGNOSIS — Z87.891 HISTORY OF SMOKING: ICD-10-CM

## 2023-01-09 DIAGNOSIS — I10 PRIMARY HYPERTENSION: ICD-10-CM

## 2023-01-09 PROCEDURE — 3077F PR MOST RECENT SYSTOLIC BLOOD PRESSURE >= 140 MM HG: ICD-10-PCS | Mod: CPTII,S$GLB,,

## 2023-01-09 PROCEDURE — 99999 PR PBB SHADOW E&M-EST. PATIENT-LVL III: CPT | Mod: PBBFAC,,,

## 2023-01-09 PROCEDURE — 99999 PR PBB SHADOW E&M-EST. PATIENT-LVL III: ICD-10-PCS | Mod: PBBFAC,,,

## 2023-01-09 PROCEDURE — 3078F DIAST BP <80 MM HG: CPT | Mod: CPTII,S$GLB,,

## 2023-01-09 PROCEDURE — 1111F PR DISCHARGE MEDS RECONCILED W/ CURRENT OUTPATIENT MED LIST: ICD-10-PCS | Mod: CPTII,S$GLB,,

## 2023-01-09 PROCEDURE — 97605 PR NEG PRESS WOUND THERAPY (NPWT) W/NON-DISPOSABLE WOUND VAC DEVICE (DME), <=50 CM: ICD-10-PCS | Mod: RT,S$GLB,,

## 2023-01-09 PROCEDURE — 1101F PR PT FALLS ASSESS DOC 0-1 FALLS W/OUT INJ PAST YR: ICD-10-PCS | Mod: CPTII,S$GLB,,

## 2023-01-09 PROCEDURE — 1125F AMNT PAIN NOTED PAIN PRSNT: CPT | Mod: CPTII,S$GLB,,

## 2023-01-09 PROCEDURE — 3008F BODY MASS INDEX DOCD: CPT | Mod: CPTII,S$GLB,,

## 2023-01-09 PROCEDURE — 97605 NEG PRS WND THER DME<=50SQCM: CPT | Mod: RT,S$GLB,,

## 2023-01-09 PROCEDURE — 3288F FALL RISK ASSESSMENT DOCD: CPT | Mod: CPTII,S$GLB,,

## 2023-01-09 PROCEDURE — 1111F DSCHRG MED/CURRENT MED MERGE: CPT | Mod: CPTII,S$GLB,,

## 2023-01-09 PROCEDURE — 99214 OFFICE O/P EST MOD 30 MIN: CPT | Mod: 25,S$GLB,,

## 2023-01-09 PROCEDURE — 1101F PT FALLS ASSESS-DOCD LE1/YR: CPT | Mod: CPTII,S$GLB,,

## 2023-01-09 PROCEDURE — 3077F SYST BP >= 140 MM HG: CPT | Mod: CPTII,S$GLB,,

## 2023-01-09 PROCEDURE — 3288F PR FALLS RISK ASSESSMENT DOCUMENTED: ICD-10-PCS | Mod: CPTII,S$GLB,,

## 2023-01-09 PROCEDURE — 1125F PR PAIN SEVERITY QUANTIFIED, PAIN PRESENT: ICD-10-PCS | Mod: CPTII,S$GLB,,

## 2023-01-09 PROCEDURE — 1159F MED LIST DOCD IN RCRD: CPT | Mod: CPTII,S$GLB,,

## 2023-01-09 PROCEDURE — 3008F PR BODY MASS INDEX (BMI) DOCUMENTED: ICD-10-PCS | Mod: CPTII,S$GLB,,

## 2023-01-09 PROCEDURE — 3078F PR MOST RECENT DIASTOLIC BLOOD PRESSURE < 80 MM HG: ICD-10-PCS | Mod: CPTII,S$GLB,,

## 2023-01-09 PROCEDURE — 1159F PR MEDICATION LIST DOCUMENTED IN MEDICAL RECORD: ICD-10-PCS | Mod: CPTII,S$GLB,,

## 2023-01-09 PROCEDURE — 99214 PR OFFICE/OUTPT VISIT, EST, LEVL IV, 30-39 MIN: ICD-10-PCS | Mod: 25,S$GLB,,

## 2023-01-09 NOTE — PROGRESS NOTES
Subjective:       Patient ID: Babatunde Singh is a 72 y.o. male.    Chief Complaint: Wound Check      Patient presents for a reevaluation of right lower extremity and heel wounds. Patient follows with vascular surgery for PAD. Patient s/p right axillary to bi-profunda bypass on 10/6/22. Pt's ABIs preoperatively bilaterally were 0 to 0.69 on the right lower extremity and 0.74 on the left lower extremity. Patient s/p surgical debridement of his Achilles tendon on 22 with Dr. Ortiz. Following this, patient had a wound vac placed to his right distal lateral lower extremity wound. Pt has [employer] that comes out x a week- pt reports that he stopped working. Denies fever, chills, erythema, warmth, drainage, or pain.    Review of Systems   Constitutional:  Negative for activity change, chills, diaphoresis, fatigue and fever.   Respiratory:  Negative for apnea, chest tightness and shortness of breath.    Cardiovascular:  Negative for chest pain, palpitations and leg swelling.   Musculoskeletal:  Negative for gait problem and joint swelling.   Skin:  Positive for wound. Negative for pallor and rash.   Neurological:  Negative for syncope, weakness and numbness.   Psychiatric/Behavioral:  Negative for agitation. The patient is not nervous/anxious.    All other systems reviewed and are negative.    Objective:      Physical Exam  Vitals reviewed.   Constitutional:       General: He is not in acute distress.     Appearance: Normal appearance.   Cardiovascular:      Rate and Rhythm: Normal rate and regular rhythm.      Pulses: Normal pulses.   Pulmonary:      Effort: No respiratory distress.   Musculoskeletal:         General: No swelling.      Right lower le+ Edema present.      Left lower le+ Edema present.   Skin:     General: Skin is warm and dry.      Capillary Refill: Capillary refill takes less than 2 seconds.      Findings: Wound present. No erythema.          Neurological:      General: No focal  deficit present.      Mental Status: He is alert and oriented to person, place, and time.   Psychiatric:         Mood and Affect: Mood normal.         Behavior: Behavior normal.         Thought Content: Thought content normal.         Judgment: Judgment normal.       Assessment:       1. Wound of right lower extremity, initial encounter    2. Healed wound    3. Bilateral lower extremity edema    4. Primary hypertension    5. PAD (peripheral artery disease)    6. History of smoking           Altered Skin Integrity 10/28/22 1405 Right lower;distal;lateral Leg Ulceration (Active)   10/28/22 1405   Altered Skin Integrity Present on Admission: yes   Side: Right   Orientation: lower;distal;lateral   Location: Leg   Wound Number:    Is this injury device related?:    Primary Wound Type: Ulceration   Description of Altered Skin Integrity:    Ankle-Brachial Index:    Pulses:    Removal Indication and Assessment:    Wound Outcome:    (Retired) Wound Length (cm):    (Retired) Wound Width (cm):    (Retired) Depth (cm):    Wound Description (Comments):    Removal Indications:    Wound Image   01/09/23 0834   Dressing Appearance Dry;Intact;Clean;Moist drainage 01/09/23 0834   Drainage Amount Moderate 01/09/23 0834   Drainage Characteristics/Odor Garsia 01/09/23 0834   Red (%), Wound Tissue Color 100 % 01/09/23 0834   Periwound Area Intact;Pink;Edematous 01/09/23 0834   Wound Length (cm) 4.3 cm 01/09/23 0834   Wound Width (cm) 4.3 cm 01/09/23 0834   Wound Depth (cm) 0.1 cm 01/09/23 0834   Wound Volume (cm^3) 1.849 cm^3 01/09/23 0834   Wound Surface Area (cm^2) 18.49 cm^2 01/09/23 0834   Care Cleansed with:;Soap and water 01/09/23 0834   Dressing Applied;Other (comment) 01/09/23 0834            Altered Skin Integrity 10/28/22 1405 Right lateral Heel Ulceration (Active)   10/28/22 1405   Altered Skin Integrity Present on Admission: yes   Side: Right   Orientation: lateral   Location: Heel   Wound Number:    Is this injury device  related?:    Primary Wound Type: Ulceration   Description of Altered Skin Integrity:    Ankle-Brachial Index:    Pulses:    Removal Indication and Assessment:    Wound Outcome:    (Retired) Wound Length (cm):    (Retired) Wound Width (cm):    (Retired) Depth (cm):    Wound Description (Comments):    Removal Indications:    Dressing Appearance Dry;Intact;Clean 01/09/23 0834   Drainage Amount None 01/09/23 0834   Care Cleansed with:;Soap and water 01/09/23 0834         Babatunde was seen in the clinic room and placed in the supine position on the treatment table.  The wound dressings and the drape and black sponge was removed from the wound bed. The leg was cleansed with Easi-clense sponges and dried thoroughly. No odor, redness, or warmth noted from patient's baseline.  Right anterior lower extremity and right lateral heel wound noted to be healed.    Plan of Care:   Right lateral lower extremity wound: NPWT. Skin prep was applied to the periwound area.  Black sponge was placed in the wound.  The periwound skin was protected with drape and black sponge covered the wound.  Another piece of drape was used to seal the sponge to the wound.  The trac pad was connected to the black sponge and then to the wound vac.  The vac was run on continuous suction at 125 mmHg.  The patient tolerated the procedure well.   ABD pad was placed to right anterior shin to prevent skin breakdown.  Right lower extremity was covered with rolled gauze and 1 loose ACE wrap to secure dressing.    Plan:     Discussed patient with Dr. Ortiz.    Discussed wound care vs amputation. Discussed at length the long process: development of granulation tissue and reepithelialization is slow and may not happen. Discussed the importance of keeping the tendon wet/moist and not letting the tendon desiccate. Will increase home health to 3x a week.    Right lower extremity dressed as detailed above.    Fax[facility] health orders to [facility].  Home health  3x a week or PRN complications. Remove old dressing. Cleanse wound with mild soap and water or wound cleanser. Pat dry. Apply: Skin barrier wipe to periwound  ABD pad to right anterior shin to prevent skin breakdown.  Right lateral lower extremity wound: NPWT. Fill wound with Black GranuFoam, NPWT setting at 125 mmHg continuous. Cover right lower extremity with rolled gauze and 1 loose ACE wrap to secure dressing in place.  Skilled nursing instruction to patient/caregiver to include: VAC safety alarms, Troubleshooting, Canister changes, Waste disposal, Patching leaks, s/s reportable to skilled nurse/MD, Customer service phone number, Alternate dressing placement, VAC Patient handbook, and Universal precautions;  Alternate dressing change PRN, VAC therapy off for greater than 2 hours: Moist dressing to wound bed    Discussed nutrition and the role of protein in wound healing with the patient. Instructed patient to continue to optimize protein for wound healing. Gave samples of Ensure.    Instructed patient to elevate legs whenever sedentary.    Instructed patient to keep vascular surgery appointments.    Written and verbal instructions given to patient.  RTC in 3-4 weeks    Sherry Davis PA-C

## 2023-01-10 ENCOUNTER — TELEPHONE (OUTPATIENT)
Dept: ENDOSCOPY | Facility: HOSPITAL | Age: 73
End: 2023-01-10
Payer: MEDICARE

## 2023-01-10 ENCOUNTER — TELEPHONE (OUTPATIENT)
Dept: WOUND CARE | Facility: CLINIC | Age: 73
End: 2023-01-10
Payer: MEDICARE

## 2023-01-10 DIAGNOSIS — N28.89 RENAL MASS: Primary | ICD-10-CM

## 2023-01-10 DIAGNOSIS — N28.89 RENAL MASS, RIGHT: ICD-10-CM

## 2023-01-10 RX ORDER — HEPARIN SODIUM 5000 [USP'U]/ML
5000 INJECTION, SOLUTION INTRAVENOUS; SUBCUTANEOUS
Status: CANCELLED | OUTPATIENT
Start: 2023-02-27 | End: 2023-02-27

## 2023-01-10 NOTE — PROGRESS NOTES
Vaccine Information Statement(s) was given today. This has been reviewed, questions answered, and verbal consent given by Patient for injection(s) and administration of Influenza (Inactivated).    Patient tolerated without incident. See immunization grid for documentation.         Discussed with Dr Gavin, surg onc  We will schedule combined robotic right partial nephrectomy and cholecystectomy at [facility] on Monday 2/27.    Orders placed    Will schedule preop appt with me at [facility]

## 2023-01-10 NOTE — TELEPHONE ENCOUNTER
Called and spoke with Navneet, advised clinic encounter notes from office visit on 1/9/23 was faxed over per her request and the wound care orders are on page 4 of 8.

## 2023-01-10 NOTE — TELEPHONE ENCOUNTER
----- Message from Kaylee Bloom sent at 1/10/2023 11:17 AM CST -----  Regarding: Clinical Notes  Contact: [phone]  [facility] calling in regards to appt pt had yesterday. [place] stated they would like clinical notes faxed over to [phone]. Please  call and adv @ [phone]- Navneet

## 2023-01-11 ENCOUNTER — TELEPHONE (OUTPATIENT)
Dept: GASTROENTEROLOGY | Facility: CLINIC | Age: 73
End: 2023-01-11
Payer: MEDICARE

## 2023-01-11 ENCOUNTER — TELEPHONE (OUTPATIENT)
Dept: VASCULAR SURGERY | Facility: CLINIC | Age: 73
End: 2023-01-11
Payer: MEDICARE

## 2023-01-11 NOTE — TELEPHONE ENCOUNTER
----- Message from Clementina Rosas sent at 1/10/2023  2:48 PM CST -----  Type:  Patient Returning Call    Who Called: pt   Who Left Message for Patient: pt   Does the patient know what this is regarding?: pt need a call from the office  he had a call but the couldn't talk he was in the store  Would the patient rather a call back or a response via "[facility]?  Call   Best Call Back Number: [phone]  Additional Information:  call back

## 2023-01-11 NOTE — TELEPHONE ENCOUNTER
Spoke with patient.   Scheduled for VCE on 1/17 for 7:30.    Reviewed prep instructions with patient and patient expressed understanding.   Confirmation mailed along with written prep instructions.   Claribel

## 2023-01-11 NOTE — TELEPHONE ENCOUNTER
Contacted Ac in response to message. Notified Ac that signed order has been faxed and nurse received transmission receipt. Ac verbalized understanding.  ----- Message from Umair Luna sent at 1/11/2023  8:41 AM CST -----  Regarding: advise; wound vac therapy  Contact: Ac @ [phone]. [phone]  Caller, Ac with I is calling again to speak to someone in the office to f/u and confirm if paperwork (Renewal Prescription) for wound vac therapy; caller states that forms were faxed to the office on 12/27/22. Caller states that the company has not heard back from the office. Caller is asking for a return call back today to advise. Reference number: [id number]. Thanks.

## 2023-01-11 NOTE — TELEPHONE ENCOUNTER
----- Message from Dilshad Ye MD sent at 1/3/2023 12:52 PM CST -----  Regarding: Outpatient Pill Cam  Keena Sanford,    Can we start getting an outpatient pill camera set up for Mr. Singh? I placed the order and I can do a phone consent later this week. We had discussed doing this at his last clinic appointment.    Thanks,  Dilshad

## 2023-01-13 ENCOUNTER — TELEPHONE (OUTPATIENT)
Dept: GASTROENTEROLOGY | Facility: CLINIC | Age: 73
End: 2023-01-13
Payer: MEDICARE

## 2023-01-13 NOTE — TELEPHONE ENCOUNTER
Ma spoke with pt   Pt was informed that he can drive himself  Pt states he doesn't want VCE done , he hasn't had any problems in awhile   He states he will call his daughter to cxl

## 2023-01-13 NOTE — TELEPHONE ENCOUNTER
----- Message from Karina Thao MA sent at 1/13/2023  2:44 PM CST -----  Contact: pt  He has a capsule on Tuesday. He needs help with the prep.  ----- Message -----  From: Sonali Wesley  Sent: 1/13/2023   2:28 PM CST  To: Ephraim Yung Staff    Pt is requesting to speak to someone in the office as soon as possible in regards to his appt Tuesday 1/7. He is requesting information as to if he needs to have someone drive him to and from the appt.     Confirmed contact below:  Contact Name:Babatunde Singh  Phone Number: [phone]

## 2023-01-16 ENCOUNTER — NURSE TRIAGE (OUTPATIENT)
Dept: ADMINISTRATIVE | Facility: CLINIC | Age: 73
End: 2023-01-16
Payer: MEDICARE

## 2023-01-16 NOTE — TELEPHONE ENCOUNTER
Spoke with Mr. Singh, answered questions about prep and discussed risks/benefit of video capsule endoscopy. He understands prep instructions and is amenable to the plan.     Dilshad Ye  Gastroenterology Fellow, PGY-IV

## 2023-01-16 NOTE — TELEPHONE ENCOUNTER
Patient called for clarification regarding pre-op procedure for Capsule Endoscopy. Patient states that he has spoken with Endoscopy Staff and advised to mix his colon prep at 12 noon on today and to begin drinking his prep at 7PM. Patient states his procedure is scheduled for tomorrow, 01/17/23 @ 7:30 AM.     Patient's Gastroenterologist/On Call Provider, Dr. Dilshad Ye contacted for care advice and states he will contact patient today regarding is Capsule Endoscopy.    Patient advised that Dr. Ye will contact him on today regarding his Capsule Endoscopy. Patient states understanding at this time.      Reason for Disposition   Question about upcoming scheduled test, no triage required and triager able to answer question    Additional Information   Negative: [1] Caller is not with the adult (patient) AND [2] reporting urgent symptoms   Negative: Lab result questions   Negative: Medication questions   Negative: Caller can't be reached by phone   Negative: Caller has already spoken to PCP or another triager   Negative: RN needs further essential information from caller in order to complete triage   Negative: Requesting regular office appointment   Negative: [1] Caller requesting NON-URGENT health information AND [2] PCP's office is the best resource   Negative: Health Information question, no triage required and triager able to answer question   Negative: General information question, no triage required and triager able to answer question    Protocols used: Information Only Call - No Triage[facility]

## 2023-01-17 ENCOUNTER — TELEPHONE (OUTPATIENT)
Dept: GASTROENTEROLOGY | Facility: CLINIC | Age: 73
End: 2023-01-17
Payer: MEDICARE

## 2023-01-17 ENCOUNTER — CLINICAL SUPPORT (OUTPATIENT)
Dept: GASTROENTEROLOGY | Facility: CLINIC | Age: 73
End: 2023-01-17
Payer: MEDICARE

## 2023-01-17 ENCOUNTER — TELEPHONE (OUTPATIENT)
Dept: UROLOGY | Facility: CLINIC | Age: 73
End: 2023-01-17
Payer: MEDICARE

## 2023-01-17 DIAGNOSIS — D64.9 ANEMIA, UNSPECIFIED TYPE: ICD-10-CM

## 2023-01-17 DIAGNOSIS — D50.0 ANEMIA DUE TO GI BLOOD LOSS: ICD-10-CM

## 2023-01-17 NOTE — TELEPHONE ENCOUNTER
Instructions for the day reviewed with patient.   All questions answered to patients satisfaction.   Patient swallowed capsule without incident.   Claribel

## 2023-01-17 NOTE — TELEPHONE ENCOUNTER
LVM TO RETURN MY CALL        MATT MORRIS      ----- Message from Matt Yost MA sent at 1/13/2023  2:53 PM CST -----  Regarding: FW: Outpatient surgery    ----- Message -----  From: Shani Hancock LPN  Sent: 1/12/2023   7:30 AM CST  To: Bereket De Leon Staff  Subject: Outpatient surgery                               Good morning,   This code is not on the inpatient list for medicare.  Please consider changing this to outpatient because the patient has medicare primary.  Thank you,   Shani Hancock LPN  [facility]   [phone]

## 2023-01-19 ENCOUNTER — LAB VISIT (OUTPATIENT)
Dept: LAB | Facility: HOSPITAL | Age: 73
End: 2023-01-19
Attending: SURGERY
Payer: MEDICARE

## 2023-01-19 ENCOUNTER — OFFICE VISIT (OUTPATIENT)
Dept: SURGERY | Facility: CLINIC | Age: 73
End: 2023-01-19
Payer: MEDICARE

## 2023-01-19 VITALS
HEIGHT: 65 IN | BODY MASS INDEX: 24.49 KG/M2 | TEMPERATURE: 98 F | OXYGEN SATURATION: 100 % | DIASTOLIC BLOOD PRESSURE: 64 MMHG | WEIGHT: 147 LBS | SYSTOLIC BLOOD PRESSURE: 143 MMHG | HEART RATE: 85 BPM

## 2023-01-19 DIAGNOSIS — K82.8 GALLBLADDER MASS: Primary | ICD-10-CM

## 2023-01-19 DIAGNOSIS — K87 DISORDERS OF GALLBLADDER, BILIARY TRACT AND PANCREAS IN DISEASES CLASSIFIED ELSEWHERE: ICD-10-CM

## 2023-01-19 DIAGNOSIS — D37.6 NEOPLASM OF UNCERTAIN BEHAVIOR OF LIVER, GALLBLADDER AND BILE DUCTS: ICD-10-CM

## 2023-01-19 DIAGNOSIS — K82.8 GALLBLADDER MASS: ICD-10-CM

## 2023-01-19 DIAGNOSIS — Z09 ENCOUNTER FOR FOLLOW-UP EXAMINATION AFTER COMPLETED TREATMENT FOR CONDITIONS OTHER THAN MALIGNANT NEOPLASM: ICD-10-CM

## 2023-01-19 LAB
CANCER AG19-9 SERPL-ACNC: 5.9 U/ML (ref 0–40)
CEA SERPL-MCNC: <1.7 NG/ML (ref 0–5)

## 2023-01-19 PROCEDURE — 3288F FALL RISK ASSESSMENT DOCD: CPT | Mod: CPTII,S$GLB,, | Performed by: NURSE PRACTITIONER

## 2023-01-19 PROCEDURE — 1101F PT FALLS ASSESS-DOCD LE1/YR: CPT | Mod: CPTII,S$GLB,, | Performed by: NURSE PRACTITIONER

## 2023-01-19 PROCEDURE — 99499 UNLISTED E&M SERVICE: CPT | Mod: S$GLB,,, | Performed by: SURGERY

## 2023-01-19 PROCEDURE — 3078F DIAST BP <80 MM HG: CPT | Mod: CPTII,S$GLB,, | Performed by: NURSE PRACTITIONER

## 2023-01-19 PROCEDURE — 3077F PR MOST RECENT SYSTOLIC BLOOD PRESSURE >= 140 MM HG: ICD-10-PCS | Mod: CPTII,S$GLB,, | Performed by: NURSE PRACTITIONER

## 2023-01-19 PROCEDURE — 99214 OFFICE O/P EST MOD 30 MIN: CPT | Mod: S$GLB,,, | Performed by: NURSE PRACTITIONER

## 2023-01-19 PROCEDURE — 82378 CARCINOEMBRYONIC ANTIGEN: CPT | Performed by: SURGERY

## 2023-01-19 PROCEDURE — 3008F PR BODY MASS INDEX (BMI) DOCUMENTED: ICD-10-PCS | Mod: CPTII,S$GLB,, | Performed by: NURSE PRACTITIONER

## 2023-01-19 PROCEDURE — 3288F PR FALLS RISK ASSESSMENT DOCUMENTED: ICD-10-PCS | Mod: CPTII,S$GLB,, | Performed by: NURSE PRACTITIONER

## 2023-01-19 PROCEDURE — 99214 PR OFFICE/OUTPT VISIT, EST, LEVL IV, 30-39 MIN: ICD-10-PCS | Mod: S$GLB,,, | Performed by: NURSE PRACTITIONER

## 2023-01-19 PROCEDURE — 3077F SYST BP >= 140 MM HG: CPT | Mod: CPTII,S$GLB,, | Performed by: NURSE PRACTITIONER

## 2023-01-19 PROCEDURE — 86301 IMMUNOASSAY TUMOR CA 19-9: CPT | Performed by: SURGERY

## 2023-01-19 PROCEDURE — 1126F PR PAIN SEVERITY QUANTIFIED, NO PAIN PRESENT: ICD-10-PCS | Mod: CPTII,S$GLB,, | Performed by: NURSE PRACTITIONER

## 2023-01-19 PROCEDURE — 99499 RISK ADDL DX/OHS AUDIT: ICD-10-PCS | Mod: S$GLB,,, | Performed by: SURGERY

## 2023-01-19 PROCEDURE — 3008F BODY MASS INDEX DOCD: CPT | Mod: CPTII,S$GLB,, | Performed by: NURSE PRACTITIONER

## 2023-01-19 PROCEDURE — 99999 PR PBB SHADOW E&M-EST. PATIENT-LVL III: ICD-10-PCS | Mod: PBBFAC,,, | Performed by: NURSE PRACTITIONER

## 2023-01-19 PROCEDURE — 1101F PR PT FALLS ASSESS DOC 0-1 FALLS W/OUT INJ PAST YR: ICD-10-PCS | Mod: CPTII,S$GLB,, | Performed by: NURSE PRACTITIONER

## 2023-01-19 PROCEDURE — 1126F AMNT PAIN NOTED NONE PRSNT: CPT | Mod: CPTII,S$GLB,, | Performed by: NURSE PRACTITIONER

## 2023-01-19 PROCEDURE — 3078F PR MOST RECENT DIASTOLIC BLOOD PRESSURE < 80 MM HG: ICD-10-PCS | Mod: CPTII,S$GLB,, | Performed by: NURSE PRACTITIONER

## 2023-01-19 PROCEDURE — 99999 PR PBB SHADOW E&M-EST. PATIENT-LVL III: CPT | Mod: PBBFAC,,, | Performed by: NURSE PRACTITIONER

## 2023-01-19 PROCEDURE — 36415 COLL VENOUS BLD VENIPUNCTURE: CPT | Performed by: SURGERY

## 2023-01-19 NOTE — PROGRESS NOTES
I saw Mr. Singh with Annamarie WOLFE. 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 He has no evidence of metastatic disease. CEA and CA 19-9 are normal. I think cholecystectomy is warranted in this case to ensure that there is not a pre-malignant or malignant process within the gallbladder. I will plan to do this robotically but will convert to open if I think it cannot be removed safely in a minimally invasive fashion without risking spillage. I will plan to send a frozen section and do a radical cholecystectomy with portal lymphadenectomy if an invasive cancer is found. He is scheduled for a combined case with Dr. Cuba, who will resect his right renal mass, on 2/27. I spoke with his daughter on the phone, who had many questions. I will see him back for a preoperative visit with his daughter 1-2 weeks prior to surgery.     Tolu Gavin MD  Surgical Oncology

## 2023-01-19 NOTE — PROGRESS NOTES
"    Encounter Date:  2023    Patient ID: Babatunde Singh  Age:  72 y.o. :  1950    Chief Complaint:  followup of gallbladder mass     2022: DVT and remains on Xarelto  2022: Imaging revealed R kidney mass, evaluated by urology, Dr. Cuba  10/6/2022: s/p L axillary to bilateral femoris profunda bypass   2022: abnormal finding in gallbladder with gallstones and stable ~7cm mass in R kidney  2022: s/p surgical debridement of Achilles tendon per Dr. Ortiz    Interval History:  Mr. Singh returns to clinic alone from [facility], with wound vac to foot ambulating with a cane. His daughter, Cindi, was available via speakerphone during today's visit.   He was last seen in clinic last month with Dr. Gavin for possible gallbladder mass. He had new imaging which revealed ~ 3.3 cm multiloculated cystic lesion in gallbladder.   He reports feeling well today, only c/o tegaderm on R foot wound vac "pulls my skin". Denies abd pain. Has a good appetite, denies N/V, + occ diarrhea. Remains under management per wound care with home health to perform wound vac changes 3 x week to R foot. Denies CP, SOB. Remains afebrile.   He is tentatively scheduled for robotic partial nephrectomy on 2023 with Dr. Cuba, who scheduled sx after 6 months of anticoagulation post DVT.     Prior abd surgery: none  No family hx of CA  Currently taking anticoagulation - taking Xarelto  Quit smoking 10/2022, smoked since age 16 <1ppd    New Data:  Imaging:  Dr. Gavin and I personally reviewed the following images:   2023: MRI abd:  Gallbladder: Layering gallbladder sludge and stones.  Multiloculated cystic lesion with smooth thin enhancing septations along the gallbladder wall measuring 2.4 x 3.3 x 3.1 cm.  Similar appearing lesion at the fundus measuring 1.9 cm though Phrygian cap could appear similarly.  No evidence of acute cholecystitis.  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     Right renal enhancing mass remains concerning for RCC similar to prior.  Urologic consultation recommended if not already performed.    Past Medical History:   Diagnosis Date    DVT (deep venous thrombosis)     Hypertension     Peripheral arterial disease      Past Surgical History:   Procedure Laterality Date    APPLICATION OF WOUND VACUUM-ASSISTED CLOSURE DEVICE Right 12/8/2022    Procedure: APPLICATION, WOUND VAC;  Surgeon: LEI Ortiz III, MD;  [address]  Service: Peripheral Vascular;  Laterality: Right;    COLONOSCOPY N/A 10/31/2022    Procedure: COLONOSCOPY;  Surgeon: Philip Shafer MD;  [facility]  Service: Endoscopy;  Laterality: N/A;    CREATION OF AXILLARY-FEMORAL ARTERY BYPASS WITH GRAFT N/A 10/6/2022    Procedure: CREATION, BYPASS, ARTERIAL, AXILLARY TO FEMORAL, USING GRAFT;  Surgeon: LEI Ortiz III, MD;  [address];  Service: Peripheral Vascular;  Laterality: N/A;    CREATION OF FEMORAL-FEMORAL ARTERY BYPASS WITH GRAFT N/A 10/6/2022    Procedure: CREATION, BYPASS, ARTERIAL, FEMORAL TO FEMORAL, USING GRAFT;  Surgeon: LEI Ortiz III, MD;  [address];  Service: Peripheral Vascular;  Laterality: N/A;    ESOPHAGOGASTRODUODENOSCOPY N/A 10/20/2022    Procedure: EGD (ESOPHAGOGASTRODUODENOSCOPY);  Surgeon: Sixto Chicas MD;  [facility]  Service: Endoscopy;  Laterality: N/A;    ESOPHAGOGASTRODUODENOSCOPY N/A 10/28/2022    Procedure: EGD (ESOPHAGOGASTRODUODENOSCOPY);  Surgeon: Marlee Hopson MD;  [facility]  Service: Gastroenterology;  Laterality: N/A;    EXCISION OF HYDROCELE Left 4/26/2022    Procedure: HYDROCELECTOMY;  Surgeon: Damion Roberts MD;  Location: [facility];  Service: Urology;  Laterality: Left;    KNEE SURGERY  1972    WOUND DEBRIDEMENT Right 12/8/2022    Procedure: DEBRIDEMENT, WOUND;   Surgeon: LEI Ortiz III, MD;  Location: [facility] OR [address] [place] [address];  Service: Peripheral Vascular;  Laterality: Right;  RLE wound debridement     Current Outpatient Medications on File Prior to Visit   Medication Sig Dispense Refill    acetaminophen (TYLENOL) 325 MG tablet Take 2 tablets (650 mg total) by mouth every 8 (eight) hours as needed. 20 tablet 0    aspirin 81 MG Chew Chew and swallow 1 tablet (81 mg total) by mouth once daily. (Patient not taking: Reported on 12/22/2022) 30 tablet 0    atorvastatin (LIPITOR) 80 MG tablet Take 1 tablet (80 mg total) by mouth once daily. 30 tablet 0    ezetimibe (ZETIA) 10 mg tablet Take 1 tablet (10 mg total) by mouth once daily. 30 tablet 0    gabapentin (NEURONTIN) 800 MG tablet Take 1 tablet (800 mg total) by mouth 3 (three) times daily. 90 tablet 11    HYDROcodone-acetaminophen (NORCO) 5-325 mg per tablet Take 1 tablet by mouth every 6 (six) hours as needed for Pain. 35 tablet 0    losartan-hydrochlorothiazide 50-12.5 mg (HYZAAR) 50-12.5 mg per tablet Take 1 tablet by mouth once daily. 30 tablet 0    multivitamin (THERAGRAN) per tablet Take 1 tablet by mouth once daily.      pantoprazole (PROTONIX) 40 MG tablet Take 1 tablet (40 mg total) by mouth once daily. 30 tablet 0    rivaroxaban (XARELTO) 20 mg Tab Take 1 tablet (20 mg total) by mouth daily with dinner or evening meal. 30 tablet 0     No current facility-administered medications on file prior to visit.     Review of patient's allergies indicates:  No Known Allergies    Family History:  His family history includes Hypertension in his father and mother.     Social History:   reports that he quit smoking about 3 months ago. His smoking use included cigarettes. He has a 27.50 pack-year smoking history. He has never used smokeless tobacco. He reports that he does not drink alcohol and does not use drugs.     ROS:    Pertinent positive/negatives detailed in HPI, all other systems negative.     Physical Exam:  BP (!) 143/64  "(BP Location: Left arm, Patient Position: Sitting, BP Method: Small (Automatic))   Pulse 85   Temp 97.9 °F (36.6 °C) (Oral)   Ht 5' 5" (1.651 m)   Wt 66.7 kg (147 lb)   SpO2 100%   BMI 24.46 kg/m²     Constitutional:  Non-toxic, no acute distress.   Eyes:  Sclerae anicteric, gaze symmetrical  Neck:  Trachea midline,  FROM  Resp:  Easy work of breathing  Abd:  Soft, non-tender, no masses, no ascites  Musculoskeletal:  Ambulatory.   Psych:  Awake, alert, oriented.  Answers and asks questions appropriately      ICD-10-CM ICD-9-CM    1. Gallbladder mass  K82.8 575.8 CBC Auto Differential      Comprehensive Metabolic Panel      APTT      PROTIME-INR      2. Disorders of gallbladder, biliary tract and pancreas in diseases classified elsewhere  K87 [id number] APTT      PROTIME-INR      Plan     Lengthy discussion on proceeding with cholecystectomy at the time of partial nephrectomy. If immediate path read is positive for malignancy, would proceed with oncologic resection including liver resection with lymphadenopathy.   RTC for in person discussion with him and his daughter.   Add panel for Dr. Gavin to case on 2/27/23.     Follow up in about 4 weeks (around 2/16/2023).      Questions were asked and answered to patient and daughter's satisfaction.      Pt seen in conjunction with Dr. Gavin today.         Annamarie Pelletier NP  Upper GI / Hepatobiliary Surgical Oncology  [facility] [place], [place]  Office: [phone]  Fax: [phone]          "

## 2023-01-20 ENCOUNTER — TELEPHONE (OUTPATIENT)
Dept: SURGERY | Facility: CLINIC | Age: 73
End: 2023-01-20
Payer: MEDICARE

## 2023-01-20 NOTE — TELEPHONE ENCOUNTER
Pt dtr notified of upcoming pre op appt with Dr. Gavin and labs on 2/15/23. Dtr verbalized understanding.

## 2023-01-24 ENCOUNTER — OFFICE VISIT (OUTPATIENT)
Dept: VASCULAR SURGERY | Facility: CLINIC | Age: 73
End: 2023-01-24
Payer: MEDICARE

## 2023-01-24 ENCOUNTER — HOSPITAL ENCOUNTER (OUTPATIENT)
Dept: VASCULAR SURGERY | Facility: CLINIC | Age: 73
Discharge: HOME | End: 2023-01-24
Attending: SURGERY
Payer: MEDICARE

## 2023-01-24 VITALS
BODY MASS INDEX: 22.75 KG/M2 | SYSTOLIC BLOOD PRESSURE: 153 MMHG | HEART RATE: 84 BPM | TEMPERATURE: 98 F | DIASTOLIC BLOOD PRESSURE: 65 MMHG | WEIGHT: 136.69 LBS

## 2023-01-24 DIAGNOSIS — I70.238: Primary | ICD-10-CM

## 2023-01-24 DIAGNOSIS — I73.9 PAD (PERIPHERAL ARTERY DISEASE): ICD-10-CM

## 2023-01-24 DIAGNOSIS — I73.9 PAD (PERIPHERAL ARTERY DISEASE): Primary | ICD-10-CM

## 2023-01-24 PROCEDURE — 1160F RVW MEDS BY RX/DR IN RCRD: CPT | Mod: CPTII,S$GLB,, | Performed by: SURGERY

## 2023-01-24 PROCEDURE — 1125F PR PAIN SEVERITY QUANTIFIED, PAIN PRESENT: ICD-10-PCS | Mod: CPTII,S$GLB,, | Performed by: SURGERY

## 2023-01-24 PROCEDURE — 91110 PR GI TRACT CAPSULE ENDOSCOPY: ICD-10-PCS | Mod: 26,GC,, | Performed by: INTERNAL MEDICINE

## 2023-01-24 PROCEDURE — 3077F PR MOST RECENT SYSTOLIC BLOOD PRESSURE >= 140 MM HG: ICD-10-PCS | Mod: CPTII,S$GLB,, | Performed by: SURGERY

## 2023-01-24 PROCEDURE — 1101F PR PT FALLS ASSESS DOC 0-1 FALLS W/OUT INJ PAST YR: ICD-10-PCS | Mod: CPTII,S$GLB,, | Performed by: SURGERY

## 2023-01-24 PROCEDURE — 93925 LOWER EXTREMITY STUDY: CPT | Mod: S$GLB,,, | Performed by: SURGERY

## 2023-01-24 PROCEDURE — 1160F PR REVIEW ALL MEDS BY PRESCRIBER/CLIN PHARMACIST DOCUMENTED: ICD-10-PCS | Mod: CPTII,S$GLB,, | Performed by: SURGERY

## 2023-01-24 PROCEDURE — 1159F MED LIST DOCD IN RCRD: CPT | Mod: CPTII,S$GLB,, | Performed by: SURGERY

## 2023-01-24 PROCEDURE — 3077F SYST BP >= 140 MM HG: CPT | Mod: CPTII,S$GLB,, | Performed by: SURGERY

## 2023-01-24 PROCEDURE — 93923 UPR/LXTR ART STDY 3+ LVLS: CPT | Mod: S$GLB,,, | Performed by: SURGERY

## 2023-01-24 PROCEDURE — 91110 GI TRC IMG INTRAL ESOPH-ILE: CPT | Mod: 26,GC,, | Performed by: INTERNAL MEDICINE

## 2023-01-24 PROCEDURE — 99999 PR PBB SHADOW E&M-EST. PATIENT-LVL III: ICD-10-PCS | Mod: PBBFAC,,, | Performed by: SURGERY

## 2023-01-24 PROCEDURE — 99999 PR PBB SHADOW E&M-EST. PATIENT-LVL III: CPT | Mod: PBBFAC,,, | Performed by: SURGERY

## 2023-01-24 PROCEDURE — 3288F FALL RISK ASSESSMENT DOCD: CPT | Mod: CPTII,S$GLB,, | Performed by: SURGERY

## 2023-01-24 PROCEDURE — 99214 PR OFFICE/OUTPT VISIT, EST, LEVL IV, 30-39 MIN: ICD-10-PCS | Mod: S$GLB,,, | Performed by: SURGERY

## 2023-01-24 PROCEDURE — 3078F DIAST BP <80 MM HG: CPT | Mod: CPTII,S$GLB,, | Performed by: SURGERY

## 2023-01-24 PROCEDURE — 3008F BODY MASS INDEX DOCD: CPT | Mod: CPTII,S$GLB,, | Performed by: SURGERY

## 2023-01-24 PROCEDURE — 1125F AMNT PAIN NOTED PAIN PRSNT: CPT | Mod: CPTII,S$GLB,, | Performed by: SURGERY

## 2023-01-24 PROCEDURE — 3078F PR MOST RECENT DIASTOLIC BLOOD PRESSURE < 80 MM HG: ICD-10-PCS | Mod: CPTII,S$GLB,, | Performed by: SURGERY

## 2023-01-24 PROCEDURE — 3288F PR FALLS RISK ASSESSMENT DOCUMENTED: ICD-10-PCS | Mod: CPTII,S$GLB,, | Performed by: SURGERY

## 2023-01-24 PROCEDURE — 1101F PT FALLS ASSESS-DOCD LE1/YR: CPT | Mod: CPTII,S$GLB,, | Performed by: SURGERY

## 2023-01-24 PROCEDURE — 93923 PR NON-INVASIVE PHYSIOLOGIC STUDY EXTREMITY 3 LEVELS: ICD-10-PCS | Mod: S$GLB,,, | Performed by: SURGERY

## 2023-01-24 PROCEDURE — 99214 OFFICE O/P EST MOD 30 MIN: CPT | Mod: S$GLB,,, | Performed by: SURGERY

## 2023-01-24 PROCEDURE — 93925 PR DUPLEX LO EXTREM ART BILAT: ICD-10-PCS | Mod: S$GLB,,, | Performed by: SURGERY

## 2023-01-24 PROCEDURE — 1159F PR MEDICATION LIST DOCUMENTED IN MEDICAL RECORD: ICD-10-PCS | Mod: CPTII,S$GLB,, | Performed by: SURGERY

## 2023-01-24 PROCEDURE — 3008F PR BODY MASS INDEX (BMI) DOCUMENTED: ICD-10-PCS | Mod: CPTII,S$GLB,, | Performed by: SURGERY

## 2023-01-24 RX ORDER — HYDROCODONE BITARTRATE AND ACETAMINOPHEN 5; 325 MG/1; MG/1
1 TABLET ORAL EVERY 6 HOURS PRN
Qty: 15 TABLET | Refills: 0 | Status: ON HOLD | OUTPATIENT
Start: 2023-01-24 | End: 2023-02-15

## 2023-01-24 NOTE — PROGRESS NOTES
VASCULAR SURGERY SERVICE    CHIEF COMPLAINT: severe PAD    HISTORY OF PRESENT ILLNESS: Babatunde Singh is a 72 y.o. male initially seen as an inpatient, who presented with severe chronic ischemic rest pain to the right foot, who become nonambulatory, also a recent diagnosis of a right renal mass.   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   He was initially seen by interventional cardiology who felt there were no revascularization options and suggested either palliative care or above-knee amputation.    He underwent a LEFT axillary to bi- profunda bypass 10/06/2022.   Postoperatively, his ABIs went from 0 bilaterally preoperatively to 0.69 right, 0.74 left.  His severe ischemic rest pain resolved completely.  He was discharged to skilled nursing and is to be discharged tomorrow.  States he is now ambulating without assistance.    During his evaluation he is also noted to have significant full-thickness ulceration of his right calf.  He is receiving wound care at [facility]    Due to the limited runoff of this bypass he was started on Xarelto and aspirin    S/p  Operative debridement and wound VAC 12/8/2022  LEFT axillary to bi- profunda bypass 10/06/2022.  The left axillary artery was uses inflow source, as he has a right renal mass that he is going to require nephrectomy    1/24/2023:  Now returns, without complaints.  He is scheduled for robotic nephrectomy in the near future.  He is still using the wound VAC    Past Medical History:   Diagnosis Date    DVT (deep venous thrombosis)     Hypertension     Peripheral arterial disease        Past Surgical History:   Procedure Laterality Date    APPLICATION OF WOUND VACUUM-ASSISTED CLOSURE DEVICE Right 12/8/2022    Procedure: APPLICATION, WOUND VAC;  Surgeon: LEI Ortiz III, MD;  Location: [facility]  OR 2ND FLR;  Service: Peripheral Vascular;  Laterality: Right;    COLONOSCOPY N/A 10/31/2022    Procedure: COLONOSCOPY;  Surgeon: Philip Shafer MD;  Location: [facility] ENDO (2ND FLR);  Service: Endoscopy;  Laterality: N/A;    CREATION OF AXILLARY-FEMORAL ARTERY BYPASS WITH GRAFT N/A 10/6/2022    Procedure: CREATION, BYPASS, ARTERIAL, AXILLARY TO FEMORAL, USING GRAFT;  Surgeon: LEI Ortiz III, MD;  Location: [facility] OR 2ND FLR;  Service: Peripheral Vascular;  Laterality: N/A;    CREATION OF FEMORAL-FEMORAL ARTERY BYPASS WITH GRAFT N/A 10/6/2022    Procedure: CREATION, BYPASS, ARTERIAL, FEMORAL TO FEMORAL, USING GRAFT;  Surgeon: LEI Ortiz III, MD;  Location: [facility] OR 2ND FLR;  Service: Peripheral Vascular;  Laterality: N/A;    ESOPHAGOGASTRODUODENOSCOPY N/A 10/20/2022    Procedure: EGD (ESOPHAGOGASTRODUODENOSCOPY);  Surgeon: Sixto Chicas MD;  Location: [facility] ENDO (2ND FLR);  Service: Endoscopy;  Laterality: N/A;    ESOPHAGOGASTRODUODENOSCOPY N/A 10/28/2022    Procedure: EGD (ESOPHAGOGASTRODUODENOSCOPY);  Surgeon: Marlee Hopson MD;  Location: [facility] ENDO (2ND FLR);  Service: Gastroenterology;  Laterality: N/A;    EXCISION OF HYDROCELE Left 4/26/2022    Procedure: HYDROCELECTOMY;  Surgeon: Damion Roberts MD;  Location: [facility];  Service: Urology;  Laterality: Left;    KNEE SURGERY  1972    WOUND DEBRIDEMENT Right 12/8/2022    Procedure: DEBRIDEMENT, WOUND;  Surgeon: LEI Ortiz III, MD;  Location: [facility] OR 2ND FLR;  Service: Peripheral Vascular;  Laterality: Right;  RLE wound debridement         Current Outpatient Medications:     acetaminophen (TYLENOL) 325 MG tablet, Take 2 tablets (650 mg total) by mouth every 8 (eight) hours as needed., Disp: 20 tablet, Rfl: 0    atorvastatin (LIPITOR) 80 MG tablet, Take 1 tablet (80 mg total) by mouth once daily., Disp: 30 tablet, Rfl: 0    ezetimibe (ZETIA) 10 mg tablet, Take 1 tablet (10 mg total) by mouth once daily., Disp: 30 tablet, Rfl: 0    gabapentin  (NEURONTIN) 800 MG tablet, Take 1 tablet (800 mg total) by mouth 3 (three) times daily., Disp: 90 tablet, Rfl: 11    losartan-hydrochlorothiazide 50-12.5 mg (HYZAAR) 50-12.5 mg per tablet, Take 1 tablet by mouth once daily., Disp: 30 tablet, Rfl: 0    multivitamin (THERAGRAN) per tablet, Take 1 tablet by mouth once daily., Disp: , Rfl:     rivaroxaban (XARELTO) 20 mg Tab, Take 1 tablet (20 mg total) by mouth daily with dinner or evening meal., Disp: 30 tablet, Rfl: 0    aspirin 81 MG Chew, Chew and swallow 1 tablet (81 mg total) by mouth once daily. (Patient not taking: Reported on 2022), Disp: 30 tablet, Rfl: 0    HYDROcodone-acetaminophen (NORCO) 5-325 mg per tablet, Take 1 tablet by mouth every 6 (six) hours as needed for Pain. (Patient not taking: Reported on 2023), Disp: 35 tablet, Rfl: 0    HYDROcodone-acetaminophen (NORCO) 5-325 mg per tablet, Take 1 tablet by mouth every 6 (six) hours as needed for Pain., Disp: 15 tablet, Rfl: 0    pantoprazole (PROTONIX) 40 MG tablet, Take 1 tablet (40 mg total) by mouth once daily., Disp: 30 tablet, Rfl: 0    rivaroxaban (XARELTO) 20 mg Tab, Take 1 tablet (20 mg total) by mouth daily with dinner or evening meal., Disp: 30 tablet, Rfl: 11    Review of patient's allergies indicates:   Allergen Reactions    Ace inhibitors        Family History   Problem Relation Age of Onset    Hypertension Mother     Hypertension Father        Social History     Tobacco Use    Smoking status: Former     Packs/day: 0.50     Years: 55.00     Pack years: 27.50     Types: Cigarettes     Quit date: 10/11/2022     Years since quittin.2    Smokeless tobacco: Never    Tobacco comments:     Cigarettes3-4 per day   Substance Use Topics    Alcohol use: Never    Drug use: Never     PHYSICAL EXAM:   BP (!) 153/65 (BP Location: Left arm, Patient Position: Sitting, BP Method: Large (Automatic))   Pulse 84   Temp 97.6 °F (36.4 °C) (Oral)   Wt 62 kg (136 lb 11 oz)   BMI 22.75 kg/m²    Constitutional:  Alert,   Well-appearing  In no distress.   Neurological: Normal speech  no focal findings  CN II - XII grossly intact.    Psychiatric: Mood and affect appropriate and symmetric.   HEENT: Normocephalic / atraumatic  PERRLA  Midline trachea  No scars across the neck left infraclavicular scar   Cardiac: Regular rate and rhythm.   Pulmonary: Normal pulmonary effort.   Abdomen: Soft, not distended.     Skin: Warm and well perfused.    Vascular:  Good pedal signals bilaterally   Extremities/  Musculoskeletal: No edema.   Femoral scars     Now with excellent granulation tissue in the former area of exposed tendon.  See picture      10/25/2022      1/24/2023      IMAGING:  Duplex of the ax fem-fem today demonstrates it to be patent without stenosis  ABIs 0.69 right, 0.74 left, compared with 0 bilaterally pre intervention    IMPRESSION:    3.5 months status post right ax fem-fem for critical limb ischemia.   His ischemic rest pain is gone, and his pressure ulcerations of right calf show marked improvement in healing, as above  Known right renal mass.  Have discussed this case with his treating urologist.  Okay to stop the Xarelto prior to his partial right nephrectomy    PLAN:    Stop wound VAC, begin topical dressing as per wound care  Okay to stop Xarelto for planned nephrectomy  Follow up with me in 4 months with duplex of his ax fem-fem and ABIs  Keep follow-up with wound care until wound completely healed    ARMEN Ortiz III, MD, FACS  Professor and Chief, Vascular and Endovascular Surgery

## 2023-01-24 NOTE — [employer]ATION PATIENT INSTRUCTIONS
Discharge Summary/Instructions for after Video Capsule Endoscopy  Patient Name: Babatunde Singh  Patient MRN: [id number]  Patient YOB: 1950  Friday, January 20, 2023  Kosta Kc MD  1.  Do Not eat or drink anything for 1 hour.  Try sips of water first.  If   tolerated, resume your regular diet or one recommended by your physician.  2.  Do not drive, operate machinery, make critical decisions, or do   activities that require coordination or balance for 24 hours.  3.   You may experience a sore throat for 24 to 48 hours.  You may use   throat lozenges or gargle with warm salt water to relieve the discomfort.  4.  Because air was put into your stomach during the procedure, you may   experience some belching.  5.  Do not use any medication containing aspirin for 10 days.  6.  Go directly to the emergency room if you notice any of the following:   Chills and/or fever over 101 F   Persistent vomiting or vomiting with blood/nasal regurgitation   Severe abdominal pain, other than gas cramps   Severe chest pain   Black, tarry stools     Your doctor recommends these additional instructions:  Return to your GI clinic at appointment to be scheduled.   Your physician has recommended an upper device-assisted enteroscopy.  If you have any questions or problems, please call your physician.  EMERGENCY PHONE NUMBER: [phone]  LAB RESULTS: [phone]  Kosta Kc MD  1/24/2023 3:35:16 PM  This report has been verified and signed electronically.  Dear patient,  As a result of recent federal legislation (The Federal Cures Act), you may   receive lab or pathology results from your procedure in your [facility]   account before your physician is able to contact you. Your physician or   their representative will relay the results to you with their   recommendations at their soonest availability.  Thank you,  PROVATION

## 2023-01-27 NOTE — PROGRESS NOTES
[facility] UGI MULTIDISCIPLINARY TUMOR BOARD  PATIENT REVIEW FORM   ____________________________________________________________    CLINIC #: [id number]  DATE: 1/30/2023    DIAGNOSIS: gallbladder mass    PRESENTER: Gavin    PATIENT SUMMARY:   This 73 y/o gentleman with recent diagnosis of renal cancer in 9/2022 had incidental finding of cystic mass in gallbladder on imaging.  Reviewed MRI. It has not been biopsied so no pathology for review. He denies any GI symptoms.     BOARD RECOMMENDATIONS:   Proceed with simple cholecystectomy at the time of nephrectomy    CONSULT NEEDED:     [] Surgery    [] Hem/Onc    [] Rad/Onc    [] Dietary                 [] Social Service    [] Psychology       [] AES  [] Radiology          [x] Fiona'l Treatment Guidelines reviewed and care planned is consistent with guidelines.         (i.e., NCCN, NCI, PD, ACO, AUA, etc.)    PRESENTATION AT CANCER CONFERENCE:         [x] Prospective    [] Retrospective     [] Follow-Up

## 2023-01-29 NOTE — PROGRESS NOTES
Subjective:      Babatunde Singh is a 72 y.o. male who returns today regarding his     Preop appt    Doing great  Claudication resolved    No previous abdominal surgery.    The following portions of the patient's history were reviewed and updated as appropriate: allergies, current medications, past family history, past medical history, past social history, past surgical history and problem list.    Review of Systems  Pertinent items are noted in HPI.  A comprehensive multipoint review of systems was negative except as otherwise stated in the HPI.    Past Medical History:   Diagnosis Date    DVT (deep venous thrombosis)     Hypertension     Peripheral arterial disease      Past Surgical History:   Procedure Laterality Date    APPLICATION OF WOUND VACUUM-ASSISTED CLOSURE DEVICE Right 12/8/2022    Procedure: APPLICATION, WOUND VAC;  Surgeon: LEI Ortiz III, MD;  Location: [facility] OR [address] [place] [address];  Service: Peripheral Vascular;  Laterality: Right;    COLONOSCOPY N/A 10/31/2022    Procedure: COLONOSCOPY;  Surgeon: Philip Shafer MD;  [facility]  Service: Endoscopy;  Laterality: N/A;    CREATION OF AXILLARY-FEMORAL ARTERY BYPASS WITH GRAFT N/A 10/6/2022    Procedure: CREATION, BYPASS, ARTERIAL, AXILLARY TO FEMORAL, USING GRAFT;  Surgeon: LEI Ortiz III, MD;  Location: [facility] OR [address] [place] [address];  Service: Peripheral Vascular;  Laterality: N/A;    CREATION OF FEMORAL-FEMORAL ARTERY BYPASS WITH GRAFT N/A 10/6/2022    Procedure: CREATION, BYPASS, ARTERIAL, FEMORAL TO FEMORAL, USING GRAFT;  Surgeon: LEI Ortiz III, MD;  [facility];  Service: Peripheral Vascular;  Laterality: N/A;    ESOPHAGOGASTRODUODENOSCOPY N/A 10/20/2022    Procedure: EGD (ESOPHAGOGASTRODUODENOSCOPY);  Surgeon: Sixto Chicas MD;  [facility]  Service: Endoscopy;  Laterality: N/A;    ESOPHAGOGASTRODUODENOSCOPY N/A 10/28/2022    Procedure: EGD (ESOPHAGOGASTRODUODENOSCOPY);  Surgeon: Marlee Hopson MD;   Location: [facility] ENDO (2ND FLR);  Service: Gastroenterology;  Laterality: N/A;    EXCISION OF HYDROCELE Left 4/26/2022    Procedure: HYDROCELECTOMY;  Surgeon: Damion Roberts MD;  Location: [facility];  Service: Urology;  Laterality: Left;    KNEE SURGERY  1972    WOUND DEBRIDEMENT Right 12/8/2022    Procedure: DEBRIDEMENT, WOUND;  Surgeon: LEI Ortiz III, MD;  Location: [facility] OR 2ND FLR;  Service: Peripheral Vascular;  Laterality: Right;  RLE wound debridement       Review of patient's allergies indicates:   Allergen Reactions    Ace inhibitors           Objective:   Vitals: There were no vitals taken for this visit.    Physical Exam   General: alert and oriented, no acute distress  Respiratory: Symmetric expansion, non-labored breathing  Cardiovascular: no peripheral edema  Abdomen: soft, non distended  Skin: normal coloration and turgor, no rashes, no suspicious skin lesions noted  Neuro: no gross deficits  Psych: normal judgment and insight, normal mood/affect, and non-anxious  Walked with patient around 2nd floor [place]; no SOB; no claudication  Placed pt in left lateral decubitus postion; pule is palpable in graft in this position    Physical Exam    Lab Review   Urinalysis demonstrates pending    Lab Results   Component Value Date    WBC 10.37 01/03/2023    HGB 7.6 (L) 01/03/2023    HCT 26.5 (L) 01/03/2023    HCT 20 (LL) 10/19/2022    MCV 75 (L) 01/03/2023     01/03/2023     Lab Results   Component Value Date    CREATININE 0.8 12/12/2022    BUN 12 12/12/2022       Imaging  MRI ABDOMEN WITH AND WO_INC MRCP (XPD)     CLINICAL HISTORY:  possible gallbladder mass;  Abnormal radiologic findings on diagnostic imaging of right kidney     TECHNIQUE:  Multiplanar multisequence images of the abdomen pre and post 10 mL IV Gadavist contrast.  Additional 3D and thick slab fluid sensitive MRCP sequences were obtained with maximum intensity projection images for evaluation of the biliary system.      COMPARISON:  Ultrasound abdomen 11/30/2022; CT chest 10/10/2022; CT runoff 08/03/2022     FINDINGS:  Inferior Thorax: Normal.     Peritoneal Space: No ascites.     Liver: Normal size.  No global parenchymal signal abnormality.  Couple punctate T2 hyperintensities, favored to be cysts.     Gallbladder: Layering gallbladder sludge and stones.  Multiloculated cystic lesion with smooth thin enhancing septations along the gallbladder wall measuring 2.4 x 3.3 x 3.1 cm.  Similar appearing lesion at the fundus measuring 1.9 cm though Phrygian cap could appear similarly.  No evidence of acute cholecystitis.     Bile Ducts: No ductal dilation, stricture, or filling defect.     Pancreas: No mass or peripancreatic fat stranding.     Spleen: Normal.     Adrenals: Unremarkable.     Bowel/Mesentery (imaged portion): Diverticulosis coli.  No evidence of bowel obstruction or inflammation.     Urinary Tract (imaged portion): Arising from the posterosuperior aspect of the right kidney is a 6.4 x 6.8 x 6.6 cm mixed signal mass abutting the right diaphragm and posterior aspect of the right hepatic dome without definite invasion of the local structures.  There is some extension into the renal hilum/sinus.  Due to the presence of significant intrinsicd T1 hyperintensity is difficult to assess for enhancement, although no layering blood products are seen..  Finding is similar in size to prior examinations.  No definite invasion the renal vasculature. Other bilateral subcentimeter renal T2 hyperintensities, likely cysts.  No obstructive uropathy.     Retroperitoneum:  No significant adenopathy.     Abdominal wall:  Normal.     Vasculature: Axillary fem bypass noted.  Chronic distal aortic and proximal common iliac artery occlusion.  The renal arteries appear stenotic bilaterally.     Bones: Normal marrow.     Impression:     Multi locular cystic lesion of the gallbladder wall measuring 3.3 cm is concerning for cystic neoplasm, possibly  mucinous cystic neoplasm.     Right renal enhancing mass remains concerning for RCC similar to prior.  Urologic consultation recommended if not already performed.     This report was flagged in Epic as abnormal.     Electronically signed by resident: Oziel Pathak  Date:                                            01/04/2023  Time:                                           13:57     Electronically signed by: Maxwell Galvez Jr  Date:                                            01/05/2023  Time:                                           11:28    CTA CHEST NON CORONARY (PE STUDIES)     CLINICAL HISTORY:  Pulmonary embolism (PE) suspected, high prob;     TECHNIQUE:  Low dose axial images, sagittal and coronal reformations were obtained from the thoracic inlet to the lung bases following the IV administration of 75 mL of Omnipaque 350.  MIP images were performed.     COMPARISON:  Chest radiograph 10/08/2022, CT chest 03/15/2022     FINDINGS:  Base of Neck: No significant abnormality.     Thoracic soft tissues: Postop change of axillary to femoral bypass graft with contrast opacifying the visualized portion of the left chest wall coursing graft.  Mild surrounding edema and few foci of gas noted.  No organized collections surrounding the graft.  No axillary lymphadenopathy.     Aorta/vasculature: 3 vessel left-sided aortic arch.  Moderate calcific atherosclerosis.  No aneurysm.  Aortic annular calcifications are noted.     Heart: Mild bilateral calcification coronary arteries.  Normal size.  Small anterior pericardial effusion, new from prior.     Pulmonary vasculature: This study is suboptimal for the evaluation of the pulmonary arteries. There is no filling defect of the pulmonary arteries to suggest pulmonary thromboembolism.     [place]ina/Mediastinum: No pathologic rachel enlargement.     Airways: Patent.     Lungs/Pleura: Moderate centrilobular emphysematous changes throughout the lungs.  No consolidation or pleural  effusion.  Mild interlobular septal thickening is noted at the apices bilaterally and similar to prior.  New large pulmonary nodules or masses.     Esophagus: Normal.     Upper Abdomen: Redemonstration of the mass at the superior pole the right kidney measuring up to 5.7 cm and unchanged from prior.  This measures higher than simple fluid in density and remains concerning for neoplasm.  High-density noted at the gallbladder fundus, which can relate to vicarious excretion of contrast versus sludge/stones.     Bones: No acute fracture. No suspicious lytic or sclerotic lesions.     Impression:     Suboptimal examination of pulmonary arteries demonstrates no evidence of pulmonary thromboembolism as clinically questioned.     No acute parenchymal abnormality within the lungs.  No consolidation or pleural effusion.     Postop change of axillary to femoral bypass graft with mild surrounding edema and few foci of gas, likely postprocedural.  No significant completion is observed within the chest.     5.7 superior pole renal mass, similar to prior.     High density material noted in the gallbladder fundus, which may relate to vicarious excretion of contrast or biliary sludge/stones.     Additional findings above.     Electronically signed by resident: Phu Biggs  Date:                                            10/10/2022  Time:                                           16:41     Electronically signed by: Loretta Bloom  Date:                                            10/11/2022  Time:                                           08:30        Assessment and Plan:   Renal mass, right    We discussed the natural history of small renal masses, the risk of malignancy and disease progression, and the small risk of mortality.  We discussed the risks and benefits of watchful waiting, biopsy, partial and total nephrectomy.  We discussed the benefits of renal preservation when possible.  We discussed percutaneous, laparoscopic and  robotic approaches.  We discussed the management of positive surgical margins.  I answered all questions.    Schedule right robotic vs open partial vs total nephrectomy at [facility] 2/27/23    We discussed the high risk of complications and/or requiring total nephrectomy due to the size and depth on invasion of the mass  Spoke with daughter and answered all questions    Gallbladder mass  Dr Gavin, surg onc, plans cholecystectomy with frozen section and possible portal lymphadenectomy at time of his renal surgery    PAD (peripheral artery disease)  Sp axillary bifemoral bypass; doing well  Graft is palpable; will avoid the graft with trocar placement      I spent 60 min on the day of this encounter preparing for, treating and managing the above

## 2023-01-29 NOTE — H&P (VIEW-ONLY)
Subjective:      Babatunde Singh is a 72 y.o. male who returns today regarding his     Preop appt    Doing great  Claudication resolved    No previous abdominal surgery.    The following portions of the patient's history were reviewed and updated as appropriate: allergies, current medications, past family history, past medical history, past social history, past surgical history and problem list.    Review of Systems  Pertinent items are noted in HPI.  A comprehensive multipoint review of systems was negative except as otherwise stated in the HPI.    Past Medical History:   Diagnosis Date    DVT (deep venous thrombosis)     Hypertension     Peripheral arterial disease      Past Surgical History:   Procedure Laterality Date    APPLICATION OF WOUND VACUUM-ASSISTED CLOSURE DEVICE Right 12/8/2022    Procedure: APPLICATION, WOUND VAC;  Surgeon: LEI Ortiz III, MD;  Location: [facility] OR [address] [place] [address];  Service: Peripheral Vascular;  Laterality: Right;    COLONOSCOPY N/A 10/31/2022    Procedure: COLONOSCOPY;  Surgeon: Philip Shafer MD;  [facility]  Service: Endoscopy;  Laterality: N/A;    CREATION OF AXILLARY-FEMORAL ARTERY BYPASS WITH GRAFT N/A 10/6/2022    Procedure: CREATION, BYPASS, ARTERIAL, AXILLARY TO FEMORAL, USING GRAFT;  Surgeon: LEI Ortiz III, MD;  Location: [facility] OR [address] [place] [address];  Service: Peripheral Vascular;  Laterality: N/A;    CREATION OF FEMORAL-FEMORAL ARTERY BYPASS WITH GRAFT N/A 10/6/2022    Procedure: CREATION, BYPASS, ARTERIAL, FEMORAL TO FEMORAL, USING GRAFT;  Surgeon: LEI Ortiz III, MD;  Location: [facility];  Service: Peripheral Vascular;  Laterality: N/A;    ESOPHAGOGASTRODUODENOSCOPY N/A 10/20/2022    Procedure: EGD (ESOPHAGOGASTRODUODENOSCOPY);  Surgeon: Sixto Chicas MD;  Location: [facility] ENDO ([facility]);  Service: Endoscopy;  Laterality: N/A;    ESOPHAGOGASTRODUODENOSCOPY N/A 10/28/2022    Procedure: EGD (ESOPHAGOGASTRODUODENOSCOPY);  Surgeon: Marlee Hopson MD;   Location: [facility] ENDO (2ND FLR);  Service: Gastroenterology;  Laterality: N/A;    EXCISION OF HYDROCELE Left 4/26/2022    Procedure: HYDROCELECTOMY;  Surgeon: Damion Roberts MD;  Location: [facility];  Service: Urology;  Laterality: Left;    KNEE SURGERY  1972    WOUND DEBRIDEMENT Right 12/8/2022    Procedure: DEBRIDEMENT, WOUND;  Surgeon: LEI Ortiz III, MD;  Location: [facility] OR 2ND FLR;  Service: Peripheral Vascular;  Laterality: Right;  RLE wound debridement       Review of patient's allergies indicates:   Allergen Reactions    Ace inhibitors           Objective:   Vitals: There were no vitals taken for this visit.    Physical Exam   General: alert and oriented, no acute distress  Respiratory: Symmetric expansion, non-labored breathing  Cardiovascular: no peripheral edema  Abdomen: soft, non distended  Skin: normal coloration and turgor, no rashes, no suspicious skin lesions noted  Neuro: no gross deficits  Psych: normal judgment and insight, normal mood/affect, and non-anxious  Walked with patient around 2nd floor Olivia; no SOB; no claudication  Placed pt in left lateral decubitus postion; pule is palpable in graft in this position    Physical Exam    Lab Review   Urinalysis demonstrates pending    Lab Results   Component Value Date    WBC 10.37 01/03/2023    HGB 7.6 (L) 01/03/2023    HCT 26.5 (L) 01/03/2023    HCT 20 (LL) 10/19/2022    MCV 75 (L) 01/03/2023     01/03/2023     Lab Results   Component Value Date    CREATININE 0.8 12/12/2022    BUN 12 12/12/2022       Imaging  MRI ABDOMEN WITH AND WO_INC MRCP (XPD)     CLINICAL HISTORY:  possible gallbladder mass;  Abnormal radiologic findings on diagnostic imaging of right kidney     TECHNIQUE:  Multiplanar multisequence images of the abdomen pre and post 10 mL IV Gadavist contrast.  Additional 3D and thick slab fluid sensitive MRCP sequences were obtained with maximum intensity projection images for evaluation of the biliary system.      COMPARISON:  Ultrasound abdomen 11/30/2022; CT chest 10/10/2022; CT runoff 08/03/2022     FINDINGS:  Inferior Thorax: Normal.     Peritoneal Space: No ascites.     Liver: Normal size.  No global parenchymal signal abnormality.  Couple punctate T2 hyperintensities, favored to be cysts.     Gallbladder: Layering gallbladder sludge and stones.  Multiloculated cystic lesion with smooth thin enhancing septations along the gallbladder wall measuring 2.4 x 3.3 x 3.1 cm.  Similar appearing lesion at the fundus measuring 1.9 cm though Phrygian cap could appear similarly.  No evidence of acute cholecystitis.     Bile Ducts: No ductal dilation, stricture, or filling defect.     Pancreas: No mass or peripancreatic fat stranding.     Spleen: Normal.     Adrenals: Unremarkable.     Bowel/Mesentery (imaged portion): Diverticulosis coli.  No evidence of bowel obstruction or inflammation.     Urinary Tract (imaged portion): Arising from the posterosuperior aspect of the right kidney is a 6.4 x 6.8 x 6.6 cm mixed signal mass abutting the right diaphragm and posterior aspect of the right hepatic dome without definite invasion of the local structures.  There is some extension into the renal hilum/sinus.  Due to the presence of significant intrinsicd T1 hyperintensity is difficult to assess for enhancement, although no layering blood products are seen..  Finding is similar in size to prior examinations.  No definite invasion the renal vasculature. Other bilateral subcentimeter renal T2 hyperintensities, likely cysts.  No obstructive uropathy.     Retroperitoneum:  No significant adenopathy.     Abdominal wall:  Normal.     Vasculature: Axillary fem bypass noted.  Chronic distal aortic and proximal common iliac artery occlusion.  The renal arteries appear stenotic bilaterally.     Bones: Normal marrow.     Impression:     Multi locular cystic lesion of the gallbladder wall measuring 3.3 cm is concerning for cystic neoplasm, possibly  mucinous cystic neoplasm.     Right renal enhancing mass remains concerning for RCC similar to prior.  Urologic consultation recommended if not already performed.     This report was flagged in Epic as abnormal.     Electronically signed by resident: Oziel Pathak  Date:                                            01/04/2023  Time:                                           13:57     Electronically signed by: Maxwell Galvez Jr  Date:                                            01/05/2023  Time:                                           11:28    CTA CHEST NON CORONARY (PE STUDIES)     CLINICAL HISTORY:  Pulmonary embolism (PE) suspected, high prob;     TECHNIQUE:  Low dose axial images, sagittal and coronal reformations were obtained from the thoracic inlet to the lung bases following the IV administration of 75 mL of Omnipaque 350.  MIP images were performed.     COMPARISON:  Chest radiograph 10/08/2022, CT chest 03/15/2022     FINDINGS:  Base of Neck: No significant abnormality.     Thoracic soft tissues: Postop change of axillary to femoral bypass graft with contrast opacifying the visualized portion of the left chest wall coursing graft.  Mild surrounding edema and few foci of gas noted.  No organized collections surrounding the graft.  No axillary lymphadenopathy.     Aorta/vasculature: 3 vessel left-sided aortic arch.  Moderate calcific atherosclerosis.  No aneurysm.  Aortic annular calcifications are noted.     Heart: Mild bilateral calcification coronary arteries.  Normal size.  Small anterior pericardial effusion, new from prior.     Pulmonary vasculature: This study is suboptimal for the evaluation of the pulmonary arteries. There is no filling defect of the pulmonary arteries to suggest pulmonary thromboembolism.     Albina/Mediastinum: No pathologic rachel enlargement.     Airways: Patent.     Lungs/Pleura: Moderate centrilobular emphysematous changes throughout the lungs.  No consolidation or pleural  effusion.  Mild interlobular septal thickening is noted at the apices bilaterally and similar to prior.  New large pulmonary nodules or masses.     Esophagus: Normal.     Upper Abdomen: Redemonstration of the mass at the superior pole the right kidney measuring up to 5.7 cm and unchanged from prior.  This measures higher than simple fluid in density and remains concerning for neoplasm.  High-density noted at the gallbladder fundus, which can relate to vicarious excretion of contrast versus sludge/stones.     Bones: No acute fracture. No suspicious lytic or sclerotic lesions.     Impression:     Suboptimal examination of pulmonary arteries demonstrates no evidence of pulmonary thromboembolism as clinically questioned.     No acute parenchymal abnormality within the lungs.  No consolidation or pleural effusion.     Postop change of axillary to femoral bypass graft with mild surrounding edema and few foci of gas, likely postprocedural.  No significant completion is observed within the chest.     5.7 superior pole renal mass, similar to prior.     High density material noted in the gallbladder fundus, which may relate to vicarious excretion of contrast or biliary sludge/stones.     Additional findings above.     Electronically signed by resident: Phu Biggs  Date:                                            10/10/2022  Time:                                           16:41     Electronically signed by: Loretta Bloom  Date:                                            10/11/2022  Time:                                           08:30        Assessment and Plan:   Renal mass, right    We discussed the natural history of small renal masses, the risk of malignancy and disease progression, and the small risk of mortality.  We discussed the risks and benefits of watchful waiting, biopsy, partial and total nephrectomy.  We discussed the benefits of renal preservation when possible.  We discussed percutaneous, laparoscopic and  robotic approaches.  We discussed the management of positive surgical margins.  I answered all questions.    Schedule right robotic vs open partial vs total nephrectomy at [facility] 2/27/23    We discussed the high risk of complications and/or requiring total nephrectomy due to the size and depth on invasion of the mass  Spoke with daughter and answered all questions    Gallbladder mass  Dr Gavin, surg onc, plans cholecystectomy with frozen section and possible portal lymphadenectomy at time of his renal surgery    PAD (peripheral artery disease)  Sp axillary bifemoral bypass; doing well  Graft is palpable; will avoid the graft with trocar placement      I spent 60 min on the day of this encounter preparing for, treating and managing the above

## 2023-01-30 ENCOUNTER — TUMOR BOARD CONFERENCE (OUTPATIENT)
Dept: SURGERY | Facility: CLINIC | Age: 73
End: 2023-01-30
Payer: MEDICARE

## 2023-01-31 ENCOUNTER — OFFICE VISIT (OUTPATIENT)
Dept: UROLOGY | Facility: CLINIC | Age: 73
End: 2023-01-31
Payer: MEDICARE

## 2023-01-31 ENCOUNTER — DOCUMENTATION ONLY (OUTPATIENT)
Dept: HEMATOLOGY/ONCOLOGY | Facility: CLINIC | Age: 73
End: 2023-01-31
Payer: MEDICARE

## 2023-01-31 VITALS
HEIGHT: 65 IN | SYSTOLIC BLOOD PRESSURE: 162 MMHG | DIASTOLIC BLOOD PRESSURE: 76 MMHG | HEART RATE: 91 BPM | BODY MASS INDEX: 22.66 KG/M2 | WEIGHT: 136 LBS

## 2023-01-31 DIAGNOSIS — K82.8 GALLBLADDER MASS: ICD-10-CM

## 2023-01-31 DIAGNOSIS — N28.89 RENAL MASS, RIGHT: Primary | ICD-10-CM

## 2023-01-31 DIAGNOSIS — I73.9 PAD (PERIPHERAL ARTERY DISEASE): ICD-10-CM

## 2023-01-31 PROCEDURE — 3077F PR MOST RECENT SYSTOLIC BLOOD PRESSURE >= 140 MM HG: ICD-10-PCS | Mod: CPTII,S$GLB,, | Performed by: UROLOGY

## 2023-01-31 PROCEDURE — 1126F PR PAIN SEVERITY QUANTIFIED, NO PAIN PRESENT: ICD-10-PCS | Mod: CPTII,S$GLB,, | Performed by: UROLOGY

## 2023-01-31 PROCEDURE — 1101F PT FALLS ASSESS-DOCD LE1/YR: CPT | Mod: CPTII,S$GLB,, | Performed by: UROLOGY

## 2023-01-31 PROCEDURE — 1159F MED LIST DOCD IN RCRD: CPT | Mod: CPTII,S$GLB,, | Performed by: UROLOGY

## 2023-01-31 PROCEDURE — 99499 UNLISTED E&M SERVICE: CPT | Mod: S$GLB,,, | Performed by: UROLOGY

## 2023-01-31 PROCEDURE — 3078F PR MOST RECENT DIASTOLIC BLOOD PRESSURE < 80 MM HG: ICD-10-PCS | Mod: CPTII,S$GLB,, | Performed by: UROLOGY

## 2023-01-31 PROCEDURE — 1126F AMNT PAIN NOTED NONE PRSNT: CPT | Mod: CPTII,S$GLB,, | Performed by: UROLOGY

## 2023-01-31 PROCEDURE — 99999 PR PBB SHADOW E&M-EST. PATIENT-LVL III: CPT | Mod: PBBFAC,,, | Performed by: UROLOGY

## 2023-01-31 PROCEDURE — 99999 PR PBB SHADOW E&M-EST. PATIENT-LVL III: ICD-10-PCS | Mod: PBBFAC,,, | Performed by: UROLOGY

## 2023-01-31 PROCEDURE — 3078F DIAST BP <80 MM HG: CPT | Mod: CPTII,S$GLB,, | Performed by: UROLOGY

## 2023-01-31 PROCEDURE — 3288F PR FALLS RISK ASSESSMENT DOCUMENTED: ICD-10-PCS | Mod: CPTII,S$GLB,, | Performed by: UROLOGY

## 2023-01-31 PROCEDURE — 3008F BODY MASS INDEX DOCD: CPT | Mod: CPTII,S$GLB,, | Performed by: UROLOGY

## 2023-01-31 PROCEDURE — 99215 OFFICE O/P EST HI 40 MIN: CPT | Mod: S$GLB,,, | Performed by: UROLOGY

## 2023-01-31 PROCEDURE — 1159F PR MEDICATION LIST DOCUMENTED IN MEDICAL RECORD: ICD-10-PCS | Mod: CPTII,S$GLB,, | Performed by: UROLOGY

## 2023-01-31 PROCEDURE — 3008F PR BODY MASS INDEX (BMI) DOCUMENTED: ICD-10-PCS | Mod: CPTII,S$GLB,, | Performed by: UROLOGY

## 2023-01-31 PROCEDURE — 3288F FALL RISK ASSESSMENT DOCD: CPT | Mod: CPTII,S$GLB,, | Performed by: UROLOGY

## 2023-01-31 PROCEDURE — 1101F PR PT FALLS ASSESS DOC 0-1 FALLS W/OUT INJ PAST YR: ICD-10-PCS | Mod: CPTII,S$GLB,, | Performed by: UROLOGY

## 2023-01-31 PROCEDURE — 99215 PR OFFICE/OUTPT VISIT, EST, LEVL V, 40-54 MIN: ICD-10-PCS | Mod: S$GLB,,, | Performed by: UROLOGY

## 2023-01-31 PROCEDURE — 87086 URINE CULTURE/COLONY COUNT: CPT | Performed by: UROLOGY

## 2023-01-31 PROCEDURE — 99499 RISK ADDL DX/OHS AUDIT: ICD-10-PCS | Mod: S$GLB,,, | Performed by: UROLOGY

## 2023-01-31 PROCEDURE — 3077F SYST BP >= 140 MM HG: CPT | Mod: CPTII,S$GLB,, | Performed by: UROLOGY

## 2023-01-31 NOTE — PROGRESS NOTES
Upper GI Tumor Board  Cancer Genetics Summary  Patient ID:  Name Babatunde Singh     1950    MRN [id number]      Date of Upper GI Tumor Board:  2023  Presenting Provider(s):  Dr. Tolu Gavin    Cancer Genetics Impression:  72 y.o. biological male diagnosed with renal cancer recently, now with gallbladder mass.  History in chart also significant for adenomatous colon polyps.    Cancer Genetics Recommendation:  Cancer Genetics consult for further cancer genetic risk assessment, genetic counseling, and potentially genetic testing.    Intervention:  Sent this recommendation to NOLVIA Melton NP.

## 2023-02-01 LAB — BACTERIA UR CULT: NO GROWTH

## 2023-02-06 ENCOUNTER — TELEPHONE (OUTPATIENT)
Dept: INTERNAL MEDICINE | Facility: CLINIC | Age: 73
End: 2023-02-06
Payer: MEDICARE

## 2023-02-06 NOTE — TELEPHONE ENCOUNTER
----- Message from Kim Buitrago sent at 2/6/2023  8:32 AM CST -----  Type:  Appointment Request    Name of Caller:Patients Daughter   When is the first available appointment?5/8/23  Symptoms:Proceudre follow up   Would the patient rather a call back or a response via [facility]? Call back   Best Call Back Number:[phone]  Additional Information: Patients daughter called on behalf of the patient. Patient had a surgery about a month ago, to implant stints. Daughter indicates patient needs to  discuss medications with the provider.Patients daughter indicates the patient needs to schedule an appointment for the soonest available. Patient would like to be seen between 2/7/23-2/9/23. Patient would like a call back with further assistance in scheduling.

## 2023-02-07 ENCOUNTER — TELEPHONE (OUTPATIENT)
Dept: FAMILY MEDICINE | Facility: CLINIC | Age: 73
End: 2023-02-07

## 2023-02-07 ENCOUNTER — OFFICE VISIT (OUTPATIENT)
Dept: WOUND CARE | Facility: CLINIC | Age: 73
End: 2023-02-07
Payer: MEDICARE

## 2023-02-07 ENCOUNTER — TELEPHONE (OUTPATIENT)
Dept: INTERNAL MEDICINE | Facility: CLINIC | Age: 73
End: 2023-02-07
Payer: MEDICARE

## 2023-02-07 VITALS
HEIGHT: 65 IN | DIASTOLIC BLOOD PRESSURE: 74 MMHG | TEMPERATURE: 98 F | BODY MASS INDEX: 24.46 KG/M2 | HEART RATE: 80 BPM | SYSTOLIC BLOOD PRESSURE: 170 MMHG | WEIGHT: 146.81 LBS

## 2023-02-07 DIAGNOSIS — R60.0 BILATERAL LOWER EXTREMITY EDEMA: ICD-10-CM

## 2023-02-07 DIAGNOSIS — S81.801A WOUND OF RIGHT LOWER EXTREMITY, INITIAL ENCOUNTER: Primary | ICD-10-CM

## 2023-02-07 DIAGNOSIS — Z87.891 HISTORY OF SMOKING: ICD-10-CM

## 2023-02-07 DIAGNOSIS — I10 PRIMARY HYPERTENSION: ICD-10-CM

## 2023-02-07 DIAGNOSIS — I73.9 PAD (PERIPHERAL ARTERY DISEASE): ICD-10-CM

## 2023-02-07 PROBLEM — K57.30 DIVERTICULOSIS OF COLON: Status: ACTIVE | Noted: 2021-07-19

## 2023-02-07 PROBLEM — D64.9 ANEMIA: Status: ACTIVE | Noted: 2021-01-14

## 2023-02-07 PROBLEM — J42 CHRONIC BRONCHITIS: Status: ACTIVE | Noted: 2020-06-18

## 2023-02-07 PROCEDURE — 3008F PR BODY MASS INDEX (BMI) DOCUMENTED: ICD-10-PCS | Mod: CPTII,S$GLB,,

## 2023-02-07 PROCEDURE — 3078F DIAST BP <80 MM HG: CPT | Mod: CPTII,S$GLB,,

## 2023-02-07 PROCEDURE — 1159F PR MEDICATION LIST DOCUMENTED IN MEDICAL RECORD: ICD-10-PCS | Mod: CPTII,S$GLB,,

## 2023-02-07 PROCEDURE — 3008F BODY MASS INDEX DOCD: CPT | Mod: CPTII,S$GLB,,

## 2023-02-07 PROCEDURE — 11042 DEBRIDEMENT: ICD-10-PCS | Mod: S$GLB,,,

## 2023-02-07 PROCEDURE — 1125F PR PAIN SEVERITY QUANTIFIED, PAIN PRESENT: ICD-10-PCS | Mod: CPTII,S$GLB,,

## 2023-02-07 PROCEDURE — 3077F SYST BP >= 140 MM HG: CPT | Mod: CPTII,S$GLB,,

## 2023-02-07 PROCEDURE — 1101F PT FALLS ASSESS-DOCD LE1/YR: CPT | Mod: CPTII,S$GLB,,

## 2023-02-07 PROCEDURE — 99214 OFFICE O/P EST MOD 30 MIN: CPT | Mod: 25,S$GLB,,

## 2023-02-07 PROCEDURE — 3288F FALL RISK ASSESSMENT DOCD: CPT | Mod: CPTII,S$GLB,,

## 2023-02-07 PROCEDURE — 3078F PR MOST RECENT DIASTOLIC BLOOD PRESSURE < 80 MM HG: ICD-10-PCS | Mod: CPTII,S$GLB,,

## 2023-02-07 PROCEDURE — 1159F MED LIST DOCD IN RCRD: CPT | Mod: CPTII,S$GLB,,

## 2023-02-07 PROCEDURE — 3288F PR FALLS RISK ASSESSMENT DOCUMENTED: ICD-10-PCS | Mod: CPTII,S$GLB,,

## 2023-02-07 PROCEDURE — 11042 DBRDMT SUBQ TIS 1ST 20SQCM/<: CPT | Mod: S$GLB,,,

## 2023-02-07 PROCEDURE — 3077F PR MOST RECENT SYSTOLIC BLOOD PRESSURE >= 140 MM HG: ICD-10-PCS | Mod: CPTII,S$GLB,,

## 2023-02-07 PROCEDURE — 99999 PR PBB SHADOW E&M-EST. PATIENT-LVL III: CPT | Mod: PBBFAC,,,

## 2023-02-07 PROCEDURE — 99999 PR PBB SHADOW E&M-EST. PATIENT-LVL III: ICD-10-PCS | Mod: PBBFAC,,,

## 2023-02-07 PROCEDURE — 1101F PR PT FALLS ASSESS DOC 0-1 FALLS W/OUT INJ PAST YR: ICD-10-PCS | Mod: CPTII,S$GLB,,

## 2023-02-07 PROCEDURE — 1125F AMNT PAIN NOTED PAIN PRSNT: CPT | Mod: CPTII,S$GLB,,

## 2023-02-07 PROCEDURE — 99214 PR OFFICE/OUTPT VISIT, EST, LEVL IV, 30-39 MIN: ICD-10-PCS | Mod: 25,S$GLB,,

## 2023-02-07 NOTE — TELEPHONE ENCOUNTER
----- Message from Nurse Radha Patel RN sent at 2/7/2023  2:35 PM CST -----  Regarding: MEDICAL CLEARANCE  Good Afternoon.    Above patient has confirmed Preop Anesthesia visit on Thursday 2/9/23 for Partial Nephrectomy and Cholecystectomy Preop assessment.    Her surgery is set for 2/27/23 with Dr. Cuba and Dr. Arango.     Can you please arrange for Preop Medical Clearance with the above patient? Your assistance is greatly appreciated.     Thank you,  Radha Patel, RN  Anesthesia Clinician

## 2023-02-07 NOTE — PROGRESS NOTES
Subjective:       Patient ID: Babatunde iSngh is a 72 y.o. male.    Chief Complaint: Wound Check      Patient presents for a reevaluation of right lower extremity and heel wounds. Patient follows with vascular surgery for PAD. Patient s/p right axillary to bi-profunda bypass on 10/6/22. Pt's ABIs preoperatively bilaterally were 0 to 0.69 on the right lower extremity and 0.74 on the left lower extremity. Patient s/p surgical debridement of his Achilles tendon on 22 with Dr. Ortiz. Following this, patient had a wound vac placed to his right distal lateral lower extremity wound. Dr. Ortiz D/C the wound vac on 23. Pt has [facility] that comes out 1x a week. Denies fever, chills, erythema, warmth, drainage, or pain.    Review of Systems   Constitutional:  Negative for activity change, chills, diaphoresis, fatigue and fever.   Respiratory:  Negative for apnea, chest tightness and shortness of breath.    Cardiovascular:  Negative for chest pain, palpitations and leg swelling.   Musculoskeletal:  Negative for gait problem and joint swelling.   Skin:  Positive for wound. Negative for pallor and rash.   Neurological:  Negative for syncope, weakness and numbness.   Psychiatric/Behavioral:  Negative for agitation. The patient is not nervous/anxious.    All other systems reviewed and are negative.    Objective:      Physical Exam  Vitals reviewed.   Constitutional:       General: He is not in acute distress.     Appearance: Normal appearance.   Cardiovascular:      Rate and Rhythm: Normal rate and regular rhythm.      Pulses: Normal pulses.   Pulmonary:      Effort: No respiratory distress.   Musculoskeletal:         General: No swelling.      Right lower le+ Edema present.      Left lower le+ Edema present.   Skin:     General: Skin is warm and dry.      Capillary Refill: Capillary refill takes less than 2 seconds.      Findings: Wound present. No erythema.          Neurological:      General:  No focal deficit present.      Mental Status: He is alert and oriented to person, place, and time.   Psychiatric:         Mood and Affect: Mood normal.         Behavior: Behavior normal.         Thought Content: Thought content normal.         Judgment: Judgment normal.       Assessment:       1. Wound of right lower extremity, initial encounter    2. Bilateral lower extremity edema    3. Primary hypertension    4. PAD (peripheral artery disease)    5. History of smoking             Altered Skin Integrity 10/28/22 1405 Right lower;distal;lateral Leg Ulceration (Active)   10/28/22 1405   Altered Skin Integrity Present on Admission: yes   Side: Right   Orientation: lower;distal;lateral   Location: Leg   Wound Number:    Is this injury device related?:    Primary Wound Type: Ulceration   Description of Altered Skin Integrity:    Ankle-Brachial Index:    Pulses:    Removal Indication and Assessment:    Wound Outcome:    (Retired) Wound Length (cm):    (Retired) Wound Width (cm):    (Retired) Depth (cm):    Wound Description (Comments):    Removal Indications:    Wound Image   02/07/23 0846   Dressing Appearance Dry;Intact;Clean;Moist drainage 02/07/23 0846   Drainage Amount Moderate 02/07/23 0846   Drainage Characteristics/Odor Garsia;Creamy 02/07/23 0846   Red (%), Wound Tissue Color 80 % 02/07/23 0846   Yellow (%), Wound Tissue Color 20 % 02/07/23 0846   Periwound Area Intact;Edematous;Pink 02/07/23 0846   Wound Length (cm) 3.7 cm 02/07/23 0846   Wound Width (cm) 3.7 cm 02/07/23 0846   Wound Depth (cm) 0.1 cm 02/07/23 0846   Wound Volume (cm^3) 1.369 cm^3 02/07/23 0846   Wound Surface Area (cm^2) 13.69 cm^2 02/07/23 0846   Care Cleansed with:;Soap and water 02/07/23 0846   Dressing Applied;Compression wrap;Other (comment) 02/07/23 0846   Periwound Care Skin barrier film applied 02/07/23 0846   Compression Two layer compression 02/07/23 0846         Babatunde was seen in the clinic room and placed in the supine position on  the treatment table.  The wound dressings and the drape and black sponge was removed from the wound bed. The leg was cleansed with Easi-clense sponges and dried thoroughly. No odor, redness, or warmth noted from patient's baseline.  Dr. Ortiz consulted- No tendon exposed.     Plan of Care: Calmoseptine to periwound, hydrofera blue to wound bed, and a two layer calamine coflex wrap. The patient's foot was positioned at a 90 degree angle.  A calamine coflex wrap was applied using a spiral technique avoiding creases or folds.  The wrap was started behind the first metatarsal and ended below the tibial tubercle of the knee.  There was overlap of each turn half the width of the previous turn.     Plan:     Right lower extremity dressed as detailed above.  Patient was instructed to not get the dressings wet and to use cast covers for showering.  Should the dressing become wet, he is to remove it, place a moist dressing over the wound, cover with gauze and roll gauze and use ace wraps for compression and to secure bandages.  He should then notify this office or home health as soon as possible to have a new dressing applied.  Instructed patient to remove wrap if he notices tingling or coldness to his right lower extremity or if his toes become white, blue, or cold.      Faxed home health orders to [facility].  HOME HEALTH WOUND CARE ORDERS  D/C previous orders  Wound care and nurse visits  1 X a week or PRN complications  Wound location;  1. Cleanse wound with saline or wound cleanser    ( pt may shower)  2.Apply calmoseptine to periwound, hydrofera blue to wound bed, and a two layer calamine coflex wrap.  3.Cover with appropriate cover dressing  Monitor for infection, teach patient/caregiver signs and symptoms    Discussed nutrition and the role of protein in wound healing with the patient. Instructed patient to continue to optimize protein for wound healing. Gave samples of Ensure.    Instructed patient to  elevate legs whenever sedentary.    Instructed patient to keep vascular surgery appointments.    Written and verbal instructions given to patient.  RTC in 3-4 weeks    Sherry Davis PA-C

## 2023-02-07 NOTE — TELEPHONE ENCOUNTER
----- Message from Nurse Radha Patel RN sent at 2/7/2023  2:44 PM CST -----  Regarding: MEDICAL PREOP CLEARANCE  Good Afternoon.    The above patient is scheduled for Surgery 2/27/23, Partial Nephrectomy & Cholecystectomy.    Can you arrange for Medical Clearance for surgery with above patient?  Your assistance is greatly appreciated.    Thank you,  Radha Patel, RN  Anesthesia Clinician

## 2023-02-07 NOTE — PROCEDURES
"Debridement    Date/Time: 2/7/2023 8:30 AM  Performed by: Sherry Davis PA-C  Authorized by: Sherry Davis PA-C     Time out: Immediately prior to procedure a "time out" was called to verify the correct patient, procedure, equipment, support staff and site/side marked as required.    Consent Done?:  Yes (Written)  Local anesthesia used?: No      Wound Details:    Location:  Right leg    Type of Debridement:  Excisional       Length (cm):  3.7       Area (sq cm):  13.69       Width (cm):  3.7       Percent Debrided (%):  20       Depth (cm):  0.1       Total Area Debrided (sq cm):  2.74    Depth of debridement:  Subcutaneous tissue    Tissue debrided:  Subcutaneous    Devitalized tissue debrided:  Biofilm, Clots, Fibrin and Slough    Instruments:  Curette, Forceps and Scissors    Bleeding:  Minimal  Hemostasis Achieved: Yes    Method Used:  Pressure  Patient tolerance:  Patient tolerated the procedure well with no immediate complications  "

## 2023-02-08 NOTE — TELEPHONE ENCOUNTER
----- Message from Esdras Ly MD sent at 2/8/2023  9:08 AM CST -----  Regarding: FW: MEDICAL PREOP CLEARANCE  Can we schedule a Pre op for patient anybody    Esdras Ly    ----- Message -----  From: Nurse Radha Patel RN  Sent: 2/7/2023   2:47 PM CST  To: Marci Peterson NP, Esdras Ly MD, #  Subject: MEDICAL PREOP CLEARANCE                          Good Afternoon.    The above patient is scheduled for Surgery 2/27/23, Partial Nephrectomy & Cholecystectomy.    Can you arrange for Medical Clearance for surgery with above patient?  Your assistance is greatly appreciated.    Thank you,  Radha Patel, RN  Anesthesia Clinician

## 2023-02-09 ENCOUNTER — HOSPITAL ENCOUNTER (OUTPATIENT)
Dept: PREADMISSION TESTING | Facility: HOSPITAL | Age: 73
Discharge: HOME | End: 2023-02-09
Attending: UROLOGY | Admitting: UROLOGY
Payer: MEDICARE

## 2023-02-09 VITALS
HEIGHT: 65 IN | HEART RATE: 89 BPM | WEIGHT: 146.5 LBS | SYSTOLIC BLOOD PRESSURE: 160 MMHG | BODY MASS INDEX: 24.41 KG/M2 | OXYGEN SATURATION: 99 % | TEMPERATURE: 98 F | DIASTOLIC BLOOD PRESSURE: 77 MMHG | RESPIRATION RATE: 16 BRPM

## 2023-02-09 NOTE — ANESTHESIA PAT ROS NOTE
02/09/2023  Babatunde Singh is a 72 y.o., male.      Pre-op Assessment          Review of Systems  Anesthesia Hx:  No problems with previous Anesthesia   History of prior surgery of interest to airway management or planning:  Previous anesthesia: MAC        Denies Family Hx of Anesthesia complications.    Denies Personal Hx of Anesthesia complications.                    Social:  Former Smoker, No Alcohol Use QUIT SMOKING 12/7/2022      Hematology/Oncology:       -- Anemia:                  Current/Recent Cancer.            Oncology Comments: RIGHT RENAL MASS  GALLBLADDER MASS     EENT/Dental:  EENT/Dental Normal           Cardiovascular:     Hypertension       Denies Angina.    PVD hyperlipidemia    ABSENT PEDAL PULSES    HX DVT; PULMONARY EMBOLUS.     XARELTO LAST DOSE 2/19/23    HX AXILLARY FEMORAL BYPASS; CHRONIC DISTAL AORTIC  AND PROXIMAL COMMON ILIAC ARTERY OCCLUSION    Grade I left ventricular diastolic dysfunction.   Functional Capacity 4 METS   Coronary Artery Disease:      S/P Percutaneous Coronary Intervention (PCI)                            Pulmonary:   COPD   Shortness of breath  Denies Recent URI.                 Renal/:  Chronic Renal Disease  BPH RIGHT RENAL MASS    RENAL ARTERY STENOSIS BILATERAL     Neoplasm/Tumor, Renal Neoplasm.   Kidney Function/Disease             Hepatic/GI:   Denies PUD.  GERD, well controlled Denies Liver Disease.  Denies Hepatitis.   GALLBLADDER MASS    DIVERTICULOSIS          Musculoskeletal:   Musculoskeletal General/Symptoms: sciatica, muscle pain, localized.  CLAUDICATION RLE  Joint Disease:  ArthritisTemporomandibular Joint Disorder: SCIATICA. RIGHT SCIATICA         Neurological:    Neuromuscular Disease,         Pain  , location of RLE CLAUDICATION , quality of burning   , precipitating factors are WALKING , alleviating factors are REST.                           Endocrine:  Denies Diabetes.           Dermatological:  ULCERATION RLE   Psych:   anxiety               Physical Exam  General: Well nourished, Cooperative, Alert and Oriented    Airway:  Mouth Opening: Normal  Tongue: Normal  Neck ROM: Right Lateral Motion Decreased, Flexion Decreased    Dental:  Dentures  UPPER AND LOWER DENTURES  Chest/Lungs:  Clear to auscultation, Normal Respiratory Rate    Heart:  Rate: Normal  Rhythm: Regular Rhythm  Sounds: Normal      MEDICAL CLEARANCE / DR. GRIFFITH PENDING      2/16/23 HOSPITAL ADMISSION FOR NORMOCYTIC ANEMIA  Patient presenting for pre-operative labs and found to have Hgb <7 and repeat in ED with Hgb 6.6 requiring blood transfusion. Per chart review, patient underwent extensive evaluation for anemia several months ago including EGD, colonoscopy and video capsule endoscopy. VCE revealing two single, mucosal red spots in the small intestine. Per GI bleed, if repeat bleed, patient to have device-assisted endoscopy. Patient also with known wound of the lower extremities that he reports have bled in the past.  - s/p 1 unit of pRBCs in the ED  - maintain large bore IV  - continue to trend CBCs; transfuse if Hgb <7  - holding anticoagulation at this time  - Gastroenterology consult placed given patient's history, appreciate recommendations  - begin IV pantoprazole 40 mg BID  In the emergency department, the patient was found to be hemodynamically stable.  Labs significant for repeat hemoglobin at 6.6.  The patient had type and screen sent and was consented for blood transfusion with in the emergency department and 1 unit of of pRBCs was ordered.  The patient was admitted to Hospital Medicine for further observation.    PAD (peripheral artery disease)  Patient with history of critical limb ischemia of right lower extremity and is now s/p bypass. Patient complains of mild pain on interview to his right lower extremity wounds.   - holding antiplatelet medication given his  anemia  - resume home atorvastatin and ezetimibe  - gabapentin 800 mg TID for pain control        Right renal mass  Patient with known right renal mass. Follows with Urology as outpatient. Patient is scheduled to have surgical intervention completed on the mass for further intervention.     Mixed hyperlipidemia  Resume home atorvastatin and ezetimibe.         Primary hypertension  Blood pressure controlled within the emergency department. Holding antihypertensive regimen at this time given underlying concern for bleed.     Electronically signed by Víctor Evangelista MD at 2/16/2023  1:33 AM  Víctor Evangelista MD  Department of Hospital Medicine   [facility] - Observation 11H      Electronically signed by Víctor Evangelista MD at 2/16/2023  1:39 AM

## 2023-02-09 NOTE — DISCHARGE INSTRUCTIONS
Your surgery has been scheduled for:____________2/27/23______________________________    You should report to:  ____[facility], located on the [place] side of the first floor of the           [facility] ([phone])  __X__[facility], located on the [facility] side of the            Second floor of the [facility] ([phone])  ____3rd Floor [facility] located on the [facility] side of the [facility] [phone]  ____[facility]: located at [address] [facility] [place] [address]. [address].     Please Note   Tell your doctor if you take Aspirin, products containing Aspirin, herbal medications  or blood thinners, such as Coumadin, Ticlid, or Plavix.  (Consult your provider regarding holding or stopping before surgery).  Arrange for someone to drive you home following surgery.  You will not be allowed to leave the surgical facility alone or drive yourself home following sedation and anesthesia.    Before Surgery  Stop taking all herbal medications, vitamins, and supplements 7 days prior to surgery  No Motrin/Advil (Ibuprofen) 7 days before surgery  No Aleve (Naproxen) 7 days before surgery  Stop Taking Asprin, products containing Asprin _7____days before surgery  Stop taking blood thinners__7_____days before surgery  No Goody's/BC  Powder 7 days before surgery  Refrain from drinking alcoholic beverages for 24hours before and after surgery  Stop or limit smoking ____7_____days before surgery  You may take Tylenol for pain    Night before Surgery  Do not eat or drink after midnight  Take a shower or bath (shower is recommended).  Bathe with Hibiclens soap or an antibacterial soap from the neck down.  If not supplied by your surgeon, hibiclens soap will need to be purchased over the counter in pharmacy.  Rinse soap off thoroughly.  Shampoo your hair with your regular shampoo    The Day of  Surgery  Take another bath or shower with hibiclens or any antibacterial soap, to reduce the chance of infection.  Take heart and blood pressure medications with a small sip of water, as advised by the perioperative team.  Do not take fluid pills  You may brush your teeth and rinse your mouth, but do not swall any additional water.   Do not apply perfumes, powder, body lotions or deodorant on the day of surgery.  Nail polish should be removed.  Do not wear makeup or moisturizer  Wear comfortable clothes, such as a button front shirt and loose fitting pants.  Leave all jewelry, including body piercings, and valuables at home.    Bring any devices you will neeed after surgery such as crutches or canes.  If you have sleep apnea, please bring your CPAP machine  In the event that your physical condition changes including the onset of a cold or respiratory illness, or if you have to delay or cancel your surgery, please notify your surgeon.       Anesthesia: General Anesthesia     You are watched continuously during your procedure by your anesthesia provider.     Youre due to have surgery. During surgery, youll be given medicine called anesthesia or anesthetic. This will keep you comfortable and pain-free. Your anesthesia provider will use general anesthesia.  What is general anesthesia?  General anesthesia puts you into a state like deep sleep. It goes into the bloodstream (IV anesthetics), into the lungs (gas anesthetics), or both. You feel nothing during the procedure. You will not remember it. During the procedure, the anesthesia provider monitors you continuously. He or she checks your heart rate and rhythm, blood pressure, breathing, and blood oxygen.  IV anesthetics. IV anesthetics are given through an IV line in your arm. Theyre often given first. This is so you are asleep before a gas anesthetic is started. Some kinds of IV anesthetics relieve pain. Others relax you. Your doctor will decide which kind is best  in your case.  Gas anesthetics. Gas anesthetics are breathed into the lungs. They are often used to keep you asleep. They can be given through a facemask or a tube placed in your larynx or trachea (breathing tube).  If you have a facemask, your anesthesia provider will most likely place it over your nose and mouth while youre still awake. Youll breathe oxygen through the mask as your IV anesthetic is started. Gas anesthetic may be added through the mask.  If you have a tube in the larynx or trachea, it will be inserted into your throat after youre asleep.  Anesthesia tools and medicines  You will likely have:  IV anesthetics. These are put into an IV line into your bloodstream.  Gas anesthetics. You breathe these anesthetics into your lungs, where they pass into your bloodstream.  Pulse oximeter. This is a small clip that is attached to the end of your finger. This measures your blood oxygen level.  Electrocardiography leads (electrodes). These are small sticky pads that are placed on your chest. They record your heart rate and rhythm.  Blood pressure cuff. This reads your blood pressure.  Risks and possible complications  General anesthesia has some risks. These include:  Breathing problems  Nausea and vomiting  Sore throat or hoarseness (usually temporary)  Allergic reaction to the anesthetic  Irregular heartbeat (rare)  Cardiac arrest (rare)   Anesthesia safety  Follow all instructions you are given for how long not to eat or drink before your procedure.  Be sure your doctor knows what medicines and drugs you take. This includes over-the-counter medicines, herbs, supplements, alcohol or other drugs. You will be asked when those were last taken.  Have an adult family member or friend drive you home after the procedure.  For the first 24 hours after your surgery:  Do not drive or use heavy equipment.  Do not make important decisions or sign legal documents. If important decisions or signing legal documents is  necessary during the first 24 hours after surgery, have a trusted family member or spouse act on your behalf.  Avoid alcohol.  Have a responsible adult stay with you. He or she can watch for problems and help keep you safe.  Date Last Reviewed: 12/1/2016 © 2000-2017 [employer]. [address] [place] [address], [place] [address]. All rights reserved. This information is not intended as a substitute for professional medical care. Always follow your healthcare professional's instructions.

## 2023-02-10 ENCOUNTER — TELEPHONE (OUTPATIENT)
Dept: WOUND CARE | Facility: CLINIC | Age: 73
End: 2023-02-10
Payer: MEDICARE

## 2023-02-10 DIAGNOSIS — Z01.818 PREOP TESTING: Primary | ICD-10-CM

## 2023-02-10 NOTE — TELEPHONE ENCOUNTER
----- Message from Ileana Cortez sent at 2/10/2023  8:29 AM CST -----  Contact: Itzel @ [phone]  [facility] calling regarding Patient Advice (message) for # asking for nurse dto give her a call a concerning pt, asking for call back

## 2023-02-10 NOTE — TELEPHONE ENCOUNTER
Returned call and spoke with Itzel, who asked about patient's wound measurements from visit on 2/7/23 - because patient states he wound has gotten bigger.  HH nurse advised her measurements indicate wound is smaller from her last visit.  Wound Measurements from 2/7/23 are 3.7 x 3.7 x 0.1 cm.  Patient also had concerns about HH nurse frequency.  Advised Itzel that patient's wound vac has been discontinued and he does not have much drainage therefore the skilled nursing visits will remain once weekly per provider 's wound care orders.

## 2023-02-14 PROBLEM — K82.8 GALLBLADDER MASS: Status: ACTIVE | Noted: 2022-09-12

## 2023-02-14 NOTE — PROGRESS NOTES
Encounter Date:  2/15/2023    Patient ID: Babatunde Singh  Age:  72 y.o. :  1950    Chief Complaint:  followup of  gallbladder mass     2022: DVT and remains on Xarelto  2022: Imaging revealed R kidney mass, evaluated by urology, Dr. Cuba  10/6/2022: s/p L axillary to bilateral femoris profunda bypass   2022: abnormal finding in gallbladder with gallstones and stable ~7cm mass in R kidney  2022: s/p surgical debridement of Achilles tendon per Dr. Ortiz  2023: removed RLE wound vac    Interval History:  Mr. Singh returns to clinic alone from [facility]; his daughter, Cnidi, is available via speakerphone today. He was last seen in clinic in 2023. We discussed his case at [facility] last month which I was present at and participated in. MRI last month - stable 3.3 multicystic lesion in gallbladder. Tumor markers WNL.   Tentatively scheduled for partial nephrectomy per Akilah Cuba and Romaine on 23. He had preop appt last week. Labs this morning revealed drop in H/H to 6.3/ 23.5.     RLE wound vac removed last month per vascular surgery.  [facility] comes 1x a week.  Overall he is feeling well, denies significant pain. Reports a good appetite, tolerating oral diet without N/V. He denies melena, hemoptysis.     Prior abd surgery: none  No family hx of CA  Currently taking anticoagulation - taking Xarelto  Quit smoking 2022, smoked since age 16 <1ppd    New Data:  Imaging:  Dr. Gavin and I personally reviewed the following images:   2023: Vas u/s BLE arterial   Color flow duplex imaging reveals a patent Lt Ax-Fem, Lt to Rt Fem-Fem Crossover bypass graft. Graft velocities do not suggest a hemodynamically significant stenosis.    2023: BRENNAN  Right Leg: Segmental pressures and PVR waveforms suggest moderate peripheral arterial occlusive disease.     Right Leg: Segmental pressures and PVR waveforms suggest moderate peripheral arterial occlusive disease.    2023:  MRI abd:  Gallbladder: Layering gallbladder sludge and stones.  Multiloculated cystic lesion with smooth thin enhancing septations along the gallbladder wall measuring 2.4 x 3.3 x 3.1 cm.  Similar appearing lesion at the fundus measuring 1.9 cm though Phrygian cap could appear similarly.  No evidence of acute cholecystitis.    Labs:    CA 19-9 = 5.9  CEA = < 1.7      Chemistry        Component Value Date/Time     02/15/2023 1103    K 3.9 02/15/2023 1103     02/15/2023 1103    CO2 23 02/15/2023 1103    BUN 16 02/15/2023 1103    CREATININE 0.8 02/15/2023 1103    GLU 94 02/15/2023 1103        Component Value Date/Time    CALCIUM 8.9 12/12/2022 0907    ALKPHOS 76 10/30/2022 0610    AST 17 10/30/2022 0610    ALT 12 10/30/2022 0610    BILITOT 0.3 10/30/2022 0610    ESTGFRAFRICA >60.0 03/10/2022 1118    EGFRNONAA >60.0 03/10/2022 1118        Lab Results   Component Value Date    WBC 13.92 (H) 02/15/2023    HGB 6.3 (L) 02/15/2023    HCT 23.5 (L) 02/15/2023    MCV 68 (L) 02/15/2023     02/15/2023     Lab Results   Component Value Date    HGBA1C 5.8 (H) 03/10/2022     1/20/2023: video capsule endoscopy   - Two single mucosal red spots with no bleeding in the small bowel.    10/31/2022: Colonoscopy per Dr. Shafer  - The examined portion of the ileum was normal.   - One 2 mm polyp in the transverse colon, removed with a jumbo cold forceps. Resected and retrieved.   - One 3 mm polyp in the transverse colon, removed with a cold snare. Resected and retrieved.   - One 7 mm polyp in the sigmoid colon, removed with a cold snare. Resected and retrieved. Clip (MR conditional) was placed.   - One 12 mm polyp in the sigmoid colon, removed with a hot snare. Resected and retrieved. Clip (MR conditional) was placed.   - Diverticulosis in the sigmoid colon.   - The examination was otherwise normal on direct and retroflexion views.    10/28/2022: EGD per Dr. Marlee Hopson  Findings:        The examined esophagus was normal.         The entire examined stomach was normal.        Patchy moderately erythematous mucosa without active bleeding and        with no stigmata of bleeding was found in the duodenal bulb.        The exam of the duodenum was otherwise normal.        The examined jejunum was normal.   Impression:     - Normal esophagus.                          - Normal stomach.                          - Erythematous duodenopathy.                          - Normal examined jejunum.                          - No specimens collected.     10/2022: EGD per Dr. Sixto Chicas   - Normal esophagus.   - Z-line regular.   - Gastritis.   - Normal examined duodenum.   - No specimens collected.     10/27/2022: EKG:   Sinus tachycardia   Low anterior forces   Abnormal ECG     Past Medical History:   Diagnosis Date    DVT (deep venous thrombosis)     Hypertension     Peripheral arterial disease      Past Surgical History:   Procedure Laterality Date    APPLICATION OF WOUND VACUUM-ASSISTED CLOSURE DEVICE Right 12/8/2022    Procedure: APPLICATION, WOUND VAC;  Surgeon: LEI Ortiz III, MD;  [facility] [facility] [place] [address] [place] [address];  Service: Peripheral Vascular;  Laterality: Right;    COLONOSCOPY N/A 10/31/2022    Procedure: COLONOSCOPY;  Surgeon: Philip Shafer MD;  Location: [facility] ([address] [place] [address]);  Service: Endoscopy;  Laterality: N/A;    CREATION OF AXILLARY-FEMORAL ARTERY BYPASS WITH GRAFT N/A 10/6/2022    Procedure: CREATION, BYPASS, ARTERIAL, AXILLARY TO FEMORAL, USING GRAFT;  Surgeon: LEI Ortiz III, MD;  [facility];  Service: Peripheral Vascular;  Laterality: N/A;    CREATION OF FEMORAL-FEMORAL ARTERY BYPASS WITH GRAFT N/A 10/6/2022    Procedure: CREATION, BYPASS, ARTERIAL, FEMORAL TO FEMORAL, USING GRAFT;  Surgeon: LEI Ortiz III, MD;  [facility];  Service: Peripheral Vascular;  Laterality: N/A;    ESOPHAGOGASTRODUODENOSCOPY N/A 10/20/2022    Procedure: EGD (ESOPHAGOGASTRODUODENOSCOPY);  Surgeon: Sixto Chicas,  MD;  Location: [facility] ENDO (2ND FLR);  Service: Endoscopy;  Laterality: N/A;    ESOPHAGOGASTRODUODENOSCOPY N/A 10/28/2022    Procedure: EGD (ESOPHAGOGASTRODUODENOSCOPY);  Surgeon: Marlee Hopson MD;  Location: [facility] ENDO (2ND FLR);  Service: Gastroenterology;  Laterality: N/A;    EXCISION OF HYDROCELE Left 4/26/2022    Procedure: HYDROCELECTOMY;  Surgeon: Damion Roberts MD;  Location: [facility] OR;  Service: Urology;  Laterality: Left;    KNEE SURGERY  1972    WOUND DEBRIDEMENT Right 12/8/2022    Procedure: DEBRIDEMENT, WOUND;  Surgeon: LEI Ortiz III, MD;  Location: [facility] OR 2ND FLR;  Service: Peripheral Vascular;  Laterality: Right;  RLE wound debridement     Current Outpatient Medications on File Prior to Visit   Medication Sig Dispense Refill    acetaminophen (TYLENOL) 325 MG tablet Take 2 tablets (650 mg total) by mouth every 8 (eight) hours as needed. 20 tablet 0    aspirin 81 MG Chew Chew and swallow 1 tablet (81 mg total) by mouth once daily. 30 tablet 0    atorvastatin (LIPITOR) 80 MG tablet Take 1 tablet (80 mg total) by mouth once daily. 30 tablet 0    ezetimibe (ZETIA) 10 mg tablet Take 1 tablet (10 mg total) by mouth once daily. 30 tablet 0    gabapentin (NEURONTIN) 800 MG tablet Take 1 tablet (800 mg total) by mouth 3 (three) times daily. 90 tablet 11    HYDROcodone-acetaminophen (NORCO) 5-325 mg per tablet Take 1 tablet by mouth every 6 (six) hours as needed for Pain. 35 tablet 0    HYDROcodone-acetaminophen (NORCO) 5-325 mg per tablet Take 1 tablet by mouth every 6 (six) hours as needed for Pain. 15 tablet 0    losartan-hydrochlorothiazide 50-12.5 mg (HYZAAR) 50-12.5 mg per tablet Take 1 tablet by mouth once daily. 30 tablet 0    multivitamin (THERAGRAN) per tablet Take 1 tablet by mouth once daily.      pantoprazole (PROTONIX) 40 MG tablet Take 1 tablet (40 mg total) by mouth once daily. 30 tablet 0    rivaroxaban (XARELTO) 20 mg Tab Take 1 tablet (20 mg total) by mouth daily with dinner or  "evening meal. 30 tablet 11     No current facility-administered medications on file prior to visit.     Review of patient's allergies indicates:   Allergen Reactions    Ace inhibitors        Family History:  His family history includes Hypertension in his father and mother.     Social History:   reports that he quit smoking about 4 months ago. His smoking use included cigarettes. He has a 27.50 pack-year smoking history. He has never used smokeless tobacco. He reports that he does not drink alcohol and does not use drugs.     ROS:   Pertinent positive/negatives detailed in HPI, all other systems negative.     Physical Exam:  /65 (BP Location: Left arm, Patient Position: Sitting)   Pulse 97   Ht 5' 5" (1.651 m)   Wt 65 kg (143 lb 4.8 oz)   SpO2 100%   BMI 23.85 kg/m²     Constitutional:  Non-toxic, no acute distress.  Eyes:  Sclerae anicteric, gaze symmetrical  Neck:  Trachea midline,  FROM  Resp:  Easy work of breathing  CV:  Regular pulse  Abd:  Soft, non-tender, no masses, no ascites  Musculoskeletal:  Ambulatory, normal gait, no muscle wasting.  Extremities are symmetrical without lymphedema.  Neuro:  No gross deficits  Psych:  Awake, alert, oriented.  Answers and asks questions appropriately      ICD-10-CM ICD-9-CM    1. Gallbladder mass  K82.8 575.8       2. Right renal mass  N28.89 593.9       3. PAD (peripheral artery disease)  I73.9 443.9       4. Tobacco use disorder, severe, in early remission  F17.201 305.1       Plan   Given low H/H, recommend proceed to ED for blood transfusion.   Lengthy discussion on proceeding with cholecystectomy at the time of partial nephrectomy.   Risks and benefits including death, bleeding, infection, scar, pain/numbness, margin positivity, discovery of additional disease were all reviewed.  He was given the opportunity to ask questions, which were all addressed.  He voiced understanding and wishes to proceed. Consents obtained and preop instructions provided per RN. " Surgery tentatively set for 2/27/23.     Follow up for post operative care / hospital discharge.      Questions were asked and answered to patient's satisfaction.    Pt seen in conjunction with Dr. Gavin today.           Annamarie Pelletier NP  Upper GI / Hepatobiliary Surgical Oncology  [facility] [place], [place]  Office: [phone]  Fax: [phone]

## 2023-02-15 ENCOUNTER — OFFICE VISIT (OUTPATIENT)
Dept: SURGERY | Facility: CLINIC | Age: 73
End: 2023-02-15
Payer: MEDICARE

## 2023-02-15 ENCOUNTER — HOSPITAL ENCOUNTER (OUTPATIENT)
Facility: HOSPITAL | Age: 73
Discharge: HOME | End: 2023-02-16
Attending: EMERGENCY MEDICINE | Admitting: STUDENT IN AN ORGANIZED HEALTH CARE EDUCATION/TRAINING PROGRAM
Payer: MEDICARE

## 2023-02-15 ENCOUNTER — TELEPHONE (OUTPATIENT)
Dept: SURGERY | Facility: CLINIC | Age: 73
End: 2023-02-15
Payer: MEDICARE

## 2023-02-15 VITALS
HEART RATE: 97 BPM | WEIGHT: 143.31 LBS | HEIGHT: 65 IN | OXYGEN SATURATION: 100 % | SYSTOLIC BLOOD PRESSURE: 139 MMHG | BODY MASS INDEX: 23.88 KG/M2 | DIASTOLIC BLOOD PRESSURE: 65 MMHG

## 2023-02-15 DIAGNOSIS — K82.8 GALLBLADDER MASS: Primary | ICD-10-CM

## 2023-02-15 DIAGNOSIS — R05.9 COUGH: ICD-10-CM

## 2023-02-15 DIAGNOSIS — D64.9 ANEMIA: ICD-10-CM

## 2023-02-15 DIAGNOSIS — N28.89 RIGHT RENAL MASS: ICD-10-CM

## 2023-02-15 DIAGNOSIS — F17.201 TOBACCO USE DISORDER, SEVERE, IN EARLY REMISSION: ICD-10-CM

## 2023-02-15 DIAGNOSIS — I73.9 PAD (PERIPHERAL ARTERY DISEASE): ICD-10-CM

## 2023-02-15 DIAGNOSIS — R07.9 CHEST PAIN: ICD-10-CM

## 2023-02-15 LAB
ALBUMIN SERPL BCP-MCNC: 3.2 G/DL (ref 3.5–5.2)
ALP SERPL-CCNC: 88 U/L (ref 55–135)
ALT SERPL W/O P-5'-P-CCNC: 8 U/L (ref 10–44)
ANION GAP SERPL CALC-SCNC: 7 MMOL/L (ref 8–16)
ANISOCYTOSIS BLD QL SMEAR: SLIGHT
AST SERPL-CCNC: 16 U/L (ref 10–40)
BASO STIPL BLD QL SMEAR: ABNORMAL
BASOPHILS # BLD AUTO: 0.08 K/UL (ref 0–0.2)
BASOPHILS NFR BLD: 0.6 % (ref 0–1.9)
BILIRUB SERPL-MCNC: 0.3 MG/DL (ref 0.1–1)
BLD PROD TYP BPU: NORMAL
BLOOD UNIT EXPIRATION DATE: NORMAL
BLOOD UNIT TYPE CODE: 6200
BLOOD UNIT TYPE: NORMAL
BUN SERPL-MCNC: 16 MG/DL (ref 8–23)
CALCIUM SERPL-MCNC: 9.1 MG/DL (ref 8.7–10.5)
CHLORIDE SERPL-SCNC: 109 MMOL/L (ref 95–110)
CO2 SERPL-SCNC: 23 MMOL/L (ref 23–29)
CODING SYSTEM: NORMAL
CREAT SERPL-MCNC: 0.9 MG/DL (ref 0.5–1.4)
CROSSMATCH INTERPRETATION: NORMAL
DIFFERENTIAL METHOD: ABNORMAL
DISPENSE STATUS: NORMAL
EOSINOPHIL # BLD AUTO: 0.3 K/UL (ref 0–0.5)
EOSINOPHIL NFR BLD: 2.2 % (ref 0–8)
ERYTHROCYTE [DISTWIDTH] IN BLOOD BY AUTOMATED COUNT: 20.2 % (ref 11.5–14.5)
EST. GFR  (NO RACE VARIABLE): >60 ML/MIN/1.73 M^2
FERRITIN SERPL-MCNC: 16 NG/ML (ref 20–300)
GLUCOSE SERPL-MCNC: 98 MG/DL (ref 70–110)
HCT VFR BLD AUTO: 24 % (ref 40–54)
HGB BLD-MCNC: 6.6 G/DL (ref 14–18)
IMM GRANULOCYTES # BLD AUTO: 0.08 K/UL (ref 0–0.04)
IMM GRANULOCYTES NFR BLD AUTO: 0.6 % (ref 0–0.5)
IRON SERPL-MCNC: 15 UG/DL (ref 45–160)
LYMPHOCYTES # BLD AUTO: 3.3 K/UL (ref 1–4.8)
LYMPHOCYTES NFR BLD: 26.7 % (ref 18–48)
MCH RBC QN AUTO: 18.9 PG (ref 27–31)
MCHC RBC AUTO-ENTMCNC: 27.5 G/DL (ref 32–36)
MCV RBC AUTO: 69 FL (ref 82–98)
MONOCYTES # BLD AUTO: 1.3 K/UL (ref 0.3–1)
MONOCYTES NFR BLD: 10.3 % (ref 4–15)
NEUTROPHILS # BLD AUTO: 7.4 K/UL (ref 1.8–7.7)
NEUTROPHILS NFR BLD: 59.6 % (ref 38–73)
NRBC BLD-RTO: 0 /100 WBC
NUM UNITS TRANS PACKED RBC: NORMAL
PLATELET # BLD AUTO: 344 K/UL (ref 150–450)
PLATELET BLD QL SMEAR: ABNORMAL
PMV BLD AUTO: 10.1 FL (ref 9.2–12.9)
POIKILOCYTOSIS BLD QL SMEAR: SLIGHT
POLYCHROMASIA BLD QL SMEAR: ABNORMAL
POTASSIUM SERPL-SCNC: 3.9 MMOL/L (ref 3.5–5.1)
PROT SERPL-MCNC: 6.6 G/DL (ref 6–8.4)
RBC # BLD AUTO: 3.5 M/UL (ref 4.6–6.2)
RETICS/RBC NFR AUTO: 2.4 % (ref 0.4–2)
SATURATED IRON: 3 % (ref 20–50)
SCHISTOCYTES BLD QL SMEAR: ABNORMAL
SODIUM SERPL-SCNC: 139 MMOL/L (ref 136–145)
TARGETS BLD QL SMEAR: ABNORMAL
TOTAL IRON BINDING CAPACITY: 457 UG/DL (ref 250–450)
TRANSFERRIN SERPL-MCNC: 309 MG/DL (ref 200–375)
TROPONIN I SERPL DL<=0.01 NG/ML-MCNC: 0.02 NG/ML (ref 0–0.03)
WBC # BLD AUTO: 12.38 K/UL (ref 3.9–12.7)

## 2023-02-15 PROCEDURE — 1126F PR PAIN SEVERITY QUANTIFIED, NO PAIN PRESENT: ICD-10-PCS | Mod: CPTII,S$GLB,, | Performed by: NURSE PRACTITIONER

## 2023-02-15 PROCEDURE — 3075F SYST BP GE 130 - 139MM HG: CPT | Mod: CPTII,S$GLB,, | Performed by: NURSE PRACTITIONER

## 2023-02-15 PROCEDURE — 25000003 PHARM REV CODE 250: Performed by: STUDENT IN AN ORGANIZED HEALTH CARE EDUCATION/TRAINING PROGRAM

## 2023-02-15 PROCEDURE — 99291 PR CRITICAL CARE, E/M 30-74 MINUTES: ICD-10-PCS | Mod: ,,, | Performed by: EMERGENCY MEDICINE

## 2023-02-15 PROCEDURE — P9016 RBC LEUKOCYTES REDUCED: HCPCS

## 2023-02-15 PROCEDURE — 84484 ASSAY OF TROPONIN QUANT: CPT

## 2023-02-15 PROCEDURE — 3075F PR MOST RECENT SYSTOLIC BLOOD PRESS GE 130-139MM HG: ICD-10-PCS | Mod: CPTII,S$GLB,, | Performed by: NURSE PRACTITIONER

## 2023-02-15 PROCEDURE — 36430 TRANSFUSION BLD/BLD COMPNT: CPT

## 2023-02-15 PROCEDURE — 3288F PR FALLS RISK ASSESSMENT DOCUMENTED: ICD-10-PCS | Mod: CPTII,S$GLB,, | Performed by: NURSE PRACTITIONER

## 2023-02-15 PROCEDURE — G0378 HOSPITAL OBSERVATION PER HR: HCPCS

## 2023-02-15 PROCEDURE — 3078F DIAST BP <80 MM HG: CPT | Mod: CPTII,S$GLB,, | Performed by: NURSE PRACTITIONER

## 2023-02-15 PROCEDURE — 96374 THER/PROPH/DIAG INJ IV PUSH: CPT

## 2023-02-15 PROCEDURE — 3008F PR BODY MASS INDEX (BMI) DOCUMENTED: ICD-10-PCS | Mod: CPTII,S$GLB,, | Performed by: NURSE PRACTITIONER

## 2023-02-15 PROCEDURE — 63600175 PHARM REV CODE 636 W HCPCS: Performed by: STUDENT IN AN ORGANIZED HEALTH CARE EDUCATION/TRAINING PROGRAM

## 2023-02-15 PROCEDURE — 99999 PR PBB SHADOW E&M-EST. PATIENT-LVL III: CPT | Mod: PBBFAC,,, | Performed by: NURSE PRACTITIONER

## 2023-02-15 PROCEDURE — 80053 COMPREHEN METABOLIC PANEL: CPT | Mod: 91

## 2023-02-15 PROCEDURE — 3288F FALL RISK ASSESSMENT DOCD: CPT | Mod: CPTII,S$GLB,, | Performed by: NURSE PRACTITIONER

## 2023-02-15 PROCEDURE — 3008F BODY MASS INDEX DOCD: CPT | Mod: CPTII,S$GLB,, | Performed by: NURSE PRACTITIONER

## 2023-02-15 PROCEDURE — 1101F PT FALLS ASSESS-DOCD LE1/YR: CPT | Mod: CPTII,S$GLB,, | Performed by: NURSE PRACTITIONER

## 2023-02-15 PROCEDURE — 84466 ASSAY OF TRANSFERRIN: CPT

## 2023-02-15 PROCEDURE — 3078F PR MOST RECENT DIASTOLIC BLOOD PRESSURE < 80 MM HG: ICD-10-PCS | Mod: CPTII,S$GLB,, | Performed by: NURSE PRACTITIONER

## 2023-02-15 PROCEDURE — 99999 PR PBB SHADOW E&M-EST. PATIENT-LVL III: ICD-10-PCS | Mod: PBBFAC,,, | Performed by: NURSE PRACTITIONER

## 2023-02-15 PROCEDURE — 99214 OFFICE O/P EST MOD 30 MIN: CPT | Mod: S$GLB,,, | Performed by: NURSE PRACTITIONER

## 2023-02-15 PROCEDURE — 85025 COMPLETE CBC W/AUTO DIFF WBC: CPT | Mod: 91

## 2023-02-15 PROCEDURE — 99214 PR OFFICE/OUTPT VISIT, EST, LEVL IV, 30-39 MIN: ICD-10-PCS | Mod: S$GLB,,, | Performed by: NURSE PRACTITIONER

## 2023-02-15 PROCEDURE — 85045 AUTOMATED RETICULOCYTE COUNT: CPT

## 2023-02-15 PROCEDURE — 99285 EMERGENCY DEPT VISIT HI MDM: CPT | Mod: 25

## 2023-02-15 PROCEDURE — 86920 COMPATIBILITY TEST SPIN: CPT

## 2023-02-15 PROCEDURE — 99291 CRITICAL CARE FIRST HOUR: CPT | Mod: ,,, | Performed by: EMERGENCY MEDICINE

## 2023-02-15 PROCEDURE — 1101F PR PT FALLS ASSESS DOC 0-1 FALLS W/OUT INJ PAST YR: ICD-10-PCS | Mod: CPTII,S$GLB,, | Performed by: NURSE PRACTITIONER

## 2023-02-15 PROCEDURE — C9113 INJ PANTOPRAZOLE SODIUM, VIA: HCPCS | Performed by: STUDENT IN AN ORGANIZED HEALTH CARE EDUCATION/TRAINING PROGRAM

## 2023-02-15 PROCEDURE — 1126F AMNT PAIN NOTED NONE PRSNT: CPT | Mod: CPTII,S$GLB,, | Performed by: NURSE PRACTITIONER

## 2023-02-15 PROCEDURE — 82728 ASSAY OF FERRITIN: CPT

## 2023-02-15 RX ORDER — NALOXONE HCL 0.4 MG/ML
0.02 VIAL (ML) INJECTION
Status: DISCONTINUED | OUTPATIENT
Start: 2023-02-15 | End: 2023-02-16 | Stop reason: HOSPADM

## 2023-02-15 RX ORDER — ONDANSETRON 2 MG/ML
4 INJECTION INTRAMUSCULAR; INTRAVENOUS EVERY 8 HOURS PRN
Status: DISCONTINUED | OUTPATIENT
Start: 2023-02-15 | End: 2023-02-16 | Stop reason: HOSPADM

## 2023-02-15 RX ORDER — IBUPROFEN 200 MG
24 TABLET ORAL
Status: DISCONTINUED | OUTPATIENT
Start: 2023-02-15 | End: 2023-02-16 | Stop reason: HOSPADM

## 2023-02-15 RX ORDER — SODIUM CHLORIDE 0.9 % (FLUSH) 0.9 %
10 SYRINGE (ML) INJECTION EVERY 12 HOURS PRN
Status: DISCONTINUED | OUTPATIENT
Start: 2023-02-15 | End: 2023-02-16 | Stop reason: HOSPADM

## 2023-02-15 RX ORDER — GABAPENTIN 400 MG/1
800 CAPSULE ORAL 3 TIMES DAILY
Status: DISCONTINUED | OUTPATIENT
Start: 2023-02-15 | End: 2023-02-16 | Stop reason: HOSPADM

## 2023-02-15 RX ORDER — POLYETHYLENE GLYCOL 3350 17 G/17G
17 POWDER, FOR SOLUTION ORAL 3 TIMES DAILY PRN
Status: DISCONTINUED | OUTPATIENT
Start: 2023-02-15 | End: 2023-02-16 | Stop reason: HOSPADM

## 2023-02-15 RX ORDER — HYDROCODONE BITARTRATE AND ACETAMINOPHEN 500; 5 MG/1; MG/1
TABLET ORAL
Status: DISCONTINUED | OUTPATIENT
Start: 2023-02-15 | End: 2023-02-16 | Stop reason: HOSPADM

## 2023-02-15 RX ORDER — ACETAMINOPHEN 325 MG/1
650 TABLET ORAL EVERY 8 HOURS PRN
Status: DISCONTINUED | OUTPATIENT
Start: 2023-02-15 | End: 2023-02-16 | Stop reason: HOSPADM

## 2023-02-15 RX ORDER — GLUCAGON 1 MG
1 KIT INJECTION
Status: DISCONTINUED | OUTPATIENT
Start: 2023-02-15 | End: 2023-02-16 | Stop reason: HOSPADM

## 2023-02-15 RX ORDER — ATORVASTATIN CALCIUM 40 MG/1
80 TABLET, FILM COATED ORAL DAILY
Status: DISCONTINUED | OUTPATIENT
Start: 2023-02-16 | End: 2023-02-16 | Stop reason: HOSPADM

## 2023-02-15 RX ORDER — PANTOPRAZOLE SODIUM 40 MG/10ML
40 INJECTION, POWDER, LYOPHILIZED, FOR SOLUTION INTRAVENOUS 2 TIMES DAILY
Status: DISCONTINUED | OUTPATIENT
Start: 2023-02-15 | End: 2023-02-16 | Stop reason: HOSPADM

## 2023-02-15 RX ORDER — SIMETHICONE 80 MG
1 TABLET,CHEWABLE ORAL 4 TIMES DAILY PRN
Status: DISCONTINUED | OUTPATIENT
Start: 2023-02-15 | End: 2023-02-16 | Stop reason: HOSPADM

## 2023-02-15 RX ORDER — IBUPROFEN 200 MG
16 TABLET ORAL
Status: DISCONTINUED | OUTPATIENT
Start: 2023-02-15 | End: 2023-02-16 | Stop reason: HOSPADM

## 2023-02-15 RX ORDER — TALC
6 POWDER (GRAM) TOPICAL NIGHTLY PRN
Status: DISCONTINUED | OUTPATIENT
Start: 2023-02-15 | End: 2023-02-16 | Stop reason: HOSPADM

## 2023-02-15 RX ORDER — EZETIMIBE 10 MG/1
10 TABLET ORAL DAILY
Status: DISCONTINUED | OUTPATIENT
Start: 2023-02-16 | End: 2023-02-16 | Stop reason: HOSPADM

## 2023-02-15 RX ORDER — ONDANSETRON 4 MG/1
4 TABLET, ORALLY DISINTEGRATING ORAL EVERY 8 HOURS PRN
Status: DISCONTINUED | OUTPATIENT
Start: 2023-02-15 | End: 2023-02-16 | Stop reason: HOSPADM

## 2023-02-15 RX ADMIN — PANTOPRAZOLE SODIUM 40 MG: 40 INJECTION, POWDER, FOR SOLUTION INTRAVENOUS at 07:02

## 2023-02-15 RX ADMIN — ACETAMINOPHEN 650 MG: 325 TABLET ORAL at 08:02

## 2023-02-15 RX ADMIN — GABAPENTIN 800 MG: 400 CAPSULE ORAL at 08:02

## 2023-02-15 NOTE — FIRST PROVIDER EVALUATION
" Emergency Department TeleTriage Encounter Note      CHIEF COMPLAINT    Chief Complaint   Patient presents with    Abnormal Lab     Reports low H&H. Denies blood in stools. + blood thinner use. Denies lightheadedness/dizziness, c/p,        VITAL SIGNS   Initial Vitals [02/15/23 1246]   BP Pulse Resp Temp SpO2   138/60 89 16 98.2 °F (36.8 °C) 100 %      MAP       --            ALLERGIES    Review of patient's allergies indicates:   Allergen Reactions    Ace inhibitors        PROVIDER TRIAGE NOTE  Patient presents with complaint of "low blood count". Reports he has been feeling "dynamite". He denied any complaints. Outpatient labs reviewed. Reports recent GI workup which was negative. Denied rectal bleeding or dark/tarry stools.      Phy:   Constitutional: well nourished, well developed, appearing stated age, NAD   HEENT: NCAT, symmetrical lids, No obvious facial deformity.  Normal phonation. Normal Conjunctiva   Neck: NAROM   Respiratory: Normal effort.  No obvious use of accessory muscles   Musculoskeletal: Moved upper extremities well   Neuro: Alert, answers questions appropriately    Psych: appropriate mood and affect      Initial orders will be placed and care will be transferred to an alternate provider when patient is roomed for a full evaluation. Any additional orders and the final disposition will be determined by that provider.        ORDERS  Labs Reviewed   CBC W/ AUTO DIFFERENTIAL   COMPREHENSIVE METABOLIC PANEL   TYPE & SCREEN       ED Orders (720h ago, onward)      Start Ordered     Status Ordering Provider    02/15/23 1410 02/15/23 1409  Saline lock IV (18 ga. or larger)  Once         Ordered NEGIBIS JAFFE    02/15/23 1410 02/15/23 1409  2nd Saline lock IV (18 ga. or larger)  Once         Ordered NEGROTIBIS RUIZ    02/15/23 1410 02/15/23 1409  NPO (Ice Chips)  Once         Ordered NEGROTTO IBIS ESCAMILLA    02/15/23 1410 02/15/23 1409  CBC auto differential  STAT         " Ordered NEGROTTO ANDI, IBIS    02/15/23 1410 02/15/23 1409  Comprehensive metabolic panel  STAT         Ordered NEGROTTO ANDI, IBIS    02/15/23 1410 02/15/23 1409  Type & Screen  STAT         Ordered NEGROTTO ANDI, IBIS    02/15/23 1410 02/15/23 1409  Vital signs  Once         Ordered NEGROTTO ANDI, IBIS    02/15/23 1410 02/15/23 1409  Cardiac Monitoring - Adult  Continuous        Comments: Notify Physician If:    Ordered NEGROTTO ANDI, IBIS    02/15/23 1410 02/15/23 1409  Pulse Oximetry Continuous  Continuous         Ordered NEGROTTO ANDI, IBIS    02/15/23 1410 02/15/23 1409  EKG 12-lead  Once         Ordered NEGROTTO ANDI, IBIS              Virtual Visit Note: The provider triage portion of this emergency department evaluation and documentation was performed via [employer], a HIPAA-compliant telemedicine application, in concert with a tele-presenter in the room. A face to face patient evaluation with one of my colleagues will occur once the patient is placed in an emergency department room.      DISCLAIMER: This note was prepared with [employer] voice recognition transcription software. Garbled syntax, mangled pronouns, and other bizarre constructions may be attributed to that software system.

## 2023-02-15 NOTE — TELEPHONE ENCOUNTER
Spoke to pt to check ETA for appt this am. Pt was not aware of clinic appt scheduled today (Wed), pt thought a[pt was scheduled for tmrw (Thurs). Pt is en route to clinic now and will be late. Will notify .

## 2023-02-15 NOTE — ED PROVIDER NOTES
Encounter Date: 2/15/2023       History     Chief Complaint   Patient presents with    Abnormal Lab     Reports low H&H. Denies blood in stools. + blood thinner use. Denies lightheadedness/dizziness, c/p,      Babatunde Singh is a 72 y.o. male with PAD with RLE CLI s/p left axillary to bilateral femoris profunda bypass with 8 mm PTFE (10/6/2022), HTN, HLD, history of RLE DVT (Xeralto) , former smoker presents to the emergency department from general surgery clinic for hemoglobin of 6.3 after getting preop labs done at 11:00 a.m. today.  Patient reports he is currently feeling well.  He has an upcoming scheduled surgery for partial nephrectomy/gallbladder mass?.  Patient denies experiencing med emesis melena or hematochezia.  Denies shortness of breath, chest pain or dizziness.            Review of patient's allergies indicates:   Allergen Reactions    Ace inhibitors      Past Medical History:   Diagnosis Date    DVT (deep venous thrombosis)     Hypertension     Peripheral arterial disease      Past Surgical History:   Procedure Laterality Date    APPLICATION OF WOUND VACUUM-ASSISTED CLOSURE DEVICE Right 12/8/2022    Procedure: APPLICATION, WOUND VAC;  Surgeon: LEI Ortiz III, MD;  Location: [address];  Service: Peripheral Vascular;  Laterality: Right;    COLONOSCOPY N/A 10/31/2022    Procedure: COLONOSCOPY;  Surgeon: Philip Shafer MD;  Location: [address]);  Service: Endoscopy;  Laterality: N/A;    CREATION OF AXILLARY-FEMORAL ARTERY BYPASS WITH GRAFT N/A 10/6/2022    Procedure: CREATION, BYPASS, ARTERIAL, AXILLARY TO FEMORAL, USING GRAFT;  Surgeon: LEI Ortiz III, MD;  Location: [address];  Service: Peripheral Vascular;  Laterality: N/A;    CREATION OF FEMORAL-FEMORAL ARTERY BYPASS WITH GRAFT N/A 10/6/2022    Procedure: CREATION, BYPASS, ARTERIAL, FEMORAL TO FEMORAL, USING GRAFT;  Surgeon: LEI Ortiz III, MD;  Location: [address];  Service: Peripheral Vascular;   Laterality: N/A;    ESOPHAGOGASTRODUODENOSCOPY N/A 10/20/2022    Procedure: EGD (ESOPHAGOGASTRODUODENOSCOPY);  Surgeon: Sixto Chicas MD;  Location: [facility] ENDO (2ND FLR);  Service: Endoscopy;  Laterality: N/A;    ESOPHAGOGASTRODUODENOSCOPY N/A 10/28/2022    Procedure: EGD (ESOPHAGOGASTRODUODENOSCOPY);  Surgeon: Marlee Hopson MD;  Location: [facility] ENDO (2ND FLR);  Service: Gastroenterology;  Laterality: N/A;    EXCISION OF HYDROCELE Left 2022    Procedure: HYDROCELECTOMY;  Surgeon: Damion Roberts MD;  Location: [facility] OR;  Service: Urology;  Laterality: Left;    KNEE SURGERY  1972    WOUND DEBRIDEMENT Right 2022    Procedure: DEBRIDEMENT, WOUND;  Surgeon: LEI Ortiz III, MD;  Location: [facility] OR 2ND FLR;  Service: Peripheral Vascular;  Laterality: Right;  RLE wound debridement     Family History   Problem Relation Age of Onset    Hypertension Mother     Hypertension Father      Social History     Tobacco Use    Smoking status: Former     Packs/day: 0.50     Years: 55.00     Pack years: 27.50     Types: Cigarettes     Quit date: 10/11/2022     Years since quittin.3    Smokeless tobacco: Never    Tobacco comments:     Cigarettes3-4 per day   Substance Use Topics    Alcohol use: Never    Drug use: Never     Review of Systems   Constitutional:  Negative for fever.   HENT:  Negative for sore throat.    Respiratory:  Negative for shortness of breath.    Cardiovascular:  Negative for chest pain.   Gastrointestinal:  Negative for nausea.   Genitourinary:  Negative for dysuria.   Musculoskeletal:  Negative for back pain.   Skin:  Negative for rash.   Neurological:  Negative for weakness.   Hematological:  Does not bruise/bleed easily.     Physical Exam     Initial Vitals [02/15/23 1246]   BP Pulse Resp Temp SpO2   138/60 89 16 98.2 °F (36.8 °C) 100 %      MAP       --         Physical Exam    ED Course   Procedures  Labs Reviewed   CBC W/ AUTO DIFFERENTIAL - Abnormal; Notable for the following components:        Result Value    RBC 3.50 (*)     Hemoglobin 6.6 (*)     Hematocrit 24.0 (*)     MCV 69 (*)     MCH 18.9 (*)     MCHC 27.5 (*)     RDW 20.2 (*)     Immature Granulocytes 0.6 (*)     Immature Grans (Abs) 0.08 (*)     Mono # 1.3 (*)     All other components within normal limits   COMPREHENSIVE METABOLIC PANEL - Abnormal; Notable for the following components:    Albumin 3.2 (*)     ALT 8 (*)     Anion Gap 7 (*)     All other components within normal limits   PREPARE RBC SOFT          Imaging Results              X-Ray Chest PA And Lateral (Final result)  Result time 02/15/23 16:12:32      Final result by Rodney Dillon MD (02/15/23 16:12:32)                   Impression:      Stable chest.  No active process.      Electronically signed by: Rodney Dillon MD  Date:    02/15/2023  Time:    16:12               Narrative:    EXAMINATION:  XR CHEST PA AND LATERAL    CLINICAL HISTORY:  Cough, unspecified    TECHNIQUE:  PA and lateral views of the chest were performed.    COMPARISON:  11/30/2022    FINDINGS:  Heart normal.  Lungs clear.  Partial rotation frontal view right.  Stable postop changes left lateral upper lung zone.  Flattening of hemidiaphragms with some hyperinflation of lungs..                                       Medications   0.9%  NaCl infusion (for blood administration) (has no administration in time range)     Medical Decision Making:   Initial Assessment:   72-year-old male presents emergency department with low hemoglobin from preop labs today  Differential Diagnosis:   Differential diagnosis:  Lower GI bleed, anemia of chronic disease, hemoglobin requiring transfusion  Clinical Tests:   Lab Tests: Ordered and Reviewed  ED Management:  Repeat CBC and CMP ordered   Prepared and transfusion 1 unit RBCs, patient signed consent form  Vital signs remained stable  On reassessment patient reports productive cough over the last 2 weeks, he however continues to deny fever or shortness of breath.   Lungs clear to auscultation bilaterally  Will get a chest x-ray at abundance of caution to rule out underlining infection.  VS  remain stable  I discussed with hospital medicine patient presentation and lab results.  Will be placed in observation  Patient discussed with supervising physician                        Clinical Impression:   Final diagnoses:  [D64.9] Anemia  [R05.9] Cough        ED Disposition Condition    Observation Stable                Jordyn Wang PA-C  02/15/23 1181

## 2023-02-15 NOTE — PROGRESS NOTES
Pt transferred to ER for blood transfusion per Dr. Gavin. VSS. Triage nurse, Jessica notified of pt arriving. Pt en route to ER per transport. NAD.

## 2023-02-15 NOTE — ED NOTES
Patient states he saw Doctor for possible gallbladder removal, labs drawn with low H and H, denies rectal bleeding x 1 monthXarelto this am, would also like refill on Norco for intermittent pain

## 2023-02-16 VITALS
RESPIRATION RATE: 18 BRPM | WEIGHT: 146 LBS | OXYGEN SATURATION: 100 % | TEMPERATURE: 98 F | BODY MASS INDEX: 24.32 KG/M2 | HEIGHT: 65 IN | HEART RATE: 83 BPM | DIASTOLIC BLOOD PRESSURE: 81 MMHG | SYSTOLIC BLOOD PRESSURE: 182 MMHG

## 2023-02-16 LAB
ANION GAP SERPL CALC-SCNC: 9 MMOL/L (ref 8–16)
BUN SERPL-MCNC: 19 MG/DL (ref 8–23)
CALCIUM SERPL-MCNC: 9.2 MG/DL (ref 8.7–10.5)
CHLORIDE SERPL-SCNC: 107 MMOL/L (ref 95–110)
CO2 SERPL-SCNC: 23 MMOL/L (ref 23–29)
CREAT SERPL-MCNC: 0.9 MG/DL (ref 0.5–1.4)
ERYTHROCYTE [DISTWIDTH] IN BLOOD BY AUTOMATED COUNT: 20.8 % (ref 11.5–14.5)
EST. GFR  (NO RACE VARIABLE): >60 ML/MIN/1.73 M^2
GLUCOSE SERPL-MCNC: 111 MG/DL (ref 70–110)
HCT VFR BLD AUTO: 26.1 % (ref 40–54)
HGB BLD-MCNC: 7.4 G/DL (ref 14–18)
MAGNESIUM SERPL-MCNC: 2 MG/DL (ref 1.6–2.6)
MCH RBC QN AUTO: 20.1 PG (ref 27–31)
MCHC RBC AUTO-ENTMCNC: 28.4 G/DL (ref 32–36)
MCV RBC AUTO: 71 FL (ref 82–98)
PLATELET # BLD AUTO: 290 K/UL (ref 150–450)
PMV BLD AUTO: 9.9 FL (ref 9.2–12.9)
POTASSIUM SERPL-SCNC: 3.9 MMOL/L (ref 3.5–5.1)
RBC # BLD AUTO: 3.68 M/UL (ref 4.6–6.2)
SODIUM SERPL-SCNC: 139 MMOL/L (ref 136–145)
WBC # BLD AUTO: 12.18 K/UL (ref 3.9–12.7)

## 2023-02-16 PROCEDURE — 96374 THER/PROPH/DIAG INJ IV PUSH: CPT | Mod: 59

## 2023-02-16 PROCEDURE — 63600175 PHARM REV CODE 636 W HCPCS: Performed by: STUDENT IN AN ORGANIZED HEALTH CARE EDUCATION/TRAINING PROGRAM

## 2023-02-16 PROCEDURE — C9113 INJ PANTOPRAZOLE SODIUM, VIA: HCPCS | Performed by: STUDENT IN AN ORGANIZED HEALTH CARE EDUCATION/TRAINING PROGRAM

## 2023-02-16 PROCEDURE — 99499 UNLISTED E&M SERVICE: CPT | Mod: ,,, | Performed by: STUDENT IN AN ORGANIZED HEALTH CARE EDUCATION/TRAINING PROGRAM

## 2023-02-16 PROCEDURE — 99222 1ST HOSP IP/OBS MODERATE 55: CPT | Mod: GC,,, | Performed by: STUDENT IN AN ORGANIZED HEALTH CARE EDUCATION/TRAINING PROGRAM

## 2023-02-16 PROCEDURE — 99223 PR INITIAL HOSPITAL CARE,LEVL III: ICD-10-PCS | Mod: ,,, | Performed by: STUDENT IN AN ORGANIZED HEALTH CARE EDUCATION/TRAINING PROGRAM

## 2023-02-16 PROCEDURE — G0378 HOSPITAL OBSERVATION PER HR: HCPCS

## 2023-02-16 PROCEDURE — 25000003 PHARM REV CODE 250: Performed by: STUDENT IN AN ORGANIZED HEALTH CARE EDUCATION/TRAINING PROGRAM

## 2023-02-16 PROCEDURE — 36415 COLL VENOUS BLD VENIPUNCTURE: CPT | Performed by: STUDENT IN AN ORGANIZED HEALTH CARE EDUCATION/TRAINING PROGRAM

## 2023-02-16 PROCEDURE — 80048 BASIC METABOLIC PNL TOTAL CA: CPT | Performed by: STUDENT IN AN ORGANIZED HEALTH CARE EDUCATION/TRAINING PROGRAM

## 2023-02-16 PROCEDURE — 99222 PR INITIAL HOSPITAL CARE,LEVL II: ICD-10-PCS | Mod: GC,,, | Performed by: STUDENT IN AN ORGANIZED HEALTH CARE EDUCATION/TRAINING PROGRAM

## 2023-02-16 PROCEDURE — 96376 TX/PRO/DX INJ SAME DRUG ADON: CPT

## 2023-02-16 PROCEDURE — 99499 NO LOS: ICD-10-PCS | Mod: ,,, | Performed by: STUDENT IN AN ORGANIZED HEALTH CARE EDUCATION/TRAINING PROGRAM

## 2023-02-16 PROCEDURE — 85027 COMPLETE CBC AUTOMATED: CPT | Performed by: STUDENT IN AN ORGANIZED HEALTH CARE EDUCATION/TRAINING PROGRAM

## 2023-02-16 PROCEDURE — 83735 ASSAY OF MAGNESIUM: CPT | Performed by: STUDENT IN AN ORGANIZED HEALTH CARE EDUCATION/TRAINING PROGRAM

## 2023-02-16 PROCEDURE — 99223 1ST HOSP IP/OBS HIGH 75: CPT | Mod: ,,, | Performed by: STUDENT IN AN ORGANIZED HEALTH CARE EDUCATION/TRAINING PROGRAM

## 2023-02-16 RX ORDER — NAPROXEN SODIUM 220 MG/1
81 TABLET, FILM COATED ORAL DAILY
Status: DISCONTINUED | OUTPATIENT
Start: 2023-02-16 | End: 2023-02-16 | Stop reason: HOSPADM

## 2023-02-16 RX ORDER — LOSARTAN POTASSIUM AND HYDROCHLOROTHIAZIDE 12.5; 5 MG/1; MG/1
1 TABLET ORAL DAILY
Status: DISCONTINUED | OUTPATIENT
Start: 2023-02-16 | End: 2023-02-16 | Stop reason: HOSPADM

## 2023-02-16 RX ORDER — PANTOPRAZOLE SODIUM 40 MG/1
40 TABLET, DELAYED RELEASE ORAL DAILY
Qty: 30 TABLET | Refills: 2 | Status: SHIPPED | OUTPATIENT
Start: 2023-02-16 | End: 2023-02-16 | Stop reason: HOSPADM

## 2023-02-16 RX ORDER — FERROUS SULFATE 325(65) MG
325 TABLET ORAL
Qty: 36 TABLET | Refills: 0 | Status: SHIPPED | OUTPATIENT
Start: 2023-02-17 | End: 2023-04-06 | Stop reason: SDUPTHER

## 2023-02-16 RX ADMIN — IRON SUCROSE 300 MG: 20 INJECTION, SOLUTION INTRAVENOUS at 12:02

## 2023-02-16 RX ADMIN — GABAPENTIN 800 MG: 400 CAPSULE ORAL at 10:02

## 2023-02-16 RX ADMIN — PANTOPRAZOLE SODIUM 40 MG: 40 INJECTION, POWDER, FOR SOLUTION INTRAVENOUS at 10:02

## 2023-02-16 RX ADMIN — EZETIMIBE 10 MG: 10 TABLET ORAL at 10:02

## 2023-02-16 RX ADMIN — ATORVASTATIN CALCIUM 80 MG: 40 TABLET, FILM COATED ORAL at 10:02

## 2023-02-16 NOTE — CONSULTS
[facility]  Gastroenterology  Consult Note    Patient Name: Babatunde Singh  MRN: [id number]  Admission Date: 2/15/2023  Hospital Length of Stay: 0 days  Code Status: Full Code   Attending Provider: Jossie Brody MD   Consulting Provider: Cecil Thompson MD  Primary Care Physician: Esdras Ly MD  Principal Problem:Normocytic anemia    Inpatient consult to Gastroenterology  Consult performed by: Cecil Thompson MD  Consult ordered by: Víctor Evangelista MD      Subjective:     HPI:   Mr Singh is a 73yo PMHx HTN, HLD, DVT on xarelto, PAD s/p L axillary biprofunda bypass presented to [facility] [place] on 02/15 with reports of abnormal labs (Hgb 6.3).    GI consulted for anemia without overt GI blood loss.     Patient has had a number of endoscopies previously for melena. EGD 10/20/2022 for melena notable for gastritis, otherwise unremarkable. Push enteroscopy 10/28/2022 for melena notable for normal stomach, erythematous duodenopathy. Colonoscopy 10/31/2022 for melena notable for 4 polyps removed (largest 1.2cm), recommended repeat in 3 years.   VCE 01/20/2023 for melena notable for two single mucosal red spots without bleeding in small bowel--post pyloric lesions at 6min and 15min after FD1.    VS since arrival notable for AF, HR wnl, normotensive, RIMMA.     CBC w/o leukocytosis, Hgb 6.6-->7.4 after 1u PRBC.  BMP w/ BUN/ Cr 19/ 0.9  LFTs unremarkable    Past Medical History:   Diagnosis Date    DVT (deep venous thrombosis)     Hypertension     Peripheral arterial disease        Past Surgical History:   Procedure Laterality Date    APPLICATION OF WOUND VACUUM-ASSISTED CLOSURE DEVICE Right 12/8/2022    Procedure: APPLICATION, WOUND VAC;  Surgeon: LEI Ortiz III, MD;  [facility];  Service: Peripheral Vascular;  Laterality: Right;    COLONOSCOPY N/A 10/31/2022    Procedure: COLONOSCOPY;  Surgeon: Philip Shafer MD;  Location: [facility] (2ND FLR);  Service: Endoscopy;  Laterality:  N/A;    CREATION OF AXILLARY-FEMORAL ARTERY BYPASS WITH GRAFT N/A 10/6/2022    Procedure: CREATION, BYPASS, ARTERIAL, AXILLARY TO FEMORAL, USING GRAFT;  Surgeon: LEI Ortiz III, MD;  Location: [facility] OR 2ND FLR;  Service: Peripheral Vascular;  Laterality: N/A;    CREATION OF FEMORAL-FEMORAL ARTERY BYPASS WITH GRAFT N/A 10/6/2022    Procedure: CREATION, BYPASS, ARTERIAL, FEMORAL TO FEMORAL, USING GRAFT;  Surgeon: LEI Ortiz III, MD;  Location: [facility] OR 2ND FLR;  Service: Peripheral Vascular;  Laterality: N/A;    ESOPHAGOGASTRODUODENOSCOPY N/A 10/20/2022    Procedure: EGD (ESOPHAGOGASTRODUODENOSCOPY);  Surgeon: Sixto Chicas MD;  Location: [facility] ENDO (2ND FLR);  Service: Endoscopy;  Laterality: N/A;    ESOPHAGOGASTRODUODENOSCOPY N/A 10/28/2022    Procedure: EGD (ESOPHAGOGASTRODUODENOSCOPY);  Surgeon: Marlee Hopson MD;  Location: [facility] ENDO (2ND FLR);  Service: Gastroenterology;  Laterality: N/A;    EXCISION OF HYDROCELE Left 2022    Procedure: HYDROCELECTOMY;  Surgeon: Damion Roberts MD;  Location: [facility];  Service: Urology;  Laterality: Left;    KNEE SURGERY  1972    WOUND DEBRIDEMENT Right 2022    Procedure: DEBRIDEMENT, WOUND;  Surgeon: LEI Ortiz III, MD;  Location: [facility] OR 2ND FLR;  Service: Peripheral Vascular;  Laterality: Right;  RLE wound debridement       Family History   Problem Relation Age of Onset    Hypertension Mother     Hypertension Father        Social History     Socioeconomic History    Marital status:    Occupational History    Occupation: [employer]   Tobacco Use    Smoking status: Former     Packs/day: 0.50     Years: 55.00     Pack years: 27.50     Types: Cigarettes     Quit date: 10/11/2022     Years since quittin.3    Smokeless tobacco: Never    Tobacco comments:     Cigarettes3-4 per day   Substance and Sexual Activity    Alcohol use: Never    Drug use: Never    Sexual activity: Yes     Partners: Female     Comment: wife       No current  facility-administered medications on file prior to encounter.     Current Outpatient Medications on File Prior to Encounter   Medication Sig Dispense Refill    aspirin 81 MG Chew Chew and swallow 1 tablet (81 mg total) by mouth once daily. 30 tablet 0    atorvastatin (LIPITOR) 80 MG tablet Take 1 tablet (80 mg total) by mouth once daily. 30 tablet 0    gabapentin (NEURONTIN) 800 MG tablet Take 1 tablet (800 mg total) by mouth 3 (three) times daily. 90 tablet 11    losartan-hydrochlorothiazide 50-12.5 mg (HYZAAR) 50-12.5 mg per tablet Take 1 tablet by mouth once daily. 30 tablet 0    multivitamin (THERAGRAN) per tablet Take 1 tablet by mouth once daily.      rivaroxaban (XARELTO) 20 mg Tab Take 1 tablet (20 mg total) by mouth daily with dinner or evening meal. 30 tablet 11    acetaminophen (TYLENOL) 325 MG tablet Take 2 tablets (650 mg total) by mouth every 8 (eight) hours as needed. (Patient not taking: Reported on 2/15/2023) 20 tablet 0    ezetimibe (ZETIA) 10 mg tablet Take 1 tablet (10 mg total) by mouth once daily. (Patient not taking: Reported on 2/15/2023) 30 tablet 0       Review of patient's allergies indicates:   Allergen Reactions    Ace inhibitors        Review of Systems   Constitutional:  Negative for chills, fever and weight loss.   HENT:  Negative for ear pain, hearing loss and tinnitus.    Eyes:  Negative for blurred vision, double vision and photophobia.   Respiratory:  Negative for cough, hemoptysis and sputum production.    Cardiovascular:  Negative for chest pain, palpitations and orthopnea.   Gastrointestinal:  Negative for abdominal pain, blood in stool, constipation, diarrhea, heartburn, melena, nausea and vomiting.   Genitourinary:  Negative for dysuria, frequency and urgency.   Musculoskeletal:  Negative for back pain, myalgias and neck pain.   Skin:  Negative for itching and rash.   Neurological:  Negative for dizziness, tingling and headaches.   Endo/Heme/Allergies:  Negative for  environmental allergies and polydipsia. Does not bruise/bleed easily.      Objective:     Vitals:    02/16/23 0719   BP:    Pulse: 78   Resp:    Temp:          Constitutional:  not in acute distress and well developed  HENT: Head: Normal, normocephalic, atraumatic.  Eyes: conjunctiva clear and sclera nonicteric  Cardiovascular: regular rate and rhythm  Respiratory: normal chest expansion & respiratory effort   and no accessory muscle use  GI: soft, non-tender, without masses or organomegaly  Skin: normal color  Neurological: alert, oriented x3  Psychiatric: mood and affect are within normal limits, pt is a good historian; no memory problems were noted    Significant Labs:  Recent Labs   Lab 02/15/23  1103 02/15/23  1614 02/16/23  0251   HGB 6.3* 6.6* 7.4*       Lab Results   Component Value Date    WBC 12.18 02/16/2023    HGB 7.4 (L) 02/16/2023    HCT 26.1 (L) 02/16/2023    MCV 71 (L) 02/16/2023     02/16/2023       Lab Results   Component Value Date     02/16/2023    K 3.9 02/16/2023     02/16/2023    CO2 23 02/16/2023    BUN 19 02/16/2023    CREATININE 0.9 02/16/2023    CALCIUM 9.2 02/16/2023    ANIONGAP 9 02/16/2023    ESTGFRAFRICA >60.0 03/10/2022    EGFRNONAA >60.0 03/10/2022       Lab Results   Component Value Date    ALT 8 (L) 02/15/2023    AST 16 02/15/2023    ALKPHOS 88 02/15/2023    BILITOT 0.3 02/15/2023       Lab Results   Component Value Date    INR 1.1 02/15/2023    INR 1.2 10/28/2022    INR 3.2 (H) 10/27/2022       Significant Imaging:  Reviewed pertinent radiology findings.       Assessment/Plan:     73yo PMHx HTN, HLD, DVT on xarelto, PAD s/p L axillary biprofunda bypass presented to [facility] ED on 02/15 with reports of abnormal labs (Hgb 6.3).    GI consulted for microcytic anemia without overt GI blood loss.     Will defer repeat VCE at this time given no overt GI blood loss    Problem List:  Anemia    Recommendations:  Can follow up in GI clinic for further recommendations    Thank  you for involving us in the care of Babatunde Singh. Please call with any additional questions, concerns or changes in the patient's clinical status. We will continue to follow.     Cecil Thompson MD  Gastroenterology Fellow PGY V  [facility]

## 2023-02-16 NOTE — ASSESSMENT & PLAN NOTE
Patient with known right renal mass. Follows with Urology as outpatient. Patient is scheduled to have surgical intervention completed on the mass for further intervention.

## 2023-02-16 NOTE — PLAN OF CARE
CM attempted to schedule  Heme/Onc Clinic  referral. No appointments available in the requested time,   will send message to clinic nurse to contact pt/family with appointment. Note put in AVS.  Will continue to update plan as needed.  Oziel Hopson RN,BSN

## 2023-02-16 NOTE — ASSESSMENT & PLAN NOTE
Patient with history of critical limb ischemia of right lower extremity and is now s/p bypass. Patient complains of mild pain on interview to his right lower extremity wounds.   - holding antiplatelet medication given his anemia  - resume home atorvastatin and ezetimibe  - gabapentin 800 mg TID for pain control

## 2023-02-16 NOTE — DISCHARGE INSTRUCTIONS
Continue all home medications as you are - there were no changes made to your previous medications. You should start taking iron supplements every Monday, Wednesday, and Friday as prescribed. If you are not tolerating these medications or you have constipation, please contact your primary care provider to help set you up with iron infusions. Your primary care provider will need to recheck your blood counts in 1 week - please call to schedule this appointment. You will be called to schedule an appointment with the gastroenterology team and the hematology team for your blood counts. Please seek medical care with black/bloody stools, severe abdominal pain, constipation, nausea, weakness, confusion, bleeding, or you have any other concerns. If you develop constipation you should trial miralax (you can get this over the counter at any pharmacy) one cap daily.

## 2023-02-16 NOTE — H&P
[facility] [address] [place] [address] Medicine  History & Physical    Patient Name: Babatunde Singh  MRN: [id number]  Patient Class: OP- Observation  Admission Date: 2/15/2023  Attending Physician: Matt Dawson MD   Primary Care Provider: Esdras Ly MD         Patient information was obtained from patient, past medical records and ER records.     Subjective:     Principal Problem:Normocytic anemia    Chief Complaint:   Chief Complaint   Patient presents with    Abnormal Lab     Reports low H&H. Denies blood in stools. + blood thinner use. Denies lightheadedness/dizziness, c/p,         HPI: Babatunde Singh is a 72-year-old man with a past medical history of primary hypertension, hyperlipidemia, history of DVT on anticoagulation, peripheral arterial disease s/p left axillary to bi-profunda bypass who presents to the emergency department with abnormal labs.  Of note, the patient recently was found to have a renal mass.  The patient has been following with the Urology Clinic with plans for surgical intervention on the mass.  The patient had preoperative labs collected this morning which revealed a hemoglobin of 6.3.  The patient was told to present to the emergency department for repeat lab testing and possible blood transfusion.  The patient says that he has received blood transfusion in the past, most recently several months ago.  The patient said that he had an EGD and colonoscopies completed which revealed no underlying pathology.  On chart review, it appears that patient also received video capsule endoscopy and had two single mucosal red spots with no bleeding in the small bowel.  The patient denies any rectal bleeding or hematochezia.  He denies any melanotic stools.  He also denies any other source of bleeding, including hematuria and hematemesis.  The patient denies any abdominal pain.  He also denies any chest pain or chest discomfort, but does describe some dyspnea on exertion saying that he at times has  to stop when ambulating to catch his breath. The patient's only complaint is occasional pain at the site of his chronic lower extremity wounds which are secondary to his peripheral arterial disease. The patient does note occasional bleeding from the wounds when he unwraps the bandages.    In the emergency department, the patient was found to be hemodynamically stable.  Labs significant for repeat hemoglobin at 6.6.  The patient had type and screen sent and was consented for blood transfusion with in the emergency department and 1 unit of of pRBCs was ordered.  The patient was admitted to Hospital Medicine for further observation.      Past Medical History:   Diagnosis Date    DVT (deep venous thrombosis)     Hypertension     Peripheral arterial disease        Past Surgical History:   Procedure Laterality Date    APPLICATION OF WOUND VACUUM-ASSISTED CLOSURE DEVICE Right 12/8/2022    Procedure: APPLICATION, WOUND VAC;  Surgeon: LEI Ortiz III, MD;  Location: [facility] OR [address] [place] [address];  Service: Peripheral Vascular;  Laterality: Right;    COLONOSCOPY N/A 10/31/2022    Procedure: COLONOSCOPY;  Surgeon: Philip Shafer MD;  Location: [facility] ([address] [place] [address]);  Service: Endoscopy;  Laterality: N/A;    CREATION OF AXILLARY-FEMORAL ARTERY BYPASS WITH GRAFT N/A 10/6/2022    Procedure: CREATION, BYPASS, ARTERIAL, AXILLARY TO FEMORAL, USING GRAFT;  Surgeon: LEI Ortiz III, MD;  Location: [facility] OR [address] [place] [address];  Service: Peripheral Vascular;  Laterality: N/A;    CREATION OF FEMORAL-FEMORAL ARTERY BYPASS WITH GRAFT N/A 10/6/2022    Procedure: CREATION, BYPASS, ARTERIAL, FEMORAL TO FEMORAL, USING GRAFT;  Surgeon: LEI Ortiz III, MD;  Location: [facility] OR [address] [place] [address];  Service: Peripheral Vascular;  Laterality: N/A;    ESOPHAGOGASTRODUODENOSCOPY N/A 10/20/2022    Procedure: EGD (ESOPHAGOGASTRODUODENOSCOPY);  Surgeon: Sixto Chicas MD;  Location: [facility] ([address] [place] [address]);  Service: Endoscopy;  Laterality: N/A;     ESOPHAGOGASTRODUODENOSCOPY N/A 10/28/2022    Procedure: EGD (ESOPHAGOGASTRODUODENOSCOPY);  Surgeon: Marlee Hopson MD;  Location: [facility] ENDO (2ND FLR);  Service: Gastroenterology;  Laterality: N/A;    EXCISION OF HYDROCELE Left 4/26/2022    Procedure: HYDROCELECTOMY;  Surgeon: Damion Roberts MD;  Location: [facility] OR;  Service: Urology;  Laterality: Left;    KNEE SURGERY  1972    WOUND DEBRIDEMENT Right 12/8/2022    Procedure: DEBRIDEMENT, WOUND;  Surgeon: LEI Ortiz III, MD;  Location: [facility] OR 2ND FLR;  Service: Peripheral Vascular;  Laterality: Right;  RLE wound debridement       Review of patient's allergies indicates:   Allergen Reactions    Ace inhibitors        No current facility-administered medications on file prior to encounter.     Current Outpatient Medications on File Prior to Encounter   Medication Sig    aspirin 81 MG Chew Chew and swallow 1 tablet (81 mg total) by mouth once daily.    atorvastatin (LIPITOR) 80 MG tablet Take 1 tablet (80 mg total) by mouth once daily.    gabapentin (NEURONTIN) 800 MG tablet Take 1 tablet (800 mg total) by mouth 3 (three) times daily.    losartan-hydrochlorothiazide 50-12.5 mg (HYZAAR) 50-12.5 mg per tablet Take 1 tablet by mouth once daily.    rivaroxaban (XARELTO) 20 mg Tab Take 1 tablet (20 mg total) by mouth daily with dinner or evening meal.    acetaminophen (TYLENOL) 325 MG tablet Take 2 tablets (650 mg total) by mouth every 8 (eight) hours as needed. (Patient not taking: Reported on 2/15/2023)    ezetimibe (ZETIA) 10 mg tablet Take 1 tablet (10 mg total) by mouth once daily.    HYDROcodone-acetaminophen (NORCO) 5-325 mg per tablet Take 1 tablet by mouth every 6 (six) hours as needed for Pain.    HYDROcodone-acetaminophen (NORCO) 5-325 mg per tablet Take 1 tablet by mouth every 6 (six) hours as needed for Pain.    multivitamin (THERAGRAN) per tablet Take 1 tablet by mouth once daily.    pantoprazole (PROTONIX) 40 MG tablet Take 1 tablet (40  mg total) by mouth once daily.     Family History       Problem Relation (Age of Onset)    Hypertension Mother, Father          Tobacco Use    Smoking status: Former     Packs/day: 0.50     Years: 55.00     Pack years: 27.50     Types: Cigarettes     Quit date: 10/11/2022     Years since quittin.3    Smokeless tobacco: Never    Tobacco comments:     Cigarettes3-4 per day   Substance and Sexual Activity    Alcohol use: Never    Drug use: Never    Sexual activity: Yes     Partners: Female     Comment: wife     Review of Systems   Constitutional:  Negative for chills, diaphoresis, fatigue and fever.   HENT:  Negative for congestion, rhinorrhea, sinus pressure, sinus pain, sneezing and sore throat.    Eyes:  Negative for photophobia and visual disturbance.   Respiratory:  Positive for shortness of breath. Negative for cough, choking and chest tightness.    Cardiovascular:  Negative for chest pain, palpitations and leg swelling.   Gastrointestinal:  Negative for abdominal distention, abdominal pain, anal bleeding, blood in stool, constipation, diarrhea, nausea and vomiting.   Endocrine: Negative for polyphagia and polyuria.   Genitourinary:  Negative for dysuria, hematuria and urgency.   Musculoskeletal:  Negative for back pain, gait problem, myalgias and neck pain.   Skin:  Positive for wound. Negative for rash.   Neurological:  Negative for dizziness, seizures, syncope, numbness and headaches.   Psychiatric/Behavioral:  Negative for agitation, confusion and hallucinations. The patient is not hyperactive.    Objective:     Vital Signs (Most Recent):  Temp: 97.8 °F (36.6 °C) (02/15/23 1658)  Pulse: 81 (02/15/23 165)  Resp: 18 (02/15/23 1658)  BP: (!) 148/67 (02/15/23 1658)  SpO2: 100 % (02/15/23 1658)   Vital Signs (24h Range):  Temp:  [97.8 °F (36.6 °C)-98.2 °F (36.8 °C)] 97.8 °F (36.6 °C)  Pulse:  [80-97] 81  Resp:  [16-19] 18  SpO2:  [97 %-100 %] 100 %  BP: (117-148)/(56-67) 148/67     Weight: 66.2 kg (146  lb)  Body mass index is 24.3 kg/m².    Physical Exam  Vitals and nursing note reviewed.   Constitutional:       General: He is not in acute distress.     Appearance: Normal appearance. He is normal weight. He is not ill-appearing, toxic-appearing or diaphoretic.      Comments: The patient is in no acute distress. He is cooperative with examination and conversant with interview.   HENT:      Head: Normocephalic and atraumatic.      Right Ear: External ear normal.      Left Ear: External ear normal.      Nose: Nose normal. No congestion or rhinorrhea.      Mouth/Throat:      Mouth: Mucous membranes are moist.      Pharynx: Oropharynx is clear. No oropharyngeal exudate or posterior oropharyngeal erythema.   Eyes:      General: No scleral icterus.     Extraocular Movements: Extraocular movements intact.   Cardiovascular:      Rate and Rhythm: Normal rate and regular rhythm.      Pulses: Normal pulses.      Heart sounds: Normal heart sounds. No murmur heard.    No friction rub. No gallop.   Pulmonary:      Effort: Pulmonary effort is normal.      Breath sounds: Normal breath sounds. No stridor. No wheezing, rhonchi or rales.   Chest:      Chest wall: No tenderness.   Abdominal:      General: Abdomen is flat. Bowel sounds are normal.      Palpations: There is no mass.      Tenderness: There is no abdominal tenderness. There is no guarding or rebound.      Hernia: No hernia is present.   Musculoskeletal:         General: Signs of injury present.      Cervical back: Normal range of motion and neck supple.      Right lower leg: No edema.      Left lower leg: No edema.   Skin:     General: Skin is warm.      Comments: Patient's right lower extremity wrapped given his chronic lower extremity wounds.   Neurological:      General: No focal deficit present.      Mental Status: He is alert and oriented to person, place, and time. Mental status is at baseline.      Sensory: No sensory deficit.      Motor: No weakness.    Psychiatric:         Mood and Affect: Mood normal.         Behavior: Behavior normal.         Thought Content: Thought content normal.         Judgment: Judgment normal.           Significant Labs: All pertinent labs within the past 24 hours have been reviewed.  CBC:   Recent Labs   Lab 02/15/23  1103 02/15/23  1614   WBC 13.92* 12.38   HGB 6.3* 6.6*   HCT 23.5* 24.0*    344     CMP:   Recent Labs   Lab 02/15/23  1103 02/15/23  1614    139   K 3.9 3.9    109   CO2 23 23   GLU 94 98   BUN 16 16   CREATININE 0.8 0.9   CALCIUM 8.8 9.1   PROT 6.1 6.6   ALBUMIN 3.0* 3.2*   BILITOT 0.3 0.3   ALKPHOS 82 88   AST 12 16   ALT 6* 8*   ANIONGAP 8 7*       Significant Imaging: I have reviewed all pertinent imaging results/findings within the past 24 hours.    Assessment/Plan:     * Normocytic anemia  Patient presenting for pre-operative labs and found to have Hgb <7 and repeat in ED with Hgb 6.6 requiring blood transfusion. Per chart review, patient underwent extensive evaluation for anemia several months ago including EGD, colonoscopy and video capsule endoscopy. VCE revealing two single, mucosal red spots in the small intestine. Per GI bleed, if repeat bleed, patient to have device-assisted endoscopy. Patient also with known wound of the lower extremities that he reports have bled in the past.  - s/p 1 unit of pRBCs in the ED  - maintain large bore IV  - continue to trend CBCs; transfuse if Hgb <7  - holding anticoagulation at this time  - Gastroenterology consult placed given patient's history, appreciate recommendations  - begin IV pantoprazole 40 mg BID    PAD (peripheral artery disease)  Patient with history of critical limb ischemia of right lower extremity and is now s/p bypass. Patient complains of mild pain on interview to his right lower extremity wounds.   - holding antiplatelet medication given his anemia  - resume home atorvastatin and ezetimibe  - gabapentin 800 mg TID for pain  control      Right renal mass  Patient with known right renal mass. Follows with Urology as outpatient. Patient is scheduled to have surgical intervention completed on the mass for further intervention.    Mixed hyperlipidemia  Resume home atorvastatin and ezetimibe.       Primary hypertension  Blood pressure controlled within the emergency department. Holding antihypertensive regimen at this time given underlying concern for bleed.        VTE Risk Mitigation (From admission, onward)         Ordered     IP VTE HIGH RISK PATIENT  Once         02/15/23 1818     Place sequential compression device  Until discontinued         02/15/23 1818     Reason for No Pharmacological VTE Prophylaxis  Once        Question:  Reasons:  Answer:  Risk of Bleeding    02/15/23 1818               Code Status: FULL    Víctor Evangelista MD  Department of Hospital Medicine   [facility] - Observation 11H

## 2023-02-16 NOTE — PROGRESS NOTES
[facility] 11H  Wound Care    Patient Name:  Babatunde Singh   MRN:  [id number]  Date: 2023  Diagnosis: Normocytic anemia    History:     Past Medical History:   Diagnosis Date    DVT (deep venous thrombosis)     Hypertension     Peripheral arterial disease        Social History     Socioeconomic History    Marital status:    Occupational History    Occupation: doorman [employer]   Tobacco Use    Smoking status: Former     Packs/day: 0.50     Years: 55.00     Pack years: 27.50     Types: Cigarettes     Quit date: 10/11/2022     Years since quittin.3    Smokeless tobacco: Never    Tobacco comments:     Cigarettes3-4 per day   Substance and Sexual Activity    Alcohol use: Never    Drug use: Never    Sexual activity: Yes     Partners: Female     Comment: wife       Precautions:     Allergies as of 02/15/2023 - Reviewed 02/15/2023   Allergen Reaction Noted    Ace inhibitors  2012       [facility] Assessment Details/Treatment     Patient seen for wound care consultation for RLE wound.   Reviewed chart for this encounter.   See Flow Sheet for findings.    Pt found sitting up on the side of the bed, agreeable to care at this time. Pt assisted into supine position for RLE assessment. Pt had a very tight two layer compression wrap from mid foot to calf that needed to be cut off. Per pt, his outside providers keep him in compression and wraps are completed by providers RN. Per pt's last BRENNAN - he is not a candidate for compression. Pt very anxious about not having compression on and would like it on and/or to speak to a doctor about it despite wound care education. MD notified of pt's anxiety and our findings. Wound was cleansed w/ NS and patted dry, applied Mepilex Ag to wound bed and secured w/ cast padding.     RECOMMENDATIONS  Q5 days - cleanse wound w/ NS, pat dry. Place Mepilex Ag to wound bed (sticky side towards skin), secure w/ cast padding.  Pt is not a candidate for compression at this  time.    Discussed POC with patient and primary RN.   See EMR for orders & patient education    Nursing to continue care. Wound care will follow.           02/16/23 1002   WOCN Assessment   WOCN Total Time (mins) 25   Visit Date 02/16/23   Visit Time 1002   Consult Type New   WOCN Speciality Wound   Intervention assessed;changed;applied;chart review;coordination of care;orders;team conference   Teaching on-going        Altered Skin Integrity 10/04/22 1400 Right lower;medial;lateral Leg Ulceration   Date First Assessed/Time First Assessed: 10/04/22 1400   Side: Right  Orientation: lower;medial;lateral  Location: Leg  Primary Wound Type: (c) Ulceration   Wound Image    Dressing Appearance Intact;Dry   Drainage Amount Scant   Drainage Characteristics/Odor Serosanguineous   Appearance Pink;Red;Moist   Red (%), Wound Tissue Color 100 %   Periwound Area Intact;Dry   Wound Edges Irregular   Wound Length (cm) 3 cm   Wound Width (cm) 3.2 cm   Wound Depth (cm) 0.1 cm   Wound Volume (cm^3) 0.96 cm^3   Wound Surface Area (cm^2) 9.6 cm^2   Care Cleansed with:;Sterile normal saline   Dressing Applied;Silver;Cast padding

## 2023-02-16 NOTE — PLAN OF CARE
[facility]H  Discharge Final Note    Primary Care Provider: Esdras Ly MD    Expected Discharge Date: 2/16/2023    Future Appointments   Date Time Provider Department Center   2/24/2023 11:30 AM YARITZA Cespedes II, MD [facility] IM [address] PCW   2/28/2023  8:30 AM Sherry Davis PA-C [facility] WOUND [address]   3/13/2023  8:30 AM Marita Will MD [facility] Olivia Cance   4/6/2023  1:00 PM Dilshad Ye MD [facility] GASTRO [address]   5/16/2023  9:00 AM Esdras Ly MD [facility] IM [address] PCW     Pt discharged home with no services.      Oziel Hopson, RN,BSN        Final Discharge Note (most recent)       Final Note - 02/16/23 1541          Final Note    Assessment Type Final Discharge Note     Anticipated Discharge Disposition Home or Self Care     Hospital Resources/Appts/Education Provided Provided patient/caregiver with written discharge plan information;Appointments scheduled and added to AVS        Post-Acute Status    Discharge Delays None known at this time                     Important Message from Medicare             Contact Info       [facility]/Onc clinic        Next Steps: Follow up    Instructions: The Clinic Nurse will call you with an appointment. If you do not hear from them in 48 hrs, please call [phone].

## 2023-02-16 NOTE — PROGRESS NOTES
I saw Mr. Singh with [facility]. He returns to discuss his upcoming surgery and sign consents. His images were reviewed at Tumor Board. Based on the Tumor Board discussion, I think we should definitely proceed with cholecystectomy given the possibility of a gallbladder mucinous cystic neoplasm. However, given that there is no clear mass and that this will be a combined case, I will plan for robotic cholecystectomy alone. If the final pathology shows an invasive cancer that requires a 4B/5 partial hepatectomy and portal lymphadenectomy, I will then go back for a second operation. Mr. Singh looks good and no longer has the lower extremity wound vac, but his hgb is 6.3.     I explained the risks and benefits of robotic, possible open, cholecystectomy to the patient in person and his daughter via telephone. Risks were discussed including but not limited to infection, bleeding, injury to adjacent organs, need for further procedures, bile leak, common bile duct injury, need for further procedures, cardiovascular and pulmonary complications, death, and imponderables. He understands the risks and signed written informed consent to proceed. We will have him go to the ED for blood transfusion.    Tolu Gavin MD  Surgical Oncology

## 2023-02-16 NOTE — SUBJECTIVE & OBJECTIVE
Past Medical History:   Diagnosis Date    DVT (deep venous thrombosis)     Hypertension     Peripheral arterial disease        Past Surgical History:   Procedure Laterality Date    APPLICATION OF WOUND VACUUM-ASSISTED CLOSURE DEVICE Right 12/8/2022    Procedure: APPLICATION, WOUND VAC;  Surgeon: LEI Ortiz III, MD;  [facility];  Service: Peripheral Vascular;  Laterality: Right;    COLONOSCOPY N/A 10/31/2022    Procedure: COLONOSCOPY;  Surgeon: Philip Shafer MD;  Location: [facility] ENDO (2ND FLR);  Service: Endoscopy;  Laterality: N/A;    CREATION OF AXILLARY-FEMORAL ARTERY BYPASS WITH GRAFT N/A 10/6/2022    Procedure: CREATION, BYPASS, ARTERIAL, AXILLARY TO FEMORAL, USING GRAFT;  Surgeon: LEI Ortiz III, MD;  [facility];  Service: Peripheral Vascular;  Laterality: N/A;    CREATION OF FEMORAL-FEMORAL ARTERY BYPASS WITH GRAFT N/A 10/6/2022    Procedure: CREATION, BYPASS, ARTERIAL, FEMORAL TO FEMORAL, USING GRAFT;  Surgeon: LEI Ortiz III, MD;  [facility];  Service: Peripheral Vascular;  Laterality: N/A;    ESOPHAGOGASTRODUODENOSCOPY N/A 10/20/2022    Procedure: EGD (ESOPHAGOGASTRODUODENOSCOPY);  Surgeon: Sixto Chicas MD;  Location: [facility] ENDO (2ND FLR);  Service: Endoscopy;  Laterality: N/A;    ESOPHAGOGASTRODUODENOSCOPY N/A 10/28/2022    Procedure: EGD (ESOPHAGOGASTRODUODENOSCOPY);  Surgeon: Marlee Hopson MD;  Location: [facility] ENDO (2ND FLR);  Service: Gastroenterology;  Laterality: N/A;    EXCISION OF HYDROCELE Left 4/26/2022    Procedure: HYDROCELECTOMY;  Surgeon: Damion Roberts MD;  Location: [facility];  Service: Urology;  Laterality: Left;    KNEE SURGERY  1972    WOUND DEBRIDEMENT Right 12/8/2022    Procedure: DEBRIDEMENT, WOUND;  Surgeon: LEI Ortiz III, MD;  Location: [facility] OR 2ND FLR;  Service: Peripheral Vascular;  Laterality: Right;  RLE wound debridement       Review of patient's allergies indicates:   Allergen Reactions    Ace  inhibitors        No current facility-administered medications on file prior to encounter.     Current Outpatient Medications on File Prior to Encounter   Medication Sig    aspirin 81 MG Chew Chew and swallow 1 tablet (81 mg total) by mouth once daily.    atorvastatin (LIPITOR) 80 MG tablet Take 1 tablet (80 mg total) by mouth once daily.    gabapentin (NEURONTIN) 800 MG tablet Take 1 tablet (800 mg total) by mouth 3 (three) times daily.    losartan-hydrochlorothiazide 50-12.5 mg (HYZAAR) 50-12.5 mg per tablet Take 1 tablet by mouth once daily.    rivaroxaban (XARELTO) 20 mg Tab Take 1 tablet (20 mg total) by mouth daily with dinner or evening meal.    acetaminophen (TYLENOL) 325 MG tablet Take 2 tablets (650 mg total) by mouth every 8 (eight) hours as needed. (Patient not taking: Reported on 2/15/2023)    ezetimibe (ZETIA) 10 mg tablet Take 1 tablet (10 mg total) by mouth once daily.    HYDROcodone-acetaminophen (NORCO) 5-325 mg per tablet Take 1 tablet by mouth every 6 (six) hours as needed for Pain.    HYDROcodone-acetaminophen (NORCO) 5-325 mg per tablet Take 1 tablet by mouth every 6 (six) hours as needed for Pain.    multivitamin (THERAGRAN) per tablet Take 1 tablet by mouth once daily.    pantoprazole (PROTONIX) 40 MG tablet Take 1 tablet (40 mg total) by mouth once daily.     Family History       Problem Relation (Age of Onset)    Hypertension Mother, Father          Tobacco Use    Smoking status: Former     Packs/day: 0.50     Years: 55.00     Pack years: 27.50     Types: Cigarettes     Quit date: 10/11/2022     Years since quittin.3    Smokeless tobacco: Never    Tobacco comments:     Cigarettes3-4 per day   Substance and Sexual Activity    Alcohol use: Never    Drug use: Never    Sexual activity: Yes     Partners: Female     Comment: wife     Review of Systems   Constitutional:  Negative for chills, diaphoresis, fatigue and fever.   HENT:  Negative for congestion, rhinorrhea, sinus pressure, sinus  pain, sneezing and sore throat.    Eyes:  Negative for photophobia and visual disturbance.   Respiratory:  Positive for shortness of breath. Negative for cough, choking and chest tightness.    Cardiovascular:  Negative for chest pain, palpitations and leg swelling.   Gastrointestinal:  Negative for abdominal distention, abdominal pain, anal bleeding, blood in stool, constipation, diarrhea, nausea and vomiting.   Endocrine: Negative for polyphagia and polyuria.   Genitourinary:  Negative for dysuria, hematuria and urgency.   Musculoskeletal:  Negative for back pain, gait problem, myalgias and neck pain.   Skin:  Positive for wound. Negative for rash.   Neurological:  Negative for dizziness, seizures, syncope, numbness and headaches.   Psychiatric/Behavioral:  Negative for agitation, confusion and hallucinations. The patient is not hyperactive.    Objective:     Vital Signs (Most Recent):  Temp: 97.8 °F (36.6 °C) (02/15/23 1658)  Pulse: 81 (02/15/23 1658)  Resp: 18 (02/15/23 1658)  BP: (!) 148/67 (02/15/23 1658)  SpO2: 100 % (02/15/23 1658)   Vital Signs (24h Range):  Temp:  [97.8 °F (36.6 °C)-98.2 °F (36.8 °C)] 97.8 °F (36.6 °C)  Pulse:  [80-97] 81  Resp:  [16-19] 18  SpO2:  [97 %-100 %] 100 %  BP: (117-148)/(56-67) 148/67     Weight: 66.2 kg (146 lb)  Body mass index is 24.3 kg/m².    Physical Exam  Vitals and nursing note reviewed.   Constitutional:       General: He is not in acute distress.     Appearance: Normal appearance. He is normal weight. He is not ill-appearing, toxic-appearing or diaphoretic.      Comments: The patient is in no acute distress. He is cooperative with examination and conversant with interview.   HENT:      Head: Normocephalic and atraumatic.      Right Ear: External ear normal.      Left Ear: External ear normal.      Nose: Nose normal. No congestion or rhinorrhea.      Mouth/Throat:      Mouth: Mucous membranes are moist.      Pharynx: Oropharynx is clear. No oropharyngeal exudate or  posterior oropharyngeal erythema.   Eyes:      General: No scleral icterus.     Extraocular Movements: Extraocular movements intact.   Cardiovascular:      Rate and Rhythm: Normal rate and regular rhythm.      Pulses: Normal pulses.      Heart sounds: Normal heart sounds. No murmur heard.    No friction rub. No gallop.   Pulmonary:      Effort: Pulmonary effort is normal.      Breath sounds: Normal breath sounds. No stridor. No wheezing, rhonchi or rales.   Chest:      Chest wall: No tenderness.   Abdominal:      General: Abdomen is flat. Bowel sounds are normal.      Palpations: There is no mass.      Tenderness: There is no abdominal tenderness. There is no guarding or rebound.      Hernia: No hernia is present.   Musculoskeletal:         General: Signs of injury present.      Cervical back: Normal range of motion and neck supple.      Right lower leg: No edema.      Left lower leg: No edema.   Skin:     General: Skin is warm.      Comments: Patient's right lower extremity wrapped given his chronic lower extremity wounds.   Neurological:      General: No focal deficit present.      Mental Status: He is alert and oriented to person, place, and time. Mental status is at baseline.      Sensory: No sensory deficit.      Motor: No weakness.   Psychiatric:         Mood and Affect: Mood normal.         Behavior: Behavior normal.         Thought Content: Thought content normal.         Judgment: Judgment normal.           Significant Labs: All pertinent labs within the past 24 hours have been reviewed.  CBC:   Recent Labs   Lab 02/15/23  1103 02/15/23  1614   WBC 13.92* 12.38   HGB 6.3* 6.6*   HCT 23.5* 24.0*    344     CMP:   Recent Labs   Lab 02/15/23  1103 02/15/23  1614    139   K 3.9 3.9    109   CO2 23 23   GLU 94 98   BUN 16 16   CREATININE 0.8 0.9   CALCIUM 8.8 9.1   PROT 6.1 6.6   ALBUMIN 3.0* 3.2*   BILITOT 0.3 0.3   ALKPHOS 82 88   AST 12 16   ALT 6* 8*   ANIONGAP 8 7*       Significant  Imaging: I have reviewed all pertinent imaging results/findings within the past 24 hours.

## 2023-02-16 NOTE — PHARMACY MED REC
"            Admission Medication History     The home medication history was taken by Rosa M Tang.    You may go to "Admission" then "Reconcile Home Medications" tabs to review and/or act upon these items.     The home medication list has been updated by the Pharmacy department.   Please read ALL comments highlighted in yellow.   Please address this information as you see fit.    Feel free to contact us if you have any questions or require assistance.      The medications listed below were removed from the home medication list. Please reorder if appropriate:  Patient reports no longer taking the following medication(s):  ACETAMINOPHEN 325 MG TABLET  HYDROCODONE-ACETAMINOPHEN 5-325 MG TABLET  PANTOPRAZOLE 40 MG TABLET        Medications listed below were obtained from: Patient/family  PTA Medications   Medication Sig    aspirin 81 MG Chew     Chew and swallow 1 tablet (81 mg total) by mouth once daily.    atorvastatin (LIPITOR) 80 MG tablet   Take 1 tablet (80 mg total) by mouth once daily.    gabapentin (NEURONTIN) 800 MG tablet   Take 1 tablet (800 mg total) by mouth 3 (three) times daily.    losartan-hydrochlorothiazide 50-12.5 mg (HYZAAR) 50-12.5 mg per tablet   Take 1 tablet by mouth once daily.    multivitamin (THERAGRAN) per tablet   Take 1 tablet by mouth once daily.    rivaroxaban (XARELTO) 20 mg Tab   Take 1 tablet (20 mg total) by mouth daily with dinner or evening meal.    ezetimibe (ZETIA) 10 mg tablet     Take 1 tablet (10 mg total) by mouth once daily. (Patient not taking: Reported on 2/15/2023)       Potential issues to be addressed PRIOR TO DISCHARGE  Please discuss with the patient barriers to adherence with medication treatment plans  Patient requires education regarding drug therapies     Rosa M Tang  [id number]      .        "

## 2023-02-16 NOTE — PLAN OF CARE
[facility]  Discharge Assessment    Primary Care Provider: Esdras Ly MD     Discharge Assessment (most recent)       BRIEF DISCHARGE ASSESSMENT - 02/16/23 1225          Discharge Planning    Assessment Type Discharge Planning Brief Assessment     Resource/Environmental Concerns none     Support Systems Spouse/significant other     Equipment Currently Used at Home cane, straight     Current Living Arrangements home     Patient/Family Anticipates Transition to home     Patient/Family Anticipated Services at Transition none     DME Needed Upon Discharge  none     Discharge Plan A Home     Discharge Plan B Home                   Pt is a 72 y.o. male admitted with  normocytic anemia and has a PMH of DVT, PAD and HTN. He lives with his  wife in a single story house with 4 steps to enter. He is independent with his ADLs and IADls and still drives. Pt will drive himself home. Pt active with [facility],  to inform pt is in hospital under OBS status.   [facility] Discharge Packet given to patient and/or family with understanding verbalized.   name and number and estimated discharge date written on white board in patient's room with request to call for any questions or concerns.  Will continue to follow for needs.  Oziel Hopson RN,BSN

## 2023-02-16 NOTE — HPI
Babatunde Singh is a 72-year-old man with a past medical history of primary hypertension, hyperlipidemia, history of DVT on anticoagulation, peripheral arterial disease s/p left axillary to bi-profunda bypass who presents to the emergency department with abnormal labs.  Of note, the patient recently was found to have a renal mass.  The patient has been following with the Urology Clinic with plans for surgical intervention on the mass.  The patient had preoperative labs collected this morning which revealed a hemoglobin of 6.3.  The patient was told to present to the emergency department for repeat lab testing and possible blood transfusion.  The patient says that he has received blood transfusion in the past, most recently several months ago.  The patient said that he had an EGD and colonoscopies completed which revealed no underlying pathology.  On chart review, it appears that patient also received video capsule endoscopy and had two single mucosal red spots with no bleeding in the small bowel.  The patient denies any rectal bleeding or hematochezia.  He denies any melanotic stools.  He also denies any other source of bleeding, including hematuria and hematemesis.  The patient denies any abdominal pain.  He also denies any chest pain or chest discomfort, but does describe some dyspnea on exertion saying that he at times has to stop when ambulating to catch his breath. The patient's only complaint is occasional pain at the site of his chronic lower extremity wounds which are secondary to his peripheral arterial disease. The patient does note occasional bleeding from the wounds when he unwraps the bandages.    In the emergency department, the patient was found to be hemodynamically stable.  Labs significant for repeat hemoglobin at 6.6.  The patient had type and screen sent and was consented for blood transfusion with in the emergency department and 1 unit of of pRBCs was ordered.  The patient was admitted to  [facility] for further observation.

## 2023-02-16 NOTE — ASSESSMENT & PLAN NOTE
Blood pressure controlled within the emergency department. Holding antihypertensive regimen at this time given underlying concern for bleed.

## 2023-02-17 DIAGNOSIS — Z01.818 PREOP TESTING: Primary | ICD-10-CM

## 2023-02-17 DIAGNOSIS — I10 PRIMARY HYPERTENSION: ICD-10-CM

## 2023-02-17 NOTE — HOSPITAL COURSE
Admitted with anemia, with no s/s of bleeding. GI was consulted given history, with no intervention planned given lack of overt blood loss. They will plan to see him in clinic and schedule colonoscopy. Anemia labs notable for low ferritin and iron. Given 1u pRBC and a dose of IV iron. Discharged to continue PO iron MWF. Discussed close follow up with GI, heme/onc, and PCP after discharge. Discharge plan discussed, and he was discharged in good condition. Strict return precautions provided and questions answered.

## 2023-02-17 NOTE — DISCHARGE SUMMARY
[facility] [address] [place] [address] Medicine  Discharge Summary      Patient Name: Babatunde Singh  MRN: [id number]  EMILIA: [id number]  Patient Class: OP- Observation  Admission Date: 2/15/2023  Hospital Length of Stay: 0 days  Discharge Date and Time:  02/17/2023 3:56 PM  Attending Physician: Celia att. providers found   Discharging Provider: Jossie Brody MD  Primary Care Provider: Esdras Ly MD  [facility] Medicine Team: [facility] Jossie Brody MD  Primary Care Team: [facility]    HPI:   Babatunde Singh is a 72-year-old man with a past medical history of primary hypertension, hyperlipidemia, history of DVT on anticoagulation, peripheral arterial disease s/p left axillary to bi-profunda bypass who presents to the emergency department with abnormal labs.  Of note, the patient recently was found to have a renal mass.  The patient has been following with the Urology Clinic with plans for surgical intervention on the mass.  The patient had preoperative labs collected this morning which revealed a hemoglobin of 6.3.  The patient was told to present to the emergency department for repeat lab testing and possible blood transfusion.  The patient says that he has received blood transfusion in the past, most recently several months ago.  The patient said that he had an EGD and colonoscopies completed which revealed no underlying pathology.  On chart review, it appears that patient also received video capsule endoscopy and had two single mucosal red spots with no bleeding in the small bowel.  The patient denies any rectal bleeding or hematochezia.  He denies any melanotic stools.  He also denies any other source of bleeding, including hematuria and hematemesis.  The patient denies any abdominal pain.  He also denies any chest pain or chest discomfort, but does describe some dyspnea on exertion saying that he at times has to stop when ambulating to catch his breath. The patient's only complaint is occasional  pain at the site of his chronic lower extremity wounds which are secondary to his peripheral arterial disease. The patient does note occasional bleeding from the wounds when he unwraps the bandages.    In the emergency department, the patient was found to be hemodynamically stable.  Labs significant for repeat hemoglobin at 6.6.  The patient had type and screen sent and was consented for blood transfusion with in the emergency department and 1 unit of of pRBCs was ordered.  The patient was admitted to Hospital Medicine for further observation.      * No surgery found *      Hospital Course:   Admitted with anemia, with no s/s of bleeding. GI was consulted given history, with no intervention planned given lack of overt blood loss. They will plan to see him in clinic and schedule colonoscopy. Anemia labs notable for low ferritin and iron. Given 1u pRBC and a dose of IV iron. Discharged to continue PO iron MWF. Discussed close follow up with GI, heme/onc, and PCP after discharge. Discharge plan discussed, and he was discharged in good condition. Strict return precautions provided and questions answered.        Goals of Care Treatment Preferences:  Code Status: Full Code      Consults:   Consults (From admission, onward)        Status Ordering Provider     Inpatient consult to Gastroenterology  Once        Provider:  (Not yet assigned)    Completed ALMA MIN          Cardiac/Vascular  PAD (peripheral artery disease)  Patient with history of critical limb ischemia of right lower extremity and is now s/p bypass. Patient complains of mild pain on interview to his right lower extremity wounds.   - holding antiplatelet medication given his anemia  - resume home atorvastatin and ezetimibe  - gabapentin 800 mg TID for pain control      Mixed hyperlipidemia  Resume home atorvastatin and ezetimibe.       Primary hypertension  Blood pressure controlled within the emergency department. Holding antihypertensive regimen at  this time given underlying concern for bleed.      Renal/  Right renal mass  Patient with known right renal mass. Follows with Urology as outpatient. Patient is scheduled to have surgical intervention completed on the mass for further intervention.    Oncology  * Normocytic anemia  Patient presenting for pre-operative labs and found to have Hgb <7 and repeat in ED with Hgb 6.6 requiring blood transfusion. Per chart review, patient underwent extensive evaluation for anemia several months ago including EGD, colonoscopy and video capsule endoscopy. VCE revealing two single, mucosal red spots in the small intestine. Per GI bleed, if repeat bleed, patient to have device-assisted endoscopy. Patient also with known wound of the lower extremities that he reports have bled in the past.  - s/p 1 unit of pRBCs in the ED  - maintain large bore IV  - continue to trend CBCs; transfuse if Hgb <7  - holding anticoagulation at this time  - Gastroenterology consult placed given patient's history, appreciate recommendations  - begin IV pantoprazole 40 mg BID      Final Active Diagnoses:    Diagnosis Date Noted POA    PRINCIPAL PROBLEM:  Normocytic anemia [D64.9] 01/14/2021 Unknown    PAD (peripheral artery disease) [I73.9] 08/25/2022 Yes    Right renal mass [N28.89] 08/11/2022 Yes    Mixed hyperlipidemia [E78.2] 07/26/2022 Yes    Primary hypertension [I10] 03/10/2022 Yes      Problems Resolved During this Admission:       Discharged Condition: good    Disposition: Home or Self Care    Follow Up:   Follow-up Information     Heme/Onc clinic Follow up.    Why: The Clinic Nurse will call you with an appointment. If you do not hear from them in 48 hrs, please call [phone].                     Patient Instructions:      Ambulatory referral/consult to Gastroenterology   Standing Status: Future   Referral Priority: Routine Referral Type: Consultation   Referral Reason: Specialty Services Required   Requested Specialty:  Gastroenterology   Number of Visits Requested: 1     Ambulatory referral/consult to Hematology / Oncology   Standing Status: Future   Referral Priority: Routine Referral Type: Consultation   Referral Reason: Specialty Services Required   Requested Specialty: Hematology and Oncology   Number of Visits Requested: 1     Diet Cardiac     Notify your health care provider if you experience any of the following:  temperature >100.4     Notify your health care provider if you experience any of the following:  persistent nausea and vomiting or diarrhea     Notify your health care provider if you experience any of the following:  severe uncontrolled pain     Notify your health care provider if you experience any of the following:  difficulty breathing or increased cough     Notify your health care provider if you experience any of the following:  severe persistent headache     Notify your health care provider if you experience any of the following:  worsening rash     Notify your health care provider if you experience any of the following:  persistent dizziness, light-headedness, or visual disturbances     Notify your health care provider if you experience any of the following:  increased confusion or weakness     Activity as tolerated       Significant Diagnostic Studies: Labs: All labs within the past 24 hours have been reviewed    Pending Diagnostic Studies:     None         Medications:  Reconciled Home Medications:      Medication List      START taking these medications    ferrous sulfate 325 mg (65 mg iron) Tab tablet  Commonly known as: IRON  Take 1 tablet (325 mg total) by mouth every Mon, Wed, Fri.        CONTINUE taking these medications    aspirin 81 MG Chew  Chew and swallow 1 tablet (81 mg total) by mouth once daily.     atorvastatin 80 MG tablet  Commonly known as: LIPITOR  Take 1 tablet (80 mg total) by mouth once daily.     ezetimibe 10 mg tablet  Commonly known as: ZETIA  Take 1 tablet (10 mg total) by mouth  once daily.     gabapentin 800 MG tablet  Commonly known as: NEURONTIN  Take 1 tablet (800 mg total) by mouth 3 (three) times daily.     losartan-hydrochlorothiazide 50-12.5 mg 50-12.5 mg per tablet  Commonly known as: HYZAAR  Take 1 tablet by mouth once daily.     multivitamin per tablet  Commonly known as: THERAGRAN  Take 1 tablet by mouth once daily.     rivaroxaban 20 mg Tab  Commonly known as: XARELTO  Take 1 tablet (20 mg total) by mouth daily with dinner or evening meal.        ASK your doctor about these medications    acetaminophen 325 MG tablet  Commonly known as: TYLENOL  Take 2 tablets (650 mg total) by mouth every 8 (eight) hours as needed.            Indwelling Lines/Drains at time of discharge:   Lines/Drains/Airways     None                 Time spent on the discharge of patient: 35 minutes spent on direct patient care and counseling          Jossie Brody MD  Department of Hospital Medicine  [facility]

## 2023-02-22 ENCOUNTER — TELEPHONE (OUTPATIENT)
Dept: INTERNAL MEDICINE | Facility: CLINIC | Age: 73
End: 2023-02-22
Payer: MEDICARE

## 2023-02-22 NOTE — TELEPHONE ENCOUNTER
----- Message from Izabel Robbins sent at 2/22/2023 10:50 AM CST -----  Name Of Caller:Babatunde            Provider Name:vanessa Bloom            Does patient feel the need to be seen today?No            Relationship to the Pt?:Pt            Contact Preference?:[phone]            What is the nature of the call?: Pt would like a call back regarding his preop clearance instructions.

## 2023-02-22 NOTE — TELEPHONE ENCOUNTER
Called and spoke with pt, pt states he wants to know what supplement is supposed to given to him before operation on 2/26/23. Pt explained he was not trying to reach us, as he is aware of his upcoming appt Friday 2/24/23.

## 2023-02-23 ENCOUNTER — ANESTHESIA EVENT (OUTPATIENT)
Dept: SURGERY | Facility: HOSPITAL | Age: 73
DRG: 657 | End: 2023-02-23
Payer: MEDICARE

## 2023-02-23 ENCOUNTER — TELEPHONE (OUTPATIENT)
Dept: UROLOGY | Facility: CLINIC | Age: 73
End: 2023-02-23
Payer: MEDICARE

## 2023-02-23 ENCOUNTER — TELEPHONE (OUTPATIENT)
Dept: SURGERY | Facility: CLINIC | Age: 73
End: 2023-02-23
Payer: MEDICARE

## 2023-02-23 NOTE — TELEPHONE ENCOUNTER
"Pt called with questions about "drinking" something in preporation for surgery scheduled on 2/27/23. Explained to pt that Dr. Gavin did not order prep for surgery but there is a possibility that Dr. Hubbard may have. Sent staff message to Dr. Cuba office. Notified pt, pt verbalized understanding.   "

## 2023-02-23 NOTE — TELEPHONE ENCOUNTER
Informed patient that he can take two packages of Miralax the night before his surgery, since he can't find the Mag Citrate.

## 2023-02-23 NOTE — TELEPHONE ENCOUNTER
----- Message from Luna Nielsen RN sent at 2/23/2023  8:44 AM CST -----  Contact: Patient    ----- Message -----  From: Yadi Downey  Sent: 2/23/2023   8:30 AM CST  To: Rose AKERS Staff    Patient call in requesting a call back this morning regarding drink that needs to take     Please advice    Patient requesting  office to call him    Patient can be reach at [phone] or [phone]

## 2023-02-24 ENCOUNTER — TELEPHONE (OUTPATIENT)
Dept: SURGERY | Facility: CLINIC | Age: 73
End: 2023-02-24
Payer: MEDICARE

## 2023-02-24 ENCOUNTER — OFFICE VISIT (OUTPATIENT)
Dept: INTERNAL MEDICINE | Facility: CLINIC | Age: 73
DRG: 657 | End: 2023-02-24
Payer: MEDICARE

## 2023-02-24 VITALS
BODY MASS INDEX: 22.66 KG/M2 | SYSTOLIC BLOOD PRESSURE: 118 MMHG | HEIGHT: 65 IN | DIASTOLIC BLOOD PRESSURE: 60 MMHG | WEIGHT: 136 LBS | OXYGEN SATURATION: 96 % | HEART RATE: 69 BPM

## 2023-02-24 DIAGNOSIS — Z09 HOSPITAL DISCHARGE FOLLOW-UP: Primary | ICD-10-CM

## 2023-02-24 DIAGNOSIS — D50.9 IRON DEFICIENCY ANEMIA, UNSPECIFIED IRON DEFICIENCY ANEMIA TYPE: ICD-10-CM

## 2023-02-24 PROCEDURE — 3008F BODY MASS INDEX DOCD: CPT | Mod: CPTII,S$GLB,, | Performed by: INTERNAL MEDICINE

## 2023-02-24 PROCEDURE — 99214 PR OFFICE/OUTPT VISIT, EST, LEVL IV, 30-39 MIN: ICD-10-PCS | Mod: S$GLB,,, | Performed by: INTERNAL MEDICINE

## 2023-02-24 PROCEDURE — 3074F SYST BP LT 130 MM HG: CPT | Mod: CPTII,S$GLB,, | Performed by: INTERNAL MEDICINE

## 2023-02-24 PROCEDURE — 3074F PR MOST RECENT SYSTOLIC BLOOD PRESSURE < 130 MM HG: ICD-10-PCS | Mod: CPTII,S$GLB,, | Performed by: INTERNAL MEDICINE

## 2023-02-24 PROCEDURE — 99999 PR PBB SHADOW E&M-EST. PATIENT-LVL III: ICD-10-PCS | Mod: PBBFAC,,, | Performed by: INTERNAL MEDICINE

## 2023-02-24 PROCEDURE — 1159F PR MEDICATION LIST DOCUMENTED IN MEDICAL RECORD: ICD-10-PCS | Mod: CPTII,S$GLB,, | Performed by: INTERNAL MEDICINE

## 2023-02-24 PROCEDURE — 3078F PR MOST RECENT DIASTOLIC BLOOD PRESSURE < 80 MM HG: ICD-10-PCS | Mod: CPTII,S$GLB,, | Performed by: INTERNAL MEDICINE

## 2023-02-24 PROCEDURE — 99214 OFFICE O/P EST MOD 30 MIN: CPT | Mod: S$GLB,,, | Performed by: INTERNAL MEDICINE

## 2023-02-24 PROCEDURE — 3288F PR FALLS RISK ASSESSMENT DOCUMENTED: ICD-10-PCS | Mod: CPTII,S$GLB,, | Performed by: INTERNAL MEDICINE

## 2023-02-24 PROCEDURE — 1126F PR PAIN SEVERITY QUANTIFIED, NO PAIN PRESENT: ICD-10-PCS | Mod: CPTII,S$GLB,, | Performed by: INTERNAL MEDICINE

## 2023-02-24 PROCEDURE — 3078F DIAST BP <80 MM HG: CPT | Mod: CPTII,S$GLB,, | Performed by: INTERNAL MEDICINE

## 2023-02-24 PROCEDURE — 1160F PR REVIEW ALL MEDS BY PRESCRIBER/CLIN PHARMACIST DOCUMENTED: ICD-10-PCS | Mod: CPTII,S$GLB,, | Performed by: INTERNAL MEDICINE

## 2023-02-24 PROCEDURE — 1126F AMNT PAIN NOTED NONE PRSNT: CPT | Mod: CPTII,S$GLB,, | Performed by: INTERNAL MEDICINE

## 2023-02-24 PROCEDURE — 1101F PT FALLS ASSESS-DOCD LE1/YR: CPT | Mod: CPTII,S$GLB,, | Performed by: INTERNAL MEDICINE

## 2023-02-24 PROCEDURE — 1101F PR PT FALLS ASSESS DOC 0-1 FALLS W/OUT INJ PAST YR: ICD-10-PCS | Mod: CPTII,S$GLB,, | Performed by: INTERNAL MEDICINE

## 2023-02-24 PROCEDURE — 3288F FALL RISK ASSESSMENT DOCD: CPT | Mod: CPTII,S$GLB,, | Performed by: INTERNAL MEDICINE

## 2023-02-24 PROCEDURE — 1159F MED LIST DOCD IN RCRD: CPT | Mod: CPTII,S$GLB,, | Performed by: INTERNAL MEDICINE

## 2023-02-24 PROCEDURE — 1160F RVW MEDS BY RX/DR IN RCRD: CPT | Mod: CPTII,S$GLB,, | Performed by: INTERNAL MEDICINE

## 2023-02-24 PROCEDURE — 99999 PR PBB SHADOW E&M-EST. PATIENT-LVL III: CPT | Mod: PBBFAC,,, | Performed by: INTERNAL MEDICINE

## 2023-02-24 PROCEDURE — 3008F PR BODY MASS INDEX (BMI) DOCUMENTED: ICD-10-PCS | Mod: CPTII,S$GLB,, | Performed by: INTERNAL MEDICINE

## 2023-02-24 RX ORDER — MIDAZOLAM HYDROCHLORIDE 1 MG/ML
.5-4 INJECTION INTRAMUSCULAR; INTRAVENOUS
Status: CANCELLED | OUTPATIENT
Start: 2023-02-24

## 2023-02-24 RX ORDER — FENTANYL CITRATE 50 UG/ML
25-200 INJECTION, SOLUTION INTRAMUSCULAR; INTRAVENOUS
Status: CANCELLED | OUTPATIENT
Start: 2023-02-24

## 2023-02-24 NOTE — TELEPHONE ENCOUNTER
Spoke to Mr. Babatunde Singh. Confirmed procedure for 2/27/2023 with Dr. Gavin/. Informed to arrive at [facility] on the 2nd floor on [facility] for 0500 and surgery time is approximately 0700. Surgery Instructions provided as follows:  instructed to remain NPO solids 8 hours prior to surgery, clear liquids until 2 hours prior to surgery, to shower with antibacterial soap the night before surgery and morning of surgery before arrival, not to apply any lotions, powders or deodorant, remove all metal and jewelry, to wear comfortable clothes, and leave all valuables at home. Medications reviewed and pt reports stopping anti coagulants approx 1 week ago. Confirmed pt will have family members accompany pt on DOS. Instructed to bring surgery folder on DOS. Pt verbalized understanding to all of the above.

## 2023-02-24 NOTE — PROGRESS NOTES
HOSPITAL FOLLOWUP NOTE    Discharge note information (in italics) was reviewed in detail and discussed with the patient:   HPI:   Babatunde Singh is a 72-year-old man with a past medical history of primary hypertension, hyperlipidemia, history of DVT on anticoagulation, peripheral arterial disease s/p left axillary to bi-profunda bypass who presents to the emergency department with abnormal labs.  Of note, the patient recently was found to have a renal mass.  The patient has been following with the Urology Clinic with plans for surgical intervention on the mass.  The patient had preoperative labs collected this morning which revealed a hemoglobin of 6.3.  The patient was told to present to the emergency department for repeat lab testing and possible blood transfusion.  The patient says that he has received blood transfusion in the past, most recently several months ago.  The patient said that he had an EGD and colonoscopies completed which revealed no underlying pathology.  On chart review, it appears that patient also received video capsule endoscopy and had two single mucosal red spots with no bleeding in the small bowel.  The patient denies any rectal bleeding or hematochezia.  He denies any melanotic stools.  He also denies any other source of bleeding, including hematuria and hematemesis.  The patient denies any abdominal pain.  He also denies any chest pain or chest discomfort, but does describe some dyspnea on exertion saying that he at times has to stop when ambulating to catch his breath. The patient's only complaint is occasional pain at the site of his chronic lower extremity wounds which are secondary to his peripheral arterial disease. The patient does note occasional bleeding from the wounds when he unwraps the bandages.     In the emergency department, the patient was found to be hemodynamically stable.  Labs significant for repeat hemoglobin at 6.6.  The patient had type and screen sent and was  consented for blood transfusion with in the emergency department and 1 unit of of pRBCs was ordered.  The patient was admitted to Hospital Medicine for further observation.     Hospital Course:   Admitted with anemia, with no s/s of bleeding. GI was consulted given history, with no intervention planned given lack of overt blood loss. They will plan to see him in clinic and schedule colonoscopy. Anemia labs notable for low ferritin and iron. Given 1u pRBC and a dose of IV iron. Discharged to continue PO iron MWF. Discussed close follow up with GI, heme/onc, and PCP after discharge. Discharge plan discussed, and he was discharged in good condition. Strict return precautions provided and questions answered.     PMFSH: all information reviewed and updated as necessary during this encounter.  Medication list reviewed and reconciled.    He is feeling well and no new complaints today.      Review of Systems   Constitutional:  Negative for chills, fever and malaise/fatigue.   Respiratory:  Negative for cough.    Cardiovascular:  Negative for chest pain, palpitations and leg swelling.   Gastrointestinal:  Negative for blood in stool and melena.   Neurological:  Negative for dizziness and weakness.   Physical Exam  Constitutional:       General: He is not in acute distress.     Appearance: Normal appearance. He is normal weight. He is not ill-appearing, toxic-appearing or diaphoretic.   Cardiovascular:      Rate and Rhythm: Normal rate.   Pulmonary:      Effort: Pulmonary effort is normal.   Neurological:      Mental Status: He is alert.      Assessment/plan:  1. Hospital discharge follow-up    2. Iron deficiency anemia, unspecified iron deficiency anemia type  He hasn't been taking the iron because he was told by the urology office to not take it until after his planned urology procedure. That is planned for Monday.   He will start the iron as instructed after Monday.  Will recheck his HGB today for stability.  He states  he's been anemic for years and no one has ever found a cause so GI bleeding is unlikely and he looks to be well compensated and used to the levels he usually runs.         No follow-ups on file.  Medication List with Changes/Refills   Current Medications    ACETAMINOPHEN (TYLENOL) 325 MG TABLET    Take 2 tablets (650 mg total) by mouth every 8 (eight) hours as needed.    ASPIRIN 81 MG CHEW    Chew and swallow 1 tablet (81 mg total) by mouth once daily.    ATORVASTATIN (LIPITOR) 80 MG TABLET    Take 1 tablet (80 mg total) by mouth once daily.    EZETIMIBE (ZETIA) 10 MG TABLET    Take 1 tablet (10 mg total) by mouth once daily.    FERROUS SULFATE (IRON) 325 MG (65 MG IRON) TAB TABLET    Take 1 tablet (325 mg total) by mouth every Mon, Wed, Fri.    GABAPENTIN (NEURONTIN) 800 MG TABLET    Take 1 tablet (800 mg total) by mouth 3 (three) times daily.    LOSARTAN-HYDROCHLOROTHIAZIDE 50-12.5 MG (HYZAAR) 50-12.5 MG PER TABLET    Take 1 tablet by mouth once daily.    MULTIVITAMIN (THERAGRAN) PER TABLET    Take 1 tablet by mouth once daily.    RIVAROXABAN (XARELTO) 20 MG TAB    Take 1 tablet (20 mg total) by mouth daily with dinner or evening meal.          I spent a total of 30 minutes on the day of the visit.This includes face to face time and non-face to face time preparing to see the patient (eg, review of tests), obtaining and/or reviewing separately obtained history, documenting clinical information in the electronic or other health record, independently interpreting results and communicating results to the patient/family/caregiver, and coordinating care.

## 2023-02-26 ENCOUNTER — TELEPHONE (OUTPATIENT)
Dept: UROLOGY | Facility: CLINIC | Age: 73
End: 2023-02-26
Payer: MEDICARE

## 2023-02-26 NOTE — TELEPHONE ENCOUNTER
Discussed with Dr Gavin.  He and I both think there is a high change of conversion from robotic to open so we prefer an open approach via an anterior subcostal incision.  I called and spoke with Mr. Singh' daugher and she agrees with this plan.

## 2023-02-26 NOTE — ANESTHESIA PREPROCEDURE EVALUATION
[facility]  Anesthesia Pre-Operative Evaluation         Patient Name: Babatunde Singh  YOB: 1950  MRN: [id number]    SUBJECTIVE:     Pre-operative evaluation for Procedure(s) (LRB):  XI ROBOTIC NEPHRECTOMY, PARTIAL (Right)  XI ROBOTIC CHOLECYSTECTOMY (N/A)     02/26/2023    Babatunde Singh is a 72 y.o. male w/ a significant PMHx of tobacco abuse, HTN, hx of DVT on Xarelto (stopped 1 week ago), PAD s/p left axillary to bi-profunda bypass with right lower extremity wounds s/p surgical debridement of achilles tendon 12/8/22, anemia with recent admission for blood transfusion, right renal mass, and gallbladder mass.     Patient now presents for the above procedure(s).    TTE Summary 10/2/22:  · The left ventricle is normal in size with concentric remodeling and hyperdynamic systolic function. The estimated ejection fraction is >70%.  · Normal right ventricular size with normal right ventricular systolic function.  · Grade I left ventricular diastolic dysfunction.  · The estimated PA systolic pressure is 36 mmHg.  · Normal central venous pressure (3 mmHg).      Prev airway:   Date/Time: 10/6/2022 9:56 AM  Performed by: Estefanía Chicas MD  Authorized by: Cr Juarez Jr., MD      Intubation:     Induction:  Intravenous    Intubated:  Postinduction    Mask Ventilation:  Easy mask    Attempts:  2    Attempted By:  Resident anesthesiologist    Method of Intubation:  Direct    Blade:  Ernst 2    Laryngeal View Grade: Grade III - only epiglottis visible      Attempted By (2nd Attempt):  Resident anesthesiologist    Method of Intubation (2nd Attempt):  Video laryngoscopy    Blade (2nd Attempt):  Holder 3    Laryngeal View Grade (2nd Attempt): Grade I - full view of cords      Difficult Airway Encountered?: No      Complications:  None    Airway Device:  Oral endotracheal tube    Airway Device Size:  7.5    Style/Cuff Inflation:  Cuffed (inflated to minimal occlusive pressure)    Tube secured:   22    Secured at:  The teeth    Placement Verified By:  Capnometry    Complicating Factors:  None, anterior larynx, overbite, large prominent central incisors, large/floppy epiglottis and poor neck/head extension    Findings Post-Intubation:  BS equal bilateral and atraumatic/condition of teeth unchanged      LDA: None documented.     Drips: None documented.      Patient Active Problem List   Diagnosis    Primary hypertension    Mixed hyperlipidemia    Claudication of right lower extremity    Right leg pain    Sciatica of right side    Numbness and tingling of right leg    Right renal mass    Allodynia (Right leg)    PAD (peripheral artery disease)    Critical limb ischemia of right lower extremity with ulceration of lower leg    Absent pedal pulses    DVT (deep venous thrombosis)    Tachycardia    Bilateral lower extremity edema    Leg wound, right, subsequent encounter    Tobacco use disorder, severe, in early remission    Impaired fasting glucose    Diverticulosis of colon    Chronic bronchitis    Normocytic anemia    Gallbladder mass       Review of patient's allergies indicates:   Allergen Reactions    Ace inhibitors        Current Inpatient Medications:      No current facility-administered medications on file prior to encounter.     Current Outpatient Medications on File Prior to Encounter   Medication Sig Dispense Refill    acetaminophen (TYLENOL) 325 MG tablet Take 2 tablets (650 mg total) by mouth every 8 (eight) hours as needed. (Patient not taking: Reported on 2/15/2023) 20 tablet 0    aspirin 81 MG Chew Chew and swallow 1 tablet (81 mg total) by mouth once daily. 30 tablet 0    atorvastatin (LIPITOR) 80 MG tablet Take 1 tablet (80 mg total) by mouth once daily. 30 tablet 0    ezetimibe (ZETIA) 10 mg tablet Take 1 tablet (10 mg total) by mouth once daily. (Patient not taking: Reported on 2/15/2023) 30 tablet 0    gabapentin (NEURONTIN) 800 MG tablet Take 1 tablet (800 mg  total) by mouth 3 (three) times daily. 90 tablet 11    losartan-hydrochlorothiazide 50-12.5 mg (HYZAAR) 50-12.5 mg per tablet Take 1 tablet by mouth once daily. 30 tablet 0    multivitamin (THERAGRAN) per tablet Take 1 tablet by mouth once daily.         Past Surgical History:   Procedure Laterality Date    APPLICATION OF WOUND VACUUM-ASSISTED CLOSURE DEVICE Right 12/8/2022    Procedure: APPLICATION, WOUND VAC;  Surgeon: LEI Ortiz III, MD;  Location: [facility] OR 2ND FLR;  Service: Peripheral Vascular;  Laterality: Right;    COLONOSCOPY N/A 10/31/2022    Procedure: COLONOSCOPY;  Surgeon: Philip Shafer MD;  Location: [facility] ENDO (2ND FLR);  Service: Endoscopy;  Laterality: N/A;    CREATION OF AXILLARY-FEMORAL ARTERY BYPASS WITH GRAFT N/A 10/6/2022    Procedure: CREATION, BYPASS, ARTERIAL, AXILLARY TO FEMORAL, USING GRAFT;  Surgeon: LEI Ortiz III, MD;  [facility];  Service: Peripheral Vascular;  Laterality: N/A;    CREATION OF FEMORAL-FEMORAL ARTERY BYPASS WITH GRAFT N/A 10/6/2022    Procedure: CREATION, BYPASS, ARTERIAL, FEMORAL TO FEMORAL, USING GRAFT;  Surgeon: LEI Ortiz III, MD;  [facility];  Service: Peripheral Vascular;  Laterality: N/A;    ESOPHAGOGASTRODUODENOSCOPY N/A 10/20/2022    Procedure: EGD (ESOPHAGOGASTRODUODENOSCOPY);  Surgeon: Sixto Chicas MD;  Location: [facility] ENDO (2ND FLR);  Service: Endoscopy;  Laterality: N/A;    ESOPHAGOGASTRODUODENOSCOPY N/A 10/28/2022    Procedure: EGD (ESOPHAGOGASTRODUODENOSCOPY);  Surgeon: Marlee Hopson MD;  Location: [facility] ENDO (2ND FLR);  Service: Gastroenterology;  Laterality: N/A;    EXCISION OF HYDROCELE Left 4/26/2022    Procedure: HYDROCELECTOMY;  Surgeon: Damion Roberts MD;  Location: [facility];  Service: Urology;  Laterality: Left;    KNEE SURGERY  1972    WOUND DEBRIDEMENT Right 12/8/2022    Procedure: DEBRIDEMENT, WOUND;  Surgeon: LEI Ortiz III, MD;  [facility];  Service:  Peripheral Vascular;  Laterality: Right;  RLE wound debridement       Social History:  Tobacco Use: Medium Risk    Smoking Tobacco Use: Former    Smokeless Tobacco Use: Never    Passive Exposure: Not on file      Alcohol Use: Not on file        OBJECTIVE:     Vital Signs Range (Last 24H):         Significant Labs:  Lab Results   Component Value Date    WBC 12.18 02/16/2023    HGB 9.4 (L) 02/24/2023    HCT 26.1 (L) 02/16/2023     02/16/2023    CHOL 138 03/10/2022    TRIG 40 03/10/2022    HDL 44 03/10/2022    ALT 8 (L) 02/15/2023    AST 16 02/15/2023     02/16/2023    K 3.9 02/16/2023     02/16/2023    CREATININE 0.9 02/16/2023    BUN 19 02/16/2023    CO2 23 02/16/2023    INR 1.1 02/15/2023    HGBA1C 5.8 (H) 03/10/2022       Diagnostic Studies: No relevant studies.    EKG:   Results for orders placed or performed during the hospital encounter of 10/27/22   EKG 12-lead    Collection Time: 10/27/22  8:48 PM    Narrative    Test Reason : K92.2,    Vent. Rate : 106 BPM     Atrial Rate : 106 BPM     P-R Int : 162 ms          QRS Dur : 080 ms      QT Int : 352 ms       P-R-T Axes : 063 058 018 degrees     QTc Int : 467 ms    Sinus tachycardia  Low anterior forces  Abnormal ECG  When compared with ECG of 19-OCT-2022 18:10,  No significant change was found  Confirmed by ANDERS MARIN MD ([id number]) on 10/28/2022 11:03:04 AM    Referred By: [email or web address]   SELF           Confirmed By:ANDERS MARIN MD       2D ECHO:  TTE:  Results for orders placed or performed during the hospital encounter of 10/02/22   Echo   Result Value Ref Range    Ascending aorta 3.16 cm    STJ 3.16 cm    AV mean gradient 10 mmHg    Ao peak libby 2.08 m/s    Ao VTI 29.25 cm    IVS 0.96 0.6 - 1.1 cm    LA size 3.79 cm    Left Atrium Major Axis 4.40 cm    Left Atrium Minor Axis 4.43 cm    LVIDd 4.15 3.5 - 6.0 cm    LVIDs 2.97 2.1 - 4.0 cm    LVOT diameter 2.21 cm    LVOT peak VTI 20.24 cm    Posterior Wall 0.98 0.6 - 1.1 cm    MV Peak A Libby  "1.44 m/s    E wave deceleration time 78.59 msec    MV Peak E John 0.98 m/s    PV Peak D John 0.30 m/s    PV Peak S John 0.56 m/s    RA Major Axis 3.57 cm    RA Width 2.71 cm    RVDD 2.29 cm    Sinus 3.24 cm    TAPSE 2.30 cm    TR Max John 2.89 m/s    LA WIDTH 3.28 cm    MV stenosis pressure 1/2 time 22.79 ms    LV Diastolic Volume 76.33 mL    LV Systolic Volume 34.11 mL    LVOT peak john 1.68 m/s    LA volume (mod) 39.59 cm3    MV "A" wave duration 7.99 msec    MV mean gradient 1 mmHg    MV peak gradient 9 mmHg    MV VTI 19.62 cm    FS 28 %    LA volume 46.65 cm3    LV mass 129.06 g    Left Ventricle Relative Wall Thickness 0.47 cm    AV valve area 2.65 cm2    AV Velocity Ratio 0.81     AV index (prosthetic) 0.69     MV valve area p 1/2 method 9.65 cm2    MV valve area by continuity eq 3.96 cm2    E/A ratio 0.68     Pulm vein S/D ratio 1.87     LVOT area 3.8 cm2    LVOT stroke volume 77.60 cm3    AV peak gradient 17 mmHg    LV Systolic Volume Index 21.1 mL/m2    LV Diastolic Volume Index 47.12 mL/m2    LA Volume Index 28.8 mL/m2    LV Mass Index 80 g/m2    Triscuspid Valve Regurgitation Peak Gradient 33 mmHg    LA Volume Index (Mod) 24.4 mL/m2    BSA 1.6 m2    Right Atrial Pressure (from IVC) 3 mmHg    TV rest pulmonary artery pressure 36 mmHg    EF 75 %    Narrative    · The left ventricle is normal in size with concentric remodeling and   hyperdynamic systolic function. The estimated ejection fraction is >70%.  · Normal right ventricular size with normal right ventricular systolic   function.  · Grade I left ventricular diastolic dysfunction.  · The estimated PA systolic pressure is 36 mmHg.  · Normal central venous pressure (3 mmHg).          BRIAN:  No results found for this or any previous visit.    ASSESSMENT/PLAN:           Pre-op Assessment    I have reviewed the Patient Summary Reports.     I have reviewed the Nursing Notes. I have reviewed the NPO Status.   I have reviewed the Medications.     Review of " Systems  Anesthesia Hx:  No problems with previous Anesthesia  History of prior surgery of interest to airway management or planning: Denies Family Hx of Anesthesia complications.   Denies Personal Hx of Anesthesia complications.   Social:  Smoker    Hematology/Oncology:     Oncology Normal    -- Anemia:   EENT/Dental:EENT/Dental Normal   Cardiovascular:   Hypertension PVD    Pulmonary:  Pulmonary Normal    Renal/:   Chronic Renal Disease    Hepatic/GI:  Hepatic/GI Normal    Musculoskeletal:  Musculoskeletal Normal    Neurological:  Neurology Normal    Endocrine:  Endocrine Normal    Dermatological:  Skin Normal    Psych:  Psychiatric Normal           Physical Exam  General: Well nourished    Airway:  Mallampati: II   Mouth Opening: Normal  TM Distance: Normal  Tongue: Normal  Neck ROM: Normal ROM    Dental:  Partial Dentures    Chest/Lungs:  Clear to auscultation, Normal Respiratory Rate    Heart:  Rate: Normal  Rhythm: Regular Rhythm  Sounds: Normal    Abdomen:  Normal, Nontender        Anesthesia Plan  Type of Anesthesia, risks & benefits discussed:    Anesthesia Type: Gen ETT  Intra-op Monitoring Plan: Standard ASA Monitors and Art Line  Post Op Pain Control Plan: multimodal analgesia  Induction:  IV  Airway Plan: Video, Post-Induction  Informed Consent: Informed consent signed with the Patient and all parties understand the risks and agree with anesthesia plan.  All questions answered.   ASA Score: 3  Day of Surgery Review of History & Physical: H&P Update referred to the surgeon/provider.    Ready For Surgery From Anesthesia Perspective.     .

## 2023-02-27 ENCOUNTER — TELEPHONE (OUTPATIENT)
Dept: INTERNAL MEDICINE | Facility: CLINIC | Age: 73
End: 2023-02-27
Payer: MEDICARE

## 2023-02-27 ENCOUNTER — ANESTHESIA (OUTPATIENT)
Dept: SURGERY | Facility: HOSPITAL | Age: 73
DRG: 657 | End: 2023-02-27
Payer: MEDICARE

## 2023-02-27 ENCOUNTER — HOSPITAL ENCOUNTER (INPATIENT)
Facility: HOSPITAL | Age: 73
LOS: 3 days | Discharge: HOME HEALTH | DRG: 657 | End: 2023-03-02
Attending: UROLOGY | Admitting: UROLOGY
Payer: MEDICARE

## 2023-02-27 DIAGNOSIS — R73.01 IMPAIRED FASTING GLUCOSE: ICD-10-CM

## 2023-02-27 DIAGNOSIS — N28.89 RENAL MASS: ICD-10-CM

## 2023-02-27 DIAGNOSIS — N28.89 RENAL MASS, RIGHT: ICD-10-CM

## 2023-02-27 DIAGNOSIS — D64.9 NORMOCYTIC ANEMIA: ICD-10-CM

## 2023-02-27 DIAGNOSIS — I10 PRIMARY HYPERTENSION: ICD-10-CM

## 2023-02-27 DIAGNOSIS — Z01.818 PREOP TESTING: ICD-10-CM

## 2023-02-27 DIAGNOSIS — E78.2 MIXED HYPERLIPIDEMIA: Primary | ICD-10-CM

## 2023-02-27 DIAGNOSIS — I73.9 CLAUDICATION OF RIGHT LOWER EXTREMITY: ICD-10-CM

## 2023-02-27 LAB
ABO + RH BLD: NORMAL
ANION GAP SERPL CALC-SCNC: 9 MMOL/L (ref 8–16)
BASOPHILS # BLD AUTO: 0.06 K/UL (ref 0–0.2)
BASOPHILS NFR BLD: 0.7 % (ref 0–1.9)
BLD GP AB SCN CELLS X3 SERPL QL: NORMAL
BLD PROD TYP BPU: NORMAL
BLD PROD TYP BPU: NORMAL
BLOOD UNIT EXPIRATION DATE: NORMAL
BLOOD UNIT EXPIRATION DATE: NORMAL
BLOOD UNIT TYPE CODE: 6200
BLOOD UNIT TYPE CODE: 6200
BLOOD UNIT TYPE: NORMAL
BLOOD UNIT TYPE: NORMAL
BUN SERPL-MCNC: 14 MG/DL (ref 8–23)
CALCIUM SERPL-MCNC: 9.7 MG/DL (ref 8.7–10.5)
CHLORIDE SERPL-SCNC: 108 MMOL/L (ref 95–110)
CO2 SERPL-SCNC: 24 MMOL/L (ref 23–29)
CODING SYSTEM: NORMAL
CODING SYSTEM: NORMAL
CREAT SERPL-MCNC: 1 MG/DL (ref 0.5–1.4)
CROSSMATCH INTERPRETATION: NORMAL
CROSSMATCH INTERPRETATION: NORMAL
DIFFERENTIAL METHOD: ABNORMAL
DISPENSE STATUS: NORMAL
DISPENSE STATUS: NORMAL
EOSINOPHIL # BLD AUTO: 0.1 K/UL (ref 0–0.5)
EOSINOPHIL NFR BLD: 1.1 % (ref 0–8)
ERYTHROCYTE [DISTWIDTH] IN BLOOD BY AUTOMATED COUNT: 27.4 % (ref 11.5–14.5)
EST. GFR  (NO RACE VARIABLE): >60 ML/MIN/1.73 M^2
GLUCOSE SERPL-MCNC: 91 MG/DL (ref 70–110)
HCT VFR BLD AUTO: 35.9 % (ref 40–54)
HGB BLD-MCNC: 10.3 G/DL (ref 14–18)
IMM GRANULOCYTES # BLD AUTO: 0.03 K/UL (ref 0–0.04)
IMM GRANULOCYTES NFR BLD AUTO: 0.3 % (ref 0–0.5)
LYMPHOCYTES # BLD AUTO: 2.2 K/UL (ref 1–4.8)
LYMPHOCYTES NFR BLD: 23.7 % (ref 18–48)
MCH RBC QN AUTO: 21.4 PG (ref 27–31)
MCHC RBC AUTO-ENTMCNC: 28.7 G/DL (ref 32–36)
MCV RBC AUTO: 75 FL (ref 82–98)
MONOCYTES # BLD AUTO: 0.8 K/UL (ref 0.3–1)
MONOCYTES NFR BLD: 8.3 % (ref 4–15)
NEUTROPHILS # BLD AUTO: 6.1 K/UL (ref 1.8–7.7)
NEUTROPHILS NFR BLD: 65.9 % (ref 38–73)
NRBC BLD-RTO: 0 /100 WBC
PLATELET # BLD AUTO: 331 K/UL (ref 150–450)
PMV BLD AUTO: 10.1 FL (ref 9.2–12.9)
POTASSIUM SERPL-SCNC: 4.1 MMOL/L (ref 3.5–5.1)
RBC # BLD AUTO: 4.82 M/UL (ref 4.6–6.2)
SODIUM SERPL-SCNC: 141 MMOL/L (ref 136–145)
TRANS ERYTHROCYTES VOL PATIENT: NORMAL ML
TRANS ERYTHROCYTES VOL PATIENT: NORMAL ML
WBC # BLD AUTO: 9.21 K/UL (ref 3.9–12.7)

## 2023-02-27 PROCEDURE — 63600175 PHARM REV CODE 636 W HCPCS: Performed by: STUDENT IN AN ORGANIZED HEALTH CARE EDUCATION/TRAINING PROGRAM

## 2023-02-27 PROCEDURE — 36000709 HC OR TIME LEV III EA ADD 15 MIN: Performed by: UROLOGY

## 2023-02-27 PROCEDURE — 25000003 PHARM REV CODE 250: Performed by: STUDENT IN AN ORGANIZED HEALTH CARE EDUCATION/TRAINING PROGRAM

## 2023-02-27 PROCEDURE — 76942 ECHO GUIDE FOR BIOPSY: CPT | Performed by: STUDENT IN AN ORGANIZED HEALTH CARE EDUCATION/TRAINING PROGRAM

## 2023-02-27 PROCEDURE — 64999 UNLISTED PX NERVOUS SYSTEM: CPT | Mod: ,,, | Performed by: ANESTHESIOLOGY

## 2023-02-27 PROCEDURE — 47120 PARTIAL REMOVAL OF LIVER: CPT | Mod: ,,, | Performed by: SURGERY

## 2023-02-27 PROCEDURE — 25000003 PHARM REV CODE 250: Performed by: ANESTHESIOLOGY

## 2023-02-27 PROCEDURE — 71000015 HC POSTOP RECOV 1ST HR: Performed by: UROLOGY

## 2023-02-27 PROCEDURE — 25000003 PHARM REV CODE 250: Performed by: UROLOGY

## 2023-02-27 PROCEDURE — 88305 TISSUE EXAM BY PATHOLOGIST: CPT | Mod: 26,,, | Performed by: PATHOLOGY

## 2023-02-27 PROCEDURE — D9220A PRA ANESTHESIA: Mod: ,,, | Performed by: ANESTHESIOLOGY

## 2023-02-27 PROCEDURE — 80048 BASIC METABOLIC PNL TOTAL CA: CPT | Performed by: UROLOGY

## 2023-02-27 PROCEDURE — 63600175 PHARM REV CODE 636 W HCPCS: Performed by: UROLOGY

## 2023-02-27 PROCEDURE — 88341 IMHCHEM/IMCYTCHM EA ADD ANTB: CPT | Performed by: PATHOLOGY

## 2023-02-27 PROCEDURE — 88305 TISSUE EXAM BY PATHOLOGIST: CPT | Performed by: PATHOLOGY

## 2023-02-27 PROCEDURE — 88307 TISSUE EXAM BY PATHOLOGIST: CPT | Mod: 26,,, | Performed by: PATHOLOGY

## 2023-02-27 PROCEDURE — 88341 IMHCHEM/IMCYTCHM EA ADD ANTB: CPT | Mod: 26,,, | Performed by: PATHOLOGY

## 2023-02-27 PROCEDURE — 88305 TISSUE EXAM BY PATHOLOGIST: ICD-10-PCS | Mod: 26,,, | Performed by: PATHOLOGY

## 2023-02-27 PROCEDURE — 38747 PR REMOVE ABD LYMPH NODES RAD REGNL: ICD-10-PCS | Mod: 59,,, | Performed by: SURGERY

## 2023-02-27 PROCEDURE — 27201423 OPTIME MED/SURG SUP & DEVICES STERILE SUPPLY: Performed by: UROLOGY

## 2023-02-27 PROCEDURE — 88304 TISSUE EXAM BY PATHOLOGIST: CPT | Mod: 26,,, | Performed by: PATHOLOGY

## 2023-02-27 PROCEDURE — 88304 PR  SURG PATH,LEVEL III: ICD-10-PCS | Mod: 26,,, | Performed by: PATHOLOGY

## 2023-02-27 PROCEDURE — 50230 REMOVAL KIDNEY OPEN RADICAL: CPT | Mod: RT,,, | Performed by: UROLOGY

## 2023-02-27 PROCEDURE — 71000033 HC RECOVERY, INTIAL HOUR: Performed by: UROLOGY

## 2023-02-27 PROCEDURE — 50230 PR REMV KIDNEY,RADICAL: ICD-10-PCS | Mod: RT,,, | Performed by: UROLOGY

## 2023-02-27 PROCEDURE — 88307 PR  SURG PATH,LEVEL V: ICD-10-PCS | Mod: 26,,, | Performed by: PATHOLOGY

## 2023-02-27 PROCEDURE — 88342 CHG IMMUNOCYTOCHEMISTRY: ICD-10-PCS | Mod: 26,,, | Performed by: PATHOLOGY

## 2023-02-27 PROCEDURE — 88342 IMHCHEM/IMCYTCHM 1ST ANTB: CPT | Performed by: PATHOLOGY

## 2023-02-27 PROCEDURE — 64999: ICD-10-PCS | Mod: ,,, | Performed by: ANESTHESIOLOGY

## 2023-02-27 PROCEDURE — 88304 TISSUE EXAM BY PATHOLOGIST: CPT | Performed by: PATHOLOGY

## 2023-02-27 PROCEDURE — 64463 PVB THORACIC CONT INFUSION: CPT | Performed by: STUDENT IN AN ORGANIZED HEALTH CARE EDUCATION/TRAINING PROGRAM

## 2023-02-27 PROCEDURE — 88342 IMHCHEM/IMCYTCHM 1ST ANTB: CPT | Mod: 26,,, | Performed by: PATHOLOGY

## 2023-02-27 PROCEDURE — 20600001 HC STEP DOWN PRIVATE ROOM

## 2023-02-27 PROCEDURE — 86900 BLOOD TYPING SEROLOGIC ABO: CPT | Performed by: UROLOGY

## 2023-02-27 PROCEDURE — 86920 COMPATIBILITY TEST SPIN: CPT | Performed by: UROLOGY

## 2023-02-27 PROCEDURE — D9220A PRA ANESTHESIA: ICD-10-PCS | Mod: ,,, | Performed by: ANESTHESIOLOGY

## 2023-02-27 PROCEDURE — C1729 CATH, DRAINAGE: HCPCS | Performed by: UROLOGY

## 2023-02-27 PROCEDURE — 36620 ARTERIAL: ICD-10-PCS | Mod: 59,,, | Performed by: ANESTHESIOLOGY

## 2023-02-27 PROCEDURE — 37000009 HC ANESTHESIA EA ADD 15 MINS: Performed by: UROLOGY

## 2023-02-27 PROCEDURE — 85025 COMPLETE CBC W/AUTO DIFF WBC: CPT | Performed by: UROLOGY

## 2023-02-27 PROCEDURE — 94761 N-INVAS EAR/PLS OXIMETRY MLT: CPT

## 2023-02-27 PROCEDURE — 47120 PR RESEC LIVER,PART LOBECTOMY: ICD-10-PCS | Mod: ,,, | Performed by: SURGERY

## 2023-02-27 PROCEDURE — 88341 PR IHC OR ICC EACH ADD'L SINGLE ANTIBODY  STAINPR: ICD-10-PCS | Mod: 26,,, | Performed by: PATHOLOGY

## 2023-02-27 PROCEDURE — 99900035 HC TECH TIME PER 15 MIN (STAT)

## 2023-02-27 PROCEDURE — 38747 REMOVE ABDOMINAL LYMPH NODES: CPT | Mod: 59,,, | Performed by: SURGERY

## 2023-02-27 PROCEDURE — 27201037 HC PRESSURE MONITORING SET UP

## 2023-02-27 PROCEDURE — 71000016 HC POSTOP RECOV ADDL HR: Performed by: UROLOGY

## 2023-02-27 PROCEDURE — 37000008 HC ANESTHESIA 1ST 15 MINUTES: Performed by: UROLOGY

## 2023-02-27 PROCEDURE — 36620 INSERTION CATHETER ARTERY: CPT | Mod: 59,,, | Performed by: ANESTHESIOLOGY

## 2023-02-27 PROCEDURE — 88307 TISSUE EXAM BY PATHOLOGIST: CPT | Performed by: PATHOLOGY

## 2023-02-27 PROCEDURE — 36000708 HC OR TIME LEV III 1ST 15 MIN: Performed by: UROLOGY

## 2023-02-27 RX ORDER — METHOCARBAMOL 500 MG/1
500 TABLET, FILM COATED ORAL 3 TIMES DAILY
Status: DISCONTINUED | OUTPATIENT
Start: 2023-02-27 | End: 2023-02-27

## 2023-02-27 RX ORDER — BUPIVACAINE HYDROCHLORIDE 7.5 MG/ML
INJECTION, SOLUTION EPIDURAL; RETROBULBAR
Status: COMPLETED | OUTPATIENT
Start: 2023-02-27 | End: 2023-02-27

## 2023-02-27 RX ORDER — HYDROMORPHONE HYDROCHLORIDE 1 MG/ML
0.2 INJECTION, SOLUTION INTRAMUSCULAR; INTRAVENOUS; SUBCUTANEOUS EVERY 5 MIN PRN
Status: DISCONTINUED | OUTPATIENT
Start: 2023-02-27 | End: 2023-02-27 | Stop reason: HOSPADM

## 2023-02-27 RX ORDER — ACETAMINOPHEN 10 MG/ML
INJECTION, SOLUTION INTRAVENOUS
Status: DISCONTINUED | OUTPATIENT
Start: 2023-02-27 | End: 2023-02-27

## 2023-02-27 RX ORDER — SODIUM CHLORIDE 0.9 % (FLUSH) 0.9 %
10 SYRINGE (ML) INJECTION
Status: DISCONTINUED | OUTPATIENT
Start: 2023-02-27 | End: 2023-02-27 | Stop reason: HOSPADM

## 2023-02-27 RX ORDER — FENTANYL CITRATE 50 UG/ML
INJECTION, SOLUTION INTRAMUSCULAR; INTRAVENOUS
Status: DISCONTINUED | OUTPATIENT
Start: 2023-02-27 | End: 2023-02-27

## 2023-02-27 RX ORDER — PHENYLEPHRINE HCL IN 0.9% NACL 1 MG/10 ML
SYRINGE (ML) INTRAVENOUS
Status: DISCONTINUED | OUTPATIENT
Start: 2023-02-27 | End: 2023-02-27

## 2023-02-27 RX ORDER — FAMOTIDINE 20 MG/1
20 TABLET, FILM COATED ORAL 2 TIMES DAILY
Status: DISCONTINUED | OUTPATIENT
Start: 2023-02-27 | End: 2023-03-02 | Stop reason: HOSPADM

## 2023-02-27 RX ORDER — LIDOCAINE HYDROCHLORIDE 20 MG/ML
INJECTION, SOLUTION EPIDURAL; INFILTRATION; INTRACAUDAL; PERINEURAL
Status: DISCONTINUED | OUTPATIENT
Start: 2023-02-27 | End: 2023-02-27

## 2023-02-27 RX ORDER — SODIUM CHLORIDE 9 MG/ML
INJECTION, SOLUTION INTRAVENOUS CONTINUOUS
Status: DISCONTINUED | OUTPATIENT
Start: 2023-02-27 | End: 2023-02-28

## 2023-02-27 RX ORDER — MIDAZOLAM HYDROCHLORIDE 1 MG/ML
INJECTION, SOLUTION INTRAMUSCULAR; INTRAVENOUS
Status: DISCONTINUED | OUTPATIENT
Start: 2023-02-27 | End: 2023-02-27

## 2023-02-27 RX ORDER — AMOXICILLIN 250 MG
1 CAPSULE ORAL 2 TIMES DAILY
Status: DISCONTINUED | OUTPATIENT
Start: 2023-02-27 | End: 2023-03-02 | Stop reason: HOSPADM

## 2023-02-27 RX ORDER — GABAPENTIN 400 MG/1
800 CAPSULE ORAL 3 TIMES DAILY
Status: DISCONTINUED | OUTPATIENT
Start: 2023-02-27 | End: 2023-03-02 | Stop reason: HOSPADM

## 2023-02-27 RX ORDER — KETAMINE HCL IN 0.9 % NACL 50 MG/5 ML
SYRINGE (ML) INTRAVENOUS
Status: DISCONTINUED | OUTPATIENT
Start: 2023-02-27 | End: 2023-02-27

## 2023-02-27 RX ORDER — ROCURONIUM BROMIDE 10 MG/ML
INJECTION, SOLUTION INTRAVENOUS
Status: DISCONTINUED | OUTPATIENT
Start: 2023-02-27 | End: 2023-02-27

## 2023-02-27 RX ORDER — DEXAMETHASONE SODIUM PHOSPHATE 4 MG/ML
INJECTION, SOLUTION INTRA-ARTICULAR; INTRALESIONAL; INTRAMUSCULAR; INTRAVENOUS; SOFT TISSUE
Status: DISCONTINUED | OUTPATIENT
Start: 2023-02-27 | End: 2023-02-27

## 2023-02-27 RX ORDER — ONDANSETRON 2 MG/ML
4 INJECTION INTRAMUSCULAR; INTRAVENOUS EVERY 12 HOURS PRN
Status: DISCONTINUED | OUTPATIENT
Start: 2023-02-27 | End: 2023-03-02 | Stop reason: HOSPADM

## 2023-02-27 RX ORDER — HEPARIN SODIUM 5000 [USP'U]/ML
5000 INJECTION, SOLUTION INTRAVENOUS; SUBCUTANEOUS EVERY 8 HOURS
Status: DISCONTINUED | OUTPATIENT
Start: 2023-02-27 | End: 2023-03-02 | Stop reason: HOSPADM

## 2023-02-27 RX ORDER — ACETAMINOPHEN 500 MG
1000 TABLET ORAL EVERY 6 HOURS SCHEDULED
Status: DISPENSED | OUTPATIENT
Start: 2023-02-27 | End: 2023-03-01

## 2023-02-27 RX ORDER — PROPOFOL 10 MG/ML
VIAL (ML) INTRAVENOUS
Status: DISCONTINUED | OUTPATIENT
Start: 2023-02-27 | End: 2023-02-27

## 2023-02-27 RX ORDER — TALC
6 POWDER (GRAM) TOPICAL NIGHTLY PRN
Status: DISCONTINUED | OUTPATIENT
Start: 2023-02-27 | End: 2023-03-02 | Stop reason: HOSPADM

## 2023-02-27 RX ORDER — METHOCARBAMOL 500 MG/1
500 TABLET, FILM COATED ORAL 4 TIMES DAILY
Status: DISCONTINUED | OUTPATIENT
Start: 2023-02-27 | End: 2023-02-27

## 2023-02-27 RX ORDER — CEFAZOLIN SODIUM 1 G/3ML
INJECTION, POWDER, FOR SOLUTION INTRAMUSCULAR; INTRAVENOUS
Status: DISCONTINUED | OUTPATIENT
Start: 2023-02-27 | End: 2023-02-27

## 2023-02-27 RX ORDER — ONDANSETRON 2 MG/ML
8 INJECTION INTRAMUSCULAR; INTRAVENOUS EVERY 6 HOURS PRN
Status: DISCONTINUED | OUTPATIENT
Start: 2023-02-27 | End: 2023-02-27

## 2023-02-27 RX ORDER — HALOPERIDOL 5 MG/ML
0.5 INJECTION INTRAMUSCULAR EVERY 10 MIN PRN
Status: DISCONTINUED | OUTPATIENT
Start: 2023-02-27 | End: 2023-02-27 | Stop reason: HOSPADM

## 2023-02-27 RX ORDER — METHOCARBAMOL 500 MG/1
500 TABLET, FILM COATED ORAL EVERY 6 HOURS
Status: DISCONTINUED | OUTPATIENT
Start: 2023-02-27 | End: 2023-03-02 | Stop reason: HOSPADM

## 2023-02-27 RX ORDER — OXYCODONE HYDROCHLORIDE 10 MG/1
10 TABLET ORAL EVERY 4 HOURS PRN
Status: DISCONTINUED | OUTPATIENT
Start: 2023-02-27 | End: 2023-03-01

## 2023-02-27 RX ORDER — HYDRALAZINE HYDROCHLORIDE 20 MG/ML
10 INJECTION INTRAMUSCULAR; INTRAVENOUS EVERY 6 HOURS PRN
Status: DISCONTINUED | OUTPATIENT
Start: 2023-02-27 | End: 2023-03-02 | Stop reason: HOSPADM

## 2023-02-27 RX ORDER — ONDANSETRON 2 MG/ML
INJECTION INTRAMUSCULAR; INTRAVENOUS
Status: DISCONTINUED | OUTPATIENT
Start: 2023-02-27 | End: 2023-02-27

## 2023-02-27 RX ORDER — POLYETHYLENE GLYCOL 3350 17 G/17G
17 POWDER, FOR SOLUTION ORAL DAILY
Status: DISCONTINUED | OUTPATIENT
Start: 2023-02-27 | End: 2023-03-02 | Stop reason: HOSPADM

## 2023-02-27 RX ORDER — ROPIVACAINE HYDROCHLORIDE 2 MG/ML
0.1 INJECTION, SOLUTION EPIDURAL; INFILTRATION; PERINEURAL CONTINUOUS
Status: DISCONTINUED | OUTPATIENT
Start: 2023-02-27 | End: 2023-03-02

## 2023-02-27 RX ORDER — HEPARIN SODIUM 5000 [USP'U]/ML
5000 INJECTION, SOLUTION INTRAVENOUS; SUBCUTANEOUS
Status: COMPLETED | OUTPATIENT
Start: 2023-02-27 | End: 2023-02-27

## 2023-02-27 RX ORDER — OXYCODONE HYDROCHLORIDE 5 MG/1
5 TABLET ORAL EVERY 4 HOURS PRN
Status: DISCONTINUED | OUTPATIENT
Start: 2023-02-27 | End: 2023-03-01

## 2023-02-27 RX ORDER — ACETAMINOPHEN 500 MG
1000 TABLET ORAL EVERY 6 HOURS
Status: DISCONTINUED | OUTPATIENT
Start: 2023-02-27 | End: 2023-02-27

## 2023-02-27 RX ADMIN — MIDAZOLAM 1 MG: 1 INJECTION INTRAMUSCULAR; INTRAVENOUS at 07:02

## 2023-02-27 RX ADMIN — ACETAMINOPHEN 1000 MG: 500 TABLET ORAL at 11:02

## 2023-02-27 RX ADMIN — Medication 100 MCG: at 11:02

## 2023-02-27 RX ADMIN — METHOCARBAMOL 500 MG: 500 TABLET ORAL at 06:02

## 2023-02-27 RX ADMIN — Medication 50 MCG: at 11:02

## 2023-02-27 RX ADMIN — PROPOFOL 30 MG: 10 INJECTION, EMULSION INTRAVENOUS at 07:02

## 2023-02-27 RX ADMIN — ONDANSETRON 4 MG: 2 INJECTION INTRAMUSCULAR; INTRAVENOUS at 11:02

## 2023-02-27 RX ADMIN — SODIUM CHLORIDE: 0.9 INJECTION, SOLUTION INTRAVENOUS at 07:02

## 2023-02-27 RX ADMIN — Medication 50 MCG: at 10:02

## 2023-02-27 RX ADMIN — GABAPENTIN 800 MG: 300 CAPSULE ORAL at 08:02

## 2023-02-27 RX ADMIN — Medication 10 MG: at 10:02

## 2023-02-27 RX ADMIN — HYDROMORPHONE HYDROCHLORIDE 0.2 MG: 1 INJECTION, SOLUTION INTRAMUSCULAR; INTRAVENOUS; SUBCUTANEOUS at 12:02

## 2023-02-27 RX ADMIN — ACETAMINOPHEN 1000 MG: 500 TABLET ORAL at 05:02

## 2023-02-27 RX ADMIN — HEPARIN SODIUM 5000 UNITS: 5000 INJECTION INTRAVENOUS; SUBCUTANEOUS at 07:02

## 2023-02-27 RX ADMIN — GABAPENTIN 800 MG: 300 CAPSULE ORAL at 03:02

## 2023-02-27 RX ADMIN — HEPARIN SODIUM 5000 UNITS: 5000 INJECTION INTRAVENOUS; SUBCUTANEOUS at 03:02

## 2023-02-27 RX ADMIN — POLYETHYLENE GLYCOL 3350 17 G: 17 POWDER, FOR SOLUTION ORAL at 03:02

## 2023-02-27 RX ADMIN — ROCURONIUM BROMIDE 10 MG: 10 INJECTION, SOLUTION INTRAVENOUS at 09:02

## 2023-02-27 RX ADMIN — SUGAMMADEX 200 MG: 100 INJECTION, SOLUTION INTRAVENOUS at 12:02

## 2023-02-27 RX ADMIN — FAMOTIDINE 20 MG: 20 TABLET ORAL at 03:02

## 2023-02-27 RX ADMIN — PROPOFOL 30 MG: 10 INJECTION, EMULSION INTRAVENOUS at 08:02

## 2023-02-27 RX ADMIN — FENTANYL CITRATE 25 MCG: 50 INJECTION INTRAMUSCULAR; INTRAVENOUS at 12:02

## 2023-02-27 RX ADMIN — OXYCODONE HYDROCHLORIDE 10 MG: 10 TABLET ORAL at 01:02

## 2023-02-27 RX ADMIN — Medication 10 MG: at 11:02

## 2023-02-27 RX ADMIN — HYDROMORPHONE HYDROCHLORIDE 0.2 MG: 1 INJECTION, SOLUTION INTRAMUSCULAR; INTRAVENOUS; SUBCUTANEOUS at 02:02

## 2023-02-27 RX ADMIN — SODIUM CHLORIDE, SODIUM GLUCONATE, SODIUM ACETATE, POTASSIUM CHLORIDE, MAGNESIUM CHLORIDE, SODIUM PHOSPHATE, DIBASIC, AND POTASSIUM PHOSPHATE: .53; .5; .37; .037; .03; .012; .00082 INJECTION, SOLUTION INTRAVENOUS at 08:02

## 2023-02-27 RX ADMIN — HYDROMORPHONE HYDROCHLORIDE 0.2 MG: 1 INJECTION, SOLUTION INTRAMUSCULAR; INTRAVENOUS; SUBCUTANEOUS at 01:02

## 2023-02-27 RX ADMIN — OXYCODONE HYDROCHLORIDE 10 MG: 10 TABLET ORAL at 05:02

## 2023-02-27 RX ADMIN — PROPOFOL 50 MG: 10 INJECTION, EMULSION INTRAVENOUS at 08:02

## 2023-02-27 RX ADMIN — DEXAMETHASONE SODIUM PHOSPHATE 4 MG: 4 INJECTION INTRA-ARTICULAR; INTRALESIONAL; INTRAMUSCULAR; INTRAVENOUS; SOFT TISSUE at 11:02

## 2023-02-27 RX ADMIN — BUPIVACAINE HYDROCHLORIDE 15 ML: 7.5 INJECTION, SOLUTION EPIDURAL; RETROBULBAR at 07:02

## 2023-02-27 RX ADMIN — ROPIVACAINE HYDROCHLORIDE 0.1 ML/HR: 2 INJECTION, SOLUTION EPIDURAL; INFILTRATION at 12:02

## 2023-02-27 RX ADMIN — OXYCODONE HYDROCHLORIDE 10 MG: 10 TABLET ORAL at 11:02

## 2023-02-27 RX ADMIN — LIDOCAINE HYDROCHLORIDE 60 MG: 20 INJECTION, SOLUTION EPIDURAL; INFILTRATION; INTRACAUDAL; PERINEURAL at 07:02

## 2023-02-27 RX ADMIN — CEFAZOLIN 2 G: 2 INJECTION, POWDER, FOR SOLUTION INTRAMUSCULAR; INTRAVENOUS at 08:02

## 2023-02-27 RX ADMIN — FENTANYL CITRATE 50 MCG: 50 INJECTION INTRAMUSCULAR; INTRAVENOUS at 07:02

## 2023-02-27 RX ADMIN — ROCURONIUM BROMIDE 50 MG: 10 INJECTION, SOLUTION INTRAVENOUS at 07:02

## 2023-02-27 RX ADMIN — Medication 20 MG: at 08:02

## 2023-02-27 RX ADMIN — PROPOFOL 120 MG: 10 INJECTION, EMULSION INTRAVENOUS at 07:02

## 2023-02-27 RX ADMIN — HYDROMORPHONE HYDROCHLORIDE 0.2 MG: 1 INJECTION, SOLUTION INTRAMUSCULAR; INTRAVENOUS; SUBCUTANEOUS at 03:02

## 2023-02-27 RX ADMIN — HEPARIN SODIUM 5000 UNITS: 5000 INJECTION INTRAVENOUS; SUBCUTANEOUS at 09:02

## 2023-02-27 RX ADMIN — SENNOSIDES AND DOCUSATE SODIUM 1 TABLET: 50; 8.6 TABLET ORAL at 08:02

## 2023-02-27 RX ADMIN — SODIUM CHLORIDE: 9 INJECTION, SOLUTION INTRAVENOUS at 08:02

## 2023-02-27 RX ADMIN — Medication 50 MCG: at 09:02

## 2023-02-27 RX ADMIN — ROCURONIUM BROMIDE 10 MG: 10 INJECTION, SOLUTION INTRAVENOUS at 10:02

## 2023-02-27 RX ADMIN — SODIUM CHLORIDE: 9 INJECTION, SOLUTION INTRAVENOUS at 12:02

## 2023-02-27 RX ADMIN — Medication 100 MCG: at 10:02

## 2023-02-27 RX ADMIN — ROCURONIUM BROMIDE 20 MG: 10 INJECTION, SOLUTION INTRAVENOUS at 08:02

## 2023-02-27 RX ADMIN — ACETAMINOPHEN 1000 MG: 10 INJECTION INTRAVENOUS at 08:02

## 2023-02-27 RX ADMIN — METHOCARBAMOL 500 MG: 500 TABLET ORAL at 11:02

## 2023-02-27 NOTE — PROGRESS NOTES
PreOp complete. Type and screen, BMP, and CBC sent to blood bank and the lab. Belongings given to daughter, Cindi. Urine and EKG preOp orders not needed per surgery team.

## 2023-02-27 NOTE — BRIEF OP NOTE
General Surgery Brief Operative Note     2/27/2023    PRE-OP DIAGNOSIS: Renal mass [N28.89]      POST-OP DIAGNOSIS: Post-Op Diagnosis Codes:     * Renal mass [N28.89]    Procedure(s):  RADICAL CHOLECYSTECTOMY     SURGEON: Surgeon(s) and Role:  Panel 1:     * Moises Cuba MD - Primary     * Catalina Landry MD - Resident - Assisting  Panel 2:     * Tolu Gavin MD - Primary   *Becky Harrison MD- Reisdent -Assisting     ANESTHESIA: General     OPERATIVE FINDINGS/ PROCEDURE IN BRIEF:    Non-inflammed multi-cystic appearance of gallbladder  Radical cholecystectomy performed with ~2cm of adjacent liver parenchyma removed en bolc with the gallbladder, surgicel left in gallbladder bed   x3 portal lymph nodes removed     ESTIMATED BLOOD LOSS: Minimal    COMPLICATIONS: none    DISPOSITION: PACU - hemodynamically stable.

## 2023-02-27 NOTE — ANESTHESIA PROCEDURE NOTES
Intubation    Date/Time: 2/27/2023 7:54 AM  Performed by: Tolu Cadena MD  Authorized by: León Espinal MD     Intubation:     Induction:  Intravenous    Intubated:  Postinduction    Mask Ventilation:  Easy mask    Attempts:  1    Attempted By:  Resident anesthesiologist    Method of Intubation:  Video laryngoscopy    Blade:  Holder 3    Laryngeal View Grade: Grade I - full view of cords      Difficult Airway Encountered?: No      Complications:  None    Airway Device:  Oral endotracheal tube    Airway Device Size:  7.5    Style/Cuff Inflation:  Cuffed (inflated to minimal occlusive pressure)    Tube secured:  22    Secured at:  The lips    Placement Verified By:  Capnometry    Complicating Factors:  None    Findings Post-Intubation:  Atraumatic/condition of teeth unchanged and BS equal bilateral

## 2023-02-27 NOTE — BRIEF OP NOTE
[facility] ([facility])  Brief Operative Note    SUMMARY     Surgery Date: 2/27/2023     Surgeon(s) and Role:  Panel 1:     * Moises Peters MD - Primary     * Catalina Landry MD - Resident - Assisting     * Alannah Lawson MD - Resident - Assisting  Panel 2:     * Tanika Gavin MD - Primary      Pre-op Diagnosis:  Renal mass [N28.89]    Post-op Diagnosis:  Post-Op Diagnosis Codes:     * Renal mass [N28.89]    Procedure Performed:   Procedure(s) (LRB):  NEPHRECTOMY, Radical (Right)  CHOLECYSTECTOMY complex (N/A)    Anesthesia: General    Findings:   1 artery taken proximal to branching, 1 vein  Surgical oncology assisted with mobilization of liver in order to access upper pole of the kidney, right kidney removed without  complication  Cholecystetomy and lymph node dissection per surgical oncology team    Estimated Blood Loss: 50cc         Specimens:   Specimen (24h ago, onward)       Start     Ordered    02/27/23 1148  Specimen to Pathology, Surgery Urology  Once        Comments: Pre-op Diagnosis: Renal mass [N28.89]Procedure(s):NEPHRECTOMY, RIGHTCHOLECYSTECTOMY Number of specimens: 2Name of specimens: 1-Right Kidney-Perm2-Gallbladder and liver segment 4 and 5-Perm3. Portal lymph nodes-Perm     References:    Click here for ordering Quick Tip   Question Answer Comment   Procedure Type: Urology    Specimen Class: Known or suspected malignancy    Which provider would you like to cc? MOISES PETERS    Which provider would you like to cc? TANIKA GAVIN    Release to patient Immediate        02/27/23 1148

## 2023-02-27 NOTE — OP NOTE
[facility]  Operative Note    Date: 02/27/2023    Pre-Op Diagnosis: right renal mass    Post-Op Diagnosis: same    Procedure(s) Performed:   right radical nephrectomy - open    Specimen(s):  1.  right kidney  (Gallbladder and portal lymph nodes per surg onc)     Staff Surgeon: Moises Cuba MD    Assistant Surgeon: Catalina Landry MD, Alannah Lawson MD     Anesthesia: General endotracheal anesthesia    Indications: Babatunde Singh is a 72 y.o. male with right renal mass suspicious for malignancy.  After thorough discussion of management options and risks and benefits associated with both, the patient has elected for a right partial nephrectomy. However, the patient and his daughter understand that there is a high chance that this will end up being a radical nephrectomy. Imaging findings are also concerning for a gallbladder lesion. Surgical oncology will be present during the case to perform a cholecystectomy and possible lymph node dissection. All questions asked by patient and his daughter were answered prior to surgery.     Findings:   - surgical oncology assisted with mobilization of the liver in order to access upper pole of the kidney, right kidney removed without complications  - 1 artery taken proximal to branching, 1 vein  - cholecystectomy and lymph node dissection per surgical oncology team     Estimated Blood Loss: 50 mL    Drains: none    Procedure in Detail: After informed consent was obtained, the patient was brought to the operating suite and placed in the supine position. SCDs were applied and working. General anesthesia was administered. A byers catheter was placed in the standard fashion. The patient was then placed in the supine position with a bump under his right side to have it elevated. The patient was appropriately padded and secured to the table. The patient was prepped and draped in the usual sterile fashion. Timeout was performed and preoperative antibiotics were  confirmed.     A right subcostal ernie was made 2 cm below the costal margin.  This was incised sharply using a 10 blade. Bovie cautery was used to dissect down through the subcutaneous tissues medially through the rectus muscle to the rectus fascia followed by the oblique muscles and transversus to the tranvsersalis fascia laterally. The peritoneum was entered. The falciform ligament was taken. We then set up the Do retractor.      The colon was first medialized from the hepatic flexure to the posterior peritoneum inferiorly. The duodenum was kocherized exposing the right IVC.      The kidney was mobilized off the psoas posteriorly and inferiorly. The upper pole of the right kidney was superior requiring liver mobilization to reach. Surgical oncology was called to assist with mobilizing the liver so that the upper pole could be reached. The decision was made to perform a radical nephrectomy due to the time added to the case as well as the sheer size of the mass and bleeding risk. The risks of performing a partial nephrectomy outweighed the benefits. The ureter was identified and taken with ligasure device.  Surgical oncology mobilized the liver. Please see their note for full documentation.      The kidney was further freed circumferentially until it was only attached at the hilum. The hilum was identified. The renal artery was tied off using 0 silk ties and then cut. The renal vein was taken with the endoscopic stapler using the vascular load.     The kidney was freed and removed off the field for pathology.     Once the kidney was removed, surgical oncology performed the cholecystectomy with a portal lymph node dissection. Please see their operative report for full details.     After the cholecystectomy and portal lymph node dissection, the case was turned back over to us to close.      The fascia was closed in 2 layers using 0-PDS. The subcutaneous tissue was brought together using 2-0 vicryl and the skin  was closed with 4-0 monocryl in a running subcuticular fashion. Dermabond was used to dress the incisions.      The patient tolerated the procedure well and was transferred to the recovery room in stable condition.    Disposition:  The patient will be admitted to the urology service.     Catalina Landry MD

## 2023-02-27 NOTE — OP NOTE
[facility] ([facility])  Surgery Department  Operative Note       Date of Procedure: 2/27/2023     Procedure: Procedure(s) (LRB):  NEPHRECTOMY, PARTIAL (Right)  CHOLECYSTECTOMY complex (N/A)     Surgeon(s) and Role:  Panel 1:     * Moises Cuba MD - Primary     * Catalina Landry MD - Resident - Assisting  Panel 2:     * Tolu Gavin MD - Primary        Pre-Operative Diagnosis:   Cystic neoplasm of gallbladder      Post-Operative Diagnosis:   Same      Comorbidities:  DVT  2.   Hypertension  3.   Peripheral arterial disease    Anesthesia: General    Operative Findings:   No evidence of distant intraperitoneal metastases   2.   Mass like structure at most superior aspect of gallbladder fundus. Cystic abnormality of gallbladder neck.     Procedures:  Liver mobilization  Radical cholecystectomy with en bloc 4B/5 partial hepatectomy  Portal lymphadenectomy    Estimated Blood Loss (EBL):  50 mL            Indications:  Babatunde Singh is a 72 year old man with a large right renal mass who was scheduled for a right partial vs complete nephrectomy. He also had an asymptomatic abnormality of his gallbladder concerning for possible mucinous cystic neoplasm seen on preoperative imaging and was sent to me for consideration of cholecystectomy. I discussed with the patient the possibility of both simple cholecystectomy as well as the need for radical cholecystectomy with partial hepatectomy of segment 4B/5 and portal lymphadenectomy. Risks and benefits were reviewed including bleeding, infection, damage to local structures, cardiovascular and pulmonary complications, bile leak, common bile duct injury, need for additional procedures, death, and imponderables.  He understands and gave informed consent to proceed.    Details:  When I entered the operating room, Dr. Cuba had already made a right subcostal incision and set up the Do retractor. He asked for assistance in mobilizing the right lobe of the liver  due to the superior extent of the renal mass. I incised the retroperitoneum to mobilize the liver from the retroperitoneum. The patient had some adhesions of the diaphragm to the dome of the liver that I divided. The right triangular ligament was then further divided to mobilize the right liver. Once the liver was adequately mobilized, Dr. Cuba completed the nephrectomy.     I then examined the gallbladder. There was a mass at the superior portion that looked most like a Phrygian cap. However, as suggested by the preoperative imaging, the gallbladder was cystic and somewhat firm at the neck, and therefore, I thought it would be best to approach this as a radical cholecystectomy. The cystic duct and artery were identified and ligated. An umbilical tape was placed around the hepatoduodenal ligament to use as a Laina tourniquet for the Mae maneuver. A transection line was then marked on the liver to included a portion of segment 4B/5 with the gallbladder. Prolene sutures were placed in the liver to use as retraction sutures. The Mae was then tightened. The liver parenchyma was divided with a crush clamp technique. Small vessels and bile ducts were ligated with clips and ties. There were two Pringles for a total of 29 minutes. The specimen was then removed from the field and sent to pathology. The hepatectomy bed was examined for hemostasis, which was excellent. Surgicel was left in the hepatectomy cavity.     Attention was then turned to the portal lymphadenectomy. The highest right portal lymph node was dissected and removed with a combination of cautery and clips. This dissection was continued inferiorly to remove lymph nodes posterolateral to the duodenum. The hepatic artery lymph node was then removed from the gastrohepatic ligament. All of these lymph nodes were soft and not clearly pathologic. The lymph nodes were sent to pathology.     I left the operating room at that point and turned the case back  over to Dr. Peters. I communicated the intraoperative findings with the family following the procedure.     Implants: * No implants in log *    Specimens:   Specimen (24h ago, onward)       Start     Ordered    02/27/23 1148  Specimen to Pathology, Surgery Urology  Once        Comments: Pre-op Diagnosis: Renal mass [N28.89]Procedure(s):NEPHRECTOMY, RIGHTCHOLECYSTECTOMY Number of specimens: 2Name of specimens: 1-Right Kidney-Perm2-Gallbladder and liver segment 4 and 5-Perm3. Portal lymph nodes-Perm     References:    Click here for ordering Quick Tip   Question Answer Comment   Procedure Type: Urology    Specimen Class: Known or suspected malignancy    Which provider would you like to cc? NICOLE PETERS    Which provider would you like to cc? TANIKA GAVIN    Release to patient Immediate        02/27/23 1148                            Condition: Good    Disposition: PACU - hemodynamically stable.    Attestation: I was present and scrubbed for my entire portion of the procedure.     Tanika Gaivn MD  Staff Surgeon  Surgical Oncology

## 2023-02-27 NOTE — ANESTHESIA PROCEDURE NOTES
[facility]    Patient location during procedure: pre-op   Block not for primary anesthetic.  Reason for block: at surgeon's request and post-op pain management   Post-op Pain Location: right abdominal pain   Start time: 2/27/2023 7:30 AM  Timeout: 2/27/2023 7:30 AM   End time: 2/27/2023 7:30 AM    Staffing  Authorizing Provider: Gosia Ricketts MD  Performing Provider: Luis Villalpando MD    Preanesthetic Checklist  Completed: patient identified, IV checked, site marked, risks and benefits discussed, surgical consent, monitors and equipment checked, pre-op evaluation and timeout performed  Peripheral Block  Patient position: sitting  Prep: ChloraPrep  Patient monitoring: heart rate, cardiac monitor, continuous pulse ox, continuous capnometry and frequent blood pressure checks  Block type: erector spinae plane (Erector Spinae Plane)  Laterality: right  Injection technique: continuous  Interspace: T8-9    Needle  Needle type: Tuohy   Needle gauge: 17 G  Needle length: 3.5 in  Needle localization: anatomical landmarks and ultrasound guidance  Catheter type: spring wound  Catheter size: 19 G  Test dose: lidocaine 1.5% with Epi 1-to-200,000 and negative   -ultrasound image captured on disc.  Assessment  Injection assessment: negative aspiration, negative parasthesia and local visualized surrounding nerve  Paresthesia pain: none  Heart rate change: no  Slow fractionated injection: yes  Pain Tolerance: comfortable throughout block  Medications:    Medications: bupivacaine (pf) (MARCAINE) injection 0.75% - Perineural   15 mL - 2/27/2023 7:30:00 AM    Additional Notes  Patient tolerated very well.  Vital signs stable.  See DOSC RN charting for vital signs.

## 2023-02-27 NOTE — INTERVAL H&P NOTE
The patient has been examined and the H&P has been reviewed:    I concur with the findings and no changes have occurred since H&P was written.    Surgery risks, benefits and alternative options discussed and understood by patient/family.    Dr. Cuba spoke with family 2/26. Decision was made to start with open approach. Consents obtained.     Holding Xarelto x1 week.       There are no hospital problems to display for this patient.

## 2023-02-27 NOTE — PROGRESS NOTES
Urology Progress Note    Patient seen and examined.    POD 0 open R radical nephrectomy, cholecystectomy. Doing well, pain controlled postoperatively. Catheter draining clear yellow urine.    Please call with further questions or concerns.    Darrel Miller MD  Urology, PGY-3  [facility]

## 2023-02-27 NOTE — NURSING TRANSFER
Nursing Transfer Note      2/27/2023     Reason patient is being transferred: Out of PACU    Transfer To: 1049    Transfer via bed    Transfer with N/A    Transported by Transport    Medicines sent: IV NaCl infusing & Perineural Ropivicaine     Any special needs or follow-up needed: No    Chart send with patient: Yes    Notified: daughter    Patient reassessed at: 1820

## 2023-02-27 NOTE — TRANSFER OF CARE
"Anesthesia Transfer of Care Note    Patient: Babatunde Singh    Procedure(s) Performed: Procedure(s) (LRB):  NEPHRECTOMY, PARTIAL (Right)  CHOLECYSTECTOMY complex (N/A)    Patient location: PACU    Anesthesia Type: general    Transport from OR: Transported from OR on 6-10 L/min O2 by face mask with adequate spontaneous ventilation    Post pain: adequate analgesia    Post assessment: no apparent anesthetic complications    Post vital signs: stable    Level of consciousness: awake, alert and oriented    Nausea/Vomiting: no nausea/vomiting    Complications: none    Transfer of care protocol was followed      Last vitals:   Visit Vitals  BP (!) 149/75 (BP Location: Right arm, Patient Position: Lying)   Pulse 74   Temp 36.6 °C (97.8 °F) (Temporal)   Resp 17   Ht 5' 3" (1.6 m)   Wt 60.8 kg (134 lb)   SpO2 100%   BMI 23.74 kg/m²     "

## 2023-02-27 NOTE — ANESTHESIA PROCEDURE NOTES
Arterial    Diagnosis: Renal Mass    Patient location during procedure: done in OR  Procedure start time: 2/27/2023 7:50 AM  Timeout: 2/27/2023 7:50 AM  Procedure end time: 2/27/2023 8:08 AM    Staffing  Authorizing Provider: León Espinal MD  Performing Provider: Hattie Tong MD    Anesthesiologist was present at the time of the procedure.    Preanesthetic Checklist  Completed: patient identified, IV checked, site marked, risks and benefits discussed, surgical consent, monitors and equipment checked, pre-op evaluation, timeout performed and anesthesia consent givenArterial  Skin Prep: chlorhexidine gluconate  Local Infiltration: none  Orientation: left  Location: brachial    Catheter Size: 22 G  Catheter placement by Ultrasound guidance. Heme positive aspiration all ports.   Vessel Caliber: medium, patent  Needle advanced into vessel with real time Ultrasound guidance.Insertion Attempts: 3  Assessment  Dressing: secured with tape and tegaderm  Patient: Tolerated well  Additional Notes  Attempted left radial, however, artery not compressible and no flash of blood after visualization under ultrasound. Attempted left ulnar under ultrasound, but guidewire would not thread. Successful left brachial a-line placed.

## 2023-02-27 NOTE — TELEPHONE ENCOUNTER
----- Message from Laura Hartley MA sent at 2/24/2023  3:55 PM CST -----    ----- Message -----  From: YARITZA Cespedes II, MD  Sent: 2/24/2023   3:54 PM CST  To: Rubina Garcia II Staff    Please call the patient and let him know that his hemoglobin count is much improved. He should keep his appointments as scheduled for follow up. He should restart his iron after his procedure as we discussed during his appointment today.

## 2023-02-28 LAB
ALBUMIN SERPL BCP-MCNC: 2.8 G/DL (ref 3.5–5.2)
ALP SERPL-CCNC: 73 U/L (ref 55–135)
ALT SERPL W/O P-5'-P-CCNC: 119 U/L (ref 10–44)
ANION GAP SERPL CALC-SCNC: 10 MMOL/L (ref 8–16)
AST SERPL-CCNC: 145 U/L (ref 10–40)
BASOPHILS # BLD AUTO: 0.04 K/UL (ref 0–0.2)
BASOPHILS NFR BLD: 0.2 % (ref 0–1.9)
BILIRUB SERPL-MCNC: 0.3 MG/DL (ref 0.1–1)
BUN SERPL-MCNC: 15 MG/DL (ref 8–23)
CALCIUM SERPL-MCNC: 8.3 MG/DL (ref 8.7–10.5)
CHLORIDE SERPL-SCNC: 110 MMOL/L (ref 95–110)
CO2 SERPL-SCNC: 17 MMOL/L (ref 23–29)
CREAT SERPL-MCNC: 1.1 MG/DL (ref 0.5–1.4)
DIFFERENTIAL METHOD: ABNORMAL
EOSINOPHIL # BLD AUTO: 0 K/UL (ref 0–0.5)
EOSINOPHIL NFR BLD: 0 % (ref 0–8)
ERYTHROCYTE [DISTWIDTH] IN BLOOD BY AUTOMATED COUNT: 26.9 % (ref 11.5–14.5)
EST. GFR  (NO RACE VARIABLE): >60 ML/MIN/1.73 M^2
GLUCOSE SERPL-MCNC: 102 MG/DL (ref 70–110)
HCT VFR BLD AUTO: 28.8 % (ref 40–54)
HGB BLD-MCNC: 8.5 G/DL (ref 14–18)
IMM GRANULOCYTES # BLD AUTO: 0.14 K/UL (ref 0–0.04)
IMM GRANULOCYTES NFR BLD AUTO: 0.7 % (ref 0–0.5)
LYMPHOCYTES # BLD AUTO: 2.9 K/UL (ref 1–4.8)
LYMPHOCYTES NFR BLD: 14.8 % (ref 18–48)
MAGNESIUM SERPL-MCNC: 1.9 MG/DL (ref 1.6–2.6)
MCH RBC QN AUTO: 21.6 PG (ref 27–31)
MCHC RBC AUTO-ENTMCNC: 29.5 G/DL (ref 32–36)
MCV RBC AUTO: 73 FL (ref 82–98)
MONOCYTES # BLD AUTO: 2 K/UL (ref 0.3–1)
MONOCYTES NFR BLD: 10.2 % (ref 4–15)
NEUTROPHILS # BLD AUTO: 14.2 K/UL (ref 1.8–7.7)
NEUTROPHILS NFR BLD: 74.1 % (ref 38–73)
NRBC BLD-RTO: 0 /100 WBC
PHOSPHATE SERPL-MCNC: 3.6 MG/DL (ref 2.7–4.5)
PLATELET # BLD AUTO: 262 K/UL (ref 150–450)
PMV BLD AUTO: 11.1 FL (ref 9.2–12.9)
POTASSIUM SERPL-SCNC: 4.4 MMOL/L (ref 3.5–5.1)
PROT SERPL-MCNC: 5.6 G/DL (ref 6–8.4)
RBC # BLD AUTO: 3.94 M/UL (ref 4.6–6.2)
SODIUM SERPL-SCNC: 137 MMOL/L (ref 136–145)
WBC # BLD AUTO: 19.21 K/UL (ref 3.9–12.7)

## 2023-02-28 PROCEDURE — 25000003 PHARM REV CODE 250: Performed by: STUDENT IN AN ORGANIZED HEALTH CARE EDUCATION/TRAINING PROGRAM

## 2023-02-28 PROCEDURE — 97116 GAIT TRAINING THERAPY: CPT

## 2023-02-28 PROCEDURE — 63600175 PHARM REV CODE 636 W HCPCS: Performed by: STUDENT IN AN ORGANIZED HEALTH CARE EDUCATION/TRAINING PROGRAM

## 2023-02-28 PROCEDURE — 97530 THERAPEUTIC ACTIVITIES: CPT

## 2023-02-28 PROCEDURE — 80053 COMPREHEN METABOLIC PANEL: CPT | Performed by: STUDENT IN AN ORGANIZED HEALTH CARE EDUCATION/TRAINING PROGRAM

## 2023-02-28 PROCEDURE — 85025 COMPLETE CBC W/AUTO DIFF WBC: CPT | Performed by: STUDENT IN AN ORGANIZED HEALTH CARE EDUCATION/TRAINING PROGRAM

## 2023-02-28 PROCEDURE — 36415 COLL VENOUS BLD VENIPUNCTURE: CPT | Performed by: STUDENT IN AN ORGANIZED HEALTH CARE EDUCATION/TRAINING PROGRAM

## 2023-02-28 PROCEDURE — 97165 OT EVAL LOW COMPLEX 30 MIN: CPT

## 2023-02-28 PROCEDURE — 83735 ASSAY OF MAGNESIUM: CPT | Performed by: STUDENT IN AN ORGANIZED HEALTH CARE EDUCATION/TRAINING PROGRAM

## 2023-02-28 PROCEDURE — 99231 SBSQ HOSP IP/OBS SF/LOW 25: CPT | Mod: ,,, | Performed by: ANESTHESIOLOGY

## 2023-02-28 PROCEDURE — 97161 PT EVAL LOW COMPLEX 20 MIN: CPT

## 2023-02-28 PROCEDURE — 99231 PR SUBSEQUENT HOSPITAL CARE,LEVL I: ICD-10-PCS | Mod: ,,, | Performed by: ANESTHESIOLOGY

## 2023-02-28 PROCEDURE — 20600001 HC STEP DOWN PRIVATE ROOM

## 2023-02-28 PROCEDURE — 84100 ASSAY OF PHOSPHORUS: CPT | Performed by: STUDENT IN AN ORGANIZED HEALTH CARE EDUCATION/TRAINING PROGRAM

## 2023-02-28 RX ORDER — BISACODYL 10 MG
10 SUPPOSITORY, RECTAL RECTAL DAILY PRN
Status: DISCONTINUED | OUTPATIENT
Start: 2023-02-28 | End: 2023-03-02 | Stop reason: HOSPADM

## 2023-02-28 RX ORDER — LOSARTAN POTASSIUM AND HYDROCHLOROTHIAZIDE 12.5; 5 MG/1; MG/1
1 TABLET ORAL DAILY
Status: DISCONTINUED | OUTPATIENT
Start: 2023-02-28 | End: 2023-03-02 | Stop reason: HOSPADM

## 2023-02-28 RX ORDER — ATORVASTATIN CALCIUM 40 MG/1
80 TABLET, FILM COATED ORAL DAILY
Status: DISCONTINUED | OUTPATIENT
Start: 2023-02-28 | End: 2023-03-02 | Stop reason: HOSPADM

## 2023-02-28 RX ADMIN — OXYCODONE HYDROCHLORIDE 10 MG: 10 TABLET ORAL at 05:02

## 2023-02-28 RX ADMIN — FAMOTIDINE 20 MG: 20 TABLET ORAL at 09:02

## 2023-02-28 RX ADMIN — ACETAMINOPHEN 1000 MG: 500 TABLET ORAL at 11:02

## 2023-02-28 RX ADMIN — GABAPENTIN 800 MG: 300 CAPSULE ORAL at 02:02

## 2023-02-28 RX ADMIN — METHOCARBAMOL 500 MG: 500 TABLET ORAL at 05:02

## 2023-02-28 RX ADMIN — ACETAMINOPHEN 1000 MG: 500 TABLET ORAL at 05:02

## 2023-02-28 RX ADMIN — POLYETHYLENE GLYCOL 3350 17 G: 17 POWDER, FOR SOLUTION ORAL at 08:02

## 2023-02-28 RX ADMIN — GABAPENTIN 800 MG: 300 CAPSULE ORAL at 09:02

## 2023-02-28 RX ADMIN — LOSARTAN POTASSIUM AND HYDROCHLOROTHIAZIDE 1 TABLET: 50; 12.5 TABLET, FILM COATED ORAL at 08:02

## 2023-02-28 RX ADMIN — HEPARIN SODIUM 5000 UNITS: 5000 INJECTION INTRAVENOUS; SUBCUTANEOUS at 09:02

## 2023-02-28 RX ADMIN — ATORVASTATIN CALCIUM 80 MG: 40 TABLET, FILM COATED ORAL at 08:02

## 2023-02-28 RX ADMIN — SODIUM CHLORIDE: 9 INJECTION, SOLUTION INTRAVENOUS at 02:02

## 2023-02-28 RX ADMIN — METHOCARBAMOL 500 MG: 500 TABLET ORAL at 11:02

## 2023-02-28 RX ADMIN — SENNOSIDES AND DOCUSATE SODIUM 1 TABLET: 50; 8.6 TABLET ORAL at 08:02

## 2023-02-28 RX ADMIN — FAMOTIDINE 20 MG: 20 TABLET ORAL at 08:02

## 2023-02-28 RX ADMIN — GABAPENTIN 800 MG: 300 CAPSULE ORAL at 08:02

## 2023-02-28 RX ADMIN — SENNOSIDES AND DOCUSATE SODIUM 1 TABLET: 50; 8.6 TABLET ORAL at 09:02

## 2023-02-28 RX ADMIN — SODIUM CHLORIDE: 9 INJECTION, SOLUTION INTRAVENOUS at 05:02

## 2023-02-28 RX ADMIN — HEPARIN SODIUM 5000 UNITS: 5000 INJECTION INTRAVENOUS; SUBCUTANEOUS at 02:02

## 2023-02-28 RX ADMIN — HEPARIN SODIUM 5000 UNITS: 5000 INJECTION INTRAVENOUS; SUBCUTANEOUS at 05:02

## 2023-02-28 NOTE — PT/OT/SLP EVAL
"Physical Therapy Co-Evaluation    Patient Name:  Babatunde Singh   MRN:  [id number]    Recommendations:     Discharge Recommendations: home health PT   Discharge Equipment Recommendations: none   Barriers to discharge: None    Assessment:     Babatunde Singh is a 72 y.o. male admitted with a medical diagnosis of Gallbladder mass.  He presents with the following impairments/functional limitations: impaired endurance, impaired functional mobility, gait instability, impaired balance, decreased coordination, impaired sensation. Pt was receptive to physical therapy co-evaluation with OT. Pt able to amb 200 feet with RW and CGA. Due to current limitations pt would benefit from acute skilled physical therapy services to improve functional mobility and activity tolerance.    Rehab Prognosis: Good; patient would benefit from acute skilled PT services to address these deficits and reach maximum level of function.    Recent Surgery: Procedure(s) (LRB):  NEPHRECTOMY, PARTIAL (Right)  CHOLECYSTECTOMY complex (N/A) 1 Day Post-Op    Plan:     During this hospitalization, patient to be seen 3 x/week to address the identified rehab impairments via gait training, therapeutic activities, therapeutic exercises and progress toward the following goals:    Plan of Care Expires:  03/30/23    Subjective     Chief Complaint: none reported  Patient/Family Comments/goals: "Hurts to laugh or cough"  Pain/Comfort:  Pain Rating 1: 0/10  Pain Rating Post-Intervention 1: 0/10    Patients cultural, spiritual, Ep[employer]alian conflicts given the current situation: no    Living Environment:  Pt lives with wife and daughter in [facility] with 4 ALTAGRACIA with B HR. Pt has walk-in shower in bathroom.  Prior to admission, patients level of function was modified independent, using cane when walking on uneven surfaces or going up stairs.  Equipment used at home: cane, straight, walker, rolling.  DME owned (not currently used): none.  Upon discharge, patient will have " assistance from wife and daughter.    Objective:     Communicated with nurse prior to session.  Patient found HOB elevated with peripheral IV, perineural catheter  upon PT entry to room.    General Precautions: Standard, fall  Orthopedic Precautions:N/A   Braces: N/A  Respiratory Status: Room air    Exams:  Sensation:    -       Impaired  light/touch to R foot.  RLE ROM: WFL  RLE Strength: WFL  LLE ROM: WFL  LLE Strength: WFL    Functional Mobility:  Co-evaluation due to suspected pt need for 2 skilled therapists services for pt and staff safety to accommodate for pt activity tolerance/pain management  Bed Mobility:     Rolling Right: stand by assistance  Scooting: stand by assistance  Supine to Sit: stand by assistance  Transfers:     Sit to Stand:  stand by assistance with no AD  Gait: 200' with CGA and RW. Pt amb with decreased marina/step length.  Balance: Static sitting balance: good, pt able to sit EOB with supervision for muscle testing.  Static/dynamic standing balance: pt amb with RW and CGA but no LOB      AM-PAC 6 CLICK MOBILITY  Total Score:19       Treatment & Education:  Discussed POC with pt who verbalized understanding. Educated pt on improtance of continued mobility and ambulation, with family member or nursing staff, during hospital stay. Educated pt on safety with mobility.    Patient left up in chair with all lines intact and call button in reach.    GOALS:   Multidisciplinary Problems       Physical Therapy Goals          Problem: Physical Therapy    Goal Priority Disciplines Outcome Goal Variances Interventions   Physical Therapy Goal     PT, PT/OT Ongoing, Progressing     Description: Goals to be met by: 3/14/2023     Patient will increase functional independence with mobility by performin. Supine to sit with Set-up [place]  2. Sit to stand transfer with Supervision using LRAD  3. Bed to chair transfer with Supervision using LRAD  4. Gait  x 400 feet with Supervision using LRAD    5. Ascend/descend 5 stair with bilateral Handrails Stand-by Assistance using No Assistive Device.                          History:     Past Medical History:   Diagnosis Date    DVT (deep venous thrombosis)     Hypertension     Peripheral arterial disease        Past Surgical History:   Procedure Laterality Date    APPLICATION OF WOUND VACUUM-ASSISTED CLOSURE DEVICE Right 12/8/2022    Procedure: APPLICATION, WOUND VAC;  Surgeon: LEI Ortiz III, MD;  [facility];  Service: Peripheral Vascular;  Laterality: Right;    CHOLECYSTECTOMY N/A 2/27/2023    Procedure: CHOLECYSTECTOMY complex;  Surgeon: Tolu Gavin MD;  [address]  Service: General;  Laterality: N/A;    COLONOSCOPY N/A 10/31/2022    Procedure: COLONOSCOPY;  Surgeon: Philip Shafer MD;  [facility]  Service: Endoscopy;  Laterality: N/A;    CREATION OF AXILLARY-FEMORAL ARTERY BYPASS WITH GRAFT N/A 10/6/2022    Procedure: CREATION, BYPASS, ARTERIAL, AXILLARY TO FEMORAL, USING GRAFT;  Surgeon: LEI Ortiz III, MD;  [address];  Service: Peripheral Vascular;  Laterality: N/A;    CREATION OF FEMORAL-FEMORAL ARTERY BYPASS WITH GRAFT N/A 10/6/2022    Procedure: CREATION, BYPASS, ARTERIAL, FEMORAL TO FEMORAL, USING GRAFT;  Surgeon: LEI Ortiz III, MD;  [address];  Service: Peripheral Vascular;  Laterality: N/A;    ESOPHAGOGASTRODUODENOSCOPY N/A 10/20/2022    Procedure: EGD (ESOPHAGOGASTRODUODENOSCOPY);  Surgeon: Sixto Chicas MD;  [facility]  Service: Endoscopy;  Laterality: N/A;    ESOPHAGOGASTRODUODENOSCOPY N/A 10/28/2022    Procedure: EGD (ESOPHAGOGASTRODUODENOSCOPY);  Surgeon: Marlee Hopson MD;  [facility]  Service: Gastroenterology;  Laterality: N/A;    EXCISION OF HYDROCELE Left 4/26/2022    Procedure: HYDROCELECTOMY;  Surgeon: Damion Roberts MD;  Location: [facility];  Service: Urology;  Laterality: Left;    KNEE  SURGERY  1971    PARTIAL NEPHRECTOMY Right 2/27/2023    Procedure: NEPHRECTOMY, PARTIAL;  Surgeon: Moises Cuba MD;  Location: [facility] OR [address] [place] [address];  Service: Urology;  Laterality: Right;  Dr Gavin robotic cholecystectomy for gallbladder mass also    WOUND DEBRIDEMENT Right 12/8/2022    Procedure: DEBRIDEMENT, WOUND;  Surgeon: LEI Ortiz III, MD;  Location: [facility] OR [address] [place] [address];  Service: Peripheral Vascular;  Laterality: Right;  RLE wound debridement       Time Tracking:     PT Received On: 02/28/23  PT Start Time: 0958     PT Stop Time: 1031  PT Total Time (min): 33 min     Billable Minutes: Evaluation 10 and Gait Training 23 02/28/2023

## 2023-02-28 NOTE — PLAN OF CARE
[facility]  Initial Discharge Assessment       Primary Care Provider: Esdras Ly MD    Admission Diagnosis: Renal mass [N28.89]  Renal mass, right [N28.89]    Admission Date: 2/27/2023  Expected Discharge Date: 3/3/2023    Discharge Barriers Identified: None    Payor: [employer]ARE / Plan: [employer] CHOICES 65 / Product Type: Medicare Advantage /     Extended Emergency Contact Information  Primary Emergency Contact: Cindi Matos  Mobile Phone: [phone]  Relation: Daughter  Preferred language: English   needed? No    Discharge Plan A: Home         [id number] [place], [place] - [address] [place] [address]  Phone: [phone] Fax: [phone]    [facility]  [address] [facility] [address]  Phone: [phone] Fax: [phone]      Initial Assessment (most recent)       Adult Discharge Assessment - 02/28/23 1539          Discharge Assessment    Assessment Type Discharge Planning Brief Assessment     Confirmed/corrected address, phone number and insurance Yes     Confirmed Demographics Correct on Facesheet     Source of Information patient     Does patient/caregiver understand observation status --   Pt was unsure    Communicated LAZ with patient/caregiver Yes     Reason For Admission renal mass     People in Home alone     Facility Arrived From: Home     Do you expect to return to your current living situation? Yes     Do you have help at home or someone to help you manage your care at home? Yes     Who are your caregiver(s) and their phone number(s)? Cindi-[phone]     Prior to hospitilization cognitive status: Alert/Oriented     Current cognitive status: Alert/Oriented     Walking or Climbing Stairs ambulation difficulty, requires equipment     Mobility Management Cane     Home Accessibility wheelchair accessible     Home Layout Able to live on 1st floor      Equipment Currently Used at Home cane, straight     Readmission within 30 days? No     Patient currently being followed by outpatient case management? No     Do you currently have service(s) that help you manage your care at home? No     Do you take prescription medications? Yes     Do you have prescription coverage? Yes     Coverage PHN     Do you have any problems affording any of your prescribed medications? No     Is the patient taking medications as prescribed? yes     Who is going to help you get home at discharge? [phone]     How do you get to doctors appointments? family or friend will provide     Are you on dialysis? No     Do you take coumadin? No     Discharge Plan A Home     DME Needed Upon Discharge  none     Discharge Plan discussed with: Patient     Discharge Barriers Identified None                      Spoke to pt. Pt lives at home with wife. Post hospital  stay [phone] will be pt support person and pt. has transportation at d/c with daughter. There have been no hospitalizations within the last 30 days per pt. Verified pt PCP and preferred pharmacy. Pt stated not on Coumadin and is not receiving dialysis. All questions answered regarding case management/ discharge planning , pt verbalized understanding. Discharge booklet with SW contact information given to pt.     Jennyfer Saenz LCSW  Case Management/[facility]  [phone]

## 2023-02-28 NOTE — PROGRESS NOTES
[facility]  Wound Care    Patient Name:  Babatunde Singh   MRN:  [id number]  Date: 2023  Diagnosis: Gallbladder mass    History:     Past Medical History:   Diagnosis Date    DVT (deep venous thrombosis)     Hypertension     Peripheral arterial disease        Social History     Socioeconomic History    Marital status:    Occupational History    Occupation: marie [employer]   Tobacco Use    Smoking status: Former     Packs/day: 0.50     Years: 55.00     Pack years: 27.50     Types: Cigarettes     Quit date: 10/11/2022     Years since quittin.3    Smokeless tobacco: Never    Tobacco comments:     Cigarettes3-4 per day   Substance and Sexual Activity    Alcohol use: Never    Drug use: Never    Sexual activity: Yes     Partners: Female     Comment: wife       Precautions:     Allergies as of 01/10/2023    (No Known Allergies)       [facility] Assessment Details/Treatment     Patient seen for wound care consultation for RLE wound.   Reviewed chart for this encounter.   See Flow Sheet for findings.     Pt known to our service d/t recent admission. Pt found sitting up in bed, agreeable to care at this time. Pt assisted into supine position for RLE assessment. Pt had a very tight two layer compression wrap from mid foot to calf that needed to be cut off. Per pt, his outside providers keep him in compression and wraps are completed by providers RN. Per pt's last BRENNAN - he is not a candidate for compression. Educated pt on his BRENNAN results and that per policy, we would not put him in compression as it could cause further harm. Wound was cleansed w/ NS and patted dry, applied Mepilex Ag to wound bed and secured w/ cast padding.      RECOMMENDATIONS  Q5 days - cleanse wound w/ NS, pat dry. Place Mepilex Ag to wound bed (sticky side towards skin), secure w/ cast padding.  Pt is not a candidate for compression at this time.     Discussed POC with patient and primary RN.   See EMR for orders & patient education     Nursing to  continue care. Wound care will follow.     02/28/23 1004   WOCN Assessment   WOCN Total Time (mins) 25   Visit Date 02/28/23   Visit Time 1004   Consult Type New   WOCN Speciality Wound   Intervention assessed;changed;applied;chart review;orders   Teaching on-going        Altered Skin Integrity 02/28/23 0930 Right anterior;lower Calf   Date First Assessed/Time First Assessed: 02/28/23 0930   Altered Skin Integrity Present on Admission: yes  Side: Right  Orientation: anterior;lower  Location: Calf   Wound Image    Dressing Appearance Dry;Intact;Clean   Drainage Amount Small   Drainage Characteristics/Odor Serosanguineous   Appearance Pink;Red;Moist   Tissue loss description Partial thickness   Periwound Area Intact;Dry   Wound Edges Irregular   Care Cleansed with:;Wound cleanser   Dressing Applied;Silver;Cast padding

## 2023-02-28 NOTE — SUBJECTIVE & OBJECTIVE
Interval History: NAEO. AFVSS. Patient reports pain is well controlled. Catheter removed on AM rounds, draining clear yellow.      Objective:     Temp:  [97.3 °F (36.3 °C)-98.4 °F (36.9 °C)] 97.9 °F (36.6 °C)  Pulse:  [68-92] 82  Resp:  [13-26] 18  SpO2:  [92 %-100 %] 92 %  BP: (118-181)/(56-78) 132/63     Body mass index is 26.32 kg/m².           Drains       Drain  Duration                  Urethral Catheter 02/27/23 0815 Silicone 16 Fr. <1 day                    Physical Exam  Constitutional:       General: He is not in acute distress.     Appearance: Normal appearance. He is not ill-appearing.   HENT:      Head: Normocephalic and atraumatic.      Mouth/Throat:      Mouth: Mucous membranes are moist.   Eyes:      Extraocular Movements: Extraocular movements intact.   Cardiovascular:      Rate and Rhythm: Normal rate.   Pulmonary:      Effort: Pulmonary effort is normal.   Abdominal:      Palpations: Abdomen is soft.      Comments: Right subcostal incision dressed with island dressing, no strikethrough   Genitourinary:     Comments: Romero catheter draining clear yellow  Musculoskeletal:      Cervical back: Normal range of motion.   Skin:     General: Skin is warm and dry.   Neurological:      Mental Status: He is alert and oriented to person, place, and time.   Psychiatric:         Mood and Affect: Mood normal.         Behavior: Behavior normal.       Significant Labs:    BMP:  Recent Labs   Lab 02/27/23  0701      K 4.1      CO2 24   BUN 14   CREATININE 1.0   CALCIUM 9.7       CBC:   Recent Labs   Lab 02/24/23  1202 02/27/23  0701   WBC  --  9.21   HGB 9.4* 10.3*   HCT  --  35.9*   PLT  --  331       Urine Studies: No results for input(s): COLORU, APPEARANCEUA, PHUR, SPECGRAV, PROTEINUA, GLUCUA, KETONESU, BILIRUBINUA, OCCULTUA, NITRITE, UROBILINOGEN, LEUKOCYTESUR, RBCUA, WBCUA, BACTERIA, SQUAMEPITHEL, HYALINECASTS in the last 168 hours.    Invalid input(s): WRIGHTSUR    Significant Imaging:  All  pertinent imaging results/findings from the past 24 hours have been reviewed.

## 2023-02-28 NOTE — ANESTHESIA POST-OP PAIN MANAGEMENT
Acute Pain Service Progress Note    Babatunde Singh is a 72 y.o., male, [id number].    Surgery:  Right partial nephrectomy    Post Op Day #: 1    Catheter type: perineural  Right T8-9 MILLICENT    Infusion type: Ropivacaine 0.2%  0.1 mL/hr basal    Problem List:    Active Hospital Problems    Diagnosis  POA    *Gallbladder mass [K82.8]  Yes    Tobacco use disorder, severe, in early remission [F17.201]  Yes    Bilateral lower extremity edema [R60.0]  Yes    DVT (deep venous thrombosis) [I82.409]  Yes    PAD (peripheral artery disease) [I73.9]  Yes    Right renal mass [N28.89]  Yes    Claudication of right lower extremity [I73.9]  Yes    Mixed hyperlipidemia [E78.2]  Yes    Right leg pain [M79.604]  Yes    Sciatica of right side [M54.31]  Yes    Numbness and tingling of right leg [R20.0, R20.2]  Yes    Primary hypertension [I10]  Yes    Normocytic anemia [D64.9]  Yes     Formatting of this note might be different from the original.  Added automatically from request for surgery [id number]      Chronic bronchitis [J42]  Yes      Resolved Hospital Problems   No resolved problems to display.       Subjective:     General appearance of alert, oriented, no complaints   Pain with rest: 1    Numbers   Pain with movement: 4    Numbers   Side Effects    1. Pruritis No    2. Nausea No    3. Motor Blockade No, 0=Ability to raise lower extremities off bed    4. Sedation No, 1=awake and alert    Objective:     Catheter level T8-9   Catheter site clean, dry, intact         Vitals   Vitals:    02/28/23 0743   BP: (!) 152/67   Pulse: 82   Resp: 18   Temp: 36.2 °C (97.2 °F)        Labs    Admission on 02/27/2023   Component Date Value Ref Range Status    Sodium 02/27/2023 141  136 - 145 mmol/L Final    Potassium 02/27/2023 4.1  3.5 - 5.1 mmol/L Final    Chloride 02/27/2023 108  95 - 110 mmol/L Final    CO2 02/27/2023 24  23 - 29 mmol/L Final    Glucose 02/27/2023 91  70 - 110 mg/dL Final    BUN 02/27/2023 14  8 - 23 mg/dL Final  Left message on answering machine to call [facility].        Creatinine 02/27/2023 1.0  0.5 - 1.4 mg/dL Final    Calcium 02/27/2023 9.7  8.7 - 10.5 mg/dL Final    Anion Gap 02/27/2023 9  8 - 16 mmol/L Final    eGFR 02/27/2023 >60.0  >60 mL/min/1.73 m^2 Final    WBC 02/27/2023 9.21  3.90 - 12.70 K/uL Final    RBC 02/27/2023 4.82  4.60 - 6.20 M/uL Final    Hemoglobin 02/27/2023 10.3 (L)  14.0 - 18.0 g/dL Final    Hematocrit 02/27/2023 35.9 (L)  40.0 - 54.0 % Final    MCV 02/27/2023 75 (L)  82 - 98 fL Final    MCH 02/27/2023 21.4 (L)  27.0 - 31.0 pg Final    MCHC 02/27/2023 28.7 (L)  32.0 - 36.0 g/dL Final    RDW 02/27/2023 27.4 (H)  11.5 - 14.5 % Final    Platelets 02/27/2023 331  150 - 450 K/uL Final    MPV 02/27/2023 10.1  9.2 - 12.9 fL Final    Immature Granulocytes 02/27/2023 0.3  0.0 - 0.5 % Final    Gran # (ANC) 02/27/2023 6.1  1.8 - 7.7 K/uL Final    Immature Grans (Abs) 02/27/2023 0.03  0.00 - 0.04 K/uL Final    Lymph # 02/27/2023 2.2  1.0 - 4.8 K/uL Final    Mono # 02/27/2023 0.8  0.3 - 1.0 K/uL Final    Eos # 02/27/2023 0.1  0.0 - 0.5 K/uL Final    Baso # 02/27/2023 0.06  0.00 - 0.20 K/uL Final    nRBC 02/27/2023 0  0 /100 WBC Final    Gran % 02/27/2023 65.9  38.0 - 73.0 % Final    Lymph % 02/27/2023 23.7  18.0 - 48.0 % Final    Mono % 02/27/2023 8.3  4.0 - 15.0 % Final    Eosinophil % 02/27/2023 1.1  0.0 - 8.0 % Final    Basophil % 02/27/2023 0.7  0.0 - 1.9 % Final    Differential Method 02/27/2023 Automated   Final    Group & Rh 02/27/2023 A POS   Final    Indirect Stan 02/27/2023 NEG   Final    UNIT NUMBER 02/27/2023 [id number]   Final    Product Code 02/27/2023 [id number]   Final    DISPENSE STATUS 02/27/2023 RETURNED   Final    CODING SYSTEM 02/27/2023 [id number]   Final    Unit Blood Type Code 02/27/2023 6200   Final    Unit Blood Type 02/27/2023 A POS   Final    Unit Expiration 02/27/2023 [id number]   Final    CROSSMATCH INTERPRETATION 02/27/2023 Compatible   Final    UNIT NUMBER 02/27/2023 [id number]   Final     Product Code 02/27/2023 [id number]   Final    DISPENSE STATUS 02/27/2023 RETURNED   Final    CODING SYSTEM 02/27/2023 [id number]   Final    Unit Blood Type Code 02/27/2023 6200   Final    Unit Blood Type 02/27/2023 A POS   Final    Unit Expiration 02/27/2023 [id number]   Final    CROSSMATCH INTERPRETATION 02/27/2023 Compatible   Final    Sodium 02/28/2023 137  136 - 145 mmol/L Final    Potassium 02/28/2023 4.4  3.5 - 5.1 mmol/L Final    Chloride 02/28/2023 110  95 - 110 mmol/L Final    CO2 02/28/2023 17 (L)  23 - 29 mmol/L Final    Glucose 02/28/2023 102  70 - 110 mg/dL Final    BUN 02/28/2023 15  8 - 23 mg/dL Final    Creatinine 02/28/2023 1.1  0.5 - 1.4 mg/dL Final    Calcium 02/28/2023 8.3 (L)  8.7 - 10.5 mg/dL Final    Total Protein 02/28/2023 5.6 (L)  6.0 - 8.4 g/dL Final    Albumin 02/28/2023 2.8 (L)  3.5 - 5.2 g/dL Final    Total Bilirubin 02/28/2023 0.3  0.1 - 1.0 mg/dL Final    Alkaline Phosphatase 02/28/2023 73  55 - 135 U/L Final    AST 02/28/2023 145 (H)  10 - 40 U/L Final    ALT 02/28/2023 119 (H)  10 - 44 U/L Final    Anion Gap 02/28/2023 10  8 - 16 mmol/L Final    eGFR 02/28/2023 >60.0  >60 mL/min/1.73 m^2 Final    Phosphorus 02/28/2023 3.6  2.7 - 4.5 mg/dL Final    Magnesium 02/28/2023 1.9  1.6 - 2.6 mg/dL Final    WBC 02/28/2023 19.21 (H)  3.90 - 12.70 K/uL Final    RBC 02/28/2023 3.94 (L)  4.60 - 6.20 M/uL Final    Hemoglobin 02/28/2023 8.5 (L)  14.0 - 18.0 g/dL Final    Hematocrit 02/28/2023 28.8 (L)  40.0 - 54.0 % Final    MCV 02/28/2023 73 (L)  82 - 98 fL Final    MCH 02/28/2023 21.6 (L)  27.0 - 31.0 pg Final    MCHC 02/28/2023 29.5 (L)  32.0 - 36.0 g/dL Final    RDW 02/28/2023 26.9 (H)  11.5 - 14.5 % Final    Platelets 02/28/2023 262  150 - 450 K/uL Final    MPV 02/28/2023 11.1  9.2 - 12.9 fL Final    Immature Granulocytes 02/28/2023 0.7 (H)  0.0 - 0.5 % Final    Gran # (ANC) 02/28/2023 14.2 (H)  1.8 - 7.7 K/uL Final    Immature Grans (Abs) 02/28/2023 0.14 (H)   0.00 - 0.04 K/uL Final    Lymph # 02/28/2023 2.9  1.0 - 4.8 K/uL Final    Mono # 02/28/2023 2.0 (H)  0.3 - 1.0 K/uL Final    Eos # 02/28/2023 0.0  0.0 - 0.5 K/uL Final    Baso # 02/28/2023 0.04  0.00 - 0.20 K/uL Final    nRBC 02/28/2023 0  0 /100 WBC Final    Gran % 02/28/2023 74.1 (H)  38.0 - 73.0 % Final    Lymph % 02/28/2023 14.8 (L)  18.0 - 48.0 % Final    Mono % 02/28/2023 10.2  4.0 - 15.0 % Final    Eosinophil % 02/28/2023 0.0  0.0 - 8.0 % Final    Basophil % 02/28/2023 0.2  0.0 - 1.9 % Final    Differential Method 02/28/2023 Automated   Final        Meds   Current Facility-Administered Medications   Medication Dose Route Frequency Provider Last Rate Last Admin    0.9%  NaCl infusion   Intravenous Continuous Catalina Landry  mL/hr at 02/28/23 0522 New Bag at 02/28/23 0522    acetaminophen tablet 1,000 mg  1,000 mg Oral 4 times per day Luis Villalpando MD   1,000 mg at 02/28/23 0524    atorvastatin tablet 80 mg  80 mg Oral Daily Catalina Landry MD   80 mg at 02/28/23 0820    famotidine tablet 20 mg  20 mg Oral BID Catalina Landry MD   20 mg at 02/28/23 0821    gabapentin capsule 800 mg  800 mg Oral TID Catalina Landry MD   800 mg at 02/28/23 0818    heparin (porcine) injection 5,000 Units  5,000 Units Subcutaneous Q8H Catalina Landry MD   5,000 Units at 02/28/23 0523    hydrALAZINE injection 10 mg  10 mg Intravenous Q6H PRN Catalina Landry MD        losartan-hydrochlorothiazide 50-12.5 mg per tablet 1 tablet  1 tablet Oral Daily Catalina Landry MD   1 tablet at 02/28/23 0846    melatonin tablet 6 mg  6 mg Oral Nightly PRN Catalina Landry MD        methocarbamoL tablet 500 mg  500 mg Oral Q6H Paresh Og Mike MD   500 mg at 02/28/23 0523    ondansetron injection 4 mg  4 mg Intravenous Q12H PRN Luis Villalpando MD        oxyCODONE immediate release tablet 5 mg  5 mg Oral Q4H PRN Catalina Landry MD        oxyCODONE immediate release tablet Tab 10  mg  10 mg Oral Q4H PRN Catalina Landry MD   10 mg at 02/28/23 0525    polyethylene glycol packet 17 g  17 g Oral Daily Paresh Mike MD   17 g at 02/28/23 0820    ROPIvacaine (PF) 2 mg/ml (0.2%) solution  0.1 mL/hr Perineural Continuous Luis Villalpando MD 0.1 mL/hr at 02/27/23 1236 0.1 mL/hr at 02/27/23 1236    senna-docusate 8.6-50 mg per tablet 1 tablet  1 tablet Oral BID Paresh Mike MD   1 tablet at 02/28/23 0820        Anticoagulant dose: Heparin injections 5,000units q8h    Assessment:     Pain control adequate    Plan:    - continue R MILLICENT catheter infusion  - gabapentin 800mg TID  - methocarbamol 500mg q6h  - tylenol 1g q6h  - oxycodone 5mg q4h PRN moderate pain  - oxycodone 10mg q4h PRN severe pain     Patient doing well, continue present treatment.        Nubia Mcnamara DO  Anesthesiology Resident, PGY-1

## 2023-02-28 NOTE — PT/OT/SLP EVAL
Occupational Therapy   Evaluation    Name: Babatunde Singh  MRN: [id number]  Admitting Diagnosis: Gallbladder mass  Recent Surgery: Procedure(s) (LRB):  NEPHRECTOMY, PARTIAL (Right)  CHOLECYSTECTOMY complex (N/A) 1 Day Post-Op    Recommendations:     Discharge Recommendations: home  Discharge Equipment Recommendations:  none  Barriers to discharge:  None    Assessment:     Babatunde Singh is a 72 y.o. male with a medical diagnosis of Gallbladder mass.  He presents s/p nephrectomy & cholecystectomy on 2/27. Pt walked 100' SBA with a RW.  Performance deficits affecting function: weakness, impaired endurance, impaired self care skills, impaired functional mobility, gait instability, impaired balance, decreased coordination.      Rehab Prognosis: Good; patient would benefit from acute skilled OT services to address these deficits and reach maximum level of function.       Plan:     Patient to be seen 3 x/week to address the above listed problems via self-care/home management, therapeutic activities, therapeutic exercises  Plan of Care Expires: 03/30/23  Plan of Care Reviewed with: patient    Subjective     Occupational Profile:  Living Environment: Pt lives at home with wife in [facility] [address] [facility] with B handrail and walk in shower with built in bench  Previous level of function: Pt was independent prior to admission  Roles and Routines: Pt works as a manager for an apartment complex  Equipment Used at Home:  cane, straight, rollator, shower chair  Assistance upon Discharge: family    Pain/Comfort:  Pain Rating 1: 0/10    Patients cultural, spiritual, Anabaptist conflicts given the current situation:      Objective:     Communicated with: rn prior to session.  Patient found supine with peripheral IV upon OT entry to room.    General Precautions: Standard, fall  Orthopedic Precautions:    Braces:    Respiratory Status: Room air    Occupational Performance:    Bed Mobility:    Patient completed Rolling/Turning to Right with  supervision  Patient completed Scooting/Bridging with supervision  Patient completed Supine to Sit with supervision    Functional Mobility/Transfers:  Patient completed Sit <> Stand Transfer with stand by assistance  with  rolling walker   Patient completed Bed <> Chair Transfer using Step Transfer technique with stand by assistance with rolling walker  Functional Mobility: Pt walked 100' SBA with a RW.    Activities of Daily Living:  Feeding:  independence   Upper Body Dressing: minimum assistance    Cognitive/Visual Perceptual:  Cognitive/Psychosocial Skills:     -       Oriented to: Person, Place, Time, and Situation   -       Safety awareness/insight to disability: intact     Physical Exam:  BUE AROM/MMT: WNL    AMPAC 6 Click ADL:  AMPAC Total Score: 21    Treatment & Education:  UE ROM/MMT  Bed mobility training / assessment  Functional mobility assessment  Sit/standing balance assessment  Educated on importance of sitting OOB in bedside chair to promote increased strength, endurance & breathing.  Discussed OT POC / Post-acute plan    Patient left up in chair with all lines intact and call button in reach    GOALS:   Multidisciplinary Problems       Occupational Therapy Goals          Problem: Occupational Therapy    Goal Priority Disciplines Outcome Interventions   Occupational Therapy Goal     OT, PT/OT Ongoing, Progressing    Description: Goals to be met by: 3/7/23    Patient will increase functional independence with ADLs by performing:    LE Dressing with Supervision.  Grooming while standing at sink with Set-up Assistance.  Toileting from toilet with Set-up Assistance for hygiene and clothing management.   Supine to sit with [place].  Toilet transfer to toilet with Supervision.                         History:     Past Medical History:   Diagnosis Date    DVT (deep venous thrombosis)     Hypertension     Peripheral arterial disease          Past Surgical History:   Procedure Laterality Date     APPLICATION OF WOUND VACUUM-ASSISTED CLOSURE DEVICE Right 12/8/2022    Procedure: APPLICATION, WOUND VAC;  Surgeon: LEI Ortiz III, MD;  [facility];  Service: Peripheral Vascular;  Laterality: Right;    CHOLECYSTECTOMY N/A 2/27/2023    Procedure: CHOLECYSTECTOMY complex;  Surgeon: Tolu Gavin MD;  [address]  Service: General;  Laterality: N/A;    COLONOSCOPY N/A 10/31/2022    Procedure: COLONOSCOPY;  Surgeon: Philip Shafer MD;  [facility]  Service: Endoscopy;  Laterality: N/A;    CREATION OF AXILLARY-FEMORAL ARTERY BYPASS WITH GRAFT N/A 10/6/2022    Procedure: CREATION, BYPASS, ARTERIAL, AXILLARY TO FEMORAL, USING GRAFT;  Surgeon: LEI Ortiz III, MD;  [facility];  Service: Peripheral Vascular;  Laterality: N/A;    CREATION OF FEMORAL-FEMORAL ARTERY BYPASS WITH GRAFT N/A 10/6/2022    Procedure: CREATION, BYPASS, ARTERIAL, FEMORAL TO FEMORAL, USING GRAFT;  Surgeon: LEI Ortiz III, MD;  [address]  Service: Peripheral Vascular;  Laterality: N/A;    ESOPHAGOGASTRODUODENOSCOPY N/A 10/20/2022    Procedure: EGD (ESOPHAGOGASTRODUODENOSCOPY);  Surgeon: Sixto Chicas MD;  [facility]  Service: Endoscopy;  Laterality: N/A;    ESOPHAGOGASTRODUODENOSCOPY N/A 10/28/2022    Procedure: EGD (ESOPHAGOGASTRODUODENOSCOPY);  Surgeon: Marlee Hopson MD;  [facility]  Service: Gastroenterology;  Laterality: N/A;    EXCISION OF HYDROCELE Left 4/26/2022    Procedure: HYDROCELECTOMY;  Surgeon: Damion Roberts MD;  Location: [facility];  Service: Urology;  Laterality: Left;    KNEE SURGERY  1972    PARTIAL NEPHRECTOMY Right 2/27/2023    Procedure: NEPHRECTOMY, PARTIAL;  Surgeon: Moises Cuba MD;  [address];  Service: Urology;  Laterality: Right;  Dr Gavin robotic cholecystectomy for gallbladder mass also    WOUND DEBRIDEMENT Right 12/8/2022    Procedure: DEBRIDEMENT, WOUND;   Surgeon: LEI Ortiz III, MD;  Location: [facility] OR [address] [place] [address];  Service: Peripheral Vascular;  Laterality: Right;  RLE wound debridement       Time Tracking:     OT Date of Treatment: 02/28/23  OT Start Time: 0957  OT Stop Time: 1030  OT Total Time (min): 33 min    Billable Minutes:Evaluation 12  Therapeutic Activity 21    2/28/2023

## 2023-02-28 NOTE — PLAN OF CARE
Problem: Physical Therapy  Goal: Physical Therapy Goal  Description: Goals to be met by: 3/14/2023     Patient will increase functional independence with mobility by performin. Supine to sit with Set-up [place]  2. Sit to stand transfer with Supervision using LRAD  3. Bed to chair transfer with Supervision using LRAD  4. Gait  x 400 feet with Supervision using LRAD   5. Ascend/descend 5 stair with bilateral Handrails Stand-by Assistance using No Assistive Device.     Outcome: Ongoing, Progressing

## 2023-02-28 NOTE — ASSESSMENT & PLAN NOTE
Babatunde Singh is a 72 y.o. male with a history of a 5.7 cm right upper pole renal mass now POD1 s/p open right radical nephrectomy.    -Multimodal pain control  -PT/OT  -encourage ambulation  -Regular diet  -catheter removed on AM rounds  -f/u voiding trial  -wound care consult of lower extremity wounds  -f/u labs

## 2023-02-28 NOTE — PROGRESS NOTES
[facility]  General Surgery  Progress Note    Subjective:     History of Present Illness:  No notes on file    Post-Op Info:  Procedure(s) (LRB):  NEPHRECTOMY, PARTIAL (Right)  CHOLECYSTECTOMY complex (N/A)   1 Day Post-Op     Interval History: Afebrile, HDS. Ropivicaine PNC catheter in place, reporting some pain.     Medications:  Continuous Infusions:   sodium chloride 0.9% 125 mL/hr at 02/28/23 0522    ROPIvacaine (PF) 2 mg/ml (0.2%) 0.1 mL/hr (02/27/23 1236)     Scheduled Meds:   acetaminophen  1,000 mg Oral 4 times per day    atorvastatin  80 mg Oral Daily    famotidine  20 mg Oral BID    gabapentin  800 mg Oral TID    heparin (porcine)  5,000 Units Subcutaneous Q8H    methocarbamoL  500 mg Oral Q6H    polyethylene glycol  17 g Oral Daily    senna-docusate 8.6-50 mg  1 tablet Oral BID     PRN Meds:hydrALAZINE, melatonin, ondansetron, oxyCODONE, oxyCODONE     Review of patient's allergies indicates:   Allergen Reactions    Ace inhibitors      Objective:     Vital Signs (Most Recent):  Temp: 97.9 °F (36.6 °C) (02/28/23 0117)  Pulse: 82 (02/28/23 0117)  Resp: 18 (02/28/23 0525)  BP: 132/63 (02/28/23 0117)  SpO2: (!) 92 % (02/28/23 0117)   Vital Signs (24h Range):  Temp:  [97.3 °F (36.3 °C)-98.4 °F (36.9 °C)] 97.9 °F (36.6 °C)  Pulse:  [68-92] 82  Resp:  [13-26] 18  SpO2:  [92 %-100 %] 92 %  BP: (118-181)/(56-78) 132/63     Weight: 67.4 kg (148 lb 9.4 oz)  Body mass index is 26.32 kg/m².    Intake/Output - Last 3 Shifts         02/26 0700  02/27 0659 02/27 0700  02/28 0659 02/28 0700  03/01 0659    P.O.  360     I.V. (mL/kg)  1500 (22.3)     IV Piggyback  2200.5     Total Intake(mL/kg)  4060.5 (60.2)     Urine (mL/kg/hr)  1000 (0.6)     Total Output  1000     Net  +3060.5                    Physical Exam  Constitutional:       General: He is not in acute distress.     Appearance: Normal appearance.   HENT:      Head: Normocephalic and atraumatic.   Eyes:      General: No scleral icterus.      Conjunctiva/sclera: Conjunctivae normal.      Pupils: Pupils are equal, round, and reactive to light.   Cardiovascular:      Rate and Rhythm: Normal rate and regular rhythm.   Pulmonary:      Effort: Pulmonary effort is normal. No respiratory distress.      Breath sounds: No stridor.   Abdominal:      General: Abdomen is flat. There is no distension.      Palpations: Abdomen is soft.      Tenderness: There is abdominal tenderness (appropriately tender to palpation). There is no guarding.      Comments: Island dressing over subcostal incision, no strikethrough   Musculoskeletal:         General: No deformity or signs of injury. Normal range of motion.   Skin:     General: Skin is warm and dry.      Coloration: Skin is not jaundiced.   Neurological:      General: No focal deficit present.      Mental Status: He is alert and oriented to person, place, and time.   Psychiatric:         Mood and Affect: Mood normal.         Behavior: Behavior normal.       Significant Labs:  I have reviewed all pertinent lab results within the past 24 hours.  CBC:   Recent Labs   Lab 02/27/23  0701   WBC 9.21   RBC 4.82   HGB 10.3*   HCT 35.9*      MCV 75*   MCH 21.4*   MCHC 28.7*     BMP:   Recent Labs   Lab 02/27/23  0701   GLU 91      K 4.1      CO2 24   BUN 14   CREATININE 1.0   CALCIUM 9.7       Significant Diagnostics:  I have reviewed all pertinent imaging results/findings within the past 24 hours.    Assessment/Plan:     * Gallbladder mass  Babatunde Singh is a 72 year old male with renal mass and gallbladder mass now POD1 s/p open right radical nephrectomy and radical cholecystectomy.    - regular diet  - mIVF  - Multi-modal pain control  - PRN nausea meds  - Replace electrolytes PRN  - DVT prophylaxis   - OOBTC/Ambulate  - IS  - PT/OT  - Rest of care per primary team            Luis Lindsay MD  General Surgery  [facility]

## 2023-02-28 NOTE — PLAN OF CARE
Problem: Occupational Therapy  Goal: Occupational Therapy Goal  Description: Goals to be met by: 3/7/23    Patient will increase functional independence with ADLs by performing:    LE Dressing with Supervision.  Grooming while standing at sink with Set-up Assistance.  Toileting from toilet with Set-up Assistance for hygiene and clothing management.   Supine to sit with [place].  Toilet transfer to toilet with Supervision.    Outcome: Ongoing, Progressing

## 2023-02-28 NOTE — PROGRESS NOTES
[facility]  Urology  Progress Note    Patient Name: Babatunde Singh  MRN: [id number]  Admission Date: 2/27/2023  Hospital Length of Stay: 1 days  Code Status: Prior   Attending Provider: Moises Cuba MD   Primary Care Physician: Esdras Ly MD    Subjective:     HPI:  No notes on file    Interval History: NAEO. AFVSS. Patient reports pain is well controlled. Catheter removed on AM rounds, draining clear yellow.      Objective:     Temp:  [97.3 °F (36.3 °C)-98.4 °F (36.9 °C)] 97.9 °F (36.6 °C)  Pulse:  [68-92] 82  Resp:  [13-26] 18  SpO2:  [92 %-100 %] 92 %  BP: (118-181)/(56-78) 132/63     Body mass index is 26.32 kg/m².           Drains       Drain  Duration                  Urethral Catheter 02/27/23 0815 Silicone 16 Fr. <1 day                    Physical Exam  Constitutional:       General: He is not in acute distress.     Appearance: Normal appearance. He is not ill-appearing.   HENT:      Head: Normocephalic and atraumatic.      Mouth/Throat:      Mouth: Mucous membranes are moist.   Eyes:      Extraocular Movements: Extraocular movements intact.   Cardiovascular:      Rate and Rhythm: Normal rate.   Pulmonary:      Effort: Pulmonary effort is normal.   Abdominal:      Palpations: Abdomen is soft.      Comments: Right subcostal incision dressed with island dressing, no strikethrough   Genitourinary:     Comments: Romero catheter draining clear yellow  Musculoskeletal:      Cervical back: Normal range of motion.   Skin:     General: Skin is warm and dry.   Neurological:      Mental Status: He is alert and oriented to person, place, and time.   Psychiatric:         Mood and Affect: Mood normal.         Behavior: Behavior normal.       Significant Labs:    BMP:  Recent Labs   Lab 02/27/23  0701      K 4.1      CO2 24   BUN 14   CREATININE 1.0   CALCIUM 9.7       CBC:   Recent Labs   Lab 02/24/23  1202 02/27/23  0701   WBC  --  9.21   HGB 9.4* 10.3*   HCT  --  35.9*   PLT  --  331       Urine  Studies: No results for input(s): COLORU, APPEARANCEUA, PHUR, SPECGRAV, PROTEINUA, GLUCUA, KETONESU, BILIRUBINUA, OCCULTUA, NITRITE, UROBILINOGEN, LEUKOCYTESUR, RBCUA, WBCUA, BACTERIA, SQUAMEPITHEL, HYALINECASTS in the last 168 hours.    Invalid input(s): ITZ    Significant Imaging:  All pertinent imaging results/findings from the past 24 hours have been reviewed.      Assessment/Plan:     * Right renal mass  Babatunde Singh is a 72 y.o. male with a history of a 5.7 cm right upper pole renal mass now POD1 s/p open right radical nephrectomy.    -Multimodal pain control  -PT/OT  -encourage ambulation  -Regular diet  -catheter removed on AM rounds  -f/u voiding trial  -wound care consult of lower extremity wounds  -f/u labs        Kristofer Coello MD  Urology  [facility]

## 2023-02-28 NOTE — SUBJECTIVE & OBJECTIVE
Interval History: Afebrile, HDS. Ropivicaine PNC catheter in place, reporting some pain.     Medications:  Continuous Infusions:   sodium chloride 0.9% 125 mL/hr at 02/28/23 0522    ROPIvacaine (PF) 2 mg/ml (0.2%) 0.1 mL/hr (02/27/23 1236)     Scheduled Meds:   acetaminophen  1,000 mg Oral 4 times per day    atorvastatin  80 mg Oral Daily    famotidine  20 mg Oral BID    gabapentin  800 mg Oral TID    heparin (porcine)  5,000 Units Subcutaneous Q8H    methocarbamoL  500 mg Oral Q6H    polyethylene glycol  17 g Oral Daily    senna-docusate 8.6-50 mg  1 tablet Oral BID     PRN Meds:hydrALAZINE, melatonin, ondansetron, oxyCODONE, oxyCODONE     Review of patient's allergies indicates:   Allergen Reactions    Ace inhibitors      Objective:     Vital Signs (Most Recent):  Temp: 97.9 °F (36.6 °C) (02/28/23 0117)  Pulse: 82 (02/28/23 0117)  Resp: 18 (02/28/23 0525)  BP: 132/63 (02/28/23 0117)  SpO2: (!) 92 % (02/28/23 0117)   Vital Signs (24h Range):  Temp:  [97.3 °F (36.3 °C)-98.4 °F (36.9 °C)] 97.9 °F (36.6 °C)  Pulse:  [68-92] 82  Resp:  [13-26] 18  SpO2:  [92 %-100 %] 92 %  BP: (118-181)/(56-78) 132/63     Weight: 67.4 kg (148 lb 9.4 oz)  Body mass index is 26.32 kg/m².    Intake/Output - Last 3 Shifts         02/26 0700  02/27 0659 02/27 0700 02/28 0659 02/28 0700 03/01 0659    P.O.  360     I.V. (mL/kg)  1500 (22.3)     IV Piggyback  2200.5     Total Intake(mL/kg)  4060.5 (60.2)     Urine (mL/kg/hr)  1000 (0.6)     Total Output  1000     Net  +3060.5                    Physical Exam  Constitutional:       General: He is not in acute distress.     Appearance: Normal appearance.   HENT:      Head: Normocephalic and atraumatic.   Eyes:      General: No scleral icterus.     Conjunctiva/sclera: Conjunctivae normal.      Pupils: Pupils are equal, round, and reactive to light.   Cardiovascular:      Rate and Rhythm: Normal rate and regular rhythm.   Pulmonary:      Effort: Pulmonary effort is normal. No respiratory  distress.      Breath sounds: No stridor.   Abdominal:      General: Abdomen is flat. There is no distension.      Palpations: Abdomen is soft.      Tenderness: There is abdominal tenderness (appropriately tender to palpation). There is no guarding.      Comments: Island dressing over subcostal incision, no strikethrough   Musculoskeletal:         General: No deformity or signs of injury. Normal range of motion.   Skin:     General: Skin is warm and dry.      Coloration: Skin is not jaundiced.   Neurological:      General: No focal deficit present.      Mental Status: He is alert and oriented to person, place, and time.   Psychiatric:         Mood and Affect: Mood normal.         Behavior: Behavior normal.       Significant Labs:  I have reviewed all pertinent lab results within the past 24 hours.  CBC:   Recent Labs   Lab 02/27/23  0701   WBC 9.21   RBC 4.82   HGB 10.3*   HCT 35.9*      MCV 75*   MCH 21.4*   MCHC 28.7*     BMP:   Recent Labs   Lab 02/27/23  0701   GLU 91      K 4.1      CO2 24   BUN 14   CREATININE 1.0   CALCIUM 9.7       Significant Diagnostics:  I have reviewed all pertinent imaging results/findings within the past 24 hours.

## 2023-02-28 NOTE — ASSESSMENT & PLAN NOTE
Babatunde Singh is a 72 year old male with renal mass and gallbladder mass now POD1 s/p open right radical nephrectomy and radical cholecystectomy.    - regular diet  - mIVF  - Multi-modal pain control  - PRN nausea meds  - Replace electrolytes PRN  - DVT prophylaxis   - OOBTC/Ambulate  - IS  - PT/OT  - Rest of care per primary team

## 2023-03-01 LAB
ALBUMIN SERPL BCP-MCNC: 2.7 G/DL (ref 3.5–5.2)
ALP SERPL-CCNC: 76 U/L (ref 55–135)
ALT SERPL W/O P-5'-P-CCNC: 93 U/L (ref 10–44)
ANION GAP SERPL CALC-SCNC: 5 MMOL/L (ref 8–16)
AST SERPL-CCNC: 103 U/L (ref 10–40)
BASOPHILS # BLD AUTO: 0.07 K/UL (ref 0–0.2)
BASOPHILS NFR BLD: 0.5 % (ref 0–1.9)
BILIRUB SERPL-MCNC: 0.4 MG/DL (ref 0.1–1)
BUN SERPL-MCNC: 14 MG/DL (ref 8–23)
CALCIUM SERPL-MCNC: 8.5 MG/DL (ref 8.7–10.5)
CHLORIDE SERPL-SCNC: 109 MMOL/L (ref 95–110)
CO2 SERPL-SCNC: 23 MMOL/L (ref 23–29)
CREAT SERPL-MCNC: 1.3 MG/DL (ref 0.5–1.4)
DIFFERENTIAL METHOD: ABNORMAL
EOSINOPHIL # BLD AUTO: 0 K/UL (ref 0–0.5)
EOSINOPHIL NFR BLD: 0.3 % (ref 0–8)
ERYTHROCYTE [DISTWIDTH] IN BLOOD BY AUTOMATED COUNT: 26.5 % (ref 11.5–14.5)
EST. GFR  (NO RACE VARIABLE): 58.7 ML/MIN/1.73 M^2
GLUCOSE SERPL-MCNC: 96 MG/DL (ref 70–110)
HCT VFR BLD AUTO: 28.2 % (ref 40–54)
HGB BLD-MCNC: 8 G/DL (ref 14–18)
IMM GRANULOCYTES # BLD AUTO: 0.05 K/UL (ref 0–0.04)
IMM GRANULOCYTES NFR BLD AUTO: 0.4 % (ref 0–0.5)
LYMPHOCYTES # BLD AUTO: 1.8 K/UL (ref 1–4.8)
LYMPHOCYTES NFR BLD: 13.4 % (ref 18–48)
MAGNESIUM SERPL-MCNC: 1.9 MG/DL (ref 1.6–2.6)
MCH RBC QN AUTO: 21 PG (ref 27–31)
MCHC RBC AUTO-ENTMCNC: 28.4 G/DL (ref 32–36)
MCV RBC AUTO: 74 FL (ref 82–98)
MONOCYTES # BLD AUTO: 1 K/UL (ref 0.3–1)
MONOCYTES NFR BLD: 7.3 % (ref 4–15)
NEUTROPHILS # BLD AUTO: 10.7 K/UL (ref 1.8–7.7)
NEUTROPHILS NFR BLD: 78.1 % (ref 38–73)
NRBC BLD-RTO: 0 /100 WBC
PHOSPHATE SERPL-MCNC: 2.9 MG/DL (ref 2.7–4.5)
PLATELET # BLD AUTO: 348 K/UL (ref 150–450)
PMV BLD AUTO: 10 FL (ref 9.2–12.9)
POTASSIUM SERPL-SCNC: 4.3 MMOL/L (ref 3.5–5.1)
PROT SERPL-MCNC: 5.8 G/DL (ref 6–8.4)
RBC # BLD AUTO: 3.81 M/UL (ref 4.6–6.2)
SODIUM SERPL-SCNC: 137 MMOL/L (ref 136–145)
WBC # BLD AUTO: 13.74 K/UL (ref 3.9–12.7)

## 2023-03-01 PROCEDURE — 25000003 PHARM REV CODE 250: Performed by: STUDENT IN AN ORGANIZED HEALTH CARE EDUCATION/TRAINING PROGRAM

## 2023-03-01 PROCEDURE — 63600175 PHARM REV CODE 636 W HCPCS: Performed by: STUDENT IN AN ORGANIZED HEALTH CARE EDUCATION/TRAINING PROGRAM

## 2023-03-01 PROCEDURE — 80053 COMPREHEN METABOLIC PANEL: CPT | Performed by: STUDENT IN AN ORGANIZED HEALTH CARE EDUCATION/TRAINING PROGRAM

## 2023-03-01 PROCEDURE — 99231 SBSQ HOSP IP/OBS SF/LOW 25: CPT | Mod: ,,, | Performed by: ANESTHESIOLOGY

## 2023-03-01 PROCEDURE — 84100 ASSAY OF PHOSPHORUS: CPT | Performed by: STUDENT IN AN ORGANIZED HEALTH CARE EDUCATION/TRAINING PROGRAM

## 2023-03-01 PROCEDURE — 83735 ASSAY OF MAGNESIUM: CPT | Performed by: STUDENT IN AN ORGANIZED HEALTH CARE EDUCATION/TRAINING PROGRAM

## 2023-03-01 PROCEDURE — 85025 COMPLETE CBC W/AUTO DIFF WBC: CPT | Performed by: STUDENT IN AN ORGANIZED HEALTH CARE EDUCATION/TRAINING PROGRAM

## 2023-03-01 PROCEDURE — 99231 PR SUBSEQUENT HOSPITAL CARE,LEVL I: ICD-10-PCS | Mod: ,,, | Performed by: ANESTHESIOLOGY

## 2023-03-01 PROCEDURE — 20600001 HC STEP DOWN PRIVATE ROOM

## 2023-03-01 PROCEDURE — 36415 COLL VENOUS BLD VENIPUNCTURE: CPT | Performed by: STUDENT IN AN ORGANIZED HEALTH CARE EDUCATION/TRAINING PROGRAM

## 2023-03-01 RX ORDER — ACETAMINOPHEN 500 MG
1000 TABLET ORAL EVERY 6 HOURS SCHEDULED
Status: DISCONTINUED | OUTPATIENT
Start: 2023-03-01 | End: 2023-03-02 | Stop reason: HOSPADM

## 2023-03-01 RX ORDER — ROPIVACAINE HYDROCHLORIDE 2 MG/ML
INJECTION, SOLUTION EPIDURAL; INFILTRATION; PERINEURAL
Status: DISPENSED
Start: 2023-03-01 | End: 2023-03-01

## 2023-03-01 RX ORDER — OXYCODONE HYDROCHLORIDE 5 MG/1
5 TABLET ORAL EVERY 4 HOURS PRN
Status: DISCONTINUED | OUTPATIENT
Start: 2023-03-01 | End: 2023-03-02 | Stop reason: HOSPADM

## 2023-03-01 RX ADMIN — METHOCARBAMOL 500 MG: 500 TABLET ORAL at 05:03

## 2023-03-01 RX ADMIN — HEPARIN SODIUM 5000 UNITS: 5000 INJECTION INTRAVENOUS; SUBCUTANEOUS at 05:03

## 2023-03-01 RX ADMIN — ACETAMINOPHEN 1000 MG: 500 TABLET ORAL at 05:03

## 2023-03-01 RX ADMIN — SENNOSIDES AND DOCUSATE SODIUM 1 TABLET: 50; 8.6 TABLET ORAL at 09:03

## 2023-03-01 RX ADMIN — ATORVASTATIN CALCIUM 80 MG: 40 TABLET, FILM COATED ORAL at 08:03

## 2023-03-01 RX ADMIN — GABAPENTIN 800 MG: 300 CAPSULE ORAL at 02:03

## 2023-03-01 RX ADMIN — GABAPENTIN 800 MG: 300 CAPSULE ORAL at 08:03

## 2023-03-01 RX ADMIN — LOSARTAN POTASSIUM AND HYDROCHLOROTHIAZIDE 1 TABLET: 50; 12.5 TABLET, FILM COATED ORAL at 08:03

## 2023-03-01 RX ADMIN — METHOCARBAMOL 500 MG: 500 TABLET ORAL at 11:03

## 2023-03-01 RX ADMIN — FAMOTIDINE 20 MG: 20 TABLET ORAL at 08:03

## 2023-03-01 RX ADMIN — HEPARIN SODIUM 5000 UNITS: 5000 INJECTION INTRAVENOUS; SUBCUTANEOUS at 09:03

## 2023-03-01 RX ADMIN — FAMOTIDINE 20 MG: 20 TABLET ORAL at 09:03

## 2023-03-01 RX ADMIN — GABAPENTIN 800 MG: 300 CAPSULE ORAL at 09:03

## 2023-03-01 RX ADMIN — HEPARIN SODIUM 5000 UNITS: 5000 INJECTION INTRAVENOUS; SUBCUTANEOUS at 02:03

## 2023-03-01 RX ADMIN — SENNOSIDES AND DOCUSATE SODIUM 1 TABLET: 50; 8.6 TABLET ORAL at 08:03

## 2023-03-01 RX ADMIN — POLYETHYLENE GLYCOL 3350 17 G: 17 POWDER, FOR SOLUTION ORAL at 08:03

## 2023-03-01 NOTE — SUBJECTIVE & OBJECTIVE
Interval History: NAEO. AFVSS. Pain well controlled and tolerating diet. Ambulated x2, passing flatus, no bm yet.    Objective:     Temp:  [98 °F (36.7 °C)-98.5 °F (36.9 °C)] 98.5 °F (36.9 °C)  Pulse:  [74-82] 82  Resp:  [16-20] 16  SpO2:  [98 %-99 %] 99 %  BP: (157-171)/(70-75) 157/70     Body mass index is 26.32 kg/m².    Date 03/01/23 0700 - 03/02/23 0659   Shift 8150-0485 7155-8044 6680-7590 24 Hour Total   INTAKE   Shift Total(mL/kg)       OUTPUT   Urine(mL/kg/hr) 500   500   Shift Total(mL/kg) 500(7.4)   500(7.4)   Weight (kg) 67.4 67.4 67.4 67.4          Drains       None                   Physical Exam  Constitutional:       General: He is not in acute distress.     Appearance: Normal appearance. He is not ill-appearing.   HENT:      Head: Normocephalic and atraumatic.      Mouth/Throat:      Mouth: Mucous membranes are moist.   Eyes:      Extraocular Movements: Extraocular movements intact.   Cardiovascular:      Rate and Rhythm: Normal rate.   Pulmonary:      Effort: Pulmonary effort is normal.   Abdominal:      General: There is distension.      Palpations: Abdomen is soft.      Tenderness: There is no right CVA tenderness, left CVA tenderness or guarding.      Comments: Right subcostal incision c/d/I, appropriately ttp   Genitourinary:     Comments: Romero catheter draining clear yellow  Musculoskeletal:      Cervical back: Normal range of motion.   Skin:     General: Skin is warm and dry.   Neurological:      Mental Status: He is alert and oriented to person, place, and time.   Psychiatric:         Mood and Affect: Mood normal.         Behavior: Behavior normal.       Significant Labs:    BMP:  Recent Labs   Lab 02/27/23  0701 02/28/23  0321 03/01/23  0459    137 137   K 4.1 4.4 4.3    110 109   CO2 24 17* 23   BUN 14 15 14   CREATININE 1.0 1.1 1.3   CALCIUM 9.7 8.3* 8.5*       CBC:   Recent Labs   Lab 02/27/23  0701 02/28/23  0321 03/01/23  0500   WBC 9.21 19.21* 13.74*   HGB 10.3* 8.5* 8.0*    HCT 35.9* 28.8* 28.2*    262 348       Urine Studies: No results for input(s): COLORU, APPEARANCEUA, PHUR, SPECGRAV, PROTEINUA, GLUCUA, KETONESU, BILIRUBINUA, OCCULTUA, NITRITE, UROBILINOGEN, LEUKOCYTESUR, RBCUA, WBCUA, BACTERIA, SQUAMEPITHEL, HYALINECASTS in the last 168 hours.    Invalid input(s): WRIGHTSARAH    Significant Imaging:  All pertinent imaging results/findings from the past 24 hours have been reviewed.

## 2023-03-01 NOTE — ANESTHESIA POST-OP PAIN MANAGEMENT
Acute Pain Service Progress Note    Babatunde Singh is a 72 y.o., male, [id number].    Surgery:  Right partial nephrectomy    Post Op Day #: 2    Catheter type: perineural  Right T8-9 MILLICENT    Infusion type: Ropivacaine 0.2%  0.1 mL/hr basal    Problem List:    There are no hospital problems to display for this patient.      Subjective:     General appearance of alert, oriented, no complaints   Pain with rest: 1    Numbers   Pain with movement: 2    Numbers   Side Effects    1. Pruritis No    2. Nausea No    3. Motor Blockade No, 0=Ability to raise lower extremities off bed    4. Sedation No, 1=awake and alert    Objective:     Catheter level T8-9   Catheter site clean, dry, intact         Vitals   Vitals:    03/01/23 1125   BP: (!) 168/76   Pulse: 91   Resp: 18   Temp: 36.7 °C (98.1 °F)        Labs    Admission on 02/27/2023   Component Date Value Ref Range Status    Sodium 02/27/2023 141  136 - 145 mmol/L Final    Potassium 02/27/2023 4.1  3.5 - 5.1 mmol/L Final    Chloride 02/27/2023 108  95 - 110 mmol/L Final    CO2 02/27/2023 24  23 - 29 mmol/L Final    Glucose 02/27/2023 91  70 - 110 mg/dL Final    BUN 02/27/2023 14  8 - 23 mg/dL Final    Creatinine 02/27/2023 1.0  0.5 - 1.4 mg/dL Final    Calcium 02/27/2023 9.7  8.7 - 10.5 mg/dL Final    Anion Gap 02/27/2023 9  8 - 16 mmol/L Final    eGFR 02/27/2023 >60.0  >60 mL/min/1.73 m^2 Final    WBC 02/27/2023 9.21  3.90 - 12.70 K/uL Final    RBC 02/27/2023 4.82  4.60 - 6.20 M/uL Final    Hemoglobin 02/27/2023 10.3 (L)  14.0 - 18.0 g/dL Final    Hematocrit 02/27/2023 35.9 (L)  40.0 - 54.0 % Final    MCV 02/27/2023 75 (L)  82 - 98 fL Final    MCH 02/27/2023 21.4 (L)  27.0 - 31.0 pg Final    MCHC 02/27/2023 28.7 (L)  32.0 - 36.0 g/dL Final    RDW 02/27/2023 27.4 (H)  11.5 - 14.5 % Final    Platelets 02/27/2023 331  150 - 450 K/uL Final    MPV 02/27/2023 10.1  9.2 - 12.9 fL Final    Immature Granulocytes 02/27/2023 0.3  0.0 - 0.5 % Final    Gran # (ANC)  02/27/2023 6.1  1.8 - 7.7 K/uL Final    Immature Grans (Abs) 02/27/2023 0.03  0.00 - 0.04 K/uL Final    Lymph # 02/27/2023 2.2  1.0 - 4.8 K/uL Final    Mono # 02/27/2023 0.8  0.3 - 1.0 K/uL Final    Eos # 02/27/2023 0.1  0.0 - 0.5 K/uL Final    Baso # 02/27/2023 0.06  0.00 - 0.20 K/uL Final    nRBC 02/27/2023 0  0 /100 WBC Final    Gran % 02/27/2023 65.9  38.0 - 73.0 % Final    Lymph % 02/27/2023 23.7  18.0 - 48.0 % Final    Mono % 02/27/2023 8.3  4.0 - 15.0 % Final    Eosinophil % 02/27/2023 1.1  0.0 - 8.0 % Final    Basophil % 02/27/2023 0.7  0.0 - 1.9 % Final    Differential Method 02/27/2023 Automated   Final    Group & Rh 02/27/2023 A POS   Final    Indirect Stan 02/27/2023 NEG   Final    UNIT NUMBER 02/27/2023 [id number]   Final    Product Code 02/27/2023 [id number]   Final    DISPENSE STATUS 02/27/2023 RETURNED   Final    CODING SYSTEM 02/27/2023 [id number]   Final    Unit Blood Type Code 02/27/2023 6200   Final    Unit Blood Type 02/27/2023 A POS   Final    Unit Expiration 02/27/2023 [id number]   Final    CROSSMATCH INTERPRETATION 02/27/2023 Compatible   Final    UNIT NUMBER 02/27/2023 [id number]   Final    Product Code 02/27/2023 [id number]   Final    DISPENSE STATUS 02/27/2023 RETURNED   Final    CODING SYSTEM 02/27/2023 [id number]   Final    Unit Blood Type Code 02/27/2023 6200   Final    Unit Blood Type 02/27/2023 A POS   Final    Unit Expiration 02/27/2023 [id number]   Final    CROSSMATCH INTERPRETATION 02/27/2023 Compatible   Final    Sodium 02/28/2023 137  136 - 145 mmol/L Final    Potassium 02/28/2023 4.4  3.5 - 5.1 mmol/L Final    Chloride 02/28/2023 110  95 - 110 mmol/L Final    CO2 02/28/2023 17 (L)  23 - 29 mmol/L Final    Glucose 02/28/2023 102  70 - 110 mg/dL Final    BUN 02/28/2023 15  8 - 23 mg/dL Final    Creatinine 02/28/2023 1.1  0.5 - 1.4 mg/dL Final    Calcium 02/28/2023 8.3 (L)  8.7 - 10.5 mg/dL Final    Total Protein 02/28/2023 5.6 (L)  6.0 -  8.4 g/dL Final    Albumin 02/28/2023 2.8 (L)  3.5 - 5.2 g/dL Final    Total Bilirubin 02/28/2023 0.3  0.1 - 1.0 mg/dL Final    Alkaline Phosphatase 02/28/2023 73  55 - 135 U/L Final    AST 02/28/2023 145 (H)  10 - 40 U/L Final    ALT 02/28/2023 119 (H)  10 - 44 U/L Final    Anion Gap 02/28/2023 10  8 - 16 mmol/L Final    eGFR 02/28/2023 >60.0  >60 mL/min/1.73 m^2 Final    Phosphorus 02/28/2023 3.6  2.7 - 4.5 mg/dL Final    Magnesium 02/28/2023 1.9  1.6 - 2.6 mg/dL Final    WBC 02/28/2023 19.21 (H)  3.90 - 12.70 K/uL Final    RBC 02/28/2023 3.94 (L)  4.60 - 6.20 M/uL Final    Hemoglobin 02/28/2023 8.5 (L)  14.0 - 18.0 g/dL Final    Hematocrit 02/28/2023 28.8 (L)  40.0 - 54.0 % Final    MCV 02/28/2023 73 (L)  82 - 98 fL Final    MCH 02/28/2023 21.6 (L)  27.0 - 31.0 pg Final    MCHC 02/28/2023 29.5 (L)  32.0 - 36.0 g/dL Final    RDW 02/28/2023 26.9 (H)  11.5 - 14.5 % Final    Platelets 02/28/2023 262  150 - 450 K/uL Final    MPV 02/28/2023 11.1  9.2 - 12.9 fL Final    Immature Granulocytes 02/28/2023 0.7 (H)  0.0 - 0.5 % Final    Gran # (ANC) 02/28/2023 14.2 (H)  1.8 - 7.7 K/uL Final    Immature Grans (Abs) 02/28/2023 0.14 (H)  0.00 - 0.04 K/uL Final    Lymph # 02/28/2023 2.9  1.0 - 4.8 K/uL Final    Mono # 02/28/2023 2.0 (H)  0.3 - 1.0 K/uL Final    Eos # 02/28/2023 0.0  0.0 - 0.5 K/uL Final    Baso # 02/28/2023 0.04  0.00 - 0.20 K/uL Final    nRBC 02/28/2023 0  0 /100 WBC Final    Gran % 02/28/2023 74.1 (H)  38.0 - 73.0 % Final    Lymph % 02/28/2023 14.8 (L)  18.0 - 48.0 % Final    Mono % 02/28/2023 10.2  4.0 - 15.0 % Final    Eosinophil % 02/28/2023 0.0  0.0 - 8.0 % Final    Basophil % 02/28/2023 0.2  0.0 - 1.9 % Final    Differential Method 02/28/2023 Automated   Final    WBC 03/01/2023 13.74 (H)  3.90 - 12.70 K/uL Final    RBC 03/01/2023 3.81 (L)  4.60 - 6.20 M/uL Final    Hemoglobin 03/01/2023 8.0 (L)  14.0 - 18.0 g/dL Final    Hematocrit 03/01/2023 28.2 (L)  40.0 - 54.0 %  Final    MCV 03/01/2023 74 (L)  82 - 98 fL Final    MCH 03/01/2023 21.0 (L)  27.0 - 31.0 pg Final    MCHC 03/01/2023 28.4 (L)  32.0 - 36.0 g/dL Final    RDW 03/01/2023 26.5 (H)  11.5 - 14.5 % Final    Platelets 03/01/2023 348  150 - 450 K/uL Final    MPV 03/01/2023 10.0  9.2 - 12.9 fL Final    Immature Granulocytes 03/01/2023 0.4  0.0 - 0.5 % Final    Gran # (ANC) 03/01/2023 10.7 (H)  1.8 - 7.7 K/uL Final    Immature Grans (Abs) 03/01/2023 0.05 (H)  0.00 - 0.04 K/uL Final    Lymph # 03/01/2023 1.8  1.0 - 4.8 K/uL Final    Mono # 03/01/2023 1.0  0.3 - 1.0 K/uL Final    Eos # 03/01/2023 0.0  0.0 - 0.5 K/uL Final    Baso # 03/01/2023 0.07  0.00 - 0.20 K/uL Final    nRBC 03/01/2023 0  0 /100 WBC Final    Gran % 03/01/2023 78.1 (H)  38.0 - 73.0 % Final    Lymph % 03/01/2023 13.4 (L)  18.0 - 48.0 % Final    Mono % 03/01/2023 7.3  4.0 - 15.0 % Final    Eosinophil % 03/01/2023 0.3  0.0 - 8.0 % Final    Basophil % 03/01/2023 0.5  0.0 - 1.9 % Final    Differential Method 03/01/2023 Automated   Final    Sodium 03/01/2023 137  136 - 145 mmol/L Final    Potassium 03/01/2023 4.3  3.5 - 5.1 mmol/L Final    Chloride 03/01/2023 109  95 - 110 mmol/L Final    CO2 03/01/2023 23  23 - 29 mmol/L Final    Glucose 03/01/2023 96  70 - 110 mg/dL Final    BUN 03/01/2023 14  8 - 23 mg/dL Final    Creatinine 03/01/2023 1.3  0.5 - 1.4 mg/dL Final    Calcium 03/01/2023 8.5 (L)  8.7 - 10.5 mg/dL Final    Total Protein 03/01/2023 5.8 (L)  6.0 - 8.4 g/dL Final    Albumin 03/01/2023 2.7 (L)  3.5 - 5.2 g/dL Final    Total Bilirubin 03/01/2023 0.4  0.1 - 1.0 mg/dL Final    Alkaline Phosphatase 03/01/2023 76  55 - 135 U/L Final    AST 03/01/2023 103 (H)  10 - 40 U/L Final    ALT 03/01/2023 93 (H)  10 - 44 U/L Final    Anion Gap 03/01/2023 5 (L)  8 - 16 mmol/L Final    eGFR 03/01/2023 58.7 (A)  >60 mL/min/1.73 m^2 Final    Magnesium 03/01/2023 1.9  1.6 - 2.6 mg/dL Final    Phosphorus 03/01/2023 2.9  2.7 - 4.5 mg/dL  Final        Meds   Current Facility-Administered Medications   Medication Dose Route Frequency Provider Last Rate Last Admin    acetaminophen tablet 1,000 mg  1,000 mg Oral 4 times per day Cruz Higuera MD        atorvastatin tablet 80 mg  80 mg Oral Daily Catalina Landry MD   80 mg at 03/01/23 0834    bisacodyL suppository 10 mg  10 mg Rectal Daily PRN Coleman Lofton MD        famotidine tablet 20 mg  20 mg Oral BID Catalina Landry MD   20 mg at 03/01/23 0834    gabapentin capsule 800 mg  800 mg Oral TID Catalina Landry MD   800 mg at 03/01/23 0834    heparin (porcine) injection 5,000 Units  5,000 Units Subcutaneous Q8H Catalina Landry MD   5,000 Units at 03/01/23 0517    hydrALAZINE injection 10 mg  10 mg Intravenous Q6H PRN Catalina Landry MD        losartan-hydrochlorothiazide 50-12.5 mg per tablet 1 tablet  1 tablet Oral Daily Catalina Landry MD   1 tablet at 03/01/23 0834    melatonin tablet 6 mg  6 mg Oral Nightly PRN Catalina Landry MD        methocarbamoL tablet 500 mg  500 mg Oral Q6H Paresh Mike MD   500 mg at 03/01/23 1120    ondansetron injection 4 mg  4 mg Intravenous Q12H PRN Luis Villalpando MD        oxyCODONE immediate release tablet 5 mg  5 mg Oral Q4H PRN Cruz Higuera MD        polyethylene glycol packet 17 g  17 g Oral Daily Paresh Mike MD   17 g at 03/01/23 0834    ROPIvacaine (PF) 2 mg/ml (0.2%) (Naropin) 2 mg/mL (0.2 %) infusion             ROPIvacaine (PF) 2 mg/ml (0.2%) solution  0.1 mL/hr Perineural Continuous Luis Villalpando MD 0.1 mL/hr at 02/27/23 1236 0.1 mL/hr at 02/27/23 1236    senna-docusate 8.6-50 mg per tablet 1 tablet  1 tablet Oral BID Parseh Mike MD   1 tablet at 03/01/23 0834        Anticoagulant dose: Heparin injections 5,000units q8h    Assessment:     Pain control adequate    Plan:    - continue R MILLICENT catheter infusion  - gabapentin 800mg TID  - methocarbamol 500mg q6h  - tylenol 1g q6h  - oxycodone 5mg q4h  PRN moderate and severe pain       Patient doing well, continue present treatment.    Thank you for allowing us to participate in the care of this patient. We will continue to follow.       Nubia Mcnamara DO  Anesthesiology Resident, PGY-1

## 2023-03-01 NOTE — SUBJECTIVE & OBJECTIVE
Interval History: NAEO. AFVSS. Pain well controlled. Tolerating diet.       Objective:     Temp:  [98 °F (36.7 °C)-98.5 °F (36.9 °C)] 98.5 °F (36.9 °C)  Pulse:  [74-82] 82  Resp:  [16-20] 16  SpO2:  [98 %-99 %] 99 %  BP: (157-171)/(70-75) 157/70     Body mass index is 26.32 kg/m².    Date 03/01/23 0700 - 03/02/23 0659   Shift 4379-9742 8743-1299 1543-4499 24 Hour Total   INTAKE   Shift Total(mL/kg)       OUTPUT   Urine(mL/kg/hr) 500   500   Shift Total(mL/kg) 500(7.4)   500(7.4)   Weight (kg) 67.4 67.4 67.4 67.4          Drains       None                   Physical Exam    Significant Labs:    BMP:  Recent Labs   Lab 02/27/23  0701 02/28/23  0321 03/01/23  0459    137 137   K 4.1 4.4 4.3    110 109   CO2 24 17* 23   BUN 14 15 14   CREATININE 1.0 1.1 1.3   CALCIUM 9.7 8.3* 8.5*       CBC:   Recent Labs   Lab 02/27/23  0701 02/28/23  0321 03/01/23  0500   WBC 9.21 19.21* 13.74*   HGB 10.3* 8.5* 8.0*   HCT 35.9* 28.8* 28.2*    262 348       Urine Studies: No results for input(s): COLORU, APPEARANCEUA, PHUR, SPECGRAV, PROTEINUA, GLUCUA, KETONESU, BILIRUBINUA, OCCULTUA, NITRITE, UROBILINOGEN, LEUKOCYTESUR, RBCUA, WBCUA, BACTERIA, SQUAMEPITHEL, HYALINECASTS in the last 168 hours.    Invalid input(s): WRIGHTSUR  All pertinent labs results from the past 24 hours have been reviewed.    Significant Imaging:  All pertinent imaging results/findings from the past 24 hours have been reviewed.

## 2023-03-01 NOTE — SUBJECTIVE & OBJECTIVE
Interval History: No acute events overnight. Tolerating diet. Having bowel function.     Medications:  Continuous Infusions:   ROPIvacaine (PF) 2 mg/ml (0.2%) 0.1 mL/hr (02/27/23 1236)     Scheduled Meds:   acetaminophen  1,000 mg Oral 4 times per day    acetaminophen  1,000 mg Oral 4 times per day    atorvastatin  80 mg Oral Daily    famotidine  20 mg Oral BID    gabapentin  800 mg Oral TID    heparin (porcine)  5,000 Units Subcutaneous Q8H    losartan-hydrochlorothiazide 50-12.5 mg  1 tablet Oral Daily    methocarbamoL  500 mg Oral Q6H    polyethylene glycol  17 g Oral Daily    ROPIvacaine (PF) 2 mg/ml (0.2%)        senna-docusate 8.6-50 mg  1 tablet Oral BID     PRN Meds:bisacodyL, hydrALAZINE, melatonin, ondansetron, oxyCODONE     Review of patient's allergies indicates:   Allergen Reactions    Ace inhibitors      Objective:     Vital Signs (Most Recent):  Temp: 98.8 °F (37.1 °C) (03/01/23 0810)  Pulse: 91 (03/01/23 0810)  Resp: 18 (03/01/23 0810)  BP: (!) 155/71 (03/01/23 0810)  SpO2: 98 % (03/01/23 0810)   Vital Signs (24h Range):  Temp:  [98 °F (36.7 °C)-98.8 °F (37.1 °C)] 98.8 °F (37.1 °C)  Pulse:  [74-91] 91  Resp:  [16-20] 18  SpO2:  [98 %-99 %] 98 %  BP: (155-171)/(70-75) 155/71     Weight: 67.4 kg (148 lb 9.4 oz)  Body mass index is 26.32 kg/m².    Intake/Output - Last 3 Shifts         02/27 0700  02/28 0659 02/28 0700 03/01 0659 03/01 0700 03/02 0659    P.O. 360      I.V. (mL/kg) 1500 (22.3)      IV Piggyback 2200.5      Total Intake(mL/kg) 4060.5 (60.2)      Urine (mL/kg/hr) 1000 (0.6) 2650 (1.6) 500 (2.5)    Stool  0 0    Total Output 1000 2650 500    Net +3060.5 -2650 -500           Stool Occurrence  0 x 0 x            Physical Exam  Vitals and nursing note reviewed.   Pulmonary:      Effort: Pulmonary effort is normal.   Abdominal:      Tenderness: There is abdominal tenderness.      Comments: Incision clean, dry, and intact. Appropriately tender.        Significant Labs:  I have reviewed all  pertinent lab results within the past 24 hours.  CBC:   Recent Labs   Lab 03/01/23  0500   WBC 13.74*   RBC 3.81*   HGB 8.0*   HCT 28.2*      MCV 74*   MCH 21.0*   MCHC 28.4*     CMP:   Recent Labs   Lab 03/01/23  0459   GLU 96   CALCIUM 8.5*   ALBUMIN 2.7*   PROT 5.8*      K 4.3   CO2 23      BUN 14   CREATININE 1.3   ALKPHOS 76   ALT 93*   *   BILITOT 0.4       Significant Diagnostics:  none

## 2023-03-01 NOTE — ASSESSMENT & PLAN NOTE
Babatunde Singh is a 72 y.o. male with a history of a 5.7 cm right upper pole renal mass now POD1 s/p open right radical nephrectomy.    -Multimodal pain control  -Appreciate pain management assistance  -PT/OT  -encourage ambulation   -Regular diet  - passed VT  -wound care consult of lower extremity wounds  -f/u labs

## 2023-03-01 NOTE — ASSESSMENT & PLAN NOTE
Babatunde Singh is a 72 year old male with renal mass and gallbladder mass now POD 2  s/p open right radical nephrectomy and radical cholecystectomy.    -okay for regular diet  -remaining care per primary team  -will follow up patient in clinic to discuss path

## 2023-03-01 NOTE — PROGRESS NOTES
[facility]  Urology  Progress Note    Patient Name: Babatunde Singh  MRN: [id number]  Admission Date: 2/27/2023  Hospital Length of Stay: 2 days  Code Status: Prior   Attending Provider: Moises Cuba MD   Primary Care Physician: Esdras Ly MD    Subjective:     HPI:  No notes on file    Interval History: NAEO. AFVSS. Pain well controlled and tolerating diet.     Objective:     Temp:  [98 °F (36.7 °C)-98.5 °F (36.9 °C)] 98.5 °F (36.9 °C)  Pulse:  [74-82] 82  Resp:  [16-20] 16  SpO2:  [98 %-99 %] 99 %  BP: (157-171)/(70-75) 157/70     Body mass index is 26.32 kg/m².    Date 03/01/23 0700 - 03/02/23 0659   Shift 6630-3137 0927-2388 7784-9308 24 Hour Total   INTAKE   Shift Total(mL/kg)       OUTPUT   Urine(mL/kg/hr) 500   500   Shift Total(mL/kg) 500(7.4)   500(7.4)   Weight (kg) 67.4 67.4 67.4 67.4          Drains       None                   Physical Exam  Constitutional:       General: He is not in acute distress.     Appearance: Normal appearance. He is not ill-appearing.   HENT:      Head: Normocephalic and atraumatic.      Mouth/Throat:      Mouth: Mucous membranes are moist.   Eyes:      Extraocular Movements: Extraocular movements intact.   Cardiovascular:      Rate and Rhythm: Normal rate.   Pulmonary:      Effort: Pulmonary effort is normal.   Abdominal:      Palpations: Abdomen is soft.      Comments: Right subcostal incision dressed with island dressing, no strikethrough   Genitourinary:     Comments: Romero catheter draining clear yellow  Musculoskeletal:      Cervical back: Normal range of motion.   Skin:     General: Skin is warm and dry.   Neurological:      Mental Status: He is alert and oriented to person, place, and time.   Psychiatric:         Mood and Affect: Mood normal.         Behavior: Behavior normal.       Significant Labs:    BMP:  Recent Labs   Lab 02/27/23  0701 02/28/23  0321 03/01/23  0459    137 137   K 4.1 4.4 4.3    110 109   CO2 24 17* 23   BUN 14 15 14    CREATININE 1.0 1.1 1.3   CALCIUM 9.7 8.3* 8.5*       CBC:   Recent Labs   Lab 02/27/23  0701 02/28/23  0321 03/01/23  0500   WBC 9.21 19.21* 13.74*   HGB 10.3* 8.5* 8.0*   HCT 35.9* 28.8* 28.2*    262 348       Urine Studies: No results for input(s): COLORU, APPEARANCEUA, PHUR, SPECGRAV, PROTEINUA, GLUCUA, KETONESU, BILIRUBINUA, OCCULTUA, NITRITE, UROBILINOGEN, LEUKOCYTESUR, RBCUA, WBCUA, BACTERIA, SQUAMEPITHEL, HYALINECASTS in the last 168 hours.    Invalid input(s): ITZ    Significant Imaging:  All pertinent imaging results/findings from the past 24 hours have been reviewed.                  Assessment/Plan:     Right renal mass  Babatunde Singh is a 72 y.o. male with a history of a 5.7 cm right upper pole renal mass now POD1 s/p open right radical nephrectomy.    -Multimodal pain control  -Appreciate pain management assistance  -PT/OT  -encourage ambulation   -Regular diet  - passed VT  -wound care consult of lower extremity wounds  -f/u labs        VTE Risk Mitigation (From admission, onward)           Ordered     heparin (porcine) injection 5,000 Units  Every 8 hours         02/27/23 1236     IP VTE HIGH RISK PATIENT  Once         02/27/23 1236     Place sequential compression device  Until discontinued         02/27/23 1236     Place ANGIE hose  Until discontinued         02/27/23 0636     Place sequential compression device  Until discontinued         02/27/23 0636                    Kristofer Coello MD  Urology  [facility]

## 2023-03-01 NOTE — PLAN OF CARE
03/01/23 1058   Post-Acute Status   Post-Acute Authorization Home Health   Home Health Status Pending Payor Medical Review   Coverage [facility]   Hospital Resources/Appts/Education Provided Provided patient/caregiver with written discharge plan information   Patient choice form signed by patient/caregiver List with quality metrics by geographic area provided   Discharge Delays None known at this time   Discharge Plan   Discharge Plan A Home Health       HH orders faxed to [facility].    Jennyfer Saenz LCSW  Case Management/[facility]  [phone]

## 2023-03-01 NOTE — PROGRESS NOTES
[facility]  General Surgery  Progress Note    Subjective:       Post-Op Info:  Procedure(s) (LRB):  NEPHRECTOMY, PARTIAL (Right)  CHOLECYSTECTOMY complex (N/A)   2 Days Post-Op     Interval History: No acute events overnight. Tolerating diet. Having bowel function.     Medications:  Continuous Infusions:   ROPIvacaine (PF) 2 mg/ml (0.2%) 0.1 mL/hr (02/27/23 1236)     Scheduled Meds:   acetaminophen  1,000 mg Oral 4 times per day    acetaminophen  1,000 mg Oral 4 times per day    atorvastatin  80 mg Oral Daily    famotidine  20 mg Oral BID    gabapentin  800 mg Oral TID    heparin (porcine)  5,000 Units Subcutaneous Q8H    losartan-hydrochlorothiazide 50-12.5 mg  1 tablet Oral Daily    methocarbamoL  500 mg Oral Q6H    polyethylene glycol  17 g Oral Daily    ROPIvacaine (PF) 2 mg/ml (0.2%)        senna-docusate 8.6-50 mg  1 tablet Oral BID     PRN Meds:bisacodyL, hydrALAZINE, melatonin, ondansetron, oxyCODONE     Review of patient's allergies indicates:   Allergen Reactions    Ace inhibitors      Objective:     Vital Signs (Most Recent):  Temp: 98.8 °F (37.1 °C) (03/01/23 0810)  Pulse: 91 (03/01/23 0810)  Resp: 18 (03/01/23 0810)  BP: (!) 155/71 (03/01/23 0810)  SpO2: 98 % (03/01/23 0810)   Vital Signs (24h Range):  Temp:  [98 °F (36.7 °C)-98.8 °F (37.1 °C)] 98.8 °F (37.1 °C)  Pulse:  [74-91] 91  Resp:  [16-20] 18  SpO2:  [98 %-99 %] 98 %  BP: (155-171)/(70-75) 155/71     Weight: 67.4 kg (148 lb 9.4 oz)  Body mass index is 26.32 kg/m².    Intake/Output - Last 3 Shifts         02/27 0700  02/28 0659 02/28 0700  03/01 0659 03/01 0700  03/02 0659    P.O. 360      I.V. (mL/kg) 1500 (22.3)      IV Piggyback 2200.5      Total Intake(mL/kg) 4060.5 (60.2)      Urine (mL/kg/hr) 1000 (0.6) 2650 (1.6) 500 (2.5)    Stool  0 0    Total Output 1000 2650 500    Net +3060.5 -2650 -500           Stool Occurrence  0 x 0 x            Physical Exam  Vitals and nursing note reviewed.   Pulmonary:      Effort: Pulmonary  effort is normal.   Abdominal:      Tenderness: There is abdominal tenderness.      Comments: Incision clean, dry, and intact. Appropriately tender.        Significant Labs:  I have reviewed all pertinent lab results within the past 24 hours.  CBC:   Recent Labs   Lab 03/01/23  0500   WBC 13.74*   RBC 3.81*   HGB 8.0*   HCT 28.2*      MCV 74*   MCH 21.0*   MCHC 28.4*     CMP:   Recent Labs   Lab 03/01/23  0459   GLU 96   CALCIUM 8.5*   ALBUMIN 2.7*   PROT 5.8*      K 4.3   CO2 23      BUN 14   CREATININE 1.3   ALKPHOS 76   ALT 93*   *   BILITOT 0.4       Significant Diagnostics:  none    Assessment/Plan:     * Gallbladder mass  Babatunde Singh is a 72 year old male with renal mass and gallbladder mass now POD 2  s/p open right radical nephrectomy and radical cholecystectomy.    -okay for regular diet  -remaining care per primary team  -will follow up patient in clinic to discuss path            Maynor Pierre MD  General Surgery  [facility]

## 2023-03-02 VITALS
WEIGHT: 148.56 LBS | DIASTOLIC BLOOD PRESSURE: 61 MMHG | RESPIRATION RATE: 18 BRPM | HEART RATE: 92 BPM | TEMPERATURE: 99 F | SYSTOLIC BLOOD PRESSURE: 138 MMHG | OXYGEN SATURATION: 98 % | HEIGHT: 63 IN | BODY MASS INDEX: 26.32 KG/M2

## 2023-03-02 LAB
ALBUMIN SERPL BCP-MCNC: 2.5 G/DL (ref 3.5–5.2)
ALP SERPL-CCNC: 86 U/L (ref 55–135)
ALT SERPL W/O P-5'-P-CCNC: 63 U/L (ref 10–44)
ANION GAP SERPL CALC-SCNC: 5 MMOL/L (ref 8–16)
AST SERPL-CCNC: 59 U/L (ref 10–40)
BASOPHILS # BLD AUTO: 0.06 K/UL (ref 0–0.2)
BASOPHILS # BLD AUTO: 0.07 K/UL (ref 0–0.2)
BASOPHILS NFR BLD: 0.5 % (ref 0–1.9)
BASOPHILS NFR BLD: 0.6 % (ref 0–1.9)
BILIRUB SERPL-MCNC: 0.3 MG/DL (ref 0.1–1)
BUN SERPL-MCNC: 15 MG/DL (ref 8–23)
CALCIUM SERPL-MCNC: 8.9 MG/DL (ref 8.7–10.5)
CHLORIDE SERPL-SCNC: 107 MMOL/L (ref 95–110)
CO2 SERPL-SCNC: 25 MMOL/L (ref 23–29)
CREAT SERPL-MCNC: 1.3 MG/DL (ref 0.5–1.4)
DIFFERENTIAL METHOD: ABNORMAL
DIFFERENTIAL METHOD: ABNORMAL
EOSINOPHIL # BLD AUTO: 0.1 K/UL (ref 0–0.5)
EOSINOPHIL # BLD AUTO: 0.1 K/UL (ref 0–0.5)
EOSINOPHIL NFR BLD: 1.2 % (ref 0–8)
EOSINOPHIL NFR BLD: 1.2 % (ref 0–8)
ERYTHROCYTE [DISTWIDTH] IN BLOOD BY AUTOMATED COUNT: 26.5 % (ref 11.5–14.5)
ERYTHROCYTE [DISTWIDTH] IN BLOOD BY AUTOMATED COUNT: 26.9 % (ref 11.5–14.5)
EST. GFR  (NO RACE VARIABLE): 58.7 ML/MIN/1.73 M^2
GLUCOSE SERPL-MCNC: 84 MG/DL (ref 70–110)
HCT VFR BLD AUTO: 28.2 % (ref 40–54)
HCT VFR BLD AUTO: 28.5 % (ref 40–54)
HGB BLD-MCNC: 7.7 G/DL (ref 14–18)
HGB BLD-MCNC: 7.9 G/DL (ref 14–18)
IMM GRANULOCYTES # BLD AUTO: 0.04 K/UL (ref 0–0.04)
IMM GRANULOCYTES # BLD AUTO: 0.06 K/UL (ref 0–0.04)
IMM GRANULOCYTES NFR BLD AUTO: 0.4 % (ref 0–0.5)
IMM GRANULOCYTES NFR BLD AUTO: 0.5 % (ref 0–0.5)
LYMPHOCYTES # BLD AUTO: 2.6 K/UL (ref 1–4.8)
LYMPHOCYTES # BLD AUTO: 2.7 K/UL (ref 1–4.8)
LYMPHOCYTES NFR BLD: 23.4 % (ref 18–48)
LYMPHOCYTES NFR BLD: 24.2 % (ref 18–48)
MAGNESIUM SERPL-MCNC: 1.9 MG/DL (ref 1.6–2.6)
MCH RBC QN AUTO: 20.8 PG (ref 27–31)
MCH RBC QN AUTO: 21 PG (ref 27–31)
MCHC RBC AUTO-ENTMCNC: 27.3 G/DL (ref 32–36)
MCHC RBC AUTO-ENTMCNC: 27.7 G/DL (ref 32–36)
MCV RBC AUTO: 76 FL (ref 82–98)
MCV RBC AUTO: 76 FL (ref 82–98)
MONOCYTES # BLD AUTO: 1.1 K/UL (ref 0.3–1)
MONOCYTES # BLD AUTO: 1.1 K/UL (ref 0.3–1)
MONOCYTES NFR BLD: 9.7 % (ref 4–15)
MONOCYTES NFR BLD: 9.9 % (ref 4–15)
NEUTROPHILS # BLD AUTO: 7 K/UL (ref 1.8–7.7)
NEUTROPHILS # BLD AUTO: 7.1 K/UL (ref 1.8–7.7)
NEUTROPHILS NFR BLD: 63.6 % (ref 38–73)
NEUTROPHILS NFR BLD: 64.8 % (ref 38–73)
NRBC BLD-RTO: 0 /100 WBC
NRBC BLD-RTO: 0 /100 WBC
PHOSPHATE SERPL-MCNC: 3.4 MG/DL (ref 2.7–4.5)
PLATELET # BLD AUTO: 353 K/UL (ref 150–450)
PLATELET # BLD AUTO: 394 K/UL (ref 150–450)
PMV BLD AUTO: 10.4 FL (ref 9.2–12.9)
PMV BLD AUTO: 9.9 FL (ref 9.2–12.9)
POTASSIUM SERPL-SCNC: 4.1 MMOL/L (ref 3.5–5.1)
PROT SERPL-MCNC: 5.5 G/DL (ref 6–8.4)
RBC # BLD AUTO: 3.71 M/UL (ref 4.6–6.2)
RBC # BLD AUTO: 3.77 M/UL (ref 4.6–6.2)
SODIUM SERPL-SCNC: 137 MMOL/L (ref 136–145)
WBC # BLD AUTO: 10.93 K/UL (ref 3.9–12.7)
WBC # BLD AUTO: 11.08 K/UL (ref 3.9–12.7)

## 2023-03-02 PROCEDURE — 85025 COMPLETE CBC W/AUTO DIFF WBC: CPT | Performed by: STUDENT IN AN ORGANIZED HEALTH CARE EDUCATION/TRAINING PROGRAM

## 2023-03-02 PROCEDURE — 99231 PR SUBSEQUENT HOSPITAL CARE,LEVL I: ICD-10-PCS | Mod: ,,, | Performed by: ANESTHESIOLOGY

## 2023-03-02 PROCEDURE — 99231 SBSQ HOSP IP/OBS SF/LOW 25: CPT | Mod: ,,, | Performed by: ANESTHESIOLOGY

## 2023-03-02 PROCEDURE — 25000003 PHARM REV CODE 250: Performed by: STUDENT IN AN ORGANIZED HEALTH CARE EDUCATION/TRAINING PROGRAM

## 2023-03-02 PROCEDURE — 94761 N-INVAS EAR/PLS OXIMETRY MLT: CPT

## 2023-03-02 PROCEDURE — 84100 ASSAY OF PHOSPHORUS: CPT | Performed by: STUDENT IN AN ORGANIZED HEALTH CARE EDUCATION/TRAINING PROGRAM

## 2023-03-02 PROCEDURE — 36415 COLL VENOUS BLD VENIPUNCTURE: CPT | Performed by: STUDENT IN AN ORGANIZED HEALTH CARE EDUCATION/TRAINING PROGRAM

## 2023-03-02 PROCEDURE — 63600175 PHARM REV CODE 636 W HCPCS: Performed by: STUDENT IN AN ORGANIZED HEALTH CARE EDUCATION/TRAINING PROGRAM

## 2023-03-02 PROCEDURE — 83735 ASSAY OF MAGNESIUM: CPT | Performed by: STUDENT IN AN ORGANIZED HEALTH CARE EDUCATION/TRAINING PROGRAM

## 2023-03-02 PROCEDURE — 80053 COMPREHEN METABOLIC PANEL: CPT | Performed by: STUDENT IN AN ORGANIZED HEALTH CARE EDUCATION/TRAINING PROGRAM

## 2023-03-02 PROCEDURE — 97530 THERAPEUTIC ACTIVITIES: CPT

## 2023-03-02 RX ORDER — DEXTROMETHORPHAN HYDROBROMIDE, GUAIFENESIN 5; 100 MG/5ML; MG/5ML
650 LIQUID ORAL EVERY 8 HOURS
Qty: 30 TABLET | Refills: 0 | Status: SHIPPED | OUTPATIENT
Start: 2023-03-02 | End: 2023-03-13

## 2023-03-02 RX ORDER — OXYCODONE HYDROCHLORIDE 5 MG/1
5 TABLET ORAL EVERY 4 HOURS PRN
Qty: 15 TABLET | Refills: 0 | Status: SHIPPED | OUTPATIENT
Start: 2023-03-02 | End: 2023-05-22

## 2023-03-02 RX ORDER — POLYETHYLENE GLYCOL 3350 17 G/17G
17 POWDER, FOR SOLUTION ORAL DAILY
Qty: 238 G | Refills: 0 | Status: SHIPPED | OUTPATIENT
Start: 2023-03-02 | End: 2023-03-16

## 2023-03-02 RX ORDER — IBUPROFEN 600 MG/1
600 TABLET ORAL EVERY 6 HOURS PRN
Qty: 40 TABLET | Refills: 0 | Status: SHIPPED | OUTPATIENT
Start: 2023-03-02 | End: 2023-03-13

## 2023-03-02 RX ADMIN — POLYETHYLENE GLYCOL 3350 17 G: 17 POWDER, FOR SOLUTION ORAL at 09:03

## 2023-03-02 RX ADMIN — SENNOSIDES AND DOCUSATE SODIUM 1 TABLET: 50; 8.6 TABLET ORAL at 09:03

## 2023-03-02 RX ADMIN — HEPARIN SODIUM 5000 UNITS: 5000 INJECTION INTRAVENOUS; SUBCUTANEOUS at 05:03

## 2023-03-02 RX ADMIN — GABAPENTIN 800 MG: 300 CAPSULE ORAL at 09:03

## 2023-03-02 RX ADMIN — ACETAMINOPHEN 1000 MG: 500 TABLET ORAL at 05:03

## 2023-03-02 RX ADMIN — ATORVASTATIN CALCIUM 80 MG: 40 TABLET, FILM COATED ORAL at 09:03

## 2023-03-02 RX ADMIN — METHOCARBAMOL 500 MG: 500 TABLET ORAL at 01:03

## 2023-03-02 RX ADMIN — ACETAMINOPHEN 1000 MG: 500 TABLET ORAL at 01:03

## 2023-03-02 RX ADMIN — METHOCARBAMOL 500 MG: 500 TABLET ORAL at 12:03

## 2023-03-02 RX ADMIN — METHOCARBAMOL 500 MG: 500 TABLET ORAL at 05:03

## 2023-03-02 RX ADMIN — FAMOTIDINE 20 MG: 20 TABLET ORAL at 09:03

## 2023-03-02 RX ADMIN — LOSARTAN POTASSIUM AND HYDROCHLOROTHIAZIDE 1 TABLET: 50; 12.5 TABLET, FILM COATED ORAL at 09:03

## 2023-03-02 NOTE — PLAN OF CARE
[address]  [facility]  Discharge Final Note    Primary Care Provider: Esdras Ly MD    Expected Discharge Date: 3/2/2023    Final Discharge Note (most recent)       Final Note - 03/02/23 1355          Final Note    Assessment Type Final Discharge Note     Anticip[place] [facility]ospital Resources/Appts/Education Provided Provided patient/caregiver with written discharge plan information        Post-Acute Status    Post-Acute Authorization Home Health     Home Health Status Set-up Complete/Auth obtained     Coverage [facility]     Patient choice form signed by patient/caregiver List with quality metrics by geographic area provided     Discharge Delays None known at this time                     Important Message from Medicare             Contact Info       Britney Singer NP   Specialty: Urology    [address] [facility] [address]   Phone: [phone]       Next Steps: Follow up in 2 week(s)            Pt to d/c home with family [facility] to begin services 3/4/23.     Jennyfer Saenz LCSW  Case Management/[facility]  [phone]

## 2023-03-02 NOTE — PROGRESS NOTES
"Pt transport arrived prior to daughter could get to hospital. Pt stated "she's coming through [facility] now." Discussed with pt again that transport can't wait for her to get here, another request would need to be made. Pt refused to wait until daughter arrives and requested transport take him downstairs where he'll wait for her there. AVS reviewed with pt. Pt verbalized understanding. Pt verbalized having all personal belongings and medications delivered via bedside pharmacy. Pt left floor via wheelchair accompanied by transport.   "

## 2023-03-02 NOTE — HPI
Mr. Singh is a 71 yo male s/p combined open right radical nephrectomy and cholecystectomy Monday 2/27.

## 2023-03-02 NOTE — PROGRESS NOTES
Pt resting comfortably.  Right MILLICENT PNC has been paused. Dressing CDI.  Catheter discontinued, tip intact.  Pt tolerated well.  Educated regarding continued pain management.  Understanding verbalized.

## 2023-03-02 NOTE — PT/OT/SLP DISCHARGE
Physical Therapy Discharge Summary    Name: Babatunde Singh  MRN: [id number]   Principal Problem: Right renal mass     Patient Discharged from acute Physical Therapy on 3/2/2023.  Please refer to prior PT noted date on 2023 for functional status.     Assessment:     Patient appropriate for care in another setting.    Objective:     GOALS:   Multidisciplinary Problems       Physical Therapy Goals          Problem: Physical Therapy    Goal Priority Disciplines Outcome Goal Variances Interventions   Physical Therapy Goal     PT, PT/OT Ongoing, Progressing     Description: Goals to be met by: 3/14/2023     Patient will increase functional independence with mobility by performin. Supine to sit with Set-up [place]  2. Sit to stand transfer with Supervision using LRAD  3. Bed to chair transfer with Supervision using LRAD  4. Gait  x 400 feet with Supervision using LRAD   5. Ascend/descend 5 stair with bilateral Handrails Stand-by Assistance using No Assistive Device.                          Reasons for Discontinuation of Therapy Services  Transfer to alternate level of care.  Pt with be receiving home akua.     Plan:     Patient Discharged to: Home with Home Health Service.      3/2/2023

## 2023-03-02 NOTE — PROGRESS NOTES
IVs removed. AVS printed. Pt requested to wait to go over AVS until his daughter is here to get him. Transport put in now per pt request. Pt verbalizes understanding that if transport gets here before his daughter, they can't wait for her to arrive and will either have to take him downstairs or another order for transport will need to be requested. Instructed pt to use call light when daughter arrives.    normal... Well appearing, well nourished, awake, alert, oriented to person, place, time/situation and in no apparent distress.

## 2023-03-02 NOTE — SUBJECTIVE & OBJECTIVE
Interval History: NAEO.  AFVSS.  Having some incisional abdominal pain when sitting up for longer periods of time but otherwise doing well with no complaints. Ambulating. Tolerating PO.  Passing lots of gas.  Pain well controlled- pain catheters currently paused.      Review of Systems: per HPI   Objective:     Temp:  [96.6 °F (35.9 °C)-98.8 °F (37.1 °C)] 96.6 °F (35.9 °C)  Pulse:  [73-91] 76  Resp:  [18-20] 20  SpO2:  [93 %-98 %] 93 %  BP: (122-168)/(60-76) 122/60     Body mass index is 26.32 kg/m².           Drains       None                   Physical Exam  Constitutional:       General: He is not in acute distress.     Appearance: Normal appearance. He is not ill-appearing.   HENT:      Head: Normocephalic and atraumatic.      Mouth/Throat:      Mouth: Mucous membranes are moist.   Eyes:      Extraocular Movements: Extraocular movements intact.   Cardiovascular:      Rate and Rhythm: Normal rate.   Pulmonary:      Effort: Pulmonary effort is normal.   Abdominal:      General: There is distension.      Palpations: Abdomen is soft.      Tenderness: There is no right CVA tenderness, left CVA tenderness or guarding.      Comments: Right subcostal incision c/d/I, appropriately ttp   Genitourinary:     Comments: Romero catheter draining clear yellow  Musculoskeletal:      Cervical back: Normal range of motion.   Skin:     General: Skin is warm and dry.   Neurological:      Mental Status: He is alert and oriented to person, place, and time.   Psychiatric:         Mood and Affect: Mood normal.         Behavior: Behavior normal.       Significant Labs:    BMP:  Recent Labs   Lab 02/27/23  0701 02/28/23  0321 03/01/23  0459    137 137   K 4.1 4.4 4.3    110 109   CO2 24 17* 23   BUN 14 15 14   CREATININE 1.0 1.1 1.3   CALCIUM 9.7 8.3* 8.5*       CBC:   Recent Labs   Lab 02/27/23  0701 02/28/23  0321 03/01/23  0500   WBC 9.21 19.21* 13.74*   HGB 10.3* 8.5* 8.0*   HCT 35.9* 28.8* 28.2*    262 348       All  pertinent labs results from the past 24 hours have been reviewed.  Recent Lab Results       None            Significant Imaging:  All pertinent imaging results/findings from the past 24 hours have been reviewed.

## 2023-03-02 NOTE — ASSESSMENT & PLAN NOTE
Babatunde Singh is a 72 y.o. male with a history of a 5.7 cm right upper pole renal mass now POD1 s/p open right radical nephrectomy.    -Continue multimodal pain control  -Appreciate pain management assistance. Tentatively planning to pause and remove pain caths today  -PT/OT  -Ambulate QID  -Regular diet, tolerating well  - Passing flatus, no bm  - passed VT  -appreciate wound care's assistance  -f/u labs    Dispo: Likely discharge today

## 2023-03-02 NOTE — CARE UPDATE
POD #3. Patient sitting up on hospital bed comfortably this morning. Reported 0/10 pain at rest and 1/10 pain with exertion.     PNC paused this morning. Plan to remove PNC later today if patient continues to do well.        Nubia Mcnamara DO  Anesthesiology Resident, PGY1  [facility]

## 2023-03-02 NOTE — PROGRESS NOTES
Upon entering room, it was noted that the epidural pump and bag of ropivacaine was missing from the pole in the room. Nurse last visualized pump with ropivacaine bag in it this morning around 0924. Upon further inspection, bag of ropivacaine was found in pt trash can in the pt room. Ropivacaine retrieved and wasted appropriately with ESMER Prasad. 410 mls wasted. Charge nurse and floor nurse director notified.

## 2023-03-02 NOTE — ANESTHESIA POST-OP PAIN MANAGEMENT
Acute Pain Service Progress Note    Babatunde Singh is a 72 y.o., male, [id number].    Surgery:  Right partial nephrectomy    Post Op Day #: 3    Catheter type: perineural  Right T8-9 MILLICENT    Infusion type: Ropivacaine 0.2%  0.1 mL/hr basal    Problem List:    Active Hospital Problems    Diagnosis  POA    *Right renal mass [N28.89]  Yes    Tobacco use disorder, severe, in early remission [F17.201]  Yes    Bilateral lower extremity edema [R60.0]  Yes    DVT (deep venous thrombosis) [I82.409]  Yes    Gallbladder mass [K82.8]  Yes    PAD (peripheral artery disease) [I73.9]  Yes    Claudication of right lower extremity [I73.9]  Yes    Mixed hyperlipidemia [E78.2]  Yes    Right leg pain [M79.604]  Yes    Sciatica of right side [M54.31]  Yes    Numbness and tingling of right leg [R20.0, R20.2]  Yes    Primary hypertension [I10]  Yes    Normocytic anemia [D64.9]  Yes     Formatting of this note might be different from the original.  Added automatically from request for surgery [id number]      Chronic bronchitis [J42]  Yes      Resolved Hospital Problems   No resolved problems to display.       Subjective:     General appearance of alert, oriented, no complaints   Pain with rest: 1    Numbers   Pain with movement: 2    Numbers   Side Effects    1. Pruritis No    2. Nausea No    3. Motor Blockade No, 0=Ability to raise lower extremities off bed    4. Sedation No, 1=awake and alert    Objective:     Catheter level T8-9   Catheter site clean, dry, intact         Vitals   Vitals:    03/02/23 0716   BP: (!) 170/73   Pulse: 73   Resp: 18   Temp: 36.6 °C (97.9 °F)        Labs    Admission on 02/27/2023   Component Date Value Ref Range Status    Sodium 02/27/2023 141  136 - 145 mmol/L Final    Potassium 02/27/2023 4.1  3.5 - 5.1 mmol/L Final    Chloride 02/27/2023 108  95 - 110 mmol/L Final    CO2 02/27/2023 24  23 - 29 mmol/L Final    Glucose 02/27/2023 91  70 - 110 mg/dL Final    BUN 02/27/2023 14  8 - 23 mg/dL Final     Creatinine 02/27/2023 1.0  0.5 - 1.4 mg/dL Final    Calcium 02/27/2023 9.7  8.7 - 10.5 mg/dL Final    Anion Gap 02/27/2023 9  8 - 16 mmol/L Final    eGFR 02/27/2023 >60.0  >60 mL/min/1.73 m^2 Final    WBC 02/27/2023 9.21  3.90 - 12.70 K/uL Final    RBC 02/27/2023 4.82  4.60 - 6.20 M/uL Final    Hemoglobin 02/27/2023 10.3 (L)  14.0 - 18.0 g/dL Final    Hematocrit 02/27/2023 35.9 (L)  40.0 - 54.0 % Final    MCV 02/27/2023 75 (L)  82 - 98 fL Final    MCH 02/27/2023 21.4 (L)  27.0 - 31.0 pg Final    MCHC 02/27/2023 28.7 (L)  32.0 - 36.0 g/dL Final    RDW 02/27/2023 27.4 (H)  11.5 - 14.5 % Final    Platelets 02/27/2023 331  150 - 450 K/uL Final    MPV 02/27/2023 10.1  9.2 - 12.9 fL Final    Immature Granulocytes 02/27/2023 0.3  0.0 - 0.5 % Final    Gran # (ANC) 02/27/2023 6.1  1.8 - 7.7 K/uL Final    Immature Grans (Abs) 02/27/2023 0.03  0.00 - 0.04 K/uL Final    Lymph # 02/27/2023 2.2  1.0 - 4.8 K/uL Final    Mono # 02/27/2023 0.8  0.3 - 1.0 K/uL Final    Eos # 02/27/2023 0.1  0.0 - 0.5 K/uL Final    Baso # 02/27/2023 0.06  0.00 - 0.20 K/uL Final    nRBC 02/27/2023 0  0 /100 WBC Final    Gran % 02/27/2023 65.9  38.0 - 73.0 % Final    Lymph % 02/27/2023 23.7  18.0 - 48.0 % Final    Mono % 02/27/2023 8.3  4.0 - 15.0 % Final    Eosinophil % 02/27/2023 1.1  0.0 - 8.0 % Final    Basophil % 02/27/2023 0.7  0.0 - 1.9 % Final    Differential Method 02/27/2023 Automated   Final    Group & Rh 02/27/2023 A POS   Final    Indirect Stan 02/27/2023 NEG   Final    UNIT NUMBER 02/27/2023 [id number]   Final    Product Code 02/27/2023 [id number]   Final    DISPENSE STATUS 02/27/2023 RETURNED   Final    CODING SYSTEM 02/27/2023 [id number]   Final    Unit Blood Type Code 02/27/2023 6200   Final    Unit Blood Type 02/27/2023 A POS   Final    Unit Expiration 02/27/2023 [id number]   Final    CROSSMATCH INTERPRETATION 02/27/2023 Compatible   Final    UNIT NUMBER 02/27/2023 [id number]   Final     Product Code 02/27/2023 [id number]   Final    DISPENSE STATUS 02/27/2023 RETURNED   Final    CODING SYSTEM 02/27/2023 [id number]   Final    Unit Blood Type Code 02/27/2023 6200   Final    Unit Blood Type 02/27/2023 A POS   Final    Unit Expiration 02/27/2023 [id number]   Final    CROSSMATCH INTERPRETATION 02/27/2023 Compatible   Final    Sodium 02/28/2023 137  136 - 145 mmol/L Final    Potassium 02/28/2023 4.4  3.5 - 5.1 mmol/L Final    Chloride 02/28/2023 110  95 - 110 mmol/L Final    CO2 02/28/2023 17 (L)  23 - 29 mmol/L Final    Glucose 02/28/2023 102  70 - 110 mg/dL Final    BUN 02/28/2023 15  8 - 23 mg/dL Final    Creatinine 02/28/2023 1.1  0.5 - 1.4 mg/dL Final    Calcium 02/28/2023 8.3 (L)  8.7 - 10.5 mg/dL Final    Total Protein 02/28/2023 5.6 (L)  6.0 - 8.4 g/dL Final    Albumin 02/28/2023 2.8 (L)  3.5 - 5.2 g/dL Final    Total Bilirubin 02/28/2023 0.3  0.1 - 1.0 mg/dL Final    Alkaline Phosphatase 02/28/2023 73  55 - 135 U/L Final    AST 02/28/2023 145 (H)  10 - 40 U/L Final    ALT 02/28/2023 119 (H)  10 - 44 U/L Final    Anion Gap 02/28/2023 10  8 - 16 mmol/L Final    eGFR 02/28/2023 >60.0  >60 mL/min/1.73 m^2 Final    Phosphorus 02/28/2023 3.6  2.7 - 4.5 mg/dL Final    Magnesium 02/28/2023 1.9  1.6 - 2.6 mg/dL Final    WBC 02/28/2023 19.21 (H)  3.90 - 12.70 K/uL Final    RBC 02/28/2023 3.94 (L)  4.60 - 6.20 M/uL Final    Hemoglobin 02/28/2023 8.5 (L)  14.0 - 18.0 g/dL Final    Hematocrit 02/28/2023 28.8 (L)  40.0 - 54.0 % Final    MCV 02/28/2023 73 (L)  82 - 98 fL Final    MCH 02/28/2023 21.6 (L)  27.0 - 31.0 pg Final    MCHC 02/28/2023 29.5 (L)  32.0 - 36.0 g/dL Final    RDW 02/28/2023 26.9 (H)  11.5 - 14.5 % Final    Platelets 02/28/2023 262  150 - 450 K/uL Final    MPV 02/28/2023 11.1  9.2 - 12.9 fL Final    Immature Granulocytes 02/28/2023 0.7 (H)  0.0 - 0.5 % Final    Gran # (ANC) 02/28/2023 14.2 (H)  1.8 - 7.7 K/uL Final    Immature Grans (Abs) 02/28/2023 0.14 (H)   0.00 - 0.04 K/uL Final    Lymph # 02/28/2023 2.9  1.0 - 4.8 K/uL Final    Mono # 02/28/2023 2.0 (H)  0.3 - 1.0 K/uL Final    Eos # 02/28/2023 0.0  0.0 - 0.5 K/uL Final    Baso # 02/28/2023 0.04  0.00 - 0.20 K/uL Final    nRBC 02/28/2023 0  0 /100 WBC Final    Gran % 02/28/2023 74.1 (H)  38.0 - 73.0 % Final    Lymph % 02/28/2023 14.8 (L)  18.0 - 48.0 % Final    Mono % 02/28/2023 10.2  4.0 - 15.0 % Final    Eosinophil % 02/28/2023 0.0  0.0 - 8.0 % Final    Basophil % 02/28/2023 0.2  0.0 - 1.9 % Final    Differential Method 02/28/2023 Automated   Final    WBC 03/01/2023 13.74 (H)  3.90 - 12.70 K/uL Final    RBC 03/01/2023 3.81 (L)  4.60 - 6.20 M/uL Final    Hemoglobin 03/01/2023 8.0 (L)  14.0 - 18.0 g/dL Final    Hematocrit 03/01/2023 28.2 (L)  40.0 - 54.0 % Final    MCV 03/01/2023 74 (L)  82 - 98 fL Final    MCH 03/01/2023 21.0 (L)  27.0 - 31.0 pg Final    MCHC 03/01/2023 28.4 (L)  32.0 - 36.0 g/dL Final    RDW 03/01/2023 26.5 (H)  11.5 - 14.5 % Final    Platelets 03/01/2023 348  150 - 450 K/uL Final    MPV 03/01/2023 10.0  9.2 - 12.9 fL Final    Immature Granulocytes 03/01/2023 0.4  0.0 - 0.5 % Final    Gran # (ANC) 03/01/2023 10.7 (H)  1.8 - 7.7 K/uL Final    Immature Grans (Abs) 03/01/2023 0.05 (H)  0.00 - 0.04 K/uL Final    Lymph # 03/01/2023 1.8  1.0 - 4.8 K/uL Final    Mono # 03/01/2023 1.0  0.3 - 1.0 K/uL Final    Eos # 03/01/2023 0.0  0.0 - 0.5 K/uL Final    Baso # 03/01/2023 0.07  0.00 - 0.20 K/uL Final    nRBC 03/01/2023 0  0 /100 WBC Final    Gran % 03/01/2023 78.1 (H)  38.0 - 73.0 % Final    Lymph % 03/01/2023 13.4 (L)  18.0 - 48.0 % Final    Mono % 03/01/2023 7.3  4.0 - 15.0 % Final    Eosinophil % 03/01/2023 0.3  0.0 - 8.0 % Final    Basophil % 03/01/2023 0.5  0.0 - 1.9 % Final    Differential Method 03/01/2023 Automated   Final    Sodium 03/01/2023 137  136 - 145 mmol/L Final    Potassium 03/01/2023 4.3  3.5 - 5.1 mmol/L Final    Chloride 03/01/2023 109  95 - 110  mmol/L Final    CO2 03/01/2023 23  23 - 29 mmol/L Final    Glucose 03/01/2023 96  70 - 110 mg/dL Final    BUN 03/01/2023 14  8 - 23 mg/dL Final    Creatinine 03/01/2023 1.3  0.5 - 1.4 mg/dL Final    Calcium 03/01/2023 8.5 (L)  8.7 - 10.5 mg/dL Final    Total Protein 03/01/2023 5.8 (L)  6.0 - 8.4 g/dL Final    Albumin 03/01/2023 2.7 (L)  3.5 - 5.2 g/dL Final    Total Bilirubin 03/01/2023 0.4  0.1 - 1.0 mg/dL Final    Alkaline Phosphatase 03/01/2023 76  55 - 135 U/L Final    AST 03/01/2023 103 (H)  10 - 40 U/L Final    ALT 03/01/2023 93 (H)  10 - 44 U/L Final    Anion Gap 03/01/2023 5 (L)  8 - 16 mmol/L Final    eGFR 03/01/2023 58.7 (A)  >60 mL/min/1.73 m^2 Final    Magnesium 03/01/2023 1.9  1.6 - 2.6 mg/dL Final    Phosphorus 03/01/2023 2.9  2.7 - 4.5 mg/dL Final    WBC 03/02/2023 10.93  3.90 - 12.70 K/uL Final    RBC 03/02/2023 3.71 (L)  4.60 - 6.20 M/uL Final    Hemoglobin 03/02/2023 7.7 (L)  14.0 - 18.0 g/dL Final    Hematocrit 03/02/2023 28.2 (L)  40.0 - 54.0 % Final    MCV 03/02/2023 76 (L)  82 - 98 fL Final    MCH 03/02/2023 20.8 (L)  27.0 - 31.0 pg Final    MCHC 03/02/2023 27.3 (L)  32.0 - 36.0 g/dL Final    RDW 03/02/2023 26.5 (H)  11.5 - 14.5 % Final    Platelets 03/02/2023 394  150 - 450 K/uL Final    MPV 03/02/2023 10.4  9.2 - 12.9 fL Final    Immature Granulocytes 03/02/2023 0.4  0.0 - 0.5 % Final    Gran # (ANC) 03/02/2023 7.1  1.8 - 7.7 K/uL Final    Immature Grans (Abs) 03/02/2023 0.04  0.00 - 0.04 K/uL Final    Lymph # 03/02/2023 2.6  1.0 - 4.8 K/uL Final    Mono # 03/02/2023 1.1 (H)  0.3 - 1.0 K/uL Final    Eos # 03/02/2023 0.1  0.0 - 0.5 K/uL Final    Baso # 03/02/2023 0.06  0.00 - 0.20 K/uL Final    nRBC 03/02/2023 0  0 /100 WBC Final    Gran % 03/02/2023 64.8  38.0 - 73.0 % Final    Lymph % 03/02/2023 23.4  18.0 - 48.0 % Final    Mono % 03/02/2023 9.7  4.0 - 15.0 % Final    Eosinophil % 03/02/2023 1.2  0.0 - 8.0 % Final    Basophil % 03/02/2023 0.5  0.0 - 1.9  % Final    Differential Method 03/02/2023 Automated   Final        Meds   Current Facility-Administered Medications   Medication Dose Route Frequency Provider Last Rate Last Admin    acetaminophen tablet 1,000 mg  1,000 mg Oral 4 times per day Cruz Higuera MD   1,000 mg at 03/02/23 0530    atorvastatin tablet 80 mg  80 mg Oral Daily Catalina Landry MD   80 mg at 03/01/23 0834    bisacodyL suppository 10 mg  10 mg Rectal Daily PRN Coleman Lofton MD        famotidine tablet 20 mg  20 mg Oral BID Catalina Landry MD   20 mg at 03/01/23 2101    gabapentin capsule 800 mg  800 mg Oral TID Catailna Landry MD   800 mg at 03/01/23 2101    heparin (porcine) injection 5,000 Units  5,000 Units Subcutaneous Q8H Catalina Landry MD   5,000 Units at 03/02/23 0531    hydrALAZINE injection 10 mg  10 mg Intravenous Q6H PRN Catalina Landry MD        losartan-hydrochlorothiazide 50-12.5 mg per tablet 1 tablet  1 tablet Oral Daily Catalina Landry MD   1 tablet at 03/01/23 0834    melatonin tablet 6 mg  6 mg Oral Nightly PRN Catalina Landry MD        methocarbamoL tablet 500 mg  500 mg Oral Q6H Paresh Mike MD   500 mg at 03/02/23 0530    ondansetron injection 4 mg  4 mg Intravenous Q12H PRN Luis Villalpando MD        oxyCODONE immediate release tablet 5 mg  5 mg Oral Q4H PRN Cruz Higuera MD        polyethylene glycol packet 17 g  17 g Oral Daily Paresh Mike MD   17 g at 03/01/23 0834    ROPIvacaine (PF) 2 mg/ml (0.2%) solution  0.1 mL/hr Perineural Continuous Luis Villalpando MD 0.1 mL/hr at 02/27/23 1236 0.1 mL/hr at 02/27/23 1236    senna-docusate 8.6-50 mg per tablet 1 tablet  1 tablet Oral BID Paresh Mike MD   1 tablet at 03/01/23 2101        Anticoagulant dose: Heparin injections 5,000units q8h    Assessment:     Pain control adequate    Plan:    - paused R MILLICENT catheter infusion this am  - gabapentin 800mg TID  - methocarbamol 500mg q6h  - tylenol 1g q6h  - oxycodone 5mg  q4h PRN moderate and severe pain       Patient doing well, continue present treatment.    Thank you for allowing us to participate in the care of this patient. We will sign off.       Nubia Mcnamara DO  Anesthesiology Resident, PGY-1

## 2023-03-02 NOTE — PROGRESS NOTES
[facility]  Urology  Progress Note    Patient Name: Babatunde Singh  MRN: [id number]  Admission Date: 2/27/2023  Hospital Length of Stay: 3 days  Code Status: Prior   Attending Provider: Moises Cuba MD   Primary Care Physician: Esdras Ly MD    Subjective:     HPI:  Mr. Singh is a 71 yo male s/p combined open right radical nephrectomy and cholecystectomy Monday 2/27.      Interval History: NAEO. AFVSS. Pain well controlled and tolerating diet. Ambulated x2, passing flatus, no bm yet.    Objective:     Temp:  [98 °F (36.7 °C)-98.5 °F (36.9 °C)] 98.5 °F (36.9 °C)  Pulse:  [74-82] 82  Resp:  [16-20] 16  SpO2:  [98 %-99 %] 99 %  BP: (157-171)/(70-75) 157/70     Body mass index is 26.32 kg/m².    Date 03/01/23 0700 - 03/02/23 0659   Shift 9594-8979 6883-8366 0988-3037 24 Hour Total   INTAKE   Shift Total(mL/kg)       OUTPUT   Urine(mL/kg/hr) 500   500   Shift Total(mL/kg) 500(7.4)   500(7.4)   Weight (kg) 67.4 67.4 67.4 67.4          Drains       None                   Physical Exam  Constitutional:       General: He is not in acute distress.     Appearance: Normal appearance. He is not ill-appearing.   HENT:      Head: Normocephalic and atraumatic.      Mouth/Throat:      Mouth: Mucous membranes are moist.   Eyes:      Extraocular Movements: Extraocular movements intact.   Cardiovascular:      Rate and Rhythm: Normal rate.   Pulmonary:      Effort: Pulmonary effort is normal.   Abdominal:      General: There is distension.      Palpations: Abdomen is soft.      Tenderness: There is no right CVA tenderness, left CVA tenderness or guarding.      Comments: Right subcostal incision c/d/I, appropriately ttp   Genitourinary:     Comments: Romero catheter draining clear yellow  Musculoskeletal:      Cervical back: Normal range of motion.   Skin:     General: Skin is warm and dry.   Neurological:      Mental Status: He is alert and oriented to person, place, and time.   Psychiatric:         Mood and Affect: Mood  normal.         Behavior: Behavior normal.       Significant Labs:    BMP:  Recent Labs   Lab 02/27/23  0701 02/28/23  0321 03/01/23  0459    137 137   K 4.1 4.4 4.3    110 109   CO2 24 17* 23   BUN 14 15 14   CREATININE 1.0 1.1 1.3   CALCIUM 9.7 8.3* 8.5*       CBC:   Recent Labs   Lab 02/27/23  0701 02/28/23  0321 03/01/23  0500   WBC 9.21 19.21* 13.74*   HGB 10.3* 8.5* 8.0*   HCT 35.9* 28.8* 28.2*    262 348       Urine Studies: No results for input(s): COLORU, APPEARANCEUA, PHUR, SPECGRAV, PROTEINUA, GLUCUA, KETONESU, BILIRUBINUA, OCCULTUA, NITRITE, UROBILINOGEN, LEUKOCYTESUR, RBCUA, WBCUA, BACTERIA, SQUAMEPITHEL, HYALINECASTS in the last 168 hours.    Invalid input(s): ITZ    Significant Imaging:  All pertinent imaging results/findings from the past 24 hours have been reviewed.        Assessment/Plan:     * Right renal mass  Babatunde Singh is a 72 y.o. male with a history of a 5.7 cm right upper pole renal mass now POD1 s/p open right radical nephrectomy.    -Continue multimodal pain control  -Appreciate pain management assistance. Tentatively planning to pause and remove pain caths today  -PT/OT  -Ambulate QID  -Regular diet, tolerating well  - Passing flatus, no bm  - passed VT  -appreciate wound care's assistance  -f/u labs    Dispo: Likely discharge today        VTE Risk Mitigation (From admission, onward)         Ordered     heparin (porcine) injection 5,000 Units  Every 8 hours         02/27/23 1236     IP VTE HIGH RISK PATIENT  Once         02/27/23 1236     Place sequential compression device  Until discontinued         02/27/23 1236     Place ANGIE hose  Until discontinued         02/27/23 0636     Place sequential compression device  Until discontinued         02/27/23 0636                Saji Dee MD  Urology  [facility]

## 2023-03-02 NOTE — DISCHARGE INSTRUCTIONS
What to expect with your Nephrectomy.    RESTART HOME XARELTO 3/3.    [facility]    Symptoms you may experience immediately post-op:  Bloating and/or shoulder pain if you had a laparoscopic procedure - when we do this operation, we fill up your abdominal cavity with gas to better help us visualize the organs and allow our instruments to fit. After the surgery, not all the air can be removed and your body will eventually absorb this small amount of air. However this can make you feel bloated. In addition, when you sit up, the air can sit right under a muscle (the diaphragm) which has connecting nerves to the shoulders, which could explain why you have shoulder pain.  Do not expect necessarily to have gas or to have a bowel movement - this goes along with the bloating, you may feel like you want to pass gas or have a bowel movement but you cant. This is normal and you will feel like this for a couple days. There are no pills to help with this. Small walks throughout the day should help with this.  Pain - your pain should be able to be controlled with medicines by mouth that we prescribe. It is important for you to tell us if you are on any pain medications at home before the surgery as you may need stronger pain meds while in the hospital.  You can go home when:  Pain is controlled with medicines by mouth  You are able to walk without difficulty or pain  You are tolerating a regulating diet  Your are voiding. If you cannot void we may have to replace a catheter and you will follow up 3-5 days after you leave to have a voiding trial. The nurses will teach you how to care for your catheter.  When you go home:  Activity  Continue to walk - small walks throughout the day are better than one long walk.   Do not lift anything greater than 8 pounds for 6 weeks - we want your abdominal wall muscles to heal.  Bowel Movements - Do not strain to have a bowel movement - the pain medicines will make you constipated. That is  why we also ask you to take colace 2-3 x per day to help keep your bowels regular. If you are still having trouble, then you can also add Miralax once a day. Do not take any stool softeners if you are having diarrhea.  Drain - If you have a drain (not your catheter, but a separate drain) then record the output and bring it with you to your next appointment  Smoking - If you smoke, we encourage you STOP. Smoking interferes with the healing process and your prolong your healing with continued smoking.  Driving - Do not drive while you are on pain meds or with your catheter in place.  Bathing - If you do not have a drain, you can shower 48 hours after your surgery. If you do have a drain, sponge bathe only until the drain is out.  Dressing - you can remove the dressings if there is no drainage or change them as needed if there is. The little sterile band-aid strips will fall off on their own in 10-14 days. If they have not fallen off then you can remove them yourself.  Restarting medicines -especially blood thinners (Aspirin, Plavix, Coumadin), Fish Oil. Discuss this with your physician prior to discharge.  When to return to the ER  Fever - If you have a fever >101.5, this could be due to a number of reasons such as infection of the urine or incision. If your catheter has been removed, you could possibly have a leak. It would be best to come to the ER so they can better evaluate you.  Severe pain - pain is expected, but severe or new onset of pain is not normal.   Inability to tolerate food or liquid with nausea and vomiting - it would be best to go to the ER for them to better evaluate you.

## 2023-03-02 NOTE — DISCHARGE SUMMARY
[facility]  Urology  Discharge Summary      Patient Name: Babatunde Singh  MRN: [id number]  Admission Date: 2/27/2023  Hospital Length of Stay: 3 days  Discharge Date and Time: No discharge date for patient encounter.  Attending Physician: Moises Cuba MD   Discharging Provider: Sis Miles MD  Primary Care Physician: Esdras Ly MD    HPI:   Mr. Singh is a 71 yo male s/p combined open right radical nephrectomy and cholecystectomy Monday 2/27.      Procedure(s) (LRB):  NEPHRECTOMY, PARTIAL (Right)  CHOLECYSTECTOMY complex (N/A)     Indwelling Lines/Drains at time of discharge:   Lines/Drains/Airways     None                 Hospital Course (synopsis of major diagnoses, care, treatment, and services provided during the course of the hospital stay): The patient presented with the above history and underwent above procedure. He tolerated the procedure well. Please see the operative note for further procedure details. Vitals remained stable throughout the post operative period. Physical exam was appropriate. The patient was able to void without difficulty, and tolerate a regular diet prior to discharge.  He was sent home with prescriptions for oxycodone and instructions to restart home xarelto on 3/3/23.      Goals of Care Treatment Preferences:  Code Status: Full Code      Consults:  none   Significant Diagnostic Studies: none     Pending Diagnostic Studies:     Procedure Component Value Units Date/Time    Specimen to Pathology, Surgery Urology [[id number]] Collected: 02/27/23 1151    Order Status: Sent Lab Status: In process Updated: 02/27/23 2151    Specimen: Tissue           Final Active Diagnoses:    Diagnosis Date Noted POA    PRINCIPAL PROBLEM:  Right renal mass [N28.89] 08/11/2022 Yes    Tobacco use disorder, severe, in early remission [F17.201] 12/22/2022 Yes    Bilateral lower extremity edema [R60.0] 10/20/2022 Yes    DVT (deep venous thrombosis) [I82.409] 10/05/2022 Yes    Gallbladder mass  [K82.8] 09/12/2022 Yes    PAD (peripheral artery disease) [I73.9] 08/25/2022 Yes    Claudication of right lower extremity [I73.9] 07/26/2022 Yes    Mixed hyperlipidemia [E78.2] 07/26/2022 Yes    Right leg pain [M79.604] 07/26/2022 Yes    Sciatica of right side [M54.31] 07/26/2022 Yes    Numbness and tingling of right leg [R20.0, R20.2] 07/26/2022 Yes    Primary hypertension [I10] 03/10/2022 Yes    Normocytic anemia [D64.9] 01/14/2021 Yes    Chronic bronchitis [J42] 06/18/2020 Yes      Problems Resolved During this Admission:         Discharged Condition: good    Disposition: home or self care     Follow Up:   Follow-up Information     Britney Singer NP Follow up in 2 week(s).    Specialty: Urology  Contact information:  [address] [place] [address] [id number] [phone]                       Patient Instructions:   No discharge procedures on file.  Medications:  Reconciled Home Medications:      Medication List      START taking these medications    ibuprofen 600 MG tablet  Commonly known as: ADVIL,MOTRIN  Take 1 tablet (600 mg total) by mouth every 6 (six) hours as needed for Pain.     oxyCODONE 5 MG immediate release tablet  Commonly known as: ROXICODONE  Take 1 tablet (5 mg total) by mouth every 4 (four) hours as needed for Pain.     polyethylene glycol 17 gram/dose powder  Commonly known as: GLYCOLAX  Use cap to measure 17 g, then mix in liquid and drink by mouth once daily. for 14 days        CONTINUE taking these medications    * acetaminophen 325 MG tablet  Commonly known as: TYLENOL  Take 2 tablets (650 mg total) by mouth every 8 (eight) hours as needed.     * ARTHRITIS PAIN RELIEF (ACETAM) 650 MG Tbsr  Generic drug: acetaminophen  Take 1 tablet (650 mg total) by mouth every 8 (eight) hours. for 10 days     XARELTO 20 mg Tab  Generic drug: rivaroxaban  Take 1 tablet (20 mg total) by mouth daily with dinner or evening meal.  Start taking on: March 3, 2023         * This list has 2 medication(s)  that are the same as other medications prescribed for you. Read the directions carefully, and ask your doctor or other care provider to review them with you.            ASK your doctor about these medications    aspirin 81 MG Chew  Chew and swallow 1 tablet (81 mg total) by mouth once daily.     atorvastatin 80 MG tablet  Commonly known as: LIPITOR  Take 1 tablet (80 mg total) by mouth once daily.     ezetimibe 10 mg tablet  Commonly known as: ZETIA  Take 1 tablet (10 mg total) by mouth once daily.     ferrous sulfate 325 mg (65 mg iron) Tab tablet  Commonly known as: IRON  Take 1 tablet (325 mg total) by mouth every Mon, Wed, Fri.     gabapentin 800 MG tablet  Commonly known as: NEURONTIN  Take 1 tablet (800 mg total) by mouth 3 (three) times daily.     losartan-hydrochlorothiazide 50-12.5 mg 50-12.5 mg per tablet  Commonly known as: HYZAAR  Take 1 tablet by mouth once daily.     multivitamin per tablet  Commonly known as: THERAGRAN  Take 1 tablet by mouth once daily.            Time spent on the discharge of patient: 15 minutes    Sis Miles MD  Urology  [facility]

## 2023-03-02 NOTE — PLAN OF CARE
Problem: Adult Inpatient Plan of Care  Goal: Plan of Care Review  Outcome: Ongoing, Progressing  Goal: Patient-Specific Goal (Individualized)  Outcome: Ongoing, Progressing  Goal: Absence of Hospital-Acquired Illness or Injury  Outcome: Ongoing, Progressing  Goal: Optimal Comfort and Wellbeing  Outcome: Ongoing, Progressing  Goal: Readiness for Transition of Care  Outcome: Ongoing, Progressing     Problem: Infection  Goal: Absence of Infection Signs and Symptoms  Outcome: Ongoing, Progressing     Problem: Fall Injury Risk  Goal: Absence of Fall and Fall-Related Injury  Outcome: Ongoing, Progressing     Problem: Impaired Wound Healing  Goal: Optimal Wound Healing  Outcome: Ongoing, Progressing     POC reviewed and discussed; pt A/Ox4; VSS; no signs of distress; RMQ/RLQ incision intact with dermabond; demonstrated splinting for pt when coughing to minimize abdominal pressure with coughing secondary to incentive spirometer use; pt verbalizes he does not feel ready to be discharged, but that he would prefer possibly leaving Friday; pt encouraged to share his thoughts with care team members. Will continue to monitor.

## 2023-03-02 NOTE — PT/OT/SLP PROGRESS
Occupational Therapy   Treatment    Name: Babatunde Singh  MRN: [id number]  Admitting Diagnosis:  Right renal mass  3 Days Post-Op    Recommendations:     Discharge Recommendations: home  Discharge Equipment Recommendations:  none  Barriers to discharge:  None    Assessment:     Babatunde Singh is a 72 y.o. male with a medical diagnosis of Right renal mass. Pt progressing well walking over 300' with a RW. Performance deficits affecting function are impaired endurance, impaired self care skills, gait instability, impaired balance, decreased coordination.     Rehab Prognosis:  Good; patient would benefit from acute skilled OT services to address these deficits and reach maximum level of function.       Plan:     Patient to be seen 3 x/week to address the above listed problems via self-care/home management, therapeutic activities, therapeutic exercises  Plan of Care Expires: 03/30/23  Plan of Care Reviewed with: patient    Subjective     Pain/Comfort:  Pain Rating 1: 0/10    Objective:     Communicated with: rn prior to session.  Patient found supine with peripheral IV upon OT entry to room.    General Precautions: Standard, fall    Orthopedic Precautions:   Braces:    Respiratory Status: Room air     Occupational Performance:     Bed Mobility:    Patient completed Supine to Sit with supervision     Functional Mobility/Transfers:  Patient completed Sit <> Stand Transfer with supervision  with  rolling walker   Patient completed Bed <> Chair Transfer using Step Transfer technique with supervision with rolling walker  Functional Mobility: Pt walked 300' (S) with a RW.    Activities of Daily Living:  Pt declined.    [facility] 6 Click ADL: 21    Treatment & Education:  Discussed OT POC and progress.    Patient left up in chair with all lines intact and call button in reach    GOALS:   Multidisciplinary Problems       Occupational Therapy Goals          Problem: Occupational Therapy    Goal Priority Disciplines Outcome  Interventions   Occupational Therapy Goal     OT, PT/OT Ongoing, Progressing    Description: Goals to be met by: 3/7/23    Patient will increase functional independence with ADLs by performing:    LE Dressing with Supervision.  Grooming while standing at sink with Set-up Assistance.  Toileting from toilet with Set-up Assistance for hygiene and clothing management.   Supine to sit with [place].  Toilet transfer to toilet with Supervision.                         Time Tracking:     OT Date of Treatment: 03/02/23  OT Start Time: 1015  OT Stop Time: 1039  OT Total Time (min): 24 min    Billable Minutes:Therapeutic Activity 24    OT/ALEX: OT          3/2/2023

## 2023-03-02 NOTE — ASSESSMENT & PLAN NOTE
Babatunde Singh is a 72 y.o. male with a history of a 5.7 cm right upper pole renal mass now POD3 s/p open right radical nephrectomy.    -Continue multimodal pain control  -Appreciate pain management assistance. Will tentatively plan to remove pain catheters toda   -PT/OT  -Ambulate QID  -Regular diet, tolerating well  - Passing flatus, no bm  - passed VT  -appreciate wound care's assistance  -f/u labs    Dispo: Likely discharge today

## 2023-03-03 ENCOUNTER — TELEPHONE (OUTPATIENT)
Dept: INTERNAL MEDICINE | Facility: CLINIC | Age: 73
End: 2023-03-03
Payer: MEDICARE

## 2023-03-03 ENCOUNTER — TELEPHONE (OUTPATIENT)
Dept: WOUND CARE | Facility: CLINIC | Age: 73
End: 2023-03-03
Payer: MEDICARE

## 2023-03-03 ENCOUNTER — PATIENT OUTREACH (OUTPATIENT)
Dept: ADMINISTRATIVE | Facility: CLINIC | Age: 73
End: 2023-03-03
Payer: MEDICARE

## 2023-03-03 NOTE — TELEPHONE ENCOUNTER
Called and spoke with patient who advised he missed his appointment on 2/28/23 due to hospital admit - appointment rescheduled for 3/7/23 at 8am

## 2023-03-03 NOTE — PROGRESS NOTES
C3 nurse spoke with Babatunde Singh for a TCC post hospital discharge follow up call. The patient does not have a scheduled HOSFU appointment with Esdras Ly MD within 5-7 days post hospital discharge date 03/02/23. C3 nurse was unable to schedule HOSFU appointment in [facility].    Message sent to PCP staff requesting they contact patient and schedule follow up appointment.

## 2023-03-03 NOTE — TELEPHONE ENCOUNTER
----- Message from Laura Traore sent at 3/3/2023 10:50 AM CST -----  Regarding: Reschedule Appt  Contact: Patient  Patient missed appt on 02/28/2023   Patient was in the hospital and has was discharged on 03/02/2023   Patient would like to reschedule appt missed   Please Assist     Patient can be reached at [phone]

## 2023-03-03 NOTE — TELEPHONE ENCOUNTER
----- Message from Laura Joseph sent at 3/3/2023 10:46 AM CST -----  Regarding: Sooner Appt  Contact: Patient  Type:  Sooner Apoointment Request    Caller is requesting a sooner appointment.  Caller declined first available appointment listed below.  Caller will not accept being placed on the waitlist and is requesting a message be sent to doctor.  Name of Caller:Patient   When is the first available appointment?no appts generated   Symptoms: Hospital follow up   Would the patient rather a call back or a response via [facility]? Call back   Best Call Back Number: [phone]  Additional Information: Patient is requesting a call back to schedule a hospital follow up appt with physician Pt was discharged on 03/02/2023 and was advised to be scheduled within 10 days please assist

## 2023-03-06 NOTE — ANESTHESIA POSTPROCEDURE EVALUATION
Anesthesia Post Evaluation    Patient: Babatunde Singh    Procedure(s) Performed: Procedure(s) (LRB):  NEPHRECTOMY, PARTIAL (Right)  CHOLECYSTECTOMY complex (N/A)    Final Anesthesia Type: general      Patient location during evaluation: PACU  Patient participation: Yes- Able to Participate  Level of consciousness: awake  Post-procedure vital signs: reviewed and stable  Pain management: adequate  Airway patency: patent    PONV status at discharge: No PONV  Anesthetic complications: no      Cardiovascular status: blood pressure returned to baseline  Respiratory status: unassisted  Hydration status: euvolemic  Follow-up not needed.          Vitals Value Taken Time   /61 03/02/23 1051   Temp 37.1 °C (98.7 °F) 03/02/23 1051   Pulse 92 03/02/23 1051   Resp 18 03/02/23 1051   SpO2 98 % 03/02/23 1051         Event Time   Out of Recovery 02/27/2023 13:00:00         Pain/Cassie Score: No data recorded

## 2023-03-07 ENCOUNTER — OFFICE VISIT (OUTPATIENT)
Dept: WOUND CARE | Facility: CLINIC | Age: 73
End: 2023-03-07
Payer: MEDICARE

## 2023-03-07 ENCOUNTER — TELEPHONE (OUTPATIENT)
Dept: SURGERY | Facility: CLINIC | Age: 73
End: 2023-03-07
Payer: MEDICARE

## 2023-03-07 VITALS
WEIGHT: 145.31 LBS | SYSTOLIC BLOOD PRESSURE: 167 MMHG | HEART RATE: 84 BPM | TEMPERATURE: 99 F | DIASTOLIC BLOOD PRESSURE: 74 MMHG | HEIGHT: 75 IN | BODY MASS INDEX: 18.07 KG/M2

## 2023-03-07 DIAGNOSIS — I10 PRIMARY HYPERTENSION: ICD-10-CM

## 2023-03-07 DIAGNOSIS — I73.9 PAD (PERIPHERAL ARTERY DISEASE): ICD-10-CM

## 2023-03-07 DIAGNOSIS — Z87.891 HISTORY OF SMOKING: ICD-10-CM

## 2023-03-07 DIAGNOSIS — R60.0 BILATERAL LOWER EXTREMITY EDEMA: ICD-10-CM

## 2023-03-07 DIAGNOSIS — S81.801A WOUND OF RIGHT LOWER EXTREMITY, INITIAL ENCOUNTER: Primary | ICD-10-CM

## 2023-03-07 LAB
COMMENT: NORMAL
FINAL PATHOLOGIC DIAGNOSIS: NORMAL
GROSS: NORMAL
Lab: NORMAL

## 2023-03-07 PROCEDURE — 99214 PR OFFICE/OUTPT VISIT, EST, LEVL IV, 30-39 MIN: ICD-10-PCS | Mod: 25,S$GLB,,

## 2023-03-07 PROCEDURE — 3077F SYST BP >= 140 MM HG: CPT | Mod: CPTII,S$GLB,,

## 2023-03-07 PROCEDURE — 1159F PR MEDICATION LIST DOCUMENTED IN MEDICAL RECORD: ICD-10-PCS | Mod: CPTII,S$GLB,,

## 2023-03-07 PROCEDURE — 3078F PR MOST RECENT DIASTOLIC BLOOD PRESSURE < 80 MM HG: ICD-10-PCS | Mod: CPTII,S$GLB,,

## 2023-03-07 PROCEDURE — 99999 PR PBB SHADOW E&M-EST. PATIENT-LVL IV: CPT | Mod: PBBFAC,,,

## 2023-03-07 PROCEDURE — 3008F BODY MASS INDEX DOCD: CPT | Mod: CPTII,S$GLB,,

## 2023-03-07 PROCEDURE — 1126F PR PAIN SEVERITY QUANTIFIED, NO PAIN PRESENT: ICD-10-PCS | Mod: CPTII,S$GLB,,

## 2023-03-07 PROCEDURE — 99999 PR PBB SHADOW E&M-EST. PATIENT-LVL IV: ICD-10-PCS | Mod: PBBFAC,,,

## 2023-03-07 PROCEDURE — 3008F PR BODY MASS INDEX (BMI) DOCUMENTED: ICD-10-PCS | Mod: CPTII,S$GLB,,

## 2023-03-07 PROCEDURE — 1111F DSCHRG MED/CURRENT MED MERGE: CPT | Mod: CPTII,S$GLB,,

## 2023-03-07 PROCEDURE — 1126F AMNT PAIN NOTED NONE PRSNT: CPT | Mod: CPTII,S$GLB,,

## 2023-03-07 PROCEDURE — 1101F PR PT FALLS ASSESS DOC 0-1 FALLS W/OUT INJ PAST YR: ICD-10-PCS | Mod: CPTII,S$GLB,,

## 2023-03-07 PROCEDURE — 99214 OFFICE O/P EST MOD 30 MIN: CPT | Mod: 25,S$GLB,,

## 2023-03-07 PROCEDURE — 3288F FALL RISK ASSESSMENT DOCD: CPT | Mod: CPTII,S$GLB,,

## 2023-03-07 PROCEDURE — 3078F DIAST BP <80 MM HG: CPT | Mod: CPTII,S$GLB,,

## 2023-03-07 PROCEDURE — 1101F PT FALLS ASSESS-DOCD LE1/YR: CPT | Mod: CPTII,S$GLB,,

## 2023-03-07 PROCEDURE — 1111F PR DISCHARGE MEDS RECONCILED W/ CURRENT OUTPATIENT MED LIST: ICD-10-PCS | Mod: CPTII,S$GLB,,

## 2023-03-07 PROCEDURE — 29581 PR APPL MLT-LAYER VENOUS WOUND COMPRESS BELOW KNEE: ICD-10-PCS | Mod: RT,S$GLB,,

## 2023-03-07 PROCEDURE — 3288F PR FALLS RISK ASSESSMENT DOCUMENTED: ICD-10-PCS | Mod: CPTII,S$GLB,,

## 2023-03-07 PROCEDURE — 29581 APPL MULTLAYER CMPRN SYS LEG: CPT | Mod: RT,S$GLB,,

## 2023-03-07 PROCEDURE — 1159F MED LIST DOCD IN RCRD: CPT | Mod: CPTII,S$GLB,,

## 2023-03-07 PROCEDURE — 3077F PR MOST RECENT SYSTOLIC BLOOD PRESSURE >= 140 MM HG: ICD-10-PCS | Mod: CPTII,S$GLB,,

## 2023-03-07 NOTE — TELEPHONE ENCOUNTER
I called the patient to discuss the pathology from his radical cholecystectomy. The specimen showed no evidence of dysplasia or malignancy. He reports he has no pain and is doing well. I reviewed his scheduled appointment with me and Dr. Cuba next week.       Final Pathologic Diagnosis 1. 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   - Atherosclerotic vessels.   - See CAP synoptic report and comment below.   2. GALLBLADDER AND LIVER SEGMENT 4 AND 5, RADICAL CHOLECYSTECTOMY AND PARTIAL   HEPATECTOMY:   - Gallbladder with adenomyomatosis, chronic cholecystitis, and cholelithiasis   - Liver with incidental bile duct hamartoma   - Negative for dysplasia and malignancy   - See comment   3. PORTAL LYMPH NODES, DISSECTION:   - Three lymph nodes, negative for carcinoma (0/3).   SURGICAL PATHOLOGY CANCER CASE SUMMARY-KIDNEY   Procedure: Radical nephrectomy.   Specimen laterality: Right.   Tumor site: Superior pole.   Tumor size: 7.2 cm.   Tumor focality: Unifocal.   Histologic type: Papillary renal cell carcinoma, Type 2.   Sarcomatoid features: Not identified.   Rhabdoid features: Not identified.   Histologic Grade (ISUP Grade): 3.   Tumor necrosis: Present, 15%.   Tumor extension: Limited to kidney.   Margins: Uninvolved.   Lymphovascular invasion (excluding renal vein and its segmental branches):   Not identified.   Regional lymph nodes: No regional lymph nodes submitted or found.   Distant metastases: Not applicable.   Pathologic stage (pTNM, AJCC 8th edition): pT2a pN not assigned.   Blocks for potential molecular or ancillary studies:   Tumor block: 1F.    Comment: Interp By Calli Murphy M.D., Signed on 03/07/2023 at 11:44

## 2023-03-07 NOTE — PROGRESS NOTES
Subjective:       Patient ID: Babatunde Singh is a 72 y.o. male.    Chief Complaint: Wound Check      Patient presents for a reevaluation of right lower extremity wound. Patient follows with vascular surgery for PAD. Patient s/p right axillary to bi-profunda bypass on 10/6/22. Pt's ABIs preoperatively bilaterally were 0 to 0.69 on the right lower extremity and 0.74 on the left lower extremity. Patient s/p surgical debridement of his Achilles tendon on 22 with Dr. Ortiz. Following this, patient had a wound vac placed to his right distal lateral lower extremity wound. Dr. Ortiz D/C the wound vac on 23. Pt has [facility] that comes out 1x a week. He reports a new anterior lower extremity wound that was formed during his hospital stay. Denies fever, chills, erythema, warmth, drainage, or pain.    Review of Systems   Constitutional:  Negative for activity change, chills, diaphoresis, fatigue and fever.   Respiratory:  Negative for apnea, chest tightness and shortness of breath.    Cardiovascular:  Negative for chest pain, palpitations and leg swelling.   Musculoskeletal:  Negative for gait problem and joint swelling.   Skin:  Positive for wound. Negative for pallor and rash.   Neurological:  Negative for syncope, weakness and numbness.   Psychiatric/Behavioral:  Negative for agitation. The patient is not nervous/anxious.    All other systems reviewed and are negative.    Objective:      Physical Exam  Vitals reviewed.   Constitutional:       General: He is not in acute distress.     Appearance: Normal appearance.   Cardiovascular:      Rate and Rhythm: Normal rate and regular rhythm.      Pulses: Normal pulses.   Pulmonary:      Effort: No respiratory distress.   Musculoskeletal:         General: No swelling.      Right lower le+ Edema present.      Left lower le+ Edema present.   Skin:     General: Skin is warm and dry.      Capillary Refill: Capillary refill takes less than 2 seconds.       Findings: Wound present. No erythema.          Neurological:      General: No focal deficit present.      Mental Status: He is alert and oriented to person, place, and time.   Psychiatric:         Mood and Affect: Mood normal.         Behavior: Behavior normal.         Thought Content: Thought content normal.         Judgment: Judgment normal.       Assessment:       1. Wound of right lower extremity, initial encounter    2. Bilateral lower extremity edema    3. Primary hypertension    4. PAD (peripheral artery disease)    5. History of smoking             Altered Skin Integrity 02/28/23 0930 Right anterior;lower Calf (Active)   02/28/23 0930   Altered Skin Integrity Present on Admission - Did Patient arrive to the hospital with altered skin?: yes   Side: Right   Orientation: anterior;lower   Location: Calf   Wound Number:    Is this injury device related?:    Primary Wound Type:    Description of Altered Skin Integrity:    Ankle-Brachial Index:    Pulses:    Removal Indication and Assessment:    Wound Outcome:    (Retired) Wound Length (cm):    (Retired) Wound Width (cm):    (Retired) Depth (cm):    Wound Description (Comments):    Removal Indications:    Wound Image    03/07/23 0852   Dressing Appearance Dry;Intact;Clean;Moist drainage 03/07/23 0852   Drainage Amount Scant 03/07/23 0852   Drainage Characteristics/Odor Serous 03/07/23 0852   Red (%), Wound Tissue Color 100 % 03/07/23 0852   Periwound Area Intact;Pink;Edematous 03/07/23 0852   Wound Length (cm) 0.4 cm 03/07/23 0852   Wound Width (cm) 0.2 cm 03/07/23 0852   Wound Depth (cm) 0.1 cm 03/07/23 0852   Wound Volume (cm^3) 0.008 cm^3 03/07/23 0852   Wound Surface Area (cm^2) 0.08 cm^2 03/07/23 0852   Care Cleansed with:;Soap and water 03/07/23 0852   Dressing Applied;Compression wrap;Other (comment) 03/07/23 0852   Periwound Care Skin barrier film applied 03/07/23 0852   Compression Two layer compression 03/07/23 0852            Altered Skin Integrity  Right anterior;lower Leg (Active)       Altered Skin Integrity Present on Admission - Did Patient arrive to the hospital with altered skin?:    Side: Right   Orientation: anterior;lower   Location: Leg   Wound Number:    Is this injury device related?:    Primary Wound Type:    Description of Altered Skin Integrity:    Ankle-Brachial Index:    Pulses:    Removal Indication and Assessment:    Wound Outcome:    (Retired) Wound Length (cm):    (Retired) Wound Width (cm):    (Retired) Depth (cm):    Wound Description (Comments):    Removal Indications:    Wound Image    03/07/23 0852   Dressing Appearance Dry;Intact;Clean;Moist drainage 03/07/23 0852   Drainage Amount Small 03/07/23 0852   Drainage Characteristics/Odor Serosanguineous 03/07/23 0852   Red (%), Wound Tissue Color 100 % 03/07/23 0852   Periwound Area Intact;Edematous;Pink 03/07/23 0852   Wound Length (cm) 0.6 cm 03/07/23 0852   Wound Width (cm) 0.7 cm 03/07/23 0852   Wound Depth (cm) 0.1 cm 03/07/23 0852   Wound Volume (cm^3) 0.042 cm^3 03/07/23 0852   Wound Surface Area (cm^2) 0.42 cm^2 03/07/23 0852   Care Cleansed with:;Soap and water 03/07/23 0852   Dressing Applied;Compression wrap;Other (comment) 03/07/23 0852   Periwound Care Skin barrier film applied 03/07/23 0852   Compression Two layer compression 03/07/23 0852         Babatunde was seen in the clinic room and placed in the supine position on the treatment table.  The wound dressings and the drape and black sponge was removed from the wound bed. The leg was cleansed with Easi-clense sponges and dried thoroughly. No odor, redness, or warmth noted from patient's baseline. New anterior lower extremity wound noted. Removed dried drainage with long q-tip.    Plan of Care: Calmoseptine to periwound, polymem to wound bed, and a two layer calamine coflex wrap. The patient's foot was positioned at a 90 degree angle.  A calamine coflex wrap was applied using a spiral technique avoiding creases or folds.  The  wrap was started behind the first metatarsal and ended below the tibial tubercle of the knee.  There was overlap of each turn half the width of the previous turn.     Plan:     Right lower extremity dressed as detailed above.  Patient was instructed to not get the dressings wet and to use cast covers for showering.  Should the dressing become wet, he is to remove it, place a moist dressing over the wound, cover with gauze and roll gauze and use ace wraps for compression and to secure bandages.  He should then notify this office or home health as soon as possible to have a new dressing applied.  Instructed patient to remove wrap if he notices tingling or coldness to his right lower extremity or if his toes become white, blue, or cold.      Faxed home health orders to [facility].  HOME HEALTH WOUND CARE ORDERS  D/C previous orders  Wound care and nurse visits  1 X a week or PRN complications  Wound location;  1. Cleanse wound with saline or wound cleanser    ( pt may shower)  2.Apply calmoseptine to periwound, polymem to wound bed, and a two layer calamine coflex wrap.  3.Cover with appropriate cover dressing  Monitor for infection, teach patient/caregiver signs and symptoms    Discussed nutrition and the role of protein in wound healing with the patient. Instructed patient to continue to optimize protein for wound healing. Gave samples of Ensure.    Instructed patient to elevate legs whenever sedentary.    Instructed patient to keep vascular surgery appointments.    Written and verbal instructions given to patient.  RTC in 3-4 weeks    Sherry Davis PA-C

## 2023-03-08 ENCOUNTER — OFFICE VISIT (OUTPATIENT)
Dept: INTERNAL MEDICINE | Facility: CLINIC | Age: 73
End: 2023-03-08
Payer: MEDICARE

## 2023-03-08 VITALS
OXYGEN SATURATION: 97 % | HEART RATE: 82 BPM | BODY MASS INDEX: 18.07 KG/M2 | HEIGHT: 75 IN | SYSTOLIC BLOOD PRESSURE: 138 MMHG | WEIGHT: 145.31 LBS | DIASTOLIC BLOOD PRESSURE: 80 MMHG

## 2023-03-08 DIAGNOSIS — Z90.5 H/O PARTIAL NEPHRECTOMY: ICD-10-CM

## 2023-03-08 DIAGNOSIS — Z09 HOSPITAL DISCHARGE FOLLOW-UP: Primary | ICD-10-CM

## 2023-03-08 DIAGNOSIS — Z90.49 HISTORY OF CHOLECYSTECTOMY: ICD-10-CM

## 2023-03-08 PROBLEM — N28.89 RIGHT RENAL MASS: Status: RESOLVED | Noted: 2022-08-11 | Resolved: 2023-03-08

## 2023-03-08 PROCEDURE — 99496 TRANSJ CARE MGMT HIGH F2F 7D: CPT | Mod: S$GLB,,, | Performed by: INTERNAL MEDICINE

## 2023-03-08 PROCEDURE — 99999 PR PBB SHADOW E&M-EST. PATIENT-LVL IV: ICD-10-PCS | Mod: PBBFAC,,, | Performed by: INTERNAL MEDICINE

## 2023-03-08 PROCEDURE — 3008F PR BODY MASS INDEX (BMI) DOCUMENTED: ICD-10-PCS | Mod: CPTII,S$GLB,, | Performed by: INTERNAL MEDICINE

## 2023-03-08 PROCEDURE — 3079F PR MOST RECENT DIASTOLIC BLOOD PRESSURE 80-89 MM HG: ICD-10-PCS | Mod: CPTII,S$GLB,, | Performed by: INTERNAL MEDICINE

## 2023-03-08 PROCEDURE — 3075F PR MOST RECENT SYSTOLIC BLOOD PRESS GE 130-139MM HG: ICD-10-PCS | Mod: CPTII,S$GLB,, | Performed by: INTERNAL MEDICINE

## 2023-03-08 PROCEDURE — 1159F PR MEDICATION LIST DOCUMENTED IN MEDICAL RECORD: ICD-10-PCS | Mod: CPTII,S$GLB,, | Performed by: INTERNAL MEDICINE

## 2023-03-08 PROCEDURE — 1160F PR REVIEW ALL MEDS BY PRESCRIBER/CLIN PHARMACIST DOCUMENTED: ICD-10-PCS | Mod: CPTII,S$GLB,, | Performed by: INTERNAL MEDICINE

## 2023-03-08 PROCEDURE — 3075F SYST BP GE 130 - 139MM HG: CPT | Mod: CPTII,S$GLB,, | Performed by: INTERNAL MEDICINE

## 2023-03-08 PROCEDURE — 3079F DIAST BP 80-89 MM HG: CPT | Mod: CPTII,S$GLB,, | Performed by: INTERNAL MEDICINE

## 2023-03-08 PROCEDURE — 1126F PR PAIN SEVERITY QUANTIFIED, NO PAIN PRESENT: ICD-10-PCS | Mod: CPTII,S$GLB,, | Performed by: INTERNAL MEDICINE

## 2023-03-08 PROCEDURE — 3008F BODY MASS INDEX DOCD: CPT | Mod: CPTII,S$GLB,, | Performed by: INTERNAL MEDICINE

## 2023-03-08 PROCEDURE — 1101F PR PT FALLS ASSESS DOC 0-1 FALLS W/OUT INJ PAST YR: ICD-10-PCS | Mod: CPTII,S$GLB,, | Performed by: INTERNAL MEDICINE

## 2023-03-08 PROCEDURE — 1160F RVW MEDS BY RX/DR IN RCRD: CPT | Mod: CPTII,S$GLB,, | Performed by: INTERNAL MEDICINE

## 2023-03-08 PROCEDURE — 3288F FALL RISK ASSESSMENT DOCD: CPT | Mod: CPTII,S$GLB,, | Performed by: INTERNAL MEDICINE

## 2023-03-08 PROCEDURE — 1126F AMNT PAIN NOTED NONE PRSNT: CPT | Mod: CPTII,S$GLB,, | Performed by: INTERNAL MEDICINE

## 2023-03-08 PROCEDURE — 1101F PT FALLS ASSESS-DOCD LE1/YR: CPT | Mod: CPTII,S$GLB,, | Performed by: INTERNAL MEDICINE

## 2023-03-08 PROCEDURE — 99496 TRANSITIONAL CARE MANAGE SERVICE 7 DAY DISCHARGE: ICD-10-PCS | Mod: S$GLB,,, | Performed by: INTERNAL MEDICINE

## 2023-03-08 PROCEDURE — 3288F PR FALLS RISK ASSESSMENT DOCUMENTED: ICD-10-PCS | Mod: CPTII,S$GLB,, | Performed by: INTERNAL MEDICINE

## 2023-03-08 PROCEDURE — 1159F MED LIST DOCD IN RCRD: CPT | Mod: CPTII,S$GLB,, | Performed by: INTERNAL MEDICINE

## 2023-03-08 PROCEDURE — 99999 PR PBB SHADOW E&M-EST. PATIENT-LVL IV: CPT | Mod: PBBFAC,,, | Performed by: INTERNAL MEDICINE

## 2023-03-08 NOTE — PROGRESS NOTES
HOSPITAL FOLLOWUP NOTE    Discharge note information (in italics) was reviewed in detail and discussed with the patient:   Hospital Course (synopsis of major diagnoses, care, treatment, and services provided during the course of the hospital stay): The patient presented with the above history and underwent above procedure. He tolerated the procedure well. Please see the operative note for further procedure details. Vitals remained stable throughout the post operative period. Physical exam was appropriate. The patient was able to void without difficulty, and tolerate a regular diet prior to discharge.  He was sent home with prescriptions for oxycodone and instructions to restart home xarelto on 3/3/23.         [facility]: all information reviewed and updated as necessary during this encounter.  Medication list reviewed and reconciled.    No complaints today.  He sees surgery for follow up in several days.       Review of Systems   Constitutional: Negative.    Respiratory: Negative.     Cardiovascular: Negative.    Gastrointestinal: Negative.    Physical Exam  Constitutional:       General: He is not in acute distress.     Appearance: Normal appearance. He is normal weight. He is not ill-appearing, toxic-appearing or diaphoretic.   Cardiovascular:      Rate and Rhythm: Normal rate.   Pulmonary:      Effort: Pulmonary effort is normal.   Abdominal:       Neurological:      Mental Status: He is alert.      Assessment/plan:  1. Hospital discharge follow-up    2. H/O partial nephrectomy  Keep f/u with Oncology for decision on additional treatment/therapy if needed. He has no pain. F/u with surgery as planned. Continue current medicaitons.   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    3. History of cholecystectomy  His would looks good.  He has a large right sided scar that is healing well. No pain.  He has minor numbness which should improve.          Follow up if  symptoms worsen or fail to improve, for follow up on chronic conditions with your PCP..  Medication List with Changes/Refills   Current Medications    ACETAMINOPHEN (TYLENOL) 325 MG TABLET    Take 2 tablets (650 mg total) by mouth every 8 (eight) hours as needed.    ACETAMINOPHEN (TYLENOL) 650 MG TBSR    Take 1 tablet (650 mg total) by mouth every 8 (eight) hours. for 10 days    ASPIRIN 81 MG CHEW    Chew and swallow 1 tablet (81 mg total) by mouth once daily.    ATORVASTATIN (LIPITOR) 80 MG TABLET    Take 1 tablet (80 mg total) by mouth once daily.    EZETIMIBE (ZETIA) 10 MG TABLET    Take 1 tablet (10 mg total) by mouth once daily.    FERROUS SULFATE (IRON) 325 MG (65 MG IRON) TAB TABLET    Take 1 tablet (325 mg total) by mouth every Mon, Wed, Fri.    GABAPENTIN (NEURONTIN) 800 MG TABLET    Take 1 tablet (800 mg total) by mouth 3 (three) times daily.    IBUPROFEN (ADVIL,MOTRIN) 600 MG TABLET    Take 1 tablet (600 mg total) by mouth every 6 (six) hours as needed for Pain.    LOSARTAN-HYDROCHLOROTHIAZIDE 50-12.5 MG (HYZAAR) 50-12.5 MG PER TABLET    Take 1 tablet by mouth once daily.    MULTIVITAMIN (THERAGRAN) PER TABLET    Take 1 tablet by mouth once daily.    OXYCODONE (ROXICODONE) 5 MG IMMEDIATE RELEASE TABLET    Take 1 tablet (5 mg total) by mouth every 4 (four) hours as needed for Pain.    POLYETHYLENE GLYCOL (GLYCOLAX) 17 GRAM/DOSE POWDER    Use cap to measure 17 g, then mix in liquid and drink by mouth once daily. for 14 days    RIVAROXABAN (XARELTO) 20 MG TAB    Take 1 tablet (20 mg total) by mouth daily with dinner or evening meal.      Transitional Care Note:  Family and/or Caretaker present at visit?  Yes.  Diagnostic tests reviewed/disposition: No diagnosic tests pending after this hospitalization.  Disease/illness education: Kidney masses  Home health/community services discussion/referrals: Patient has home health established at [place] .   Establishment or re-establishment of referral orders for community  resources: No other necessary community resources.   Discussion with other health care providers: No discussion with other health care providers necessary.

## 2023-03-08 NOTE — PHYSICIAN QUERY
PT Name: Babatunde Singh  MR #: [id number]    DOCUMENTATION CLARIFICATION     CDS/: Sharon Narvaez               Contact information: [email or web address]  This form is a permanent document in the medical record.     Query Date: March 8, 2023    By submitting this query, we are merely seeking further clarification of documentation.  Please utilize your independent clinical judgment when addressing the question(s) below.    The medical record contains the following:  Pathology Findings Location in Medical Record   Final Pathologic Diagnosis:  1. RIGHT KIDNEY, RADICAL NEPHRECTOMY:   Papillary renal cell carcinoma, Type 2, ISUP nuclear grade 3, 7.2 cm.   Atherosclerotic vessels.     2. GALLBLADDER AND LIVER SEGMENT 4 AND 5, RADICAL CHOLECYSTECTOMY AND PARTIAL   HEPATECTOMY:   Gallbladder with adenomyomatosis, chronic cholecystitis, and cholelithiasis   Liver with incidental bile duct hamartoma   Negative for dysplasia and malignancy     3. PORTAL LYMPH NODES, DISSECTION:   Three lymph nodes, negative for carcinoma (0/3).   Pathology Report, Collection Date: 02/27, Reported Date: 03/07       Please clarify the pathology findings.  [ x ] Pathology findings noted above are ruled in/confirmed as diagnoses   [  ] Pathology findings noted above are not confirmed as diagnoses   [  ] Other diagnosis (please specify): ___________   [  ] Clinically Undetermined     Please document in your progress notes daily for the duration of treatment until resolved and include in your discharge summary.    Form No. [id number]

## 2023-03-09 ENCOUNTER — TELEPHONE (OUTPATIENT)
Dept: HEMATOLOGY/ONCOLOGY | Facility: CLINIC | Age: 73
End: 2023-03-09
Payer: MEDICARE

## 2023-03-09 NOTE — TELEPHONE ENCOUNTER
Tried to call pt to confirm appt on 03/13/2023 at 8:30am with Dr. Will. Pt didn't answer so left message regarding call

## 2023-03-10 ENCOUNTER — TELEPHONE (OUTPATIENT)
Dept: INTERNAL MEDICINE | Facility: CLINIC | Age: 73
End: 2023-03-10
Payer: MEDICARE

## 2023-03-13 ENCOUNTER — OFFICE VISIT (OUTPATIENT)
Dept: HEMATOLOGY/ONCOLOGY | Facility: CLINIC | Age: 73
End: 2023-03-13
Payer: MEDICARE

## 2023-03-13 VITALS
BODY MASS INDEX: 18.25 KG/M2 | RESPIRATION RATE: 20 BRPM | SYSTOLIC BLOOD PRESSURE: 122 MMHG | TEMPERATURE: 98 F | DIASTOLIC BLOOD PRESSURE: 58 MMHG | OXYGEN SATURATION: 100 % | HEIGHT: 75 IN | HEART RATE: 89 BPM | WEIGHT: 146.81 LBS

## 2023-03-13 DIAGNOSIS — R20.2 PARESTHESIA OF SKIN: ICD-10-CM

## 2023-03-13 DIAGNOSIS — D64.9 ANEMIA: ICD-10-CM

## 2023-03-13 DIAGNOSIS — E78.2 MIXED HYPERLIPIDEMIA: Primary | ICD-10-CM

## 2023-03-13 DIAGNOSIS — D50.9 IRON DEFICIENCY ANEMIA, UNSPECIFIED IRON DEFICIENCY ANEMIA TYPE: ICD-10-CM

## 2023-03-13 DIAGNOSIS — R53.82 CHRONIC FATIGUE: ICD-10-CM

## 2023-03-13 DIAGNOSIS — R20.1 HYPOESTHESIA OF SKIN: ICD-10-CM

## 2023-03-13 DIAGNOSIS — R20.2 NUMBNESS AND TINGLING OF RIGHT LEG: ICD-10-CM

## 2023-03-13 DIAGNOSIS — F17.201 TOBACCO USE DISORDER, SEVERE, IN EARLY REMISSION: ICD-10-CM

## 2023-03-13 DIAGNOSIS — R20.0 NUMBNESS AND TINGLING OF RIGHT LEG: ICD-10-CM

## 2023-03-13 DIAGNOSIS — I10 PRIMARY HYPERTENSION: ICD-10-CM

## 2023-03-13 PROCEDURE — 99205 PR OFFICE/OUTPT VISIT, NEW, LEVL V, 60-74 MIN: ICD-10-PCS | Mod: S$GLB,,, | Performed by: INTERNAL MEDICINE

## 2023-03-13 PROCEDURE — 3078F PR MOST RECENT DIASTOLIC BLOOD PRESSURE < 80 MM HG: ICD-10-PCS | Mod: CPTII,S$GLB,, | Performed by: INTERNAL MEDICINE

## 2023-03-13 PROCEDURE — 1101F PT FALLS ASSESS-DOCD LE1/YR: CPT | Mod: CPTII,S$GLB,, | Performed by: INTERNAL MEDICINE

## 2023-03-13 PROCEDURE — 3078F DIAST BP <80 MM HG: CPT | Mod: CPTII,S$GLB,, | Performed by: INTERNAL MEDICINE

## 2023-03-13 PROCEDURE — 3288F PR FALLS RISK ASSESSMENT DOCUMENTED: ICD-10-PCS | Mod: CPTII,S$GLB,, | Performed by: INTERNAL MEDICINE

## 2023-03-13 PROCEDURE — 1126F PR PAIN SEVERITY QUANTIFIED, NO PAIN PRESENT: ICD-10-PCS | Mod: CPTII,S$GLB,, | Performed by: INTERNAL MEDICINE

## 2023-03-13 PROCEDURE — 1126F AMNT PAIN NOTED NONE PRSNT: CPT | Mod: CPTII,S$GLB,, | Performed by: INTERNAL MEDICINE

## 2023-03-13 PROCEDURE — 1159F PR MEDICATION LIST DOCUMENTED IN MEDICAL RECORD: ICD-10-PCS | Mod: CPTII,S$GLB,, | Performed by: INTERNAL MEDICINE

## 2023-03-13 PROCEDURE — 3008F PR BODY MASS INDEX (BMI) DOCUMENTED: ICD-10-PCS | Mod: CPTII,S$GLB,, | Performed by: INTERNAL MEDICINE

## 2023-03-13 PROCEDURE — 1111F DSCHRG MED/CURRENT MED MERGE: CPT | Mod: CPTII,S$GLB,, | Performed by: INTERNAL MEDICINE

## 2023-03-13 PROCEDURE — 3074F PR MOST RECENT SYSTOLIC BLOOD PRESSURE < 130 MM HG: ICD-10-PCS | Mod: CPTII,S$GLB,, | Performed by: INTERNAL MEDICINE

## 2023-03-13 PROCEDURE — 99999 PR PBB SHADOW E&M-EST. PATIENT-LVL IV: ICD-10-PCS | Mod: PBBFAC,,, | Performed by: INTERNAL MEDICINE

## 2023-03-13 PROCEDURE — 99999 PR PBB SHADOW E&M-EST. PATIENT-LVL IV: CPT | Mod: PBBFAC,,, | Performed by: INTERNAL MEDICINE

## 2023-03-13 PROCEDURE — 1101F PR PT FALLS ASSESS DOC 0-1 FALLS W/OUT INJ PAST YR: ICD-10-PCS | Mod: CPTII,S$GLB,, | Performed by: INTERNAL MEDICINE

## 2023-03-13 PROCEDURE — 3008F BODY MASS INDEX DOCD: CPT | Mod: CPTII,S$GLB,, | Performed by: INTERNAL MEDICINE

## 2023-03-13 PROCEDURE — 1159F MED LIST DOCD IN RCRD: CPT | Mod: CPTII,S$GLB,, | Performed by: INTERNAL MEDICINE

## 2023-03-13 PROCEDURE — 1111F PR DISCHARGE MEDS RECONCILED W/ CURRENT OUTPATIENT MED LIST: ICD-10-PCS | Mod: CPTII,S$GLB,, | Performed by: INTERNAL MEDICINE

## 2023-03-13 PROCEDURE — 99205 OFFICE O/P NEW HI 60 MIN: CPT | Mod: S$GLB,,, | Performed by: INTERNAL MEDICINE

## 2023-03-13 PROCEDURE — 3074F SYST BP LT 130 MM HG: CPT | Mod: CPTII,S$GLB,, | Performed by: INTERNAL MEDICINE

## 2023-03-13 PROCEDURE — 3288F FALL RISK ASSESSMENT DOCD: CPT | Mod: CPTII,S$GLB,, | Performed by: INTERNAL MEDICINE

## 2023-03-13 RX ORDER — HEPARIN 100 UNIT/ML
500 SYRINGE INTRAVENOUS
Status: CANCELLED | OUTPATIENT
Start: 2023-04-05

## 2023-03-13 RX ORDER — HEPARIN 100 UNIT/ML
500 SYRINGE INTRAVENOUS
Status: CANCELLED | OUTPATIENT
Start: 2023-03-14

## 2023-03-13 RX ORDER — SODIUM CHLORIDE 0.9 % (FLUSH) 0.9 %
10 SYRINGE (ML) INJECTION
Status: CANCELLED | OUTPATIENT
Start: 2023-03-14

## 2023-03-13 RX ORDER — SODIUM CHLORIDE 0.9 % (FLUSH) 0.9 %
10 SYRINGE (ML) INJECTION
Status: CANCELLED | OUTPATIENT
Start: 2023-04-05

## 2023-03-13 NOTE — PROGRESS NOTES
CC: Anemia, hematology consultation     , 72, M, is here for hematology consultation for anemia. He has history of DVT, HTn, peripheral arterial disease. He has fatigue. Denies upper or lower GI bleeding, melena, hemoptysis, epistaxis, hematuria. He has received 5 units PRBCs since Oct 2022. No family h/o anemia. He has lost weight in the past 12 months. He zapata snot have very good appetite. He is on Xarelto,.  Hgb was 11.2g/dl in March 2022, has decreased to to 7.9g/dl in March 2023. He underwent  combined open right radical nephrectomy and cholecystectomy 2/27/23.  He has had colonoscopy, EGD, VCE since late 2022.         Past Medical History:   Diagnosis Date    DVT (deep venous thrombosis)     Hypertension     Peripheral arterial disease     Right renal mass 8/11/2022           Past Surgical History:   Procedure Laterality Date    APPLICATION OF WOUND VACUUM-ASSISTED CLOSURE DEVICE Right 12/8/2022    Procedure: APPLICATION, WOUND VAC;  Surgeon: LEI Ortiz III, MD;  Location: [facility] OR [facility] FLR;  Service: Peripheral Vascular;  Laterality: Right;    CHOLECYSTECTOMY N/A 2/27/2023    Procedure: CHOLECYSTECTOMY complex;  Surgeon: Tolu Gavin MD;  Location: [facility] OR [facility] FLR;  Service: General;  Laterality: N/A;    COLONOSCOPY N/A 10/31/2022    Procedure: COLONOSCOPY;  Surgeon: Philip Shafer MD;  Location: [facility] (2ND FLR);  Service: Endoscopy;  Laterality: N/A;    CREATION OF AXILLARY-FEMORAL ARTERY BYPASS WITH GRAFT N/A 10/6/2022    Procedure: CREATION, BYPASS, ARTERIAL, AXILLARY TO FEMORAL, USING GRAFT;  Surgeon: LEI Ortiz III, MD;  [facility];  Service: Peripheral Vascular;  Laterality: N/A;    CREATION OF FEMORAL-FEMORAL ARTERY BYPASS WITH GRAFT N/A 10/6/2022    Procedure: CREATION, BYPASS, ARTERIAL, FEMORAL TO FEMORAL, USING GRAFT;  Surgeon: LEI Ortiz III, MD;  Location: [facility] OR [facility] FLR;  Service: Peripheral Vascular;  Laterality: N/A;     ESOPHAGOGASTRODUODENOSCOPY N/A 10/20/2022    Procedure: EGD (ESOPHAGOGASTRODUODENOSCOPY);  Surgeon: Sixto Chicas MD;  Location: [facility] ENDO (2ND FLR);  Service: Endoscopy;  Laterality: N/A;    ESOPHAGOGASTRODUODENOSCOPY N/A 10/28/2022    Procedure: EGD (ESOPHAGOGASTRODUODENOSCOPY);  Surgeon: Marlee Hopson MD;  Location: [facility] ENDO (2ND FLR);  Service: Gastroenterology;  Laterality: N/A;    EXCISION OF HYDROCELE Left 2022    Procedure: HYDROCELECTOMY;  Surgeon: Damion Roberts MD;  Location: [facility];  Service: Urology;  Laterality: Left;    KNEE SURGERY  1972    PARTIAL NEPHRECTOMY Right 2023    Procedure: NEPHRECTOMY, PARTIAL;  Surgeon: Moises Cuba MD;  Location: [facility] 2ND FLR;  Service: Urology;  Laterality: Right;  Dr Gavin robotic cholecystectomy for gallbladder mass also    WOUND DEBRIDEMENT Right 2022    Procedure: DEBRIDEMENT, WOUND;  Surgeon: LEI Ortiz III, MD;  Location: [facility] OR 2ND FLR;  Service: Peripheral Vascular;  Laterality: Right;  RLE wound debridement           Social History     Socioeconomic History    Marital status:    Occupational History    Occupation: doorman [employer]   Tobacco Use    Smoking status: Former     Packs/day: 0.50     Years: 55.00     Pack years: 27.50     Types: Cigarettes     Quit date: 10/11/2022     Years since quittin.4    Smokeless tobacco: Never    Tobacco comments:     Cigarettes3-4 per day   Substance and Sexual Activity    Alcohol use: Never    Drug use: Never    Sexual activity: Yes     Partners: Female     Comment: wife       Review of patient's allergies indicates:   Allergen Reactions    Ace inhibitors          Review of Systems   Constitutional:  Positive for malaise/fatigue. Negative for chills, fever and weight loss.   HENT:  Negative for congestion, ear discharge, hearing loss, nosebleeds and tinnitus.    Eyes:  Negative for double vision and discharge.   Respiratory:  Negative for cough, hemoptysis and shortness of  breath.    Cardiovascular:  Negative for chest pain, orthopnea and claudication.   Gastrointestinal:  Negative for abdominal pain, blood in stool, constipation, diarrhea, melena and vomiting.   Genitourinary:  Negative for frequency, hematuria and urgency.   Musculoskeletal:  Negative for back pain, myalgias and neck pain.   Neurological:  Negative for tremors and focal weakness.   Endo/Heme/Allergies:  Negative for polydipsia.   Psychiatric/Behavioral:  Negative for depression, hallucinations, substance abuse and suicidal ideas. The patient is not nervous/anxious.         Vitals:    03/13/23 0849   BP: (!) 122/58   Pulse: 89   Resp: 20   Temp: 98.4 °F (36.9 °C)           Physical Exam  HENT:      Head: Normocephalic and atraumatic.      Mouth/Throat:      Pharynx: No posterior oropharyngeal erythema.   Eyes:      General: No scleral icterus.  Cardiovascular:      Rate and Rhythm: Normal rate and regular rhythm.   Pulmonary:      Effort: Pulmonary effort is normal. No respiratory distress.      Breath sounds: Normal breath sounds. No rhonchi.   Abdominal:      General: There is no distension.      Palpations: There is no mass.      Tenderness: There is no abdominal tenderness.   Musculoskeletal:         General: No swelling.   Lymphadenopathy:      Cervical: No cervical adenopathy.   Skin:     Coloration: Skin is not jaundiced.   Neurological:      General: No focal deficit present.      Mental Status: He is alert and oriented to person, place, and time.      Cranial Nerves: No cranial nerve deficit.        Current Outpatient Medications   Medication Sig    acetaminophen (TYLENOL) 650 MG TbSR Take 1 tablet (650 mg total) by mouth every 8 (eight) hours. for 10 days    atorvastatin (LIPITOR) 80 MG tablet Take 1 tablet (80 mg total) by mouth once daily.    ferrous sulfate (IRON) 325 mg (65 mg iron) Tab tablet Take 1 tablet (325 mg total) by mouth every Mon, Wed, Fri.    gabapentin (NEURONTIN) 800 MG tablet Take 1  tablet (800 mg total) by mouth 3 (three) times daily.    ibuprofen (ADVIL,MOTRIN) 600 MG tablet Take 1 tablet (600 mg total) by mouth every 6 (six) hours as needed for Pain.    losartan-hydrochlorothiazide 50-12.5 mg (HYZAAR) 50-12.5 mg per tablet Take 1 tablet by mouth once daily.    multivitamin (THERAGRAN) per tablet Take 1 tablet by mouth once daily.    oxyCODONE (ROXICODONE) 5 MG immediate release tablet Take 1 tablet (5 mg total) by mouth every 4 (four) hours as needed for Pain.    polyethylene glycol (GLYCOLAX) 17 gram/dose powder Use cap to measure 17 g, then mix in liquid and drink by mouth once daily. for 14 days    rivaroxaban (XARELTO) 20 mg Tab Take 1 tablet (20 mg total) by mouth daily with dinner or evening meal.    acetaminophen (TYLENOL) 325 MG tablet Take 2 tablets (650 mg total) by mouth every 8 (eight) hours as needed. (Patient not taking: Reported on 3/3/2023)    aspirin 81 MG Chew Chew and swallow 1 tablet (81 mg total) by mouth once daily. (Patient not taking: Reported on 3/13/2023)    ezetimibe (ZETIA) 10 mg tablet Take 1 tablet (10 mg total) by mouth once daily. (Patient not taking: Reported on 2/15/2023)     No current facility-administered medications for this visit.     10/20/22 EGD      Normal esophagus.                          - Z-line regular.                          - Gastritis.                          - Normal examined duodenum.                          - No specimens collected      `10/28/22 colonoscopy       - The examined portion of the ileum was normal.                          - One 2 mm polyp in the transverse colon, removed with a jumbo cold forceps. Resected and retrieved.                          - One 3 mm polyp in the transverse colon, removed with a cold snare. Resected and retrieved.                          - One 7 mm polyp in the sigmoid colon, removed with a cold snare. Resected and retrieved. Clip (MR conditional) was placed.                          - One 12 mm  polyp in the sigmoid colon, removed with a hot snare. Resected and retrieved. Clip (MR                          conditional) was placed.                        - Diverticulosis in the sigmoid colon.         - The examination was otherwise normal on direct and retroflexion views.       Diagnosis 1. Colon, transverse, polypectomies:   - Tubular adenoma, multiple fragments   - Negative for high-grade dysplasia or malignancy   2. Colon, sigmoid #1, polypectomy:   - Tubular adenoma   - Negative for high-grade dysplasia or malignancy   3. Colon, sigmoid #2, polypectomy:   - Tubulovillous adenoma   - Resection margin uninvolved by dysplasia   - Negative for high-grade dysplasia or malignancy         1/20/23 video capsule endoscopy    Two single mucosal red spots with no bleeding in the small bowel.     2/27/23     1. 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   - Atherosclerotic vessels.   - See CAP synoptic report and comment below.     2. GALLBLADDER AND LIVER SEGMENT 4 AND 5, RADICAL CHOLECYSTECTOMY AND PARTIAL   HEPATECTOMY:   - Gallbladder with adenomyomatosis, chronic cholecystitis, and cholelithiasis   - Liver with incidental bile duct hamartoma   - Negative for dysplasia and malignancy   - See comment     3. PORTAL LYMPH NODES, DISSECTION:   - Three lymph nodes, negative for carcinoma (0/3).     SURGICAL PATHOLOGY CANCER CASE SUMMARY-KIDNEY   Procedure: Radical nephrectomy.   Specimen laterality: Right.   Tumor site: Superior pole.   Tumor size: 7.2 cm.   Tumor focality: Unifocal.   Histologic type: Papillary renal cell carcinoma, Type 2.   Sarcomatoid features: Not identified.   Rhabdoid features: Not identified.   Histologic Grade (ISUP Grade): 3.   Tumor necrosis: Present, 15%.   Tumor extension: Limited to kidney.   Margins: Uninvolved.   Lymphovascular invasion (excluding renal vein and its segmental branches):   Not identified.   Regional lymph  nodes: No regional lymph nodes submitted or found.   Distant metastases: Not applicable      Component      Latest Ref Rng & Units 3/2/2023   WBC      3.90 - 12.70 K/uL 10.93   RBC      4.60 - 6.20 M/uL 3.71 (L)   Hemoglobin      14.0 - 18.0 g/dL 7.7 (L)   Hematocrit      40.0 - 54.0 % 28.2 (L)   MCV      82 - 98 fL 76 (L)   MCH      27.0 - 31.0 pg 20.8 (L)   MCHC      32.0 - 36.0 g/dL 27.3 (L)   RDW      11.5 - 14.5 % 26.5 (H)   Platelets      150 - 450 K/uL 394   MPV      9.2 - 12.9 fL 10.4   Immature Granulocytes      0.0 - 0.5 % 0.4   Gran # (ANC)      1.8 - 7.7 K/uL 7.1   Immature Grans (Abs)      0.00 - 0.04 K/uL 0.04   Lymph #      1.0 - 4.8 K/uL 2.6   Mono #      0.3 - 1.0 K/uL 1.1 (H)   Eos #      0.0 - 0.5 K/uL 0.1   Baso #      0.00 - 0.20 K/uL 0.06   nRBC      0 /100 WBC 0   Gran %      38.0 - 73.0 % 64.8   Lymph %      18.0 - 48.0 % 23.4   Mono %      4.0 - 15.0 % 9.7   Eosinophil %      0.0 - 8.0 % 1.2   Basophil %      0.0 - 1.9 % 0.5   Differential Method       Automated   Sodium      136 - 145 mmol/L 137   Potassium      3.5 - 5.1 mmol/L 4.1   Chloride      95 - 110 mmol/L 107   CO2      23 - 29 mmol/L 25   Glucose      70 - 110 mg/dL 84   BUN      8 - 23 mg/dL 15   Creatinine      0.5 - 1.4 mg/dL 1.3   Calcium      8.7 - 10.5 mg/dL 8.9   PROTEIN TOTAL      6.0 - 8.4 g/dL 5.5 (L)   Albumin      3.5 - 5.2 g/dL 2.5 (L)   BILIRUBIN TOTAL      0.1 - 1.0 mg/dL 0.3   Alkaline Phosphatase      55 - 135 U/L 86   AST      10 - 40 U/L 59 (H)   ALT      10 - 44 U/L 63 (H)   Anion Gap      8 - 16 mmol/L 5 (L)   eGFR      >60 mL/min/1.73 m:2 58.7 (A)     Component      Latest Ref Rng & Units 3/2/2023 2/15/2023   Iron      45 - 160 ug/dL  15 (L)   Transferrin      200 - 375 mg/dL  309   TIBC      250 - 450 ug/dL  457 (H)   Saturated Iron      20 - 50 %  3 (L)   Ferritin      20.0 - 300.0 ng/mL  16 (L)   Retic      0.4 - 2.0 %  2.4 (H)   Magnesium      1.6 - 2.6 mg/dL 1.9        Assessment:        1. Iron  deficiency anemia due to chronic blood loss  2. Fatigue, chronic  3. Weight loss  4. Papillary carcinoma of kidney  5. History of DVT  6. HTn, primary  7. Peripheral artery disease        Plan:    1,2 . Extensive work up since Oct 2022 has revealed polyps in colon that were resected; diverticuloses in colon; un remarkable EGD; two single mucosal red spots with no bleeding in the small bowel on VCE.  He is on xarelto and aspirin. He also had papillary carcinoma in his kidney that has since been resected.  He has evidence of iron deficiency as of 2/15/23. He will receive iv iron. He will have B12, folate, LDH, SPEP./RAMOS checked.     3. S/p nephrectomy    5. He had evidence of thrombosis with the right anterior tibial and peroneal veins in Sept 2022.  Repeat US doppler did not demonstrate this thrombus. No indication for prolonged anti-coagulation for first DVT.     6. He follows with     7. He has bene stented. He is on aspirin, xarelto        BMT Chart Routing      Follow up with physician    Follow up with MACKENZIE 3 months.   Provider visit type    Infusion scheduling note iv iron in 1-2 weeks   Injection scheduling note    Labs CBC, free light chains, SPEP, immunofixation, vitamin B12, LDH, folate, ferritin and iron and TIBC   Scheduling:  Preferred lab:  Lab interval:  cbc, iron, tibc, ferritin, ldh, spep, immunofixation, b12, folate, light chains in 3 [facility]   Imaging    Pharmacy appointment    Other referrals

## 2023-03-15 NOTE — PROGRESS NOTES
"  Post-Op Follow-up Visit:   3/16/2023  Patient ID: Babatunde Singh is a 72 y.o. male, born 1950    Chief Complaint   Patient presents with    Post-op Evaluation     8/2022: DVT and remains on Xarelto  9/2022: Imaging revealed R kidney mass, evaluated by urology, Dr. Cuba  10/6/2022: s/p L axillary to bilateral femoris profunda bypass   11/2022: abnormal finding in gallbladder with gallstones and stable ~7cm mass in R kidney  12/8/2022: s/p surgical debridement of Achilles tendon per Dr. Ortiz  1/24/2023: removed RLE wound vac  2/27/2023: NEPHRECTOMY, PARTIAL (Right) per Dr. Cuba; CHOLECYSTECTOMY complex per Dr. Gavin    Interval History: This 71 y/o gentleman returns to clinic from [facility] today, ambulating with a cane.   Since his last clinic visit, he underwent R partial nephrectomy and complex cholecystectomy per Dr. Cuba and Dr. Gavin on 2/27/23. He was discharged to home on POD 3, with instructions to resume Xarelto on 3/3/23. Today is his first post op visit and he has post op visit with urology at [facility] later today.   Pathology revealed R renal cell carcinoma but gallbladder and lymph nodes were benign.   He denies significant incisional pain. Reports a good appetite, tolerating oral diet without N/V/D. Weight is down 8# since surgery.     Physical Exam:  BP (!) 156/69   Pulse 91   Ht 6' 3" (1.905 m)   Wt 62.7 kg (138 lb 3.2 oz)   SpO2 100%   BMI 17.27 kg/m²     General:  Non-toxic, ambulatory  Abd:  Soft, non-tender  Incision:  R subcostal incision with edges well approximated without exudate and erythema    Pathology:  1. RIGHT KIDNEY, RADICAL NEPHRECTOMY: - Papillary renal cell carcinoma, Type 2, ISUP nuclear grade 3, 7.2 cm. - Atherosclerotic vessels. - See CAP synoptic report and comment below.   2. GALLBLADDER AND LIVER SEGMENT 4 AND 5, RADICAL CHOLECYSTECTOMY AND PARTIAL HEPATECTOMY: - Gallbladder with adenomyomatosis, chronic cholecystitis, and cholelithiasis - Liver with " incidental bile duct hamartoma - Negative for dysplasia and malignancy   3. PORTAL LYMPH NODES, DISSECTION: - Three lymph nodes, negative for carcinoma (0/3)      ICD-10-CM ICD-9-CM    1. Gallbladder mass  K82.8 575.8       Plan   Reviewed pathology again in person with him and provided printed copy to him today. F/u with urology as recommended for papillary renal cell carcinoma. He has appt today.   Continue oral diet  Increase physical activity as tolerated but no heavy lifting until 6 weeks post op    RTC prn    Questions were asked and answered to patient's satisfaction.        Pt seen in conjunction with Dr. Gavin today        Annamarie Pelletier NP  Upper GI / Hepatobiliary Surgical Oncology  [facility] [place], [place]  Office: [phone]  Fax: [phone]

## 2023-03-16 ENCOUNTER — OFFICE VISIT (OUTPATIENT)
Dept: SURGERY | Facility: CLINIC | Age: 73
End: 2023-03-16
Payer: MEDICARE

## 2023-03-16 ENCOUNTER — OFFICE VISIT (OUTPATIENT)
Dept: UROLOGY | Facility: CLINIC | Age: 73
End: 2023-03-16
Payer: MEDICARE

## 2023-03-16 VITALS — SYSTOLIC BLOOD PRESSURE: 148 MMHG | HEART RATE: 101 BPM | DIASTOLIC BLOOD PRESSURE: 65 MMHG

## 2023-03-16 VITALS
HEIGHT: 75 IN | BODY MASS INDEX: 17.18 KG/M2 | WEIGHT: 138.19 LBS | OXYGEN SATURATION: 100 % | DIASTOLIC BLOOD PRESSURE: 69 MMHG | SYSTOLIC BLOOD PRESSURE: 156 MMHG | HEART RATE: 91 BPM

## 2023-03-16 DIAGNOSIS — K82.8 GALLBLADDER MASS: Primary | ICD-10-CM

## 2023-03-16 DIAGNOSIS — C64.1 RENAL CELL CARCINOMA OF RIGHT KIDNEY: Primary | ICD-10-CM

## 2023-03-16 PROCEDURE — 1101F PT FALLS ASSESS-DOCD LE1/YR: CPT | Mod: CPTII,S$GLB,, | Performed by: NURSE PRACTITIONER

## 2023-03-16 PROCEDURE — 1126F PR PAIN SEVERITY QUANTIFIED, NO PAIN PRESENT: ICD-10-PCS | Mod: CPTII,S$GLB,, | Performed by: NURSE PRACTITIONER

## 2023-03-16 PROCEDURE — 3077F PR MOST RECENT SYSTOLIC BLOOD PRESSURE >= 140 MM HG: ICD-10-PCS | Mod: CPTII,S$GLB,, | Performed by: NURSE PRACTITIONER

## 2023-03-16 PROCEDURE — 3288F PR FALLS RISK ASSESSMENT DOCUMENTED: ICD-10-PCS | Mod: CPTII,S$GLB,, | Performed by: NURSE PRACTITIONER

## 2023-03-16 PROCEDURE — 99999 PR PBB SHADOW E&M-EST. PATIENT-LVL III: ICD-10-PCS | Mod: PBBFAC,,, | Performed by: NURSE PRACTITIONER

## 2023-03-16 PROCEDURE — 1101F PR PT FALLS ASSESS DOC 0-1 FALLS W/OUT INJ PAST YR: ICD-10-PCS | Mod: CPTII,S$GLB,, | Performed by: NURSE PRACTITIONER

## 2023-03-16 PROCEDURE — 3078F DIAST BP <80 MM HG: CPT | Mod: CPTII,S$GLB,, | Performed by: NURSE PRACTITIONER

## 2023-03-16 PROCEDURE — 99999 PR PBB SHADOW E&M-EST. PATIENT-LVL III: CPT | Mod: PBBFAC,,, | Performed by: NURSE PRACTITIONER

## 2023-03-16 PROCEDURE — 1159F MED LIST DOCD IN RCRD: CPT | Mod: CPTII,S$GLB,, | Performed by: NURSE PRACTITIONER

## 2023-03-16 PROCEDURE — 3288F FALL RISK ASSESSMENT DOCD: CPT | Mod: CPTII,S$GLB,, | Performed by: NURSE PRACTITIONER

## 2023-03-16 PROCEDURE — 1160F RVW MEDS BY RX/DR IN RCRD: CPT | Mod: CPTII,S$GLB,, | Performed by: NURSE PRACTITIONER

## 2023-03-16 PROCEDURE — 3008F PR BODY MASS INDEX (BMI) DOCUMENTED: ICD-10-PCS | Mod: CPTII,S$GLB,, | Performed by: NURSE PRACTITIONER

## 2023-03-16 PROCEDURE — 3077F SYST BP >= 140 MM HG: CPT | Mod: CPTII,S$GLB,, | Performed by: NURSE PRACTITIONER

## 2023-03-16 PROCEDURE — 1159F PR MEDICATION LIST DOCUMENTED IN MEDICAL RECORD: ICD-10-PCS | Mod: CPTII,S$GLB,, | Performed by: NURSE PRACTITIONER

## 2023-03-16 PROCEDURE — 3078F PR MOST RECENT DIASTOLIC BLOOD PRESSURE < 80 MM HG: ICD-10-PCS | Mod: CPTII,S$GLB,, | Performed by: NURSE PRACTITIONER

## 2023-03-16 PROCEDURE — 99024 POSTOP FOLLOW-UP VISIT: CPT | Mod: S$GLB,,, | Performed by: NURSE PRACTITIONER

## 2023-03-16 PROCEDURE — 3008F BODY MASS INDEX DOCD: CPT | Mod: CPTII,S$GLB,, | Performed by: NURSE PRACTITIONER

## 2023-03-16 PROCEDURE — 99024 PR POST-OP FOLLOW-UP VISIT: ICD-10-PCS | Mod: S$GLB,,, | Performed by: NURSE PRACTITIONER

## 2023-03-16 PROCEDURE — 1160F PR REVIEW ALL MEDS BY PRESCRIBER/CLIN PHARMACIST DOCUMENTED: ICD-10-PCS | Mod: CPTII,S$GLB,, | Performed by: NURSE PRACTITIONER

## 2023-03-16 PROCEDURE — 1126F AMNT PAIN NOTED NONE PRSNT: CPT | Mod: CPTII,S$GLB,, | Performed by: NURSE PRACTITIONER

## 2023-03-16 NOTE — PROGRESS NOTES
I saw Mr. Singh with Annamarie WOLFE. He it doing great. Tolerating a diet, very minimal pain. Incision healing well. I reviewed his radical cholecystectomy path with him, which showed adenomyomatosis, chronic cholecystitis, and cholelithiasis with no evidence of malignancy. I will see him back in clinic as needed.     Tolu Gavin MD  Surgical Oncology

## 2023-03-16 NOTE — PROGRESS NOTES
Subjective:      Babatunde Singh is a 72 y.o. male who returns today for a wound check. Established patient of Dr. Cuba' and new to me today.     The patient is s/p open right radical nephrectomy and cholecystectomy on 2/27 per Dr. Cuba and Dr. Gavin. Discharged on POD #3. Creatinine 1.3 at discharge (0.9 pre operatively).     He presents today for wound check. Doing great! Minimal pain. Denies constipation. Denies dysuria and hematuria.    The following portions of the patient's history were reviewed and updated as appropriate: allergies, current medications, past family history, past medical history, past social history, past surgical history and problem list.    Review of Systems  Constitutional: no fever or chills  ENT: no nasal congestion or sore throat  Respiratory: no cough or shortness of breath  Cardiovascular: no chest pain or palpitations  Gastrointestinal: no nausea or vomiting, tolerating diet  Genitourinary: as per HPI  Hematologic/Lymphatic: no easy bruising or lymphadenopathy  Musculoskeletal: no arthralgias or myalgias  Neurological: no seizures or tremors  Behavioral/Psych: no auditory or visual hallucinations     Objective:   Vitals:   Vitals:    03/16/23 1403   BP: (!) 148/65   Pulse: 101       Physical Exam   General: alert and oriented, no acute distress  Head: normocephalic, atraumatic  Neck: supple, normal ROM  Respiratory: Symmetric expansion, non-labored breathing  Cardiovascular: regular rate and rhythm  Abdomen: soft, non tender, non distended; abdominal incision healing well dressed with dermabond  Genitourinary: deferred   Skin: normal coloration and turgor, no rashes, no suspicious skin lesions noted  Neuro: alert and oriented x3, no gross deficits  Psych: normal judgment and insight, normal mood/affect, and non-anxious    Lab Review   Urinalysis demonstrates : no sample  Lab Results   Component Value Date    WBC 11.08 03/02/2023    WBC 10.93 03/02/2023    HGB 7.9 (L) 03/02/2023     HGB 7.7 (L) 03/02/2023    HCT 28.5 (L) 03/02/2023    HCT 28.2 (L) 03/02/2023    HCT 20 (LL) 10/19/2022    MCV 76 (L) 03/02/2023    MCV 76 (L) 03/02/2023     03/02/2023     03/02/2023     Lab Results   Component Value Date    CREATININE 1.3 03/02/2023    BUN 15 03/02/2023     No results found for: PSA  Imaging   None    Final Pathologic Diagnosis 1. 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   - Atherosclerotic vessels.   - See CAP synoptic report and comment below.   2. GALLBLADDER AND LIVER SEGMENT 4 AND 5, RADICAL CHOLECYSTECTOMY AND PARTIAL   HEPATECTOMY:   - Gallbladder with adenomyomatosis, chronic cholecystitis, and cholelithiasis   - Liver with incidental bile duct hamartoma   - Negative for dysplasia and malignancy   - See comment   3. PORTAL LYMPH NODES, DISSECTION:   - Three lymph nodes, negative for carcinoma (0/3).   SURGICAL PATHOLOGY CANCER CASE SUMMARY-KIDNEY   Procedure: Radical nephrectomy.   Specimen laterality: Right.   Tumor site: Superior pole.   Tumor size: 7.2 cm.   Tumor focality: Unifocal.   Histologic type: Papillary renal cell carcinoma, Type 2.   Sarcomatoid features: Not identified.   Rhabdoid features: Not identified.   Histologic Grade (ISUP Grade): 3.   Tumor necrosis: Present, 15%.   Tumor extension: Limited to kidney.   Margins: Uninvolved.   Lymphovascular invasion (excluding renal vein and its segmental branches):   Not identified.   Regional lymph nodes: No regional lymph nodes submitted or found.   Distant metastases: Not applicable.   Pathologic stage (pTNM, AJCC 8th edition): pT2a pN not assigned.   Blocks for potential molecular or ancillary studies:   Tumor block: 1F.      Assessment:     1. Renal cell carcinoma of right kidney      Plan:   Diagnoses and all orders for this visit:    Renal cell carcinoma of right kidney Grade 3 pT2a pN  -     Comprehensive Metabolic Panel; Future  -     CBC  Auto Differential; Future  -     US Abdomen Complete; Future    Plan:  --Doing well  --Continue to avoid straining for BM and lifting >5 lbs x 4 weeks  --Follow up with Dr. Cuba in 3 months with labs and abdominal US

## 2023-03-21 ENCOUNTER — TELEPHONE (OUTPATIENT)
Dept: PULMONOLOGY | Facility: CLINIC | Age: 73
End: 2023-03-21
Payer: MEDICARE

## 2023-03-21 DIAGNOSIS — Z87.891 HISTORY OF TOBACCO USE: ICD-10-CM

## 2023-03-21 DIAGNOSIS — F17.200 TOBACCO USE DISORDER: Primary | ICD-10-CM

## 2023-03-21 NOTE — TELEPHONE ENCOUNTER
Called and spoke with pt to confirm scheduling of follow up LDCT for Tuesday, April 4th @ 8:15 am.  Instructions given on where to go with verbalized understanding.

## 2023-03-28 ENCOUNTER — TELEPHONE (OUTPATIENT)
Dept: ENDOSCOPY | Facility: HOSPITAL | Age: 73
End: 2023-03-28
Payer: MEDICARE

## 2023-03-28 ENCOUNTER — OFFICE VISIT (OUTPATIENT)
Dept: WOUND CARE | Facility: CLINIC | Age: 73
End: 2023-03-28
Payer: MEDICARE

## 2023-03-28 ENCOUNTER — TELEPHONE (OUTPATIENT)
Dept: UROLOGY | Facility: CLINIC | Age: 73
End: 2023-03-28
Payer: MEDICARE

## 2023-03-28 VITALS
WEIGHT: 147.5 LBS | BODY MASS INDEX: 18.34 KG/M2 | TEMPERATURE: 99 F | HEART RATE: 82 BPM | DIASTOLIC BLOOD PRESSURE: 79 MMHG | SYSTOLIC BLOOD PRESSURE: 175 MMHG | HEIGHT: 75 IN

## 2023-03-28 DIAGNOSIS — I73.9 PAD (PERIPHERAL ARTERY DISEASE): ICD-10-CM

## 2023-03-28 DIAGNOSIS — R60.0 BILATERAL LOWER EXTREMITY EDEMA: ICD-10-CM

## 2023-03-28 DIAGNOSIS — S81.801A WOUND OF RIGHT LOWER EXTREMITY, INITIAL ENCOUNTER: Primary | ICD-10-CM

## 2023-03-28 DIAGNOSIS — Z87.891 HISTORY OF SMOKING: ICD-10-CM

## 2023-03-28 DIAGNOSIS — I10 PRIMARY HYPERTENSION: ICD-10-CM

## 2023-03-28 PROCEDURE — 11042 DBRDMT SUBQ TIS 1ST 20SQCM/<: CPT | Mod: S$GLB,,,

## 2023-03-28 PROCEDURE — 3078F DIAST BP <80 MM HG: CPT | Mod: CPTII,S$GLB,,

## 2023-03-28 PROCEDURE — 1159F PR MEDICATION LIST DOCUMENTED IN MEDICAL RECORD: ICD-10-PCS | Mod: CPTII,S$GLB,,

## 2023-03-28 PROCEDURE — 3008F PR BODY MASS INDEX (BMI) DOCUMENTED: ICD-10-PCS | Mod: CPTII,S$GLB,,

## 2023-03-28 PROCEDURE — 99999 PR PBB SHADOW E&M-EST. PATIENT-LVL IV: CPT | Mod: PBBFAC,,,

## 2023-03-28 PROCEDURE — 1159F MED LIST DOCD IN RCRD: CPT | Mod: CPTII,S$GLB,,

## 2023-03-28 PROCEDURE — 11042 DEBRIDEMENT: ICD-10-PCS | Mod: S$GLB,,,

## 2023-03-28 PROCEDURE — 3078F PR MOST RECENT DIASTOLIC BLOOD PRESSURE < 80 MM HG: ICD-10-PCS | Mod: CPTII,S$GLB,,

## 2023-03-28 PROCEDURE — 3288F FALL RISK ASSESSMENT DOCD: CPT | Mod: CPTII,S$GLB,,

## 2023-03-28 PROCEDURE — 1101F PR PT FALLS ASSESS DOC 0-1 FALLS W/OUT INJ PAST YR: ICD-10-PCS | Mod: CPTII,S$GLB,,

## 2023-03-28 PROCEDURE — 3077F SYST BP >= 140 MM HG: CPT | Mod: CPTII,S$GLB,,

## 2023-03-28 PROCEDURE — 99214 OFFICE O/P EST MOD 30 MIN: CPT | Mod: 25,S$GLB,,

## 2023-03-28 PROCEDURE — 1126F AMNT PAIN NOTED NONE PRSNT: CPT | Mod: CPTII,S$GLB,,

## 2023-03-28 PROCEDURE — 3008F BODY MASS INDEX DOCD: CPT | Mod: CPTII,S$GLB,,

## 2023-03-28 PROCEDURE — 99214 PR OFFICE/OUTPT VISIT, EST, LEVL IV, 30-39 MIN: ICD-10-PCS | Mod: 25,S$GLB,,

## 2023-03-28 PROCEDURE — 1101F PT FALLS ASSESS-DOCD LE1/YR: CPT | Mod: CPTII,S$GLB,,

## 2023-03-28 PROCEDURE — 3288F PR FALLS RISK ASSESSMENT DOCUMENTED: ICD-10-PCS | Mod: CPTII,S$GLB,,

## 2023-03-28 PROCEDURE — 3077F PR MOST RECENT SYSTOLIC BLOOD PRESSURE >= 140 MM HG: ICD-10-PCS | Mod: CPTII,S$GLB,,

## 2023-03-28 PROCEDURE — 1126F PR PAIN SEVERITY QUANTIFIED, NO PAIN PRESENT: ICD-10-PCS | Mod: CPTII,S$GLB,,

## 2023-03-28 PROCEDURE — 99999 PR PBB SHADOW E&M-EST. PATIENT-LVL IV: ICD-10-PCS | Mod: PBBFAC,,,

## 2023-03-28 PROCEDURE — 1111F DSCHRG MED/CURRENT MED MERGE: CPT | Mod: CPTII,S$GLB,,

## 2023-03-28 PROCEDURE — 1111F PR DISCHARGE MEDS RECONCILED W/ CURRENT OUTPATIENT MED LIST: ICD-10-PCS | Mod: CPTII,S$GLB,,

## 2023-03-28 NOTE — TELEPHONE ENCOUNTER
----- Message from Dilshad Ye MD sent at 3/28/2023  2:10 PM CDT -----  Yes surgery should provide clearance  ----- Message -----  From: Karina Thao MA  Sent: 3/28/2023  12:36 PM CDT  To: Dilshad Ye MD    Would it be best from him to get from surgery?  ----- Message -----  From: Enmanuel Altamirano  Sent: 3/28/2023  11:40 AM CDT  To: Bereket De Leon Staff, Ephraim Yung Staff    Pt Requesting Call Back    Who called: pt  Who called for pt:  Best call back #: [phone](pt) [phone](job fax)  Add notes: pt said he had a procedure done on him by Dr. Ye(gallbladder) and that Dr. Cuba took care of his kidney care; pt said he would like a clearance to go back to work(pt wants a copy faxed to his job and one mailed to him); pt said for work all he do is sit down at a desk at work, that he doesn't  on anything; pt says he feels pretty good and is healing well; pt said if a package comes through at his job he just texts the staff and let them know a package came

## 2023-03-28 NOTE — PROGRESS NOTES
Subjective:       Patient ID: Babatunde Singh is a 72 y.o. male.    Chief Complaint: Wound Check      Patient presents for a reevaluation of right lower extremity wound. Patient follows with vascular surgery for PAD. Patient s/p right axillary to bi-profunda bypass on 10/6/22. Pt's ABIs preoperatively bilaterally were 0 to 0.69 on the right lower extremity and 0.74 on the left lower extremity. Patient s/p surgical debridement of his Achilles tendon on 22 with Dr. Ortiz. Following this, patient had a wound vac placed to his right distal lateral lower extremity wound. Dr. Ortiz D/C the wound vac on 23. Pt has [facility] that comes out 1x a week. Denies fever, chills, erythema, warmth, drainage, or pain.    Review of Systems   Constitutional:  Negative for activity change, chills, diaphoresis, fatigue and fever.   Respiratory:  Negative for apnea, chest tightness and shortness of breath.    Cardiovascular:  Negative for chest pain, palpitations and leg swelling.   Musculoskeletal:  Negative for gait problem and joint swelling.   Skin:  Positive for wound. Negative for pallor and rash.   Neurological:  Negative for syncope, weakness and numbness.   Psychiatric/Behavioral:  Negative for agitation. The patient is not nervous/anxious.    All other systems reviewed and are negative.    Objective:      Physical Exam  Vitals reviewed.   Constitutional:       General: He is not in acute distress.     Appearance: Normal appearance.   Cardiovascular:      Rate and Rhythm: Normal rate and regular rhythm.      Pulses: Normal pulses.   Pulmonary:      Effort: No respiratory distress.   Musculoskeletal:         General: No swelling.      Right lower le+ Edema present.      Left lower le+ Edema present.   Skin:     General: Skin is warm and dry.      Findings: Wound present. No erythema.          Neurological:      General: No focal deficit present.      Mental Status: He is alert and oriented to  person, place, and time.   Psychiatric:         Mood and Affect: Mood normal.         Behavior: Behavior normal.         Thought Content: Thought content normal.         Judgment: Judgment normal.       Assessment:       1. Wound of right lower extremity, initial encounter    2. Bilateral lower extremity edema    3. Primary hypertension    4. PAD (peripheral artery disease)    5. History of smoking             Altered Skin Integrity 02/28/23 0930 Right anterior;lower Calf (Active)   02/28/23 0930   Altered Skin Integrity Present on Admission - Did Patient arrive to the hospital with altered skin?: yes   Side: Right   Orientation: anterior;lower   Location: Calf   Wound Number:    Is this injury device related?:    Primary Wound Type:    Description of Altered Skin Integrity:    Ankle-Brachial Index:    Pulses:    Removal Indication and Assessment:    Wound Outcome:    (Retired) Wound Length (cm):    (Retired) Wound Width (cm):    (Retired) Depth (cm):    Wound Description (Comments):    Removal Indications:    Wound Image    03/28/23 0829   Dressing Appearance Dry;Intact;Clean;Moist drainage 03/28/23 0829   Drainage Amount Scant 03/28/23 0829   Drainage Characteristics/Odor Serosanguineous 03/28/23 0829   Yellow (%), Wound Tissue Color 100 % 03/28/23 0829   Periwound Area Intact;Edematous;Pink 03/28/23 0829   Wound Length (cm) 1 cm 03/28/23 0829   Wound Width (cm) 1 cm 03/28/23 0829   Wound Depth (cm) 0.1 cm 03/28/23 0829   Wound Volume (cm^3) 0.1 cm^3 03/28/23 0829   Wound Surface Area (cm^2) 1 cm^2 03/28/23 0829   Care Cleansed with:;Soap and water 03/28/23 0829   Dressing Applied;Compression wrap;Other (comment) 03/28/23 0829   Periwound Care Skin barrier film applied 03/28/23 0829   Compression Two layer compression 03/28/23 0829           Babatunde was seen in the clinic room and placed in the supine position on the treatment table.  The wound dressings were removed from the wound bed. The leg was cleansed with  Easi-clense sponges and dried thoroughly. No odor, redness, or warmth noted from patient's baseline. New anterior lower extremity wound noted to be healed. Placed topical 4% Lidocaine Hydrochloride to wound bed for 15 minutes. Sharp debridement with 5 mm curette to remove non-viable tissue. Pt tolerated procedure well.    Plan of Care: Calmoseptine to periwound, hydrofera blue to wound bed, and a two layer calamine coflex wrap. The patient's foot was positioned at a 90 degree angle.  A calamine coflex wrap was applied using a spiral technique avoiding creases or folds.  The wrap was started behind the first metatarsal and ended below the tibial tubercle of the knee.  There was overlap of each turn half the width of the previous turn.     Plan:     Right lower extremity dressed as detailed above.  Patient was instructed to not get the dressings wet and to use cast covers for showering.  Should the dressing become wet, he is to remove it, place a moist dressing over the wound, cover with gauze and roll gauze and use ace wraps for compression and to secure bandages.  He should then notify this office or home health as soon as possible to have a new dressing applied.  Instructed patient to remove wrap if he notices tingling or coldness to his right lower extremity or if his toes become white, blue, or cold.      Faxed home health orders to [facility].  HOME HEALTH WOUND CARE ORDERS  D/C previous orders  Wound care and nurse visits  1 X a week or PRN complications  Wound location;  1. Cleanse wound with saline or wound cleanser    ( pt may shower)  2.Apply calmoseptine to periwound, hydrofera blue to wound bed, and a two layer calamine coflex wrap.  3.Cover with appropriate cover dressing  Monitor for infection, teach patient/caregiver signs and symptoms    Discussed nutrition and the role of protein in wound healing with the patient. Instructed patient to continue to optimize protein for wound healing. Gave  samples of Ensure.    Instructed patient to elevate legs whenever sedentary.    Instructed patient to keep vascular surgery appointments.    Written and verbal instructions given to patient.  RTC in 3-4 weeks    Sherry Davis PA-C

## 2023-03-28 NOTE — PROCEDURES
"Debridement    Date/Time: 3/28/2023 8:30 AM  Performed by: Sherry Davis PA-C  Authorized by: Sherry Davis PA-C     Time out: Immediately prior to procedure a "time out" was called to verify the correct patient, procedure, equipment, support staff and site/side marked as required.    Consent Done?:  Yes (Written)  Local anesthesia used?: Yes    Local anesthetic:  Topical anesthetic (Topical 4% Lidocaine Hydrochloride)    Wound Details:    Location:  Right leg    Type of Debridement:  Excisional       Length (cm):  1       Area (sq cm):  1       Width (cm):  1       Percent Debrided (%):  100       Depth (cm):  0.1       Total Area Debrided (sq cm):  1    Depth of debridement:  Subcutaneous tissue    Tissue debrided:  Subcutaneous    Devitalized tissue debrided:  Biofilm, Exudate, Fibrin and Slough    Instruments:  Curette  Bleeding:  Minimal  Hemostasis Achieved: Yes  Method Used:  Pressure  Patient tolerance:  Patient tolerated the procedure well with no immediate complications  "

## 2023-03-28 NOTE — TELEPHONE ENCOUNTER
Patient return to letter has been sen tot he provider for signature. Return to work slip will be sen to the patient in mailed and via fax      Veronica MORRIS----- Message from Moises Cuba MD sent at 3/28/2023  1:31 PM CDT -----  He is OK to return to work 6 weeks after the date of my surgery    No restriction from my standpoint    Please fax a note to this effect    Thanks    Sc      ----- Message -----  From: Saumya Giraldo RN  Sent: 3/28/2023  11:57 AM CDT  To: Moises Cuba MD, Veronica Yost MA      ----- Message -----  From: Enmanuel Altamirano  Sent: 3/28/2023  11:40 AM CDT  To: Bereket De Leon Staff, Ephraim Yung Staff    Pt Requesting Call Back    Who called: pt  Who called for pt:  Best call back #: [phone](pt) [phone](job fax)  Add notes: pt said he had a procedure done on him by Dr. Ye(gallbladder) and that Dr. Cuba took care of his kidney care; pt said he would like a clearance to go back to work(pt wants a copy faxed to his job and one mailed to him); pt said for work all he do is sit down at a desk at work, that he doesn't  on anything; pt says he feels pretty good and is healing well; pt said if a package comes through at his job he just texts the staff and let them know a package came

## 2023-04-03 ENCOUNTER — TELEPHONE (OUTPATIENT)
Dept: INFUSION THERAPY | Facility: HOSPITAL | Age: 73
End: 2023-04-03
Payer: MEDICARE

## 2023-04-04 ENCOUNTER — INFUSION (OUTPATIENT)
Dept: INFUSION THERAPY | Facility: HOSPITAL | Age: 73
End: 2023-04-04
Payer: MEDICARE

## 2023-04-04 ENCOUNTER — HOSPITAL ENCOUNTER (OUTPATIENT)
Dept: RADIOLOGY | Facility: HOSPITAL | Age: 73
Discharge: HOME | End: 2023-04-04
Attending: INTERNAL MEDICINE
Payer: MEDICARE

## 2023-04-04 VITALS
HEIGHT: 75 IN | BODY MASS INDEX: 18.34 KG/M2 | DIASTOLIC BLOOD PRESSURE: 65 MMHG | RESPIRATION RATE: 18 BRPM | TEMPERATURE: 98 F | SYSTOLIC BLOOD PRESSURE: 151 MMHG | OXYGEN SATURATION: 100 % | HEART RATE: 87 BPM | WEIGHT: 147.5 LBS

## 2023-04-04 DIAGNOSIS — Z87.891 HISTORY OF TOBACCO USE: ICD-10-CM

## 2023-04-04 DIAGNOSIS — E78.5 HYPERLIPIDEMIA, UNSPECIFIED HYPERLIPIDEMIA TYPE: ICD-10-CM

## 2023-04-04 DIAGNOSIS — F17.200 TOBACCO USE DISORDER: ICD-10-CM

## 2023-04-04 DIAGNOSIS — D50.9 IRON DEFICIENCY ANEMIA, UNSPECIFIED IRON DEFICIENCY ANEMIA TYPE: Primary | ICD-10-CM

## 2023-04-04 PROCEDURE — 25000003 PHARM REV CODE 250: Performed by: INTERNAL MEDICINE

## 2023-04-04 PROCEDURE — A4216 STERILE WATER/SALINE, 10 ML: HCPCS | Performed by: INTERNAL MEDICINE

## 2023-04-04 PROCEDURE — 63600175 PHARM REV CODE 636 W HCPCS: Mod: JZ,JG | Performed by: INTERNAL MEDICINE

## 2023-04-04 PROCEDURE — 71271 CT CHEST LUNG SCREENING LOW DOSE: ICD-10-PCS | Mod: 26,GC,, | Performed by: RADIOLOGY

## 2023-04-04 PROCEDURE — 71271 CT THORAX LUNG CANCER SCR C-: CPT | Mod: 26,GC,, | Performed by: RADIOLOGY

## 2023-04-04 PROCEDURE — 96365 THER/PROPH/DIAG IV INF INIT: CPT

## 2023-04-04 PROCEDURE — 71271 CT THORAX LUNG CANCER SCR C-: CPT | Mod: TC

## 2023-04-04 RX ORDER — SODIUM CHLORIDE 0.9 % (FLUSH) 0.9 %
10 SYRINGE (ML) INJECTION
Status: DISCONTINUED | OUTPATIENT
Start: 2023-04-04 | End: 2023-04-04 | Stop reason: HOSPADM

## 2023-04-04 RX ORDER — ATORVASTATIN CALCIUM 80 MG/1
80 TABLET, FILM COATED ORAL DAILY
Qty: 30 TABLET | Refills: 0 | Status: SHIPPED | OUTPATIENT
Start: 2023-04-04 | End: 2023-04-24 | Stop reason: SDUPTHER

## 2023-04-04 RX ADMIN — Medication 10 ML: at 12:04

## 2023-04-04 RX ADMIN — FERRIC CARBOXYMALTOSE INJECTION 750 MG: 50 INJECTION, SOLUTION INTRAVENOUS at 12:04

## 2023-04-04 RX ADMIN — SODIUM CHLORIDE: 9 INJECTION, SOLUTION INTRAVENOUS at 12:04

## 2023-04-04 NOTE — TELEPHONE ENCOUNTER
No new care gaps identified.  [facility] Embedded Care Gaps. Reference number: [id number]. 4/04/2023   4:06:05 PM CDT

## 2023-04-04 NOTE — TELEPHONE ENCOUNTER
----- Message from Esdras Ly MD sent at 4/4/2023  4:01 PM CDT -----  No findings suspicious for lung cancer.  Recommend continued lung cancer screening in 12 months

## 2023-04-06 ENCOUNTER — OFFICE VISIT (OUTPATIENT)
Dept: GASTROENTEROLOGY | Facility: CLINIC | Age: 73
End: 2023-04-06
Payer: MEDICARE

## 2023-04-06 ENCOUNTER — LAB VISIT (OUTPATIENT)
Dept: LAB | Facility: HOSPITAL | Age: 73
End: 2023-04-06
Payer: MEDICARE

## 2023-04-06 VITALS
HEIGHT: 75 IN | HEART RATE: 94 BPM | BODY MASS INDEX: 18.29 KG/M2 | WEIGHT: 147.06 LBS | SYSTOLIC BLOOD PRESSURE: 157 MMHG | DIASTOLIC BLOOD PRESSURE: 80 MMHG

## 2023-04-06 DIAGNOSIS — D50.0 ANEMIA DUE TO GI BLOOD LOSS: ICD-10-CM

## 2023-04-06 DIAGNOSIS — D64.9 ANEMIA: ICD-10-CM

## 2023-04-06 LAB
BASOPHILS # BLD AUTO: 0.08 K/UL (ref 0–0.2)
BASOPHILS NFR BLD: 0.9 % (ref 0–1.9)
DIFFERENTIAL METHOD: ABNORMAL
EOSINOPHIL # BLD AUTO: 0.2 K/UL (ref 0–0.5)
EOSINOPHIL NFR BLD: 2.6 % (ref 0–8)
ERYTHROCYTE [DISTWIDTH] IN BLOOD BY AUTOMATED COUNT: 23.3 % (ref 11.5–14.5)
FERRITIN SERPL-MCNC: 484 NG/ML (ref 20–300)
HCT VFR BLD AUTO: 26.8 % (ref 40–54)
HGB BLD-MCNC: 7.6 G/DL (ref 14–18)
IMM GRANULOCYTES # BLD AUTO: 0.11 K/UL (ref 0–0.04)
IMM GRANULOCYTES NFR BLD AUTO: 1.2 % (ref 0–0.5)
IRON SERPL-MCNC: 350 UG/DL (ref 45–160)
LYMPHOCYTES # BLD AUTO: 3 K/UL (ref 1–4.8)
LYMPHOCYTES NFR BLD: 32.3 % (ref 18–48)
MCH RBC QN AUTO: 23.6 PG (ref 27–31)
MCHC RBC AUTO-ENTMCNC: 28.4 G/DL (ref 32–36)
MCV RBC AUTO: 83 FL (ref 82–98)
MONOCYTES # BLD AUTO: 0.9 K/UL (ref 0.3–1)
MONOCYTES NFR BLD: 10 % (ref 4–15)
NEUTROPHILS # BLD AUTO: 4.9 K/UL (ref 1.8–7.7)
NEUTROPHILS NFR BLD: 53 % (ref 38–73)
NRBC BLD-RTO: 0 /100 WBC
PLATELET # BLD AUTO: 321 K/UL (ref 150–450)
PMV BLD AUTO: 9.2 FL (ref 9.2–12.9)
RBC # BLD AUTO: 3.22 M/UL (ref 4.6–6.2)
SATURATED IRON: 89 % (ref 20–50)
TOTAL IRON BINDING CAPACITY: 392 UG/DL (ref 250–450)
TRANSFERRIN SERPL-MCNC: 265 MG/DL (ref 200–375)
WBC # BLD AUTO: 9.27 K/UL (ref 3.9–12.7)

## 2023-04-06 PROCEDURE — 3079F DIAST BP 80-89 MM HG: CPT | Mod: CPTII,GC,S$GLB, | Performed by: STUDENT IN AN ORGANIZED HEALTH CARE EDUCATION/TRAINING PROGRAM

## 2023-04-06 PROCEDURE — 1159F MED LIST DOCD IN RCRD: CPT | Mod: CPTII,GC,S$GLB, | Performed by: STUDENT IN AN ORGANIZED HEALTH CARE EDUCATION/TRAINING PROGRAM

## 2023-04-06 PROCEDURE — 82728 ASSAY OF FERRITIN: CPT | Performed by: STUDENT IN AN ORGANIZED HEALTH CARE EDUCATION/TRAINING PROGRAM

## 2023-04-06 PROCEDURE — 1159F PR MEDICATION LIST DOCUMENTED IN MEDICAL RECORD: ICD-10-PCS | Mod: CPTII,GC,S$GLB, | Performed by: STUDENT IN AN ORGANIZED HEALTH CARE EDUCATION/TRAINING PROGRAM

## 2023-04-06 PROCEDURE — 36415 COLL VENOUS BLD VENIPUNCTURE: CPT | Performed by: STUDENT IN AN ORGANIZED HEALTH CARE EDUCATION/TRAINING PROGRAM

## 2023-04-06 PROCEDURE — 99214 PR OFFICE/OUTPT VISIT, EST, LEVL IV, 30-39 MIN: ICD-10-PCS | Mod: GC,S$GLB,, | Performed by: STUDENT IN AN ORGANIZED HEALTH CARE EDUCATION/TRAINING PROGRAM

## 2023-04-06 PROCEDURE — 1126F AMNT PAIN NOTED NONE PRSNT: CPT | Mod: CPTII,GC,S$GLB, | Performed by: STUDENT IN AN ORGANIZED HEALTH CARE EDUCATION/TRAINING PROGRAM

## 2023-04-06 PROCEDURE — 1101F PT FALLS ASSESS-DOCD LE1/YR: CPT | Mod: CPTII,GC,S$GLB, | Performed by: STUDENT IN AN ORGANIZED HEALTH CARE EDUCATION/TRAINING PROGRAM

## 2023-04-06 PROCEDURE — 3288F PR FALLS RISK ASSESSMENT DOCUMENTED: ICD-10-PCS | Mod: CPTII,GC,S$GLB, | Performed by: STUDENT IN AN ORGANIZED HEALTH CARE EDUCATION/TRAINING PROGRAM

## 2023-04-06 PROCEDURE — 3288F FALL RISK ASSESSMENT DOCD: CPT | Mod: CPTII,GC,S$GLB, | Performed by: STUDENT IN AN ORGANIZED HEALTH CARE EDUCATION/TRAINING PROGRAM

## 2023-04-06 PROCEDURE — 3077F PR MOST RECENT SYSTOLIC BLOOD PRESSURE >= 140 MM HG: ICD-10-PCS | Mod: CPTII,GC,S$GLB, | Performed by: STUDENT IN AN ORGANIZED HEALTH CARE EDUCATION/TRAINING PROGRAM

## 2023-04-06 PROCEDURE — 3077F SYST BP >= 140 MM HG: CPT | Mod: CPTII,GC,S$GLB, | Performed by: STUDENT IN AN ORGANIZED HEALTH CARE EDUCATION/TRAINING PROGRAM

## 2023-04-06 PROCEDURE — 3079F PR MOST RECENT DIASTOLIC BLOOD PRESSURE 80-89 MM HG: ICD-10-PCS | Mod: CPTII,GC,S$GLB, | Performed by: STUDENT IN AN ORGANIZED HEALTH CARE EDUCATION/TRAINING PROGRAM

## 2023-04-06 PROCEDURE — 83540 ASSAY OF IRON: CPT | Performed by: STUDENT IN AN ORGANIZED HEALTH CARE EDUCATION/TRAINING PROGRAM

## 2023-04-06 PROCEDURE — 99999 PR PBB SHADOW E&M-EST. PATIENT-LVL IV: CPT | Mod: PBBFAC,GC,, | Performed by: STUDENT IN AN ORGANIZED HEALTH CARE EDUCATION/TRAINING PROGRAM

## 2023-04-06 PROCEDURE — 3008F BODY MASS INDEX DOCD: CPT | Mod: CPTII,GC,S$GLB, | Performed by: STUDENT IN AN ORGANIZED HEALTH CARE EDUCATION/TRAINING PROGRAM

## 2023-04-06 PROCEDURE — 3008F PR BODY MASS INDEX (BMI) DOCUMENTED: ICD-10-PCS | Mod: CPTII,GC,S$GLB, | Performed by: STUDENT IN AN ORGANIZED HEALTH CARE EDUCATION/TRAINING PROGRAM

## 2023-04-06 PROCEDURE — 84466 ASSAY OF TRANSFERRIN: CPT | Performed by: STUDENT IN AN ORGANIZED HEALTH CARE EDUCATION/TRAINING PROGRAM

## 2023-04-06 PROCEDURE — 1126F PR PAIN SEVERITY QUANTIFIED, NO PAIN PRESENT: ICD-10-PCS | Mod: CPTII,GC,S$GLB, | Performed by: STUDENT IN AN ORGANIZED HEALTH CARE EDUCATION/TRAINING PROGRAM

## 2023-04-06 PROCEDURE — 1101F PR PT FALLS ASSESS DOC 0-1 FALLS W/OUT INJ PAST YR: ICD-10-PCS | Mod: CPTII,GC,S$GLB, | Performed by: STUDENT IN AN ORGANIZED HEALTH CARE EDUCATION/TRAINING PROGRAM

## 2023-04-06 PROCEDURE — 99999 PR PBB SHADOW E&M-EST. PATIENT-LVL IV: ICD-10-PCS | Mod: PBBFAC,GC,, | Performed by: STUDENT IN AN ORGANIZED HEALTH CARE EDUCATION/TRAINING PROGRAM

## 2023-04-06 PROCEDURE — 85025 COMPLETE CBC W/AUTO DIFF WBC: CPT | Performed by: STUDENT IN AN ORGANIZED HEALTH CARE EDUCATION/TRAINING PROGRAM

## 2023-04-06 PROCEDURE — 99214 OFFICE O/P EST MOD 30 MIN: CPT | Mod: GC,S$GLB,, | Performed by: STUDENT IN AN ORGANIZED HEALTH CARE EDUCATION/TRAINING PROGRAM

## 2023-04-06 RX ORDER — IBUPROFEN 600 MG/1
600 TABLET ORAL EVERY 6 HOURS PRN
COMMUNITY
End: 2023-05-24

## 2023-04-06 RX ORDER — FERROUS SULFATE 325(65) MG
325 TABLET ORAL DAILY
Qty: 90 TABLET | Refills: 3 | Status: SHIPPED | OUTPATIENT
Start: 2023-04-06 | End: 2024-01-17 | Stop reason: SDUPTHER

## 2023-04-06 NOTE — PROGRESS NOTES
[facility]    Reason for visit: Diagnoses of Anemia due to GI blood loss and Anemia were pertinent to this visit.  Referring Provider/PCP: Esdras Ly MD    Initial HPI Dec 2022:  Babatunde Singh is a 72 y.o. male with a history of PAD s/p fem bypass, DVT, HTN  who is presenting for initial evaluation of recent melena. Hospitalized twice in 10/2022 for melena. EGD, small bowel enteroscopy and colonoscopy all unrevealing. Planned for video capsule endoscopy as outpatient. Today he reports no recurrent episodes of melena since discharge. Confirms he is taking xarelto and cilostazol, no longer on aspirin. Denies abdominal pain. Of note, does report he had ongoing workup for possible gallbladder neoplasm for which he has upcoming surg onc appointment.    Interval History 4/6/23:  VCE Jan 2023 showed two mucosal red spots without bleeding  Hospitalized in Feb 2023 for anemia without overt bleeding, responded appropriately to transfusion, planning for outpatient follow-up    Had R radical nephrectomy and cholecystectomy on 2/27. Doing well since.    Denies melena, hematochezia. Adherent to iron. Currently on xarelto, denies any other AC or antiplatelets. Takes ibuprofen occasionally, present in his medication bag at visit today. Advised he stop.    PEndoHx:    VCE 01/20/2023 for melena notable for two single mucosal red spots without bleeding in small bowel--post pyloric lesions at 6min and 15min after FD1.    EGD 10/28/22  Impression:            - Normal esophagus.                          - Normal stomach.                          - Erythematous duodenopathy.                          - Normal examined jejunum.                          - No specimens collected.     Push enteroscopy 10/28  -  Patchy erythema in duodenal bulb, otherwise normal    Colonoscopy 10/31/22  - 2mm and 3mm polyp in transverse - jumbo forceps  - 7 mm polyp in sigmoid - cold snare  - 12 mm polyp in sigmoid - hot  snare  - Diverticulosis in sigmoid      Review of Systems   Constitutional:  Negative for chills and fever.   HENT:  Negative for congestion and sore throat.    Eyes:  Negative for blurred vision and double vision.   Respiratory:  Negative for cough and shortness of breath.    Cardiovascular:  Negative for chest pain and palpitations.   Gastrointestinal:  Negative for abdominal pain, nausea and vomiting.   Genitourinary:  Negative for frequency and urgency.   Musculoskeletal:  Negative for joint pain and myalgias.   Skin:  Negative for itching and rash.   Neurological:  Negative for sensory change and focal weakness.     Medical History:  Past Medical History:   Diagnosis Date    DVT (deep venous thrombosis)     Hypertension     Peripheral arterial disease     Right renal mass 8/11/2022                               Physical Exam:  Constitutional:  not in acute distress and well developed  HENT: Head: Normal, normocephalic  Eyes: conjunctiva clear and sclera nonicteric  Respiratory: normal chest expansion & respiratory effort   and no accessory muscle use  GI: soft, non-tender, without masses or organomegaly  Skin: normal color on visualized skin  Neurological: alert, oriented  Psychiatric: mood and affect are within normal limits, pt is a good historian; no memory problems were noted    Laboratory:  Reviewed recent CBCs  Hgb 7.7 on 3/2, stable during recent hospitazliation  10.3 on 2/27  9.4 on 2/24  6.6 on 2/15 when hospitalized, up to 7.4 with 1u    Ferritin 16 and TIBC high at 457 on 2/15 indicative of iron deficiency          Microbiology:  No Pertinent Microbiology    Imaging:  No Pertinent Imaging    Endoscopic eval as above    Assessment:  Babatunde Singh is a 72 y.o. male with PMH of PAD s/p fem bypass, DVT who is presenting for post-hospitalization follow-up of melena. Here to discuss video capsule endoscopy after two recent hospitalizations for melena with unrevealing EGD, push enteroscopy and colonoscopy.  Obtained outpatient VCE without active bleeding but did show two mucosal red spots. Admitted Feb 2023 with worsened anemia, no overt bleeding.     Returns to clinic without overt bleeding, reports adherence to iron, and now only on Xarelto without antiplatelet agents. Will recheck CBC, if anemia worsened will likely refer to hematology for IV iron.    Problems:  Melena, resolved  PAD s/p fem bypass (on Xarelto, cilostazol)      Plan:  Check CBC, iron studies  Avoid NSAIDs  If anemia worsens, consider referral to hematology for IV iron  Continue daily iron repletion. Renewed prescription today.    Dilshad Ye MD  Gastroenterology Fellow    Orders Placed This Encounter   Procedures    CBC W/ AUTO DIFFERENTIAL    Ferritin    IRON AND TIBC

## 2023-04-11 ENCOUNTER — INFUSION (OUTPATIENT)
Dept: INFUSION THERAPY | Facility: HOSPITAL | Age: 73
End: 2023-04-11
Payer: MEDICARE

## 2023-04-11 VITALS
HEART RATE: 94 BPM | HEIGHT: 75 IN | WEIGHT: 147.5 LBS | BODY MASS INDEX: 18.34 KG/M2 | RESPIRATION RATE: 18 BRPM | DIASTOLIC BLOOD PRESSURE: 63 MMHG | TEMPERATURE: 98 F | SYSTOLIC BLOOD PRESSURE: 134 MMHG

## 2023-04-11 DIAGNOSIS — D50.9 IRON DEFICIENCY ANEMIA, UNSPECIFIED IRON DEFICIENCY ANEMIA TYPE: Primary | ICD-10-CM

## 2023-04-11 PROCEDURE — 96365 THER/PROPH/DIAG IV INF INIT: CPT

## 2023-04-11 PROCEDURE — 25000003 PHARM REV CODE 250: Performed by: INTERNAL MEDICINE

## 2023-04-11 PROCEDURE — 63600175 PHARM REV CODE 636 W HCPCS: Mod: JZ,JG | Performed by: INTERNAL MEDICINE

## 2023-04-11 PROCEDURE — 96374 THER/PROPH/DIAG INJ IV PUSH: CPT

## 2023-04-11 RX ORDER — HEPARIN 100 UNIT/ML
500 SYRINGE INTRAVENOUS
Status: DISCONTINUED | OUTPATIENT
Start: 2023-04-11 | End: 2023-04-11 | Stop reason: HOSPADM

## 2023-04-11 RX ORDER — SODIUM CHLORIDE 0.9 % (FLUSH) 0.9 %
10 SYRINGE (ML) INJECTION
Status: DISCONTINUED | OUTPATIENT
Start: 2023-04-11 | End: 2023-04-11 | Stop reason: HOSPADM

## 2023-04-11 RX ADMIN — FERRIC CARBOXYMALTOSE INJECTION 750 MG: 50 INJECTION, SOLUTION INTRAVENOUS at 03:04

## 2023-04-11 RX ADMIN — SODIUM CHLORIDE: 9 INJECTION, SOLUTION INTRAVENOUS at 03:04

## 2023-04-14 ENCOUNTER — TELEPHONE (OUTPATIENT)
Dept: INTERNAL MEDICINE | Facility: CLINIC | Age: 73
End: 2023-04-14
Payer: MEDICARE

## 2023-04-18 ENCOUNTER — OFFICE VISIT (OUTPATIENT)
Dept: WOUND CARE | Facility: CLINIC | Age: 73
End: 2023-04-18
Payer: MEDICARE

## 2023-04-18 ENCOUNTER — TELEPHONE (OUTPATIENT)
Dept: UROLOGY | Facility: CLINIC | Age: 73
End: 2023-04-18
Payer: MEDICARE

## 2023-04-18 VITALS
TEMPERATURE: 98 F | WEIGHT: 145.94 LBS | HEIGHT: 75 IN | DIASTOLIC BLOOD PRESSURE: 66 MMHG | HEART RATE: 80 BPM | SYSTOLIC BLOOD PRESSURE: 134 MMHG | BODY MASS INDEX: 18.15 KG/M2

## 2023-04-18 DIAGNOSIS — L60.3 DYSTROPHIC NAIL: ICD-10-CM

## 2023-04-18 DIAGNOSIS — Z87.828 HEALED WOUND: ICD-10-CM

## 2023-04-18 DIAGNOSIS — R60.0 BILATERAL LOWER EXTREMITY EDEMA: Primary | ICD-10-CM

## 2023-04-18 PROCEDURE — 3078F PR MOST RECENT DIASTOLIC BLOOD PRESSURE < 80 MM HG: ICD-10-PCS | Mod: CPTII,S$GLB,,

## 2023-04-18 PROCEDURE — 1101F PT FALLS ASSESS-DOCD LE1/YR: CPT | Mod: CPTII,S$GLB,,

## 2023-04-18 PROCEDURE — 3075F PR MOST RECENT SYSTOLIC BLOOD PRESS GE 130-139MM HG: ICD-10-PCS | Mod: CPTII,S$GLB,,

## 2023-04-18 PROCEDURE — 3075F SYST BP GE 130 - 139MM HG: CPT | Mod: CPTII,S$GLB,,

## 2023-04-18 PROCEDURE — 1159F PR MEDICATION LIST DOCUMENTED IN MEDICAL RECORD: ICD-10-PCS | Mod: CPTII,S$GLB,,

## 2023-04-18 PROCEDURE — 1159F MED LIST DOCD IN RCRD: CPT | Mod: CPTII,S$GLB,,

## 2023-04-18 PROCEDURE — 3288F PR FALLS RISK ASSESSMENT DOCUMENTED: ICD-10-PCS | Mod: CPTII,S$GLB,,

## 2023-04-18 PROCEDURE — 99214 PR OFFICE/OUTPT VISIT, EST, LEVL IV, 30-39 MIN: ICD-10-PCS | Mod: S$GLB,,,

## 2023-04-18 PROCEDURE — 1126F AMNT PAIN NOTED NONE PRSNT: CPT | Mod: CPTII,S$GLB,,

## 2023-04-18 PROCEDURE — 3078F DIAST BP <80 MM HG: CPT | Mod: CPTII,S$GLB,,

## 2023-04-18 PROCEDURE — 3008F BODY MASS INDEX DOCD: CPT | Mod: CPTII,S$GLB,,

## 2023-04-18 PROCEDURE — 99999 PR PBB SHADOW E&M-EST. PATIENT-LVL III: CPT | Mod: PBBFAC,,,

## 2023-04-18 PROCEDURE — 1126F PR PAIN SEVERITY QUANTIFIED, NO PAIN PRESENT: ICD-10-PCS | Mod: CPTII,S$GLB,,

## 2023-04-18 PROCEDURE — 99214 OFFICE O/P EST MOD 30 MIN: CPT | Mod: S$GLB,,,

## 2023-04-18 PROCEDURE — 3288F FALL RISK ASSESSMENT DOCD: CPT | Mod: CPTII,S$GLB,,

## 2023-04-18 PROCEDURE — 1101F PR PT FALLS ASSESS DOC 0-1 FALLS W/OUT INJ PAST YR: ICD-10-PCS | Mod: CPTII,S$GLB,,

## 2023-04-18 PROCEDURE — 3008F PR BODY MASS INDEX (BMI) DOCUMENTED: ICD-10-PCS | Mod: CPTII,S$GLB,,

## 2023-04-18 PROCEDURE — 99999 PR PBB SHADOW E&M-EST. PATIENT-LVL III: ICD-10-PCS | Mod: PBBFAC,,,

## 2023-04-18 NOTE — PROGRESS NOTES
Subjective:       Patient ID: Babatunde Singh is a 72 y.o. male.    Chief Complaint: Wound Check      Patient presents for a reevaluation of right lower extremity wound. Patient follows with vascular surgery for PAD. Patient s/p right axillary to bi-profunda bypass on 10/6/22. Pt's ABIs preoperatively bilaterally were 0 to 0.69 on the right lower extremity and 0.74 on the left lower extremity. Patient s/p surgical debridement of his Achilles tendon on 22 with Dr. Ortiz. Following this, patient had a wound vac placed to his right distal lateral lower extremity wound. Dr. Ortiz D/C the wound vac on 23. Pt has [facility] that comes out 1x a week. Denies fever, chills, erythema, warmth, drainage, or pain.    Review of Systems   Constitutional:  Negative for activity change, chills, diaphoresis, fatigue and fever.   Respiratory:  Negative for apnea, chest tightness and shortness of breath.    Cardiovascular:  Negative for chest pain, palpitations and leg swelling.   Musculoskeletal:  Negative for gait problem and joint swelling.   Skin:  Positive for wound. Negative for pallor and rash.   Neurological:  Negative for syncope, weakness and numbness.   Psychiatric/Behavioral:  Negative for agitation. The patient is not nervous/anxious.    All other systems reviewed and are negative.    Objective:      Physical Exam  Vitals reviewed.   Constitutional:       General: He is not in acute distress.     Appearance: Normal appearance.   Cardiovascular:      Rate and Rhythm: Normal rate and regular rhythm.      Pulses: Normal pulses.   Pulmonary:      Effort: No respiratory distress.   Musculoskeletal:         General: No swelling.      Right lower le+ Edema present.      Left lower le+ Edema present.   Skin:     General: Skin is warm and dry.      Findings: No erythema or wound.          Neurological:      General: No focal deficit present.      Mental Status: He is alert and oriented to person,  place, and time.   Psychiatric:         Mood and Affect: Mood normal.         Behavior: Behavior normal.         Thought Content: Thought content normal.         Judgment: Judgment normal.       Assessment:       1. Bilateral lower extremity edema    2. Dystrophic nail    3. Healed wound               Altered Skin Integrity 02/28/23 0930 Right anterior;lower Calf (Active)   02/28/23 0930   Altered Skin Integrity Present on Admission - Did Patient arrive to the hospital with altered skin?: yes   Side: Right   Orientation: anterior;lower   Location: Calf   Wound Number:    Is this injury device related?:    Primary Wound Type:    Description of Altered Skin Integrity:    Ankle-Brachial Index:    Pulses:    Removal Indication and Assessment:    Wound Outcome:    (Retired) Wound Length (cm):    (Retired) Wound Width (cm):    (Retired) Depth (cm):    Wound Description (Comments):    Removal Indications:    Dressing Appearance Dry;Intact;Clean;Moist drainage 04/18/23 0845   Drainage Amount Small 04/18/23 0845   Drainage Characteristics/Odor Serosanguineous 04/18/23 0845   Care Cleansed with:;Soap and water 04/18/23 0845     *unable to upload picture*    Babatunde was seen in the clinic room and placed in the supine position on the treatment table.  The wound dressings were removed from the wound bed. The leg was cleansed with Easi-clense sponges and dried thoroughly. No odor, redness, or warmth noted from patient's baseline. No open wounds noted.    Plan of Care: Compression stockings    Plan:       No open wounds noted.  Precautions given to prevent wounds from re-opening. Discussed how area is extremely fragile. Protect area from friction, wash area gently, and pat dry. If the wound should re-open, instructed patient to contact the office.    Discussed bilateral lower extremity edema. Instructed patient to utilize compression stockings to bilateral lower extremities. Place on first thing in the morning and remove before  bed.    Faxed home health orders to [facility].  D/C Home health wound care orders    Instructed patient to keep vascular surgery appointments.    Written and verbal instructions given to patient.  RTC PRHARRIETT Davis PA-C

## 2023-04-18 NOTE — TELEPHONE ENCOUNTER
----- Message from [facility] sent at 4/18/2023 12:32 PM CDT -----  Type:  Needs Medical Advice    Who Called: Pt   Would the patient rather a call back or a response via [facility]? Callback   Best Call Back Number: [phone]  Additional Information: Pt requesting callback from office to discuss recent test results        English

## 2023-04-21 ENCOUNTER — TELEPHONE (OUTPATIENT)
Dept: WOUND CARE | Facility: CLINIC | Age: 73
End: 2023-04-21
Payer: MEDICARE

## 2023-04-21 NOTE — TELEPHONE ENCOUNTER
Returned call and spoke with  nurse, Itzel, advised patient has been discharged from clinic on 4/18/2023; will verify if provider wants  nurse to go out for two more weeks then discharge from home health.  Will call nurse and fax over orders on Monday morning 4/24/2023

## 2023-04-21 NOTE — TELEPHONE ENCOUNTER
----- Message from Kiarra Tamez sent at 4/21/2023  2:44 PM CDT -----  Regarding: Last progress notes  Contact: @[phone]  Patient's home health nurse( Itzel) / wound care, last progress notes please call and advise @[phone]   Fax: [phone]

## 2023-04-24 ENCOUNTER — OFFICE VISIT (OUTPATIENT)
Dept: PODIATRY | Facility: CLINIC | Age: 73
End: 2023-04-24
Payer: MEDICARE

## 2023-04-24 ENCOUNTER — TELEPHONE (OUTPATIENT)
Dept: ADMINISTRATIVE | Facility: CLINIC | Age: 73
End: 2023-04-24
Payer: MEDICARE

## 2023-04-24 VITALS
SYSTOLIC BLOOD PRESSURE: 157 MMHG | DIASTOLIC BLOOD PRESSURE: 71 MMHG | HEIGHT: 63 IN | HEART RATE: 83 BPM | WEIGHT: 157.44 LBS | BODY MASS INDEX: 27.89 KG/M2

## 2023-04-24 DIAGNOSIS — L90.9 PLANTAR FAT PAD ATROPHY: ICD-10-CM

## 2023-04-24 DIAGNOSIS — E78.5 HYPERLIPIDEMIA, UNSPECIFIED HYPERLIPIDEMIA TYPE: ICD-10-CM

## 2023-04-24 DIAGNOSIS — I73.9 PAD (PERIPHERAL ARTERY DISEASE): Primary | ICD-10-CM

## 2023-04-24 DIAGNOSIS — B35.1 ONYCHOMYCOSIS DUE TO DERMATOPHYTE: ICD-10-CM

## 2023-04-24 DIAGNOSIS — L60.3 DYSTROPHIC NAIL: ICD-10-CM

## 2023-04-24 DIAGNOSIS — R60.0 EDEMA OF RIGHT FOOT: ICD-10-CM

## 2023-04-24 DIAGNOSIS — M20.5X2 HALLUX LIMITUS, ACQUIRED, LEFT: ICD-10-CM

## 2023-04-24 DIAGNOSIS — I10 PRIMARY HYPERTENSION: ICD-10-CM

## 2023-04-24 DIAGNOSIS — L60.0 INGROWN NAIL: ICD-10-CM

## 2023-04-24 DIAGNOSIS — S80.811A ABRASION OF RIGHT LOWER EXTREMITY, INITIAL ENCOUNTER: ICD-10-CM

## 2023-04-24 DIAGNOSIS — M20.5X1 HALLUX LIMITUS, ACQUIRED, RIGHT: ICD-10-CM

## 2023-04-24 PROCEDURE — 1126F PR PAIN SEVERITY QUANTIFIED, NO PAIN PRESENT: ICD-10-PCS | Mod: CPTII,S$GLB,, | Performed by: PODIATRIST

## 2023-04-24 PROCEDURE — 3077F PR MOST RECENT SYSTOLIC BLOOD PRESSURE >= 140 MM HG: ICD-10-PCS | Mod: CPTII,S$GLB,, | Performed by: PODIATRIST

## 2023-04-24 PROCEDURE — 99999 PR PBB SHADOW E&M-EST. PATIENT-LVL III: CPT | Mod: PBBFAC,,, | Performed by: PODIATRIST

## 2023-04-24 PROCEDURE — 99213 PR OFFICE/OUTPT VISIT, EST, LEVL III, 20-29 MIN: ICD-10-PCS | Mod: 25,S$GLB,, | Performed by: PODIATRIST

## 2023-04-24 PROCEDURE — 1160F PR REVIEW ALL MEDS BY PRESCRIBER/CLIN PHARMACIST DOCUMENTED: ICD-10-PCS | Mod: CPTII,S$GLB,, | Performed by: PODIATRIST

## 2023-04-24 PROCEDURE — 3077F SYST BP >= 140 MM HG: CPT | Mod: CPTII,S$GLB,, | Performed by: PODIATRIST

## 2023-04-24 PROCEDURE — 1160F RVW MEDS BY RX/DR IN RCRD: CPT | Mod: CPTII,S$GLB,, | Performed by: PODIATRIST

## 2023-04-24 PROCEDURE — 1126F AMNT PAIN NOTED NONE PRSNT: CPT | Mod: CPTII,S$GLB,, | Performed by: PODIATRIST

## 2023-04-24 PROCEDURE — 99213 OFFICE O/P EST LOW 20 MIN: CPT | Mod: 25,S$GLB,, | Performed by: PODIATRIST

## 2023-04-24 PROCEDURE — 1159F PR MEDICATION LIST DOCUMENTED IN MEDICAL RECORD: ICD-10-PCS | Mod: CPTII,S$GLB,, | Performed by: PODIATRIST

## 2023-04-24 PROCEDURE — 11721 PR DEBRIDEMENT OF NAILS, 6 OR MORE: ICD-10-PCS | Mod: Q7,S$GLB,, | Performed by: PODIATRIST

## 2023-04-24 PROCEDURE — 3078F PR MOST RECENT DIASTOLIC BLOOD PRESSURE < 80 MM HG: ICD-10-PCS | Mod: CPTII,S$GLB,, | Performed by: PODIATRIST

## 2023-04-24 PROCEDURE — 3008F PR BODY MASS INDEX (BMI) DOCUMENTED: ICD-10-PCS | Mod: CPTII,S$GLB,, | Performed by: PODIATRIST

## 2023-04-24 PROCEDURE — 3008F BODY MASS INDEX DOCD: CPT | Mod: CPTII,S$GLB,, | Performed by: PODIATRIST

## 2023-04-24 PROCEDURE — 11721 DEBRIDE NAIL 6 OR MORE: CPT | Mod: Q7,S$GLB,, | Performed by: PODIATRIST

## 2023-04-24 PROCEDURE — 1159F MED LIST DOCD IN RCRD: CPT | Mod: CPTII,S$GLB,, | Performed by: PODIATRIST

## 2023-04-24 PROCEDURE — 3078F DIAST BP <80 MM HG: CPT | Mod: CPTII,S$GLB,, | Performed by: PODIATRIST

## 2023-04-24 PROCEDURE — 99999 PR PBB SHADOW E&M-EST. PATIENT-LVL III: ICD-10-PCS | Mod: PBBFAC,,, | Performed by: PODIATRIST

## 2023-04-24 RX ORDER — ATORVASTATIN CALCIUM 80 MG/1
80 TABLET, FILM COATED ORAL DAILY
Qty: 90 TABLET | Refills: 0 | Status: SHIPPED | OUTPATIENT
Start: 2023-04-24 | End: 2023-08-01

## 2023-04-24 RX ORDER — LOSARTAN POTASSIUM AND HYDROCHLOROTHIAZIDE 12.5; 5 MG/1; MG/1
1 TABLET ORAL DAILY
Qty: 90 TABLET | Refills: 1 | Status: SHIPPED | OUTPATIENT
Start: 2023-04-24 | End: 2023-05-22 | Stop reason: ALTCHOICE

## 2023-04-24 RX ORDER — EZETIMIBE 10 MG/1
10 TABLET ORAL DAILY
Qty: 90 TABLET | Refills: 0 | Status: SHIPPED | OUTPATIENT
Start: 2023-04-24

## 2023-04-24 NOTE — TELEPHONE ENCOUNTER
Refill Routing Note   Medication(s) are not appropriate for processing by [facility] for the following reason(s):      Patient seen in ED/Hospital since LOV with PCP  Required labs outdated  Required vitals abnormal  No active prescription written by PCP    [facility] action(s):  Defer Labs due        Medication reconciliation completed: No     Appointments  past 12m or future 3m with PCP    Date Provider   Last Visit   12/6/2022 Esdras Ly MD   Next Visit   5/16/2023 Esdras Ly MD   ED visits in past 90 days: 0        Note composed:11:12 AM 04/24/2023

## 2023-04-24 NOTE — PROGRESS NOTES
Subjective:      Patient ID: Babatunde Singh is a 72 y.o. male.    Chief Complaint: Nail Problem (Bilateral fungus(Big toes))    Babatunde is a 72 y.o. male who presents to the clinic for evaluation and treatment of high risk feet. Babatunde has a past medical history of DVT (deep venous thrombosis), Hypertension, Peripheral arterial disease, and Right renal mass (8/11/2022). The patient's chief complaint is long, thick toenails. This patient has documented high risk feet requiring routine maintenance secondary to peripheral vascular disease.    PCP: Esdras Ly MD    Date Last Seen by PCP: 2/24/2023  Chief Complaint   Patient presents with    Nail Problem     Bilateral fungus(Big toes)         Current shoe gear:  Casual shoes    Last encounter in this department: 09/06/22  Previously seen by my colleague, new to me.      Hemoglobin A1C   Date Value Ref Range Status   03/10/2022 5.8 (H) 4.0 - 5.6 % Final     Comment:     ADA Screening Guidelines:  5.7-6.4%  Consistent with prediabetes  >or=6.5%  Consistent with diabetes    High levels of fetal hemoglobin interfere with the HbA1C  assay. Heterozygous hemoglobin variants (HbS, HgC, etc)do  not significantly interfere with this assay.   However, presence of multiple variants may affect accuracy.             Patient Active Problem List   Diagnosis    Primary hypertension    Mixed hyperlipidemia    Claudication of right lower extremity    Right leg pain    Sciatica of right side    Numbness and tingling of right leg    Allodynia (Right leg)    PAD (peripheral artery disease)    Critical limb ischemia of right lower extremity with ulceration of lower leg    Absent pedal pulses    DVT (deep venous thrombosis)    Tachycardia    Bilateral lower extremity edema    Leg wound, right, subsequent encounter    Tobacco use disorder, severe, in early remission    Impaired fasting glucose    Diverticulosis of colon    Chronic bronchitis    Normocytic anemia    Iron deficiency anemia     Chronic fatigue       Current Outpatient Medications on File Prior to Visit   Medication Sig Dispense Refill    acetaminophen (TYLENOL) 325 MG tablet Take 2 tablets (650 mg total) by mouth every 8 (eight) hours as needed. 20 tablet 0    atorvastatin (LIPITOR) 80 MG tablet Take 1 tablet (80 mg total) by mouth once daily. 30 tablet 0    ferrous sulfate (IRON) 325 mg (65 mg iron) Tab tablet Take 1 tablet (325 mg total) by mouth once daily. 90 tablet 3    gabapentin (NEURONTIN) 800 MG tablet Take 1 tablet (800 mg total) by mouth 3 (three) times daily. 90 tablet 11    ibuprofen (ADVIL,MOTRIN) 600 MG tablet Take 600 mg by mouth every 6 (six) hours as needed for Pain.      multivitamin (THERAGRAN) per tablet Take 1 tablet by mouth once daily.      oxyCODONE (ROXICODONE) 5 MG immediate release tablet Take 1 tablet (5 mg total) by mouth every 4 (four) hours as needed for Pain. 15 tablet 0    rivaroxaban (XARELTO) 20 mg Tab Take 1 tablet (20 mg total) by mouth daily with dinner or evening meal. 30 tablet 11    aspirin 81 MG Chew Chew and swallow 1 tablet (81 mg total) by mouth once daily. 30 tablet 0    ezetimibe (ZETIA) 10 mg tablet Take 1 tablet (10 mg total) by mouth once daily. 30 tablet 0    losartan-hydrochlorothiazide 50-12.5 mg (HYZAAR) 50-12.5 mg per tablet Take 1 tablet by mouth once daily. 30 tablet 0     No current facility-administered medications on file prior to visit.       Review of patient's allergies indicates:   Allergen Reactions    Ace inhibitors        Past Surgical History:   Procedure Laterality Date    APPLICATION OF WOUND VACUUM-ASSISTED CLOSURE DEVICE Right 12/8/2022    Procedure: APPLICATION, WOUND VAC;  Surgeon: LEI Ortiz III, MD;  Location: [facility] OR [address] [place] [address];  Service: Peripheral Vascular;  Laterality: Right;    CHOLECYSTECTOMY N/A 2/27/2023    Procedure: CHOLECYSTECTOMY complex;  Surgeon: Tolu Gavin MD;  Location: [facility] OR [address] [place] [address];  Service: General;  Laterality: N/A;    COLONOSCOPY  N/A 10/31/2022    Procedure: COLONOSCOPY;  Surgeon: Philip Shafer MD;  Location: [facility] ENDO (2ND FLR);  Service: Endoscopy;  Laterality: N/A;    CREATION OF AXILLARY-FEMORAL ARTERY BYPASS WITH GRAFT N/A 10/6/2022    Procedure: CREATION, BYPASS, ARTERIAL, AXILLARY TO FEMORAL, USING GRAFT;  Surgeon: LEI Ortiz III, MD;  Location: [facility] OR 2ND FLR;  Service: Peripheral Vascular;  Laterality: N/A;    CREATION OF FEMORAL-FEMORAL ARTERY BYPASS WITH GRAFT N/A 10/6/2022    Procedure: CREATION, BYPASS, ARTERIAL, FEMORAL TO FEMORAL, USING GRAFT;  Surgeon: LEI Ortiz III, MD;  Location: [facility] OR 2ND FLR;  Service: Peripheral Vascular;  Laterality: N/A;    ESOPHAGOGASTRODUODENOSCOPY N/A 10/20/2022    Procedure: EGD (ESOPHAGOGASTRODUODENOSCOPY);  Surgeon: Sixto Chicas MD;  Location: [facility] ENDO (2ND FLR);  Service: Endoscopy;  Laterality: N/A;    ESOPHAGOGASTRODUODENOSCOPY N/A 10/28/2022    Procedure: EGD (ESOPHAGOGASTRODUODENOSCOPY);  Surgeon: Marlee Hopson MD;  Location: [facility] ENDO (2ND FLR);  Service: Gastroenterology;  Laterality: N/A;    EXCISION OF HYDROCELE Left 4/26/2022    Procedure: HYDROCELECTOMY;  Surgeon: Damion Roberts MD;  Location: [facility];  Service: Urology;  Laterality: Left;    KNEE SURGERY  1972    PARTIAL NEPHRECTOMY Right 2/27/2023    Procedure: NEPHRECTOMY, PARTIAL;  Surgeon: Moises Cuba MD;  Location: [facility] OR 2ND FLR;  Service: Urology;  Laterality: Right;  Dr Gavin robotic cholecystectomy for gallbladder mass also    WOUND DEBRIDEMENT Right 12/8/2022    Procedure: DEBRIDEMENT, WOUND;  Surgeon: LEI Ortiz III, MD;  Location: [facility] OR 2ND FLR;  Service: Peripheral Vascular;  Laterality: Right;  RLE wound debridement       Family History   Problem Relation Age of Onset    Hypertension Mother     Hypertension Father     Colon cancer Neg Hx     Esophageal cancer Neg Hx        Social History     Socioeconomic History    Marital status:    Occupational History    Occupation:  "[employer]   Tobacco Use    Smoking status: Former     Packs/day: 0.50     Years: 55.00     Pack years: 27.50     Types: Cigarettes     Quit date: 10/11/2022     Years since quittin.5    Smokeless tobacco: Never    Tobacco comments:     Cigarettes3-4 per day   Substance and Sexual Activity    Alcohol use: Never    Drug use: Never    Sexual activity: Yes     Partners: Female     Comment: wife       Review of Systems   Constitutional: Negative for chills and fever.   Cardiovascular:  Positive for leg swelling. Negative for claudication.   Respiratory:  Negative for cough and shortness of breath.    Skin:  Positive for dry skin, nail changes and poor wound healing. Negative for itching and rash.   Musculoskeletal:  Positive for back pain, joint pain, myalgias and stiffness. Negative for falls, joint swelling and muscle weakness.   Gastrointestinal:  Negative for diarrhea, nausea and vomiting.   Neurological:  Positive for paresthesias. Negative for numbness, tremors and weakness.   Psychiatric/Behavioral:  Negative for altered mental status and hallucinations.          Objective:      Vitals:    23 0816   BP: (!) 157/71   Pulse: 83   Weight: 71.4 kg (157 lb 6.5 oz)   Height: 5' 3" (1.6 m)   PainSc: 0-No pain       Physical Exam  Vitals and nursing note reviewed.   Constitutional:       General: He is not in acute distress.     Appearance: He is well-developed. He is not ill-appearing, toxic-appearing or diaphoretic.      Comments: alert and oriented x 3.    Cardiovascular:      Pulses:           Dorsalis pedis pulses are 0 on the right side and 0 on the left side.        Posterior tibial pulses are 1+ on the right side and 1+ on the left side.      Comments: Waxy skin, absent hair growth to digits and foot and lower legs, no signs of ischemia delayed cap fill time however digits are warm  Pulmonary:      Effort: No respiratory distress.   Musculoskeletal:         General: No swelling.      Right lower leg: " 1+ Edema (Foot) present.      Left lower leg: Edema present.      Right ankle: Normal. No tenderness. No lateral malleolus, medial malleolus, AITF ligament, CF ligament or posterior TF ligament tenderness.      Right Achilles Tendon: Normal. No tenderness or defects. Do's test negative.      Left ankle: Normal. No tenderness. No lateral malleolus, medial malleolus, AITF ligament, CF ligament or posterior TF ligament tenderness.      Left Achilles Tendon: Normal. No tenderness or defects. Do's test negative.      Right foot: Decreased range of motion. Swelling, deformity, prominent metatarsal heads, tenderness and bony tenderness present.      Left foot: Decreased range of motion. Deformity and prominent metatarsal heads present. No tenderness or bony tenderness.      Comments: There is equinus deformity bilateral with decreased dorsiflexion at the ankle joint bilateral.    Decreased first MPJ range of motion both weightbearing and nonweightbearing, no crepitus observed the first MP joint, + dorsal flag sign. Moderate  bunion deformity is observed .    No excessive warmth or fluctuance noted      Feet:      Right foot:      Protective Sensation: 10 sites tested.  10 sites sensed.      Skin integrity: Dry skin present. No ulcer, blister, skin breakdown, erythema, warmth or callus.      Toenail Condition: Right toenails are abnormally thick. Fungal disease present.     Left foot:      Protective Sensation: 10 sites tested.  10 sites sensed.      Skin integrity: Dry skin present. No ulcer, blister, skin breakdown, erythema, warmth or callus.      Toenail Condition: Left toenails are abnormally thick. Fungal disease present.     Comments: [place] intact    Nails 1 through 10 thickened shortened discolored    Left hallux nail stable  No open lesions  Lymphadenopathy:      Comments: No lymphatic streaking     Skin:     General: Skin is warm and dry.      Capillary Refill: Capillary refill takes 2 to 3  seconds.      Coloration: Skin is not pale.      Findings: Lesion (anterior right leg with fibrinous base) present. No rash.      Nails: There is no clubbing.      Comments: Toenails 1-5 bilaterally are elongated by 2-3 mm, thickened by 2-3 mm, discolored/yellowed, dystrophic, brittle with subungual debris. Incurvated hallux nail bilaterally. Tender to distal nail plate pressure, without periungual skin abnormality of each.   Neurological:      Mental Status: He is alert and oriented to person, place, and time.      Motor: No atrophy.      Gait: Gait abnormal.      Comments: Light touch present    [place] 5.07 monofilament is intact bilateral feet. Sharp/dull sensation is also intact Bilateral feet.    Proprioception intact    Decreased/absent vibratory sensation bilateral feet to 128Hz tuning fork.    Paresthesias, and hyperesthesia right foot at toes with no clearly identified trigger or source.         Psychiatric:         Attention and Perception: Attention normal. He is attentive.         Mood and Affect: Mood normal. Mood is not anxious. Affect is not inappropriate.         Speech: Speech normal. He is communicative. Speech is not slurred.         Behavior: Behavior normal. Behavior is not combative.         Thought Content: Thought content normal.         Cognition and Memory: Cognition normal.         Judgment: Judgment normal.             Assessment:       Encounter Diagnoses   Name Primary?    Dystrophic nail     PAD (peripheral artery disease) Yes    Onychomycosis due to dermatophyte     Edema of right foot     Ingrown nail     Plantar fat pad atrophy     Hallux limitus, acquired, right     Hallux limitus, acquired, left     Abrasion of right lower extremity, initial encounter          Plan:     Problem List Items Addressed This Visit       PAD (peripheral artery disease) - Primary     Other Visit Diagnoses       Dystrophic nail        Onychomycosis due to dermatophyte        Edema of right foot         Ingrown nail        Plantar fat pad atrophy        Hallux limitus, acquired, right        Hallux limitus, acquired, left        Abrasion of right lower extremity, initial encounter               I counseled the patient on his conditions, their implications and medical management.       Education about PAOD and prevention of limb loss.    Shoe inspection. Patient reminded of the importance of good nutrition/healthy diet/weight management. Patient instructed on proper foot hygeine. Wear comfortable, proper fitting shoes. Wash feet daily. Dry well. After drying, apply moisturizer to feet (no lotion to webspaces). Inspect feet daily for skin breaks, blisters, swelling, or redness. Wear cotton socks (preferably white)  Change socks every day. Do NOT walk barefoot. Do NOT use heating pads or hot water soaks. We discussed wearing proper shoe gear, daily foot inspections, never walking without protective shoe gear.     Discussed edema control and the importance of daily moisturizer to the feet such as Eucerin    Recommend applying vicks vaporub to thick abnormal toenails daily x 6 months to treat fungal nail infection.    With patient's permission, nails were aggressively reduced and debrided x 10 to their soft tissue attachment mechanically and with electric , removing all offending nail and debris. Patient relates relief following the procedure. Although incurvation noted to bilateral hallux, no slantback or aggressive trimming performed due to known PAOD and poor wound healing    Abrasion noted to anterior right leg.  Patient states it is secondary to a shoe he was wearing, he has discontinued use. Cleansed with vashe.  Medihoney and mepilex border applied.  Will defer further care to his wound care provider    He will continue to monitor the areas daily, inspect his feet, wear protective shoe gear when ambulatory, moisturizer to maintain skin integrity and follow in this office in approximately 3-5 months,  sooner p.r.n.

## 2023-04-24 NOTE — TELEPHONE ENCOUNTER
----- Message from Erin Abad sent at 4/24/2023 10:38 AM CDT -----  Contact: [phone]  Requesting an RX refill or new RX. new  Is this a refill or new RX: new  RX name and strength (copy/paste from chart):  atorvastatin (LIPITOR) 80 MG tablet 90 tablet     Is this a 30 day or 90 day RX: 90  Pharmacy name and phone # (copy/paste from chart):      [id number] - [address] [address]  Phone: [phone] Fax: [phone]    ------------------------------------------------------------------------------------------------------------------------------------    Requesting an RX refill or new RX. new  Is this a refill or new RX: new  RX name and strength (copy/paste from chart):  losartan-hydrochlorothiazide 50-12.5 mg (HYZAAR) 50-12.5 mg per tablet 90 tablet     Is this a 30 day or 90 day RX: 90  Pharmacy name and phone # (copy/paste from chart):      [id number] - [address] [address]  Phone: [phone] Fax: [phone]    ------------------------------------------------------------------------------------------------------------------------------------    Requesting an RX refill or new RX. new  Is this a refill or new RX: new  RX name and strength (copy/paste from chart):  ezetimibe (ZETIA) 10 mg tablet 90 tablet    Is this a 30 day or 90 day RX: 90  Pharmacy name and phone # (copy/paste from chart):      [id number] - [place], [place] - [address] [place] [address]  Phone: [phone] Fax: [phone]

## 2023-04-24 NOTE — TELEPHONE ENCOUNTER
Care Due:                  Date            Visit Type   Department     Provider  --------------------------------------------------------------------------------                                HOSPITAL     [facility] INTER[facility]  Last Visit: 03-      FOLLOW UP    MEDICINE       Darrel Cespedes                              EP -                              PRIMARY      [facility]  Next Visit: 05-      CARE ([facility])   MEDICINE       REID GRIFFITH                                                            Last  Test          Frequency    Reason                     Performed    Due Date  --------------------------------------------------------------------------------    Lipid Panel.  12 months..  atorvastatin.............  03-   03-    [facility] Embedded Care Gaps. Reference number: [id number]. 4/24/2023   10:57:10 AM CDT

## 2023-05-08 ENCOUNTER — OFFICE VISIT (OUTPATIENT)
Dept: WOUND CARE | Facility: CLINIC | Age: 73
End: 2023-05-08
Payer: MEDICARE

## 2023-05-08 DIAGNOSIS — R60.0 BILATERAL LOWER EXTREMITY EDEMA: Primary | ICD-10-CM

## 2023-05-08 PROCEDURE — 99213 OFFICE O/P EST LOW 20 MIN: CPT | Mod: S$GLB,,,

## 2023-05-08 PROCEDURE — 99213 PR OFFICE/OUTPT VISIT, EST, LEVL III, 20-29 MIN: ICD-10-PCS | Mod: S$GLB,,,

## 2023-05-08 PROCEDURE — 99999 PR PBB SHADOW E&M-EST. PATIENT-LVL I: CPT | Mod: PBBFAC,,,

## 2023-05-08 PROCEDURE — 99999 PR PBB SHADOW E&M-EST. PATIENT-LVL I: ICD-10-PCS | Mod: PBBFAC,,,

## 2023-05-08 NOTE — PROGRESS NOTES
Subjective:       Patient ID: Babatunde Singh is a 72 y.o. male.    Chief Complaint: Wound Check      Patient presents for a reevaluation of right lower extremity wound. Patient follows with vascular surgery for PAD. Patient s/p right axillary to bi-profunda bypass on 10/6/22. Pt's ABIs preoperatively bilaterally were 0 to 0.69 on the right lower extremity and 0.74 on the left lower extremity. Patient s/p surgical debridement of his Achilles tendon on 22 with Dr. Ortiz.  [facility] told the patient to come in for a wound evaluation to the right anterior lower extremity. Denies fever, chills, erythema, warmth, drainage, or pain.    Review of Systems   Constitutional:  Negative for activity change, chills, diaphoresis, fatigue and fever.   Respiratory:  Negative for apnea, chest tightness and shortness of breath.    Cardiovascular:  Negative for chest pain, palpitations and leg swelling.   Musculoskeletal:  Negative for gait problem and joint swelling.   Skin:  Positive for wound. Negative for pallor and rash.   Neurological:  Negative for syncope, weakness and numbness.   Psychiatric/Behavioral:  Negative for agitation. The patient is not nervous/anxious.    All other systems reviewed and are negative.    Objective:      Physical Exam  Vitals reviewed.   Constitutional:       General: He is not in acute distress.     Appearance: Normal appearance.   Cardiovascular:      Rate and Rhythm: Normal rate and regular rhythm.      Pulses: Normal pulses.   Pulmonary:      Effort: No respiratory distress.   Musculoskeletal:         General: No swelling.      Right lower le+ Edema present.      Left lower le+ Edema present.   Skin:     General: Skin is warm and dry.      Findings: No erythema or wound.          Neurological:      General: No focal deficit present.      Mental Status: He is alert and oriented to person, place, and time.   Psychiatric:         Mood and Affect: Mood normal.         Behavior:  Behavior normal.         Thought Content: Thought content normal.         Judgment: Judgment normal.       Assessment:       1. Bilateral lower extremity edema                         Babatunde was seen in the clinic room and placed in the supine position on the treatment table. The leg was cleansed with Easi-clense sponges and dried thoroughly. No odor, redness, or warmth noted from patient's baseline. No open wounds noted.    Plan of Care: Compression stockings    Plan:       No open wounds noted.  Precautions given to prevent wounds from re-opening. Discussed how area is extremely fragile. Protect area from friction, wash area gently, and pat dry. If the wound should re-open, instructed patient to contact the office.    Discussed bilateral lower extremity edema. Instructed patient to utilize compression stockings to bilateral lower extremities. Place on first thing in the morning and remove before bed.    Faxed home health orders to [facility].  D/C Home health wound care orders    Instructed patient to keep vascular surgery appointments.    Written and verbal instructions given to patient.  RTC PRHARRIETT Davis PA-C

## 2023-05-17 ENCOUNTER — TELEPHONE (OUTPATIENT)
Dept: INTERNAL MEDICINE | Facility: CLINIC | Age: 73
End: 2023-05-17
Payer: MEDICARE

## 2023-05-17 NOTE — TELEPHONE ENCOUNTER
----- Message from Cheyenne Massey sent at 5/16/2023  1:26 PM CDT -----  Contact: pt/[phone]  Unable to schedule at [facility]   ----- Message -----  From: Ivis José MA  Sent: 5/16/2023   1:13 PM CDT  To: Checkout Staff [facility]      ----- Message -----  From: Roslyn Cespedes  Sent: 5/16/2023   1:07 PM CDT  To: Mal[facility] Dewitt Staff    Type:  Sooner Apoointment Request    Caller is requesting a sooner appointment.    Name of Caller:pt    When is the first available appointment?07/11/2023  Would the patient rather a call back or a response via [facility]? Call   Best Call Back Number:[phone]  Additional Information: pt  is looking to be  seen  sooner

## 2023-05-22 ENCOUNTER — OFFICE VISIT (OUTPATIENT)
Dept: INTERNAL MEDICINE | Facility: CLINIC | Age: 73
End: 2023-05-22
Payer: MEDICARE

## 2023-05-22 VITALS
HEART RATE: 81 BPM | SYSTOLIC BLOOD PRESSURE: 160 MMHG | OXYGEN SATURATION: 100 % | WEIGHT: 158 LBS | DIASTOLIC BLOOD PRESSURE: 80 MMHG | HEIGHT: 63 IN | BODY MASS INDEX: 28 KG/M2

## 2023-05-22 DIAGNOSIS — I73.9 PAD (PERIPHERAL ARTERY DISEASE): ICD-10-CM

## 2023-05-22 DIAGNOSIS — I10 PRIMARY HYPERTENSION: Primary | ICD-10-CM

## 2023-05-22 DIAGNOSIS — R73.03 PREDIABETES: ICD-10-CM

## 2023-05-22 DIAGNOSIS — Z23 NEED FOR TDAP VACCINATION: ICD-10-CM

## 2023-05-22 DIAGNOSIS — G62.9 PERIPHERAL POLYNEUROPATHY: ICD-10-CM

## 2023-05-22 PROCEDURE — 3077F SYST BP >= 140 MM HG: CPT | Mod: CPTII,S$GLB,, | Performed by: INTERNAL MEDICINE

## 2023-05-22 PROCEDURE — 99214 OFFICE O/P EST MOD 30 MIN: CPT | Mod: S$GLB,,, | Performed by: INTERNAL MEDICINE

## 2023-05-22 PROCEDURE — 1126F PR PAIN SEVERITY QUANTIFIED, NO PAIN PRESENT: ICD-10-PCS | Mod: CPTII,S$GLB,, | Performed by: INTERNAL MEDICINE

## 2023-05-22 PROCEDURE — 99999 PR PBB SHADOW E&M-EST. PATIENT-LVL IV: CPT | Mod: PBBFAC,,, | Performed by: INTERNAL MEDICINE

## 2023-05-22 PROCEDURE — 3079F DIAST BP 80-89 MM HG: CPT | Mod: CPTII,S$GLB,, | Performed by: INTERNAL MEDICINE

## 2023-05-22 PROCEDURE — 1160F PR REVIEW ALL MEDS BY PRESCRIBER/CLIN PHARMACIST DOCUMENTED: ICD-10-PCS | Mod: CPTII,S$GLB,, | Performed by: INTERNAL MEDICINE

## 2023-05-22 PROCEDURE — 4010F ACE/ARB THERAPY RXD/TAKEN: CPT | Mod: CPTII,S$GLB,, | Performed by: INTERNAL MEDICINE

## 2023-05-22 PROCEDURE — 4010F PR ACE/ARB THEARPY RXD/TAKEN: ICD-10-PCS | Mod: CPTII,S$GLB,, | Performed by: INTERNAL MEDICINE

## 2023-05-22 PROCEDURE — 1159F PR MEDICATION LIST DOCUMENTED IN MEDICAL RECORD: ICD-10-PCS | Mod: CPTII,S$GLB,, | Performed by: INTERNAL MEDICINE

## 2023-05-22 PROCEDURE — 1101F PR PT FALLS ASSESS DOC 0-1 FALLS W/OUT INJ PAST YR: ICD-10-PCS | Mod: CPTII,S$GLB,, | Performed by: INTERNAL MEDICINE

## 2023-05-22 PROCEDURE — 99999 PR PBB SHADOW E&M-EST. PATIENT-LVL IV: ICD-10-PCS | Mod: PBBFAC,,, | Performed by: INTERNAL MEDICINE

## 2023-05-22 PROCEDURE — 3008F BODY MASS INDEX DOCD: CPT | Mod: CPTII,S$GLB,, | Performed by: INTERNAL MEDICINE

## 2023-05-22 PROCEDURE — 3008F PR BODY MASS INDEX (BMI) DOCUMENTED: ICD-10-PCS | Mod: CPTII,S$GLB,, | Performed by: INTERNAL MEDICINE

## 2023-05-22 PROCEDURE — 3077F PR MOST RECENT SYSTOLIC BLOOD PRESSURE >= 140 MM HG: ICD-10-PCS | Mod: CPTII,S$GLB,, | Performed by: INTERNAL MEDICINE

## 2023-05-22 PROCEDURE — 3079F PR MOST RECENT DIASTOLIC BLOOD PRESSURE 80-89 MM HG: ICD-10-PCS | Mod: CPTII,S$GLB,, | Performed by: INTERNAL MEDICINE

## 2023-05-22 PROCEDURE — 99214 OFFICE O/P EST MOD 30 MIN: CPT | Performed by: INTERNAL MEDICINE

## 2023-05-22 PROCEDURE — 1101F PT FALLS ASSESS-DOCD LE1/YR: CPT | Mod: CPTII,S$GLB,, | Performed by: INTERNAL MEDICINE

## 2023-05-22 PROCEDURE — 1159F MED LIST DOCD IN RCRD: CPT | Mod: CPTII,S$GLB,, | Performed by: INTERNAL MEDICINE

## 2023-05-22 PROCEDURE — 3288F FALL RISK ASSESSMENT DOCD: CPT | Mod: CPTII,S$GLB,, | Performed by: INTERNAL MEDICINE

## 2023-05-22 PROCEDURE — 99214 PR OFFICE/OUTPT VISIT, EST, LEVL IV, 30-39 MIN: ICD-10-PCS | Mod: S$GLB,,, | Performed by: INTERNAL MEDICINE

## 2023-05-22 PROCEDURE — 3288F PR FALLS RISK ASSESSMENT DOCUMENTED: ICD-10-PCS | Mod: CPTII,S$GLB,, | Performed by: INTERNAL MEDICINE

## 2023-05-22 PROCEDURE — 1126F AMNT PAIN NOTED NONE PRSNT: CPT | Mod: CPTII,S$GLB,, | Performed by: INTERNAL MEDICINE

## 2023-05-22 PROCEDURE — 1160F RVW MEDS BY RX/DR IN RCRD: CPT | Mod: CPTII,S$GLB,, | Performed by: INTERNAL MEDICINE

## 2023-05-22 RX ORDER — DULOXETIN HYDROCHLORIDE 30 MG/1
30 CAPSULE, DELAYED RELEASE ORAL DAILY
Qty: 30 CAPSULE | Refills: 11 | Status: SHIPPED | OUTPATIENT
Start: 2023-05-22 | End: 2024-05-21

## 2023-05-22 RX ORDER — AMLODIPINE AND OLMESARTAN MEDOXOMIL 5; 40 MG/1; MG/1
1 TABLET ORAL DAILY
Qty: 90 TABLET | Refills: 3 | Status: SHIPPED | OUTPATIENT
Start: 2023-05-22 | End: 2024-05-21

## 2023-05-22 NOTE — PROGRESS NOTES
"    CHIEF COMPLAINT     Chief Complaint   Patient presents with    Follow-up       HPI     Babatunde Singh is a 72 y.o. male PAD, hypertension,hyperlipidemia, hx or RCC s/p nephrectomy here today for     Legs have improved with wound care. Still having some neuropathy in feet. Taking gabapentin but still having burning and tingling R> L     Still anemic without signs of bleeding iron stores have corrected.  He has an appointment with Hematology next month for further evaluation    Personally Reviewed Patient's Medical, surgical, family and social hx. Changes updated in [facility].  Care Team updated in Epic    Review of Systems:  Review of Systems   Respiratory:  Negative for cough and shortness of breath.    Genitourinary:  Positive for urgency.   Musculoskeletal:  Positive for back pain and gait problem.   Neurological:         Neuropathic pain     Health Maintenance:   Reviewed with patient  Due for the following:  TDAP due to day    PHYSICAL EXAM     BP (!) 152/62 (BP Location: Right arm, Patient Position: Sitting, BP Method: Large (Manual))   Pulse 81   Ht 5' 3" (1.6 m)   Wt 71.7 kg (158 lb)   SpO2 100%   BMI 27.99 kg/m²     Gen: Well Appearing, NAD  HEENT: PERR, EOMI  Neck: FROM, no thyromegaly, no cervical adenopathy  CVD: RRR, no M/R/G  Pulm: Normal work of breathing, CTAB, no wheezing  Abd:  Soft, NT, ND non TTP, no mass, large surgical scar R abdomen  MSK: no LE edema  Neuro: A&Ox3, gait abnormal using cane , speech normal  Mood; Mood normal, behavior normal, thought process linear       LABS     Labs reviewed;     Chemistry        Component Value Date/Time     03/02/2023 0549    K 4.1 03/02/2023 0549     03/02/2023 0549    CO2 25 03/02/2023 0549    BUN 15 03/02/2023 0549    CREATININE 1.3 03/02/2023 0549    GLU 84 03/02/2023 0549        Component Value Date/Time    CALCIUM 8.9 03/02/2023 0549    ALKPHOS 86 03/02/2023 0549    AST 59 (H) 03/02/2023 0549    ALT 63 (H) 03/02/2023 0549    BILITOT 0.3 " 03/02/2023 0549    ESTGFRAFRICA >60.0 03/10/2022 1118    EGFRNONAA >60.0 03/10/2022 1118        Lab Results   Component Value Date    WBC 9.27 04/06/2023    HGB 7.6 (L) 04/06/2023    HCT 26.8 (L) 04/06/2023    MCV 83 04/06/2023     04/06/2023         ASSESSMENT     1. Primary hypertension  amlodipine-olmesartan (LAILA) 5-40 mg per tablet      2. PAD (peripheral artery disease)        3. Peripheral polyneuropathy  DULoxetine (CYMBALTA) 30 MG capsule      4. Prediabetes  Hemoglobin A1C              Plan     Babatunde Singh is a 72 y.o. male with PAD complicated by neuroapthy, hypertension,hyperlipidemia hx or RCC s/p nephrectomy    1. Primary hypertension  Not controlled, going to switch from hyzaar to laila 5-40.   Target less 130/80 based on risk profile  - amlodipine-olmesartan (LAILA) 5-40 mg per tablet; Take 1 tablet by mouth once daily.  Dispense: 90 tablet; Refill: 3    2. PAD (peripheral artery disease)  Status post bypass followed by both peripheral cardiology and vascular surgery  Continue statin and aspirin  Continue to walk    3. Peripheral polyneuropathy  Will add duloxetine and continue gabapentin for neuropathy  Continue to walk  - DULoxetine (CYMBALTA) 30 MG capsule; Take 1 capsule (30 mg total) by mouth once daily.  Dispense: 30 capsule; Refill: 11    4. Prediabetes  Overdue for a1c check  - Hemoglobin A1C; Future      Esdras Ly MD

## 2023-05-24 ENCOUNTER — OFFICE VISIT (OUTPATIENT)
Dept: INTERNAL MEDICINE | Facility: CLINIC | Age: 73
End: 2023-05-24
Payer: MEDICARE

## 2023-05-24 VITALS
OXYGEN SATURATION: 98 % | DIASTOLIC BLOOD PRESSURE: 68 MMHG | HEIGHT: 63 IN | SYSTOLIC BLOOD PRESSURE: 116 MMHG | WEIGHT: 157.19 LBS | BODY MASS INDEX: 27.85 KG/M2 | HEART RATE: 72 BPM

## 2023-05-24 DIAGNOSIS — I73.9 PAD (PERIPHERAL ARTERY DISEASE): ICD-10-CM

## 2023-05-24 DIAGNOSIS — N18.30 STAGE 3 CHRONIC KIDNEY DISEASE, UNSPECIFIED WHETHER STAGE 3A OR 3B CKD: ICD-10-CM

## 2023-05-24 DIAGNOSIS — E78.2 MIXED HYPERLIPIDEMIA: ICD-10-CM

## 2023-05-24 DIAGNOSIS — F17.201 TOBACCO USE DISORDER, SEVERE, IN EARLY REMISSION: ICD-10-CM

## 2023-05-24 DIAGNOSIS — K57.30 DIVERTICULOSIS OF COLON: ICD-10-CM

## 2023-05-24 DIAGNOSIS — I82.401 DEEP VEIN THROMBOSIS (DVT) OF RIGHT LOWER EXTREMITY, UNSPECIFIED CHRONICITY, UNSPECIFIED VEIN: ICD-10-CM

## 2023-05-24 DIAGNOSIS — G62.9 PERIPHERAL POLYNEUROPATHY: ICD-10-CM

## 2023-05-24 DIAGNOSIS — J42 CHRONIC BRONCHITIS, UNSPECIFIED CHRONIC BRONCHITIS TYPE: ICD-10-CM

## 2023-05-24 DIAGNOSIS — H91.92 DECREASED HEARING OF LEFT EAR: ICD-10-CM

## 2023-05-24 DIAGNOSIS — I70.0 AORTIC ATHEROSCLEROSIS: ICD-10-CM

## 2023-05-24 DIAGNOSIS — Z00.00 ENCOUNTER FOR PREVENTIVE HEALTH EXAMINATION: Primary | ICD-10-CM

## 2023-05-24 DIAGNOSIS — D50.9 IRON DEFICIENCY ANEMIA, UNSPECIFIED IRON DEFICIENCY ANEMIA TYPE: ICD-10-CM

## 2023-05-24 DIAGNOSIS — H91.92 HEARING LOSS OF LEFT EAR, UNSPECIFIED HEARING LOSS TYPE: ICD-10-CM

## 2023-05-24 DIAGNOSIS — I73.9 CLAUDICATION OF RIGHT LOWER EXTREMITY: ICD-10-CM

## 2023-05-24 DIAGNOSIS — D69.2 SENILE PURPURA: ICD-10-CM

## 2023-05-24 DIAGNOSIS — C64.1 RENAL CELL CARCINOMA OF RIGHT KIDNEY: ICD-10-CM

## 2023-05-24 DIAGNOSIS — I10 PRIMARY HYPERTENSION: ICD-10-CM

## 2023-05-24 DIAGNOSIS — R73.01 IMPAIRED FASTING GLUCOSE: ICD-10-CM

## 2023-05-24 PROBLEM — R09.89 ABSENT PEDAL PULSES: Status: RESOLVED | Noted: 2022-10-05 | Resolved: 2023-05-24

## 2023-05-24 PROBLEM — R20.2 NUMBNESS AND TINGLING OF RIGHT LEG: Status: RESOLVED | Noted: 2022-07-26 | Resolved: 2023-05-24

## 2023-05-24 PROBLEM — M79.604 RIGHT LEG PAIN: Status: RESOLVED | Noted: 2022-07-26 | Resolved: 2023-05-24

## 2023-05-24 PROBLEM — R20.0 NUMBNESS AND TINGLING OF RIGHT LEG: Status: RESOLVED | Noted: 2022-07-26 | Resolved: 2023-05-24

## 2023-05-24 PROBLEM — R60.0 BILATERAL LOWER EXTREMITY EDEMA: Status: RESOLVED | Noted: 2022-10-20 | Resolved: 2023-05-24

## 2023-05-24 PROBLEM — C64.9 RENAL CELL CARCINOMA: Status: ACTIVE | Noted: 2023-05-24

## 2023-05-24 PROCEDURE — G0439 PR MEDICARE ANNUAL WELLNESS SUBSEQUENT VISIT: ICD-10-PCS | Mod: S$GLB,,, | Performed by: NURSE PRACTITIONER

## 2023-05-24 PROCEDURE — 3078F PR MOST RECENT DIASTOLIC BLOOD PRESSURE < 80 MM HG: ICD-10-PCS | Mod: CPTII,S$GLB,, | Performed by: NURSE PRACTITIONER

## 2023-05-24 PROCEDURE — 3008F BODY MASS INDEX DOCD: CPT | Mod: CPTII,S$GLB,, | Performed by: NURSE PRACTITIONER

## 2023-05-24 PROCEDURE — 99999 PR PBB SHADOW E&M-EST. PATIENT-LVL IV: CPT | Mod: PBBFAC,,, | Performed by: NURSE PRACTITIONER

## 2023-05-24 PROCEDURE — 1101F PR PT FALLS ASSESS DOC 0-1 FALLS W/OUT INJ PAST YR: ICD-10-PCS | Mod: CPTII,S$GLB,, | Performed by: NURSE PRACTITIONER

## 2023-05-24 PROCEDURE — G0439 PPPS, SUBSEQ VISIT: HCPCS | Mod: S$GLB,,, | Performed by: NURSE PRACTITIONER

## 2023-05-24 PROCEDURE — 3074F SYST BP LT 130 MM HG: CPT | Mod: CPTII,S$GLB,, | Performed by: NURSE PRACTITIONER

## 2023-05-24 PROCEDURE — 3288F PR FALLS RISK ASSESSMENT DOCUMENTED: ICD-10-PCS | Mod: CPTII,S$GLB,, | Performed by: NURSE PRACTITIONER

## 2023-05-24 PROCEDURE — 4010F ACE/ARB THERAPY RXD/TAKEN: CPT | Mod: CPTII,S$GLB,, | Performed by: NURSE PRACTITIONER

## 2023-05-24 PROCEDURE — 3008F PR BODY MASS INDEX (BMI) DOCUMENTED: ICD-10-PCS | Mod: CPTII,S$GLB,, | Performed by: NURSE PRACTITIONER

## 2023-05-24 PROCEDURE — 4010F PR ACE/ARB THEARPY RXD/TAKEN: ICD-10-PCS | Mod: CPTII,S$GLB,, | Performed by: NURSE PRACTITIONER

## 2023-05-24 PROCEDURE — 3078F DIAST BP <80 MM HG: CPT | Mod: CPTII,S$GLB,, | Performed by: NURSE PRACTITIONER

## 2023-05-24 PROCEDURE — 1126F PR PAIN SEVERITY QUANTIFIED, NO PAIN PRESENT: ICD-10-PCS | Mod: CPTII,S$GLB,, | Performed by: NURSE PRACTITIONER

## 2023-05-24 PROCEDURE — 1170F FXNL STATUS ASSESSED: CPT | Mod: CPTII,S$GLB,, | Performed by: NURSE PRACTITIONER

## 2023-05-24 PROCEDURE — 1126F AMNT PAIN NOTED NONE PRSNT: CPT | Mod: CPTII,S$GLB,, | Performed by: NURSE PRACTITIONER

## 2023-05-24 PROCEDURE — 99999 PR PBB SHADOW E&M-EST. PATIENT-LVL IV: ICD-10-PCS | Mod: PBBFAC,,, | Performed by: NURSE PRACTITIONER

## 2023-05-24 PROCEDURE — 1101F PT FALLS ASSESS-DOCD LE1/YR: CPT | Mod: CPTII,S$GLB,, | Performed by: NURSE PRACTITIONER

## 2023-05-24 PROCEDURE — 3074F PR MOST RECENT SYSTOLIC BLOOD PRESSURE < 130 MM HG: ICD-10-PCS | Mod: CPTII,S$GLB,, | Performed by: NURSE PRACTITIONER

## 2023-05-24 PROCEDURE — 1170F PR FUNCTIONAL STATUS ASSESSED: ICD-10-PCS | Mod: CPTII,S$GLB,, | Performed by: NURSE PRACTITIONER

## 2023-05-24 PROCEDURE — 3288F FALL RISK ASSESSMENT DOCD: CPT | Mod: CPTII,S$GLB,, | Performed by: NURSE PRACTITIONER

## 2023-05-24 NOTE — PROGRESS NOTES
"  Babatunde Singh presented for a  Medicare AWV and comprehensive Health Risk Assessment today. The following components were reviewed and updated:    Medical history  Family History  Social history  Allergies and Current Medications  Health Risk Assessment  Health Maintenance  Care Team         ** See Completed Assessments for Annual Wellness Visit within the encounter summary.**         The following assessments were completed:  Living Situation  CAGE  Depression Screening  Timed Get Up and Go  Whisper Test  Cognitive Function Screening      Nutrition Screening  ADL Screening  PAQ Screening  OPIOID Screening: Patient does not have a prescription for narcotics. Patient does not use substance         Vitals:    05/24/23 0802   BP: 116/68   BP Location: Left arm   Pulse: 72   SpO2: 98%   Weight: 71.3 kg (157 lb 3 oz)   Height: 5' 3" (1.6 m)     Body mass index is 27.84 kg/m².  Physical Exam  Vitals and nursing note reviewed.   Constitutional:       Appearance: He is well-developed.   HENT:      Head: Normocephalic.   Cardiovascular:      Rate and Rhythm: Normal rate and regular rhythm.      Heart sounds: Normal heart sounds. No murmur heard.  Pulmonary:      Effort: Pulmonary effort is normal.      Breath sounds: Normal breath sounds.   Abdominal:      General: Bowel sounds are normal.      Palpations: Abdomen is soft.   Musculoskeletal:         General: Normal range of motion.   Skin:     General: Skin is warm and dry.   Neurological:      Mental Status: He is alert and oriented to person, place, and time.      Motor: No abnormal muscle tone.   Psychiatric:         Mood and Affect: Mood normal.             Diagnoses and health risks identified today and associated recommendations/orders:    1. Encounter for preventive health examination  Here for Health Risk Assessment/Annual Wellness Visit.  Health maintenance reviewed and updated. Follow up in one year.   Declines immunizations - pneumonia, shingles, tetanus, " influenza, covid    2. Primary hypertension  Chronic, stable on current medications. Followed by PCP.     3. Aortic atherosclerosis  Chronic, stable on current medications. Noted CT Chest/Lung 4/04/23. Followed by PCP, Vascular Surgery.    4. PAD (peripheral artery disease)  Chronic, stable on current medications. Followed by PCP, Vascular Surgery    5. Claudication of right lower extremity  Chronic, stable S/P surgery. Followed by PCP, Vascular Surgery.    6. Mixed hyperlipidemia  Chronic, stable on current medications. Followed by PCP, Vascular Surgery, Cardiology.    7. Chronic bronchitis, unspecified chronic bronchitis type  Chronic, stable. Followed by PCP.    8. Deep vein thrombosis (DVT) of right lower extremity, unspecified chronicity, unspecified vein  Chronic, stable on current medication.  Followed by PCP.     9. Stage 3 chronic kidney disease, unspecified whether stage 3a or 3b CKD  Chronic, stable on current medications. Followed by PCP.     10. Senile purpura        11. Peripheral polyneuropathy  Chronic, stable on current medications. Followed by PCP.     12. Iron deficiency anemia, unspecified iron deficiency anemia type  Chronic, stable on current medication. Followed by PCP.     13. Impaired fasting glucose  Chronic, stable with diet. Last A1c 5.8. Followed by PCP.    14. Diverticulosis of colon  Chronic, stable. Followed by PCP.    15. Tobacco use disorder, severe, in early remission  Stable. Reports he has not smoked since 11/2022. Followed by PCP    16. Decreased hearing of left ear  Chronic, reports H/O decreased hearing left ear. Whisper test decreased left. Agrreable to evalution.  - Ambulatory referral/consult to ENT; Future    17. Hearing loss of left ear, unspecified hearing loss type  - Ambulatory referral/consult to Audiology; Future    18. Renal cell carcinoma, unspecified laterality  New onset. S/P partial nephrectomy 2/2023. Followed by PCP, Urology, Oncology.    Padmini Fine with  a 5-10 year written screening schedule and personal prevention plan. Recommendations were developed using the USPSTF age appropriate recommendations. Education, counseling, and referrals were provided as needed. After Visit Summary printed and given to patient which includes a list of additional screenings\tests needed.    Follow up in 4 months (on 9/15/2023).with PCP    Irma Sol NP

## 2023-05-24 NOTE — PATIENT INSTRUCTIONS
Counseling and Referral of Other Preventative  (Italic type indicates deductible and co-insurance are waived)    Patient Name: Babatunde Singh  Today's Date: 5/24/2023    Health Maintenance       Date Due Completion Date    TETANUS VACCINE Never done ---    COVID-19 Vaccine (4 - Booster for Moderna series) 02/25/2022 12/31/2021    Hemoglobin A1c (Prediabetes) 03/10/2023 3/10/2022    Pneumococcal Vaccines (Age 65+) (1 - PCV) 12/06/2023 (Originally 6/15/1956) ---    Shingles Vaccine (1 of 2) 05/24/2024 (Originally 6/15/2001) ---    Influenza Vaccine (Season Ended) 09/01/2023 ---    LDCT Lung Screen 04/04/2024 4/4/2023    High Dose Statin 05/22/2024 5/22/2023    Colorectal Cancer Screening 10/31/2025 10/31/2022    Lipid Panel 03/10/2027 3/10/2022        Orders Placed This Encounter   Procedures    Ambulatory referral/consult to ENT    Ambulatory referral/consult to Audiology       The following information is provided to all patients.  This information is to help you find resources for any of the problems found today that may be affecting your health:                Living healthy guide: www.[facility].[place].gov      Understanding Diabetes: www.diabetes.org      Eating healthy: www.cdc.gov/healthyweight      CDC home safety checklist: www.cdc.gov/steadi/patient.html      Agency on Aging: www.goea.[place].[facility]      Alcoholics anonymous (AA): www.aa.org      Physical Activity: www.[place].nih.gov/tz2ougo      Tobacco use: www.quitwithusla.org

## 2023-06-07 ENCOUNTER — LAB VISIT (OUTPATIENT)
Dept: LAB | Facility: OTHER | Age: 73
End: 2023-06-07
Attending: INTERNAL MEDICINE
Payer: MEDICARE

## 2023-06-07 DIAGNOSIS — C64.1 RENAL CELL CARCINOMA OF RIGHT KIDNEY: ICD-10-CM

## 2023-06-07 DIAGNOSIS — R73.03 PREDIABETES: ICD-10-CM

## 2023-06-07 LAB
ALBUMIN SERPL BCP-MCNC: 3.7 G/DL (ref 3.5–5.2)
ALP SERPL-CCNC: 88 U/L (ref 55–135)
ALT SERPL W/O P-5'-P-CCNC: 19 U/L (ref 10–44)
ANION GAP SERPL CALC-SCNC: 8 MMOL/L (ref 8–16)
AST SERPL-CCNC: 23 U/L (ref 10–40)
BASOPHILS # BLD AUTO: 0.07 K/UL (ref 0–0.2)
BASOPHILS NFR BLD: 0.7 % (ref 0–1.9)
BILIRUB SERPL-MCNC: 0.2 MG/DL (ref 0.1–1)
BUN SERPL-MCNC: 23 MG/DL (ref 8–23)
CALCIUM SERPL-MCNC: 8.9 MG/DL (ref 8.7–10.5)
CHLORIDE SERPL-SCNC: 112 MMOL/L (ref 95–110)
CO2 SERPL-SCNC: 19 MMOL/L (ref 23–29)
CREAT SERPL-MCNC: 1.4 MG/DL (ref 0.5–1.4)
DIFFERENTIAL METHOD: ABNORMAL
EOSINOPHIL # BLD AUTO: 0.2 K/UL (ref 0–0.5)
EOSINOPHIL NFR BLD: 1.6 % (ref 0–8)
ERYTHROCYTE [DISTWIDTH] IN BLOOD BY AUTOMATED COUNT: 16.5 % (ref 11.5–14.5)
EST. GFR  (NO RACE VARIABLE): 54 ML/MIN/1.73 M^2
ESTIMATED AVG GLUCOSE: 85 MG/DL (ref 68–131)
GLUCOSE SERPL-MCNC: 108 MG/DL (ref 70–110)
HBA1C MFR BLD: 4.6 % (ref 4–5.6)
HCT VFR BLD AUTO: 35.1 % (ref 40–54)
HGB BLD-MCNC: 10.6 G/DL (ref 14–18)
IMM GRANULOCYTES # BLD AUTO: 0.03 K/UL (ref 0–0.04)
IMM GRANULOCYTES NFR BLD AUTO: 0.3 % (ref 0–0.5)
LYMPHOCYTES # BLD AUTO: 2.9 K/UL (ref 1–4.8)
LYMPHOCYTES NFR BLD: 27 % (ref 18–48)
MCH RBC QN AUTO: 27.7 PG (ref 27–31)
MCHC RBC AUTO-ENTMCNC: 30.2 G/DL (ref 32–36)
MCV RBC AUTO: 92 FL (ref 82–98)
MONOCYTES # BLD AUTO: 1.2 K/UL (ref 0.3–1)
MONOCYTES NFR BLD: 11 % (ref 4–15)
NEUTROPHILS # BLD AUTO: 6.3 K/UL (ref 1.8–7.7)
NEUTROPHILS NFR BLD: 59.4 % (ref 38–73)
NRBC BLD-RTO: 0 /100 WBC
PLATELET # BLD AUTO: 335 K/UL (ref 150–450)
PMV BLD AUTO: 9.8 FL (ref 9.2–12.9)
POTASSIUM SERPL-SCNC: 4.6 MMOL/L (ref 3.5–5.1)
PROT SERPL-MCNC: 6.6 G/DL (ref 6–8.4)
RBC # BLD AUTO: 3.82 M/UL (ref 4.6–6.2)
SODIUM SERPL-SCNC: 139 MMOL/L (ref 136–145)
WBC # BLD AUTO: 10.63 K/UL (ref 3.9–12.7)

## 2023-06-07 PROCEDURE — 80053 COMPREHEN METABOLIC PANEL: CPT | Performed by: NURSE PRACTITIONER

## 2023-06-07 PROCEDURE — 83036 HEMOGLOBIN GLYCOSYLATED A1C: CPT | Performed by: INTERNAL MEDICINE

## 2023-06-07 PROCEDURE — 36415 COLL VENOUS BLD VENIPUNCTURE: CPT | Performed by: NURSE PRACTITIONER

## 2023-06-07 PROCEDURE — 85025 COMPLETE CBC W/AUTO DIFF WBC: CPT | Performed by: NURSE PRACTITIONER

## 2023-06-07 NOTE — PROGRESS NOTES
Subjective:      Babatunde Singh is a 72 y.o. male who returns today regarding his     Sp right nephrectomy    CT is scheduled for next week    No incisional problems.    The following portions of the patient's history were reviewed and updated as appropriate: allergies, current medications, past family history, past medical history, past social history, past surgical history and problem list.    Review of Systems  Pertinent items are noted in HPI.  A comprehensive multipoint review of systems was negative except as otherwise stated in the HPI.    Past Medical History:   Diagnosis Date    DVT (deep venous thrombosis)     Hypertension     Peripheral arterial disease     Right renal mass 8/11/2022     Past Surgical History:   Procedure Laterality Date    APPLICATION OF WOUND VACUUM-ASSISTED CLOSURE DEVICE Right 12/8/2022    Procedure: APPLICATION, WOUND VAC;  Surgeon: LEI Ortiz III, MD;  [facility];  Service: Peripheral Vascular;  Laterality: Right;    CHOLECYSTECTOMY N/A 2/27/2023    Procedure: CHOLECYSTECTOMY complex;  Surgeon: Tolu Gavin MD;  [facility];  Service: General;  Laterality: N/A;    COLONOSCOPY N/A 10/31/2022    Procedure: COLONOSCOPY;  Surgeon: Philip Shafer MD;  Location: [facility] (2ND FLR);  Service: Endoscopy;  Laterality: N/A;    CREATION OF AXILLARY-FEMORAL ARTERY BYPASS WITH GRAFT N/A 10/6/2022    Procedure: CREATION, BYPASS, ARTERIAL, AXILLARY TO FEMORAL, USING GRAFT;  Surgeon: LEI Ortiz III, MD;  [facility];  Service: Peripheral Vascular;  Laterality: N/A;    CREATION OF FEMORAL-FEMORAL ARTERY BYPASS WITH GRAFT N/A 10/6/2022    Procedure: CREATION, BYPASS, ARTERIAL, FEMORAL TO FEMORAL, USING GRAFT;  Surgeon: LEI Ortiz III, MD;  [facility];  Service: Peripheral Vascular;  Laterality: N/A;    ESOPHAGOGASTRODUODENOSCOPY N/A 10/20/2022    Procedure: EGD (ESOPHAGOGASTRODUODENOSCOPY);  Surgeon: Sixto Chicas,  MD;  Location: [facility] ENDO (2ND FLR);  Service: Endoscopy;  Laterality: N/A;    ESOPHAGOGASTRODUODENOSCOPY N/A 10/28/2022    Procedure: EGD (ESOPHAGOGASTRODUODENOSCOPY);  Surgeon: Marlee Hopson MD;  Location: [facility] ENDO (2ND FLR);  Service: Gastroenterology;  Laterality: N/A;    EXCISION OF HYDROCELE Left 4/26/2022    Procedure: HYDROCELECTOMY;  Surgeon: Damion Roberts MD;  Location: [facility];  Service: Urology;  Laterality: Left;    KNEE SURGERY  1972    PARTIAL NEPHRECTOMY Right 2/27/2023    Procedure: NEPHRECTOMY, PARTIAL;  Surgeon: Moises Cuba MD;  Location: [facility] 2ND FLR;  Service: Urology;  Laterality: Right;  Dr Gavin robotic cholecystectomy for gallbladder mass also    WOUND DEBRIDEMENT Right 12/8/2022    Procedure: DEBRIDEMENT, WOUND;  Surgeon: LEI Ortiz III, MD;  [facility];  Service: Peripheral Vascular;  Laterality: Right;  RLE wound debridement       Review of patient's allergies indicates:   Allergen Reactions    Ace inhibitors           Objective:   Vitals: There were no vitals taken for this visit.    Physical Exam   General: alert and oriented, no acute distress  Respiratory: Symmetric expansion, non-labored breathing  Cardiovascular: no peripheral edema  Abdomen: soft, non distended  Skin: normal coloration and turgor, no rashes, no suspicious skin lesions noted  Neuro: no gross deficits  Psych: normal judgment and insight, normal mood/affect, and non-anxious    Physical Exam    Lab Review   Urinalysis demonstrates no speicmen    Lab Results   Component Value Date    WBC 10.63 06/07/2023    HGB 10.6 (L) 06/07/2023    HCT 35.1 (L) 06/07/2023    HCT 20 (LL) 10/19/2022    MCV 92 06/07/2023     06/07/2023     Lab Results   Component Value Date    CREATININE 1.4 06/07/2023    BUN 23 06/07/2023       Imaging  CT CHEST LUNG SCREENING LOW DOSE     CLINICAL HISTORY:  Lung cancer screening, >= 20 pk-yr smoking history, risk factor(s) (Age >= 50y); Nicotine dependence,  unspecified, uncomplicated     TECHNIQUE:  CT of the thorax was performed with low dose, lung screening protocol.  No contrast was administered.  Sagittal and coronal reconstructions were obtained.     X-ray dose: DLP= 78.63 mGy-cm     COMPARISON:  CTA 10/10/2022.  CT 03/15/2022     FINDINGS:  Lungs: There are no abnormal opacities that require further evaluation.  The largest opacity in the right lung appears subsolid and measures less than 0.4 cm on series 4, image 92, stable4.  The largest opacity in the left lung appears solid and measures less than 0.4 cm on series 4, image 156, similar to prior.  There is extensive centrilobular paraseptal emphysematous changes.     Pleura:   No effusion..     Heart and pericardium: The left atrium appears mildly dilated.     Aorta and vasculature: Atherosclerosis including coronary arteries.     Chest wall and skeletal structures: Partially visualized graft along the left chest wall representing known femoral or axillary bypass.  Degenerative spine changes.     Upper abdomen: Postoperative changes of partial right nephrectomy for RCC.  Postoperative changes of cholecystectomy.     Impression:     Lung-RADS Category:  2 - Benign Appearance or Behavior - continue annual screening with LDCT in 12 months.     Clinically or potentially clinically significant non lung cancer finding:  None.     Electronically signed by resident: Dao Dennis  Date:                                            04/04/2023  Time:                                           13:16     Electronically signed by: Loretta Bloom  Date:                                            04/04/2023  Time:                                           15:26    Final Pathologic Diagnosis 1. 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   - Atherosclerotic vessels.   - See CAP synoptic report and comment below.   2. GALLBLADDER AND LIVER SEGMENT 4 AND 5, RADICAL  CHOLECYSTECTOMY AND PARTIAL   HEPATECTOMY:   - Gallbladder with adenomyomatosis, chronic cholecystitis, and cholelithiasis   - Liver with incidental bile duct hamartoma   - Negative for dysplasia and malignancy   - See comment   3. PORTAL LYMPH NODES, DISSECTION:   - Three lymph nodes, negative for carcinoma (0/3).   SURGICAL PATHOLOGY CANCER CASE SUMMARY-KIDNEY   Procedure: Radical nephrectomy.   Specimen laterality: Right.   Tumor site: Superior pole.   Tumor size: 7.2 cm.   Tumor focality: Unifocal.   Histologic type: Papillary renal cell carcinoma, Type 2.   Sarcomatoid features: Not identified.   Rhabdoid features: Not identified.   Histologic Grade (ISUP Grade): 3.   Tumor necrosis: Present, 15%.   Tumor extension: Limited to kidney.   Margins: Uninvolved.   Lymphovascular invasion (excluding renal vein and its segmental branches):   Not identified.   Regional lymph nodes: No regional lymph nodes submitted or found.   Distant metastases: Not applicable.   Pathologic stage (pTNM, AJCC 8th edition): pT2a pN not assigned.   Blocks for potential molecular or ancillary studies:   Tumor block: 1F.    Comment: Interp By Calli Murphy M.D., Signed on 03/07/2023 at 11:44       Assessment and Plan:   Renal cell carcinoma of right kidney  Grade II papillary pT2 cN0 cM0 JUN sp nephrectomy    CT scheduled for next week  RTC 4 months with CMP, CT ab and pelvis with and without IV and po    Hydrocele, bilateral  Not symptomatic

## 2023-06-09 ENCOUNTER — OFFICE VISIT (OUTPATIENT)
Dept: UROLOGY | Facility: CLINIC | Age: 73
End: 2023-06-09
Payer: MEDICARE

## 2023-06-09 VITALS
SYSTOLIC BLOOD PRESSURE: 163 MMHG | WEIGHT: 163.56 LBS | HEART RATE: 47 BPM | HEIGHT: 63 IN | BODY MASS INDEX: 28.98 KG/M2 | DIASTOLIC BLOOD PRESSURE: 69 MMHG

## 2023-06-09 DIAGNOSIS — C64.1 RENAL CELL CARCINOMA OF RIGHT KIDNEY: Primary | ICD-10-CM

## 2023-06-09 DIAGNOSIS — N43.3 HYDROCELE, BILATERAL: ICD-10-CM

## 2023-06-09 PROCEDURE — 3288F FALL RISK ASSESSMENT DOCD: CPT | Mod: CPTII,S$GLB,, | Performed by: UROLOGY

## 2023-06-09 PROCEDURE — 99214 PR OFFICE/OUTPT VISIT, EST, LEVL IV, 30-39 MIN: ICD-10-PCS | Mod: S$GLB,,, | Performed by: UROLOGY

## 2023-06-09 PROCEDURE — 4010F ACE/ARB THERAPY RXD/TAKEN: CPT | Mod: CPTII,S$GLB,, | Performed by: UROLOGY

## 2023-06-09 PROCEDURE — 3077F PR MOST RECENT SYSTOLIC BLOOD PRESSURE >= 140 MM HG: ICD-10-PCS | Mod: CPTII,S$GLB,, | Performed by: UROLOGY

## 2023-06-09 PROCEDURE — 3044F HG A1C LEVEL LT 7.0%: CPT | Mod: CPTII,S$GLB,, | Performed by: UROLOGY

## 2023-06-09 PROCEDURE — 99214 OFFICE O/P EST MOD 30 MIN: CPT | Mod: S$GLB,,, | Performed by: UROLOGY

## 2023-06-09 PROCEDURE — 3077F SYST BP >= 140 MM HG: CPT | Mod: CPTII,S$GLB,, | Performed by: UROLOGY

## 2023-06-09 PROCEDURE — 3008F BODY MASS INDEX DOCD: CPT | Mod: CPTII,S$GLB,, | Performed by: UROLOGY

## 2023-06-09 PROCEDURE — 3008F PR BODY MASS INDEX (BMI) DOCUMENTED: ICD-10-PCS | Mod: CPTII,S$GLB,, | Performed by: UROLOGY

## 2023-06-09 PROCEDURE — 1126F AMNT PAIN NOTED NONE PRSNT: CPT | Mod: CPTII,S$GLB,, | Performed by: UROLOGY

## 2023-06-09 PROCEDURE — 3288F PR FALLS RISK ASSESSMENT DOCUMENTED: ICD-10-PCS | Mod: CPTII,S$GLB,, | Performed by: UROLOGY

## 2023-06-09 PROCEDURE — 1126F PR PAIN SEVERITY QUANTIFIED, NO PAIN PRESENT: ICD-10-PCS | Mod: CPTII,S$GLB,, | Performed by: UROLOGY

## 2023-06-09 PROCEDURE — 3078F DIAST BP <80 MM HG: CPT | Mod: CPTII,S$GLB,, | Performed by: UROLOGY

## 2023-06-09 PROCEDURE — 1101F PR PT FALLS ASSESS DOC 0-1 FALLS W/OUT INJ PAST YR: ICD-10-PCS | Mod: CPTII,S$GLB,, | Performed by: UROLOGY

## 2023-06-09 PROCEDURE — 4010F PR ACE/ARB THEARPY RXD/TAKEN: ICD-10-PCS | Mod: CPTII,S$GLB,, | Performed by: UROLOGY

## 2023-06-09 PROCEDURE — 3044F PR MOST RECENT HEMOGLOBIN A1C LEVEL <7.0%: ICD-10-PCS | Mod: CPTII,S$GLB,, | Performed by: UROLOGY

## 2023-06-09 PROCEDURE — 1101F PT FALLS ASSESS-DOCD LE1/YR: CPT | Mod: CPTII,S$GLB,, | Performed by: UROLOGY

## 2023-06-09 PROCEDURE — 3078F PR MOST RECENT DIASTOLIC BLOOD PRESSURE < 80 MM HG: ICD-10-PCS | Mod: CPTII,S$GLB,, | Performed by: UROLOGY

## 2023-06-14 ENCOUNTER — HOSPITAL ENCOUNTER (OUTPATIENT)
Dept: RADIOLOGY | Facility: OTHER | Age: 73
Discharge: HOME | End: 2023-06-14
Attending: NURSE PRACTITIONER
Payer: MEDICARE

## 2023-06-14 DIAGNOSIS — C64.1 RENAL CELL CARCINOMA OF RIGHT KIDNEY: ICD-10-CM

## 2023-06-14 PROCEDURE — 76700 US EXAM ABDOM COMPLETE: CPT | Mod: TC

## 2023-06-14 PROCEDURE — 76700 US EXAM ABDOM COMPLETE: CPT | Mod: 26,,, | Performed by: RADIOLOGY

## 2023-06-14 PROCEDURE — 76700 US ABDOMEN COMPLETE: ICD-10-PCS | Mod: 26,,, | Performed by: RADIOLOGY

## 2023-06-19 ENCOUNTER — LAB VISIT (OUTPATIENT)
Dept: LAB | Facility: HOSPITAL | Age: 73
End: 2023-06-19
Payer: MEDICARE

## 2023-06-19 ENCOUNTER — OFFICE VISIT (OUTPATIENT)
Dept: HEMATOLOGY/ONCOLOGY | Facility: CLINIC | Age: 73
End: 2023-06-19
Payer: MEDICARE

## 2023-06-19 VITALS
RESPIRATION RATE: 16 BRPM | HEART RATE: 73 BPM | SYSTOLIC BLOOD PRESSURE: 178 MMHG | HEIGHT: 66 IN | DIASTOLIC BLOOD PRESSURE: 73 MMHG | OXYGEN SATURATION: 99 % | WEIGHT: 162.94 LBS | BODY MASS INDEX: 26.19 KG/M2

## 2023-06-19 DIAGNOSIS — R20.2 PARESTHESIA OF SKIN: ICD-10-CM

## 2023-06-19 DIAGNOSIS — G62.9 PERIPHERAL POLYNEUROPATHY: ICD-10-CM

## 2023-06-19 DIAGNOSIS — N18.30 STAGE 3 CHRONIC KIDNEY DISEASE, UNSPECIFIED WHETHER STAGE 3A OR 3B CKD: ICD-10-CM

## 2023-06-19 DIAGNOSIS — D64.9 NORMOCYTIC ANEMIA: ICD-10-CM

## 2023-06-19 DIAGNOSIS — I10 PRIMARY HYPERTENSION: ICD-10-CM

## 2023-06-19 DIAGNOSIS — R20.1 HYPOESTHESIA OF SKIN: ICD-10-CM

## 2023-06-19 DIAGNOSIS — E78.2 MIXED HYPERLIPIDEMIA: Primary | ICD-10-CM

## 2023-06-19 DIAGNOSIS — D50.9 IRON DEFICIENCY ANEMIA, UNSPECIFIED IRON DEFICIENCY ANEMIA TYPE: ICD-10-CM

## 2023-06-19 DIAGNOSIS — R53.82 CHRONIC FATIGUE: ICD-10-CM

## 2023-06-19 DIAGNOSIS — D64.9 ANEMIA: ICD-10-CM

## 2023-06-19 DIAGNOSIS — C64.1 RENAL CELL CARCINOMA OF RIGHT KIDNEY: ICD-10-CM

## 2023-06-19 LAB
BASOPHILS # BLD AUTO: 0.06 K/UL (ref 0–0.2)
BASOPHILS NFR BLD: 0.7 % (ref 0–1.9)
DIFFERENTIAL METHOD: ABNORMAL
EOSINOPHIL # BLD AUTO: 0.2 K/UL (ref 0–0.5)
EOSINOPHIL NFR BLD: 2.3 % (ref 0–8)
ERYTHROCYTE [DISTWIDTH] IN BLOOD BY AUTOMATED COUNT: 16.8 % (ref 11.5–14.5)
FERRITIN SERPL-MCNC: 39 NG/ML (ref 20–300)
FOLATE SERPL-MCNC: 10.2 NG/ML (ref 4–24)
HCT VFR BLD AUTO: 31.1 % (ref 40–54)
HGB BLD-MCNC: 9.5 G/DL (ref 14–18)
IMM GRANULOCYTES # BLD AUTO: 0.03 K/UL (ref 0–0.04)
IMM GRANULOCYTES NFR BLD AUTO: 0.4 % (ref 0–0.5)
IRON SERPL-MCNC: 64 UG/DL (ref 45–160)
LYMPHOCYTES # BLD AUTO: 2.4 K/UL (ref 1–4.8)
LYMPHOCYTES NFR BLD: 28 % (ref 18–48)
MCH RBC QN AUTO: 27.6 PG (ref 27–31)
MCHC RBC AUTO-ENTMCNC: 30.5 G/DL (ref 32–36)
MCV RBC AUTO: 90 FL (ref 82–98)
MONOCYTES # BLD AUTO: 1 K/UL (ref 0.3–1)
MONOCYTES NFR BLD: 11.3 % (ref 4–15)
NEUTROPHILS # BLD AUTO: 4.9 K/UL (ref 1.8–7.7)
NEUTROPHILS NFR BLD: 57.3 % (ref 38–73)
NRBC BLD-RTO: 0 /100 WBC
PLATELET # BLD AUTO: 290 K/UL (ref 150–450)
PMV BLD AUTO: 10 FL (ref 9.2–12.9)
RBC # BLD AUTO: 3.44 M/UL (ref 4.6–6.2)
SATURATED IRON: 17 % (ref 20–50)
TOTAL IRON BINDING CAPACITY: 367 UG/DL (ref 250–450)
TRANSFERRIN SERPL-MCNC: 248 MG/DL (ref 200–375)
VIT B12 SERPL-MCNC: 383 PG/ML (ref 210–950)
WBC # BLD AUTO: 8.44 K/UL (ref 3.9–12.7)

## 2023-06-19 PROCEDURE — 82746 ASSAY OF FOLIC ACID SERUM: CPT | Performed by: INTERNAL MEDICINE

## 2023-06-19 PROCEDURE — 3008F PR BODY MASS INDEX (BMI) DOCUMENTED: ICD-10-PCS | Mod: CPTII,S$GLB,, | Performed by: INTERNAL MEDICINE

## 2023-06-19 PROCEDURE — 1126F PR PAIN SEVERITY QUANTIFIED, NO PAIN PRESENT: ICD-10-PCS | Mod: CPTII,S$GLB,, | Performed by: INTERNAL MEDICINE

## 2023-06-19 PROCEDURE — 86334 IMMUNOFIX E-PHORESIS SERUM: CPT | Performed by: INTERNAL MEDICINE

## 2023-06-19 PROCEDURE — 3078F PR MOST RECENT DIASTOLIC BLOOD PRESSURE < 80 MM HG: ICD-10-PCS | Mod: CPTII,S$GLB,, | Performed by: INTERNAL MEDICINE

## 2023-06-19 PROCEDURE — 1159F MED LIST DOCD IN RCRD: CPT | Mod: CPTII,S$GLB,, | Performed by: INTERNAL MEDICINE

## 2023-06-19 PROCEDURE — 3077F PR MOST RECENT SYSTOLIC BLOOD PRESSURE >= 140 MM HG: ICD-10-PCS | Mod: CPTII,S$GLB,, | Performed by: INTERNAL MEDICINE

## 2023-06-19 PROCEDURE — 99999 PR PBB SHADOW E&M-EST. PATIENT-LVL III: CPT | Mod: PBBFAC,,, | Performed by: INTERNAL MEDICINE

## 2023-06-19 PROCEDURE — 3078F DIAST BP <80 MM HG: CPT | Mod: CPTII,S$GLB,, | Performed by: INTERNAL MEDICINE

## 2023-06-19 PROCEDURE — 84165 PATHOLOGIST INTERPRETATION SPE: ICD-10-PCS | Mod: 26,,, | Performed by: PATHOLOGY

## 2023-06-19 PROCEDURE — 1101F PT FALLS ASSESS-DOCD LE1/YR: CPT | Mod: CPTII,S$GLB,, | Performed by: INTERNAL MEDICINE

## 2023-06-19 PROCEDURE — 4010F PR ACE/ARB THEARPY RXD/TAKEN: ICD-10-PCS | Mod: CPTII,S$GLB,, | Performed by: INTERNAL MEDICINE

## 2023-06-19 PROCEDURE — 99999 PR PBB SHADOW E&M-EST. PATIENT-LVL III: ICD-10-PCS | Mod: PBBFAC,,, | Performed by: INTERNAL MEDICINE

## 2023-06-19 PROCEDURE — 83521 IG LIGHT CHAINS FREE EACH: CPT | Performed by: INTERNAL MEDICINE

## 2023-06-19 PROCEDURE — 86334 IMMUNOFIX E-PHORESIS SERUM: CPT | Mod: 26,,, | Performed by: PATHOLOGY

## 2023-06-19 PROCEDURE — 3288F FALL RISK ASSESSMENT DOCD: CPT | Mod: CPTII,S$GLB,, | Performed by: INTERNAL MEDICINE

## 2023-06-19 PROCEDURE — 4010F ACE/ARB THERAPY RXD/TAKEN: CPT | Mod: CPTII,S$GLB,, | Performed by: INTERNAL MEDICINE

## 2023-06-19 PROCEDURE — 84165 PROTEIN E-PHORESIS SERUM: CPT | Mod: 26,,, | Performed by: PATHOLOGY

## 2023-06-19 PROCEDURE — 85025 COMPLETE CBC W/AUTO DIFF WBC: CPT | Performed by: INTERNAL MEDICINE

## 2023-06-19 PROCEDURE — 1101F PR PT FALLS ASSESS DOC 0-1 FALLS W/OUT INJ PAST YR: ICD-10-PCS | Mod: CPTII,S$GLB,, | Performed by: INTERNAL MEDICINE

## 2023-06-19 PROCEDURE — 1126F AMNT PAIN NOTED NONE PRSNT: CPT | Mod: CPTII,S$GLB,, | Performed by: INTERNAL MEDICINE

## 2023-06-19 PROCEDURE — 3044F PR MOST RECENT HEMOGLOBIN A1C LEVEL <7.0%: ICD-10-PCS | Mod: CPTII,S$GLB,, | Performed by: INTERNAL MEDICINE

## 2023-06-19 PROCEDURE — 3044F HG A1C LEVEL LT 7.0%: CPT | Mod: CPTII,S$GLB,, | Performed by: INTERNAL MEDICINE

## 2023-06-19 PROCEDURE — 3077F SYST BP >= 140 MM HG: CPT | Mod: CPTII,S$GLB,, | Performed by: INTERNAL MEDICINE

## 2023-06-19 PROCEDURE — 36415 COLL VENOUS BLD VENIPUNCTURE: CPT | Performed by: INTERNAL MEDICINE

## 2023-06-19 PROCEDURE — 1159F PR MEDICATION LIST DOCUMENTED IN MEDICAL RECORD: ICD-10-PCS | Mod: CPTII,S$GLB,, | Performed by: INTERNAL MEDICINE

## 2023-06-19 PROCEDURE — 82607 VITAMIN B-12: CPT | Performed by: INTERNAL MEDICINE

## 2023-06-19 PROCEDURE — 84466 ASSAY OF TRANSFERRIN: CPT | Performed by: INTERNAL MEDICINE

## 2023-06-19 PROCEDURE — 99213 PR OFFICE/OUTPT VISIT, EST, LEVL III, 20-29 MIN: ICD-10-PCS | Mod: S$GLB,,, | Performed by: INTERNAL MEDICINE

## 2023-06-19 PROCEDURE — 99213 OFFICE O/P EST LOW 20 MIN: CPT | Mod: S$GLB,,, | Performed by: INTERNAL MEDICINE

## 2023-06-19 PROCEDURE — 84165 PROTEIN E-PHORESIS SERUM: CPT | Performed by: INTERNAL MEDICINE

## 2023-06-19 PROCEDURE — 3288F PR FALLS RISK ASSESSMENT DOCUMENTED: ICD-10-PCS | Mod: CPTII,S$GLB,, | Performed by: INTERNAL MEDICINE

## 2023-06-19 PROCEDURE — 82728 ASSAY OF FERRITIN: CPT | Performed by: INTERNAL MEDICINE

## 2023-06-19 PROCEDURE — 3008F BODY MASS INDEX DOCD: CPT | Mod: CPTII,S$GLB,, | Performed by: INTERNAL MEDICINE

## 2023-06-19 PROCEDURE — 86334 PATHOLOGIST INTERPRETATION IFE: ICD-10-PCS | Mod: 26,,, | Performed by: PATHOLOGY

## 2023-06-19 NOTE — PROGRESS NOTES
CC: Anemia, hematology follow up     , 73, M, is here for hematology follow up visit. He was initially evaluated here for anemia. He has history of DVT, HTn, peripheral arterial disease. He has fatigue. Denies upper or lower GI bleeding, melena, hemoptysis, epistaxis, hematuria. He has received 5 units PRBCs since Oct 2022. No family h/o anemia. He has lost weight in the past 12 months. He zapata snot have very good appetite. He is on Xarelto,.  Hgb was 11.2g/dl in March 2022, has decreased to to 7.9g/dl in March 2023. He underwent  combined open right radical nephrectomy and cholecystectomy 2/27/23.  He has had colonoscopy, EGD, VCE since late 2022.         Interval History: He is here for follow up. He received iv ferric carboxymaltose on 4/4 and 4/11/23 ( total of 1500mg) . No bleeding episodes since last visit.    Review of patient's allergies indicates:   Allergen Reactions    Ace inhibitors          Review of Systems   Constitutional:  Positive for malaise/fatigue. Negative for chills, fever and weight loss.   HENT:  Negative for congestion, ear discharge, hearing loss, nosebleeds and tinnitus.    Eyes:  Negative for double vision and discharge.   Respiratory:  Negative for cough, hemoptysis and shortness of breath.    Cardiovascular:  Negative for chest pain, orthopnea and claudication.   Gastrointestinal:  Negative for abdominal pain, blood in stool, constipation, diarrhea, melena and vomiting.   Genitourinary:  Negative for frequency, hematuria and urgency.   Musculoskeletal:  Negative for back pain, myalgias and neck pain.   Neurological:  Negative for tremors and focal weakness.   Endo/Heme/Allergies:  Negative for polydipsia.   Psychiatric/Behavioral:  Negative for depression, hallucinations, substance abuse and suicidal ideas. The patient is not nervous/anxious.       Vitals:    06/19/23 0931   BP: (!) 178/73   Pulse: 73   Resp: 16       Physical Exam  HENT:      Head: Normocephalic and  atraumatic.      Mouth/Throat:      Pharynx: No posterior oropharyngeal erythema.   Eyes:      General: No scleral icterus.  Cardiovascular:      Rate and Rhythm: Normal rate and regular rhythm.   Pulmonary:      Effort: Pulmonary effort is normal. No respiratory distress.      Breath sounds: Normal breath sounds. No rhonchi.   Abdominal:      General: There is no distension.      Palpations: There is no mass.      Tenderness: There is no abdominal tenderness.   Musculoskeletal:         General: No swelling.   Lymphadenopathy:      Cervical: No cervical adenopathy.   Skin:     Coloration: Skin is not jaundiced.   Neurological:      General: No focal deficit present.      Mental Status: He is alert and oriented to person, place, and time.      Cranial Nerves: No cranial nerve deficit.        Current Outpatient Medications   Medication Sig    amlodipine-olmesartan (LAILA) 5-40 mg per tablet Take 1 tablet by mouth once daily.    atorvastatin (LIPITOR) 80 MG tablet Take 1 tablet (80 mg total) by mouth once daily.    DULoxetine (CYMBALTA) 30 MG capsule Take 1 capsule (30 mg total) by mouth once daily.    ezetimibe (ZETIA) 10 mg tablet Take 1 tablet (10 mg total) by mouth once daily.    ferrous sulfate (IRON) 325 mg (65 mg iron) Tab tablet Take 1 tablet (325 mg total) by mouth once daily.    gabapentin (NEURONTIN) 800 MG tablet Take 1 tablet (800 mg total) by mouth 3 (three) times daily.    multivitamin (THERAGRAN) per tablet Take 1 tablet by mouth once daily.    rivaroxaban (XARELTO) 20 mg Tab Take 1 tablet (20 mg total) by mouth daily with dinner or evening meal.     No current facility-administered medications for this visit.     10/20/22 EGD      Normal esophagus.                          - Z-line regular.                          - Gastritis.                          - Normal examined duodenum.                          - No specimens collected      `10/28/22 colonoscopy       - The examined portion of the ileum was  normal.                          - One 2 mm polyp in the transverse colon, removed with a jumbo cold forceps. Resected and retrieved.                          - One 3 mm polyp in the transverse colon, removed with a cold snare. Resected and retrieved.                          - One 7 mm polyp in the sigmoid colon, removed with a cold snare. Resected and retrieved. Clip (MR conditional) was placed.                          - One 12 mm polyp in the sigmoid colon, removed with a hot snare. Resected and retrieved. Clip (MR                          conditional) was placed.                        - Diverticulosis in the sigmoid colon.         - The examination was otherwise normal on direct and retroflexion views.       Diagnosis 1. Colon, transverse, polypectomies:   - Tubular adenoma, multiple fragments   - Negative for high-grade dysplasia or malignancy   2. Colon, sigmoid #1, polypectomy:   - Tubular adenoma   - Negative for high-grade dysplasia or malignancy   3. Colon, sigmoid #2, polypectomy:   - Tubulovillous adenoma   - Resection margin uninvolved by dysplasia   - Negative for high-grade dysplasia or malignancy         1/20/23 video capsule endoscopy    Two single mucosal red spots with no bleeding in the small bowel.     2/27/23     1. 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   - Atherosclerotic vessels.   - See CAP synoptic report and comment below.     2. GALLBLADDER AND LIVER SEGMENT 4 AND 5, RADICAL CHOLECYSTECTOMY AND PARTIAL   HEPATECTOMY:   - Gallbladder with adenomyomatosis, chronic cholecystitis, and cholelithiasis   - Liver with incidental bile duct hamartoma   - Negative for dysplasia and malignancy   - See comment     3. PORTAL LYMPH NODES, DISSECTION:   - Three lymph nodes, negative for carcinoma (0/3).     SURGICAL PATHOLOGY CANCER CASE SUMMARY-KIDNEY   Procedure: Radical nephrectomy.   Specimen laterality: Right.   Tumor site:  Superior pole.   Tumor size: 7.2 cm.   Tumor focality: Unifocal.   Histologic type: Papillary renal cell carcinoma, Type 2.   Sarcomatoid features: Not identified.   Rhabdoid features: Not identified.   Histologic Grade (ISUP Grade): 3.   Tumor necrosis: Present, 15%.   Tumor extension: Limited to kidney.   Margins: Uninvolved.   Lymphovascular invasion (excluding renal vein and its segmental branches):   Not identified.   Regional lymph nodes: No regional lymph nodes submitted or found.   Distant metastases: Not applicable    Component      Latest Ref Rng & Units 6/19/2023   WBC      3.90 - 12.70 K/uL 8.44   RBC      4.60 - 6.20 M/uL 3.44 (L)   Hemoglobin      14.0 - 18.0 g/dL 9.5 (L)   Hematocrit      40.0 - 54.0 % 31.1 (L)   MCV      82 - 98 fL 90   MCH      27.0 - 31.0 pg 27.6   MCHC      32.0 - 36.0 g/dL 30.5 (L)   RDW      11.5 - 14.5 % 16.8 (H)   Platelets      150 - 450 K/uL 290   MPV      9.2 - 12.9 fL 10.0   Immature Granulocytes      0.0 - 0.5 % 0.4   Gran # (ANC)      1.8 - 7.7 K/uL 4.9   Immature Grans (Abs)      0.00 - 0.04 K/uL 0.03   Lymph #      1.0 - 4.8 K/uL 2.4   Mono #      0.3 - 1.0 K/uL 1.0   Eos #      0.0 - 0.5 K/uL 0.2   Baso #      0.00 - 0.20 K/uL 0.06   nRBC      0 /100 WBC 0   Gran %      38.0 - 73.0 % 57.3   Lymph %      18.0 - 48.0 % 28.0   Mono %      4.0 - 15.0 % 11.3   Eosinophil %      0.0 - 8.0 % 2.3   Basophil %      0.0 - 1.9 % 0.7   Differential Method       Automated   Iron      45 - 160 ug/dL 64   Transferrin      200 - 375 mg/dL 248   TIBC      250 - 450 ug/dL 367   Saturated Iron      20 - 50 % 17 (L)   Ferritin      20.0 - 300.0 ng/mL 39   Folate      4.0 - 24.0 ng/mL 10.2   Vitamin B-12      210 - 950 pg/mL 383       Assessment:      1. Iron deficiency anemia due to chronic blood loss  2. Fatigue, chronic  3. Weight loss  4. Papillary carcinoma of kidney  5. History of DVT  6. HTn, primary  7. Peripheral artery disease      Plan:    1,2 . Extensive work up since Oct 2022  has revealed polyps in colon that were resected; diverticuloses in colon; un remarkable EGD; two single mucosal red spots with no bleeding in the small bowel on VCE.  He is on xarelto and aspirin. He also had papillary carcinoma in his kidney that has since been resected.  He had evidence of iron deficiency as of 2/15/23. He received iv ferric carboxymaltose on 4/4 and 4/11/23 ( total of 1500mg)   TIBC now normal. Ferritin is at 39ng/ml, lower end of normal range. Serum iron saturation at 17%, slightly below normal . B12, folate both normal today.     3. S/p nephrectomy    5. He had evidence of thrombosis with the right anterior tibial and peroneal veins in Sept 2022.  Repeat US doppler did not demonstrate this thrombus. No indication for prolonged anti-coagulation for first DVT.     6. /73 mm Hg today. He is asymptomatic. He is on amlodipine-olmesartan, and is compliant. He follows with     7. He has been stented. He is on aspirin, xarelto.      BMT Chart Routing      Follow up with physician 3 months.   Follow up with MACKENZIE    Provider visit type    Infusion scheduling note    Injection scheduling note    Labs CBC, CMP, reticulocytes, ferritin, iron and TIBC and other   Scheduling:  Preferred lab:  Lab interval:  [facility], [facility]   Imaging    Pharmacy appointment    Other referrals

## 2023-06-20 ENCOUNTER — OFFICE VISIT (OUTPATIENT)
Dept: CARDIOLOGY | Facility: CLINIC | Age: 73
End: 2023-06-20
Payer: MEDICARE

## 2023-06-20 VITALS
BODY MASS INDEX: 26.14 KG/M2 | SYSTOLIC BLOOD PRESSURE: 144 MMHG | HEIGHT: 66 IN | HEART RATE: 79 BPM | DIASTOLIC BLOOD PRESSURE: 65 MMHG | WEIGHT: 162.69 LBS

## 2023-06-20 DIAGNOSIS — S81.801D LEG WOUND, RIGHT, SUBSEQUENT ENCOUNTER: ICD-10-CM

## 2023-06-20 DIAGNOSIS — I82.401 ACUTE DEEP VEIN THROMBOSIS (DVT) OF RIGHT LOWER EXTREMITY, UNSPECIFIED VEIN: ICD-10-CM

## 2023-06-20 DIAGNOSIS — I70.238: ICD-10-CM

## 2023-06-20 DIAGNOSIS — R20.2 NUMBNESS AND TINGLING OF RIGHT LEG: ICD-10-CM

## 2023-06-20 DIAGNOSIS — F17.201 TOBACCO USE DISORDER, SEVERE, IN EARLY REMISSION: ICD-10-CM

## 2023-06-20 DIAGNOSIS — C64.1 RENAL CELL CARCINOMA OF RIGHT KIDNEY: ICD-10-CM

## 2023-06-20 DIAGNOSIS — I73.9 CLAUDICATION OF RIGHT LOWER EXTREMITY: ICD-10-CM

## 2023-06-20 DIAGNOSIS — R60.0 BILATERAL LOWER EXTREMITY EDEMA: ICD-10-CM

## 2023-06-20 DIAGNOSIS — I70.0 AORTIC ATHEROSCLEROSIS: ICD-10-CM

## 2023-06-20 DIAGNOSIS — N18.30 STAGE 3 CHRONIC KIDNEY DISEASE, UNSPECIFIED WHETHER STAGE 3A OR 3B CKD: ICD-10-CM

## 2023-06-20 DIAGNOSIS — I73.9 PAD (PERIPHERAL ARTERY DISEASE): Primary | ICD-10-CM

## 2023-06-20 DIAGNOSIS — I10 PRIMARY HYPERTENSION: ICD-10-CM

## 2023-06-20 DIAGNOSIS — R20.0 NUMBNESS AND TINGLING OF RIGHT LEG: ICD-10-CM

## 2023-06-20 DIAGNOSIS — E78.2 MIXED HYPERLIPIDEMIA: ICD-10-CM

## 2023-06-20 LAB
ALBUMIN SERPL ELPH-MCNC: 3.37 G/DL (ref 3.35–5.55)
ALPHA1 GLOB SERPL ELPH-MCNC: 0.34 G/DL (ref 0.17–0.41)
ALPHA2 GLOB SERPL ELPH-MCNC: 0.67 G/DL (ref 0.43–0.99)
B-GLOBULIN SERPL ELPH-MCNC: 0.65 G/DL (ref 0.5–1.1)
GAMMA GLOB SERPL ELPH-MCNC: 0.97 G/DL (ref 0.67–1.58)
INTERPRETATION SERPL IFE-IMP: NORMAL
KAPPA LC SER QL IA: 5.14 MG/DL (ref 0.33–1.94)
KAPPA LC/LAMBDA SER IA: 1.54 (ref 0.26–1.65)
LAMBDA LC SER QL IA: 3.33 MG/DL (ref 0.57–2.63)
PATHOLOGIST INTERPRETATION IFE: NORMAL
PATHOLOGIST INTERPRETATION SPE: NORMAL
PROT SERPL-MCNC: 6 G/DL (ref 6–8.4)

## 2023-06-20 PROCEDURE — 1159F MED LIST DOCD IN RCRD: CPT | Mod: CPTII,S$GLB,, | Performed by: INTERNAL MEDICINE

## 2023-06-20 PROCEDURE — 4010F PR ACE/ARB THEARPY RXD/TAKEN: ICD-10-PCS | Mod: CPTII,S$GLB,, | Performed by: INTERNAL MEDICINE

## 2023-06-20 PROCEDURE — 1126F PR PAIN SEVERITY QUANTIFIED, NO PAIN PRESENT: ICD-10-PCS | Mod: CPTII,S$GLB,, | Performed by: INTERNAL MEDICINE

## 2023-06-20 PROCEDURE — 3008F PR BODY MASS INDEX (BMI) DOCUMENTED: ICD-10-PCS | Mod: CPTII,S$GLB,, | Performed by: INTERNAL MEDICINE

## 2023-06-20 PROCEDURE — 3288F PR FALLS RISK ASSESSMENT DOCUMENTED: ICD-10-PCS | Mod: CPTII,S$GLB,, | Performed by: INTERNAL MEDICINE

## 2023-06-20 PROCEDURE — 3288F FALL RISK ASSESSMENT DOCD: CPT | Mod: CPTII,S$GLB,, | Performed by: INTERNAL MEDICINE

## 2023-06-20 PROCEDURE — 3078F PR MOST RECENT DIASTOLIC BLOOD PRESSURE < 80 MM HG: ICD-10-PCS | Mod: CPTII,S$GLB,, | Performed by: INTERNAL MEDICINE

## 2023-06-20 PROCEDURE — 3044F HG A1C LEVEL LT 7.0%: CPT | Mod: CPTII,S$GLB,, | Performed by: INTERNAL MEDICINE

## 2023-06-20 PROCEDURE — 3077F PR MOST RECENT SYSTOLIC BLOOD PRESSURE >= 140 MM HG: ICD-10-PCS | Mod: CPTII,S$GLB,, | Performed by: INTERNAL MEDICINE

## 2023-06-20 PROCEDURE — 99999 PR PBB SHADOW E&M-EST. PATIENT-LVL III: ICD-10-PCS | Mod: PBBFAC,,, | Performed by: INTERNAL MEDICINE

## 2023-06-20 PROCEDURE — 99999 PR PBB SHADOW E&M-EST. PATIENT-LVL III: CPT | Mod: PBBFAC,,, | Performed by: INTERNAL MEDICINE

## 2023-06-20 PROCEDURE — 99215 PR OFFICE/OUTPT VISIT, EST, LEVL V, 40-54 MIN: ICD-10-PCS | Mod: S$GLB,,, | Performed by: INTERNAL MEDICINE

## 2023-06-20 PROCEDURE — 99215 OFFICE O/P EST HI 40 MIN: CPT | Mod: S$GLB,,, | Performed by: INTERNAL MEDICINE

## 2023-06-20 PROCEDURE — 3044F PR MOST RECENT HEMOGLOBIN A1C LEVEL <7.0%: ICD-10-PCS | Mod: CPTII,S$GLB,, | Performed by: INTERNAL MEDICINE

## 2023-06-20 PROCEDURE — 4010F ACE/ARB THERAPY RXD/TAKEN: CPT | Mod: CPTII,S$GLB,, | Performed by: INTERNAL MEDICINE

## 2023-06-20 PROCEDURE — 1101F PR PT FALLS ASSESS DOC 0-1 FALLS W/OUT INJ PAST YR: ICD-10-PCS | Mod: CPTII,S$GLB,, | Performed by: INTERNAL MEDICINE

## 2023-06-20 PROCEDURE — 3008F BODY MASS INDEX DOCD: CPT | Mod: CPTII,S$GLB,, | Performed by: INTERNAL MEDICINE

## 2023-06-20 PROCEDURE — 3077F SYST BP >= 140 MM HG: CPT | Mod: CPTII,S$GLB,, | Performed by: INTERNAL MEDICINE

## 2023-06-20 PROCEDURE — 3078F DIAST BP <80 MM HG: CPT | Mod: CPTII,S$GLB,, | Performed by: INTERNAL MEDICINE

## 2023-06-20 PROCEDURE — 1126F AMNT PAIN NOTED NONE PRSNT: CPT | Mod: CPTII,S$GLB,, | Performed by: INTERNAL MEDICINE

## 2023-06-20 PROCEDURE — 1159F PR MEDICATION LIST DOCUMENTED IN MEDICAL RECORD: ICD-10-PCS | Mod: CPTII,S$GLB,, | Performed by: INTERNAL MEDICINE

## 2023-06-20 PROCEDURE — 1101F PT FALLS ASSESS-DOCD LE1/YR: CPT | Mod: CPTII,S$GLB,, | Performed by: INTERNAL MEDICINE

## 2023-06-20 NOTE — PATIENT INSTRUCTIONS
Assessment/Plan:  Babatunde Singh is a 73 y.o. male with PAD with RLE CLI s/p left axillary to bilateral femoris profunda bypass with 8 mm PTFE (10/6/2022), HTN, HLD, history of RLE DVT, former smoker, who presents for a follow up appointment.     1. RLE CLI s/p left axillary to bilateral femoris profunda bypass with 8 mm PTFE (10/6/2022)- Pt is doing well with no significant LE claudication.  RLE wounds have completely healed.  Continue ASA 81 mg daily, Xarelto 20 mg daily, atorvastatin 80 mg daily, zetia 10 mg daily.      2. HLD- Continue atorvastatin 80 mg daily and zetia 10 mg daily.    3. HTN- Continue current medications.      4. Smoking- Pt states he has stopped smoking.  Encourage smoking cessation.    Follow up in 3 months with lipids prior

## 2023-06-20 NOTE — PROGRESS NOTES
"[facility]      Chief Complaint   Patient presents with    Peripheral Arterial Disease       Patient ID: Babatunde Singh is a 73 y.o. male with PAD with RLE CLI s/p left axillary to bilateral femoris profunda bypass with 8 mm PTFE (10/6/2022), HTN, HLD, history of RLE DVT, former smoker, who presents for a follow up appointment.  Pertinent history/events are as follows:     -Pt kindly referred by Dr. Avelar for evaluation of right leg pain (per her note on 7/10/2022):  "73 yo w htn here with chronic discomfort to the R LE, paresthesias and throbbing pain. Suspect claudication, without concern for acute ischemia. Will obtain US to ensure no dvt, treat v sciatica and likely dc home w outpt fu."    -At our initial clinic visit on 7/26/2022, Mr. Singh reports first noticing right leg pain approximately 4-5 years ago.  States right leg pain became acutely worse 2 weeks ago after moving furniture.  At that time he notes "shooting pain down my right leg".  He reports pain in both calves after walking less than 1 block, which is relieved with rest.  He hs no rest pain or tissue loss.  Smokes up to 1.5 packs a day for total 58 years.  RLE venous ultrasound on 7/10/2022 revealed no evidence of deep venous thrombosis in the right lower extremity.  Plan:    Right leg pain- Etiology likely multifactorial with contribution from possible lumbar degenerative disc disease with sciatica, and flow limiting PAD.  Check MRI lumbar spine and CTA abd/pelvis with iliofemoral runoff.  Check echo.  If EF is normal and CTA shows evidence of PAD, start cilostazol 100 mg bid.  Pt to start walking program with goal of at least 30 minutes a day/5 days a week.  Refer to Neurosurgery for evaluation of neurogenic claudication and RLE numbness.   HLD- Start zetia 10 mg daily.  Continue atorvastatin 80 mg daily and ASA 81 mg daily.  HTN- Continue current medications.    Smoking- Encourage smoking cessation.    Results addendum " 8/11/2022:  CTA abd/pelvis on 8/3/2022 revealed severe bilateral LE PAD.   The study also revealed a 6cm soft tissue density mass with borderline enhancement at right upper pole kidney.  Echo on 8/5/2022 with normal LV systolic function EF 70%.    Plan:  -Start cilostazol 100 mg bid  -Check CT renal mass protocol and refer to Urology for evaluation  -Results and plan discussed in detail with Mr. Singh who voiced understanding.  -Follow-up is scheduled.    -On 10/6/2022, pt underwent left axillary to bilateral femoris profunda bypass with 8 mm PTFE (Dr. Ortiz of Vascular Surgery) due to RLE critical limb ischemia.        -At follow up clinic visit on 12/16/2022, Mr. Singh reported doing well with no chest pain, SOB, TIA symptoms or syncope.  RLE appears warm and well perfused with wound vac in place.  Pt scheduled to follow up with Dr. Ortiz and wound care on 12/20/2022  Plan:   RLE CLI s/p left axillary to bilateral femoris profunda bypass with 8 mm PTFE (10/6/2022)- Pt is doing well with no significant LE claudication.  RLE appears warm and well perfused with wound vac in place.  Pt scheduled to follow up with Dr. Ortiz and wound care on 12/20/2022.  Continue ASA 81 mg daily, Xarelto 20 mg daily, atorvastatin 80 mg daily, zetia 10 mg daily.    HLD- Continue atorvastatin 80 mg daily and zetia 10 mg daily.  HTN- Continue current medications.    Smoking- Pt states he has stopped smoking.  Encourage smoking cessation.  Renal Mass- Follow  up with Urology as scheduled.     -On 2/27/2023, pt underwent combined open right radical nephrectomy and cholecystectomy.    HPI:  Mr. Singh reports doing well with no significant claudication, rest pain, or new tissue loss.  RLE wounds have completely healed.      Past Medical History:   Diagnosis Date    DVT (deep venous thrombosis)     Hypertension     Peripheral arterial disease     Right renal mass 8/11/2022     Past Surgical History:   Procedure Laterality  Date    APPLICATION OF WOUND VACUUM-ASSISTED CLOSURE DEVICE Right 12/8/2022    Procedure: APPLICATION, WOUND VAC;  Surgeon: LEI Ortiz III, MD;  [facility];  Service: Peripheral Vascular;  Laterality: Right;    CHOLECYSTECTOMY N/A 2/27/2023    Procedure: CHOLECYSTECTOMY complex;  Surgeon: Tolu Gavin MD;  [facility];  Service: General;  Laterality: N/A;    COLONOSCOPY N/A 10/31/2022    Procedure: COLONOSCOPY;  Surgeon: Philip Shafer MD;  Location: [facility] (2ND FLR);  Service: Endoscopy;  Laterality: N/A;    CREATION OF AXILLARY-FEMORAL ARTERY BYPASS WITH GRAFT N/A 10/6/2022    Procedure: CREATION, BYPASS, ARTERIAL, AXILLARY TO FEMORAL, USING GRAFT;  Surgeon: LEI Ortiz III, MD;  [facility];  Service: Peripheral Vascular;  Laterality: N/A;    CREATION OF FEMORAL-FEMORAL ARTERY BYPASS WITH GRAFT N/A 10/6/2022    Procedure: CREATION, BYPASS, ARTERIAL, FEMORAL TO FEMORAL, USING GRAFT;  Surgeon: LEI Ortiz III, MD;  [address]  Service: Peripheral Vascular;  Laterality: N/A;    ESOPHAGOGASTRODUODENOSCOPY N/A 10/20/2022    Procedure: EGD (ESOPHAGOGASTRODUODENOSCOPY);  Surgeon: Sixto Chicas MD;  Location: [facility] (2ND FLR);  Service: Endoscopy;  Laterality: N/A;    ESOPHAGOGASTRODUODENOSCOPY N/A 10/28/2022    Procedure: EGD (ESOPHAGOGASTRODUODENOSCOPY);  Surgeon: Marlee Hopson MD;  [facility]  Service: Gastroenterology;  Laterality: N/A;    EXCISION OF HYDROCELE Left 4/26/2022    Procedure: HYDROCELECTOMY;  Surgeon: Damion Roberts MD;  Location: [facility];  Service: Urology;  Laterality: Left;    KNEE SURGERY  1972    PARTIAL NEPHRECTOMY Right 2/27/2023    Procedure: NEPHRECTOMY, PARTIAL;  Surgeon: Moises Cuba MD;  [address]  Service: Urology;  Laterality: Right;  Dr Gavin robotic cholecystectomy for gallbladder mass also    WOUND DEBRIDEMENT Right 12/8/2022    Procedure:  DEBRIDEMENT, WOUND;  Surgeon: LEI Ortiz III, MD;  Location: [facility] OR [address] [place] [address];  Service: Peripheral Vascular;  Laterality: Right;  RLE wound debridement     Social History     Socioeconomic History    Marital status:    Occupational History    Occupation: [employer]   Tobacco Use    Smoking status: Former     Packs/day: 0.50     Years: 55.00     Pack years: 27.50     Types: Cigarettes     Quit date: 10/11/2022     Years since quittin.6    Smokeless tobacco: Never    Tobacco comments:     Cigarettes3-4 per day   Substance and Sexual Activity    Alcohol use: Never    Drug use: Never    Sexual activity: Yes     Partners: Female     Comment: wife     Social Determinants of Health     Financial Resource Strain: Unknown    Difficulty of Paying Living Expenses: Patient refused   Food Insecurity: Unknown    Worried About Running Out of Food in the Last Year: Patient refused    Ran Out of Food in the Last Year: Patient refused   Transportation Needs: Unknown    Lack of Transportation (Non-Medical): Patient refused   Physical Activity: Insufficiently Active    Days of Exercise per Week: 7 days    Minutes of Exercise per Session: 10 min   Stress: Unknown    Feeling of Stress : Patient refused   Social Connections: Unknown    Frequency of Communication with Friends and Family: Patient refused    Frequency of Social Gatherings with Friends and Family: Patient refused    Attends Congregational Services: Patient refused    Active Member of Clubs or Organizations: Patient refused    Attends Club or Organization Meetings: Patient refused    Marital Status: Patient refused   Housing Stability: Unknown    Unable to Pay for Housing in the Last Year: Patient refused    Unstable Housing in the Last Year: Patient refused     Family History   Problem Relation Age of Onset    Hypertension Mother     Hypertension Father     No Known Problems Daughter     No Known Problems Son     Colon cancer Neg Hx     Esophageal cancer Neg Hx   "      Review of patient's allergies indicates:   Allergen Reactions    Ace inhibitors        Medication List with Changes/Refills   Current Medications    AMLODIPINE-OLMESARTAN (LAILA) 5-40 MG PER TABLET    Take 1 tablet by mouth once daily.    ATORVASTATIN (LIPITOR) 80 MG TABLET    Take 1 tablet (80 mg total) by mouth once daily.    DULOXETINE (CYMBALTA) 30 MG CAPSULE    Take 1 capsule (30 mg total) by mouth once daily.    EZETIMIBE (ZETIA) 10 MG TABLET    Take 1 tablet (10 mg total) by mouth once daily.    FERROUS SULFATE (IRON) 325 MG (65 MG IRON) TAB TABLET    Take 1 tablet (325 mg total) by mouth once daily.    GABAPENTIN (NEURONTIN) 800 MG TABLET    Take 1 tablet (800 mg total) by mouth 3 (three) times daily.    MULTIVITAMIN (THERAGRAN) PER TABLET    Take 1 tablet by mouth once daily.    RIVAROXABAN (XARELTO) 20 MG TAB    Take 1 tablet (20 mg total) by mouth daily with dinner or evening meal.       Review of Systems  Constitution: Denies chills, fever, and sweats.  HENT: Denies headaches or blurry vision.  Cardiovascular: Denies chest pain or irregular heart beat.  Respiratory: Denies cough or shortness of breath.  Gastrointestinal: Denies abdominal pain, nausea, or vomiting.  Musculoskeletal: Negative for right leg pain.   Neurological: Denies dizziness or focal weakness.  Psychiatric/Behavioral: Normal mental status.  Hematologic/Lymphatic: Denies bleeding problem or easy bruising/bleeding.  Skin: Denies rash or suspicious lesions    Physical Examination  BP (!) 144/65   Pulse 79   Ht 5' 5.7" (1.669 m)   Wt 73.8 kg (162 lb 11.2 oz)   BMI 26.50 kg/m²     Constitutional: No acute distress, conversant  HEENT: Sclera anicteric, Pupils equal, round and reactive to light, extraocular motions intact, Oropharynx clear  Neck: No JVD, no carotid bruits  Cardiovascular: regular rate and rhythm, no murmur, rubs or gallops, normal S1/S2  Pulmonary: Clear to auscultation bilaterally  Abdominal: Abdomen soft, nontender, " nondistended, positive bowel sounds  Extremities: No lower extremity edema, healed RLE wounds   Pulses:  Carotid pulses are 2+ on the right side, and 2+ on the left side.  Radial pulses are 2+ on the right side, and 2+ on the left side.   Femoral pulses are 2+ on the right side, and 2+ on the left side.  Popliteal pulses are 2+ on the right side, and 2+ on the left side.   Dorsalis pedis pulses are 2+ on the right side, and 2+ on the left side.   Posterior tibial pulses are 2+ on the right side, and 2+ on the left side.    Skin: No ecchymosis, erythema, or ulcers  Psych: Alert and oriented x 3, appropriate affect  Neuro: CNII-XII intact, no focal deficits    Labs:  Most Recent Data  CBC:   Lab Results   Component Value Date    WBC 8.44 06/19/2023    HGB 9.5 (L) 06/19/2023    HCT 31.1 (L) 06/19/2023     06/19/2023    MCV 90 06/19/2023    RDW 16.8 (H) 06/19/2023     BMP:   Lab Results   Component Value Date     06/07/2023    K 4.6 06/07/2023     (H) 06/07/2023    CO2 19 (L) 06/07/2023    BUN 23 06/07/2023    CREATININE 1.4 06/07/2023     06/07/2023    CALCIUM 8.9 06/07/2023    MG 1.9 03/02/2023    PHOS 3.4 03/02/2023     LFTS;   Lab Results   Component Value Date    PROT 6.6 06/07/2023    ALBUMIN 3.7 06/07/2023    BILITOT 0.2 06/07/2023    AST 23 06/07/2023    ALKPHOS 88 06/07/2023    ALT 19 06/07/2023     COAGS:   Lab Results   Component Value Date    INR 1.1 02/15/2023     FLP:   Lab Results   Component Value Date    CHOL 138 03/10/2022    HDL 44 03/10/2022    LDLCALC 86.0 03/10/2022    TRIG 40 03/10/2022    CHOLHDL 31.9 03/10/2022     CARDIAC:   Lab Results   Component Value Date    TROPONINI 0.020 02/15/2023     (H) 10/20/2022     Imaging:    RLE Venous Ultrasound 7/10/2022:  No evidence of deep venous thrombosis in the right lower extremity.    Assessment/Plan:  Babatunde Singh is a 73 y.o. male with PAD with RLE CLI s/p left axillary to bilateral femoris profunda bypass with 8 mm  PTFE (10/6/2022), HTN, HLD, history of RLE DVT, former smoker, who presents for a follow up appointment.     1. RLE CLI s/p left axillary to bilateral femoris profunda bypass with 8 mm PTFE (10/6/2022)- Pt is doing well with no significant LE claudication.  RLE wounds have completely healed.  Continue ASA 81 mg daily, Xarelto 20 mg daily, atorvastatin 80 mg daily, zetia 10 mg daily.      2. HLD- Continue atorvastatin 80 mg daily and zetia 10 mg daily.    3. HTN- Continue current medications.      4. Smoking- Pt states he has stopped smoking.  Encourage smoking cessation.    Follow up in 4 months with lipids prior    Total duration of face to face visit time 45 minutes.  Total time spent counseling greater than fifty percent of total visit time.  Counseling included discussion regarding imaging findings, diagnosis, possibilities, treatment options, risks and benefits.  The patient had many questions regarding the options and long-term effects.    Cr Wallace MD, PhD  Interventional Cardiology

## 2023-06-26 DIAGNOSIS — E78.2 MIXED HYPERLIPIDEMIA: Primary | ICD-10-CM

## 2023-06-26 DIAGNOSIS — I82.401 ACUTE DEEP VEIN THROMBOSIS (DVT) OF RIGHT LOWER EXTREMITY, UNSPECIFIED VEIN: ICD-10-CM

## 2023-07-27 ENCOUNTER — TELEPHONE (OUTPATIENT)
Dept: PODIATRY | Facility: CLINIC | Age: 73
End: 2023-07-27
Payer: MEDICARE

## 2023-07-27 NOTE — TELEPHONE ENCOUNTER
Left vm message for patient to contact our office at [phone] in regards to cancelling his appt. on 08/01/2023 with Dr. Mancini(Podiatry) and to reschedule  Spoke with his daughter(Cindi) to let her dad know his appt. has been cancelled and to contact us to reschedule

## 2023-07-31 DIAGNOSIS — E78.5 HYPERLIPIDEMIA, UNSPECIFIED HYPERLIPIDEMIA TYPE: ICD-10-CM

## 2023-07-31 RX ORDER — ATORVASTATIN CALCIUM 80 MG/1
80 TABLET, FILM COATED ORAL DAILY
Qty: 90 TABLET | Refills: 0 | OUTPATIENT
Start: 2023-07-31

## 2023-07-31 NOTE — TELEPHONE ENCOUNTER
No care due was identified.  [facility] Embedded Care Due Messages. Reference number: [id number].   7/31/2023 3:36:46 PM CDT

## 2023-07-31 NOTE — TELEPHONE ENCOUNTER
Care Due:                  Date            Visit Type   Department     Provider  --------------------------------------------------------------------------------                                EP -                              PRIMARY      [facility] INTERNAL  Last Visit: 05-      CARE ([facility])   MEDICINE       REID GRIFFITH                              EP                               PRIMARY      [facility] INTERNAL  Next Visit: 09-      CARE ([facility])   [facility]       REID GRIFFITH                                                            Last  Test          Frequency    Reason                     Performed    Due Date  --------------------------------------------------------------------------------    Lipid Panel.  12 months..  atorvastatin, ezetimibe..  03-   03-    [facility] Embedded Care Due Messages. Reference number: [id number].   7/31/2023 1:52:06 PM CDT

## 2023-07-31 NOTE — TELEPHONE ENCOUNTER
----- Message from Lyndsey Sarah sent at 7/31/2023  1:55 PM CDT -----  Contact: Ms. Kaye @ [facility] Phone# [phone]  Requesting an RX refill or new RX.  Is this a refill or new RX: Refill  RX name and strength atorvastatin (LIPITOR) 80 MG tablet  Is this a 30 day or 90 day RX: 90  Pharmacy name and phone #   [id number] [address] [address]  Phone: [phone] Fax: [phone]  The doctors have asked that we provide their patients with the following 2 reminders -- prescription refills can take up to 72 hours, and a friendly reminder that in the future you can use your [facility] account to request refills:

## 2023-08-01 RX ORDER — ATORVASTATIN CALCIUM 80 MG/1
80 TABLET, FILM COATED ORAL
Qty: 90 TABLET | Refills: 0 | Status: SHIPPED | OUTPATIENT
Start: 2023-08-01 | End: 2023-11-13

## 2023-08-01 NOTE — TELEPHONE ENCOUNTER
Refill Decision Note   Babatunde Singh  is requesting a refill authorization.  Brief Assessment and Rationale for Refill:  Quick Discontinue     Medication Therapy Plan:  duplicate    Medication Reconciliation Completed: No   Comments:     No Care Gaps recommended.     Note composed:7:47 PM 07/31/2023

## 2023-08-01 NOTE — TELEPHONE ENCOUNTER
Refill Routing Note     Refill Routing Note   Medication(s) are not appropriate for processing by [facility] for the following reason(s):      Required labs outdated    [facility] action(s):  Defer Care Due:  Labs due     Medication Therapy Plan: lipid panel NTBL to [facility] 9/18/23      Appointments  past 12m or future 3m with PCP    Date Provider   Last Visit   5/22/2023 Esdras Ly MD   Next Visit   7/31/2023 Esdras Ly MD   ED visits in past 90 days: 0        Note composed:7:48 PM 07/31/2023

## 2023-08-24 ENCOUNTER — TELEPHONE (OUTPATIENT)
Dept: VASCULAR SURGERY | Facility: CLINIC | Age: 73
End: 2023-08-24
Payer: MEDICARE

## 2023-08-24 NOTE — TELEPHONE ENCOUNTER
Attempted to contact pt to schedule FU with Dr. Ortiz. Voice message left for pt requesting return call.

## 2023-08-25 ENCOUNTER — TELEPHONE (OUTPATIENT)
Dept: VASCULAR SURGERY | Facility: CLINIC | Age: 73
End: 2023-08-25
Payer: MEDICARE

## 2023-08-25 NOTE — TELEPHONE ENCOUNTER
Contacted pt in response to message. Appt scheduled, pt confirmed. Appt letter placed in mail. ----- Message from Angeles Locke sent at 8/25/2023  8:45 AM CDT -----  Regarding: call back  Contact: pt [phone]  Missed Callback     Pt returning callback from missed call. Requesting to speak with somebody in #  office. Please call.      Caller:Babatunde   Reason for call:call back states to schedule appt  Reason appt not scheduled

## 2023-09-06 ENCOUNTER — TELEPHONE (OUTPATIENT)
Dept: PODIATRY | Facility: CLINIC | Age: 73
End: 2023-09-06
Payer: MEDICARE

## 2023-09-06 NOTE — TELEPHONE ENCOUNTER
Spoke with pt in regards to missed appt 9/6/2023 and r/s for 9/13/2023 at 7:45am. Pt verbalized understanding.

## 2023-09-11 ENCOUNTER — TELEPHONE (OUTPATIENT)
Dept: VASCULAR SURGERY | Facility: CLINIC | Age: 73
End: 2023-09-11
Payer: MEDICARE

## 2023-09-11 NOTE — TELEPHONE ENCOUNTER
Contacted pt in response to message stating pt needs to change anticoagulant medication. States he recently changed insurance plans and that cost of Xarelto went up by $100 and that he cannot afford this. States he would like to know if Dr. Ortiz can change his medication. Message sent to Dr. Ortiz and will contact pt with response.

## 2023-09-12 ENCOUNTER — TELEPHONE (OUTPATIENT)
Dept: VASCULAR SURGERY | Facility: CLINIC | Age: 73
End: 2023-09-12
Payer: MEDICARE

## 2023-09-12 NOTE — TELEPHONE ENCOUNTER
"Contacted pt with Dr. Ortiz's response. Explained available options to pt and contacted pt's pharmacy to determine cost difference for 30 day supply vs 90 days supply. Pharmacy staff explained that pt is now in the "donut hole" with medicare part D due to meeting the cap on coverage for brand name medications, and because of this no other brand name medications will be covered until pt is out of the donut hole. Therefore there is no discounted rate for a 30 day supply vs a 90 day supply as Xarelto is not available in a generic form. Contacted pt with this information and explained this will be case with Eliquis as well. Also discussed possible cost of frequent INR draws and dietary restrictions with warfarin and explained that clopidogrel and ASA might be the best option for him. Pt verbalized understanding and states that for now he would prefer to say on Xarelto as Dr. Ortiz states this is the better option for him in terms of anticoagulation and that he will make adjustments to his budget for this medication. Nurse notified pt that if he finds is not able to afford this medication after all he should call back and Rx can be changed to clopidogrel and ASA. Pt verbalized understanding.  ----- Message from LEI Ortiz III, MD sent at 9/11/2023  2:12 PM CDT -----  Coumadin is very inexpensive, but he would need to check on co-pays for the INR blood draws.   Eliquis would be fine but probably just as expensive.    If neither of those are an option, could go with plavix and ASA... Not as good but better than nothing.     ----- Message -----  From: Anya Taylor RN  Sent: 9/11/2023   1:35 PM CDT  To: LEI Ortiz III, MD    Mr. Singh called today to request you prescribe an alternative to Xarelto that might be less expensive for him. He recently changed insurance plans and his Xarelto went up by $100. He just can't afford this. What do you think?      "

## 2023-09-13 ENCOUNTER — OFFICE VISIT (OUTPATIENT)
Dept: PODIATRY | Facility: CLINIC | Age: 73
End: 2023-09-13
Payer: MEDICARE

## 2023-09-13 VITALS
DIASTOLIC BLOOD PRESSURE: 74 MMHG | BODY MASS INDEX: 28.91 KG/M2 | WEIGHT: 173.5 LBS | HEART RATE: 99 BPM | HEIGHT: 65 IN | SYSTOLIC BLOOD PRESSURE: 159 MMHG

## 2023-09-13 DIAGNOSIS — B35.1 ONYCHOMYCOSIS DUE TO DERMATOPHYTE: ICD-10-CM

## 2023-09-13 DIAGNOSIS — I73.9 PAD (PERIPHERAL ARTERY DISEASE): Primary | ICD-10-CM

## 2023-09-13 DIAGNOSIS — N18.30 STAGE 3 CHRONIC KIDNEY DISEASE, UNSPECIFIED WHETHER STAGE 3A OR 3B CKD: ICD-10-CM

## 2023-09-13 PROCEDURE — 99999 PR PBB SHADOW E&M-EST. PATIENT-LVL III: CPT | Mod: PBBFAC,,, | Performed by: PODIATRIST

## 2023-09-13 PROCEDURE — 99999 PR PBB SHADOW E&M-EST. PATIENT-LVL III: ICD-10-PCS | Mod: PBBFAC,,, | Performed by: PODIATRIST

## 2023-09-13 PROCEDURE — 99499 NO LOS: ICD-10-PCS | Mod: S$GLB,,, | Performed by: PODIATRIST

## 2023-09-13 PROCEDURE — 99499 UNLISTED E&M SERVICE: CPT | Mod: S$GLB,,, | Performed by: PODIATRIST

## 2023-09-13 RX ORDER — ACETAMINOPHEN AND CODEINE PHOSPHATE 300; 30 MG/1; MG/1
1 TABLET ORAL EVERY 6 HOURS PRN
COMMUNITY
Start: 2023-09-06

## 2023-09-13 NOTE — PROGRESS NOTES
Subjective:      Patient ID: Babatunde Singh is a 73 y.o. male.    Chief Complaint: Nail Care    Babatunde is a 73 y.o. male who presents to the clinic for evaluation and treatment of high risk feet. Babatunde has a past medical history of DVT (deep venous thrombosis), Hypertension, Peripheral arterial disease, and Right renal mass (8/11/2022). The patient's chief complaint is long, thick toenails. This patient has documented high risk feet requiring routine maintenance secondary to peripheral vascular disease.    PCP: Esdras Ly MD    Date Last Seen by PCP: 2/24/2023  Chief Complaint   Patient presents with    Nail Care         Current shoe gear:  Casual shoes    Last encounter in this department: 09/06/22  Previously seen by my colleague, new to me.      Hemoglobin A1C   Date Value Ref Range Status   06/07/2023 4.6 4.0 - 5.6 % Final     Comment:     ADA Screening Guidelines:  5.7-6.4%  Consistent with prediabetes  >or=6.5%  Consistent with diabetes    High levels of fetal hemoglobin interfere with the HbA1C  assay. Heterozygous hemoglobin variants (HbS, HgC, etc)do  not significantly interfere with this assay.   However, presence of multiple variants may affect accuracy.     03/10/2022 5.8 (H) 4.0 - 5.6 % Final     Comment:     ADA Screening Guidelines:  5.7-6.4%  Consistent with prediabetes  >or=6.5%  Consistent with diabetes    High levels of fetal hemoglobin interfere with the HbA1C  assay. Heterozygous hemoglobin variants (HbS, HgC, etc)do  not significantly interfere with this assay.   However, presence of multiple variants may affect accuracy.             Patient Active Problem List   Diagnosis    Primary hypertension    Mixed hyperlipidemia    Claudication of right lower extremity    Sciatica of right side    Allodynia (Right leg)    PAD (peripheral artery disease)    Critical limb ischemia of right lower extremity with ulceration of lower leg    DVT (deep venous thrombosis)    Tachycardia    Leg wound,  right, subsequent encounter    Tobacco use disorder, severe, in early remission    Impaired fasting glucose    Diverticulosis of colon    Chronic bronchitis    Normocytic anemia    Iron deficiency anemia    Chronic fatigue    Aortic atherosclerosis    Stage 3 chronic kidney disease    Senile purpura    Peripheral polyneuropathy    Renal cell carcinoma of right kidney       Current Outpatient Medications on File Prior to Visit   Medication Sig Dispense Refill    acetaminophen-codeine 300-30mg (TYLENOL #3) 300-30 mg Tab Take 1 tablet by mouth every 6 (six) hours as needed.      amlodipine-olmesartan (LAILA) 5-40 mg per tablet Take 1 tablet by mouth once daily. 90 tablet 3    atorvastatin (LIPITOR) 80 MG tablet Take 1 tablet by mouth once a day 90 tablet 0    DULoxetine (CYMBALTA) 30 MG capsule Take 1 capsule (30 mg total) by mouth once daily. 30 capsule 11    ezetimibe (ZETIA) 10 mg tablet Take 1 tablet (10 mg total) by mouth once daily. 90 tablet 0    ferrous sulfate (IRON) 325 mg (65 mg iron) Tab tablet Take 1 tablet (325 mg total) by mouth once daily. 90 tablet 3    gabapentin (NEURONTIN) 800 MG tablet Take 1 tablet (800 mg total) by mouth 3 (three) times daily. 90 tablet 11    multivitamin (THERAGRAN) per tablet Take 1 tablet by mouth once daily.      rivaroxaban (XARELTO) 20 mg Tab Take 1 tablet (20 mg total) by mouth daily with dinner or evening meal. 30 tablet 11     No current facility-administered medications on file prior to visit.       Review of patient's allergies indicates:   Allergen Reactions    Ace inhibitors        Past Surgical History:   Procedure Laterality Date    APPLICATION OF WOUND VACUUM-ASSISTED CLOSURE DEVICE Right 12/8/2022    Procedure: APPLICATION, WOUND VAC;  Surgeon: LEI Ortiz III, MD;  Location: [facility] OR [address] [place] [address];  Service: Peripheral Vascular;  Laterality: Right;    CHOLECYSTECTOMY N/A 2/27/2023    Procedure: CHOLECYSTECTOMY complex;  Surgeon: Tolu Gavin MD;  Location: [facility]  OR 2ND FLR;  Service: General;  Laterality: N/A;    COLONOSCOPY N/A 10/31/2022    Procedure: COLONOSCOPY;  Surgeon: Philip Shafer MD;  Location: [facility] ENDO (2ND FLR);  Service: Endoscopy;  Laterality: N/A;    CREATION OF AXILLARY-FEMORAL ARTERY BYPASS WITH GRAFT N/A 10/6/2022    Procedure: CREATION, BYPASS, ARTERIAL, AXILLARY TO FEMORAL, USING GRAFT;  Surgeon: LEI Ortiz III, MD;  Location: [facility] OR 2ND FLR;  Service: Peripheral Vascular;  Laterality: N/A;    CREATION OF FEMORAL-FEMORAL ARTERY BYPASS WITH GRAFT N/A 10/6/2022    Procedure: CREATION, BYPASS, ARTERIAL, FEMORAL TO FEMORAL, USING GRAFT;  Surgeon: LEI Ortiz III, MD;  Location: [facility] OR 2ND FLR;  Service: Peripheral Vascular;  Laterality: N/A;    ESOPHAGOGASTRODUODENOSCOPY N/A 10/20/2022    Procedure: EGD (ESOPHAGOGASTRODUODENOSCOPY);  Surgeon: Sixto Chicas MD;  Location: [facility] ENDO (2ND FLR);  Service: Endoscopy;  Laterality: N/A;    ESOPHAGOGASTRODUODENOSCOPY N/A 10/28/2022    Procedure: EGD (ESOPHAGOGASTRODUODENOSCOPY);  Surgeon: Marlee Hopson MD;  Location: [facility] (2ND FLR);  Service: Gastroenterology;  Laterality: N/A;    EXCISION OF HYDROCELE Left 4/26/2022    Procedure: HYDROCELECTOMY;  Surgeon: Damion Roberts MD;  Location: [facility];  Service: Urology;  Laterality: Left;    KNEE SURGERY  1972    PARTIAL NEPHRECTOMY Right 2/27/2023    Procedure: NEPHRECTOMY, PARTIAL;  Surgeon: Moises Cuba MD;  [address]  Service: Urology;  Laterality: Right;  Dr Gavin robotic cholecystectomy for gallbladder mass also    WOUND DEBRIDEMENT Right 12/8/2022    Procedure: DEBRIDEMENT, WOUND;  Surgeon: LEI Ortiz III, MD;  Location: [facility] OR 2ND FLR;  Service: Peripheral Vascular;  Laterality: Right;  RLE wound debridement       Family History   Problem Relation Age of Onset    Hypertension Mother     Hypertension Father     No Known Problems Daughter     No Known Problems Son     Colon cancer Neg Hx     Esophageal cancer  Neg Hx        Social History     Socioeconomic History    Marital status:    Occupational History    Occupation: [employer]   Tobacco Use    Smoking status: Former     Current packs/day: 0.00     Average packs/day: 0.5 packs/day for 55.0 years (27.5 ttl pk-yrs)     Types: Cigarettes     Start date: 10/11/1967     Quit date: 10/11/2022     Years since quittin.9    Smokeless tobacco: Never    Tobacco comments:     Cigarettes3-4 per day   Substance and Sexual Activity    Alcohol use: Never    Drug use: Never    Sexual activity: Yes     Partners: Female     Comment: wife     Social Determinants of Health     Financial Resource Strain: Unknown (2023)    Overall Financial Resource Strain (CARDIA)     Difficulty of Paying Living Expenses: Patient refused   Food Insecurity: Unknown (2023)    Hunger Vital Sign     Worried About Running Out of Food in the Last Year: Patient refused     Ran Out of Food in the Last Year: Patient refused   Transportation Needs: Unknown (2023)    PRAPARE - Transportation     Lack of Transportation (Non-Medical): Patient refused   Physical Activity: Insufficiently Active (2023)    Exercise Vital Sign     Days of Exercise per Week: 7 days     Minutes of Exercise per Session: 10 min   Stress: Unknown (2023)    [employer] - Occupational Stress Questionnaire     Feeling of Stress : Patient refused   Social Connections: Unknown (2023)    Social Connection and Isolation Panel [NHANES]     Frequency of Communication with Friends and Family: Patient refused     Frequency of Social Gatherings with Friends and Family: Patient refused     Attends Hinduism Services: Patient refused     Active Member of Clubs or Organizations: Patient refused     Attends Club or Organization Meetings: Patient refused     Marital Status: Patient refused   Housing Stability: Unknown (2023)    Housing Stability Vital Sign     Unable to Pay for Housing  "in the Last Year: Patient refused     Unstable Housing in the Last Year: Patient refused       Review of Systems   Constitutional: Negative for chills and fever.   Cardiovascular:  Positive for leg swelling. Negative for claudication.   Respiratory:  Negative for cough and shortness of breath.    Skin:  Positive for dry skin, nail changes and poor wound healing. Negative for itching and rash.   Musculoskeletal:  Positive for back pain, joint pain, myalgias and stiffness. Negative for falls, joint swelling and muscle weakness.   Gastrointestinal:  Negative for diarrhea, nausea and vomiting.   Neurological:  Positive for paresthesias. Negative for numbness, tremors and weakness.   Psychiatric/Behavioral:  Negative for altered mental status and hallucinations.            Objective:      Vitals:    23 0753   BP: (!) 159/74   Pulse: 99   Weight: 78.7 kg (173 lb 8 oz)   Height: 5' 5" (1.651 m)   PainSc: 0-No pain       Physical Exam  Vitals and nursing note reviewed.   Constitutional:       General: He is not in acute distress.     Appearance: He is well-developed. He is not ill-appearing, toxic-appearing or diaphoretic.      Comments: alert and oriented x 3.    Cardiovascular:      Pulses:           Dorsalis pedis pulses are 0 on the right side and 0 on the left side.        Posterior tibial pulses are 1+ on the right side and 1+ on the left side.      Comments: Waxy skin, absent hair growth to digits and foot and lower legs, no signs of ischemia delayed cap fill time however digits are warm  Pulmonary:      Effort: No respiratory distress.   Musculoskeletal:         General: No swelling.      Right lower le+ Edema (Foot) present.      Left lower leg: Edema present.      Right ankle: Normal. No tenderness. No lateral malleolus, medial malleolus, AITF ligament, CF ligament or posterior TF ligament tenderness.      Right Achilles Tendon: Normal. No tenderness or defects. Do's test negative.      Left ankle: " Normal. No tenderness. No lateral malleolus, medial malleolus, AITF ligament, CF ligament or posterior TF ligament tenderness.      Left Achilles Tendon: Normal. No tenderness or defects. Do's test negative.      Right foot: Decreased range of motion. Swelling, deformity, prominent metatarsal heads, tenderness and bony tenderness present.      Left foot: Decreased range of motion. Deformity and prominent metatarsal heads present. No tenderness or bony tenderness.      Comments: There is equinus deformity bilateral with decreased dorsiflexion at the ankle joint bilateral.    Decreased first MPJ range of motion both weightbearing and nonweightbearing, no crepitus observed the first MP joint, + dorsal flag sign. Moderate  bunion deformity is observed .    No excessive warmth or fluctuance noted      Feet:      Right foot:      Protective Sensation: 10 sites tested.  10 sites sensed.      Skin integrity: Dry skin present. No ulcer, blister, skin breakdown, erythema, warmth or callus.      Toenail Condition: Right toenails are abnormally thick. Fungal disease present.     Left foot:      Protective Sensation: 10 sites tested.  10 sites sensed.      Skin integrity: Dry skin present. No ulcer, blister, skin breakdown, erythema, warmth or callus.      Toenail Condition: Left toenails are abnormally thick. Fungal disease present.     Comments: [place] intact    Nails 1 through 10 thickened shortened discolored    Left hallux nail stable  No open lesions  Lymphadenopathy:      Comments: No lymphatic streaking     Skin:     General: Skin is warm and dry.      Capillary Refill: Capillary refill takes 2 to 3 seconds.      Coloration: Skin is not pale.      Findings: No lesion or rash.      Nails: There is no clubbing.      Comments: Toenails 1-5 bilaterally are elongated by 2-3 mm, thickened by 2-3 mm, discolored/yellowed, dystrophic, brittle with subungual debris. Incurvated hallux nail bilaterally. Tender to  distal nail plate pressure, without periungual skin abnormality of each.   Neurological:      Mental Status: He is alert and oriented to person, place, and time.      Motor: No atrophy.      Gait: Gait abnormal.      Comments: Light touch present    [place] 5.07 monofilament is intact bilateral feet. Sharp/dull sensation is also intact Bilateral feet.    Proprioception intact    Decreased/absent vibratory sensation bilateral feet to 128Hz tuning fork.    Paresthesias, and hyperesthesia right foot at toes with no clearly identified trigger or source.         Psychiatric:         Attention and Perception: Attention normal. He is attentive.         Mood and Affect: Mood normal. Mood is not anxious. Affect is not inappropriate.         Speech: Speech normal. He is communicative. Speech is not slurred.         Behavior: Behavior normal. Behavior is not combative.         Thought Content: Thought content normal.         Cognition and Memory: Cognition normal.         Judgment: Judgment normal.               Assessment:       Encounter Diagnoses   Name Primary?    PAD (peripheral artery disease) Yes    Stage 3 chronic kidney disease, unspecified whether stage 3a or 3b CKD     Onychomycosis due to dermatophyte            Plan:     Problem List Items Addressed This Visit       PAD (peripheral artery disease) - Primary    Stage 3 chronic kidney disease     Other Visit Diagnoses       Onychomycosis due to dermatophyte                 I counseled the patient on his conditions, their implications and medical management.    - Shoe inspection. Diabetic Foot Education. Patient reminded of the importance of good nutrition and blood sugar control to help prevent podiatric complications of diabetes. Patient instructed on proper foot hygeine. We discussed wearing proper shoe gear, daily foot inspections, never walking without protective shoe gear, caution putting sharp instruments to feet     - Discussed DM foot care:  Wear  comfortable, proper fitting shoes. Wash feet daily. Dry well. After drying, apply moisturizer to feet (no lotion to webspaces). Inspect feet daily for skin breaks, blisters, swelling, or redness. Wear cotton socks (preferably white)  Change socks every day. Do NOT walk barefoot. Do NOT use heating pads or warm/hot water soaks     Long discussion with patient regarding appropriate, supportive and comfortable shoes. Recommended supportive style shoe brands with adequate arch supports to alleviate abnormal pressure and improve stability of foot while walking. Avoid flat shoes and barefoot walking as these will exacerbate or worsen symptoms.     Discussed regular and routine moisturizer to skin of both feet to help improve dry skin. Advised to apply twice daily until resolution of symptoms. Avoid between toes.     Discussed proper and consistent elevation of lower extremities, above the level of the heart, while at rest, to help control/improve edema.      With patient's permission, nails were aggressively reduced and debrided x 10 to their soft tissue attachment mechanically and with electric , removing all offending nail and debris. Patient relates relief following the procedure. Although incurvation noted to bilateral hallux, no slantback or aggressive trimming performed due to known PAOD and poor wound healing     RTC 3 months, sooner PRN

## 2023-09-15 ENCOUNTER — TELEPHONE (OUTPATIENT)
Dept: HEMATOLOGY/ONCOLOGY | Facility: CLINIC | Age: 73
End: 2023-09-15
Payer: MEDICARE

## 2023-09-18 ENCOUNTER — LAB VISIT (OUTPATIENT)
Dept: LAB | Facility: HOSPITAL | Age: 73
End: 2023-09-18
Payer: MEDICARE

## 2023-09-18 ENCOUNTER — OFFICE VISIT (OUTPATIENT)
Dept: HEMATOLOGY/ONCOLOGY | Facility: CLINIC | Age: 73
End: 2023-09-18
Payer: MEDICARE

## 2023-09-18 VITALS
SYSTOLIC BLOOD PRESSURE: 113 MMHG | BODY MASS INDEX: 28.06 KG/M2 | HEIGHT: 65 IN | HEART RATE: 90 BPM | TEMPERATURE: 98 F | OXYGEN SATURATION: 100 % | WEIGHT: 168.44 LBS | RESPIRATION RATE: 18 BRPM | DIASTOLIC BLOOD PRESSURE: 56 MMHG

## 2023-09-18 DIAGNOSIS — C64.1 RENAL CELL CARCINOMA OF RIGHT KIDNEY: ICD-10-CM

## 2023-09-18 DIAGNOSIS — N18.30 STAGE 3 CHRONIC KIDNEY DISEASE, UNSPECIFIED WHETHER STAGE 3A OR 3B CKD: ICD-10-CM

## 2023-09-18 DIAGNOSIS — E78.2 MIXED HYPERLIPIDEMIA: Primary | ICD-10-CM

## 2023-09-18 DIAGNOSIS — I82.401 ACUTE DEEP VEIN THROMBOSIS (DVT) OF RIGHT LOWER EXTREMITY, UNSPECIFIED VEIN: ICD-10-CM

## 2023-09-18 DIAGNOSIS — G62.9 PERIPHERAL POLYNEUROPATHY: ICD-10-CM

## 2023-09-18 DIAGNOSIS — D64.9 NORMOCYTIC ANEMIA: ICD-10-CM

## 2023-09-18 DIAGNOSIS — D50.9 IRON DEFICIENCY ANEMIA, UNSPECIFIED IRON DEFICIENCY ANEMIA TYPE: ICD-10-CM

## 2023-09-18 LAB
ALBUMIN SERPL BCP-MCNC: 3.3 G/DL (ref 3.5–5.2)
ALP SERPL-CCNC: 77 U/L (ref 55–135)
ALT SERPL W/O P-5'-P-CCNC: 11 U/L (ref 10–44)
ANION GAP SERPL CALC-SCNC: 6 MMOL/L (ref 8–16)
ANISOCYTOSIS BLD QL SMEAR: SLIGHT
AST SERPL-CCNC: 18 U/L (ref 10–40)
BASOPHILS # BLD AUTO: 0.07 K/UL (ref 0–0.2)
BASOPHILS NFR BLD: 0.7 % (ref 0–1.9)
BILIRUB SERPL-MCNC: 0.3 MG/DL (ref 0.1–1)
BUN SERPL-MCNC: 32 MG/DL (ref 8–23)
CALCIUM SERPL-MCNC: 8.8 MG/DL (ref 8.7–10.5)
CHLORIDE SERPL-SCNC: 108 MMOL/L (ref 95–110)
CO2 SERPL-SCNC: 24 MMOL/L (ref 23–29)
CREAT SERPL-MCNC: 1.6 MG/DL (ref 0.5–1.4)
DACRYOCYTES BLD QL SMEAR: ABNORMAL
DAT IGG-SP REAG RBC-IMP: NORMAL
DIFFERENTIAL METHOD: ABNORMAL
EOSINOPHIL # BLD AUTO: 0.1 K/UL (ref 0–0.5)
EOSINOPHIL NFR BLD: 1.2 % (ref 0–8)
ERYTHROCYTE [DISTWIDTH] IN BLOOD BY AUTOMATED COUNT: 17.3 % (ref 11.5–14.5)
EST. GFR  (NO RACE VARIABLE): 45.5 ML/MIN/1.73 M^2
FERRITIN SERPL-MCNC: 37 NG/ML (ref 20–300)
GLUCOSE SERPL-MCNC: 152 MG/DL (ref 70–110)
HCT VFR BLD AUTO: 24.3 % (ref 40–54)
HGB BLD-MCNC: 7.1 G/DL (ref 14–18)
IMM GRANULOCYTES # BLD AUTO: 0.05 K/UL (ref 0–0.04)
IMM GRANULOCYTES NFR BLD AUTO: 0.5 % (ref 0–0.5)
IRON SERPL-MCNC: 191 UG/DL (ref 45–160)
LYMPHOCYTES # BLD AUTO: 3.1 K/UL (ref 1–4.8)
LYMPHOCYTES NFR BLD: 32.1 % (ref 18–48)
MCH RBC QN AUTO: 26.3 PG (ref 27–31)
MCHC RBC AUTO-ENTMCNC: 29.2 G/DL (ref 32–36)
MCV RBC AUTO: 90 FL (ref 82–98)
MONOCYTES # BLD AUTO: 1 K/UL (ref 0.3–1)
MONOCYTES NFR BLD: 10.5 % (ref 4–15)
NEUTROPHILS # BLD AUTO: 5.3 K/UL (ref 1.8–7.7)
NEUTROPHILS NFR BLD: 55 % (ref 38–73)
NRBC BLD-RTO: 0 /100 WBC
OVALOCYTES BLD QL SMEAR: ABNORMAL
PLATELET # BLD AUTO: 378 K/UL (ref 150–450)
PMV BLD AUTO: 9.6 FL (ref 9.2–12.9)
POIKILOCYTOSIS BLD QL SMEAR: SLIGHT
POLYCHROMASIA BLD QL SMEAR: ABNORMAL
POTASSIUM SERPL-SCNC: 4.9 MMOL/L (ref 3.5–5.1)
PROT SERPL-MCNC: 6.5 G/DL (ref 6–8.4)
RBC # BLD AUTO: 2.7 M/UL (ref 4.6–6.2)
RETICS/RBC NFR AUTO: 7.2 % (ref 0.4–2)
SATURATED IRON: 40 % (ref 20–50)
SCHISTOCYTES BLD QL SMEAR: ABNORMAL
SODIUM SERPL-SCNC: 138 MMOL/L (ref 136–145)
TOTAL IRON BINDING CAPACITY: 477 UG/DL (ref 250–450)
TRANSFERRIN SERPL-MCNC: 322 MG/DL (ref 200–375)
WBC # BLD AUTO: 9.64 K/UL (ref 3.9–12.7)

## 2023-09-18 PROCEDURE — 85025 COMPLETE CBC W/AUTO DIFF WBC: CPT | Performed by: INTERNAL MEDICINE

## 2023-09-18 PROCEDURE — 1126F AMNT PAIN NOTED NONE PRSNT: CPT | Mod: CPTII,S$GLB,, | Performed by: INTERNAL MEDICINE

## 2023-09-18 PROCEDURE — 99214 OFFICE O/P EST MOD 30 MIN: CPT | Mod: S$GLB,,, | Performed by: INTERNAL MEDICINE

## 2023-09-18 PROCEDURE — 83540 ASSAY OF IRON: CPT | Performed by: INTERNAL MEDICINE

## 2023-09-18 PROCEDURE — 82728 ASSAY OF FERRITIN: CPT | Performed by: INTERNAL MEDICINE

## 2023-09-18 PROCEDURE — 3008F PR BODY MASS INDEX (BMI) DOCUMENTED: ICD-10-PCS | Mod: CPTII,S$GLB,, | Performed by: INTERNAL MEDICINE

## 2023-09-18 PROCEDURE — 99999 PR PBB SHADOW E&M-EST. PATIENT-LVL III: CPT | Mod: PBBFAC,,, | Performed by: INTERNAL MEDICINE

## 2023-09-18 PROCEDURE — 3074F PR MOST RECENT SYSTOLIC BLOOD PRESSURE < 130 MM HG: ICD-10-PCS | Mod: CPTII,S$GLB,, | Performed by: INTERNAL MEDICINE

## 2023-09-18 PROCEDURE — 84466 ASSAY OF TRANSFERRIN: CPT | Performed by: INTERNAL MEDICINE

## 2023-09-18 PROCEDURE — 3008F BODY MASS INDEX DOCD: CPT | Mod: CPTII,S$GLB,, | Performed by: INTERNAL MEDICINE

## 2023-09-18 PROCEDURE — 1101F PT FALLS ASSESS-DOCD LE1/YR: CPT | Mod: CPTII,S$GLB,, | Performed by: INTERNAL MEDICINE

## 2023-09-18 PROCEDURE — 3078F DIAST BP <80 MM HG: CPT | Mod: CPTII,S$GLB,, | Performed by: INTERNAL MEDICINE

## 2023-09-18 PROCEDURE — 3288F PR FALLS RISK ASSESSMENT DOCUMENTED: ICD-10-PCS | Mod: CPTII,S$GLB,, | Performed by: INTERNAL MEDICINE

## 2023-09-18 PROCEDURE — 1126F PR PAIN SEVERITY QUANTIFIED, NO PAIN PRESENT: ICD-10-PCS | Mod: CPTII,S$GLB,, | Performed by: INTERNAL MEDICINE

## 2023-09-18 PROCEDURE — 1159F MED LIST DOCD IN RCRD: CPT | Mod: CPTII,S$GLB,, | Performed by: INTERNAL MEDICINE

## 2023-09-18 PROCEDURE — 3044F PR MOST RECENT HEMOGLOBIN A1C LEVEL <7.0%: ICD-10-PCS | Mod: CPTII,S$GLB,, | Performed by: INTERNAL MEDICINE

## 2023-09-18 PROCEDURE — 4010F PR ACE/ARB THEARPY RXD/TAKEN: ICD-10-PCS | Mod: CPTII,S$GLB,, | Performed by: INTERNAL MEDICINE

## 2023-09-18 PROCEDURE — 85045 AUTOMATED RETICULOCYTE COUNT: CPT | Performed by: INTERNAL MEDICINE

## 2023-09-18 PROCEDURE — 84238 ASSAY NONENDOCRINE RECEPTOR: CPT | Performed by: INTERNAL MEDICINE

## 2023-09-18 PROCEDURE — 3288F FALL RISK ASSESSMENT DOCD: CPT | Mod: CPTII,S$GLB,, | Performed by: INTERNAL MEDICINE

## 2023-09-18 PROCEDURE — 1159F PR MEDICATION LIST DOCUMENTED IN MEDICAL RECORD: ICD-10-PCS | Mod: CPTII,S$GLB,, | Performed by: INTERNAL MEDICINE

## 2023-09-18 PROCEDURE — 99999 PR PBB SHADOW E&M-EST. PATIENT-LVL III: ICD-10-PCS | Mod: PBBFAC,,, | Performed by: INTERNAL MEDICINE

## 2023-09-18 PROCEDURE — 3078F PR MOST RECENT DIASTOLIC BLOOD PRESSURE < 80 MM HG: ICD-10-PCS | Mod: CPTII,S$GLB,, | Performed by: INTERNAL MEDICINE

## 2023-09-18 PROCEDURE — 4010F ACE/ARB THERAPY RXD/TAKEN: CPT | Mod: CPTII,S$GLB,, | Performed by: INTERNAL MEDICINE

## 2023-09-18 PROCEDURE — 3074F SYST BP LT 130 MM HG: CPT | Mod: CPTII,S$GLB,, | Performed by: INTERNAL MEDICINE

## 2023-09-18 PROCEDURE — 1101F PR PT FALLS ASSESS DOC 0-1 FALLS W/OUT INJ PAST YR: ICD-10-PCS | Mod: CPTII,S$GLB,, | Performed by: INTERNAL MEDICINE

## 2023-09-18 PROCEDURE — 99214 PR OFFICE/OUTPT VISIT, EST, LEVL IV, 30-39 MIN: ICD-10-PCS | Mod: S$GLB,,, | Performed by: INTERNAL MEDICINE

## 2023-09-18 PROCEDURE — 86880 COOMBS TEST DIRECT: CPT | Performed by: INTERNAL MEDICINE

## 2023-09-18 PROCEDURE — 3044F HG A1C LEVEL LT 7.0%: CPT | Mod: CPTII,S$GLB,, | Performed by: INTERNAL MEDICINE

## 2023-09-18 PROCEDURE — 80053 COMPREHEN METABOLIC PANEL: CPT | Performed by: INTERNAL MEDICINE

## 2023-09-18 RX ORDER — HEPARIN 100 UNIT/ML
500 SYRINGE INTRAVENOUS
Status: CANCELLED | OUTPATIENT
Start: 2023-11-03

## 2023-09-18 RX ORDER — HEPARIN 100 UNIT/ML
500 SYRINGE INTRAVENOUS
Status: CANCELLED | OUTPATIENT
Start: 2023-09-18

## 2023-09-18 RX ORDER — SODIUM CHLORIDE 0.9 % (FLUSH) 0.9 %
10 SYRINGE (ML) INJECTION
Status: CANCELLED | OUTPATIENT
Start: 2023-09-18

## 2023-09-18 RX ORDER — SODIUM CHLORIDE 0.9 % (FLUSH) 0.9 %
10 SYRINGE (ML) INJECTION
Status: CANCELLED | OUTPATIENT
Start: 2023-11-03

## 2023-09-18 NOTE — PROGRESS NOTES
CC: Anemia, hematology follow up     , 73, M, is here for hematology follow up visit. He was initially evaluated here for anemia. He has history of DVT, HTn, peripheral arterial disease. He has fatigue. Denies upper or lower GI bleeding, melena, hemoptysis, epistaxis, hematuria. He has received 5 units PRBCs since Oct 2022. No family h/o anemia. He has lost weight in the past 12 months. He zapata snot have very good appetite. He is on Xarelto,.  Hgb was 11.2g/dl in March 2022, has decreased to to 7.9g/dl in March 2023. He underwent  combined open right radical nephrectomy and cholecystectomy 2/27/23.  He has had colonoscopy, EGD, VCE since late 2022.         Interval History: He is here for follow up. He received iv ferric carboxymaltose on 4/4 and 4/11/23 ( total of 1500mg) . No bleeding episodes since last visit.    Review of patient's allergies indicates:   Allergen Reactions    Ace inhibitors          Review of Systems   Constitutional:  Positive for malaise/fatigue. Negative for chills, fever and weight loss.   HENT:  Negative for congestion, ear discharge, hearing loss, nosebleeds and tinnitus.    Eyes:  Negative for double vision and discharge.   Respiratory:  Negative for cough, hemoptysis and shortness of breath.    Cardiovascular:  Negative for chest pain, orthopnea and claudication.   Gastrointestinal:  Negative for abdominal pain, blood in stool, constipation, diarrhea, melena and vomiting.   Genitourinary:  Negative for frequency, hematuria and urgency.   Musculoskeletal:  Negative for back pain, myalgias and neck pain.   Neurological:  Negative for tremors and focal weakness.   Endo/Heme/Allergies:  Negative for polydipsia.   Psychiatric/Behavioral:  Negative for depression, hallucinations, substance abuse and suicidal ideas. The patient is not nervous/anxious.         Vitals:    09/18/23 1137   BP: (!) 113/56   Pulse: 90   Resp: 18   Temp: 97.5 °F (36.4 °C)       Physical Exam  HENT:       Head: Normocephalic and atraumatic.      Mouth/Throat:      Pharynx: No posterior oropharyngeal erythema.   Eyes:      General: No scleral icterus.  Cardiovascular:      Rate and Rhythm: Normal rate and regular rhythm.   Pulmonary:      Effort: Pulmonary effort is normal. No respiratory distress.      Breath sounds: Normal breath sounds. No rhonchi.   Abdominal:      General: There is no distension.      Palpations: There is no mass.      Tenderness: There is no abdominal tenderness.   Musculoskeletal:         General: No swelling.   Lymphadenopathy:      Cervical: No cervical adenopathy.   Skin:     Coloration: Skin is not jaundiced.   Neurological:      General: No focal deficit present.      Mental Status: He is alert and oriented to person, place, and time.      Cranial Nerves: No cranial nerve deficit.          Current Outpatient Medications   Medication Sig    acetaminophen-codeine 300-30mg (TYLENOL #3) 300-30 mg Tab Take 1 tablet by mouth every 6 (six) hours as needed.    amlodipine-olmesartan (LAILA) 5-40 mg per tablet Take 1 tablet by mouth once daily.    atorvastatin (LIPITOR) 80 MG tablet Take 1 tablet by mouth once a day    DULoxetine (CYMBALTA) 30 MG capsule Take 1 capsule (30 mg total) by mouth once daily.    ezetimibe (ZETIA) 10 mg tablet Take 1 tablet (10 mg total) by mouth once daily.    ferrous sulfate (IRON) 325 mg (65 mg iron) Tab tablet Take 1 tablet (325 mg total) by mouth once daily.    gabapentin (NEURONTIN) 800 MG tablet Take 1 tablet (800 mg total) by mouth 3 (three) times daily.    multivitamin (THERAGRAN) per tablet Take 1 tablet by mouth once daily.    rivaroxaban (XARELTO) 20 mg Tab Take 1 tablet (20 mg total) by mouth daily with dinner or evening meal.     No current facility-administered medications for this visit.     10/20/22 EGD      Normal esophagus.                          - Z-line regular.                          - Gastritis.                          - Normal examined duodenum.                           - No specimens collected      `10/28/22 colonoscopy       - The examined portion of the ileum was normal.                          - One 2 mm polyp in the transverse colon, removed with a jumbo cold forceps. Resected and retrieved.                          - One 3 mm polyp in the transverse colon, removed with a cold snare. Resected and retrieved.                          - One 7 mm polyp in the sigmoid colon, removed with a cold snare. Resected and retrieved. Clip (MR conditional) was placed.                          - One 12 mm polyp in the sigmoid colon, removed with a hot snare. Resected and retrieved. Clip (MR                          conditional) was placed.                        - Diverticulosis in the sigmoid colon.         - The examination was otherwise normal on direct and retroflexion views.       Diagnosis 1. Colon, transverse, polypectomies:   - Tubular adenoma, multiple fragments   - Negative for high-grade dysplasia or malignancy   2. Colon, sigmoid #1, polypectomy:   - Tubular adenoma   - Negative for high-grade dysplasia or malignancy   3. Colon, sigmoid #2, polypectomy:   - Tubulovillous adenoma   - Resection margin uninvolved by dysplasia   - Negative for high-grade dysplasia or malignancy         1/20/23 video capsule endoscopy    Two single mucosal red spots with no bleeding in the small bowel.     2/27/23     1. 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   - Atherosclerotic vessels.   - See CAP synoptic report and comment below.     2. GALLBLADDER AND LIVER SEGMENT 4 AND 5, RADICAL CHOLECYSTECTOMY AND PARTIAL   HEPATECTOMY:   - Gallbladder with adenomyomatosis, chronic cholecystitis, and cholelithiasis   - Liver with incidental bile duct hamartoma   - Negative for dysplasia and malignancy   - See comment     3. PORTAL LYMPH NODES, DISSECTION:   - Three lymph nodes, negative for carcinoma (0/3).      SURGICAL PATHOLOGY CANCER CASE SUMMARY-KIDNEY   Procedure: Radical nephrectomy.   Specimen laterality: Right.   Tumor site: Superior pole.   Tumor size: 7.2 cm.   Tumor focality: Unifocal.   Histologic type: Papillary renal cell carcinoma, Type 2.   Sarcomatoid features: Not identified.   Rhabdoid features: Not identified.   Histologic Grade (ISUP Grade): 3.   Tumor necrosis: Present, 15%.   Tumor extension: Limited to kidney.   Margins: Uninvolved.   Lymphovascular invasion (excluding renal vein and its segmental branches):   Not identified.   Regional lymph nodes: No regional lymph nodes submitted or found.   Distant metastases: Not applicable        6/19/23 SPEP: Normal total protein. Faint paraprotein band in gamma = 0.25 g/dL  6/19/23 sIFE : Faint IgG lambda specific monoclonal band present.    Component      Latest Ref Rng 9/18/2023   Sodium      136 - 145 mmol/L 138    Potassium      3.5 - 5.1 mmol/L 4.9    Chloride      95 - 110 mmol/L 108    CO2      23 - 29 mmol/L 24    Glucose      70 - 110 mg/dL 152 (H)    BUN      8 - 23 mg/dL 32 (H)    Creatinine      0.5 - 1.4 mg/dL 1.6 (H)    Calcium      8.7 - 10.5 mg/dL 8.8    PROTEIN TOTAL      6.0 - 8.4 g/dL 6.5    Albumin      3.5 - 5.2 g/dL 3.3 (L)    BILIRUBIN TOTAL      0.1 - 1.0 mg/dL 0.3    Alkaline Phosphatase      55 - 135 U/L 77    AST      10 - 40 U/L 18    ALT      10 - 44 U/L 11    eGFR      >60 mL/min/1.73 m^2 45.5 !    Anion Gap      8 - 16 mmol/L 6 (L)    WBC      3.90 - 12.70 K/uL 9.64    RBC      4.60 - 6.20 M/uL 2.70 (L)    Hemoglobin      14.0 - 18.0 g/dL 7.1 (L)    Hematocrit      40.0 - 54.0 % 24.3 (L)    MCV      82 - 98 fL 90    MCH      27.0 - 31.0 pg 26.3 (L)    MCHC      32.0 - 36.0 g/dL 29.2 (L)    RDW      11.5 - 14.5 % 17.3 (H)    Platelets      150 - 450 K/uL 378    MPV      9.2 - 12.9 fL 9.6    Iron      45 - 160 ug/dL 191 (H)    Transferrin      200 - 375 mg/dL 322    TIBC      250 - 450 ug/dL 477 (H)    Saturated Iron      20  - 50 % 40    Ferritin      20.0 - 300.0 ng/mL 37    Retic      0.4 - 2.0 % 7.2 (H)     Assessment:      1. Iron deficiency anemia due to chronic blood loss  2. Fatigue, chronic  3. Weight loss  4. Papillary carcinoma of kidney  5. History of DVT  6. HTn, primary  7. Peripheral artery disease  8. Paraproteinemia      Plan:    1,2 . Extensive work up since Oct 2022 has revealed polyps in colon that were resected; diverticuloses in colon; un remarkable EGD; two single mucosal red spots with no bleeding in the small bowel on VCE.  He is on xarelto and aspirin. He also had papillary carcinoma in his kidney that has since been resected.  He had evidence of iron deficiency as of 2/15/23. He received iv ferric carboxymaltose on 4/4 and 4/11/23 ( total of 1500mg) . B12, folate both normal .  He is again anemic  today, Hgb 7.1g/dl.  TIBC slightly elevated, ferritin normal. He will receive iv iron, and repeat labs in 8 weeks. If he is still anemic he will have BM biopsy.         3. S/p nephrectomy    5. He had evidence of thrombosis with the right anterior tibial and peroneal veins in Sept 2022.  Repeat US doppler did not demonstrate this thrombus. No indication for prolonged anti-coagulation for first DVT.     6. /56 mm Hg today.  He is on amlodipine-olmesartan, and is compliant. He follows with     7. He has been stented. He is on aspirin, xarelto. Follows with cardiology.       8. SPEP in June 2023 demonstrated a faint paraprotein band in gamma = 0.25 g/dL,  IgG lambda on RAMOS. Serum free light chain ratio normal.         BMT Chart Routing      Follow up with physician    Follow up with MACKENZIE 2 months.   Provider visit type    Infusion scheduling note   iv iron in 1-2 weeks   Injection scheduling note    Labs CBC, CMP, ferritin, iron and TIBC and reticulocytes   Scheduling:  Preferred lab:  Lab interval:     Imaging    Pharmacy appointment    Other referrals

## 2023-09-19 ENCOUNTER — TELEPHONE (OUTPATIENT)
Dept: INTERNAL MEDICINE | Facility: CLINIC | Age: 73
End: 2023-09-19
Payer: MEDICARE

## 2023-09-19 ENCOUNTER — PATIENT MESSAGE (OUTPATIENT)
Dept: PHARMACY | Facility: CLINIC | Age: 73
End: 2023-09-19
Payer: MEDICARE

## 2023-09-19 ENCOUNTER — TELEPHONE (OUTPATIENT)
Dept: PHARMACY | Facility: CLINIC | Age: 73
End: 2023-09-19
Payer: MEDICARE

## 2023-09-19 DIAGNOSIS — I82.401 DEEP VEIN THROMBOSIS (DVT) OF RIGHT LOWER EXTREMITY, UNSPECIFIED CHRONICITY, UNSPECIFIED VEIN: Primary | ICD-10-CM

## 2023-09-19 LAB — STFR SERPL-MCNC: 6.8 MG/L (ref 1.8–4.6)

## 2023-09-19 NOTE — TELEPHONE ENCOUNTER
----- Message from Bernadette Altamirano MA sent at 9/12/2023  5:18 PM CDT -----  Contact: [facility] [facility]/     ----- Message -----  From: Sally Tang  Sent: 9/11/2023  12:26 PM CDT  To: Malachi Dewitt Staff    Patient is calling for Medical Advice regarding: Summer with [employer]  is calling for a written referral sent to [facility] Prescription Assistance Program for Xarelto, patient is currently in the gap and cannot afford co pay.

## 2023-09-19 NOTE — TELEPHONE ENCOUNTER
Unfortunately, The Pharmacy Patient Assistance Team is unable to assist Mr. Singh at this time due to the following reasons      There are currently no Manufacture or Co-pay Savings Programs available for requested medication.      Patient can contact [facility] and they will pay $85 for a 30 day supply or $240 for a 90 day.      Pharmacy Patient Assistance Team

## 2023-09-20 ENCOUNTER — TELEPHONE (OUTPATIENT)
Dept: UROLOGY | Facility: CLINIC | Age: 73
End: 2023-09-20
Payer: MEDICARE

## 2023-09-29 ENCOUNTER — HOSPITAL ENCOUNTER (OUTPATIENT)
Dept: VASCULAR SURGERY | Facility: CLINIC | Age: 73
Discharge: HOME | End: 2023-09-29
Attending: SURGERY
Payer: MEDICARE

## 2023-09-29 ENCOUNTER — OFFICE VISIT (OUTPATIENT)
Dept: VASCULAR SURGERY | Facility: CLINIC | Age: 73
End: 2023-09-29
Attending: SURGERY
Payer: MEDICARE

## 2023-09-29 VITALS
BODY MASS INDEX: 28.54 KG/M2 | HEART RATE: 100 BPM | DIASTOLIC BLOOD PRESSURE: 66 MMHG | HEIGHT: 65 IN | WEIGHT: 171.31 LBS | SYSTOLIC BLOOD PRESSURE: 145 MMHG | TEMPERATURE: 99 F

## 2023-09-29 DIAGNOSIS — I73.9 PAD (PERIPHERAL ARTERY DISEASE): ICD-10-CM

## 2023-09-29 DIAGNOSIS — I73.9 PAD (PERIPHERAL ARTERY DISEASE): Primary | ICD-10-CM

## 2023-09-29 PROCEDURE — 99999 PR PBB SHADOW E&M-EST. PATIENT-LVL III: CPT | Mod: PBBFAC,,, | Performed by: SURGERY

## 2023-09-29 PROCEDURE — 93923 PR NON-INVASIVE PHYSIOLOGIC STUDY EXTREMITY 3 LEVELS: ICD-10-PCS | Mod: S$GLB,,, | Performed by: SURGERY

## 2023-09-29 PROCEDURE — 3288F PR FALLS RISK ASSESSMENT DOCUMENTED: ICD-10-PCS | Mod: CPTII,S$GLB,, | Performed by: SURGERY

## 2023-09-29 PROCEDURE — 3288F FALL RISK ASSESSMENT DOCD: CPT | Mod: CPTII,S$GLB,, | Performed by: SURGERY

## 2023-09-29 PROCEDURE — 93925 LOWER EXTREMITY STUDY: CPT | Mod: S$GLB,,, | Performed by: SURGERY

## 2023-09-29 PROCEDURE — 1160F RVW MEDS BY RX/DR IN RCRD: CPT | Mod: CPTII,S$GLB,, | Performed by: SURGERY

## 2023-09-29 PROCEDURE — 3078F PR MOST RECENT DIASTOLIC BLOOD PRESSURE < 80 MM HG: ICD-10-PCS | Mod: CPTII,S$GLB,, | Performed by: SURGERY

## 2023-09-29 PROCEDURE — 4010F PR ACE/ARB THEARPY RXD/TAKEN: ICD-10-PCS | Mod: CPTII,S$GLB,, | Performed by: SURGERY

## 2023-09-29 PROCEDURE — 1101F PT FALLS ASSESS-DOCD LE1/YR: CPT | Mod: CPTII,S$GLB,, | Performed by: SURGERY

## 2023-09-29 PROCEDURE — 3044F HG A1C LEVEL LT 7.0%: CPT | Mod: CPTII,S$GLB,, | Performed by: SURGERY

## 2023-09-29 PROCEDURE — 93923 UPR/LXTR ART STDY 3+ LVLS: CPT | Mod: S$GLB,,, | Performed by: SURGERY

## 2023-09-29 PROCEDURE — 1159F PR MEDICATION LIST DOCUMENTED IN MEDICAL RECORD: ICD-10-PCS | Mod: CPTII,S$GLB,, | Performed by: SURGERY

## 2023-09-29 PROCEDURE — 3078F DIAST BP <80 MM HG: CPT | Mod: CPTII,S$GLB,, | Performed by: SURGERY

## 2023-09-29 PROCEDURE — 3008F BODY MASS INDEX DOCD: CPT | Mod: CPTII,S$GLB,, | Performed by: SURGERY

## 2023-09-29 PROCEDURE — 93925 PR DUPLEX LO EXTREM ART BILAT: ICD-10-PCS | Mod: S$GLB,,, | Performed by: SURGERY

## 2023-09-29 PROCEDURE — 99214 OFFICE O/P EST MOD 30 MIN: CPT | Mod: S$GLB,,, | Performed by: SURGERY

## 2023-09-29 PROCEDURE — 1101F PR PT FALLS ASSESS DOC 0-1 FALLS W/OUT INJ PAST YR: ICD-10-PCS | Mod: CPTII,S$GLB,, | Performed by: SURGERY

## 2023-09-29 PROCEDURE — 4010F ACE/ARB THERAPY RXD/TAKEN: CPT | Mod: CPTII,S$GLB,, | Performed by: SURGERY

## 2023-09-29 PROCEDURE — 1160F PR REVIEW ALL MEDS BY PRESCRIBER/CLIN PHARMACIST DOCUMENTED: ICD-10-PCS | Mod: CPTII,S$GLB,, | Performed by: SURGERY

## 2023-09-29 PROCEDURE — 3077F PR MOST RECENT SYSTOLIC BLOOD PRESSURE >= 140 MM HG: ICD-10-PCS | Mod: CPTII,S$GLB,, | Performed by: SURGERY

## 2023-09-29 PROCEDURE — 3008F PR BODY MASS INDEX (BMI) DOCUMENTED: ICD-10-PCS | Mod: CPTII,S$GLB,, | Performed by: SURGERY

## 2023-09-29 PROCEDURE — 1126F AMNT PAIN NOTED NONE PRSNT: CPT | Mod: CPTII,S$GLB,, | Performed by: SURGERY

## 2023-09-29 PROCEDURE — 1159F MED LIST DOCD IN RCRD: CPT | Mod: CPTII,S$GLB,, | Performed by: SURGERY

## 2023-09-29 PROCEDURE — 99214 PR OFFICE/OUTPT VISIT, EST, LEVL IV, 30-39 MIN: ICD-10-PCS | Mod: S$GLB,,, | Performed by: SURGERY

## 2023-09-29 PROCEDURE — 1126F PR PAIN SEVERITY QUANTIFIED, NO PAIN PRESENT: ICD-10-PCS | Mod: CPTII,S$GLB,, | Performed by: SURGERY

## 2023-09-29 PROCEDURE — 99999 PR PBB SHADOW E&M-EST. PATIENT-LVL III: ICD-10-PCS | Mod: PBBFAC,,, | Performed by: SURGERY

## 2023-09-29 PROCEDURE — 3077F SYST BP >= 140 MM HG: CPT | Mod: CPTII,S$GLB,, | Performed by: SURGERY

## 2023-09-29 PROCEDURE — 3044F PR MOST RECENT HEMOGLOBIN A1C LEVEL <7.0%: ICD-10-PCS | Mod: CPTII,S$GLB,, | Performed by: SURGERY

## 2023-09-29 NOTE — PROGRESS NOTES
VASCULAR SURGERY SERVICE    CHIEF COMPLAINT: severe PAD     HISTORY OF PRESENT ILLNESS: Babatunde Singh is a 72 y.o. male initially seen as an inpatient, who presented with severe chronic ischemic rest pain to the right foot, who become nonambulatory, also a recent diagnosis of a right renal mass.   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   He was initially seen by interventional cardiology who felt there were no revascularization options and suggested either palliative care or above-knee amputation.     He underwent a LEFT axillary to bi- profunda bypass 10/06/2022.   Postoperatively, his ABIs went from 0 bilaterally preoperatively to 0.69 right, 0.74 left.  His severe ischemic rest pain resolved completely.  He was discharged to skilled nursing and is to be discharged tomorrow.  States he is now ambulating without assistance.     During his evaluation he is also noted to have significant full-thickness ulceration of his right calf.  He is receiving wound care at [facility]     Due to the limited runoff of this bypass he was started on Xarelto and aspirin     S/p  Operative debridement and wound VAC 12/8/2022  LEFT axillary to bi- profunda bypass 10/06/2022.  The left axillary artery was uses inflow source, as he has a right renal mass that he is going to require nephrectomy     1/24/2023:  Now returns, without complaints.  He is scheduled for robotic nephrectomy in the near future.  He is still using the wound VAC    9/29/23:  Re returns, very grateful.  Has been ambulating with some shooting pains in his right lower extremity greater than left which has been stable and largely unchanged since his operation.  Wounds have completely healed. In interim has undergone open radical nephrectomy and cholecystectomy, recovered well.    Past Medical  History:   Diagnosis Date    DVT (deep venous thrombosis)     Hypertension     Peripheral arterial disease     Right renal mass 8/11/2022       Past Surgical History:   Procedure Laterality Date    APPLICATION OF WOUND VACUUM-ASSISTED CLOSURE DEVICE Right 12/8/2022    Procedure: APPLICATION, WOUND VAC;  Surgeon: LEI Ortiz III, MD;  Location: [facility] OR 2ND FLR;  Service: Peripheral Vascular;  Laterality: Right;    CHOLECYSTECTOMY N/A 2/27/2023    Procedure: CHOLECYSTECTOMY complex;  Surgeon: Tolu Gavin MD;  Location: [facility] OR 2ND FLR;  Service: General;  Laterality: N/A;    COLONOSCOPY N/A 10/31/2022    Procedure: COLONOSCOPY;  Surgeon: Philip Shafer MD;  Location: [facility] ENDO (2ND FLR);  Service: Endoscopy;  Laterality: N/A;    CREATION OF AXILLARY-FEMORAL ARTERY BYPASS WITH GRAFT N/A 10/6/2022    Procedure: CREATION, BYPASS, ARTERIAL, AXILLARY TO FEMORAL, USING GRAFT;  Surgeon: LEI Ortiz III, MD;  [facility];  Service: Peripheral Vascular;  Laterality: N/A;    CREATION OF FEMORAL-FEMORAL ARTERY BYPASS WITH GRAFT N/A 10/6/2022    Procedure: CREATION, BYPASS, ARTERIAL, FEMORAL TO FEMORAL, USING GRAFT;  Surgeon: LEI Ortiz III, MD;  [facility];  Service: Peripheral Vascular;  Laterality: N/A;    ESOPHAGOGASTRODUODENOSCOPY N/A 10/20/2022    Procedure: EGD (ESOPHAGOGASTRODUODENOSCOPY);  Surgeon: Sixto Chicas MD;  Location: [facility] ENDO (2ND FLR);  Service: Endoscopy;  Laterality: N/A;    ESOPHAGOGASTRODUODENOSCOPY N/A 10/28/2022    Procedure: EGD (ESOPHAGOGASTRODUODENOSCOPY);  Surgeon: Marlee Hopson MD;  Location: [facility] ENDO (2ND FLR);  Service: Gastroenterology;  Laterality: N/A;    EXCISION OF HYDROCELE Left 4/26/2022    Procedure: HYDROCELECTOMY;  Surgeon: Damion Roberts MD;  Location: [facility];  Service: Urology;  Laterality: Left;    KNEE SURGERY  1972    PARTIAL NEPHRECTOMY Right 2/27/2023    Procedure: NEPHRECTOMY, PARTIAL;  Surgeon: Moises Cuba MD;   Location: [facility] OR [address] [place] [address];  Service: Urology;  Laterality: Right;  Dr Gavin robotic cholecystectomy for gallbladder mass also    WOUND DEBRIDEMENT Right 12/8/2022    Procedure: DEBRIDEMENT, WOUND;  Surgeon: LEI Ortiz III, MD;  Location: [facility] OR [address] [place] [address];  Service: Peripheral Vascular;  Laterality: Right;  RLE wound debridement         Current Outpatient Medications:     acetaminophen-codeine 300-30mg (TYLENOL #3) 300-30 mg Tab, Take 1 tablet by mouth every 6 (six) hours as needed., Disp: , Rfl:     amlodipine-olmesartan (LAILA) 5-40 mg per tablet, Take 1 tablet by mouth once daily., Disp: 90 tablet, Rfl: 3    atorvastatin (LIPITOR) 80 MG tablet, Take 1 tablet by mouth once a day, Disp: 90 tablet, Rfl: 0    DULoxetine (CYMBALTA) 30 MG capsule, Take 1 capsule (30 mg total) by mouth once daily., Disp: 30 capsule, Rfl: 11    ezetimibe (ZETIA) 10 mg tablet, Take 1 tablet (10 mg total) by mouth once daily., Disp: 90 tablet, Rfl: 0    ferrous sulfate (IRON) 325 mg (65 mg iron) Tab tablet, Take 1 tablet (325 mg total) by mouth once daily., Disp: 90 tablet, Rfl: 3    gabapentin (NEURONTIN) 800 MG tablet, Take 1 tablet (800 mg total) by mouth 3 (three) times daily., Disp: 90 tablet, Rfl: 11    multivitamin (THERAGRAN) per tablet, Take 1 tablet by mouth once daily., Disp: , Rfl:     rivaroxaban (XARELTO) 20 mg Tab, Take 1 tablet (20 mg total) by mouth daily with dinner or evening meal., Disp: 30 tablet, Rfl: 11    Review of patient's allergies indicates:   Allergen Reactions    Ace inhibitors        Family History   Problem Relation Age of Onset    Hypertension Mother     Hypertension Father     No Known Problems Daughter     No Known Problems Son     Colon cancer Neg Hx     Esophageal cancer Neg Hx        Social History     Tobacco Use    Smoking status: Former     Current packs/day: 0.00     Average packs/day: 0.5 packs/day for 55.0 years (27.5 ttl pk-yrs)     Types: Cigarettes     Start date: 10/11/1967     Quit date:  "10/11/2022     Years since quittin.9    Smokeless tobacco: Never    Tobacco comments:     Cigarettes3-4 per day   Substance Use Topics    Alcohol use: Never    Drug use: Never       REVIEW OF SYSTEMS:  General: No chills, fever, malaise, changes in weight  HEENT: No visual changes, difficulty hearing  Cardiovascular: No chest pain, palpitations, claudication  Pulmonary: No dyspnea, cough, wheezing  Gastrointestinal: No nausea, vomiting, diarrhea, constipation  Genitourinary: No dysuria, low urine output, hematuria  Endocrine: No polydipsia, polyphagia  Hematologic: No fatigue with exertion, pica, pallor  Musculoskeletal: No extremity or joint pain, no back pain, no difficulty with ambulation  Neurologic: no seizures, no headaches, no weakness  Psychiatric: no mood disturbance      PHYSICAL EXAM:   BP (!) 145/66   Pulse 100   Temp 98.5 °F (36.9 °C) (Oral)   Ht 5' 5" (1.651 m)   Wt 77.7 kg (171 lb 4.8 oz)   BMI 28.51 kg/m²   Constitutional:  Alert,   Well-appearing  In no distress.   Neurological: Normal speech  no focal findings  CN II - XII grossly intact.    Psychiatric: Mood and affect appropriate and symmetric.   HEENT: Normocephalic / atraumatic  PERRLA  Midline trachea  No scars across the neck left infraclavicular scar   Cardiac: Regular rate and rhythm.   Pulmonary: Normal pulmonary effort.   Abdomen: Soft, not distended.     Skin: Warm and well perfused.    Vascular:  Bilateral Triphasic PT signals present, no DP signals.   Extremities/  Musculoskeletal: No edema.   Femoral scars      IMAGIN23 Bypass Graft Duplex:  Patent left ax fem, left to right fem-fem bypass graft.  Velocity elevation to  centimeters/seconds at the proximal femoral bypass graft.  Lower Extremity BRENNAN/PVR: R 0.73 L 0.51 from previous ABIs 0.69 right, 0.74 left      IMPRESSION: IMPRESSION:   12 months post right ax fem-fem for critical limb ischemia now with healed wounds and no rest pain.     PLAN:   Follow up " with me in 1 year with  repeat duplex of his ax fem-fem and BRENNAN  Cont Rio Ortiz III, MD, FACS  Professor and Chief, Vascular and Endovascular Surgery

## 2023-10-03 ENCOUNTER — CLINICAL SUPPORT (OUTPATIENT)
Dept: AUDIOLOGY | Facility: CLINIC | Age: 73
End: 2023-10-03
Payer: MEDICARE

## 2023-10-03 ENCOUNTER — OFFICE VISIT (OUTPATIENT)
Dept: OTOLARYNGOLOGY | Facility: CLINIC | Age: 73
End: 2023-10-03
Payer: MEDICARE

## 2023-10-03 VITALS
BODY MASS INDEX: 28.4 KG/M2 | WEIGHT: 170.63 LBS | DIASTOLIC BLOOD PRESSURE: 76 MMHG | HEART RATE: 83 BPM | SYSTOLIC BLOOD PRESSURE: 144 MMHG

## 2023-10-03 DIAGNOSIS — H90.72 MIXED CONDUCTIVE AND SENSORINEURAL HEARING LOSS OF LEFT EAR WITH UNRESTRICTED HEARING OF RIGHT EAR: ICD-10-CM

## 2023-10-03 DIAGNOSIS — H91.92 DECREASED HEARING OF LEFT EAR: ICD-10-CM

## 2023-10-03 PROCEDURE — 3044F HG A1C LEVEL LT 7.0%: CPT | Mod: CPTII,S$GLB,, | Performed by: OTOLARYNGOLOGY

## 2023-10-03 PROCEDURE — 92557 PR COMPREHENSIVE HEARING TEST: ICD-10-PCS | Mod: S$GLB,,, | Performed by: AUDIOLOGIST

## 2023-10-03 PROCEDURE — 99999 PR PBB SHADOW E&M-EST. PATIENT-LVL I: CPT | Mod: PBBFAC,,, | Performed by: AUDIOLOGIST

## 2023-10-03 PROCEDURE — 99203 OFFICE O/P NEW LOW 30 MIN: CPT | Mod: S$GLB,,, | Performed by: OTOLARYNGOLOGY

## 2023-10-03 PROCEDURE — 4010F PR ACE/ARB THEARPY RXD/TAKEN: ICD-10-PCS | Mod: CPTII,S$GLB,, | Performed by: OTOLARYNGOLOGY

## 2023-10-03 PROCEDURE — 99999 PR PBB SHADOW E&M-EST. PATIENT-LVL III: ICD-10-PCS | Mod: PBBFAC,,, | Performed by: OTOLARYNGOLOGY

## 2023-10-03 PROCEDURE — 92567 TYMPANOMETRY: CPT | Mod: S$GLB,,, | Performed by: AUDIOLOGIST

## 2023-10-03 PROCEDURE — 3044F PR MOST RECENT HEMOGLOBIN A1C LEVEL <7.0%: ICD-10-PCS | Mod: CPTII,S$GLB,, | Performed by: OTOLARYNGOLOGY

## 2023-10-03 PROCEDURE — 3077F SYST BP >= 140 MM HG: CPT | Mod: CPTII,S$GLB,, | Performed by: OTOLARYNGOLOGY

## 2023-10-03 PROCEDURE — 99999 PR PBB SHADOW E&M-EST. PATIENT-LVL III: CPT | Mod: PBBFAC,,, | Performed by: OTOLARYNGOLOGY

## 2023-10-03 PROCEDURE — 3078F DIAST BP <80 MM HG: CPT | Mod: CPTII,S$GLB,, | Performed by: OTOLARYNGOLOGY

## 2023-10-03 PROCEDURE — 1159F MED LIST DOCD IN RCRD: CPT | Mod: CPTII,S$GLB,, | Performed by: OTOLARYNGOLOGY

## 2023-10-03 PROCEDURE — 3008F PR BODY MASS INDEX (BMI) DOCUMENTED: ICD-10-PCS | Mod: CPTII,S$GLB,, | Performed by: OTOLARYNGOLOGY

## 2023-10-03 PROCEDURE — 3008F BODY MASS INDEX DOCD: CPT | Mod: CPTII,S$GLB,, | Performed by: OTOLARYNGOLOGY

## 2023-10-03 PROCEDURE — 1101F PT FALLS ASSESS-DOCD LE1/YR: CPT | Mod: CPTII,S$GLB,, | Performed by: OTOLARYNGOLOGY

## 2023-10-03 PROCEDURE — 3078F PR MOST RECENT DIASTOLIC BLOOD PRESSURE < 80 MM HG: ICD-10-PCS | Mod: CPTII,S$GLB,, | Performed by: OTOLARYNGOLOGY

## 2023-10-03 PROCEDURE — 99999 PR PBB SHADOW E&M-EST. PATIENT-LVL I: ICD-10-PCS | Mod: PBBFAC,,, | Performed by: AUDIOLOGIST

## 2023-10-03 PROCEDURE — 3288F PR FALLS RISK ASSESSMENT DOCUMENTED: ICD-10-PCS | Mod: CPTII,S$GLB,, | Performed by: OTOLARYNGOLOGY

## 2023-10-03 PROCEDURE — 3077F PR MOST RECENT SYSTOLIC BLOOD PRESSURE >= 140 MM HG: ICD-10-PCS | Mod: CPTII,S$GLB,, | Performed by: OTOLARYNGOLOGY

## 2023-10-03 PROCEDURE — 99203 PR OFFICE/OUTPT VISIT, NEW, LEVL III, 30-44 MIN: ICD-10-PCS | Mod: S$GLB,,, | Performed by: OTOLARYNGOLOGY

## 2023-10-03 PROCEDURE — 92557 COMPREHENSIVE HEARING TEST: CPT | Mod: S$GLB,,, | Performed by: AUDIOLOGIST

## 2023-10-03 PROCEDURE — 3288F FALL RISK ASSESSMENT DOCD: CPT | Mod: CPTII,S$GLB,, | Performed by: OTOLARYNGOLOGY

## 2023-10-03 PROCEDURE — 4010F ACE/ARB THERAPY RXD/TAKEN: CPT | Mod: CPTII,S$GLB,, | Performed by: OTOLARYNGOLOGY

## 2023-10-03 PROCEDURE — 92567 PR TYMPA2METRY: ICD-10-PCS | Mod: S$GLB,,, | Performed by: AUDIOLOGIST

## 2023-10-03 PROCEDURE — 1159F PR MEDICATION LIST DOCUMENTED IN MEDICAL RECORD: ICD-10-PCS | Mod: CPTII,S$GLB,, | Performed by: OTOLARYNGOLOGY

## 2023-10-03 PROCEDURE — 1101F PR PT FALLS ASSESS DOC 0-1 FALLS W/OUT INJ PAST YR: ICD-10-PCS | Mod: CPTII,S$GLB,, | Performed by: OTOLARYNGOLOGY
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
## 2023-10-03 NOTE — PROGRESS NOTES
Mr. Babatunde Singh was seen in the clinic today for an audiological evaluation.  Mr. Singh reported he lost hearing in the left ear during his teenage years.    Audiological testing revealed essentially normal hearing, with a moderate hearing loss at 8000 Hz, for the right ear and a severe rising to moderate sloping to moderately-severe mixed hearing loss for the left ear.  A speech reception threshold was obtained at 15 dBHL for the right ear and at 45 dBHL for the left ear.  Speech discrimination was 96% for the right ear and 96% for the left ear.      Tympanometry testing revealed a Type A tympanogram for the right ear and a Type A tympanogram for the left ear.      Recommendations:  1. Otologic evaluation  2. Annual audiological evaluation  3. Hearing protection when in noise   4. Hearing loss consultation following medical clearance

## 2023-10-09 ENCOUNTER — HOSPITAL ENCOUNTER (OUTPATIENT)
Dept: RADIOLOGY | Facility: HOSPITAL | Age: 73
Discharge: HOME | End: 2023-10-09
Attending: UROLOGY
Payer: MEDICARE

## 2023-10-09 DIAGNOSIS — C64.1 RENAL CELL CARCINOMA OF RIGHT KIDNEY: ICD-10-CM

## 2023-10-09 PROCEDURE — 74170 CT ABD WO CNTRST FLWD CNTRST: CPT | Mod: 26,,, | Performed by: RADIOLOGY

## 2023-10-09 PROCEDURE — 25500020 PHARM REV CODE 255: Performed by: UROLOGY

## 2023-10-09 PROCEDURE — 74170 CT ABD WO CNTRST FLWD CNTRST: CPT | Mod: TC

## 2023-10-09 PROCEDURE — 74170 CT ABDOMEN W WO CONTRAST: ICD-10-PCS | Mod: 26,,, | Performed by: RADIOLOGY

## 2023-10-09 RX ADMIN — IOHEXOL 75 ML: 350 INJECTION, SOLUTION INTRAVENOUS at 10:10

## 2023-10-19 ENCOUNTER — OFFICE VISIT (OUTPATIENT)
Dept: CARDIOLOGY | Facility: CLINIC | Age: 73
End: 2023-10-19
Payer: MEDICARE

## 2023-10-19 VITALS
WEIGHT: 170.44 LBS | DIASTOLIC BLOOD PRESSURE: 67 MMHG | HEIGHT: 65 IN | BODY MASS INDEX: 28.4 KG/M2 | OXYGEN SATURATION: 98 % | HEART RATE: 82 BPM | SYSTOLIC BLOOD PRESSURE: 158 MMHG

## 2023-10-19 DIAGNOSIS — I73.9 CLAUDICATION OF RIGHT LOWER EXTREMITY: ICD-10-CM

## 2023-10-19 DIAGNOSIS — Z87.891 FORMER SMOKER: ICD-10-CM

## 2023-10-19 DIAGNOSIS — I73.9 PAD (PERIPHERAL ARTERY DISEASE): Primary | ICD-10-CM

## 2023-10-19 DIAGNOSIS — E78.2 MIXED HYPERLIPIDEMIA: ICD-10-CM

## 2023-10-19 DIAGNOSIS — I70.0 AORTIC ATHEROSCLEROSIS: ICD-10-CM

## 2023-10-19 DIAGNOSIS — I10 PRIMARY HYPERTENSION: ICD-10-CM

## 2023-10-19 DIAGNOSIS — I70.238: ICD-10-CM

## 2023-10-19 DIAGNOSIS — N18.30 STAGE 3 CHRONIC KIDNEY DISEASE, UNSPECIFIED WHETHER STAGE 3A OR 3B CKD: ICD-10-CM

## 2023-10-19 PROCEDURE — 3044F HG A1C LEVEL LT 7.0%: CPT | Mod: CPTII,S$GLB,, | Performed by: INTERNAL MEDICINE

## 2023-10-19 PROCEDURE — 99999 PR PBB SHADOW E&M-EST. PATIENT-LVL III: ICD-10-PCS | Mod: PBBFAC,,, | Performed by: INTERNAL MEDICINE

## 2023-10-19 PROCEDURE — 1159F PR MEDICATION LIST DOCUMENTED IN MEDICAL RECORD: ICD-10-PCS | Mod: CPTII,S$GLB,, | Performed by: INTERNAL MEDICINE

## 2023-10-19 PROCEDURE — 4010F PR ACE/ARB THEARPY RXD/TAKEN: ICD-10-PCS | Mod: CPTII,S$GLB,, | Performed by: INTERNAL MEDICINE

## 2023-10-19 PROCEDURE — 4010F ACE/ARB THERAPY RXD/TAKEN: CPT | Mod: CPTII,S$GLB,, | Performed by: INTERNAL MEDICINE

## 2023-10-19 PROCEDURE — 3077F SYST BP >= 140 MM HG: CPT | Mod: CPTII,S$GLB,, | Performed by: INTERNAL MEDICINE

## 2023-10-19 PROCEDURE — 3044F PR MOST RECENT HEMOGLOBIN A1C LEVEL <7.0%: ICD-10-PCS | Mod: CPTII,S$GLB,, | Performed by: INTERNAL MEDICINE

## 2023-10-19 PROCEDURE — 99215 OFFICE O/P EST HI 40 MIN: CPT | Mod: S$GLB,,, | Performed by: INTERNAL MEDICINE

## 2023-10-19 PROCEDURE — 3008F BODY MASS INDEX DOCD: CPT | Mod: CPTII,S$GLB,, | Performed by: INTERNAL MEDICINE

## 2023-10-19 PROCEDURE — 3077F PR MOST RECENT SYSTOLIC BLOOD PRESSURE >= 140 MM HG: ICD-10-PCS | Mod: CPTII,S$GLB,, | Performed by: INTERNAL MEDICINE

## 2023-10-19 PROCEDURE — 1126F AMNT PAIN NOTED NONE PRSNT: CPT | Mod: CPTII,S$GLB,, | Performed by: INTERNAL MEDICINE

## 2023-10-19 PROCEDURE — 3078F PR MOST RECENT DIASTOLIC BLOOD PRESSURE < 80 MM HG: ICD-10-PCS | Mod: CPTII,S$GLB,, | Performed by: INTERNAL MEDICINE

## 2023-10-19 PROCEDURE — 3288F PR FALLS RISK ASSESSMENT DOCUMENTED: ICD-10-PCS | Mod: CPTII,S$GLB,, | Performed by: INTERNAL MEDICINE

## 2023-10-19 PROCEDURE — 3008F PR BODY MASS INDEX (BMI) DOCUMENTED: ICD-10-PCS | Mod: CPTII,S$GLB,, | Performed by: INTERNAL MEDICINE

## 2023-10-19 PROCEDURE — 1101F PR PT FALLS ASSESS DOC 0-1 FALLS W/OUT INJ PAST YR: ICD-10-PCS | Mod: CPTII,S$GLB,, | Performed by: INTERNAL MEDICINE

## 2023-10-19 PROCEDURE — 99215 PR OFFICE/OUTPT VISIT, EST, LEVL V, 40-54 MIN: ICD-10-PCS | Mod: S$GLB,,, | Performed by: INTERNAL MEDICINE

## 2023-10-19 PROCEDURE — 3288F FALL RISK ASSESSMENT DOCD: CPT | Mod: CPTII,S$GLB,, | Performed by: INTERNAL MEDICINE

## 2023-10-19 PROCEDURE — 1126F PR PAIN SEVERITY QUANTIFIED, NO PAIN PRESENT: ICD-10-PCS | Mod: CPTII,S$GLB,, | Performed by: INTERNAL MEDICINE

## 2023-10-19 PROCEDURE — 3078F DIAST BP <80 MM HG: CPT | Mod: CPTII,S$GLB,, | Performed by: INTERNAL MEDICINE

## 2023-10-19 PROCEDURE — 1159F MED LIST DOCD IN RCRD: CPT | Mod: CPTII,S$GLB,, | Performed by: INTERNAL MEDICINE

## 2023-10-19 PROCEDURE — 1101F PT FALLS ASSESS-DOCD LE1/YR: CPT | Mod: CPTII,S$GLB,, | Performed by: INTERNAL MEDICINE

## 2023-10-19 PROCEDURE — 99999 PR PBB SHADOW E&M-EST. PATIENT-LVL III: CPT | Mod: PBBFAC,,, | Performed by: INTERNAL MEDICINE

## 2023-10-19 NOTE — PROGRESS NOTES
"[facility]      Chief Complaint   Patient presents with    Follow-up       Patient ID: Babatunde Singh is a 73 y.o. male with PAD with RLE CLI s/p left axillary to bilateral femoris profunda bypass with 8 mm PTFE (10/6/2022), HTN, HLD, history of RLE DVT, former smoker, who presents for a follow up appointment.  Pertinent history/events are as follows:     -Pt kindly referred by Dr. Avelar for evaluation of right leg pain (per her note on 7/10/2022):  "73 yo w htn here with chronic discomfort to the R LE, paresthesias and throbbing pain. Suspect claudication, without concern for acute ischemia. Will obtain US to ensure no dvt, treat v sciatica and likely dc home w outpt fu."    -At our initial clinic visit on 7/26/2022, Mr. Singh reports first noticing right leg pain approximately 4-5 years ago.  States right leg pain became acutely worse 2 weeks ago after moving furniture.  At that time he notes "shooting pain down my right leg".  He reports pain in both calves after walking less than 1 block, which is relieved with rest.  He hs no rest pain or tissue loss.  Smokes up to 1.5 packs a day for total 58 years.  RLE venous ultrasound on 7/10/2022 revealed no evidence of deep venous thrombosis in the right lower extremity.  Plan:    Right leg pain- Etiology likely multifactorial with contribution from possible lumbar degenerative disc disease with sciatica, and flow limiting PAD.  Check MRI lumbar spine and CTA abd/pelvis with iliofemoral runoff.  Check echo.  If EF is normal and CTA shows evidence of PAD, start cilostazol 100 mg bid.  Pt to start walking program with goal of at least 30 minutes a day/5 days a week.  Refer to Neurosurgery for evaluation of neurogenic claudication and RLE numbness.   HLD- Start zetia 10 mg daily.  Continue atorvastatin 80 mg daily and ASA 81 mg daily.  HTN- Continue current medications.    Smoking- Encourage smoking cessation.    Results addendum 8/11/2022:  CTA " abd/pelvis on 8/3/2022 revealed severe bilateral LE PAD.   The study also revealed a 6cm soft tissue density mass with borderline enhancement at right upper pole kidney.  Echo on 8/5/2022 with normal LV systolic function EF 70%.    Plan:  -Start cilostazol 100 mg bid  -Check CT renal mass protocol and refer to Urology for evaluation  -Results and plan discussed in detail with Mr. Singh who voiced understanding.  -Follow-up is scheduled.    -On 10/6/2022, pt underwent left axillary to bilateral femoris profunda bypass with 8 mm PTFE (Dr. Ortiz of Vascular Surgery) due to RLE critical limb ischemia.        -At follow up clinic visit on 12/16/2022, Mr. Singh reported doing well with no chest pain, SOB, TIA symptoms or syncope.  RLE appears warm and well perfused with wound vac in place.  Pt scheduled to follow up with Dr. Ortiz and wound care on 12/20/2022  Plan:   RLE CLI s/p left axillary to bilateral femoris profunda bypass with 8 mm PTFE (10/6/2022)- Pt is doing well with no significant LE claudication.  RLE appears warm and well perfused with wound vac in place.  Pt scheduled to follow up with Dr. Ortiz and wound care on 12/20/2022.  Continue ASA 81 mg daily, Xarelto 20 mg daily, atorvastatin 80 mg daily, zetia 10 mg daily.    HLD- Continue atorvastatin 80 mg daily and zetia 10 mg daily.  HTN- Continue current medications.    Smoking- Pt states he has stopped smoking.  Encourage smoking cessation.  Renal Mass- Follow  up with Urology as scheduled.     -On 2/27/2023, pt underwent combined open right radical nephrectomy and cholecystectomy.    6/20/2023 clinic visit: Mr. Singh reports doing well with no significant claudication, rest pain, or new tissue loss.  RLE wounds have completely healed.   Plan:   RLE CLI s/p left axillary to bilateral femoris profunda bypass with 8 mm PTFE (10/6/2022)- Pt is doing well with no significant LE claudication.  RLE wounds have completely healed.  Continue ASA  81 mg daily, Xarelto 20 mg daily, atorvastatin 80 mg daily, zetia 10 mg daily.    HLD- Continue atorvastatin 80 mg daily and zetia 10 mg daily.  HTN- Continue current medications.    Smoking- Pt states he has stopped smoking.  Encourage smoking cessation.    HPI:  Mr. Singh reports doing well with no significant claudication, rest pain, or new tissue loss.  Has remained smoke free.     Past Medical History:   Diagnosis Date    DVT (deep venous thrombosis)     Hypertension     Peripheral arterial disease     Right renal mass 8/11/2022     Past Surgical History:   Procedure Laterality Date    APPLICATION OF WOUND VACUUM-ASSISTED CLOSURE DEVICE Right 12/8/2022    Procedure: APPLICATION, WOUND VAC;  Surgeon: LEI Ortiz III, MD;  [facility];  Service: Peripheral Vascular;  Laterality: Right;    CHOLECYSTECTOMY N/A 2/27/2023    Procedure: CHOLECYSTECTOMY complex;  Surgeon: Tolu Gavin MD;  [facility];  Service: General;  Laterality: N/A;    COLONOSCOPY N/A 10/31/2022    Procedure: COLONOSCOPY;  Surgeon: Philip Shafer MD;  [facility]  Service: Endoscopy;  Laterality: N/A;    CREATION OF AXILLARY-FEMORAL ARTERY BYPASS WITH GRAFT N/A 10/6/2022    Procedure: CREATION, BYPASS, ARTERIAL, AXILLARY TO FEMORAL, USING GRAFT;  Surgeon: LEI Ortiz III, MD;  [facility];  Service: Peripheral Vascular;  Laterality: N/A;    CREATION OF FEMORAL-FEMORAL ARTERY BYPASS WITH GRAFT N/A 10/6/2022    Procedure: CREATION, BYPASS, ARTERIAL, FEMORAL TO FEMORAL, USING GRAFT;  Surgeon: LEI Ortiz III, MD;  [facility];  Service: Peripheral Vascular;  Laterality: N/A;    ESOPHAGOGASTRODUODENOSCOPY N/A 10/20/2022    Procedure: EGD (ESOPHAGOGASTRODUODENOSCOPY);  Surgeon: Sixto Chicas MD;  [facility]  Service: Endoscopy;  Laterality: N/A;    ESOPHAGOGASTRODUODENOSCOPY N/A 10/28/2022    Procedure: EGD  (ESOPHAGOGASTRODUODENOSCOPY);  Surgeon: Marlee Hopson MD;  Location: [facility] ENDO (2ND FLR);  Service: Gastroenterology;  Laterality: N/A;    EXCISION OF HYDROCELE Left 2022    Procedure: HYDROCELECTOMY;  Surgeon: Damion Roberts MD;  Location: [facility] OR;  Service: Urology;  Laterality: Left;    KNEE SURGERY  1972    PARTIAL NEPHRECTOMY Right 2023    Procedure: NEPHRECTOMY, PARTIAL;  Surgeon: Moises Cuba MD;  Location: [facility] OR 2ND FLR;  Service: Urology;  Laterality: Right;  Dr Gavin robotic cholecystectomy for gallbladder mass also    WOUND DEBRIDEMENT Right 2022    Procedure: DEBRIDEMENT, WOUND;  Surgeon: LEI Ortiz III, MD;  [facility];  Service: Peripheral Vascular;  Laterality: Right;  RLE wound debridement     Social History     Socioeconomic History    Marital status:    Occupational History    Occupation: [employer]   Tobacco Use    Smoking status: Former     Current packs/day: 0.00     Average packs/day: 0.5 packs/day for 55.0 years (27.5 ttl pk-yrs)     Types: Cigarettes     Start date: 10/11/1967     Quit date: 10/11/2022     Years since quittin.0    Smokeless tobacco: Never    Tobacco comments:     Cigarettes3-4 per day   Substance and Sexual Activity    Alcohol use: Never    Drug use: Never    Sexual activity: Yes     Partners: Female     Comment: wife     Social Determinants of Health     Financial Resource Strain: Unknown (2023)    Overall Financial Resource Strain (CARDIA)     Difficulty of Paying Living Expenses: Patient refused   Food Insecurity: Unknown (2023)    Hunger Vital Sign     Worried About Running Out of Food in the Last Year: Patient refused     Ran Out of Food in the Last Year: Patient refused   Transportation Needs: Unknown (2023)    PRAPARE - Transportation     Lack of Transportation (Non-Medical): Patient refused   Physical Activity: Insufficiently Active (2023)    Exercise Vital Sign     Days of Exercise per  Week: 7 days     Minutes of Exercise per Session: 10 min   Stress: Unknown (5/24/2023)    [employer] [place] [employer] - Occupational Stress Questionnaire     Feeling of Stress : Patient refused   Social Connections: Unknown (5/24/2023)    Social Connection and Isolation Panel [NHANES]     Frequency of Communication with Friends and Family: Patient refused     Frequency of Social Gatherings with Friends and Family: Patient refused     Attends Sikhism Services: Patient refused     Active Member of Clubs or Organizations: Patient refused     Attends Club or Organization Meetings: Patient refused     Marital Status: Patient refused   Housing Stability: Unknown (5/24/2023)    Housing Stability Vital Sign     Unable to Pay for Housing in the Last Year: Patient refused     Unstable Housing in the Last Year: Patient refused     Family History   Problem Relation Age of Onset    Hypertension Mother     Hypertension Father     No Known Problems Daughter     No Known Problems Son     Colon cancer Neg Hx     Esophageal cancer Neg Hx        Review of patient's allergies indicates:   Allergen Reactions    Ace inhibitors        Medication List with Changes/Refills   Current Medications    ACETAMINOPHEN-CODEINE 300-30MG (TYLENOL #3) 300-30 MG TAB    Take 1 tablet by mouth every 6 (six) hours as needed.    AMLODIPINE-OLMESARTAN (LAILA) 5-40 MG PER TABLET    Take 1 tablet by mouth once daily.    ATORVASTATIN (LIPITOR) 80 MG TABLET    Take 1 tablet by mouth once a day    DULOXETINE (CYMBALTA) 30 MG CAPSULE    Take 1 capsule (30 mg total) by mouth once daily.    EZETIMIBE (ZETIA) 10 MG TABLET    Take 1 tablet (10 mg total) by mouth once daily.    FERROUS SULFATE (IRON) 325 MG (65 MG IRON) TAB TABLET    Take 1 tablet (325 mg total) by mouth once daily.    GABAPENTIN (NEURONTIN) 800 MG TABLET    Take 1 tablet (800 mg total) by mouth 3 (three) times daily.    MULTIVITAMIN (THERAGRAN) PER TABLET    Take 1 tablet by mouth once  "daily.    RIVAROXABAN (XARELTO) 20 MG TAB    Take 1 tablet (20 mg total) by mouth daily with dinner or evening meal.       Review of Systems  Constitution: Denies chills, fever, and sweats.  HENT: Denies headaches or blurry vision.  Cardiovascular: Denies chest pain or irregular heart beat.  Respiratory: Denies cough or shortness of breath.  Gastrointestinal: Denies abdominal pain, nausea, or vomiting.  Musculoskeletal: Negative for right leg pain.   Neurological: Denies dizziness or focal weakness.  Psychiatric/Behavioral: Normal mental status.  Hematologic/Lymphatic: Denies bleeding problem or easy bruising/bleeding.  Skin: Denies rash or suspicious lesions    Physical Examination  BP (!) 158/67   Pulse 82   Ht 5' 5" (1.651 m)   Wt 77.3 kg (170 lb 6.7 oz)   SpO2 98%   BMI 28.36 kg/m²     Constitutional: No acute distress, conversant  HEENT: Sclera anicteric, Pupils equal, round and reactive to light, extraocular motions intact, Oropharynx clear  Neck: No JVD, no carotid bruits  Cardiovascular: regular rate and rhythm, no murmur, rubs or gallops, normal S1/S2  Pulmonary: Clear to auscultation bilaterally  Abdominal: Abdomen soft, nontender, nondistended, positive bowel sounds  Extremities: No lower extremity edema, healed RLE wounds   Pulses:  Carotid pulses are 2+ on the right side, and 2+ on the left side.  Radial pulses are 2+ on the right side, and 2+ on the left side.   Femoral pulses are 2+ on the right side, and 2+ on the left side.  Popliteal pulses are 2+ on the right side, and 2+ on the left side.   Dorsalis pedis pulses are 2+ on the right side, and 2+ on the left side.   Posterior tibial pulses are 2+ on the right side, and 2+ on the left side.    Skin: No ecchymosis, erythema, or ulcers  Psych: Alert and oriented x 3, appropriate affect  Neuro: CNII-XII intact, no focal deficits    Labs:  Most Recent Data  CBC:   Lab Results   Component Value Date    WBC 9.64 09/18/2023    HGB 7.1 (L) 09/18/2023 "    HCT 24.3 (L) 09/18/2023     09/18/2023    MCV 90 09/18/2023    RDW 17.3 (H) 09/18/2023     BMP:   Lab Results   Component Value Date     09/18/2023    K 4.9 09/18/2023     09/18/2023    CO2 24 09/18/2023    BUN 32 (H) 09/18/2023    CREATININE 1.6 (H) 09/18/2023     (H) 09/18/2023    CALCIUM 8.8 09/18/2023    MG 1.9 03/02/2023    PHOS 3.4 03/02/2023     LFTS;   Lab Results   Component Value Date    PROT 6.5 09/18/2023    ALBUMIN 3.3 (L) 09/18/2023    BILITOT 0.3 09/18/2023    AST 18 09/18/2023    ALKPHOS 77 09/18/2023    ALT 11 09/18/2023     COAGS:   Lab Results   Component Value Date    INR 1.1 02/15/2023     FLP:   Lab Results   Component Value Date    CHOL 138 03/10/2022    HDL 44 03/10/2022    LDLCALC 86.0 03/10/2022    TRIG 40 03/10/2022    CHOLHDL 31.9 03/10/2022     CARDIAC:   Lab Results   Component Value Date    TROPONINI 0.020 02/15/2023     (H) 10/20/2022     Imaging:    RLE Venous Ultrasound 7/10/2022:  No evidence of deep venous thrombosis in the right lower extremity.    Assessment/Plan:  Babatunde Singh is a 73 y.o. male with PAD with RLE CLI s/p left axillary to bilateral femoris profunda bypass with 8 mm PTFE (10/6/2022), HTN, HLD, history of RLE DVT, former smoker, who presents for a follow up appointment.     1. RLE CLI s/p left axillary to bilateral femoris profunda bypass with 8 mm PTFE (10/6/2022)- Pt is doing well with no significant LE claudication.  RLE wounds have completely healed.  Continue ASA 81 mg daily, Xarelto 20 mg daily, atorvastatin 80 mg daily, zetia 10 mg daily.      2. HLD- Continue atorvastatin 80 mg daily and zetia 10 mg daily.    3. HTN- Continue current medications.      4. Smoking- Pt states he has stopped smoking.  Encourage smoking cessation.    Follow up in 4 months with lipids prior    Total duration of face to face visit time 30 minutes.  Total time spent counseling greater than fifty percent of total visit time.  Counseling included  discussion regarding imaging findings, diagnosis, possibilities, treatment options, risks and benefits.  The patient had many questions regarding the options and long-term effects.    Cr Wallace MD, PhD  Interventional Cardiology

## 2023-10-19 NOTE — PATIENT INSTRUCTIONS
Assessment/Plan:  Babatunde Singh is a 73 y.o. male with PAD with RLE CLI s/p left axillary to bilateral femoris profunda bypass with 8 mm PTFE (10/6/2022), HTN, HLD, history of RLE DVT, former smoker, who presents for a follow up appointment.     1. RLE CLI s/p left axillary to bilateral femoris profunda bypass with 8 mm PTFE (10/6/2022)- Pt is doing well with no significant LE claudication.  RLE wounds have completely healed.  Continue ASA 81 mg daily, Xarelto 20 mg daily, atorvastatin 80 mg daily, zetia 10 mg daily.      2. HLD- Continue atorvastatin 80 mg daily and zetia 10 mg daily.    3. HTN- Continue current medications.      4. Smoking- Pt states he has stopped smoking.  Encourage smoking cessation.    Follow up in 4 months with lipids prior

## 2023-10-30 ENCOUNTER — TELEPHONE (OUTPATIENT)
Dept: INFUSION THERAPY | Facility: HOSPITAL | Age: 73
End: 2023-10-30
Payer: MEDICARE

## 2023-10-30 NOTE — PROGRESS NOTES
Subjective:      Babatunde Singh is a 73 y.o. male who returns today regarding his     He is doing great!!    Walking often  No claudication  Not smoking    No incisional problems.    The following portions of the patient's history were reviewed and updated as appropriate: allergies, current medications, past family history, past medical history, past social history, past surgical history and problem list.    Review of Systems  Pertinent items are noted in HPI.  A comprehensive multipoint review of systems was negative except as otherwise stated in the HPI.    Past Medical History:   Diagnosis Date    DVT (deep venous thrombosis)     Hypertension     Peripheral arterial disease     Right renal mass 8/11/2022     Past Surgical History:   Procedure Laterality Date    APPLICATION OF WOUND VACUUM-ASSISTED CLOSURE DEVICE Right 12/8/2022    Procedure: APPLICATION, WOUND VAC;  Surgeon: LEI Ortiz III, MD;  Location: [facility] FLR;  Service: Peripheral Vascular;  Laterality: Right;    CHOLECYSTECTOMY N/A 2/27/2023    Procedure: CHOLECYSTECTOMY complex;  Surgeon: Tolu Gavin MD;  [facility];  Service: General;  Laterality: N/A;    COLONOSCOPY N/A 10/31/2022    Procedure: COLONOSCOPY;  Surgeon: Philip Shafer MD;  Location: [facility] (2ND FLR);  Service: Endoscopy;  Laterality: N/A;    CREATION OF AXILLARY-FEMORAL ARTERY BYPASS WITH GRAFT N/A 10/6/2022    Procedure: CREATION, BYPASS, ARTERIAL, AXILLARY TO FEMORAL, USING GRAFT;  Surgeon: LEI Ortiz III, MD;  [facility];  Service: Peripheral Vascular;  Laterality: N/A;    CREATION OF FEMORAL-FEMORAL ARTERY BYPASS WITH GRAFT N/A 10/6/2022    Procedure: CREATION, BYPASS, ARTERIAL, FEMORAL TO FEMORAL, USING GRAFT;  Surgeon: LEI Ortiz III, MD;  [facility];  Service: Peripheral Vascular;  Laterality: N/A;    ESOPHAGOGASTRODUODENOSCOPY N/A 10/20/2022    Procedure: EGD (ESOPHAGOGASTRODUODENOSCOPY);  Surgeon:  Sixto Chicas MD;  Location: [facility] ENDO (2ND FLR);  Service: Endoscopy;  Laterality: N/A;    ESOPHAGOGASTRODUODENOSCOPY N/A 10/28/2022    Procedure: EGD (ESOPHAGOGASTRODUODENOSCOPY);  Surgeon: Marlee Hopson MD;  Location: [facility] ENDO (2ND FLR);  Service: Gastroenterology;  Laterality: N/A;    EXCISION OF HYDROCELE Left 4/26/2022    Procedure: HYDROCELECTOMY;  Surgeon: Damion Roberts MD;  Location: [facility];  Service: Urology;  Laterality: Left;    KNEE SURGERY  1972    PARTIAL NEPHRECTOMY Right 2/27/2023    Procedure: NEPHRECTOMY, PARTIAL;  Surgeon: Moises Cuba MD;  [address]  Service: Urology;  Laterality: Right;  Dr Gavin robotic cholecystectomy for gallbladder mass also    WOUND DEBRIDEMENT Right 12/8/2022    Procedure: DEBRIDEMENT, WOUND;  Surgeon: LEI Ortiz III, MD;  [address];  Service: Peripheral Vascular;  Laterality: Right;  RLE wound debridement       Review of patient's allergies indicates:   Allergen Reactions    Ace inhibitors           Objective:   Vitals: There were no vitals taken for this visit.    Physical Exam   General: alert and oriented, no acute distress  Respiratory: Symmetric expansion, non-labored breathing  Cardiovascular: no peripheral edema  Abdomen: soft, non distended  Wound well healed  No hernia  Skin: normal coloration and turgor, no rashes, no suspicious skin lesions noted  Neuro: no gross deficits  Psych: normal judgment and insight, normal mood/affect, and non-anxious    Physical Exam    Lab Review   Urinalysis demonstrates no specimen    Lab Results   Component Value Date    WBC 9.64 09/18/2023    HGB 7.1 (L) 09/18/2023    HCT 24.3 (L) 09/18/2023    HCT 20 (LL) 10/19/2022    MCV 90 09/18/2023     09/18/2023     Lab Results   Component Value Date    CREATININE 1.6 (H) 09/18/2023    BUN 32 (H) 09/18/2023     Final Pathologic Diagnosis 1. 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   - Atherosclerotic vessels.   - See CAP synoptic report and comment below.   2. GALLBLADDER AND LIVER SEGMENT 4 AND 5, RADICAL CHOLECYSTECTOMY AND PARTIAL   HEPATECTOMY:   - Gallbladder with adenomyomatosis, chronic cholecystitis, and cholelithiasis   - Liver with incidental bile duct hamartoma   - Negative for dysplasia and malignancy   - See comment   3. PORTAL LYMPH NODES, DISSECTION:   - Three lymph nodes, negative for carcinoma (0/3).   SURGICAL PATHOLOGY CANCER CASE SUMMARY-KIDNEY   Procedure: Radical nephrectomy.   Specimen laterality: Right.   Tumor site: Superior pole.   Tumor size: 7.2 cm.   Tumor focality: Unifocal.   Histologic type: Papillary renal cell carcinoma, Type 2.   Sarcomatoid features: Not identified.   Rhabdoid features: Not identified.   Histologic Grade (ISUP Grade): 3.   Tumor necrosis: Present, 15%.   Tumor extension: Limited to kidney.   Margins: Uninvolved.   Lymphovascular invasion (excluding renal vein and its segmental branches):   Not identified.   Regional lymph nodes: No regional lymph nodes submitted or found.   Distant metastases: Not applicable.   Pathologic stage (pTNM, AJCC 8th edition): pT2a pN not assigned.   Blocks for potential molecular or ancillary studies:   Tumor block: 1F.    Comment: Interp By Calli Murphy M.D., Signed on 03/07/2023 at 11:44       Imaging  CT ABDOMEN W WO CONTRAST     CLINICAL HISTORY:  stage II RCC sp right nephrectomy;Malignant neoplasm of right kidney, except renal pelvis     TECHNIQUE:  Low dose axial images were obtained from the lung bases to the pelvic inlet before and after the intravenous administration of 75 cc of Omnipaque 350 per renal mass protocol .  Sagittal and coronal reformats were provided.     COMPARISON:  Abdominal ultrasound 06/14/2023.  MR MRCP 01/04/2023.  CT abdomen 08/15/2022.     FINDINGS:  Heart: Normal in size.  No pericardial effusion. Coronary artery calcific atherosclerosis.     Lung bases:  Stable punctate right lower lobe nodule (3:7).     Liver: Normal in size.  Few punctate hypodensities, too small to characterize.  Portal and hepatic veins are patent.     Gallbladder: Surgically absent.  There is a 7 mm soft tissue nodule in the gallbladder fossa (3:47).     Bile Ducts: No intra or extrahepatic biliary ductal dilation.     Pancreas: No pancreatic mass lesion or duct dilatation.     Spleen: Unremarkable.     Adrenals: Stable nonspecific right adrenal thickening.  Left adrenal is unremarkable.     Kidneys/ Ureters: Postoperative change of right nephrectomy.  No adjacent nodular enhancement or soft tissue thickening.  Subcentimeter left renal hypodensity, too small to characterize.  No left nephrolithiasis or hydroureteronephrosis.     GI Tract/Mesentery: The stomach is unremarkable.  No evidence for bowel obstruction or inflammation. Appendix unremarkable.  Colonic diverticulosis without evidence of acute diverticulitis.     Peritoneal Space: No intraperitoneal free fluid or free air.     Lymph nodes: No significant adenopathy.     Abdominal wall/extraperitoneal soft tissues: Unremarkable.     Vasculature: Postoperative change of left-sided axillary-femoral bypass, partially visualized.  Abdominal aorta is normal in caliber.  Advanced calcific atherosclerosis.  Suspected occlusion at the level of the right common iliac artery, and at least high-grade stenosis of the left common iliac artery.     Bones: Degenerative changes without acute displaced fracture or bone destructive process.     Impression:     1. Postoperative change of right nephrectomy.  No evidence for local recurrence.  2. Postoperative change of cholecystectomy.  7 mm soft tissue nodule in the gallbladder fossa.  Indeterminate, continued imaging surveillance recommended.  3. Stable punctate right lower lobe pulmonary nodule.  4. Postoperative change axillary-femoral bypass.  Continued occlusion/near-occlusion of the level of the common  iliac arteries.  5. Additional findings as above.  This report was flagged in Epic as abnormal.     Electronically signed by resident: Dl Chavarria  Date:                                            10/09/2023  Time:                                           11:40     Electronically signed by: Celio Cordon MD  Date:                                            10/09/2023  Time:                                           12:28           Exam Ended: 10/09/23 10:59           Assessment and Plan:   Renal cell carcinoma of right kidney  Grade II papillary pT2 cN0 cM0 JUN sp nephrectomy     CT scheduled for next week  RTC 6 months with CMP, CT ab and pelvis with and without IV and po to see NP     Hydrocele, bilateral  Not symptomatic    See surg onc, Dr Gavin,  re nodule in gallbladder fossa

## 2023-10-31 ENCOUNTER — OFFICE VISIT (OUTPATIENT)
Dept: UROLOGY | Facility: CLINIC | Age: 73
End: 2023-10-31
Payer: MEDICARE

## 2023-10-31 VITALS
OXYGEN SATURATION: 95 % | RESPIRATION RATE: 16 BRPM | WEIGHT: 168.44 LBS | BODY MASS INDEX: 28.06 KG/M2 | HEART RATE: 86 BPM | HEIGHT: 65 IN | SYSTOLIC BLOOD PRESSURE: 162 MMHG | DIASTOLIC BLOOD PRESSURE: 77 MMHG

## 2023-10-31 DIAGNOSIS — C64.1 RENAL CELL CARCINOMA OF RIGHT KIDNEY: Primary | ICD-10-CM

## 2023-10-31 PROCEDURE — 3078F DIAST BP <80 MM HG: CPT | Mod: CPTII,S$GLB,, | Performed by: UROLOGY

## 2023-10-31 PROCEDURE — 1126F PR PAIN SEVERITY QUANTIFIED, NO PAIN PRESENT: ICD-10-PCS | Mod: CPTII,S$GLB,, | Performed by: UROLOGY

## 2023-10-31 PROCEDURE — 99214 OFFICE O/P EST MOD 30 MIN: CPT | Mod: S$GLB,,, | Performed by: UROLOGY

## 2023-10-31 PROCEDURE — 3077F SYST BP >= 140 MM HG: CPT | Mod: CPTII,S$GLB,, | Performed by: UROLOGY

## 2023-10-31 PROCEDURE — 3077F PR MOST RECENT SYSTOLIC BLOOD PRESSURE >= 140 MM HG: ICD-10-PCS | Mod: CPTII,S$GLB,, | Performed by: UROLOGY

## 2023-10-31 PROCEDURE — 3044F PR MOST RECENT HEMOGLOBIN A1C LEVEL <7.0%: ICD-10-PCS | Mod: CPTII,S$GLB,, | Performed by: UROLOGY

## 2023-10-31 PROCEDURE — 3008F PR BODY MASS INDEX (BMI) DOCUMENTED: ICD-10-PCS | Mod: CPTII,S$GLB,, | Performed by: UROLOGY

## 2023-10-31 PROCEDURE — 4010F PR ACE/ARB THEARPY RXD/TAKEN: ICD-10-PCS | Mod: CPTII,S$GLB,, | Performed by: UROLOGY

## 2023-10-31 PROCEDURE — 1159F MED LIST DOCD IN RCRD: CPT | Mod: CPTII,S$GLB,, | Performed by: UROLOGY

## 2023-10-31 PROCEDURE — 4010F ACE/ARB THERAPY RXD/TAKEN: CPT | Mod: CPTII,S$GLB,, | Performed by: UROLOGY

## 2023-10-31 PROCEDURE — 1101F PT FALLS ASSESS-DOCD LE1/YR: CPT | Mod: CPTII,S$GLB,, | Performed by: UROLOGY

## 2023-10-31 PROCEDURE — 1101F PR PT FALLS ASSESS DOC 0-1 FALLS W/OUT INJ PAST YR: ICD-10-PCS | Mod: CPTII,S$GLB,, | Performed by: UROLOGY

## 2023-10-31 PROCEDURE — 99214 PR OFFICE/OUTPT VISIT, EST, LEVL IV, 30-39 MIN: ICD-10-PCS | Mod: S$GLB,,, | Performed by: UROLOGY

## 2023-10-31 PROCEDURE — 1159F PR MEDICATION LIST DOCUMENTED IN MEDICAL RECORD: ICD-10-PCS | Mod: CPTII,S$GLB,, | Performed by: UROLOGY

## 2023-10-31 PROCEDURE — 1126F AMNT PAIN NOTED NONE PRSNT: CPT | Mod: CPTII,S$GLB,, | Performed by: UROLOGY

## 2023-10-31 PROCEDURE — 3288F FALL RISK ASSESSMENT DOCD: CPT | Mod: CPTII,S$GLB,, | Performed by: UROLOGY

## 2023-10-31 PROCEDURE — 3008F BODY MASS INDEX DOCD: CPT | Mod: CPTII,S$GLB,, | Performed by: UROLOGY

## 2023-10-31 PROCEDURE — 3078F PR MOST RECENT DIASTOLIC BLOOD PRESSURE < 80 MM HG: ICD-10-PCS | Mod: CPTII,S$GLB,, | Performed by: UROLOGY

## 2023-10-31 PROCEDURE — 3288F PR FALLS RISK ASSESSMENT DOCUMENTED: ICD-10-PCS | Mod: CPTII,S$GLB,, | Performed by: UROLOGY

## 2023-10-31 PROCEDURE — 3044F HG A1C LEVEL LT 7.0%: CPT | Mod: CPTII,S$GLB,, | Performed by: UROLOGY

## 2023-11-02 ENCOUNTER — INFUSION (OUTPATIENT)
Dept: INFUSION THERAPY | Facility: HOSPITAL | Age: 73
End: 2023-11-02
Payer: MEDICARE

## 2023-11-02 VITALS
RESPIRATION RATE: 16 BRPM | TEMPERATURE: 98 F | DIASTOLIC BLOOD PRESSURE: 70 MMHG | OXYGEN SATURATION: 96 % | HEART RATE: 81 BPM | SYSTOLIC BLOOD PRESSURE: 156 MMHG

## 2023-11-02 DIAGNOSIS — D50.9 IRON DEFICIENCY ANEMIA, UNSPECIFIED IRON DEFICIENCY ANEMIA TYPE: Primary | ICD-10-CM

## 2023-11-02 PROCEDURE — 96365 THER/PROPH/DIAG IV INF INIT: CPT

## 2023-11-02 PROCEDURE — 63600175 PHARM REV CODE 636 W HCPCS: Mod: JZ,JG | Performed by: INTERNAL MEDICINE

## 2023-11-02 PROCEDURE — 25000003 PHARM REV CODE 250: Performed by: INTERNAL MEDICINE

## 2023-11-02 RX ORDER — HEPARIN 100 UNIT/ML
500 SYRINGE INTRAVENOUS
Status: DISCONTINUED | OUTPATIENT
Start: 2023-11-02 | End: 2023-11-02 | Stop reason: HOSPADM

## 2023-11-02 RX ORDER — SODIUM CHLORIDE 0.9 % (FLUSH) 0.9 %
10 SYRINGE (ML) INJECTION
Status: DISCONTINUED | OUTPATIENT
Start: 2023-11-02 | End: 2023-11-02 | Stop reason: HOSPADM

## 2023-11-02 RX ADMIN — FERRIC CARBOXYMALTOSE INJECTION 750 MG: 50 INJECTION, SOLUTION INTRAVENOUS at 01:11

## 2023-11-02 RX ADMIN — SODIUM CHLORIDE: 9 INJECTION, SOLUTION INTRAVENOUS at 12:11

## 2023-11-02 NOTE — PLAN OF CARE
Patient tolerated injectafer today. No reaction noted after 30 min obs. Pt given calendar, pt should get D8 on 11/9- pt is on waitlist for appt. Discharged home in stable condition, ambulated independently.

## 2023-11-06 ENCOUNTER — TELEPHONE (OUTPATIENT)
Dept: HEMATOLOGY/ONCOLOGY | Facility: CLINIC | Age: 73
End: 2023-11-06
Payer: MEDICARE

## 2023-11-09 ENCOUNTER — INFUSION (OUTPATIENT)
Dept: INFUSION THERAPY | Facility: HOSPITAL | Age: 73
End: 2023-11-09
Payer: MEDICARE

## 2023-11-09 VITALS
SYSTOLIC BLOOD PRESSURE: 170 MMHG | HEART RATE: 75 BPM | DIASTOLIC BLOOD PRESSURE: 77 MMHG | TEMPERATURE: 98 F | RESPIRATION RATE: 19 BRPM | OXYGEN SATURATION: 99 %

## 2023-11-09 DIAGNOSIS — D50.9 IRON DEFICIENCY ANEMIA, UNSPECIFIED IRON DEFICIENCY ANEMIA TYPE: Primary | ICD-10-CM

## 2023-11-09 PROCEDURE — 96365 THER/PROPH/DIAG IV INF INIT: CPT

## 2023-11-09 PROCEDURE — 63600175 PHARM REV CODE 636 W HCPCS: Mod: JZ,JG | Performed by: INTERNAL MEDICINE

## 2023-11-09 PROCEDURE — 25000003 PHARM REV CODE 250: Performed by: INTERNAL MEDICINE

## 2023-11-09 RX ORDER — SODIUM CHLORIDE 0.9 % (FLUSH) 0.9 %
10 SYRINGE (ML) INJECTION
Status: DISCONTINUED | OUTPATIENT
Start: 2023-11-09 | End: 2023-11-09 | Stop reason: HOSPADM

## 2023-11-09 RX ORDER — HEPARIN 100 UNIT/ML
500 SYRINGE INTRAVENOUS
Status: DISCONTINUED | OUTPATIENT
Start: 2023-11-09 | End: 2023-11-09 | Stop reason: HOSPADM

## 2023-11-09 RX ADMIN — SODIUM CHLORIDE: 9 INJECTION, SOLUTION INTRAVENOUS at 07:11

## 2023-11-09 RX ADMIN — FERRIC CARBOXYMALTOSE INJECTION 750 MG: 50 INJECTION, SOLUTION INTRAVENOUS at 07:11

## 2023-11-09 NOTE — PLAN OF CARE
Pt ambulatory to clinic alone for D2 Injectafer infusion. Denies any complaints. PIV started without difficulty. Pt tolerated infusion well. OBS after, no adverse reaction noted. PIV removed with catheter intact. Ambulatory from clinic in [facility].

## 2023-11-11 DIAGNOSIS — E78.5 HYPERLIPIDEMIA, UNSPECIFIED HYPERLIPIDEMIA TYPE: ICD-10-CM

## 2023-11-11 NOTE — TELEPHONE ENCOUNTER
Care Due:                  Date            Visit Type   Department     Provider  --------------------------------------------------------------------------------                                EP -                              PRIMARY      NOM INTERNAL  Last Visit: 05-      [facility] ([facility])   MEDICINE       REID GRIFFITH  Next Visit: None Scheduled  None         None Found                                                            Last  Test          Frequency    Reason                     Performed    Due Date  --------------------------------------------------------------------------------    Lipid Panel.  12 months..  atorvastatin, ezetimibe..  03-   03-    [facility] Embedded Care Due Messages. Reference number: [id number].   11/11/2023 10:27:25 AM CST

## 2023-11-12 NOTE — TELEPHONE ENCOUNTER
Refill Routing Note   Medication(s) are not appropriate for processing by [facility] for the following reason(s):      Required labs outdated    [facility] action(s):  Defer Care Due:  Labs due          Appointments  past 12m or future 3m with PCP    Date Provider   Last Visit   5/22/2023 Esdras Ly MD   Next Visit   Visit date not found Esdras Ly MD   ED visits in past 90 days: 0        Note composed:11:39 PM 11/11/2023

## 2023-11-13 RX ORDER — ATORVASTATIN CALCIUM 80 MG/1
80 TABLET, FILM COATED ORAL
Qty: 90 TABLET | Refills: 0 | Status: SHIPPED | OUTPATIENT
Start: 2023-11-13 | End: 2024-01-16 | Stop reason: SDUPTHER

## 2024-01-04 ENCOUNTER — OFFICE VISIT (OUTPATIENT)
Dept: PODIATRY | Facility: CLINIC | Age: 74
End: 2024-01-04
Payer: MEDICARE

## 2024-01-04 ENCOUNTER — PATIENT OUTREACH (OUTPATIENT)
Dept: ADMINISTRATIVE | Facility: HOSPITAL | Age: 74
End: 2024-01-04
Payer: MEDICARE

## 2024-01-04 VITALS
RESPIRATION RATE: 18 BRPM | SYSTOLIC BLOOD PRESSURE: 163 MMHG | HEART RATE: 101 BPM | DIASTOLIC BLOOD PRESSURE: 78 MMHG | BODY MASS INDEX: 27.99 KG/M2 | HEIGHT: 65 IN | WEIGHT: 168 LBS

## 2024-01-04 DIAGNOSIS — N18.30 STAGE 3 CHRONIC KIDNEY DISEASE, UNSPECIFIED WHETHER STAGE 3A OR 3B CKD: ICD-10-CM

## 2024-01-04 DIAGNOSIS — B35.1 ONYCHOMYCOSIS DUE TO DERMATOPHYTE: ICD-10-CM

## 2024-01-04 DIAGNOSIS — I73.9 PAD (PERIPHERAL ARTERY DISEASE): Primary | ICD-10-CM

## 2024-01-04 PROCEDURE — 99999 PR PBB SHADOW E&M-EST. PATIENT-LVL III: CPT | Mod: PBBFAC,,, | Performed by: PODIATRIST

## 2024-01-04 PROCEDURE — 11721 DEBRIDE NAIL 6 OR MORE: CPT | Mod: Q9,S$GLB,, | Performed by: PODIATRIST

## 2024-01-04 PROCEDURE — 99499 UNLISTED E&M SERVICE: CPT | Mod: S$GLB,,, | Performed by: PODIATRIST

## 2024-01-04 NOTE — PROGRESS NOTES
Updates were requested from care everywhere.  Health Maintenance has been updated.  LINKS immunization registry triggered.  Immunizations were reconciled.  Per KILEY in basket message, pt declined flu vaccine 01/04/2024.

## 2024-01-04 NOTE — PROGRESS NOTES
Subjective:     Patient ID: Babatunde Singh is a 73 y.o. male.    Chief Complaint: PAD  (Irma Sol, NP  5/24/23/Internal Medicine/), Nail Care, and Toe Pain (Left big toe )    Babatunde is a 73 y.o. male who presents to the clinic for evaluation and treatment of high risk feet. Babatunde has a past medical history of DVT (deep venous thrombosis), Hypertension, Peripheral arterial disease, and Right renal mass (8/11/2022). The patient's chief complaint is long, thick toenails. This patient has documented high risk feet requiring routine maintenance secondary to peripheral vascular disease.    PCP: Esdras Ly MD    Date Last Seen by PCP:   Chief Complaint   Patient presents with    PAD      Irma oSl NP  5/24/23  Internal Medicine      Nail Care    Toe Pain     Left big toe        Last encounter in this department: 9/13/2023    Hemoglobin A1C   Date Value Ref Range Status   06/07/2023 4.6 4.0 - 5.6 % Final     Comment:     ADA Screening Guidelines:  5.7-6.4%  Consistent with prediabetes  >or=6.5%  Consistent with diabetes    High levels of fetal hemoglobin interfere with the HbA1C  assay. Heterozygous hemoglobin variants (HbS, HgC, etc)do  not significantly interfere with this assay.   However, presence of multiple variants may affect accuracy.     03/10/2022 5.8 (H) 4.0 - 5.6 % Final     Comment:     ADA Screening Guidelines:  5.7-6.4%  Consistent with prediabetes  >or=6.5%  Consistent with diabetes    High levels of fetal hemoglobin interfere with the HbA1C  assay. Heterozygous hemoglobin variants (HbS, HgC, etc)do  not significantly interfere with this assay.   However, presence of multiple variants may affect accuracy.         Review of Systems   Constitutional: Negative for chills, decreased appetite and fever.   Cardiovascular:  Negative for leg swelling.   Skin:  Positive for nail changes. Negative for color change, flushing and itching.   Musculoskeletal:  Negative for arthritis,  joint pain, joint swelling and myalgias.   Gastrointestinal:  Negative for nausea and vomiting.   Neurological:  Negative for loss of balance, numbness and paresthesias.          Patient Active Problem List   Diagnosis    Primary hypertension    Mixed hyperlipidemia    Claudication of right lower extremity    Sciatica of right side    Allodynia (Right leg)    PAD (peripheral artery disease)    Critical limb ischemia of right lower extremity with ulceration of lower leg    DVT (deep venous thrombosis)    Tachycardia    Leg wound, right, subsequent encounter    Tobacco use disorder, severe, in early remission    Impaired fasting glucose    Diverticulosis of colon    Chronic bronchitis    Normocytic anemia    Iron deficiency anemia    Chronic fatigue    Aortic atherosclerosis    Stage 3 chronic kidney disease    Senile purpura    Peripheral polyneuropathy    Renal cell carcinoma of right kidney    Former smoker       Current Outpatient Medications on File Prior to Visit   Medication Sig Dispense Refill    acetaminophen-codeine 300-30mg (TYLENOL #3) 300-30 mg Tab Take 1 tablet by mouth every 6 (six) hours as needed.      amlodipine-olmesartan (LAILA) 5-40 mg per tablet Take 1 tablet by mouth once daily. 90 tablet 3    atorvastatin (LIPITOR) 80 MG tablet TAKE 1 TABLET BY MOUTH ONCE A DAY 90 tablet 0    DULoxetine (CYMBALTA) 30 MG capsule Take 1 capsule (30 mg total) by mouth once daily. 30 capsule 11    ezetimibe (ZETIA) 10 mg tablet Take 1 tablet (10 mg total) by mouth once daily. 90 tablet 0    ferrous sulfate (IRON) 325 mg (65 mg iron) Tab tablet Take 1 tablet (325 mg total) by mouth once daily. 90 tablet 3    multivitamin (THERAGRAN) per tablet Take 1 tablet by mouth once daily.      rivaroxaban (XARELTO) 20 mg Tab Take 1 tablet (20 mg total) by mouth daily with dinner or evening meal. 30 tablet 11    gabapentin (NEURONTIN) 800 MG tablet Take 1 tablet (800 mg total) by mouth 3 (three) times daily. 90 tablet 11     No  current facility-administered medications on file prior to visit.       Review of patient's allergies indicates:   Allergen Reactions    Ace inhibitors        Past Surgical History:   Procedure Laterality Date    APPLICATION OF WOUND VACUUM-ASSISTED CLOSURE DEVICE Right 12/8/2022    Procedure: APPLICATION, WOUND VAC;  Surgeon: LEI Ortiz III, MD;  Location: [facility] OR 2ND FLR;  Service: Peripheral Vascular;  Laterality: Right;    CHOLECYSTECTOMY N/A 2/27/2023    Procedure: CHOLECYSTECTOMY complex;  Surgeon: Tolu Gavin MD;  Location: [facility] OR [facility] FLR;  Service: General;  Laterality: N/A;    COLONOSCOPY N/A 10/31/2022    Procedure: COLONOSCOPY;  Surgeon: Philip Shafer MD;  Location: [facility] ENDO (2ND FLR);  Service: Endoscopy;  Laterality: N/A;    CREATION OF AXILLARY-FEMORAL ARTERY BYPASS WITH GRAFT N/A 10/6/2022    Procedure: CREATION, BYPASS, ARTERIAL, AXILLARY TO FEMORAL, USING GRAFT;  Surgeon: LEI Ortiz III, MD;  [facility];  Service: Peripheral Vascular;  Laterality: N/A;    CREATION OF FEMORAL-FEMORAL ARTERY BYPASS WITH GRAFT N/A 10/6/2022    Procedure: CREATION, BYPASS, ARTERIAL, FEMORAL TO FEMORAL, USING GRAFT;  Surgeon: LEI Ortiz III, MD;  [address];  Service: Peripheral Vascular;  Laterality: N/A;    ESOPHAGOGASTRODUODENOSCOPY N/A 10/20/2022    Procedure: EGD (ESOPHAGOGASTRODUODENOSCOPY);  Surgeon: Sixto Chicas MD;  Location: [facility] ENDO (2ND FLR);  Service: Endoscopy;  Laterality: N/A;    ESOPHAGOGASTRODUODENOSCOPY N/A 10/28/2022    Procedure: EGD (ESOPHAGOGASTRODUODENOSCOPY);  Surgeon: Marlee Hopson MD;  Location: [facility] ENDO (2ND FLR);  Service: Gastroenterology;  Laterality: N/A;    EXCISION OF HYDROCELE Left 4/26/2022    Procedure: HYDROCELECTOMY;  Surgeon: Damion Roberts MD;  Location: [facility];  Service: Urology;  Laterality: Left;    KNEE SURGERY  1972    PARTIAL NEPHRECTOMY Right 2/27/2023    Procedure: NEPHRECTOMY, PARTIAL;  Surgeon: Moises  ANTONETTE Cuba MD;  Location: [facility] OR [address] [place] [address];  Service: Urology;  Laterality: Right;  Dr Gavin robotic cholecystectomy for gallbladder mass also    WOUND DEBRIDEMENT Right 2022    Procedure: DEBRIDEMENT, WOUND;  Surgeon: LEI Ortiz III, MD;  Location: [facility] OR [address] [place] [address];  Service: Peripheral Vascular;  Laterality: Right;  RLE wound debridement       Family History   Problem Relation Age of Onset    Hypertension Mother     Hypertension Father     No Known Problems Daughter     No Known Problems Son     Colon cancer Neg Hx     Esophageal cancer Neg Hx        Social History     Socioeconomic History    Marital status:    Occupational History    Occupation: [employer]   Tobacco Use    Smoking status: Former     Current packs/day: 0.00     Average packs/day: 0.5 packs/day for 55.0 years (27.5 ttl pk-yrs)     Types: Cigarettes     Start date: 10/11/1967     Quit date: 10/11/2022     Years since quittin.2    Smokeless tobacco: Never    Tobacco comments:     Cigarettes3-4 per day   Substance and Sexual Activity    Alcohol use: Never    Drug use: Never    Sexual activity: Yes     Partners: Female     Comment: wife     Social Determinants of Health     Financial Resource Strain: Patient Declined (2023)    Overall Financial Resource Strain (CARDIA)     Difficulty of Paying Living Expenses: Patient declined   Food Insecurity: Patient Declined (2023)    Hunger Vital Sign     Worried About Running Out of Food in the Last Year: Patient declined     Ran Out of Food in the Last Year: Patient declined   Transportation Needs: Unknown (2023)    PRAPARE - Transportation     Lack of Transportation (Non-Medical): Patient declined   Physical Activity: Insufficiently Active (2023)    Exercise Vital Sign     Days of Exercise per Week: 7 days     Minutes of Exercise per Session: 10 min   Stress: Patient Declined (2023)    [employer] - Occupational Stress Questionnaire     " Feeling of Stress : Patient declined   Social Connections: Patient Declined (5/24/2023)    Social Connection and Isolation Panel [NHANES]     Frequency of Communication with Friends and Family: Patient declined     Frequency of Social Gatherings with Friends and Family: Patient declined     Attends Mormon Services: Patient declined     Active Member of Clubs or Organizations: Patient declined     Attends Club or Organization Meetings: Patient declined     Marital Status: Patient declined   Housing Stability: Unknown (5/24/2023)    Housing Stability Vital Sign     Unable to Pay for Housing in the Last Year: Patient refused     Unstable Housing in the Last Year: Patient refused           Objective:     Vitals:    01/04/24 0859   BP: (!) 163/78   Pulse: 101   Resp: 18   Weight: 76.2 kg (168 lb)   Height: 5' 5" (1.651 m)   PainSc: 0-No pain        Physical Exam  Constitutional:       Appearance: He is well-developed.   Cardiovascular:      Comments: Dorsalis pedis and posterior tibial pulses are decreased Bilaterally. Toes are cool to touch. Feet are warm proximally.There is decreased digital hair. Skin is atrophic, slightly hyperpigmented, and mildly edematous      Musculoskeletal:         General: No tenderness. Normal range of motion.      Comments: Adequate joint range of motion without pain, limitation, nor crepitation Bilateral feet and ankle joints. Muscle strength is 5/5 in all groups bilaterally.         Skin:     General: Skin is warm and dry.      Coloration: Skin is not ashen.      Findings: No ecchymosis, erythema or lesion.      Comments: Nails x10 are elongated by  2-4mm's, thickened by 2-5 mm's, dystrophic, and are darkened in  coloration . Xerosis Bilaterally. No open lesions noted.       Neurological:      Mental Status: He is alert and oriented to person, place, and time.      Comments: [place] 5.07 monofilamant testing is diminished Logan feet. Sharp/dull sensation diminished Bilaterally. " Light touch absent Bilaterally.       Psychiatric:         Behavior: Behavior normal.           Assessment:      Encounter Diagnoses   Name Primary?    PAD (peripheral artery disease) Yes    Stage 3 chronic kidney disease, unspecified whether stage 3a or 3b CKD     Onychomycosis due to dermatophyte      Plan:     Babatunde was seen today for pad , nail care and toe pain.    Diagnoses and all orders for this visit:    PAD (peripheral artery disease)    Stage 3 chronic kidney disease, unspecified whether stage 3a or 3b CKD    Onychomycosis due to dermatophyte      I counseled the patient on his conditions, their implications and medical management.        - Shoe inspection. Patient instructed on proper foot hygeine. We discussed wearing proper shoe gear, daily foot inspections, never walking without protective shoe gear, never putting sharp instruments to feet, routine podiatric nail visits every 2-3 months.      - With patient's permission, nails were aggressively reduced and debrided x 10 to their soft tissue attachment mechanically removing all offending nail and debris. Patient relates relief following the procedure. He will continue to monitor the areas daily, inspect his feet, wear protective shoe gear when ambulatory, moisturizer to maintain skin integrity and follow in this office in approximately 2-3 months, sooner p.r.n.

## 2024-01-12 ENCOUNTER — TELEPHONE (OUTPATIENT)
Dept: INTERNAL MEDICINE | Facility: CLINIC | Age: 74
End: 2024-01-12
Payer: MEDICARE

## 2024-01-12 DIAGNOSIS — I73.9 PERIPHERAL ARTERIAL DISEASE: ICD-10-CM

## 2024-01-12 RX ORDER — GABAPENTIN 800 MG/1
800 TABLET ORAL 3 TIMES DAILY
Qty: 90 TABLET | Refills: 11 | Status: SHIPPED | OUTPATIENT
Start: 2024-01-12 | End: 2024-01-16

## 2024-01-12 NOTE — TELEPHONE ENCOUNTER
----- Message from Sharon Meyer sent at 1/12/2024  8:40 AM CST -----  Type:  RX Refill Request     Who Called: GODWIN RASHID [[id number]]    Refill or New Rx: refill     RX Name and Strength: gabapentin (NEURONTIN) 800 MG tablet    How is the patient currently taking it? (ex. 1XDay): Take 1 tablet (800 mg total) by mouth 3 (three) times daily. - Oral    Is this a 30 day or 90 day RX: 90 days     Preferred Pharmacy with phone number: [facility]/pharmacy #[id number] - [place] - [address]   Phone: [phone]  Fax: [phone]        Local or Mail Order: local     Would the patient rather a call back or a response via [facility]? Call back     Best Call Back Number: [phone]    Additional Information:

## 2024-01-13 DIAGNOSIS — I73.9 PERIPHERAL ARTERIAL DISEASE: ICD-10-CM

## 2024-01-13 NOTE — TELEPHONE ENCOUNTER
Care Due:                  Date            Visit Type   Department     Provider  --------------------------------------------------------------------------------                                EP -                              PRIMARY      NOM INTERNAL  Last Visit: 05-      [facility] ([facility])   MEDICINE       REID GRIFFITH  Next Visit: None Scheduled  None         None Found                                                            Last  Test          Frequency    Reason                     Performed    Due Date  --------------------------------------------------------------------------------    Lipid Panel.  12 months..  atorvastatin, ezetimibe..  03-   03-    [facility] Embedded Care Due Messages. Reference number: [id number].   1/13/2024 10:03:11 AM CST

## 2024-01-16 DIAGNOSIS — E78.5 HYPERLIPIDEMIA, UNSPECIFIED HYPERLIPIDEMIA TYPE: ICD-10-CM

## 2024-01-16 DIAGNOSIS — I73.9 PERIPHERAL ARTERIAL DISEASE: ICD-10-CM

## 2024-01-16 RX ORDER — GABAPENTIN 800 MG/1
800 TABLET ORAL 3 TIMES DAILY
Qty: 90 TABLET | Refills: 11 | Status: SHIPPED | OUTPATIENT
Start: 2024-01-16 | End: 2025-01-15

## 2024-01-16 NOTE — TELEPHONE ENCOUNTER
Refill Routing Note   Medication(s) are not appropriate for processing by [facility] for the following reason(s):        Outside of protocol    [facility] action(s):  Route     Requires labs : Yes      Medication Therapy Plan: Duplicate; Prev encounter went to [facility]. This is a [facility] pharmacy request that changed from Jermain Wray      Appointments  past 12m or future 3m with PCP    Date Provider   Last Visit   5/22/2023 Esdras Ly MD   Next Visit   1/13/2024 Esdras Ly MD   ED visits in past 90 days: 0        Note composed:12:39 PM 01/16/2024

## 2024-01-16 NOTE — TELEPHONE ENCOUNTER
----- Message from Jenny Mattson sent at 1/16/2024 11:04 AM CST -----  Type:  RX Refill Request    Who Called: pt   Refill or New Rx:refill  RX Name and Strength:gabapentin (NEURONTIN) 800 MG tablet  ferrous sulfate (IRON) 325 mg (65 mg iron) Tab tablet (Dr. Ye is no longer with [facility])  Low on chlestrol medication (didn't have name of it during call he said he needs it refilled)  Preferred Pharmacy with phone number:[facility]/pharmacy #[id number] - [place] [address]  Local or Mail Order:local   Ordering Provider:aurea  Would the patient rather a call back or a response via [facility]? Call   Best Call Back Number:[phone]  Additional Information:

## 2024-01-16 NOTE — TELEPHONE ENCOUNTER
No care due was identified.  [facility] Embedded Care Due Messages. Reference number: [id number].   1/16/2024 2:15:17 PM CST

## 2024-01-17 RX ORDER — GABAPENTIN 800 MG/1
800 TABLET ORAL 3 TIMES DAILY
Qty: 90 TABLET | Refills: 11 | OUTPATIENT
Start: 2024-01-17 | End: 2025-01-16

## 2024-01-17 NOTE — TELEPHONE ENCOUNTER
Refill Routing Note   Medication(s) are not appropriate for processing by [facility] for the following reason(s):        Outside of protocol  Required labs outdated: FLOS 3.14.2024  Non-delegated duplicate: gabapentin (pharmd please QDC)      [facility] action(s):  Defer  Route      Medication Therapy Plan:       Pharmacist review requested: Yes     Appointments  past 12m or future 3m with PCP    Date Provider   Last Visit   5/22/2023 Esdras Ly MD   Next Visit   Visit date not found Esdras Ly MD   ED visits in past 90 days: 0        Note composed:12:05 PM 01/17/2024

## 2024-01-17 NOTE — TELEPHONE ENCOUNTER
Refill Routing Note   Medication(s) are not appropriate for processing by [facility] for the following reason(s):        Outside of protocol: Ferrous Sulfate  Required labs outdated: Lipitor (lipid panel)    [facility] action(s):  Defer  Route  Quick Discontinue             Pharmacist review requested: Yes     Appointments  past 12m or future 3m with PCP    Date Provider   Last Visit   5/22/2023 Esdras yL MD   Next Visit   Visit date not found Esdras Ly MD   ED visits in past 90 days: 0        Note composed:2:21 PM 01/17/2024

## 2024-01-18 RX ORDER — FERROUS SULFATE 325(65) MG
325 TABLET ORAL DAILY
Qty: 90 TABLET | Refills: 3 | Status: SHIPPED | OUTPATIENT
Start: 2024-01-18

## 2024-01-18 RX ORDER — ATORVASTATIN CALCIUM 80 MG/1
80 TABLET, FILM COATED ORAL DAILY
Qty: 90 TABLET | Refills: 3 | Status: SHIPPED | OUTPATIENT
Start: 2024-01-18

## 2024-01-25 ENCOUNTER — TELEPHONE (OUTPATIENT)
Dept: INTERNAL MEDICINE | Facility: CLINIC | Age: 74
End: 2024-01-25
Payer: MEDICARE

## 2024-01-25 NOTE — TELEPHONE ENCOUNTER
Spoke with pt, he was able to get the rx's and want medication sent to [place] [id number]. [facility]. Discontinued the the other [facility] location.

## 2024-01-25 NOTE — TELEPHONE ENCOUNTER
----- Message from Zen Humphreys sent at 1/25/2024  3:53 PM CST -----  Contact: Self [phone]  Prescription refill request.  RX name and strength (copy/paste from chart):   Blood thinner for the pt's leg  Pharmacy name and phone # (copy/paste from chart):   [id number] - [place] - [address]    Additional information:

## 2024-01-25 NOTE — TELEPHONE ENCOUNTER
----- Message from eKy Arango LPN sent at 1/23/2024  7:54 AM CST -----  Regarding: medication refill  Good morning,    Mr Singh has called our office multiple times leaving a message about refilling his Gabapentin.  Not sure why he is calling us at Dr. Ly is his PCP and ordered the medication for him.  Can someone from your office please contact him regarding hie request at [phone].    Sincerely,  Key Arango LPN  [facility]  5th floor clinic

## 2024-01-26 RX ORDER — RIVAROXABAN 20 MG/1
20 TABLET, FILM COATED ORAL
Qty: 30 TABLET | Refills: 11 | Status: SHIPPED | OUTPATIENT
Start: 2024-01-26

## 2024-01-26 NOTE — TELEPHONE ENCOUNTER
Refill Routing Note   Medication(s) are not appropriate for processing by [facility] for the following reason(s):        Outside of protocol    [facility] action(s):  Route               Appointments  past 12m or future 3m with PCP    Date Provider   Last Visit   5/22/2023 Esdras Ly MD   Next Visit   5/3/2024 Esdras Ly MD   ED visits in past 90 days: 0        Note composed:10:49 AM 01/26/2024

## 2024-02-08 ENCOUNTER — OFFICE VISIT (OUTPATIENT)
Dept: OTOLARYNGOLOGY | Facility: CLINIC | Age: 74
End: 2024-02-08
Payer: MEDICARE

## 2024-02-08 DIAGNOSIS — H61.23 BILATERAL IMPACTED CERUMEN: Primary | ICD-10-CM

## 2024-02-08 DIAGNOSIS — R06.83 SNORING: ICD-10-CM

## 2024-02-08 DIAGNOSIS — H90.72 MIXED CONDUCTIVE AND SENSORINEURAL HEARING LOSS OF LEFT EAR WITH UNRESTRICTED HEARING OF RIGHT EAR: ICD-10-CM

## 2024-02-08 PROCEDURE — 99215 OFFICE O/P EST HI 40 MIN: CPT | Mod: 25,S$GLB,, | Performed by: NURSE PRACTITIONER

## 2024-02-08 NOTE — PROGRESS NOTES
"Subjective:   Babatunde is a 73 y.o. male who presents for routine ear cleaning. He denies any changes since his last visit about 4 months ago. He states he has had longstanding hearing loss in the left ear after a loud noise exposure on that side. He doesn't find the hearing loss particularly bothersome. Otherwise denies any otologic symptoms.     At the end of the appointment he asked if there was anything that could be done about his snoring. He reports a long history of loud snoring, but denies any gasping or apneic events. Denies any daytime somnolence. Had a sleep study over 15 years ago, can't recall the results. Sometimes feels like his "soft palate" chokes him when he lays flat and therefore he only sleeps on his side.     Note from previous visit on 10/3/2023 with Dr. Desouza:  1. Hearing conservation strongly recommended.  2. Trial of amplification bilaterally also recommended vs. Left middle ear exploration with ossiculoplasty vs. nothing. Inherent risks, complications of each detailed to patient and questions answered.  3. Re-check of hearing in 18-24 months or sooner if subjective change noted.  4. F/U with PCP as per schedule.       The patient's medications, allergies, past medical, surgical, social and family histories were reviewed and updated as appropriate.    A detailed review of systems was obtained with pertinent positives as per the above HPI, and otherwise negative.   Objective:     Constitutional:   He is oriented to person, place, and time. He appears well-developed and well-nourished. He appears alert. He is cooperative.  Non-toxic appearance. He does not have a sickly appearance. He does not appear ill. Normal speech.      Head:  Normocephalic and atraumatic. Not macrocephalic and not microcephalic. Head is without abrasion, without right periorbital erythema, without left periorbital erythema and without TMJ tenderness.     Ears:    Right Ear: No drainage, swelling or tenderness. No mastoid " tenderness. Tympanic membrane is not scarred, not perforated, not erythematous, not retracted and not bulging. No middle ear effusion.   Left Ear: No drainage, swelling or tenderness. No mastoid tenderness. Tympanic membrane is not scarred, not perforated, not erythematous, not retracted and not bulging.  No middle ear effusion.   Ceruminous debris obstructing bilateral TMs removed under microscopy    Pulmonary/Chest:   Effort normal.     Psychiatric:   He has a normal mood and affect. His speech is normal and behavior is normal.     Neurological:   He is alert and oriented to person, place, and time.     Procedure  Cerumen removal performed.  See procedure note.  Procedure Note:  The patient was brought to the minor procedure room and placed under the operating microscope of the left ear canal which was cleaned of ceruminous debris. Using a combination of suction, curettes and cup forceps the patient's cerumen was removed. The patient tolerated the procedure well. There were no complications.  Procedure Note:  The patient was brought to the minor procedure room and placed under the operating microscope of the right ear canal which was cleaned of ceruminous debris. Using a combination of suction, curettes and cup forceps the patient's cerumen was removed. The patient tolerated the procedure well. There were no complications.    Audiogram    Assessment:     1. Bilateral impacted cerumen    2. Mixed conductive and sensorineural hearing loss of left ear with unrestricted hearing of right ear    3. Snoring      Plan:     Bilateral impacted cerumen  Bilateral cerumen impaction removed under microscopy. Patient tolerated procedure well and noted improvement upon impaction removal.     Mixed conductive and sensorineural hearing loss of left ear with unrestricted hearing of right ear  Reviewed prior audio with patient. He is not interested in hearing aids at this time. He was offered CT imaging for further evaluation of  left sided hearing loss, but declined- he reports he is not interested in any surgery. Will repeat audio at next visit.     Snoring  Briefly Discussed potential causes of snoring at the end of the appointment. Advised visit with sleep medicine for further evaluation. He was also scheduled for an appointment with SALLY Hyde (Nasal/sinus ENT) for comprehensive physical exam his airway.  -     Ambulatory referral/consult to Sleep Disorders; Future    RTC in 6 months for routine cleaning and repeat audio

## 2024-03-28 DIAGNOSIS — F17.201 TOBACCO USE DISORDER, SEVERE, IN EARLY REMISSION: Primary | ICD-10-CM

## 2024-04-01 ENCOUNTER — TELEPHONE (OUTPATIENT)
Dept: PODIATRY | Facility: CLINIC | Age: 74
End: 2024-04-01
Payer: MEDICARE

## 2024-04-23 ENCOUNTER — LAB VISIT (OUTPATIENT)
Dept: LAB | Facility: HOSPITAL | Age: 74
End: 2024-04-23
Attending: INTERNAL MEDICINE
Payer: MEDICARE

## 2024-04-23 DIAGNOSIS — E78.2 MIXED HYPERLIPIDEMIA: ICD-10-CM

## 2024-04-23 DIAGNOSIS — I73.9 PAD (PERIPHERAL ARTERY DISEASE): ICD-10-CM

## 2024-04-23 LAB
ALBUMIN SERPL BCP-MCNC: 3.5 G/DL (ref 3.5–5.2)
ALP SERPL-CCNC: 83 U/L (ref 55–135)
ALT SERPL W/O P-5'-P-CCNC: 14 U/L (ref 10–44)
ANION GAP SERPL CALC-SCNC: 6 MMOL/L (ref 8–16)
AST SERPL-CCNC: 19 U/L (ref 10–40)
BILIRUB SERPL-MCNC: 0.3 MG/DL (ref 0.1–1)
BUN SERPL-MCNC: 15 MG/DL (ref 8–23)
CALCIUM SERPL-MCNC: 9.3 MG/DL (ref 8.7–10.5)
CHLORIDE SERPL-SCNC: 110 MMOL/L (ref 95–110)
CHOLEST SERPL-MCNC: 107 MG/DL (ref 120–199)
CHOLEST/HDLC SERPL: 3.1 {RATIO} (ref 2–5)
CO2 SERPL-SCNC: 23 MMOL/L (ref 23–29)
CREAT SERPL-MCNC: 1.3 MG/DL (ref 0.5–1.4)
EST. GFR  (NO RACE VARIABLE): 58.4 ML/MIN/1.73 M^2
GLUCOSE SERPL-MCNC: 97 MG/DL (ref 70–110)
HDLC SERPL-MCNC: 34 MG/DL (ref 40–75)
HDLC SERPL: 31.8 % (ref 20–50)
LDLC SERPL CALC-MCNC: 60.6 MG/DL (ref 63–159)
NONHDLC SERPL-MCNC: 73 MG/DL
POTASSIUM SERPL-SCNC: 4.3 MMOL/L (ref 3.5–5.1)
PROT SERPL-MCNC: 6.7 G/DL (ref 6–8.4)
SODIUM SERPL-SCNC: 139 MMOL/L (ref 136–145)
TRIGL SERPL-MCNC: 62 MG/DL (ref 30–150)

## 2024-04-23 PROCEDURE — 36415 COLL VENOUS BLD VENIPUNCTURE: CPT | Performed by: INTERNAL MEDICINE

## 2024-04-23 PROCEDURE — 80053 COMPREHEN METABOLIC PANEL: CPT | Performed by: INTERNAL MEDICINE

## 2024-04-23 PROCEDURE — 80061 LIPID PANEL: CPT | Performed by: INTERNAL MEDICINE

## 2024-04-25 ENCOUNTER — OFFICE VISIT (OUTPATIENT)
Dept: PODIATRY | Facility: CLINIC | Age: 74
End: 2024-04-25
Payer: MEDICARE

## 2024-04-25 VITALS
BODY MASS INDEX: 29.42 KG/M2 | HEIGHT: 65 IN | SYSTOLIC BLOOD PRESSURE: 166 MMHG | DIASTOLIC BLOOD PRESSURE: 83 MMHG | WEIGHT: 176.56 LBS | HEART RATE: 78 BPM

## 2024-04-25 DIAGNOSIS — N18.30 STAGE 3 CHRONIC KIDNEY DISEASE, UNSPECIFIED WHETHER STAGE 3A OR 3B CKD: ICD-10-CM

## 2024-04-25 DIAGNOSIS — L60.3 ONYCHODYSTROPHY: ICD-10-CM

## 2024-04-25 DIAGNOSIS — G62.9 PERIPHERAL POLYNEUROPATHY: ICD-10-CM

## 2024-04-25 DIAGNOSIS — I70.238: ICD-10-CM

## 2024-04-25 DIAGNOSIS — I73.9 PAD (PERIPHERAL ARTERY DISEASE): Primary | ICD-10-CM

## 2024-04-25 PROCEDURE — 99499 UNLISTED E&M SERVICE: CPT | Mod: S$GLB,,, | Performed by: PODIATRIST

## 2024-04-25 PROCEDURE — 11721 DEBRIDE NAIL 6 OR MORE: CPT | Mod: Q8,S$GLB,, | Performed by: PODIATRIST

## 2024-04-25 PROCEDURE — 99999 PR PBB SHADOW E&M-EST. PATIENT-LVL III: CPT | Mod: PBBFAC,,, | Performed by: PODIATRIST

## 2024-04-25 NOTE — PROGRESS NOTES
Subjective:     Patient ID: Babatunde Singh is a 73 y.o. male.    Chief Complaint: Nail Care    Babatunde is a 73 y.o. male who presents to the clinic for evaluation and treatment of high risk feet. Babatunde has a past medical history of DVT (deep venous thrombosis), Hypertension, Peripheral arterial disease, and Right renal mass (8/11/2022). The patient's chief complaint is long, thick toenails. This patient has documented high risk feet requiring routine maintenance secondary to peripheral vascular disease.    PCP: Esdras Ly MD    Date Last Seen by PCP:   Chief Complaint   Patient presents with    Nail Care       Last encounter in this department: 9/13/2023    Hemoglobin A1C   Date Value Ref Range Status   06/07/2023 4.6 4.0 - 5.6 % Final     Comment:     ADA Screening Guidelines:  5.7-6.4%  Consistent with prediabetes  >or=6.5%  Consistent with diabetes    High levels of fetal hemoglobin interfere with the HbA1C  assay. Heterozygous hemoglobin variants (HbS, HgC, etc)do  not significantly interfere with this assay.   However, presence of multiple variants may affect accuracy.     03/10/2022 5.8 (H) 4.0 - 5.6 % Final     Comment:     ADA Screening Guidelines:  5.7-6.4%  Consistent with prediabetes  >or=6.5%  Consistent with diabetes    High levels of fetal hemoglobin interfere with the HbA1C  assay. Heterozygous hemoglobin variants (HbS, HgC, etc)do  not significantly interfere with this assay.   However, presence of multiple variants may affect accuracy.         Review of Systems   Constitutional: Negative for chills, decreased appetite and fever.   Cardiovascular:  Negative for leg swelling.   Skin:  Positive for nail changes. Negative for color change, flushing and itching.   Musculoskeletal:  Negative for arthritis, joint pain, joint swelling and myalgias.   Gastrointestinal:  Negative for nausea and vomiting.   Neurological:  Negative for loss of balance, numbness and paresthesias.          Patient Active  Problem List   Diagnosis    Primary hypertension    Mixed hyperlipidemia    Claudication of right lower extremity    Sciatica of right side    Allodynia (Right leg)    PAD (peripheral artery disease)    Critical limb ischemia of right lower extremity with ulceration of lower leg    DVT (deep venous thrombosis)    Tachycardia    Leg wound, right, subsequent encounter    Tobacco use disorder, severe, in early remission    Impaired fasting glucose    Diverticulosis of colon    Chronic bronchitis    Normocytic anemia    Iron deficiency anemia    Chronic fatigue    Aortic atherosclerosis    Stage 3 chronic kidney disease    Senile purpura    Peripheral polyneuropathy    Renal cell carcinoma of right kidney    Former smoker       Current Outpatient Medications on File Prior to Visit   Medication Sig Dispense Refill    acetaminophen-codeine 300-30mg (TYLENOL #3) 300-30 mg Tab Take 1 tablet by mouth every 6 (six) hours as needed.      amlodipine-olmesartan (LAILA) 5-40 mg per tablet Take 1 tablet by mouth once daily. 90 tablet 3    atorvastatin (LIPITOR) 80 MG tablet Take 1 tablet (80 mg total) by mouth once daily. 90 tablet 3    DULoxetine (CYMBALTA) 30 MG capsule Take 1 capsule (30 mg total) by mouth once daily. 30 capsule 11    ezetimibe (ZETIA) 10 mg tablet Take 1 tablet (10 mg total) by mouth once daily. 90 tablet 0    ferrous sulfate (IRON) 325 mg (65 mg iron) Tab tablet Take 1 tablet (325 mg total) by mouth once daily. 90 tablet 3    gabapentin (NEURONTIN) 800 MG tablet TAKE 1 TABLET (800 MG TOTAL) BY MOUTH 3 (THREE) TIMES DAILY. 90 tablet 11    multivitamin (THERAGRAN) per tablet Take 1 tablet by mouth once daily.      XARELTO 20 mg Tab TAKE 1 TABLET (20 MG TOTAL) BY MOUTH DAILY WITH DINNER OR EVENING MEAL. 30 tablet 11     No current facility-administered medications on file prior to visit.       Review of patient's allergies indicates:   Allergen Reactions    Ace inhibitors         Past Surgical History:   Procedure Laterality Date    APPLICATION OF WOUND VACUUM-ASSISTED CLOSURE DEVICE Right 12/8/2022    Procedure: APPLICATION, WOUND VAC;  Surgeon: LEI Ortiz III, MD;  [address]  Service: Peripheral Vascular;  Laterality: Right;    CHOLECYSTECTOMY N/A 2/27/2023    Procedure: CHOLECYSTECTOMY complex;  Surgeon: Tolu Gavin MD;  [address];  Service: General;  Laterality: N/A;    COLONOSCOPY N/A 10/31/2022    Procedure: COLONOSCOPY;  Surgeon: Philip Shafer MD;  Location: [facility] ENDO (2ND FLR);  Service: Endoscopy;  Laterality: N/A;    CREATION OF AXILLARY-FEMORAL ARTERY BYPASS WITH GRAFT N/A 10/6/2022    Procedure: CREATION, BYPASS, ARTERIAL, AXILLARY TO FEMORAL, USING GRAFT;  Surgeon: LEI Ortiz III, MD;  [facility];  Service: Peripheral Vascular;  Laterality: N/A;    CREATION OF FEMORAL-FEMORAL ARTERY BYPASS WITH GRAFT N/A 10/6/2022    Procedure: CREATION, BYPASS, ARTERIAL, FEMORAL TO FEMORAL, USING GRAFT;  Surgeon: LEI Ortiz III, MD;  [address];  Service: Peripheral Vascular;  Laterality: N/A;    ESOPHAGOGASTRODUODENOSCOPY N/A 10/20/2022    Procedure: EGD (ESOPHAGOGASTRODUODENOSCOPY);  Surgeon: Sixto Chicas MD;  Location: [facility] ENDO (2ND FLR);  Service: Endoscopy;  Laterality: N/A;    ESOPHAGOGASTRODUODENOSCOPY N/A 10/28/2022    Procedure: EGD (ESOPHAGOGASTRODUODENOSCOPY);  Surgeon: Marlee Hopson MD;  Location: [facility] ENDO ([facility] FLR);  Service: Gastroenterology;  Laterality: N/A;    EXCISION OF HYDROCELE Left 4/26/2022    Procedure: HYDROCELECTOMY;  Surgeon: Damion Roberts MD;  Location: [facility];  Service: Urology;  Laterality: Left;    KNEE SURGERY  1972    PARTIAL NEPHRECTOMY Right 2/27/2023    Procedure: NEPHRECTOMY, PARTIAL;  Surgeon: Moises Cuba MD;  [address];  Service: Urology;  Laterality: Right;  Dr Gavin robotic cholecystectomy for gallbladder mass also     WOUND DEBRIDEMENT Right 2022    Procedure: DEBRIDEMENT, WOUND;  Surgeon: LEI Ortiz III, MD;  Location: [facility] OR 2ND FLR;  Service: Peripheral Vascular;  Laterality: Right;  RLE wound debridement       Family History   Problem Relation Name Age of Onset    Hypertension Mother      Hypertension Father      No Known Problems Daughter 2     No Known Problems Son      Colon cancer Neg Hx      Esophageal cancer Neg Hx         Social History     Socioeconomic History    Marital status:    Occupational History    Occupation: doorman [employer]   Tobacco Use    Smoking status: Former     Current packs/day: 0.00     Average packs/day: 0.5 packs/day for 55.0 years (27.5 ttl pk-yrs)     Types: Cigarettes     Start date: 10/11/1967     Quit date: 10/11/2022     Years since quittin.5    Smokeless tobacco: Never    Tobacco comments:     Cigarettes3-4 per day   Substance and Sexual Activity    Alcohol use: Never    Drug use: Never    Sexual activity: Yes     Partners: Female     Comment: wife     Social Determinants of Health     Financial Resource Strain: Patient Declined (2023)    Overall Financial Resource Strain (CARDIA)     Difficulty of Paying Living Expenses: Patient declined   Food Insecurity: Patient Declined (2023)    Hunger Vital Sign     Worried About Running Out of Food in the Last Year: Patient declined     Ran Out of Food in the Last Year: Patient declined   Transportation Needs: Unknown (2023)    PRAPARE - Transportation     Lack of Transportation (Non-Medical): Patient declined   Physical Activity: Insufficiently Active (2023)    Exercise Vital Sign     Days of Exercise per Week: 7 days     Minutes of Exercise per Session: 10 min   Stress: Patient Declined (2023)    [employer] - Occupational Stress Questionnaire     Feeling of Stress : Patient declined   Social Connections: Patient Declined (2023)    Social Connection  "and Isolation Panel [NHANES]     Frequency of Communication with Friends and Family: Patient declined     Frequency of Social Gatherings with Friends and Family: Patient declined     Attends Bahai Services: Patient declined     Active Member of Clubs or Organizations: Patient declined     Attends Club or Organization Meetings: Patient declined     Marital Status: Patient declined   Housing Stability: Unknown (5/24/2023)    Housing Stability Vital Sign     Unable to Pay for Housing in the Last Year: Patient refused     Unstable Housing in the Last Year: Patient refused           Objective:     Vitals:    04/25/24 0759   BP: (!) 166/83   Pulse: 78   Weight: 80.1 kg (176 lb 9.4 oz)   Height: 5' 5" (1.651 m)   PainSc: 0-No pain        Physical Exam  Constitutional:       Appearance: He is well-developed.   Cardiovascular:      Comments: Dorsalis pedis and posterior tibial pulses are decreased Bilaterally. Toes are cool to touch. Feet are warm proximally.There is decreased digital hair. Skin is atrophic, slightly hyperpigmented, and mildly edematous      Musculoskeletal:         General: No tenderness. Normal range of motion.      Comments: Adequate joint range of motion without pain, limitation, nor crepitation Bilateral feet and ankle joints. Muscle strength is 5/5 in all groups bilaterally.         Skin:     General: Skin is warm and dry.      Coloration: Skin is not ashen.      Findings: No ecchymosis, erythema or lesion.      Comments: Nails x10 are elongated by  2-4mm's, thickened by 2-5 mm's, dystrophic, and are darkened in  coloration . Xerosis Bilaterally. No open lesions noted.       Neurological:      Mental Status: He is alert and oriented to person, place, and time.      Comments: [place] 5.07 monofilamant testing is diminished [place] feet. Sharp/dull sensation diminished Bilaterally. Light touch absent Bilaterally.       Psychiatric:         Behavior: Behavior normal.         Assessment: "      Encounter Diagnoses   Name Primary?    PAD (peripheral artery disease) Yes    Critical limb ischemia of right lower extremity with ulceration of lower leg     Peripheral polyneuropathy     Stage 3 chronic kidney disease, unspecified whether stage 3a or 3b CKD     Onychodystrophy      Plan:     Babatunde was seen today for nail care.    Diagnoses and all orders for this visit:    PAD (peripheral artery disease)    Critical limb ischemia of right lower extremity with ulceration of lower leg    Peripheral polyneuropathy    Stage 3 chronic kidney disease, unspecified whether stage 3a or 3b CKD    Onychodystrophy      I counseled the patient on his conditions, their implications and medical management.        - Shoe inspection. Patient instructed on proper foot hygeine. We discussed wearing proper shoe gear, daily foot inspections, never walking without protective shoe gear, never putting sharp instruments to feet, routine podiatric nail visits every 2-3 months.      - With patient's permission, nails were aggressively reduced and debrided x 10 to their soft tissue attachment mechanically removing all offending nail and debris. Patient relates relief following the procedure. He will continue to monitor the areas daily, inspect his feet, wear protective shoe gear when ambulatory, moisturizer to maintain skin integrity and follow in this office in approximately 2-3 months, sooner p.r.n.

## 2024-04-30 ENCOUNTER — OFFICE VISIT (OUTPATIENT)
Dept: CARDIOLOGY | Facility: CLINIC | Age: 74
End: 2024-04-30
Payer: MEDICARE

## 2024-04-30 VITALS
HEART RATE: 86 BPM | BODY MASS INDEX: 29.65 KG/M2 | DIASTOLIC BLOOD PRESSURE: 81 MMHG | OXYGEN SATURATION: 96 % | SYSTOLIC BLOOD PRESSURE: 173 MMHG | WEIGHT: 177.94 LBS | HEIGHT: 65 IN

## 2024-04-30 DIAGNOSIS — S81.801D LEG WOUND, RIGHT, SUBSEQUENT ENCOUNTER: ICD-10-CM

## 2024-04-30 DIAGNOSIS — I73.9 CLAUDICATION OF RIGHT LOWER EXTREMITY: ICD-10-CM

## 2024-04-30 DIAGNOSIS — I73.9 PAD (PERIPHERAL ARTERY DISEASE): Primary | ICD-10-CM

## 2024-04-30 DIAGNOSIS — R79.9 ABNORMAL FINDING OF BLOOD CHEMISTRY, UNSPECIFIED: ICD-10-CM

## 2024-04-30 DIAGNOSIS — I10 PRIMARY HYPERTENSION: ICD-10-CM

## 2024-04-30 DIAGNOSIS — Z87.891 FORMER SMOKER: ICD-10-CM

## 2024-04-30 DIAGNOSIS — I70.238: ICD-10-CM

## 2024-04-30 DIAGNOSIS — I82.401 ACUTE DEEP VEIN THROMBOSIS (DVT) OF RIGHT LOWER EXTREMITY, UNSPECIFIED VEIN: ICD-10-CM

## 2024-04-30 DIAGNOSIS — E78.2 MIXED HYPERLIPIDEMIA: ICD-10-CM

## 2024-04-30 DIAGNOSIS — I70.0 AORTIC ATHEROSCLEROSIS: ICD-10-CM

## 2024-04-30 PROCEDURE — 99215 OFFICE O/P EST HI 40 MIN: CPT | Mod: S$GLB,,, | Performed by: INTERNAL MEDICINE

## 2024-04-30 PROCEDURE — 3008F BODY MASS INDEX DOCD: CPT | Mod: CPTII,S$GLB,, | Performed by: INTERNAL MEDICINE

## 2024-04-30 PROCEDURE — 4010F ACE/ARB THERAPY RXD/TAKEN: CPT | Mod: CPTII,S$GLB,, | Performed by: INTERNAL MEDICINE

## 2024-04-30 PROCEDURE — 3288F FALL RISK ASSESSMENT DOCD: CPT | Mod: CPTII,S$GLB,, | Performed by: INTERNAL MEDICINE

## 2024-04-30 PROCEDURE — 1159F MED LIST DOCD IN RCRD: CPT | Mod: CPTII,S$GLB,, | Performed by: INTERNAL MEDICINE

## 2024-04-30 PROCEDURE — 1101F PT FALLS ASSESS-DOCD LE1/YR: CPT | Mod: CPTII,S$GLB,, | Performed by: INTERNAL MEDICINE

## 2024-04-30 PROCEDURE — 99999 PR PBB SHADOW E&M-EST. PATIENT-LVL III: CPT | Mod: PBBFAC,,, | Performed by: INTERNAL MEDICINE

## 2024-04-30 PROCEDURE — 3077F SYST BP >= 140 MM HG: CPT | Mod: CPTII,S$GLB,, | Performed by: INTERNAL MEDICINE

## 2024-04-30 PROCEDURE — 3079F DIAST BP 80-89 MM HG: CPT | Mod: CPTII,S$GLB,, | Performed by: INTERNAL MEDICINE

## 2024-04-30 PROCEDURE — 1126F AMNT PAIN NOTED NONE PRSNT: CPT | Mod: CPTII,S$GLB,, | Performed by: INTERNAL MEDICINE

## 2024-04-30 NOTE — PROGRESS NOTES
"[facility]      Chief Complaint   Patient presents with    Follow-up    PAD       Patient ID: Babatunde Singh is a 73 y.o. male with PAD with RLE CLI s/p left axillary to bilateral femoris profunda bypass with 8 mm PTFE (10/6/2022), HTN, HLD, history of unprovoked RLE DVT (on Xarelto), former smoker, who presents for a follow up appointment.  Pertinent history/events are as follows:     -Pt kindly referred by Dr. Avelar for evaluation of right leg pain (per her note on 7/10/2022):  "71 yo w htn here with chronic discomfort to the R LE, paresthesias and throbbing pain. Suspect claudication, without concern for acute ischemia. Will obtain US to ensure no dvt, treat v sciatica and likely dc home w outpt fu."    -At our initial clinic visit on 7/26/2022, Mr. Singh reports first noticing right leg pain approximately 4-5 years ago.  States right leg pain became acutely worse 2 weeks ago after moving furniture.  At that time he notes "shooting pain down my right leg".  He reports pain in both calves after walking less than 1 block, which is relieved with rest.  He hs no rest pain or tissue loss.  Smokes up to 1.5 packs a day for total 58 years.  RLE venous ultrasound on 7/10/2022 revealed no evidence of deep venous thrombosis in the right lower extremity.  Plan:    Right leg pain- Etiology likely multifactorial with contribution from possible lumbar degenerative disc disease with sciatica, and flow limiting PAD.  Check MRI lumbar spine and CTA abd/pelvis with iliofemoral runoff.  Check echo.  If EF is normal and CTA shows evidence of PAD, start cilostazol 100 mg bid.  Pt to start walking program with goal of at least 30 minutes a day/5 days a week.  Refer to Neurosurgery for evaluation of neurogenic claudication and RLE numbness.   HLD- Start zetia 10 mg daily.  Continue atorvastatin 80 mg daily and ASA 81 mg daily.  HTN- Continue current medications.    Smoking- Encourage smoking cessation.    Results " addendum 8/11/2022:  CTA abd/pelvis on 8/3/2022 revealed severe bilateral LE PAD.   The study also revealed a 6cm soft tissue density mass with borderline enhancement at right upper pole kidney.  Echo on 8/5/2022 with normal LV systolic function EF 70%.    Plan:  -Start cilostazol 100 mg bid  -Check CT renal mass protocol and refer to Urology for evaluation  -Results and plan discussed in detail with Mr. iSngh who voiced understanding.  -Follow-up is scheduled.    -On 10/6/2022, pt underwent left axillary to bilateral femoris profunda bypass with 8 mm PTFE (Dr. Ortiz of Vascular Surgery) due to RLE critical limb ischemia.        -At follow up clinic visit on 12/16/2022, Mr. Singh reported doing well with no chest pain, SOB, TIA symptoms or syncope.  RLE appears warm and well perfused with wound vac in place.  Pt scheduled to follow up with Dr. Ortiz and wound care on 12/20/2022  Plan:   RLE CLI s/p left axillary to bilateral femoris profunda bypass with 8 mm PTFE (10/6/2022)- Pt is doing well with no significant LE claudication.  RLE appears warm and well perfused with wound vac in place.  Pt scheduled to follow up with Dr. Ortiz and wound care on 12/20/2022.  Continue ASA 81 mg daily, Xarelto 20 mg daily, atorvastatin 80 mg daily, zetia 10 mg daily.    HLD- Continue atorvastatin 80 mg daily and zetia 10 mg daily.  HTN- Continue current medications.    Smoking- Pt states he has stopped smoking.  Encourage smoking cessation.  Renal Mass- Follow  up with Urology as scheduled.     -On 2/27/2023, pt underwent combined open right radical nephrectomy and cholecystectomy.    6/20/2023 clinic visit: Mr. Singh reports doing well with no significant claudication, rest pain, or new tissue loss.  RLE wounds have completely healed.   Plan:   RLE CLI s/p left axillary to bilateral femoris profunda bypass with 8 mm PTFE (10/6/2022)- Pt is doing well with no significant LE claudication.  RLE wounds have  completely healed.  Continue ASA 81 mg daily, Xarelto 20 mg daily, atorvastatin 80 mg daily, zetia 10 mg daily.    HLD- Continue atorvastatin 80 mg daily and zetia 10 mg daily.  HTN- Continue current medications.    Smoking- Pt states he has stopped smoking.  Encourage smoking cessation.    10/19/2023 clinic visit: Mr. Singh reports doing well with no significant claudication, rest pain, or new tissue loss.  Has remained smoke free.   Plan:   RLE CLI s/p left axillary to bilateral femoris profunda bypass with 8 mm PTFE (10/6/2022)- Pt is doing well with no significant LE claudication.  RLE wounds have completely healed.  Continue ASA 81 mg daily, Xarelto 20 mg daily, atorvastatin 80 mg daily, zetia 10 mg daily.    HLD- Continue atorvastatin 80 mg daily and zetia 10 mg daily.  HTN- Continue current medications.    Smoking- Pt states he has stopped smoking.  Encourage smoking cessation.    HPI:  Mr. Singh reports no significant claudication, rest pain, or new tissue loss.  Has remained smoke free.  Staying active with working 5 days a week.  LDL 61 on 4/3/2024.      Past Medical History:   Diagnosis Date    DVT (deep venous thrombosis)     Hypertension     Peripheral arterial disease     Right renal mass 8/11/2022     Past Surgical History:   Procedure Laterality Date    APPLICATION OF WOUND VACUUM-ASSISTED CLOSURE DEVICE Right 12/8/2022    Procedure: APPLICATION, WOUND VAC;  Surgeon: LEI Ortiz III, MD;  Location: [facility] OR [address] [place] [address];  Service: Peripheral Vascular;  Laterality: Right;    CHOLECYSTECTOMY N/A 2/27/2023    Procedure: CHOLECYSTECTOMY complex;  Surgeon: Tolu Gavin MD;  Location: [facility] OR [address] [place] [address];  Service: General;  Laterality: N/A;    COLONOSCOPY N/A 10/31/2022    Procedure: COLONOSCOPY;  Surgeon: Philip Shafer MD;  Location: [facility] ([address] [place] [address]);  Service: Endoscopy;  Laterality: N/A;    CREATION OF AXILLARY-FEMORAL ARTERY BYPASS WITH GRAFT N/A 10/6/2022    Procedure: CREATION, BYPASS,  ARTERIAL, AXILLARY TO FEMORAL, USING GRAFT;  Surgeon: LEI Ortiz III, MD;  Location: [facility] OR 2ND FLR;  Service: Peripheral Vascular;  Laterality: N/A;    CREATION OF FEMORAL-FEMORAL ARTERY BYPASS WITH GRAFT N/A 10/6/2022    Procedure: CREATION, BYPASS, ARTERIAL, FEMORAL TO FEMORAL, USING GRAFT;  Surgeon: LEI Ortiz III, MD;  Location: [facility] OR 2ND FLR;  Service: Peripheral Vascular;  Laterality: N/A;    ESOPHAGOGASTRODUODENOSCOPY N/A 10/20/2022    Procedure: EGD (ESOPHAGOGASTRODUODENOSCOPY);  Surgeon: Sixto Chicas MD;  Location: [facility] ENDO (2ND FLR);  Service: Endoscopy;  Laterality: N/A;    ESOPHAGOGASTRODUODENOSCOPY N/A 10/28/2022    Procedure: EGD (ESOPHAGOGASTRODUODENOSCOPY);  Surgeon: Marlee Hopson MD;  Location: [facility] ENDO (2ND FLR);  Service: Gastroenterology;  Laterality: N/A;    EXCISION OF HYDROCELE Left 2022    Procedure: HYDROCELECTOMY;  Surgeon: Damion Roberts MD;  Location: [facility];  Service: Urology;  Laterality: Left;    KNEE SURGERY  1972    PARTIAL NEPHRECTOMY Right 2023    Procedure: NEPHRECTOMY, PARTIAL;  Surgeon: Moises Cuba MD;  [address];  Service: Urology;  Laterality: Right;  Dr Gavin robotic cholecystectomy for gallbladder mass also    WOUND DEBRIDEMENT Right 2022    Procedure: DEBRIDEMENT, WOUND;  Surgeon: LEI Ortiz III, MD;  Location: [facility] OR 2ND FLR;  Service: Peripheral Vascular;  Laterality: Right;  RLE wound debridement     Social History     Socioeconomic History    Marital status:    Occupational History    Occupation: [employer]   Tobacco Use    Smoking status: Former     Current packs/day: 0.00     Average packs/day: 0.5 packs/day for 55.0 years (27.5 ttl pk-yrs)     Types: Cigarettes     Start date: 10/11/1967     Quit date: 10/11/2022     Years since quittin.5    Smokeless tobacco: Never    Tobacco comments:     Cigarettes3-4 per day   Substance and Sexual Activity    Alcohol use: Never    Drug use: Never     Sexual activity: Yes     Partners: Female     Comment: wife     Social Determinants of Health     Financial Resource Strain: Patient Declined (5/24/2023)    Overall Financial Resource Strain (CARDIA)     Difficulty of Paying Living Expenses: Patient declined   Food Insecurity: Patient Declined (5/24/2023)    Hunger Vital Sign     Worried About Running Out of Food in the Last Year: Patient declined     Ran Out of Food in the Last Year: Patient declined   Transportation Needs: Unknown (5/24/2023)    PRAPARE - Transportation     Lack of Transportation (Non-Medical): Patient declined   Physical Activity: Insufficiently Active (5/24/2023)    Exercise Vital Sign     Days of Exercise per Week: 7 days     Minutes of Exercise per Session: 10 min   Stress: Patient Declined (5/24/2023)    [employer] - Occupational Stress Questionnaire     Feeling of Stress : Patient declined   Social Connections: Patient Declined (5/24/2023)    Social Connection and Isolation Panel [NHANES]     Frequency of Communication with Friends and Family: Patient declined     Frequency of Social Gatherings with Friends and Family: Patient declined     Attends Denominational Services: Patient declined     Active Member of Clubs or Organizations: Patient declined     Attends Club or Organization Meetings: Patient declined     Marital Status: Patient declined   Housing Stability: Unknown (5/24/2023)    Housing Stability Vital Sign     Unable to Pay for Housing in the Last Year: Patient refused     Unstable Housing in the Last Year: Patient refused     Family History   Problem Relation Name Age of Onset    Hypertension Mother      Hypertension Father      No Known Problems Daughter 2     No Known Problems Son      Colon cancer Neg Hx      Esophageal cancer Neg Hx         Review of patient's allergies indicates:   Allergen Reactions    Ace inhibitors        Medication List with Changes/Refills   Current Medications     "ACETAMINOPHEN-CODEINE 300-30MG (TYLENOL #3) 300-30 MG TAB    Take 1 tablet by mouth every 6 (six) hours as needed.    AMLODIPINE-OLMESARTAN (LAILA) 5-40 MG PER TABLET    Take 1 tablet by mouth once daily.    ATORVASTATIN (LIPITOR) 80 MG TABLET    Take 1 tablet (80 mg total) by mouth once daily.    DULOXETINE (CYMBALTA) 30 MG CAPSULE    Take 1 capsule (30 mg total) by mouth once daily.    EZETIMIBE (ZETIA) 10 MG TABLET    Take 1 tablet (10 mg total) by mouth once daily.    FERROUS SULFATE (IRON) 325 MG (65 MG IRON) TAB TABLET    Take 1 tablet (325 mg total) by mouth once daily.    GABAPENTIN (NEURONTIN) 800 MG TABLET    TAKE 1 TABLET (800 MG TOTAL) BY MOUTH 3 (THREE) TIMES DAILY.    MULTIVITAMIN (THERAGRAN) PER TABLET    Take 1 tablet by mouth once daily.    XARELTO 20 MG TAB    TAKE 1 TABLET (20 MG TOTAL) BY MOUTH DAILY WITH DINNER OR EVENING MEAL.       Review of Systems  Constitution: Denies chills, fever, and sweats.  HENT: Denies headaches or blurry vision.  Cardiovascular: Denies chest pain or irregular heart beat.  Respiratory: Denies cough or shortness of breath.  Gastrointestinal: Denies abdominal pain, nausea, or vomiting.  Musculoskeletal: Negative for right leg pain.   Neurological: Denies dizziness or focal weakness.  Psychiatric/Behavioral: Normal mental status.  Hematologic/Lymphatic: Denies bleeding problem or easy bruising/bleeding.  Skin: Denies rash or suspicious lesions    Physical Examination  BP (!) 173/81   Pulse 86   Ht 5' 5" (1.651 m)   Wt 80.7 kg (177 lb 14.6 oz)   SpO2 96%   BMI 29.61 kg/m²     Constitutional: No acute distress, conversant  HEENT: Sclera anicteric, Pupils equal, round and reactive to light, extraocular motions intact, Oropharynx clear  Neck: No JVD, no carotid bruits  Cardiovascular: regular rate and rhythm, no murmur, rubs or gallops, normal S1/S2  Pulmonary: Clear to auscultation bilaterally  Abdominal: Abdomen soft, nontender, nondistended, positive bowel " sounds  Extremities: No lower extremity edema, healed RLE wounds   Pulses:  Carotid pulses are 2+ on the right side, and 2+ on the left side.  Radial pulses are 2+ on the right side, and 2+ on the left side.   Femoral pulses are 2+ on the right side, and 2+ on the left side.  Popliteal pulses are 2+ on the right side, and 2+ on the left side.   Dorsalis pedis pulses are 2+ on the right side, and 2+ on the left side.   Posterior tibial pulses are 2+ on the right side, and 2+ on the left side.    Skin: No ecchymosis, erythema, or ulcers  Psych: Alert and oriented x 3, appropriate affect  Neuro: CNII-XII intact, no focal deficits    Labs:  Most Recent Data  CBC:   Lab Results   Component Value Date    WBC 9.64 09/18/2023    HGB 7.1 (L) 09/18/2023    HCT 24.3 (L) 09/18/2023     09/18/2023    MCV 90 09/18/2023    RDW 17.3 (H) 09/18/2023     BMP:   Lab Results   Component Value Date     04/23/2024    K 4.3 04/23/2024     04/23/2024    CO2 23 04/23/2024    BUN 15 04/23/2024    CREATININE 1.3 04/23/2024    GLU 97 04/23/2024    CALCIUM 9.3 04/23/2024    MG 1.9 03/02/2023    PHOS 3.4 03/02/2023     LFTS;   Lab Results   Component Value Date    PROT 6.7 04/23/2024    ALBUMIN 3.5 04/23/2024    BILITOT 0.3 04/23/2024    AST 19 04/23/2024    ALKPHOS 83 04/23/2024    ALT 14 04/23/2024     COAGS:   Lab Results   Component Value Date    INR 1.1 02/15/2023     FLP:   Lab Results   Component Value Date    CHOL 107 (L) 04/23/2024    HDL 34 (L) 04/23/2024    LDLCALC 60.6 (L) 04/23/2024    TRIG 62 04/23/2024    CHOLHDL 31.8 04/23/2024     CARDIAC:   Lab Results   Component Value Date    TROPONINI 0.020 02/15/2023     (H) 10/20/2022     Imaging:    RLE Venous Ultrasound 7/10/2022:  No evidence of deep venous thrombosis in the right lower extremity.    Assessment/Plan:  Babatunde Singh is a 73 y.o. male with PAD with RLE CLI s/p left axillary to bilateral femoris profunda bypass with 8 mm PTFE (10/6/2022), HTN,  HLD, history of unprovoked RLE DVT (on Xarelto), former smoker, who presents for a follow up appointment.     1. RLE CLI s/p left axillary to bilateral femoris profunda bypass with 8 mm PTFE (10/6/2022)- Pt is doing well with no significant LE claudication and no wounds/ulcers.  Continue ASA 81 mg daily, Xarelto 20 mg daily, atorvastatin 80 mg daily, zetia 10 mg daily.      2. HLD- LDL 61 on 4/3/2024.  Continue atorvastatin 80 mg daily and zetia 10 mg daily.    3. HTN- Continue current medications.      4. Smoking- Pt states he has stopped smoking.  Encourage smoking cessation.    5. History of Unprovoked RLE DVT- Continue Xarelto 20 mg daily indefinitely.      Follow up in 6 months with lipids prior    Total duration of face to face visit time 30 minutes.  Total time spent counseling greater than fifty percent of total visit time.  Counseling included discussion regarding imaging findings, diagnosis, possibilities, treatment options, risks and benefits.  The patient had many questions regarding the options and long-term effects.    Cr Wallace MD, PhD  Interventional Cardiology

## 2024-04-30 NOTE — PROGRESS NOTES
"[facility]      Chief Complaint   Patient presents with    Follow-up    PAD       Patient ID: Babatunde Singh is a 73 y.o. male with PAD with RLE CLI s/p left axillary to bilateral femoris profunda bypass with 8 mm PTFE (10/6/2022), HTN, HLD, history of RLE DVT, former smoker, who presents for a follow up appointment.  Pertinent history/events are as follows:     -Pt kindly referred by Dr. Avelar for evaluation of right leg pain (per her note on 7/10/2022):  "71 yo w htn here with chronic discomfort to the R LE, paresthesias and throbbing pain. Suspect claudication, without concern for acute ischemia. Will obtain US to ensure no dvt, treat v sciatica and likely dc home w outpt fu."    -At our initial clinic visit on 7/26/2022, Mr. Singh reports first noticing right leg pain approximately 4-5 years ago.  States right leg pain became acutely worse 2 weeks ago after moving furniture.  At that time he notes "shooting pain down my right leg".  He reports pain in both calves after walking less than 1 block, which is relieved with rest.  He hs no rest pain or tissue loss.  Smokes up to 1.5 packs a day for total 58 years.  RLE venous ultrasound on 7/10/2022 revealed no evidence of deep venous thrombosis in the right lower extremity.  Plan:    Right leg pain- Etiology likely multifactorial with contribution from possible lumbar degenerative disc disease with sciatica, and flow limiting PAD.  Check MRI lumbar spine and CTA abd/pelvis with iliofemoral runoff.  Check echo.  If EF is normal and CTA shows evidence of PAD, start cilostazol 100 mg bid.  Pt to start walking program with goal of at least 30 minutes a day/5 days a week.  Refer to Neurosurgery for evaluation of neurogenic claudication and RLE numbness.   HLD- Start zetia 10 mg daily.  Continue atorvastatin 80 mg daily and ASA 81 mg daily.  HTN- Continue current medications.    Smoking- Encourage smoking cessation.    Results addendum 8/11/2022:  CTA " abd/pelvis on 8/3/2022 revealed severe bilateral LE PAD.   The study also revealed a 6cm soft tissue density mass with borderline enhancement at right upper pole kidney.  Echo on 8/5/2022 with normal LV systolic function EF 70%.    Plan:  -Start cilostazol 100 mg bid  -Check CT renal mass protocol and refer to Urology for evaluation  -Results and plan discussed in detail with Mr. Singh who voiced understanding.  -Follow-up is scheduled.    -On 10/6/2022, pt underwent left axillary to bilateral femoris profunda bypass with 8 mm PTFE (Dr. Ortiz of Vascular Surgery) due to RLE critical limb ischemia.        -At follow up clinic visit on 12/16/2022, Mr. Singh reported doing well with no chest pain, SOB, TIA symptoms or syncope.  RLE appears warm and well perfused with wound vac in place.  Pt scheduled to follow up with Dr. Ortiz and wound care on 12/20/2022  Plan:   RLE CLI s/p left axillary to bilateral femoris profunda bypass with 8 mm PTFE (10/6/2022)- Pt is doing well with no significant LE claudication.  RLE appears warm and well perfused with wound vac in place.  Pt scheduled to follow up with Dr. Ortiz and wound care on 12/20/2022.  Continue ASA 81 mg daily, Xarelto 20 mg daily, atorvastatin 80 mg daily, zetia 10 mg daily.    HLD- Continue atorvastatin 80 mg daily and zetia 10 mg daily.  HTN- Continue current medications.    Smoking- Pt states he has stopped smoking.  Encourage smoking cessation.  Renal Mass- Follow  up with Urology as scheduled.     -On 2/27/2023, pt underwent combined open right radical nephrectomy and cholecystectomy.    6/20/2023 clinic visit: Mr. Singh reports doing well with no significant claudication, rest pain, or new tissue loss.  RLE wounds have completely healed.   Plan:   RLE CLI s/p left axillary to bilateral femoris profunda bypass with 8 mm PTFE (10/6/2022)- Pt is doing well with no significant LE claudication.  RLE wounds have completely healed.  Continue ASA  81 mg daily, Xarelto 20 mg daily, atorvastatin 80 mg daily, zetia 10 mg daily.    HLD- Continue atorvastatin 80 mg daily and zetia 10 mg daily.  HTN- Continue current medications.    Smoking- Pt states he has stopped smoking.  Encourage smoking cessation.    HPI:  Mr. Singh reports doing well with no significant claudication, rest pain, or new tissue loss.  Has remained smoke free.     Past Medical History:   Diagnosis Date    DVT (deep venous thrombosis)     Hypertension     Peripheral arterial disease     Right renal mass 8/11/2022     Past Surgical History:   Procedure Laterality Date    APPLICATION OF WOUND VACUUM-ASSISTED CLOSURE DEVICE Right 12/8/2022    Procedure: APPLICATION, WOUND VAC;  Surgeon: LEI Ortiz III, MD;  [facility];  Service: Peripheral Vascular;  Laterality: Right;    CHOLECYSTECTOMY N/A 2/27/2023    Procedure: CHOLECYSTECTOMY complex;  Surgeon: Tolu Gavin MD;  [facility];  Service: General;  Laterality: N/A;    COLONOSCOPY N/A 10/31/2022    Procedure: COLONOSCOPY;  Surgeon: Philip Shafer MD;  Location[facility] ([facility]  Service: Endoscopy;  Laterality: N/A;    CREATION OF AXILLARY-FEMORAL ARTERY BYPASS WITH GRAFT N/A 10/6/2022    Procedure: CREATION, BYPASS, ARTERIAL, AXILLARY TO FEMORAL, USING GRAFT;  Surgeon: LEI Ortiz III, MD;  [facility];  Service: Peripheral Vascular;  Laterality: N/A;    CREATION OF FEMORAL-FEMORAL ARTERY BYPASS WITH GRAFT N/A 10/6/2022    Procedure: CREATION, BYPASS, ARTERIAL, FEMORAL TO FEMORAL, USING GRAFT;  Surgeon: LEI Ortiz III, MD;  [facility];  Service: Peripheral Vascular;  Laterality: N/A;    ESOPHAGOGASTRODUODENOSCOPY N/A 10/20/2022    Procedure: EGD (ESOPHAGOGASTRODUODENOSCOPY);  Surgeon: Sixto Chicas MD;  Location: [facility]  Service: Endoscopy;  Laterality: N/A;    ESOPHAGOGASTRODUODENOSCOPY N/A 10/28/2022    Procedure: EGD  (ESOPHAGOGASTRODUODENOSCOPY);  Surgeon: Marlee Hopson MD;  Location: [facility] ENDO (2ND FLR);  Service: Gastroenterology;  Laterality: N/A;    EXCISION OF HYDROCELE Left 2022    Procedure: HYDROCELECTOMY;  Surgeon: Damion Roberts MD;  Location: [facility] OR;  Service: Urology;  Laterality: Left;    KNEE SURGERY  1972    PARTIAL NEPHRECTOMY Right 2023    Procedure: NEPHRECTOMY, PARTIAL;  Surgeon: Moises Cuba MD;  Location: [facility] OR 2ND FLR;  Service: Urology;  Laterality: Right;  Dr Gavin robotic cholecystectomy for gallbladder mass also    WOUND DEBRIDEMENT Right 2022    Procedure: DEBRIDEMENT, WOUND;  Surgeon: LEI Ortiz III, MD;  [facility];  Service: Peripheral Vascular;  Laterality: Right;  RLE wound debridement     Social History     Socioeconomic History    Marital status:    Occupational History    Occupation: [employer]   Tobacco Use    Smoking status: Former     Current packs/day: 0.00     Average packs/day: 0.5 packs/day for 55.0 years (27.5 ttl pk-yrs)     Types: Cigarettes     Start date: 10/11/1967     Quit date: 10/11/2022     Years since quittin.5    Smokeless tobacco: Never    Tobacco comments:     Cigarettes3-4 per day   Substance and Sexual Activity    Alcohol use: Never    Drug use: Never    Sexual activity: Yes     Partners: Female     Comment: wife     Social Determinants of Health     Financial Resource Strain: Patient Declined (2023)    Overall Financial Resource Strain (CARDIA)     Difficulty of Paying Living Expenses: Patient declined   Food Insecurity: Patient Declined (2023)    Hunger Vital Sign     Worried About Running Out of Food in the Last Year: Patient declined     Ran Out of Food in the Last Year: Patient declined   Transportation Needs: Unknown (2023)    PRAPARE - Transportation     Lack of Transportation (Non-Medical): Patient declined   Physical Activity: Insufficiently Active (2023)    Exercise Vital Sign      Days of Exercise per Week: 7 days     Minutes of Exercise per Session: 10 min   Stress: Patient Declined (5/24/2023)    [employer] [place] [employer] - Occupational Stress Questionnaire     Feeling of Stress : Patient declined   Social Connections: Patient Declined (5/24/2023)    Social Connection and Isolation Panel [NHANES]     Frequency of Communication with Friends and Family: Patient declined     Frequency of Social Gatherings with Friends and Family: Patient declined     Attends Alevism Services: Patient declined     Active Member of Clubs or Organizations: Patient declined     Attends Club or Organization Meetings: Patient declined     Marital Status: Patient declined   Housing Stability: Unknown (5/24/2023)    Housing Stability Vital Sign     Unable to Pay for Housing in the Last Year: Patient refused     Unstable Housing in the Last Year: Patient refused     Family History   Problem Relation Name Age of Onset    Hypertension Mother      Hypertension Father      No Known Problems Daughter 2     No Known Problems Son      Colon cancer Neg Hx      Esophageal cancer Neg Hx         Review of patient's allergies indicates:   Allergen Reactions    Ace inhibitors        Medication List with Changes/Refills   Current Medications    ACETAMINOPHEN-CODEINE 300-30MG (TYLENOL #3) 300-30 MG TAB    Take 1 tablet by mouth every 6 (six) hours as needed.    AMLODIPINE-OLMESARTAN (LAILA) 5-40 MG PER TABLET    Take 1 tablet by mouth once daily.    ATORVASTATIN (LIPITOR) 80 MG TABLET    Take 1 tablet (80 mg total) by mouth once daily.    DULOXETINE (CYMBALTA) 30 MG CAPSULE    Take 1 capsule (30 mg total) by mouth once daily.    EZETIMIBE (ZETIA) 10 MG TABLET    Take 1 tablet (10 mg total) by mouth once daily.    FERROUS SULFATE (IRON) 325 MG (65 MG IRON) TAB TABLET    Take 1 tablet (325 mg total) by mouth once daily.    GABAPENTIN (NEURONTIN) 800 MG TABLET    TAKE 1 TABLET (800 MG TOTAL) BY MOUTH 3 (THREE) TIMES  "DAILY.    MULTIVITAMIN (THERAGRAN) PER TABLET    Take 1 tablet by mouth once daily.    XARELTO 20 MG TAB    TAKE 1 TABLET (20 MG TOTAL) BY MOUTH DAILY WITH DINNER OR EVENING MEAL.       Review of Systems  Constitution: Denies chills, fever, and sweats.  HENT: Denies headaches or blurry vision.  Cardiovascular: Denies chest pain or irregular heart beat.  Respiratory: Denies cough or shortness of breath.  Gastrointestinal: Denies abdominal pain, nausea, or vomiting.  Musculoskeletal: Negative for right leg pain.   Neurological: Denies dizziness or focal weakness.  Psychiatric/Behavioral: Normal mental status.  Hematologic/Lymphatic: Denies bleeding problem or easy bruising/bleeding.  Skin: Denies rash or suspicious lesions    Physical Examination  BP (!) 173/81   Pulse 86   Ht 5' 5" (1.651 m)   Wt 80.7 kg (177 lb 14.6 oz)   SpO2 96%   BMI 29.61 kg/m²     Constitutional: No acute distress, conversant  HEENT: Sclera anicteric, Pupils equal, round and reactive to light, extraocular motions intact, Oropharynx clear  Neck: No JVD, no carotid bruits  Cardiovascular: regular rate and rhythm, no murmur, rubs or gallops, normal S1/S2  Pulmonary: Clear to auscultation bilaterally  Abdominal: Abdomen soft, nontender, nondistended, positive bowel sounds  Extremities: No lower extremity edema, healed RLE wounds   Pulses:  Carotid pulses are 2+ on the right side, and 2+ on the left side.  Radial pulses are 2+ on the right side, and 2+ on the left side.   Femoral pulses are 2+ on the right side, and 2+ on the left side.  Popliteal pulses are 2+ on the right side, and 2+ on the left side.   Dorsalis pedis pulses are 2+ on the right side, and 2+ on the left side.   Posterior tibial pulses are 2+ on the right side, and 2+ on the left side.    Skin: No ecchymosis, erythema, or ulcers  Psych: Alert and oriented x 3, appropriate affect  Neuro: CNII-XII intact, no focal deficits    Labs:  Most Recent Data  CBC:   Lab Results "   Component Value Date    WBC 9.64 09/18/2023    HGB 7.1 (L) 09/18/2023    HCT 24.3 (L) 09/18/2023     09/18/2023    MCV 90 09/18/2023    RDW 17.3 (H) 09/18/2023     BMP:   Lab Results   Component Value Date     04/23/2024    K 4.3 04/23/2024     04/23/2024    CO2 23 04/23/2024    BUN 15 04/23/2024    CREATININE 1.3 04/23/2024    GLU 97 04/23/2024    CALCIUM 9.3 04/23/2024    MG 1.9 03/02/2023    PHOS 3.4 03/02/2023     LFTS;   Lab Results   Component Value Date    PROT 6.7 04/23/2024    ALBUMIN 3.5 04/23/2024    BILITOT 0.3 04/23/2024    AST 19 04/23/2024    ALKPHOS 83 04/23/2024    ALT 14 04/23/2024     COAGS:   Lab Results   Component Value Date    INR 1.1 02/15/2023     FLP:   Lab Results   Component Value Date    CHOL 107 (L) 04/23/2024    HDL 34 (L) 04/23/2024    LDLCALC 60.6 (L) 04/23/2024    TRIG 62 04/23/2024    CHOLHDL 31.8 04/23/2024     CARDIAC:   Lab Results   Component Value Date    TROPONINI 0.020 02/15/2023     (H) 10/20/2022     Imaging:    RLE Venous Ultrasound 7/10/2022:  No evidence of deep venous thrombosis in the right lower extremity.    Assessment/Plan:  Babatunde Singh is a 73 y.o. male with PAD with RLE CLI s/p left axillary to bilateral femoris profunda bypass with 8 mm PTFE (10/6/2022), HTN, HLD, history of RLE DVT, former smoker, who presents for a follow up appointment.     1. RLE CLI s/p left axillary to bilateral femoris profunda bypass with 8 mm PTFE (10/6/2022)- Pt is doing well with no significant LE claudication.  RLE wounds have completely healed.  Continue ASA 81 mg daily, Xarelto 20 mg daily, atorvastatin 80 mg daily, zetia 10 mg daily.      2. HLD- Continue atorvastatin 80 mg daily and zetia 10 mg daily.    3. HTN- Continue current medications.      4. Smoking- Pt states he has stopped smoking.  Encourage smoking cessation.    Follow up in 4 months with lipids prior    Total duration of face to face visit time 30 minutes.  Total time spent counseling  greater than fifty percent of total visit time.  Counseling included discussion regarding imaging findings, diagnosis, possibilities, treatment options, risks and benefits.  The patient had many questions regarding the options and long-term effects.    Cr Wallace MD, PhD  Interventional Cardiology

## 2024-04-30 NOTE — PATIENT INSTRUCTIONS
Assessment/Plan:  Babatunde Singh is a 73 y.o. male with PAD with RLE CLI s/p left axillary to bilateral femoris profunda bypass with 8 mm PTFE (10/6/2022), HTN, HLD, history of unprovoked RLE DVT (on Xarelto), former smoker, who presents for a follow up appointment.     1. RLE CLI s/p left axillary to bilateral femoris profunda bypass with 8 mm PTFE (10/6/2022)- Pt is doing well with no significant LE claudication and no wounds/ulcers.  Continue ASA 81 mg daily, Xarelto 20 mg daily, atorvastatin 80 mg daily, zetia 10 mg daily.      2. HLD- LDL 61 on 4/3/2024.  Continue atorvastatin 80 mg daily and zetia 10 mg daily.    3. HTN- Continue current medications.      4. Smoking- Pt states he has stopped smoking.  Encourage smoking cessation.    5. History of Unprovoked RLE DVT- Continue Xarelto 20 mg daily indefinitely.      Follow up in 6 months with lipids prior

## 2024-05-03 ENCOUNTER — LAB VISIT (OUTPATIENT)
Dept: LAB | Facility: HOSPITAL | Age: 74
End: 2024-05-03
Attending: INTERNAL MEDICINE
Payer: MEDICARE

## 2024-05-03 ENCOUNTER — OFFICE VISIT (OUTPATIENT)
Dept: INTERNAL MEDICINE | Facility: CLINIC | Age: 74
End: 2024-05-03
Payer: MEDICARE

## 2024-05-03 VITALS
HEIGHT: 65 IN | SYSTOLIC BLOOD PRESSURE: 166 MMHG | OXYGEN SATURATION: 96 % | HEART RATE: 91 BPM | WEIGHT: 176.38 LBS | BODY MASS INDEX: 29.38 KG/M2 | DIASTOLIC BLOOD PRESSURE: 62 MMHG

## 2024-05-03 DIAGNOSIS — R73.03 PREDIABETES: ICD-10-CM

## 2024-05-03 DIAGNOSIS — R79.9 ABNORMAL FINDING OF BLOOD CHEMISTRY, UNSPECIFIED: ICD-10-CM

## 2024-05-03 DIAGNOSIS — Z85.528 HISTORY OF KIDNEY CANCER: ICD-10-CM

## 2024-05-03 DIAGNOSIS — G62.9 PERIPHERAL POLYNEUROPATHY: ICD-10-CM

## 2024-05-03 DIAGNOSIS — I73.9 PAD (PERIPHERAL ARTERY DISEASE): ICD-10-CM

## 2024-05-03 DIAGNOSIS — E78.2 MIXED HYPERLIPIDEMIA: ICD-10-CM

## 2024-05-03 DIAGNOSIS — I10 PRIMARY HYPERTENSION: Primary | ICD-10-CM

## 2024-05-03 DIAGNOSIS — N18.31 CHRONIC KIDNEY DISEASE, STAGE 3A: ICD-10-CM

## 2024-05-03 DIAGNOSIS — J42 CHRONIC BRONCHITIS, UNSPECIFIED CHRONIC BRONCHITIS TYPE: ICD-10-CM

## 2024-05-03 DIAGNOSIS — D69.2 SENILE PURPURA: ICD-10-CM

## 2024-05-03 PROBLEM — C64.1 RENAL CELL CARCINOMA OF RIGHT KIDNEY: Status: RESOLVED | Noted: 2023-05-24 | Resolved: 2024-05-03

## 2024-05-03 LAB
BASOPHILS # BLD AUTO: 0.08 K/UL (ref 0–0.2)
BASOPHILS NFR BLD: 0.6 % (ref 0–1.9)
CHOLEST SERPL-MCNC: 107 MG/DL (ref 120–199)
CHOLEST/HDLC SERPL: 3.5 {RATIO} (ref 2–5)
DIFFERENTIAL METHOD BLD: ABNORMAL
EOSINOPHIL # BLD AUTO: 0.2 K/UL (ref 0–0.5)
EOSINOPHIL NFR BLD: 1.2 % (ref 0–8)
ERYTHROCYTE [DISTWIDTH] IN BLOOD BY AUTOMATED COUNT: 16 % (ref 11.5–14.5)
ESTIMATED AVG GLUCOSE: 100 MG/DL (ref 68–131)
HBA1C MFR BLD: 5.1 % (ref 4–5.6)
HCT VFR BLD AUTO: 37.5 % (ref 40–54)
HDLC SERPL-MCNC: 31 MG/DL (ref 40–75)
HDLC SERPL: 29 % (ref 20–50)
HGB BLD-MCNC: 11.2 G/DL (ref 14–18)
IMM GRANULOCYTES # BLD AUTO: 0.04 K/UL (ref 0–0.04)
IMM GRANULOCYTES NFR BLD AUTO: 0.3 % (ref 0–0.5)
LDLC SERPL CALC-MCNC: 58.2 MG/DL (ref 63–159)
LYMPHOCYTES # BLD AUTO: 4.5 K/UL (ref 1–4.8)
LYMPHOCYTES NFR BLD: 35.2 % (ref 18–48)
MCH RBC QN AUTO: 27.5 PG (ref 27–31)
MCHC RBC AUTO-ENTMCNC: 29.9 G/DL (ref 32–36)
MCV RBC AUTO: 92 FL (ref 82–98)
MONOCYTES # BLD AUTO: 1.1 K/UL (ref 0.3–1)
MONOCYTES NFR BLD: 8.8 % (ref 4–15)
NEUTROPHILS # BLD AUTO: 7 K/UL (ref 1.8–7.7)
NEUTROPHILS NFR BLD: 53.9 % (ref 38–73)
NONHDLC SERPL-MCNC: 76 MG/DL
NRBC BLD-RTO: 0 /100 WBC
PLATELET # BLD AUTO: 312 K/UL (ref 150–450)
PMV BLD AUTO: 10.7 FL (ref 9.2–12.9)
RBC # BLD AUTO: 4.08 M/UL (ref 4.6–6.2)
TRIGL SERPL-MCNC: 89 MG/DL (ref 30–150)
WBC # BLD AUTO: 12.91 K/UL (ref 3.9–12.7)

## 2024-05-03 PROCEDURE — 3077F SYST BP >= 140 MM HG: CPT | Mod: CPTII,S$GLB,, | Performed by: INTERNAL MEDICINE

## 2024-05-03 PROCEDURE — 83036 HEMOGLOBIN GLYCOSYLATED A1C: CPT | Performed by: INTERNAL MEDICINE

## 2024-05-03 PROCEDURE — 3078F DIAST BP <80 MM HG: CPT | Mod: CPTII,S$GLB,, | Performed by: INTERNAL MEDICINE

## 2024-05-03 PROCEDURE — 99214 OFFICE O/P EST MOD 30 MIN: CPT | Mod: S$GLB,,, | Performed by: INTERNAL MEDICINE

## 2024-05-03 PROCEDURE — 3008F BODY MASS INDEX DOCD: CPT | Mod: CPTII,S$GLB,, | Performed by: INTERNAL MEDICINE

## 2024-05-03 PROCEDURE — 85025 COMPLETE CBC W/AUTO DIFF WBC: CPT | Performed by: INTERNAL MEDICINE

## 2024-05-03 PROCEDURE — 1101F PT FALLS ASSESS-DOCD LE1/YR: CPT | Mod: CPTII,S$GLB,, | Performed by: INTERNAL MEDICINE

## 2024-05-03 PROCEDURE — 36415 COLL VENOUS BLD VENIPUNCTURE: CPT | Performed by: INTERNAL MEDICINE

## 2024-05-03 PROCEDURE — 80061 LIPID PANEL: CPT | Performed by: INTERNAL MEDICINE

## 2024-05-03 PROCEDURE — 1126F AMNT PAIN NOTED NONE PRSNT: CPT | Mod: CPTII,S$GLB,, | Performed by: INTERNAL MEDICINE

## 2024-05-03 PROCEDURE — 3288F FALL RISK ASSESSMENT DOCD: CPT | Mod: CPTII,S$GLB,, | Performed by: INTERNAL MEDICINE

## 2024-05-03 PROCEDURE — 1159F MED LIST DOCD IN RCRD: CPT | Mod: CPTII,S$GLB,, | Performed by: INTERNAL MEDICINE

## 2024-05-03 PROCEDURE — 99999 PR PBB SHADOW E&M-EST. PATIENT-LVL III: CPT | Mod: PBBFAC,,, | Performed by: INTERNAL MEDICINE

## 2024-05-03 PROCEDURE — 4010F ACE/ARB THERAPY RXD/TAKEN: CPT | Mod: CPTII,S$GLB,, | Performed by: INTERNAL MEDICINE

## 2024-05-03 RX ORDER — AMLODIPINE AND OLMESARTAN MEDOXOMIL 10; 40 MG/1; MG/1
1 TABLET ORAL DAILY
Qty: 90 TABLET | Refills: 3 | Status: SHIPPED | OUTPATIENT
Start: 2024-05-03 | End: 2025-05-03

## 2024-05-03 NOTE — PROGRESS NOTES
"    CHIEF COMPLAINT     Chief Complaint   Patient presents with    Annual Exam       HPI     Babatunde Singh is a 73 y.o. male PAD, hypertension,hyperlipidemia, hx or RCC s/p nephrectomy here today for     Hypertension  Taking Kleber 5-40 blood pressure elevated.  Attributes elevation this more into coffee.  Drinks coffee every day.  No symptoms of hyper hypotension    Iron deficiency anemia  Previously receiving iron infusions lost to follow-up.    PAD  Seen by Dr. Wallace last week no changes to medication regimen continue aspirin 81 Xarelto 20 atorvastatin 80 and Zetia 10.  = patient not having acute symptoms of claudication at this time      Personally Reviewed Patient's Medical, surgical, family and social hx. Changes updated in [facility].  Care Team updated in Epic    Review of Systems:  Review of Systems    Health Maintenance:   Reviewed with patient  Due for the following:      PHYSICAL EXAM     BP (!) 166/62 (BP Location: Right arm, Patient Position: Sitting, BP Method: Medium (Manual))   Pulse 91   Ht 5' 5" (1.651 m)   Wt 80 kg (176 lb 5.9 oz)   SpO2 96%   BMI 29.35 kg/m²     Gen: Well Appearing, NAD  HEENT: PERR, EOMI  Neck: FROM, no thyromegaly, no cervical adenopathy  CVD: RRR, no M/R/G  Pulm: Normal work of breathing, CTAB, no wheezing  Abd:  Soft, NT, ND non TTP, no mass  MSK: no LE edema  Neuro: A&Ox3, gait abnormal, speech normal  Mood; Mood normal, behavior normal, thought process linear       LABS     Labs reviewed; Notable for  Lab Results   Component Value Date    LDLCALC 60.6 (L) 04/23/2024       Chemistry        Component Value Date/Time     04/23/2024 0732    K 4.3 04/23/2024 0732     04/23/2024 0732    CO2 23 04/23/2024 0732    BUN 15 04/23/2024 0732    CREATININE 1.3 04/23/2024 0732    GLU 97 04/23/2024 0732        Component Value Date/Time    CALCIUM 9.3 04/23/2024 0732    ALKPHOS 83 04/23/2024 0732    AST 19 04/23/2024 0732    ALT 14 04/23/2024 0732    BILITOT 0.3 04/23/2024 0732 "    ESTGFRAFRICA >60.0 03/10/2022 1118    EGFRNONAA >60.0 03/10/2022 1118          ASSESSMENT     1. Primary hypertension  amlodipine-olmesartan (LAILA) 10-40 mg per tablet      2. History of kidney cancer        3. Chronic bronchitis, unspecified chronic bronchitis type        4. Senile purpura        5. Chronic kidney disease, stage 3a  Microalbumin/Creatinine Ratio, Urine      6. Peripheral polyneuropathy        7. PAD (peripheral artery disease)        8. Prediabetes  Hemoglobin A1C              Plan     Babatunde Singh is a 73 y.o. male with PAD, hypertension,hyperlipidemia, hx or RCC s/p nephrectomy here today for     1. Primary hypertension  Not controlled increase Laila 10-40  - amlodipine-olmesartan (LAILA) 10-40 mg per tablet; Take 1 tablet by mouth once daily.  Dispense: 90 tablet; Refill: 3    2. History of kidney cancer  Overdue for urology follow-up.  Patient reach out to schedule    3. Chronic bronchitis, unspecified chronic bronchitis type  Improvement in symptoms since smoking cessation.  Not requiring medication at this time.  Continue to watch consider PFTs if symptoms become active    4. Senile purpura  , while provide some prognostic information does not require active medical management documented for HCC purposes    5. Chronic kidney disease, stage 3a  GFR stable  Will check albuminuria  - Microalbumin/Creatinine Ratio, Urine; Future    6. Peripheral polyneuropathy  Continue gabapentin 800 t.i.d. and Cymbalta 30 mg daily symptoms stable    7. PAD (peripheral artery disease)  Symptoms stable   Followed by Interventional Cardiology    8. Prediabetes    Work on maintaining normal body weight  Increase physical activity  Reduce added sugar and refined carbs in diet  Get 8 hours of sleep nightly    - Hemoglobin A1C; Future      Esdras Ly MD

## 2024-05-09 ENCOUNTER — TELEPHONE (OUTPATIENT)
Dept: PULMONOLOGY | Facility: CLINIC | Age: 74
End: 2024-05-09
Payer: MEDICARE

## 2024-05-22 ENCOUNTER — HOSPITAL ENCOUNTER (OUTPATIENT)
Dept: RADIOLOGY | Facility: HOSPITAL | Age: 74
Discharge: HOME | End: 2024-05-22
Attending: INTERNAL MEDICINE
Payer: MEDICARE

## 2024-05-22 DIAGNOSIS — F17.201 TOBACCO USE DISORDER, SEVERE, IN EARLY REMISSION: ICD-10-CM

## 2024-05-22 PROCEDURE — 71271 CT THORAX LUNG CANCER SCR C-: CPT | Mod: TC

## 2024-05-22 PROCEDURE — 71271 CT THORAX LUNG CANCER SCR C-: CPT | Mod: 26,,, | Performed by: STUDENT IN AN ORGANIZED HEALTH CARE EDUCATION/TRAINING PROGRAM

## 2024-05-26 RX ORDER — AMLODIPINE AND OLMESARTAN MEDOXOMIL 5; 40 MG/1; MG/1
1 TABLET ORAL
Qty: 30 TABLET | Refills: 2 | OUTPATIENT
Start: 2024-05-26

## 2024-05-26 NOTE — TELEPHONE ENCOUNTER
Refill Decision Note   Babatunde Singh  is requesting a refill authorization.  Brief Assessment and Rationale for Refill:  Quick Discontinue     Medication Therapy Plan:  Pt is now on amlodipine-olmesartan (LAILA) 10-40 mg per tablet    Medication Reconciliation Completed: No   Comments:     No Care Gaps recommended.     Note composed:11:57 AM 05/26/2024

## 2024-05-26 NOTE — TELEPHONE ENCOUNTER
No care due was identified.  [facility] Embedded Care Due Messages. Reference number: [id number].   5/26/2024 12:42:04 AM CDT

## 2024-05-28 DIAGNOSIS — Z00.00 ENCOUNTER FOR MEDICARE ANNUAL WELLNESS EXAM: ICD-10-CM

## 2024-06-10 ENCOUNTER — PATIENT MESSAGE (OUTPATIENT)
Dept: INTERNAL MEDICINE | Facility: CLINIC | Age: 74
End: 2024-06-10
Payer: MEDICARE

## 2024-07-08 ENCOUNTER — PATIENT MESSAGE (OUTPATIENT)
Dept: PODIATRY | Facility: CLINIC | Age: 74
End: 2024-07-08
Payer: MEDICARE
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
## 2024-07-11 ENCOUNTER — TELEPHONE (OUTPATIENT)
Dept: ADMINISTRATIVE | Facility: CLINIC | Age: 74
End: 2024-07-11
Payer: MEDICARE

## 2024-07-12 ENCOUNTER — OFFICE VISIT (OUTPATIENT)
Dept: INTERNAL MEDICINE | Facility: CLINIC | Age: 74
End: 2024-07-12
Payer: MEDICARE

## 2024-07-12 ENCOUNTER — TELEPHONE (OUTPATIENT)
Dept: INTERNAL MEDICINE | Facility: CLINIC | Age: 74
End: 2024-07-12

## 2024-07-12 VITALS
SYSTOLIC BLOOD PRESSURE: 140 MMHG | WEIGHT: 179.25 LBS | BODY MASS INDEX: 29.87 KG/M2 | HEIGHT: 65 IN | DIASTOLIC BLOOD PRESSURE: 80 MMHG

## 2024-07-12 DIAGNOSIS — D50.8 OTHER IRON DEFICIENCY ANEMIA: ICD-10-CM

## 2024-07-12 DIAGNOSIS — I73.9 PAD (PERIPHERAL ARTERY DISEASE): ICD-10-CM

## 2024-07-12 DIAGNOSIS — N18.31 CHRONIC KIDNEY DISEASE, STAGE 3A: ICD-10-CM

## 2024-07-12 DIAGNOSIS — G62.9 PERIPHERAL POLYNEUROPATHY: ICD-10-CM

## 2024-07-12 DIAGNOSIS — I73.9 CLAUDICATION OF RIGHT LOWER EXTREMITY: ICD-10-CM

## 2024-07-12 DIAGNOSIS — I70.0 AORTIC ATHEROSCLEROSIS: ICD-10-CM

## 2024-07-12 DIAGNOSIS — Z00.00 ENCOUNTER FOR PREVENTIVE HEALTH EXAMINATION: Primary | ICD-10-CM

## 2024-07-12 DIAGNOSIS — K57.30 DIVERTICULOSIS OF COLON: ICD-10-CM

## 2024-07-12 DIAGNOSIS — H61.22 IMPACTED CERUMEN OF LEFT EAR: ICD-10-CM

## 2024-07-12 DIAGNOSIS — I10 PRIMARY HYPERTENSION: ICD-10-CM

## 2024-07-12 DIAGNOSIS — E78.2 MIXED HYPERLIPIDEMIA: ICD-10-CM

## 2024-07-12 DIAGNOSIS — Z74.09 OTHER REDUCED MOBILITY: ICD-10-CM

## 2024-07-12 PROCEDURE — 99999 PR PBB SHADOW E&M-EST. PATIENT-LVL III: CPT | Mod: PBBFAC,,, | Performed by: NURSE PRACTITIONER

## 2024-07-12 NOTE — TELEPHONE ENCOUNTER
Pt did not see Dr Ly today. Another provider ordered an ear wash to be placed on the nurse schedule. Pt was see that provider today. Usually does not require a separate appointment(nurse visit).Pt is not here currently. Pt will now need a nurse visit in order to do an ear wash. Message sent to the appointment desk to arrange.

## 2024-07-12 NOTE — TELEPHONE ENCOUNTER
----- Message from KALEB Galvan sent at 7/12/2024  7:42 AM CDT -----  Patient of Dr. Ly that was seen this morning...  Needs to have Ear Wash out... Can you please place him on the Nurse schedule?  Have placed order.     Thanks

## 2024-07-12 NOTE — PATIENT INSTRUCTIONS
Counseling and Referral of Other Preventative  (Italic type indicates deductible and co-insurance are waived)    Patient Name: Babatunde Singh  Today's Date: 7/12/2024    Health Maintenance       Date Due Completion Date    Pneumococcal Vaccines (Age 65+) (1 of 2 - PCV) Never done ---    Shingles Vaccine (1 of 2) Never done ---    RSV Vaccine (Age 60+ and Pregnant patients) (1 - 1-dose 60+ series) Never done ---    COVID-19 Vaccine (3 - Moderna risk series) 01/28/2022 12/31/2021    Influenza Vaccine (1) 09/01/2024 1/4/2024 (Declined)    Override on 1/4/2024: Declined (Declined for season)    TETANUS VACCINE 09/26/2024 9/26/2014    High Dose Statin 05/03/2025 5/3/2024    Hemoglobin A1c (Prediabetes) 05/03/2025 5/3/2024    Annual UACr 05/03/2025 5/3/2024    LDCT Lung Screen 05/22/2025 5/22/2024    Colorectal Cancer Screening 10/31/2025 10/31/2022    Lipid Panel 05/03/2029 5/3/2024        No orders of the defined types were placed in this encounter.      The following information is provided to all patients.  This information is to help you find resources for any of the problems found today that may be affecting your health:                  Living healthy guide: www.[facility].[place].gov      Understanding Diabetes: www.diabetes.org      Eating healthy: www.cdc.gov/healthyweight      CDC home safety checklist: www.cdc.gov/steadi/patient.html      Agency on Aging: www.goea.[place].[facility]      Alcoholics anonymous (AA): www.aa.org      Physical Activity: www.[place].nih.gov/bz8sndu      Tobacco use: www.quitwithusla.org

## 2024-07-15 ENCOUNTER — CLINICAL SUPPORT (OUTPATIENT)
Dept: INTERNAL MEDICINE | Facility: CLINIC | Age: 74
End: 2024-07-15
Payer: MEDICARE

## 2024-07-15 DIAGNOSIS — H61.20 IMPACTED CERUMEN, UNSPECIFIED LATERALITY: Primary | ICD-10-CM

## 2024-07-15 NOTE — PROGRESS NOTES
Bilateral ear irrigation ordered by Jennifer Fatima FNMOHSEN on July 12 2024.   Ears irrigated until clear. Pt denies any pain and states that he hears better. Pt left without complaints.

## 2024-08-19 ENCOUNTER — TELEPHONE (OUTPATIENT)
Dept: PODIATRY | Facility: CLINIC | Age: 74
End: 2024-08-19
Payer: MEDICARE

## 2024-08-19 NOTE — TELEPHONE ENCOUNTER
Spoke with patient to confirm his appointment on 08/22/2024 with Dr. Logan, pt. has verbally confirmed appt.

## 2024-08-22 ENCOUNTER — OFFICE VISIT (OUTPATIENT)
Dept: PODIATRY | Facility: CLINIC | Age: 74
End: 2024-08-22
Payer: MEDICARE

## 2024-08-22 VITALS
HEIGHT: 65 IN | RESPIRATION RATE: 19 BRPM | SYSTOLIC BLOOD PRESSURE: 161 MMHG | BODY MASS INDEX: 29.75 KG/M2 | DIASTOLIC BLOOD PRESSURE: 81 MMHG | WEIGHT: 178.56 LBS | HEART RATE: 88 BPM

## 2024-08-22 DIAGNOSIS — G62.9 PERIPHERAL POLYNEUROPATHY: ICD-10-CM

## 2024-08-22 DIAGNOSIS — N18.30 STAGE 3 CHRONIC KIDNEY DISEASE, UNSPECIFIED WHETHER STAGE 3A OR 3B CKD: ICD-10-CM

## 2024-08-22 DIAGNOSIS — L60.3 ONYCHODYSTROPHY: ICD-10-CM

## 2024-08-22 DIAGNOSIS — I73.9 PAD (PERIPHERAL ARTERY DISEASE): Primary | ICD-10-CM

## 2024-08-22 PROCEDURE — 11721 DEBRIDE NAIL 6 OR MORE: CPT | Mod: Q9,S$GLB,, | Performed by: PODIATRIST

## 2024-08-22 PROCEDURE — 99499 UNLISTED E&M SERVICE: CPT | Mod: S$GLB,,, | Performed by: PODIATRIST

## 2024-08-22 PROCEDURE — 99999 PR PBB SHADOW E&M-EST. PATIENT-LVL III: CPT | Mod: PBBFAC,,, | Performed by: PODIATRIST

## 2024-08-26 NOTE — PROGRESS NOTES
Subjective:     Patient ID: Babatunde Singh is a 74 y.o. male.    Chief Complaint: PAD  (Jennifer Fatima, RAMYP at 7/12/2024), Nail Care, Nail Problem (Thick nail fungus ), and Foot Pain    Babatunde is a 74 y.o. male who presents to the clinic for evaluation and treatment of high risk feet. Babatunde has a past medical history of DVT (deep venous thrombosis), Hypertension, Peripheral arterial disease, and Right renal mass (8/11/2022). The patient's chief complaint is long, thick toenails. This patient has documented high risk feet requiring routine maintenance secondary to diabetes mellitis and those secondary complications of diabetes, as mentioned..    PCP: Esdras Ly MD    Date Last Seen by PCP:   Chief Complaint   Patient presents with    PAD      Jennifer Fatima, RAMYP at 7/12/2024    Nail Care    Nail Problem     Thick nail fungus     Foot Pain     Hemoglobin A1C   Date Value Ref Range Status   05/03/2024 5.1 4.0 - 5.6 % Final     Comment:     ADA Screening Guidelines:  5.7-6.4%  Consistent with prediabetes  >or=6.5%  Consistent with diabetes    High levels of fetal hemoglobin interfere with the HbA1C  assay. Heterozygous hemoglobin variants (HbS, HgC, etc)do  not significantly interfere with this assay.   However, presence of multiple variants may affect accuracy.     06/07/2023 4.6 4.0 - 5.6 % Final     Comment:     ADA Screening Guidelines:  5.7-6.4%  Consistent with prediabetes  >or=6.5%  Consistent with diabetes    High levels of fetal hemoglobin interfere with the HbA1C  assay. Heterozygous hemoglobin variants (HbS, HgC, etc)do  not significantly interfere with this assay.   However, presence of multiple variants may affect accuracy.     03/10/2022 5.8 (H) 4.0 - 5.6 % Final     Comment:     ADA Screening Guidelines:  5.7-6.4%  Consistent with prediabetes  >or=6.5%  Consistent with diabetes    High levels of fetal hemoglobin interfere with the HbA1C  assay. Heterozygous hemoglobin  "variants (HbS, HgC, etc)do  not significantly interfere with this assay.   However, presence of multiple variants may affect accuracy.         Review of Systems   Constitutional: Negative for chills, decreased appetite and fever.   Cardiovascular:  Negative for leg swelling.   Skin:  Positive for dry skin and nail changes.   Musculoskeletal:  Negative for arthritis, joint pain, joint swelling and myalgias.   Gastrointestinal:  Negative for nausea and vomiting.   Neurological:  Negative for loss of balance, numbness and paresthesias.        Objective:     Vitals:    08/22/24 0911   BP: (!) 161/81   Pulse: 88   Resp: 19   Weight: 81 kg (178 lb 9.2 oz)   Height: 5' 5" (1.651 m)   PainSc: 0-No pain        Physical Exam  Vitals reviewed.   Constitutional:       General: He is not in acute distress.     Appearance: He is well-developed. He is not toxic-appearing.   Cardiovascular:      Comments: Dorsalis pedis and posterior tibial pulses are diminished Bilaterally. Toes are cool to touch. Feet are warm proximally.There is decreased digital hair. Skin is atrophic, slightly hyperpigmented, and mildly edematous      Musculoskeletal:         General: No tenderness. Normal range of motion.      Comments: Adequate joint range of motion without pain, limitation, nor crepitation Bilateral feet and ankle joints. Muscle strength is 5/5 in all groups bilaterally.         Skin:     General: Skin is warm and dry.      Findings: No ecchymosis, erythema or lesion.      Comments: Nails x 10 are elongated by  2-5mm's, thickened by 3-5 mm's, dystrophic, and are darkened in  coloration . Xerosis Bilaterally. No open lesions noted.       Neurological:      Mental Status: He is alert and oriented to person, place, and time.      Comments: [place] 5.07 monofilamant testing is diminished Logan feet. Sharp/dull sensation diminished Bilaterally. Light touch absent Bilaterally.       Psychiatric:         Behavior: Behavior normal. "           Assessment:      Encounter Diagnoses   Name Primary?    PAD (peripheral artery disease) Yes    Peripheral polyneuropathy     Stage 3 chronic kidney disease, unspecified whether stage 3a or 3b CKD     Onychodystrophy      Plan:     Babatunde was seen today for pad , nail care, nail problem and foot pain.    Diagnoses and all orders for this visit:    PAD (peripheral artery disease)    Peripheral polyneuropathy    Stage 3 chronic kidney disease, unspecified whether stage 3a or 3b CKD    Onychodystrophy      I counseled the patient on his conditions, their implications and medical management.        - Shoe inspection. Diabetic Foot Education. Patient reminded of the importance of good nutrition and blood sugar control to help prevent podiatric complications of diabetes. Patient instructed on proper foot hygeine. We discussed wearing proper shoe gear, daily foot inspections, never walking without protective shoe gear, never putting sharp instruments to feet, routine podiatric nail visits every 2-3 months.      - With patient's permission, nails were aggressively reduced and debrided x 10 to their soft tissue attachment mechanically  removing all offending nail and debris. Patient relates relief following the procedure. He will continue to monitor the areas daily, inspect his feet, wear protective shoe gear when ambulatory, moisturizer to maintain skin integrity and follow in this office in approximately 2-3 months, sooner p.r.n.

## 2024-11-21 ENCOUNTER — OFFICE VISIT (OUTPATIENT)
Dept: PODIATRY | Facility: CLINIC | Age: 74
End: 2024-11-21
Payer: MEDICARE

## 2024-11-21 VITALS
HEIGHT: 65 IN | SYSTOLIC BLOOD PRESSURE: 149 MMHG | DIASTOLIC BLOOD PRESSURE: 72 MMHG | HEART RATE: 94 BPM | BODY MASS INDEX: 30.12 KG/M2 | RESPIRATION RATE: 18 BRPM | WEIGHT: 180.75 LBS

## 2024-11-21 DIAGNOSIS — N18.30 STAGE 3 CHRONIC KIDNEY DISEASE, UNSPECIFIED WHETHER STAGE 3A OR 3B CKD: ICD-10-CM

## 2024-11-21 DIAGNOSIS — L60.3 ONYCHODYSTROPHY: ICD-10-CM

## 2024-11-21 DIAGNOSIS — I73.9 PAD (PERIPHERAL ARTERY DISEASE): Primary | ICD-10-CM

## 2024-11-21 DIAGNOSIS — G62.9 PERIPHERAL POLYNEUROPATHY: ICD-10-CM

## 2024-11-21 PROCEDURE — 99999 PR PBB SHADOW E&M-EST. PATIENT-LVL III: CPT | Mod: PBBFAC,,, | Performed by: PODIATRIST

## 2024-11-21 NOTE — PROGRESS NOTES
Subjective:     Patient ID: Babatunde Singh is a 74 y.o. male.    Chief Complaint: No chief complaint on file.    Babatunde is a 74 y.o. male who presents to the clinic for evaluation and treatment of high risk feet. Babatunde has a past medical history of DVT (deep venous thrombosis), Hypertension, Peripheral arterial disease, and Right renal mass (8/11/2022). The patient's chief complaint is long, thick toenails. This patient has documented high risk feet requiring routine maintenance secondary to diabetes mellitis and those secondary complications of diabetes, as mentioned..    PCP: Esdras Ly MD    Date Last Seen by PCP:   No chief complaint on file.    Hemoglobin A1C   Date Value Ref Range Status   05/03/2024 5.1 4.0 - 5.6 % Final     Comment:     ADA Screening Guidelines:  5.7-6.4%  Consistent with prediabetes  >or=6.5%  Consistent with diabetes    High levels of fetal hemoglobin interfere with the HbA1C  assay. Heterozygous hemoglobin variants (HbS, HgC, etc)do  not significantly interfere with this assay.   However, presence of multiple variants may affect accuracy.     06/07/2023 4.6 4.0 - 5.6 % Final     Comment:     ADA Screening Guidelines:  5.7-6.4%  Consistent with prediabetes  >or=6.5%  Consistent with diabetes    High levels of fetal hemoglobin interfere with the HbA1C  assay. Heterozygous hemoglobin variants (HbS, HgC, etc)do  not significantly interfere with this assay.   However, presence of multiple variants may affect accuracy.     03/10/2022 5.8 (H) 4.0 - 5.6 % Final     Comment:     ADA Screening Guidelines:  5.7-6.4%  Consistent with prediabetes  >or=6.5%  Consistent with diabetes    High levels of fetal hemoglobin interfere with the HbA1C  assay. Heterozygous hemoglobin variants (HbS, HgC, etc)do  not significantly interfere with this assay.   However, presence of multiple variants may affect accuracy.         Review of Systems   Constitutional: Negative for chills, decreased appetite  "and fever.   Cardiovascular:  Negative for leg swelling.   Skin:  Positive for dry skin and nail changes. Negative for color change, flushing and itching.   Musculoskeletal:  Negative for arthritis, joint pain, joint swelling and myalgias.   Gastrointestinal:  Negative for nausea and vomiting.   Neurological:  Negative for loss of balance, numbness and paresthesias.        Objective:     Vitals:    11/21/24 0927   BP: (!) 149/72   Pulse: 94   Resp: 18   Weight: 82 kg (180 lb 12.4 oz)   Height: 5' 5" (1.651 m)   PainSc: 0-No pain        Physical Exam  Vitals reviewed.   Constitutional:       General: He is not in acute distress.     Appearance: He is well-developed. He is not toxic-appearing.   Cardiovascular:      Comments: Dorsalis pedis and posterior tibial pulses are diminished Bilaterally. Toes are cool to touch. Feet are warm proximally.There is decreased digital hair. Skin is atrophic, slightly hyperpigmented, and mildly edematous      Musculoskeletal:         General: No tenderness or signs of injury. Normal range of motion.      Comments: Adequate joint range of motion without pain, limitation, nor crepitation Bilateral feet and ankle joints. Muscle strength is 5/5 in all groups bilaterally.         Skin:     General: Skin is warm and dry.      Findings: No ecchymosis, erythema or lesion.      Comments: Nails x 10 are elongated by  2-4mm's, thickened by 3-5 mm's, dystrophic, and are darkened in  coloration . Xerosis Bilaterally. No open lesions noted.       Neurological:      Mental Status: He is alert and oriented to person, place, and time.      Comments: [place] 5.07 monofilamant testing is diminished Logan feet. Sharp/dull sensation diminished Bilaterally. Light touch absent Bilaterally.       Psychiatric:         Behavior: Behavior normal.           Assessment:      Encounter Diagnoses   Name Primary?    PAD (peripheral artery disease) Yes    Peripheral polyneuropathy     Stage 3 chronic kidney " disease, unspecified whether stage 3a or 3b CKD     Onychodystrophy      Plan:     Diagnoses and all orders for this visit:    PAD (peripheral artery disease)    Peripheral polyneuropathy    Stage 3 chronic kidney disease, unspecified whether stage 3a or 3b CKD    Onychodystrophy      I counseled the patient on his conditions, their implications and medical management.        - Shoe inspection. Diabetic Foot Education. Patient reminded of the importance of good nutrition and blood sugar control to help prevent podiatric complications of diabetes. Patient instructed on proper foot hygeine. We discussed wearing proper shoe gear, daily foot inspections, never walking without protective shoe gear, never putting sharp instruments to feet, routine podiatric nail visits every 2-3 months.      - With patient's permission, nails were aggressively reduced and debrided x 10 to their soft tissue attachment mechanically  removing all offending nail and debris. Patient relates relief following the procedure. He will continue to monitor the areas daily, inspect his feet, wear protective shoe gear when ambulatory, moisturizer to maintain skin integrity and follow in this office in approximately 2-3 months, sooner p.r.n.

## 2025-01-06 RX ORDER — FERROUS SULFATE 325(65) MG
TABLET ORAL
Qty: 90 TABLET | Refills: 1 | Status: SHIPPED | OUTPATIENT
Start: 2025-01-06

## 2025-01-22 DIAGNOSIS — E78.5 HYPERLIPIDEMIA, UNSPECIFIED HYPERLIPIDEMIA TYPE: ICD-10-CM

## 2025-01-22 RX ORDER — ATORVASTATIN CALCIUM 80 MG/1
80 TABLET, FILM COATED ORAL DAILY
Qty: 90 TABLET | Refills: 0 | Status: SHIPPED | OUTPATIENT
Start: 2025-01-22

## 2025-01-22 NOTE — TELEPHONE ENCOUNTER
Care Due:                  Date            Visit Type   Department     Provider  --------------------------------------------------------------------------------                                EP -                              PRIMARY      [facility] INTERNAL  Last Visit: 05-      CARE ([facility])   MEDICINE       REID GRIFFITH  Next Visit: None Scheduled  None         None Found                                                            Last  Test          Frequency    Reason                     Performed    Due Date  --------------------------------------------------------------------------------    CMP.........  12 months..  DULoxetine,                04- 04-                             amlodipine-olmesartan,                             atorvastatin, ezetimibe..    [facility] Embedded Care Due Messages. Reference number: [id number].   1/22/2025 12:11:05 AM CST

## 2025-01-22 NOTE — TELEPHONE ENCOUNTER
Provider Staff:  Action required for this patient     Please see care gap opportunities below in Care Due Message.    Thanks!  [facility]     Appointments      Date Provider   Last Visit   5/3/2024 Esdras Ly MD   Next Visit   Visit date not found Esdras Ly MD      Refill Decision Note   Babatunde Singh  is requesting a refill authorization.  Brief Assessment and Rationale for Refill:  Approve     Medication Therapy Plan:         Comments:     Note composed:2:20 AM 01/22/2025

## 2025-02-01 DIAGNOSIS — I73.9 PERIPHERAL ARTERIAL DISEASE: ICD-10-CM

## 2025-02-01 NOTE — TELEPHONE ENCOUNTER
Care Due:                  Date            Visit Type   Department     Provider  --------------------------------------------------------------------------------                                EP -                              PRIMARY      [facility] INTERNAL  Last Visit: 05-      [facility] ([facility])   MEDICINE       REID GRIFFITH  Next Visit: None Scheduled  None         None Found                                                            Last  Test          Frequency    Reason                     Performed    Due Date  --------------------------------------------------------------------------------    Lipid Panel.  12 months..  atorvastatin, ezetimibe..  05- 04-    [facility] Embedded Care Due Messages. Reference number: [id number].   2/01/2025 12:55:19 PM CST

## 2025-02-03 RX ORDER — GABAPENTIN 800 MG/1
800 TABLET ORAL 3 TIMES DAILY
Qty: 270 TABLET | Refills: 0 | Status: SHIPPED | OUTPATIENT
Start: 2025-02-03

## 2025-02-03 RX ORDER — RIVAROXABAN 20 MG/1
20 TABLET, FILM COATED ORAL
Qty: 30 TABLET | Refills: 0 | Status: SHIPPED | OUTPATIENT
Start: 2025-02-03

## 2025-02-03 NOTE — TELEPHONE ENCOUNTER
Refill Routing Note   Medication(s) are not appropriate for processing by [facility] for the following reason(s):        Outside of protocol    [facility] action(s):  Route     Requires labs : Yes             Appointments  past 12m or future 3m with PCP    Date Provider   Last Visit   5/3/2024 Esdras Ly MD   Next Visit   Visit date not found Esdras Ly MD   ED visits in past 90 days: 0        Note composed:10:17 AM 02/03/2025

## 2025-02-18 NOTE — PROGRESS NOTES
[facility]  Vascular Surgery  Progress Note    Patient Name: Babatunde Singh  MRN: [id number]  Admission Date: 12/8/2022  Primary Care Provider: Esdras Ly MD    Subjective:     Interval History: NAEO. VSS. Awaiting home wound vac system    Post-Op Info:  Procedure(s) (LRB):  DEBRIDEMENT, WOUND (Right)  APPLICATION, WOUND VAC (Right)   2 Days Post-Op       Medications:  Continuous Infusions:  Scheduled Meds:   aspirin  81 mg Oral Daily    atorvastatin  80 mg Oral Daily    rivaroxaban  20 mg Oral Daily with dinner     PRN Meds:acetaminophen, acetaminophen, hydrALAZINE, LIDOcaine (PF) 10 mg/ml (1%), melatonin, ondansetron, oxyCODONE, sodium chloride 0.9%     Objective:     Vital Signs (Most Recent):  Temp: 97.5 °F (36.4 °C) (12/10/22 0727)  Pulse: 83 (12/10/22 0727)  Resp: 14 (12/10/22 0727)  BP: (!) 162/76 (12/10/22 0727)  SpO2: 96 % (12/10/22 0727)   Vital Signs (24h Range):  Temp:  [97.5 °F (36.4 °C)-98.1 °F (36.7 °C)] 97.5 °F (36.4 °C)  Pulse:  [77-85] 83  Resp:  [14-18] 14  SpO2:  [95 %-98 %] 96 %  BP: (137-175)/(63-79) 162/76     Date 12/10/22 0700 - 12/11/22 0659   Shift 7104-6069 5931-1419 0005-4282 24 Hour Total   INTAKE   Shift Total(mL/kg)       OUTPUT   Urine(mL/kg/hr) 225   225   Shift Total(mL/kg) 225(3.5)   225(3.5)   Weight (kg) 64 64 64 64       Physical Exam  Cardiovascular:      Rate and Rhythm: Normal rate.      Pulses: Normal pulses.   Pulmonary:      Effort: Pulmonary effort is normal.   Abdominal:      General: Abdomen is flat.      Palpations: Abdomen is soft.   Musculoskeletal:         General: Normal range of motion.   Skin:     General: Skin is warm.      Comments: RLE wound with wound vac in place - good suction/seal      Neurological:      Mental Status: He is alert.       Significant Labs:  CBC:   Recent Labs   Lab 12/10/22  0521   WBC 9.52   RBC 3.27*   HGB 7.6*   HCT 25.5*      MCV 78*   MCH 23.2*   MCHC 29.8*     CMP:   Recent Labs   Lab 12/10/22  0521   GLU 91    CALCIUM 8.5*      K 3.7   CO2 23      BUN 9   CREATININE 0.7       Significant Diagnostics:  I have reviewed all pertinent imaging results/findings within the past 24 hours.    Assessment/Plan:     * Leg wound, right, subsequent encounter  Babatunde Singh is a 72 y.o M with severe chronic ischemic rest pain to the right foot. Patient is now s/p RLE wound washout and wound vac placement.     Plan:   - awaiting home wound vac   - regular diet   - on xarelto   - PRN pain control   - continue home meds         Arti Lyons MD  Vascular Surgery  [facility]   back pain/injury

## 2025-02-20 ENCOUNTER — NURSE TRIAGE (OUTPATIENT)
Dept: ADMINISTRATIVE | Facility: CLINIC | Age: 75
End: 2025-02-20
Payer: MEDICARE

## 2025-02-20 DIAGNOSIS — E78.5 HYPERLIPIDEMIA, UNSPECIFIED HYPERLIPIDEMIA TYPE: ICD-10-CM

## 2025-02-20 NOTE — TELEPHONE ENCOUNTER
Pt reports he has only 5 tabs left of his 80mg atorvastatin that he takes one tablet by mouth once daily, pt is wanting to request his refill to be sent to the [facility] pharmacy at [address] in [place] as he is switching pharmacies. Pt advised a message will be routed with his request, but he should also call the office when it is open per protocol, Pt encouraged to call back with any worsening symptoms or questions. He verbalized understanding.      Reason for Disposition   [1] Caller has NON-URGENT medicine question about med that PCP prescribed AND [2] triager unable to answer question    Protocols used: Medication Refill and Renewal Call-A-

## 2025-02-21 ENCOUNTER — TELEPHONE (OUTPATIENT)
Dept: INTERNAL MEDICINE | Facility: CLINIC | Age: 75
End: 2025-02-21
Payer: MEDICARE

## 2025-02-21 DIAGNOSIS — E78.5 HYPERLIPIDEMIA, UNSPECIFIED HYPERLIPIDEMIA TYPE: ICD-10-CM

## 2025-02-21 RX ORDER — ATORVASTATIN CALCIUM 80 MG/1
80 TABLET, FILM COATED ORAL DAILY
Qty: 90 TABLET | Refills: 0 | Status: SHIPPED | OUTPATIENT
Start: 2025-02-21

## 2025-02-21 RX ORDER — ATORVASTATIN CALCIUM 80 MG/1
80 TABLET, FILM COATED ORAL DAILY
Qty: 90 TABLET | Refills: 0 | Status: CANCELLED | OUTPATIENT
Start: 2025-02-21

## 2025-02-21 NOTE — TELEPHONE ENCOUNTER
No care due was identified.  [facility] Embedded Care Due Messages. Reference number: [id number].   2/21/2025 9:35:49 AM CST

## 2025-02-21 NOTE — TELEPHONE ENCOUNTER
----- Message from Tia sent at 2/21/2025  1:01 PM CST -----  Who Called: PtWhat is the request in detail: Requesting call back to discuss New rx, new pharmacy atorvastatin (LIPITOR) 80 MG tablet 90 tablet 0 2/21/2025 - NoSig - Route: Take 1 tablet (80 mg total) by mouth once daily. - OralSent to pharmacy as: atorvastatin (LIPITOR) 80 MG tabletClass: NormalOrder: [id number] [address] [address]Jobcz: [phone] Fax: [phone] the clinic reply by [facility]? NoBest Call Back Number: [phone] Information:

## 2025-02-21 NOTE — TELEPHONE ENCOUNTER
Pt requested that his pharmacy be changed from [facility] to [facility] on [address]

## 2025-02-21 NOTE — TELEPHONE ENCOUNTER
Refill Decision Note   Babatunde Singh  is requesting a refill authorization.  Brief Assessment and Rationale for Refill:  Approve     Medication Therapy Plan:         Comments:     Note composed:9:41 AM 02/21/2025

## 2025-02-24 DIAGNOSIS — I10 PRIMARY HYPERTENSION: ICD-10-CM

## 2025-02-24 NOTE — TELEPHONE ENCOUNTER
No care due was identified.  [facility] Embedded Care Due Messages. Reference number: [id number].   2/24/2025 4:13:54 PM CST

## 2025-02-24 NOTE — TELEPHONE ENCOUNTER
----- Message from Vika sent at 2/24/2025  3:53 PM CST -----  Type:  Needs Medical AdviceWho Called: GODWIN RASHID [[id number]]Pharmacy name and phone #:  [facility] [address] - [place] [address]: [phone]: [phone] the patient rather a call back or a response via [facility]? Call back Best Call Back Number: [phone] Additional Information: Patient states the medication was sent over to the wrong pharmacy. Patient states he no longer uses [facility] as his pharmacy. Patient would like atorvastatin (LIPITOR) 80 MG tablet sent to the pharmacy above. Patient has a couple of tablets left and would like the medication sent over as soon as possible.

## 2025-02-24 NOTE — TELEPHONE ENCOUNTER
----- Message from Vika sent at 2/24/2025  3:53 PM CST -----  Type:  Needs Medical AdviceWho Called: GODWIN RASHID [[id number]]Pharmacy name and phone #:  [facility] [address] - [place] [address]: [phone]: [phone] the patient rather a call back or a response via [facility]? Call back Best Call Back Number: [phone] Additional Information: Patient states the medication was sent over to the wrong pharmacy. Patient states he no longer uses [facility] as his [facility]. Patient would like atorvastatin (LIPITOR) 80 MG tablet sent to the pharmacy above. Patient has a couple of tablets left and would like the medication sent over as soon as possible.

## 2025-02-25 ENCOUNTER — TELEPHONE (OUTPATIENT)
Dept: INTERNAL MEDICINE | Facility: CLINIC | Age: 75
End: 2025-02-25
Payer: MEDICARE

## 2025-02-25 RX ORDER — AMLODIPINE AND OLMESARTAN MEDOXOMIL 10; 40 MG/1; MG/1
1 TABLET ORAL DAILY
Qty: 90 TABLET | Refills: 3 | Status: SHIPPED | OUTPATIENT
Start: 2025-02-25

## 2025-02-25 NOTE — TELEPHONE ENCOUNTER
I have spoken to the patient in regards to his medication Atorvastatin. The patient states that he only has 4 pills of the medication left. I have called the patient pharmacy [place] [address], and staff told me that the patients medication is on hold due to the patient is requesting the medication early and insurance is not going to pay for the patients medication. The patient can  the medication on April 30th ,and his insurance will pay for it.     The patent would like to know is there a way you can send over a request to get his medication refilled early due to the fact that he only has 4 pills left ,and can't wait until April 30th.

## 2025-02-25 NOTE — TELEPHONE ENCOUNTER
Refill Routing Note   Medication(s) are not appropriate for processing by [facility] for the following reason(s):      Required vitals abnormal    [facility] action(s):  Defer Care Due:  None identified            Appointments  past 12m or future 3m with PCP    Date Provider   Last Visit   5/3/2024 Esdras Ly MD   Next Visit   Visit date not found Esdras Ly MD   ED visits in past 90 days: 0        Note composed:8:08 PM 02/24/2025

## 2025-02-27 ENCOUNTER — OFFICE VISIT (OUTPATIENT)
Dept: PODIATRY | Facility: CLINIC | Age: 75
End: 2025-02-27
Payer: MEDICARE

## 2025-02-27 VITALS — BODY MASS INDEX: 30.86 KG/M2 | HEIGHT: 64 IN | WEIGHT: 180.75 LBS

## 2025-02-27 DIAGNOSIS — L60.3 ONYCHODYSTROPHY: ICD-10-CM

## 2025-02-27 DIAGNOSIS — G62.9 PERIPHERAL POLYNEUROPATHY: ICD-10-CM

## 2025-02-27 DIAGNOSIS — N18.30 STAGE 3 CHRONIC KIDNEY DISEASE, UNSPECIFIED WHETHER STAGE 3A OR 3B CKD: ICD-10-CM

## 2025-02-27 DIAGNOSIS — I73.9 PAD (PERIPHERAL ARTERY DISEASE): Primary | ICD-10-CM

## 2025-02-27 PROCEDURE — 99999 PR PBB SHADOW E&M-EST. PATIENT-LVL II: CPT | Mod: PBBFAC,,, | Performed by: PODIATRIST

## 2025-02-27 NOTE — PROGRESS NOTES
Subjective:     Patient ID: Babatunde Singh is a 74 y.o. male.    Chief Complaint: PAD and Nail Care    Babatunde is a 74 y.o. male who presents to the clinic for evaluation and treatment of high risk feet. Babatunde has a past medical history of DVT (deep venous thrombosis), Hypertension, Peripheral arterial disease, and Right renal mass (8/11/2022). The patient's chief complaint is long, thick toenails. This patient has documented high risk feet requiring routine maintenance secondary to diabetes mellitis and those secondary complications of diabetes, as mentioned..    PCP: Esdras Ly MD    Date Last Seen by PCP:   Chief Complaint   Patient presents with    PAD    Nail Care     Hemoglobin A1C   Date Value Ref Range Status   05/03/2024 5.1 4.0 - 5.6 % Final     Comment:     ADA Screening Guidelines:  5.7-6.4%  Consistent with prediabetes  >or=6.5%  Consistent with diabetes    High levels of fetal hemoglobin interfere with the HbA1C  assay. Heterozygous hemoglobin variants (HbS, HgC, etc)do  not significantly interfere with this assay.   However, presence of multiple variants may affect accuracy.     06/07/2023 4.6 4.0 - 5.6 % Final     Comment:     ADA Screening Guidelines:  5.7-6.4%  Consistent with prediabetes  >or=6.5%  Consistent with diabetes    High levels of fetal hemoglobin interfere with the HbA1C  assay. Heterozygous hemoglobin variants (HbS, HgC, etc)do  not significantly interfere with this assay.   However, presence of multiple variants may affect accuracy.     03/10/2022 5.8 (H) 4.0 - 5.6 % Final     Comment:     ADA Screening Guidelines:  5.7-6.4%  Consistent with prediabetes  >or=6.5%  Consistent with diabetes    High levels of fetal hemoglobin interfere with the HbA1C  assay. Heterozygous hemoglobin variants (HbS, HgC, etc)do  not significantly interfere with this assay.   However, presence of multiple variants may affect accuracy.         Review of Systems   Constitutional: Negative for  "chills, decreased appetite and fever.   Cardiovascular:  Negative for leg swelling.   Skin:  Positive for dry skin and nail changes. Negative for color change, flushing and itching.   Musculoskeletal:  Negative for arthritis, joint pain, joint swelling and myalgias.   Gastrointestinal:  Negative for nausea and vomiting.   Neurological:  Negative for loss of balance, numbness and paresthesias.        Objective:     Vitals:    02/27/25 1004   Weight: 82 kg (180 lb 12.4 oz)   Height: 5' 4" (1.626 m)   PainSc: 0-No pain        Physical Exam  Vitals reviewed.   Constitutional:       General: He is not in acute distress.     Appearance: He is well-developed. He is not toxic-appearing.   Cardiovascular:      Comments: Dorsalis pedis and posterior tibial pulses are diminished Bilaterally. Toes are cool to touch. Feet are warm proximally.There is decreased digital hair. Skin is atrophic, slightly hyperpigmented, and mildly edematous      Musculoskeletal:         General: No tenderness or signs of injury. Normal range of motion.      Comments: Adequate joint range of motion without pain, limitation, nor crepitation Bilateral feet and ankle joints. Muscle strength is 5/5 in all groups bilaterally.         Skin:     General: Skin is warm and dry.      Findings: No ecchymosis, erythema or lesion.      Comments: Nails x 10 are elongated by  2-4mm's, thickened by 3-5 mm's, dystrophic, and are darkened in  coloration . Xerosis Bilaterally. No open lesions noted.       Neurological:      Mental Status: He is alert and oriented to person, place, and time.      Comments: Hamilton-Pedro 5.07 monofilamant testing is diminished Logan feet. Sharp/dull sensation diminished Bilaterally. Light touch absent Bilaterally.       Psychiatric:         Behavior: Behavior normal.           Assessment:      Encounter Diagnoses   Name Primary?    PAD (peripheral artery disease) Yes    Peripheral polyneuropathy     Stage 3 chronic kidney disease, " unspecified whether stage 3a or 3b CKD     Onychodystrophy      Plan:     Babatunde was seen today for pad and nail care.    Diagnoses and all orders for this visit:    PAD (peripheral artery disease)    Peripheral polyneuropathy    Stage 3 chronic kidney disease, unspecified whether stage 3a or 3b CKD    Onychodystrophy      I counseled the patient on his conditions, their implications and medical management.        - Shoe inspection. Diabetic Foot Education. Patient reminded of the importance of good nutrition and blood sugar control to help prevent podiatric complications of diabetes. Patient instructed on proper foot hygeine. We discussed wearing proper shoe gear, daily foot inspections, never walking without protective shoe gear, never putting sharp instruments to feet, routine podiatric nail visits every 2-3 months.      - With patient's permission, nails were aggressively reduced and debrided x 10 to their soft tissue attachment mechanically  removing all offending nail and debris. Patient relates relief following the procedure. He will continue to monitor the areas daily, inspect his feet, wear protective shoe gear when ambulatory, moisturizer to maintain skin integrity and follow in this office in approximately 2-3 months, sooner p.r.n.

## 2025-03-16 DIAGNOSIS — E78.5 HYPERLIPIDEMIA, UNSPECIFIED HYPERLIPIDEMIA TYPE: ICD-10-CM

## 2025-03-17 ENCOUNTER — TELEPHONE (OUTPATIENT)
Dept: INTERNAL MEDICINE | Facility: CLINIC | Age: 75
End: 2025-03-17

## 2025-03-17 RX ORDER — ATORVASTATIN CALCIUM 80 MG/1
80 TABLET, FILM COATED ORAL DAILY
Qty: 90 TABLET | Refills: 0 | Status: SHIPPED | OUTPATIENT
Start: 2025-03-17

## 2025-03-17 RX ORDER — RIVAROXABAN 20 MG/1
TABLET, FILM COATED ORAL
Qty: 30 TABLET | Refills: 0 | Status: SHIPPED | OUTPATIENT
Start: 2025-03-17

## 2025-03-17 NOTE — TELEPHONE ENCOUNTER
Refill Routing Note   Medication(s) are not appropriate for processing by [facility] for the following reason(s):        Outside of protocol    [facility] action(s):  Route  Approve             Appointments  past 12m or future 3m with PCP    Date Provider   Last Visit   5/3/2024 Esdras Ly MD   Next Visit   Visit date not found Esdras Ly MD   ED visits in past 90 days: 0        Note composed:1:06 PM 03/17/2025

## 2025-03-17 NOTE — TELEPHONE ENCOUNTER
----- Message from Katarzyna sent at 3/17/2025  9:55 AM CDT -----  Regarding: Refill  Contact: [phone]  Hi,Who called:The pt Reason:Pt is requesting a refill for his Blood thinner medication, but does not know the name of the medication. [facility] [address] [place] [address]: [phone] Fax: [phone] Provider's name:Dr. Shah Information:Pt would like a call  to confirm the correct medication is being sent over.Thank you.  ----- Message -----  From: Katarzyna Marley  Sent: 3/17/2025   9:57 AM CDT  To: Malachi Dewitt Staff  Subject: Refill                                           Hi,Who called:The pt Reason:Pt is requesting a refill for his Blood thinner medication, but does not know the name of the medication. [address] [address]Cukmp: [phone] Fax: [phone] Provider's name:Dr. Shah Information:Thank you.

## 2025-03-17 NOTE — TELEPHONE ENCOUNTER
No care due was identified.  [facility] Embedded Care Due Messages. Reference number: [id number].   3/17/2025 10:31:13 AM CDT

## 2025-04-17 NOTE — TELEPHONE ENCOUNTER
Refill Routing Note   Medication(s) are not appropriate for processing by [facility] for the following reason(s):        Outside of protocol    [facility] action(s):  Route               Appointments  past 12m or future 3m with PCP    Date Provider   Last Visit   5/3/2024 Esdras Ly MD   Next Visit   Visit date not found Esdras Ly MD   ED visits in past 90 days: 0        Note composed:12:25 PM 04/17/2025

## 2025-04-17 NOTE — TELEPHONE ENCOUNTER
----- Message from Rehana sent at 4/17/2025  9:45 AM CDT -----  Regarding: Medication  Contact: Pt  Patient would like to speak w/ you regarding  refill for medication , Blood Thinners, Patient wasn't sure of name , Please Send To Pharmacy : [facility]  Pharm[address] call[address]  [phone] You

## 2025-04-17 NOTE — TELEPHONE ENCOUNTER
----- Message from Dominique sent at 4/17/2025 10:09 AM CDT -----  Regarding: Babatunde  Who Called:BabatundeRefill or New Rx: RefillRX Name and Strength:  XARELTO 20 mg TabIs this a 30 day or 90 day RX: Patient is requesting a 90 day supplyPreferred Pharmacy with phone number:.[facility] [address] [place], [place] - [address] [place] [id number] [facility] [address]Ilweh: [phone] Fax: [phone] the patient rather a call back or a response via [facility]?callbackC[facility]point [facility] Call Back Number:.[phone] Information:

## 2025-04-17 NOTE — TELEPHONE ENCOUNTER
MD ATTESTATION NOTE    SHARED VISIT: This visit was performed by BOTH a physician and an APC. The substantive portion of the medical decision making was performed by this attesting physician who made or approved the management plan and takes responsibility for patient management. All studies documented in the APC note (if performed) were independently interpreted by me.    The MIRTA and I have discussed this patient's history, physical exam, MDM, and treatment plan.  I have reviewed the documentation and personally had a face to face interaction with the patient. The attached note describes my personal findings.      Marie Gonzalez is a 40 y.o. male who presents to the ED c/o acute sore throat and fevers.  No dyspnea no chest pain    On exam:  GENERAL: not distressed  HENT: nares patent, bilateral swollen tonsils with exudate uvula is midline  EYES: no scleral icterus  CV: regular rhythm, regular rate  RESPIRATORY: normal effort  ABDOMEN: soft  MUSCULOSKELETAL: no deformity  NEURO: alert, moves all extremities, follows commands  SKIN: warm, dry    Labs  Recent Results (from the past 24 hours)   Comprehensive Metabolic Panel    Collection Time: 11/05/24  2:42 AM    Specimen: Blood   Result Value Ref Range    Glucose 119 (H) 65 - 99 mg/dL    BUN 11 6 - 20 mg/dL    Creatinine 1.04 0.76 - 1.27 mg/dL    Sodium 137 136 - 145 mmol/L    Potassium 3.5 3.5 - 5.2 mmol/L    Chloride 101 98 - 107 mmol/L    CO2 25.8 22.0 - 29.0 mmol/L    Calcium 9.1 8.6 - 10.5 mg/dL    Total Protein 6.8 6.0 - 8.5 g/dL    Albumin 4.4 3.5 - 5.2 g/dL    ALT (SGPT) 74 (H) 1 - 41 U/L    AST (SGOT) 58 (H) 1 - 40 U/L    Alkaline Phosphatase 110 39 - 117 U/L    Total Bilirubin 0.5 0.0 - 1.2 mg/dL    Globulin 2.4 gm/dL    A/G Ratio 1.8 g/dL    BUN/Creatinine Ratio 10.6 7.0 - 25.0    Anion Gap 10.2 5.0 - 15.0 mmol/L    eGFR 93.7 >60.0 mL/min/1.73   CBC Auto Differential    Collection Time: 11/05/24  2:42 AM    Specimen: Blood   Result Value Ref Range    WBC  No care due was identified.  [facility] Embedded Care Due Messages. Reference number: [id number].   4/17/2025 10:43:14 AM CDT   13.94 (H) 3.40 - 10.80 10*3/mm3    RBC 4.86 4.14 - 5.80 10*6/mm3    Hemoglobin 15.0 13.0 - 17.7 g/dL    Hematocrit 43.1 37.5 - 51.0 %    MCV 88.7 79.0 - 97.0 fL    MCH 30.9 26.6 - 33.0 pg    MCHC 34.8 31.5 - 35.7 g/dL    RDW 12.5 12.3 - 15.4 %    RDW-SD 40.5 37.0 - 54.0 fl    MPV 9.1 6.0 - 12.0 fL    Platelets 288 140 - 450 10*3/mm3    Neutrophil % 80.5 (H) 42.7 - 76.0 %    Lymphocyte % 11.3 (L) 19.6 - 45.3 %    Monocyte % 7.2 5.0 - 12.0 %    Eosinophil % 0.3 0.3 - 6.2 %    Basophil % 0.4 0.0 - 1.5 %    Immature Grans % 0.3 0.0 - 0.5 %    Neutrophils, Absolute 11.21 (H) 1.70 - 7.00 10*3/mm3    Lymphocytes, Absolute 1.58 0.70 - 3.10 10*3/mm3    Monocytes, Absolute 1.01 (H) 0.10 - 0.90 10*3/mm3    Eosinophils, Absolute 0.04 0.00 - 0.40 10*3/mm3    Basophils, Absolute 0.06 0.00 - 0.20 10*3/mm3    Immature Grans, Absolute 0.04 0.00 - 0.05 10*3/mm3    nRBC 0.0 0.0 - 0.2 /100 WBC   Respiratory Panel PCR w/COVID-19(SARS-CoV-2) JAIDA/WILL/AJAY/PAD/COR/MARLEY In-House, NP Swab in UTM/VTM, 2 HR TAT - Swab, Nasopharynx    Collection Time: 11/05/24  2:49 AM    Specimen: Nasopharynx; Swab   Result Value Ref Range    ADENOVIRUS, PCR Not Detected Not Detected    Coronavirus 229E Not Detected Not Detected    Coronavirus HKU1 Not Detected Not Detected    Coronavirus NL63 Not Detected Not Detected    Coronavirus OC43 Not Detected Not Detected    COVID19 Not Detected Not Detected - Ref. Range    Human Metapneumovirus Not Detected Not Detected    Human Rhinovirus/Enterovirus Not Detected Not Detected    Influenza A PCR Not Detected Not Detected    Influenza B PCR Not Detected Not Detected    Parainfluenza Virus 1 Not Detected Not Detected    Parainfluenza Virus 2 Not Detected Not Detected    Parainfluenza Virus 3 Not Detected Not Detected    Parainfluenza Virus 4 Not Detected Not Detected    RSV, PCR Not Detected Not Detected    Bordetella pertussis pcr Not Detected Not Detected    Bordetella parapertussis PCR Not Detected Not Detected     Chlamydophila pneumoniae PCR Not Detected Not Detected    Mycoplasma pneumo by PCR Not Detected Not Detected   Rapid Strep A Screen - Swab, Throat    Collection Time: 11/05/24  2:49 AM    Specimen: Throat; Swab   Result Value Ref Range    Strep A Ag Positive (A) Negative       Radiology  No Radiology Exams Resulted Within Past 24 Hours    Medications given in the ED:  Medications   ondansetron (ZOFRAN) injection 4 mg (4 mg Intravenous Given 11/5/24 0245)   ketorolac (TORADOL) injection 30 mg (30 mg Intravenous Given 11/5/24 0246)   lactated ringers bolus 1,000 mL (0 mL Intravenous Stopped 11/5/24 0345)   Penicillin G Benzathine (BICILLIN-LA) injection 1.2 Million Units (1.2 Million Units Intramuscular Given 11/5/24 0414)   acetaminophen (TYLENOL) tablet 1,000 mg (1,000 mg Oral Given 11/5/24 0337)       Orders placed during this visit:  Orders Placed This Encounter   Procedures    Respiratory Panel PCR w/COVID-19(SARS-CoV-2) JAIDA/WILL/AJAY/PAD/COR/MARLEY In-House, NP Swab in UTM/VTM, 2 HR TAT - Swab, Nasopharynx    Rapid Strep A Screen - Swab, Throat    Comprehensive Metabolic Panel    CBC Auto Differential    CBC & Differential       Medical Decision Making:  ED Course as of 11/05/24 0437   Tue Nov 05, 2024 0220 I discussed the case with Dr. Underwood and he agrees to evaluate the patient at the bedside.    [CC]   0303 WBC(!): 13.94 [CC]   0320 Strep A Ag(!): Positive [CC]   0323 Rechecked on patient: He said he is feeling much better.  Fever still high.  He believes he can tolerate Tylenol.  Will treat strep with a dose of penicillin. [CC]   0330 Heart Rate: 108 [CC]   0415 I rechecked the patient.  I discussed the patient's labs, diagnosis, and plan for discharge.  A repeat exam reveals no new worrisome changes from my initial exam findings.  The patient understands that the fact that they are being discharged does not denote that nothing is abnormal, it indicates that no clinical emergency is present and that they  must follow-up as directed in order to properly maintain their health.  Follow-up instructions (specifically listed below) and return to ER precautions were given at this time.  I specifically instructed the patient to follow-up with their PCP.  The patient understands and agrees with the plan, and is ready for discharge.  All questions answered.   [CC]      ED Course User Index  [CC] Aimee Orozco, LENA       Differential diagnosis:  Peritonsillar abscess, tonsillitis, pharyngitis    Diagnosis  Final diagnoses:   Strep pharyngitis          Theo Underwood MD  11/05/24 0437

## 2025-04-23 NOTE — TELEPHONE ENCOUNTER
No care due was identified.  [facility] Embedded Care Due Messages. Reference number: [id number].   4/23/2025 2:14:22 PM CDT

## 2025-04-23 NOTE — TELEPHONE ENCOUNTER
Refill Routing Note   Medication(s) are not appropriate for processing by [facility] for the following reason(s):        Outside of protocol    [facility] action(s):  Route             Appointments  past 12m or future 3m with PCP    Date Provider   Last Visit   5/3/2024 Esdras Ly MD   Next Visit   Visit date not found Esdras Ly MD   ED visits in past 90 days: 0        Note composed:2:51 PM 04/23/2025

## 2025-04-30 DIAGNOSIS — N18.31 CHRONIC KIDNEY DISEASE, STAGE 3A: ICD-10-CM

## 2025-05-16 ENCOUNTER — TELEPHONE (OUTPATIENT)
Dept: INTERNAL MEDICINE | Facility: CLINIC | Age: 75
End: 2025-05-16
Payer: MEDICARE

## 2025-05-16 NOTE — TELEPHONE ENCOUNTER
----- Message from Shahla sent at 5/16/2025  9:57 AM CDT -----  Regarding: GODWIN RASHID [[id number]]  Type : Patient Call  Who Called :GODWIN RASHID [[id number]]  Does the patient know what this is regarding?: patient called and stated that he received a letter for  follow up lung screen to be scheduled. Please follow up and advise.  Would the patient rather a call back or a response via [facility]?call back    Best Call Back Number:[phone]  Additional Information:

## 2025-06-05 ENCOUNTER — OFFICE VISIT (OUTPATIENT)
Dept: PODIATRY | Facility: CLINIC | Age: 75
End: 2025-06-05
Payer: MEDICARE

## 2025-06-05 VITALS
BODY MASS INDEX: 31.41 KG/M2 | WEIGHT: 183 LBS | HEART RATE: 91 BPM | SYSTOLIC BLOOD PRESSURE: 188 MMHG | DIASTOLIC BLOOD PRESSURE: 79 MMHG

## 2025-06-05 DIAGNOSIS — L60.3 ONYCHODYSTROPHY: ICD-10-CM

## 2025-06-05 DIAGNOSIS — I73.9 PAD (PERIPHERAL ARTERY DISEASE): Primary | ICD-10-CM

## 2025-06-05 DIAGNOSIS — G62.9 PERIPHERAL POLYNEUROPATHY: ICD-10-CM

## 2025-06-05 DIAGNOSIS — N18.30 STAGE 3 CHRONIC KIDNEY DISEASE, UNSPECIFIED WHETHER STAGE 3A OR 3B CKD: ICD-10-CM

## 2025-06-05 PROCEDURE — 99999 PR PBB SHADOW E&M-EST. PATIENT-LVL II: CPT | Mod: PBBFAC,,, | Performed by: PODIATRIST

## 2025-06-13 DIAGNOSIS — I73.9 PERIPHERAL ARTERIAL DISEASE: ICD-10-CM

## 2025-06-13 RX ORDER — GABAPENTIN 800 MG/1
800 TABLET ORAL 3 TIMES DAILY
Qty: 270 TABLET | Refills: 0 | OUTPATIENT
Start: 2025-06-13

## 2025-06-13 NOTE — TELEPHONE ENCOUNTER
Refill Routing Note   Medication(s) are not appropriate for processing by [facility] for the following reason(s):        Outside of protocol    [facility] action(s):  Route   Requires appointment : Yes             Appointments  past 12m or future 3m with PCP    Date Provider   Last Visit   5/3/2024 Esdras Ly MD   Next Visit   Visit date not found Esdras Ly MD   ED visits in past 90 days: 0        Note composed:11:00 AM 06/13/2025

## 2025-06-13 NOTE — TELEPHONE ENCOUNTER
Care Due:                  Date            Visit Type   Department     Provider  --------------------------------------------------------------------------------                                EP -                              PRIMARY      [facility] INTERNAL  Last Visit: 05-      [facility]   MEDICINE       Esdras Ly  Next Visit: None Scheduled  None         None Found                                                            Last  Test          Frequency    Reason                     Performed    Due Date  --------------------------------------------------------------------------------    Office Visit  15 months..  amlodipine-olmesartan,     05- 07-                             atorvastatin, rivaroxaban    [facility] Embedded Care Due Messages. Reference number: [id number].   6/13/2025 10:51:04 AM CDT

## 2025-06-16 DIAGNOSIS — I73.9 PERIPHERAL ARTERIAL DISEASE: ICD-10-CM

## 2025-06-16 RX ORDER — GABAPENTIN 800 MG/1
800 TABLET ORAL 3 TIMES DAILY
Qty: 270 TABLET | Refills: 0 | Status: SHIPPED | OUTPATIENT
Start: 2025-06-16

## 2025-06-16 NOTE — TELEPHONE ENCOUNTER
No care due was identified.  [facility] Embedded Care Due Messages. Reference number: [id number].   6/16/2025 2:37:47 PM CDT

## 2025-06-16 NOTE — TELEPHONE ENCOUNTER
Refill Routing Note   Medication(s) are not appropriate for processing by [facility] for the following reason(s):        Outside of protocol    [facility] action(s):  Route               Appointments  past 12m or future 3m with PCP    Date Provider   Last Visit   5/3/2024 Esdras Ly MD   Next Visit   Visit date not found Esdras Ly MD   ED visits in past 90 days: 0        Note composed:3:36 PM 06/16/2025

## 2025-06-23 DIAGNOSIS — Z00.00 ENCOUNTER FOR MEDICARE ANNUAL WELLNESS EXAM: ICD-10-CM

## 2025-06-23 NOTE — PROGRESS NOTES
Subjective:     Patient ID: Babatunde Singh is a 75 y.o. male.    Chief Complaint: Fungus (Toenail fungus on rt foot ), Nail Care, and Nail Problem (Toenail fungus )    Babatunde is a 75 y.o. male who presents to the clinic for evaluation and treatment of high risk feet. Babatunde has a past medical history of DVT (deep venous thrombosis), Hypertension, Peripheral arterial disease, and Right renal mass (8/11/2022). The patient's chief complaint is long, thick toenails. This patient has documented high risk feet requiring routine maintenance secondary to diabetes mellitis and those secondary complications of diabetes, as mentioned..    PCP: Esdras Ly MD    Date Last Seen by PCP:   Chief Complaint   Patient presents with    Fungus     Toenail fungus on rt foot     Nail Care    Nail Problem     Toenail fungus      Hemoglobin A1C   Date Value Ref Range Status   05/03/2024 5.1 4.0 - 5.6 % Final     Comment:     ADA Screening Guidelines:  5.7-6.4%  Consistent with prediabetes  >or=6.5%  Consistent with diabetes    High levels of fetal hemoglobin interfere with the HbA1C  assay. Heterozygous hemoglobin variants (HbS, HgC, etc)do  not significantly interfere with this assay.   However, presence of multiple variants may affect accuracy.     06/07/2023 4.6 4.0 - 5.6 % Final     Comment:     ADA Screening Guidelines:  5.7-6.4%  Consistent with prediabetes  >or=6.5%  Consistent with diabetes    High levels of fetal hemoglobin interfere with the HbA1C  assay. Heterozygous hemoglobin variants (HbS, HgC, etc)do  not significantly interfere with this assay.   However, presence of multiple variants may affect accuracy.     03/10/2022 5.8 (H) 4.0 - 5.6 % Final     Comment:     ADA Screening Guidelines:  5.7-6.4%  Consistent with prediabetes  >or=6.5%  Consistent with diabetes    High levels of fetal hemoglobin interfere with the HbA1C  assay. Heterozygous hemoglobin variants (HbS, HgC, etc)do  not significantly interfere  with this assay.   However, presence of multiple variants may affect accuracy.         Review of Systems   Constitutional: Negative for chills, decreased appetite and fever.   Cardiovascular:  Negative for leg swelling.   Respiratory:  Negative for cough.    Skin:  Positive for dry skin and nail changes. Negative for color change, flushing and itching.   Musculoskeletal:  Negative for arthritis, joint pain, joint swelling and myalgias.   Gastrointestinal:  Negative for nausea and vomiting.   Neurological:  Negative for loss of balance, numbness and paresthesias.        Objective:     Vitals:    06/05/25 0931   BP: (!) 188/79   Pulse: 91   Weight: 83 kg (183 lb)        Physical Exam  Vitals reviewed.   Constitutional:       General: He is not in acute distress.     Appearance: He is well-developed. He is not toxic-appearing.   Cardiovascular:      Comments: Dorsalis pedis and posterior tibial pulses are diminished Bilaterally. Toes are cool to touch. Feet are warm proximally.There is decreased digital hair. Skin is atrophic, slightly hyperpigmented, and mildly edematous      Musculoskeletal:         General: No tenderness or signs of injury. Normal range of motion.      Comments: Adequate joint range of motion without pain, limitation, nor crepitation Bilateral feet and ankle joints. Muscle strength is 5/5 in all groups bilaterally.         Skin:     General: Skin is warm and dry.      Findings: No ecchymosis, erythema or lesion.      Comments: Nails x 10 are elongated by  2-6mm's, thickened by 3-5 mm's, dystrophic, and are darkened in  coloration . Xerosis Bilaterally. No open lesions noted.       Neurological:      Mental Status: He is alert and oriented to person, place, and time.      Comments: [place]-Ped[place] 5.07 monofilamant testing is diminished Logan feet. Sharp/dull sensation diminished Bilaterally. Light touch absent Bilaterally.       Psychiatric:         Behavior: Behavior normal.           Assessment:       Encounter Diagnoses   Name Primary?    PAD (peripheral artery disease) Yes    Peripheral polyneuropathy     Onychodystrophy     Stage 3 chronic kidney disease, unspecified whether stage 3a or 3b CKD      Plan:     Babatunde was seen today for fungus, nail care and nail problem.    Diagnoses and all orders for this visit:    PAD (peripheral artery disease)    Peripheral polyneuropathy    Onychodystrophy    Stage 3 chronic kidney disease, unspecified whether stage 3a or 3b CKD      I counseled the patient on his conditions, their implications and medical management.        - Shoe inspection. Diabetic Foot Education. Patient reminded of the importance of good nutrition and blood sugar control to help prevent podiatric complications of diabetes. Patient instructed on proper foot hygeine. We discussed wearing proper shoe gear, daily foot inspections, never walking without protective shoe gear, never putting sharp instruments to feet, routine podiatric nail visits every 2-3 months.      - With patient's permission, nails were aggressively reduced and debrided x 10 to their soft tissue attachment mechanically  removing all offending nail and debris. Patient relates relief following the procedure. He will continue to monitor the areas daily, inspect his feet, wear protective shoe gear when ambulatory, moisturizer to maintain skin integrity and follow in this office in approximately 2-3 months, sooner p.r.n.

## 2025-07-12 NOTE — TELEPHONE ENCOUNTER
Refill Routing Note   Medication(s) are not appropriate for processing by [facility] for the following reason(s):        Outside of protocol    [facility] action(s):  Route               Appointments  past 12m or future 3m with PCP    Date Provider   Last Visit   5/3/2024 Esdras Ly MD   Next Visit   Visit date not found Esdras Ly MD   ED visits in past 90 days: 0        Note composed:3:34 PM 07/12/2025

## 2025-07-14 RX ORDER — FERROUS SULFATE 325(65) MG
TABLET ORAL
Qty: 90 TABLET | Refills: 0 | Status: SHIPPED | OUTPATIENT
Start: 2025-07-14

## 2025-07-18 DIAGNOSIS — Z87.891 HISTORY OF TOBACCO USE: Primary | ICD-10-CM

## (undated) DEVICE — SET DECANTER MEDICHOICE

## (undated) DEVICE — SEE MEDLINE ITEM 153688

## (undated) DEVICE — DEVICE CLOSURE TROCAR SITE

## (undated) DEVICE — CLIP LIGACLIP XTRA TITANIUM

## (undated) DEVICE — IRRIGATOR ENDOSCOPY DISP.

## (undated) DEVICE — SUT SILK 0 STRANDS 30IN BLK

## (undated) DEVICE — SUT 2-0 VICRYL / SH (J417)

## (undated) DEVICE — APPLICATOR CHLORAPREP ORN 26ML

## (undated) DEVICE — ELECTRODE REM PLYHSV RETURN 9

## (undated) DEVICE — CUP MEDICINE GRADUATED 1OZ

## (undated) DEVICE — SEE L#120831

## (undated) DEVICE — GOWN SMART IMP BREATHABLE XXLG

## (undated) DEVICE — SUT 2-0 12-18IN SILK

## (undated) DEVICE — TAPE UMBILICAL 1/8X36IN WHITE

## (undated) DEVICE — GOWN SURGICAL X-LARGE

## (undated) DEVICE — SUT VICRYL 3-0 27 SH

## (undated) DEVICE — SPONGE LAP 18X18 PREWASHED

## (undated) DEVICE — SEE MEDLINE ITEM 157194

## (undated) DEVICE — SUT MCRYL PLUS 4-0 PS2 27IN

## (undated) DEVICE — TOWEL OR DISP STRL BLUE 4/PK

## (undated) DEVICE — PORT AIRSEAL 12/120MM LPI

## (undated) DEVICE — DRAPE U SPLIT SHEET 54X76IN

## (undated) DEVICE — SUT CHROMIC 3-0 SH 27IN GUT

## (undated) DEVICE — TUBING SUC UNIV W/CONN 12FT

## (undated) DEVICE — TROCAR ENDOPATH XCEL 12MM 10CM

## (undated) DEVICE — DRESSING ABSRBNT ISLAND 3.6X8

## (undated) DEVICE — BLADE SURG #15 CARBON STEEL

## (undated) DEVICE — COVER LIGHT HANDLE

## (undated) DEVICE — SUT 2/0 30IN SILK BLK BRAI

## (undated) DEVICE — SPONGE GAUZE 16PLY 4X4

## (undated) DEVICE — DRESSING ADH ISLAND 3.6 X 14

## (undated) DEVICE — SUT PROLENE 5-0 36IN C-1

## (undated) DEVICE — LOOP VESSEL BLUE MAXI

## (undated) DEVICE — COVER SET UP STRL 54X54 20/BX

## (undated) DEVICE — OBTURATOR BLADELESS 8MM XI

## (undated) DEVICE — SUT PROLENE 3-0 SH DA 36 BL

## (undated) DEVICE — NDL INSUF ULTRA VERESS 120MM

## (undated) DEVICE — DRAPE LAP T SHT W/ INSTR PAD

## (undated) DEVICE — GLOVE BIOGEL SKINSENSE PI 7.5

## (undated) DEVICE — SUT 2/0 36IN COATED VICRYL

## (undated) DEVICE — STRIP PACKING ANTIMIC 1/4X1

## (undated) DEVICE — COVER LIGHT HANDLE 80/CA

## (undated) DEVICE — DRAPE ABDOMINAL TIBURON 14X11

## (undated) DEVICE — PAD K-THERMIA 24IN X 60IN

## (undated) DEVICE — DRAPE T EXTRM SURG 121X128X90

## (undated) DEVICE — STOCKINET TUBULAR 1 PLY 6X60IN

## (undated) DEVICE — SYR W/VIAL CANNULA 10CC 30

## (undated) DEVICE — SOL IRR NACL .9% 3000ML

## (undated) DEVICE — SUT PROLENE 6-0 24 C-1 C-1

## (undated) DEVICE — SUT 0 VICRYL / UR6 (J603)

## (undated) DEVICE — SUT MONOCRYL 2-0 CT-1 VIL

## (undated) DEVICE — DRAPE SLUSH WARMER WITH DISC

## (undated) DEVICE — NDL HYPO REG 25G X 1 1/2

## (undated) DEVICE — CLIPPER BLADE MOD 4406 (CAREF)

## (undated) DEVICE — TROCAR ENDOPATH XCEL 5MM 7.5CM

## (undated) DEVICE — SYR ONLY LUER LOCK 20CC

## (undated) DEVICE — DRAPE INCISE IOBAN 2 23X17IN

## (undated) DEVICE — SEAL UNIVERSAL 5MM-8MM XI

## (undated) DEVICE — DRAPE COLUMN DAVINCI XI

## (undated) DEVICE — SUTURE TT13 36IN CV6

## (undated) DEVICE — DRAPE THREE-QTR REINF 53X77IN

## (undated) DEVICE — CLIP HEMO-LOK ML

## (undated) DEVICE — TRAY MINOR GEN SURG OMC

## (undated) DEVICE — GOWN NONREINF SET-IN SLV 2XL

## (undated) DEVICE — PACK UNIVERSAL SPLIT II

## (undated) DEVICE — COVER TIP CURVED SCISSORS XI

## (undated) DEVICE — SOL NACL 0.9% INJ 500ML BG

## (undated) DEVICE — GAUZE SPONGE 4X4 12PLY

## (undated) DEVICE — SUT 3/0 48IN PROLENE BL MO

## (undated) DEVICE — STAPLER SKIN PROXIMATE WIDE

## (undated) DEVICE — SUSPENSORY LG

## (undated) DEVICE — DRAPE INCISE IOBAN 2 23X33IN

## (undated) DEVICE — SUT CTD VICRYL 0 UND BR CT

## (undated) DEVICE — PACK DRAPE UNIVERSAL CONVERTOR

## (undated) DEVICE — SUT MONOCRYL 3-0 SH U/D

## (undated) DEVICE — TRAY DRY SKIN SCRUB PREP

## (undated) DEVICE — DRESSING INFOVAC SMALL BLK

## (undated) DEVICE — SOL PVP-I SCRUB 7.5% 4OZ

## (undated) DEVICE — DRAPE HALF SURGICAL 40X58IN

## (undated) DEVICE — SUT SILK 2-0 SH 18IN BLACK

## (undated) DEVICE — CLIP MED TICALL

## (undated) DEVICE — CANISTER INFOV.A.C WOUND 500ML

## (undated) DEVICE — TIP YANKAUERS BULB NO VENT

## (undated) DEVICE — SEE MEDLINE ITEM 156902

## (undated) DEVICE — SUT PROLENE 6-0 24 BV-1

## (undated) DEVICE — SUT 3-0 12-18IN SILK

## (undated) DEVICE — NDL 22GA X1 1/2 REG BEVEL

## (undated) DEVICE — SUT 7/0 24IN PROLENE BL MO

## (undated) DEVICE — DRAIN SIL FLAT FULL FLUTD 10FR

## (undated) DEVICE — SYR 30CC LUER LOCK

## (undated) DEVICE — STAPLER INT LINEAR ARTC 3.5-45

## (undated) DEVICE — CART STAPLE RELD 45MM WHT

## (undated) DEVICE — CLOSURE SKIN STERI STRIP 1/2X4

## (undated) DEVICE — SUT 4-0 12-18IN SILK BLACK

## (undated) DEVICE — SUT PROLENE 5-0 36 BB BB BL

## (undated) DEVICE — SUT SILK 3-0 SH 18IN BLACK

## (undated) DEVICE — SPONGE DERMACEA GAUZE 4X4

## (undated) DEVICE — SPONGE SUPER KERLIX 6X6.75IN

## (undated) DEVICE — DRAPE BAG ISOLATION 20 X 20

## (undated) DEVICE — SOL 9P NACL IRR PIC IL

## (undated) DEVICE — GAUZE SPONGE PEANUT STRL

## (undated) DEVICE — SEE MEDLINE ITEM 157148

## (undated) DEVICE — TUBE SET SINGLE LUMEN FILTERED

## (undated) DEVICE — SUT CV-6 30 IN TTC-09NDL

## (undated) DEVICE — TRAY CATH FOL SIL URIMTR 16FR

## (undated) DEVICE — TRAY PERIPHERAL VASCULAR OMC

## (undated) DEVICE — SUT VICRYL 4-0 RB1 27IN UD

## (undated) DEVICE — SUT 2-0 SILK 30IN BLK BRAID

## (undated) DEVICE — DRESSING TRANS 4X4 TEGADERM

## (undated) DEVICE — Device

## (undated) DEVICE — DRAPE STERI INSTRUMENT 1018

## (undated) DEVICE — SUT LIGACLIP SMALL XTRA

## (undated) DEVICE — SCRUB 10% POVIDONE IODINE 4OZ

## (undated) DEVICE — PLEDGET TFE POLYMER 3/16

## (undated) DEVICE — SUT ETHILON 2-0 PSLX 30IN

## (undated) DEVICE — SEE MEDLINE ITEM 156930

## (undated) DEVICE — HEMOSTAT SURGICEL 4X8IN

## (undated) DEVICE — SUT SILK 2-0 STRANDS 30IN

## (undated) DEVICE — SPONGE IV DRAIN 4X4 STERILE

## (undated) DEVICE — ADHESIVE MASTISOL VIAL 48/BX

## (undated) DEVICE — DRAPE ARM DAVINCI XI

## (undated) DEVICE — SET TRI-LUMEN FILTERED TUBE

## (undated) DEVICE — SYR 10CC LUER LOCK

## (undated) DEVICE — BLADE SCALP OPHTL BEVEL STR

## (undated) DEVICE — SUT ABS CLIP LAPRA-TY CTD

## (undated) DEVICE — ADHESIVE DERMABOND ADVANCED

## (undated) DEVICE — SUT PDS II O CTX MONO 60